# Patient Record
Sex: MALE | Race: WHITE | NOT HISPANIC OR LATINO | Employment: OTHER | URBAN - METROPOLITAN AREA
[De-identification: names, ages, dates, MRNs, and addresses within clinical notes are randomized per-mention and may not be internally consistent; named-entity substitution may affect disease eponyms.]

---

## 2018-05-10 ENCOUNTER — OFFICE VISIT CONVERTED (OUTPATIENT)
Dept: ORTHOPEDIC SURGERY | Facility: CLINIC | Age: 73
End: 2018-05-10
Attending: ORTHOPAEDIC SURGERY

## 2018-06-13 ENCOUNTER — OFFICE VISIT CONVERTED (OUTPATIENT)
Dept: SURGERY | Facility: CLINIC | Age: 73
End: 2018-06-13
Attending: SURGERY

## 2018-06-26 ENCOUNTER — OFFICE VISIT CONVERTED (OUTPATIENT)
Dept: CARDIOLOGY | Facility: CLINIC | Age: 73
End: 2018-06-26
Attending: SPECIALIST

## 2018-07-05 ENCOUNTER — CONVERSION ENCOUNTER (OUTPATIENT)
Dept: SURGERY | Facility: CLINIC | Age: 73
End: 2018-07-05

## 2018-07-05 ENCOUNTER — OFFICE VISIT CONVERTED (OUTPATIENT)
Dept: SURGERY | Facility: CLINIC | Age: 73
End: 2018-07-05
Attending: SURGERY

## 2019-02-26 ENCOUNTER — OFFICE VISIT CONVERTED (OUTPATIENT)
Dept: ORTHOPEDIC SURGERY | Facility: CLINIC | Age: 74
End: 2019-02-26
Attending: ORTHOPAEDIC SURGERY

## 2019-02-26 ENCOUNTER — CONVERSION ENCOUNTER (OUTPATIENT)
Dept: ORTHOPEDIC SURGERY | Facility: CLINIC | Age: 74
End: 2019-02-26

## 2019-05-02 ENCOUNTER — OFFICE VISIT CONVERTED (OUTPATIENT)
Dept: ORTHOPEDIC SURGERY | Facility: CLINIC | Age: 74
End: 2019-05-02
Attending: ORTHOPAEDIC SURGERY

## 2019-05-02 ENCOUNTER — CONVERSION ENCOUNTER (OUTPATIENT)
Dept: ORTHOPEDIC SURGERY | Facility: CLINIC | Age: 74
End: 2019-05-02

## 2019-06-07 ENCOUNTER — OFFICE VISIT CONVERTED (OUTPATIENT)
Dept: ORTHOPEDIC SURGERY | Facility: CLINIC | Age: 74
End: 2019-06-07
Attending: ORTHOPAEDIC SURGERY

## 2019-06-28 ENCOUNTER — HOSPITAL ENCOUNTER (OUTPATIENT)
Dept: MRI IMAGING | Facility: HOSPITAL | Age: 74
Discharge: HOME | End: 2019-06-28
Attending: ORTHOPAEDIC SURGERY

## 2019-07-09 ENCOUNTER — CONVERSION ENCOUNTER (OUTPATIENT)
Dept: ORTHOPEDIC SURGERY | Facility: CLINIC | Age: 74
End: 2019-07-09

## 2019-07-09 ENCOUNTER — OFFICE VISIT CONVERTED (OUTPATIENT)
Dept: ORTHOPEDIC SURGERY | Facility: CLINIC | Age: 74
End: 2019-07-09
Attending: PHYSICIAN ASSISTANT

## 2019-08-21 ENCOUNTER — HOSPITAL ENCOUNTER (OUTPATIENT)
Dept: PERIOP | Facility: HOSPITAL | Age: 74
Setting detail: HOSPITAL OUTPATIENT SURGERY
Discharge: HOME | End: 2019-08-21
Attending: ORTHOPAEDIC SURGERY

## 2019-08-21 LAB
ANION GAP SERPL CALC-SCNC: 15 MMOL/L (ref 8–19)
BUN SERPL-MCNC: 24 MG/DL (ref 5–25)
BUN/CREAT SERPL: 17 {RATIO} (ref 6–20)
CALCIUM SERPL-MCNC: 8.7 MG/DL (ref 8.7–10.4)
CHLORIDE SERPL-SCNC: 104 MMOL/L (ref 99–111)
CONV CO2: 25 MMOL/L (ref 22–32)
CREAT UR-MCNC: 1.4 MG/DL (ref 0.7–1.2)
GFR SERPLBLD BASED ON 1.73 SQ M-ARVRAT: 49 ML/MIN/{1.73_M2}
GLUCOSE SERPL-MCNC: 103 MG/DL (ref 70–99)
OSMOLALITY SERPL CALC.SUM OF ELEC: 294 MOSM/KG (ref 273–304)
POTASSIUM SERPL-SCNC: 4.2 MMOL/L (ref 3.5–5.3)
SODIUM SERPL-SCNC: 140 MMOL/L (ref 135–147)

## 2019-08-26 ENCOUNTER — HOSPITAL ENCOUNTER (OUTPATIENT)
Dept: ORTHOPEDIC SURGERY | Facility: CLINIC | Age: 74
Setting detail: RECURRING SERIES
Discharge: HOME | End: 2019-12-13
Attending: ORTHOPAEDIC SURGERY

## 2019-09-05 ENCOUNTER — OFFICE VISIT CONVERTED (OUTPATIENT)
Dept: ORTHOPEDIC SURGERY | Facility: CLINIC | Age: 74
End: 2019-09-05
Attending: PHYSICIAN ASSISTANT

## 2019-10-08 ENCOUNTER — OFFICE VISIT CONVERTED (OUTPATIENT)
Dept: ORTHOPEDIC SURGERY | Facility: CLINIC | Age: 74
End: 2019-10-08
Attending: PHYSICIAN ASSISTANT

## 2019-11-19 ENCOUNTER — CONVERSION ENCOUNTER (OUTPATIENT)
Dept: ORTHOPEDIC SURGERY | Facility: CLINIC | Age: 74
End: 2019-11-19

## 2019-11-19 ENCOUNTER — OFFICE VISIT CONVERTED (OUTPATIENT)
Dept: ORTHOPEDIC SURGERY | Facility: CLINIC | Age: 74
End: 2019-11-19
Attending: PHYSICIAN ASSISTANT

## 2020-10-22 ENCOUNTER — OFFICE VISIT CONVERTED (OUTPATIENT)
Dept: ORTHOPEDIC SURGERY | Facility: CLINIC | Age: 75
End: 2020-10-22
Attending: PHYSICIAN ASSISTANT

## 2020-11-03 ENCOUNTER — CONVERSION ENCOUNTER (OUTPATIENT)
Dept: ORTHOPEDIC SURGERY | Facility: CLINIC | Age: 75
End: 2020-11-03

## 2020-11-03 ENCOUNTER — OFFICE VISIT CONVERTED (OUTPATIENT)
Dept: ORTHOPEDIC SURGERY | Facility: CLINIC | Age: 75
End: 2020-11-03
Attending: PHYSICIAN ASSISTANT

## 2020-11-17 ENCOUNTER — CONVERSION ENCOUNTER (OUTPATIENT)
Dept: ORTHOPEDIC SURGERY | Facility: CLINIC | Age: 75
End: 2020-11-17

## 2020-11-17 ENCOUNTER — OFFICE VISIT CONVERTED (OUTPATIENT)
Dept: ORTHOPEDIC SURGERY | Facility: CLINIC | Age: 75
End: 2020-11-17
Attending: PHYSICIAN ASSISTANT

## 2021-02-23 ENCOUNTER — HOSPITAL ENCOUNTER (OUTPATIENT)
Dept: VACCINE CLINIC | Facility: HOSPITAL | Age: 76
Discharge: HOME | End: 2021-02-23
Attending: INTERNAL MEDICINE

## 2021-03-25 ENCOUNTER — HOSPITAL ENCOUNTER (OUTPATIENT)
Dept: VACCINE CLINIC | Facility: HOSPITAL | Age: 76
Discharge: HOME | End: 2021-03-25
Attending: INTERNAL MEDICINE

## 2021-05-10 NOTE — H&P
History and Physical      Patient Name: Blair Lira   Patient ID: [id number]   Sex: Male   YOB: 1945    Primary Care Provider: Joy BERMUDEZ   Referring Provider: Joy BERMUDEZ    Visit Date: October 22, 2020    Provider: Lillie Lockett PA-C   Location: [facility]   Location Address: [address] [place] [address]  [id number]   Location Phone: [phone]          Chief Complaint  · Right elbow pain      History Of Present Illness  Blair Lira is a 75 year old /White male who presents today to [facility].      Patient is here for right elbow pain that started 1 month ago. Patient states the last 3-4 days pain increased with redness.       Past Medical History  Aftercare following right hip joint replacement surgery; Avascular necrosis of bone of hip, Right > Left; High blood pressure; History of total replacement of right hip; Hypertension; Lumbago; Lumbar disc herniation, L5-S1; Primary osteoarthritis bilateral hip         Past Surgical History  Artificial Joints/Limbs; Colonoscopy; EGD; Joint Surgery         Medication List  atorvastatin 10 mg oral tablet; hydrochlorothiazide 12.5 mg oral tablet; irbesartan 300 mg oral tablet; Keflex 500 mg oral capsule; Omega 3-6-9 Fatty Acids 400-400-200 mg oral capsule; tamsulosin 0.4 mg oral capsule         Allergy List  NO KNOWN DRUG ALLERGIES         Family Medical History  Heart Disease; Cancer, Unspecified; - No Family History of Colorectal Cancer; *No Known Family History         Social History  Alcohol (Former); Alcohol Use (Never); lives with spouse; .; Recreational Drug Use (Never); Retired.; Tobacco (Never)         Review of Systems  · Constitutional  o Denies  o : fever, chills, weight loss  · Cardiovascular  o Denies  o : chest pain, shortness of breath  · Gastrointestinal  o Denies  o : liver disease, heartburn, nausea, blood in stools  · Genitourinary  o Denies  o : painful urination,  "blood in urine  · Integument  o Denies  o : rash, itching  · Neurologic  o Denies  o : headache, weakness, loss of consciousness  · Musculoskeletal  o Denies  o : painful, swollen joints  · Psychiatric  o Denies  o : drug/alcohol addiction, anxiety, depression      Vitals  Date Time BP Position Site L\R Cuff Size HR RR TEMP (F) WT  HT  BMI kg/m2 BSA m2 O2 Sat FR L/min FiO2        10/22/2020 09:40 AM      94 - R   217lbs 6oz 6'  1\" 28.68 2.25 96 %            Physical Examination  · Constitutional  o Appearance  o : well developed, well-nourished, no obvious deformities present  · Head and Face  o Head  o :   § Inspection  § : normocephalic  o Face  o :   § Inspection  § : no facial lesions  · Eyes  o Conjunctivae  o : conjunctivae normal  o Sclerae  o : sclerae white  · Ears, Nose, Mouth and Throat  o Ears  o :   § External Ears  § : appearance within normal limits  § Hearing  § : intact  o Nose  o :   § External Nose  § : appearance normal  · Neck  o Inspection/Palpation  o : normal appearance  o Range of Motion  o : full range of motion  · Respiratory  o Respiratory Effort  o : breathing unlabored  o Inspection of Chest  o : normal appearance  o Auscultation of Lungs  o : no audible wheezing or rales  · Cardiovascular  o Heart  o : regular rate  · Gastrointestinal  o Abdominal Examination  o : soft and non-tender  · Skin and Subcutaneous Tissue  o General Inspection  o : intact, no rashes  · Psychiatric  o General  o : Alert and oriented x3  o Judgement and Insight  o : judgment and insight intact  o Mood and Affect  o : mood normal, affect appropriate  · In Office Procedures  o View  o : AP/LATERAL  o Site  o : right, elbow  o Indication  o : Right arm pain  o Study  o : X-rays ordered, taken in the office, and reviewed today.  o Xray  o : olecranon spur  o Comparative Data  o : No comparative data found  · Right [address]  o Inspection  o : swelling, erythema, no ecchymosis, no atrophy  o Palpation  o : " biceps insertion tenderness absent, non-tender lateral epicondyle, non-tender medial epicondyle, non-tender at triceps insertion, non-tender radial head, non-tender cubital tunnel, tenderness of olecranon bursa , tenderness at olecranon, non-tender UCL  o Range of Motion  o : normal flexion, normal extension, normal pronation, normal supination  o Strength  o : Full extension, full flexion, full supination, full pronoation  o Special Tests  o : Negative Varus, Negative Valgus, Negative Pivot shift          Assessment  · Right elbow pain     719.42/M25.521  · Olecranon bursitis     726.33/M70.20      Plan  · Orders  o Elbow (Right) 2 views X-Ray [facility] ([id number]) - 719.42/M25.521 - 10/22/2020  · Medications  o Medications have been Reconciled  o Transition of Care or Provider Policy  · Instructions  o Dr. Samuels saw and examined the patient and agrees with plan.   o X-rays reviewed by Dr. Samuels.  o Reviewed the patient's Past Medical, Social, and Family history as well as the ROS at today's visit, no changes.  o Call or return if worsening symptoms.  o X-ray ordered, taken and reviewed at this visit.  o Follow up in 1 week.  o Electronically Identified Patient Education Materials Provided Electronically     Prescribed Keflex 500mg one po TID x 10 days             Electronically Signed by: CHAI Aquino-C -Author on October 22, 2020 10:16:05 AM  Electronically Co-signed by: Gustavo Samuels MD -Reviewer on October 22, 2020 11:39:56 AM

## 2021-05-13 NOTE — PROGRESS NOTES
Progress Note      Patient Name: Blair Lira   Patient ID: [id number]   Sex: Male   YOB: 1945    Primary Care Provider: Joy BERMUDEZ   Referring Provider: Joy BERMUDEZ    Visit Date: November 3, 2020    Provider: Lillie Lockett PA-C   Location: [facility]   Location Address: [address] [place] [address]  [id number]   Location Phone: [phone]          Chief Complaint  · Follow up right elbow pain      History Of Present Illness  Blair Lira is a 75 year old /White male who presents today to [facility].      Patient is following up for right olecranon bursitis. Patient states completing Keflex 500mg for 10 days. Patient states redness has subsided. Patient states mild pain.       Past Medical History  Aftercare following right hip joint replacement surgery; Avascular necrosis of bone of hip, Right > Left; High blood pressure; History of total replacement of right hip; Hypertension; Lumbago; Lumbar disc herniation, L5-S1; Primary osteoarthritis bilateral hip         Past Surgical History  Artificial Joints/Limbs; Colonoscopy; EGD; Joint Surgery         Medication List  atorvastatin 10 mg oral tablet; hydrochlorothiazide 12.5 mg oral tablet; irbesartan 300 mg oral tablet; Keflex 500 mg oral capsule; Omega 3-6-9 Fatty Acids 400-400-200 mg oral capsule; tamsulosin 0.4 mg oral capsule         Allergy List  NO KNOWN DRUG ALLERGIES         Family Medical History  Heart Disease; Cancer, Unspecified; - No Family History of Colorectal Cancer; *No Known Family History         Social History  Alcohol (Former); Alcohol Use (Never); lives with spouse; .; Recreational Drug Use (Never); Retired.; Tobacco (Never)         Review of Systems  · Constitutional  o Denies  o : fever, chills, weight loss  · Cardiovascular  o Denies  o : chest pain, shortness of breath  · Gastrointestinal  o Denies  o : liver disease, heartburn, nausea, blood in  "stools  · Genitourinary  o Denies  o : painful urination, blood in urine  · Integument  o Denies  o : rash, itching  · Neurologic  o Denies  o : headache, weakness, loss of consciousness  · Musculoskeletal  o Denies  o : painful, swollen joints  · Psychiatric  o Denies  o : drug/alcohol addiction, anxiety, depression      Vitals  Date Time BP Position Site L\R Cuff Size HR RR TEMP (F) WT  HT  BMI kg/m2 BSA m2 O2 Sat FR L/min FiO2        11/03/2020 03:07 PM      73 - R   217lbs 0oz 6'  1\" 28.63 2.25 96 %            Physical Examination  · Constitutional  o Appearance  o : well developed, well-nourished, no obvious deformities present  · Head and Face  o Head  o :   § Inspection  § : normocephalic  o Face  o :   § Inspection  § : no facial lesions  · Eyes  o Conjunctivae  o : conjunctivae normal  o Sclerae  o : sclerae white  · Ears, Nose, Mouth and Throat  o Ears  o :   § External Ears  § : appearance within normal limits  § Hearing  § : intact  o Nose  o :   § External Nose  § : appearance normal  · Neck  o Inspection/Palpation  o : normal appearance  o Range of Motion  o : full range of motion  · Respiratory  o Respiratory Effort  o : breathing unlabored  o Inspection of Chest  o : normal appearance  o Auscultation of Lungs  o : no audible wheezing or rales  · Cardiovascular  o Heart  o : regular rate  · Gastrointestinal  o Abdominal Examination  o : soft and non-tender  · Skin and Subcutaneous Tissue  o General Inspection  o : intact, no rashes  · Psychiatric  o General  o : Alert and oriented x3  o Judgement and Insight  o : judgment and insight intact  o Mood and Affect  o : mood normal, affect appropriate  · Right [address]  o Inspection  o : no swelling, no erythema, no ecchymosis, no atrophy  o Palpation  o : biceps insertion tenderness absent, non-tender lateral epicondyle, non-tender medial epicondyle, non-tender at triceps insertion, non-tender radial head, non-tender cubital tunnel, tenderness of " olecranon bursa , non-tender olecranon, non-tender UCL  o Range of Motion  o : normal flexion, normal extension, normal pronation, normal supination  o Strength  o : Full extension, full flexion, full supination, full pronoation  o Special Tests  o : Negative Varus, Negative Valgus, Negative Pivot shift          Assessment  · Pain: Elbow     719.42/M25.529  · Olecranon bursitis     726.33/M70.20      Plan  · Medications  o Medications have been Reconciled  o Transition of Care or Provider Policy  · Instructions  o Reviewed the patient's Past Medical, Social, and Family history as well as the ROS at today's visit, no changes.  o Call or return if worsening symptoms.  o Follow Up in 2 weeks.  o Electronically Identified Patient Education Materials Provided Electronically     Prescribed Keflex 500mg one po TID X 10 days             Electronically Signed by: Lillie Lockett PA-C -Author on November 3, 2020 03:46:31 PM

## 2021-05-13 NOTE — PROGRESS NOTES
Progress Note      Patient Name: Blair Lira   Patient ID: [id number]   Sex: Male   YOB: 1945    Primary Care Provider: Joy BERMUDEZ   Referring Provider: Joy BERMUDEZ    Visit Date: November 17, 2020    Provider: Lillie Lockett PA-C   Location: [facility]   Location Address: [address] [place] [address]  [id number]   Location Phone: [phone]          Chief Complaint  · right elbow pain      History Of Present Illness  Blair Lira is a 75 year old /White male who presents today to [facility].      Patient is following up for right olecranon bursitis. Patient states completing Keflex 500mg for 10 days. Patient states redness has subsided. Patient states mild pain.            Past Medical History  Aftercare following right hip joint replacement surgery; Avascular necrosis of bone of hip, Right > Left; High blood pressure; History of total replacement of right hip; Hypertension; Lumbago; Lumbar disc herniation, L5-S1; Primary osteoarthritis bilateral hip         Past Surgical History  Artificial Joints/Limbs; Colonoscopy; EGD; Joint Surgery         Medication List  atorvastatin 10 mg oral tablet; hydrochlorothiazide 12.5 mg oral tablet; irbesartan 300 mg oral tablet; Omega 3-6-9 Fatty Acids 400-400-200 mg oral capsule; tamsulosin 0.4 mg oral capsule         Allergy List  NO KNOWN DRUG ALLERGIES         Family Medical History  Heart Disease; Cancer, Unspecified; - No Family History of Colorectal Cancer; *No Known Family History         Social History  Alcohol (Former); Alcohol Use (Never); lives with spouse; .; Recreational Drug Use (Never); Retired.; Tobacco (Never)         Review of Systems  · Constitutional  o Denies  o : fever, chills, weight loss  · Cardiovascular  o Denies  o : chest pain, shortness of breath  · Gastrointestinal  o Denies  o : liver disease, heartburn, nausea, blood in stools  · Genitourinary  o Denies  o :  "painful urination, blood in urine  · Integument  o Denies  o : rash, itching  · Neurologic  o Denies  o : headache, weakness, loss of consciousness  · Musculoskeletal  o Denies  o : painful, swollen joints  · Psychiatric  o Denies  o : drug/alcohol addiction, anxiety, depression      Vitals  Date Time BP Position Site L\R Cuff Size HR RR TEMP (F) WT  HT  BMI kg/m2 BSA m2 O2 Sat FR L/min FiO2        11/17/2020 10:07 AM      58 - R   223lbs 8oz 6'  1\" 29.49 2.29 99 %            Physical Examination  · Constitutional  o Appearance  o : well developed, well-nourished, no obvious deformities present  · Head and Face  o Head  o :   § Inspection  § : normocephalic  o Face  o :   § Inspection  § : no facial lesions  · Eyes  o Conjunctivae  o : conjunctivae normal  o Sclerae  o : sclerae white  · Ears, Nose, Mouth and Throat  o Ears  o :   § External Ears  § : appearance within normal limits  § Hearing  § : intact  o Nose  o :   § External Nose  § : appearance normal  · Neck  o Inspection/Palpation  o : normal appearance  o Range of Motion  o : full range of motion  · Respiratory  o Respiratory Effort  o : breathing unlabored  o Inspection of Chest  o : normal appearance  o Auscultation of Lungs  o : no audible wheezing or rales  · Cardiovascular  o Heart  o : regular rate  · Gastrointestinal  o Abdominal Examination  o : soft and non-tender  · Skin and Subcutaneous Tissue  o General Inspection  o : intact, no rashes  · Psychiatric  o General  o : Alert and oriented x3  o Judgement and Insight  o : judgment and insight intact  o Mood and Affect  o : mood normal, affect appropriate  · Injection Note/Aspiration Note  o Site  o : right elbow aspiration  o Procedure  o : Procedure: After educating the patient, patient gave consent for procedure. After using Chloraprep, the joint space was injected. The patient tolerated the procedure well.   o Medication  o : Aspiration with 1cc of 1% Lidocaine  · Right Elbow " [address]  o Inspection  o : swelling, no erythema, no ecchymosis, no atrophy  o Palpation  o : biceps insertion tenderness absent, non-tender lateral epicondyle, non-tender medial epicondyle, non-tender at triceps insertion, non-tender radial head, non-tender cubital tunnel, tenderness of olecranon bursa , non-tender olecranon, non-tender UCL  o Range of Motion  o : normal flexion, normal extension, normal pronation, normal supination  o Strength  o : Full extension, full flexion, full supination, full pronoation  o Special Tests  o : Negative Varus, Negative Valgus, Negative Pivot shift          Assessment  · Right elbow pain     719.42/M25.521  · Olecranon bursitis     726.33/M70.20      Plan  · Orders  o Elbow-Lat Single Tendon (Injection) (78350) - - 11/17/2020   Lot 08 214 DK Exp 08 01 2021 Hospira Aspiration administered by johan PAULA C  · Medications  o Medications have been Reconciled  o Transition of Care or Provider Policy  · Instructions  o Reviewed the patient's Past Medical, Social, and Family history as well as the ROS at today's visit, no changes.  o Call or return if worsening symptoms.  o Follow Up PRN.  o Electronically Identified Patient Education Materials Provided Electronically     Aspirated 5 cc of purulent fluid from right elbow olecranon bursa.             Electronically Signed by: CHAI Aquino-C -Author on November 17, 2020 11:49:20 AM  Electronically Co-signed by: Gustavo Samuels MD -Reviewer on November 17, 2020 03:10:15 PM

## 2021-05-14 VITALS — HEIGHT: 73 IN | OXYGEN SATURATION: 96 % | WEIGHT: 217.37 LBS | BODY MASS INDEX: 28.81 KG/M2 | HEART RATE: 94 BPM

## 2021-05-14 VITALS — HEIGHT: 73 IN | HEART RATE: 73 BPM | BODY MASS INDEX: 28.76 KG/M2 | OXYGEN SATURATION: 96 % | WEIGHT: 217 LBS

## 2021-05-14 VITALS — HEART RATE: 58 BPM | HEIGHT: 73 IN | OXYGEN SATURATION: 99 % | BODY MASS INDEX: 29.62 KG/M2 | WEIGHT: 223.5 LBS

## 2021-05-15 VITALS — HEIGHT: 73 IN | HEART RATE: 70 BPM | BODY MASS INDEX: 30.35 KG/M2 | WEIGHT: 229 LBS | OXYGEN SATURATION: 96 %

## 2021-05-15 VITALS — HEIGHT: 73 IN | WEIGHT: 229 LBS | BODY MASS INDEX: 30.35 KG/M2 | OXYGEN SATURATION: 98 % | HEART RATE: 74 BPM

## 2021-05-15 VITALS — HEIGHT: 73 IN | OXYGEN SATURATION: 95 % | BODY MASS INDEX: 30.35 KG/M2 | HEART RATE: 70 BPM | WEIGHT: 229 LBS

## 2021-05-15 VITALS — WEIGHT: 227.25 LBS | HEART RATE: 87 BPM | HEIGHT: 73 IN | OXYGEN SATURATION: 93 % | BODY MASS INDEX: 30.12 KG/M2

## 2021-05-15 VITALS — WEIGHT: 237.12 LBS | HEIGHT: 73 IN | HEART RATE: 83 BPM | OXYGEN SATURATION: 93 % | BODY MASS INDEX: 31.43 KG/M2

## 2021-05-15 VITALS — HEIGHT: 73 IN | OXYGEN SATURATION: 94 % | HEART RATE: 86 BPM | BODY MASS INDEX: 30.1 KG/M2 | WEIGHT: 227.12 LBS

## 2021-05-16 VITALS — WEIGHT: 228.12 LBS | HEIGHT: 73 IN | HEART RATE: 82 BPM | OXYGEN SATURATION: 93 % | BODY MASS INDEX: 30.23 KG/M2

## 2021-05-16 VITALS — BODY MASS INDEX: 29.95 KG/M2 | WEIGHT: 226 LBS | HEIGHT: 73 IN | RESPIRATION RATE: 16 BRPM

## 2021-05-16 VITALS — WEIGHT: 229 LBS | BODY MASS INDEX: 30.35 KG/M2 | RESPIRATION RATE: 16 BRPM | HEIGHT: 73 IN

## 2021-05-16 VITALS
HEART RATE: 60 BPM | WEIGHT: 226 LBS | HEIGHT: 73 IN | DIASTOLIC BLOOD PRESSURE: 84 MMHG | SYSTOLIC BLOOD PRESSURE: 136 MMHG | BODY MASS INDEX: 29.95 KG/M2

## 2021-05-16 VITALS — OXYGEN SATURATION: 98 % | WEIGHT: 235 LBS | HEART RATE: 75 BPM | HEIGHT: 73 IN | BODY MASS INDEX: 31.14 KG/M2

## 2022-11-15 ENCOUNTER — OFFICE VISIT (OUTPATIENT)
Dept: ORTHOPEDIC SURGERY | Facility: CLINIC | Age: 77
End: 2022-11-15

## 2022-11-15 VITALS — HEIGHT: 73 IN | BODY MASS INDEX: 29.69 KG/M2 | OXYGEN SATURATION: 97 % | WEIGHT: 224 LBS | HEART RATE: 74 BPM

## 2022-11-15 DIAGNOSIS — M25.511 RIGHT SHOULDER PAIN, UNSPECIFIED CHRONICITY: Primary | ICD-10-CM

## 2022-11-15 DIAGNOSIS — M75.41 IMPINGEMENT SYNDROME OF RIGHT SHOULDER: ICD-10-CM

## 2022-11-15 PROCEDURE — 99203 OFFICE O/P NEW LOW 30 MIN: CPT | Performed by: ORTHOPAEDIC SURGERY

## 2022-11-15 PROCEDURE — 20610 DRAIN/INJ JOINT/BURSA W/O US: CPT | Performed by: ORTHOPAEDIC SURGERY

## 2022-11-15 RX ORDER — TAMSULOSIN HYDROCHLORIDE 0.4 MG/1
CAPSULE ORAL
COMMUNITY
Start: 2022-09-23

## 2022-11-15 RX ORDER — ATORVASTATIN CALCIUM 10 MG/1
TABLET, FILM COATED ORAL
COMMUNITY
Start: 2022-09-23

## 2022-11-15 RX ORDER — IRBESARTAN 300 MG/1
300 TABLET ORAL NIGHTLY
COMMUNITY
Start: 2022-09-23

## 2022-11-15 RX ORDER — TRIAMCINOLONE ACETONIDE 40 MG/ML
40 INJECTION, SUSPENSION INTRA-ARTICULAR; INTRAMUSCULAR
Status: COMPLETED | OUTPATIENT
Start: 2022-11-15 | End: 2022-11-15

## 2022-11-15 RX ORDER — HYDROCHLOROTHIAZIDE 25 MG/1
25 TABLET ORAL NIGHTLY
COMMUNITY
Start: 2022-09-23

## 2022-11-15 RX ORDER — LIDOCAINE HYDROCHLORIDE 10 MG/ML
5 INJECTION, SOLUTION INFILTRATION; PERINEURAL
Status: COMPLETED | OUTPATIENT
Start: 2022-11-15 | End: 2022-11-15

## 2022-11-15 RX ORDER — MELOXICAM 15 MG/1
15 TABLET ORAL DAILY
Qty: 30 TABLET | Refills: 0 | Status: SHIPPED | OUTPATIENT
Start: 2022-11-15 | End: 2022-12-12

## 2022-11-15 RX ADMIN — LIDOCAINE HYDROCHLORIDE 5 ML: 10 INJECTION, SOLUTION INFILTRATION; PERINEURAL at 09:17

## 2022-11-15 RX ADMIN — TRIAMCINOLONE ACETONIDE 40 MG: 40 INJECTION, SUSPENSION INTRA-ARTICULAR; INTRAMUSCULAR at 09:17

## 2022-11-15 NOTE — PROGRESS NOTES
"Chief Complaint  Initial Evaluation of the Right Shoulder     Subjective      Blair Lira presents to [facility] for initial evaluation of the right shoulder. No injury occurred but 2-3 weeks ago the shoulder started hurting with burning and aching.  There is just constant pain.  He reports difficulty with sleeping and activity. The patient had a left rotator cuff tear years ago and states this feels like that did when it started. He stated it started more in the bicep but then went back to the shoulder joint.     No Known Allergies     Social History     Socioeconomic History   • Marital status:    Tobacco Use   • Smoking status: Never   • Smokeless tobacco: Never        Review of Systems     Objective   Vital Signs:   Pulse 74   Ht 185.4 cm (73\")   Wt 102 kg (224 lb)   SpO2 97%   BMI 29.55 kg/m²       Physical Exam  Constitutional:       Appearance: Normal appearance. Patient is well-developed and normal weight.   HENT:      Head: Normocephalic.      Right Ear: Hearing and external ear normal.      Left Ear: Hearing and external ear normal.      Nose: Nose normal.   Eyes:      Conjunctiva/sclera: Conjunctivae normal.   Cardiovascular:      Rate and Rhythm: Normal rate.   Pulmonary:      Effort: Pulmonary effort is normal.      Breath sounds: No wheezing or rales.   Abdominal:      Palpations: Abdomen is soft.      Tenderness: There is no abdominal tenderness.   Musculoskeletal:      Cervical back: Normal range of motion.   Skin:     Findings: No rash.   Neurological:      Mental Status: Patient is alert and oriented to person, place, and time.   Psychiatric:         Mood and Affect: Mood and affect normal.         Judgment: Judgment normal.       Ortho Exam      RIGHT SHOULDER radial pulse 2+. Ulnar pulse 2+. Good tone of deltoid, bicep, triceps, wrist extensors and flexors. Positive impingement.  40 ER .  Near full forward elevation.  80 abduction.     Large Joint " Arthrocentesis: R subacromial bursa  Date/Time: 11/15/2022 9:17 AM  Consent given by: patient  Site marked: site marked  Timeout: Immediately prior to procedure a time out was called to verify the correct patient, procedure, equipment, support staff and site/side marked as required   Supporting Documentation  Indications: pain   Procedure Details  Location: shoulder - R subacromial bursa  Preparation: Patient was prepped and draped in the usual sterile fashion  Needle gauge: 21g.  Medications administered: 40 mg triamcinolone acetonide 40 MG/ML; 5 mL lidocaine 1 %  Patient tolerance: patient tolerated the procedure well with no immediate complications            Imaging Results (Most Recent)     Procedure Component Value Units Date/Time    XR Scapula Right [[id number]] Resulted: 11/15/22 0849     Updated: 11/15/22 0852           Result Review :     X-Ray Report:  Right scapula(s) X-Ray  Indication: Evaluation of the right shoulder  AP/Lateral view(s)  Findings: No acute osseous abnormality, no dislocation or fracture.   Prior studies available for comparison:No              Assessment and Plan     Diagnoses and all orders for this visit:    1. Right shoulder pain, unspecified chronicity (Primary)  -     XR Scapula Right    2. Impingement syndrome of right shoulder      Discussed the treatment plan with the patient. Discussed conservative measures as exercises, anti-inflammatory and injection. The patient is going to get a right steroid shoulder injection, anti-inflammatory and HEP.  MRI if these conservative measures do not work.     Educated on risk of smoking. Discussed options for smoking cessation.  Call or return if worsening symptoms.    Follow Up     3-4 weeks.       Patient was given instructions and counseling regarding his condition or for health maintenance advice. Please see specific information pulled into the AVS if appropriate.     Scribed for Gustavo Samuels MD by Abigail Umanzor MA.  11/15/22    08:57 EST    I have personally performed the services described in this document as scribed by the above individual and it is both accurate and complete. Gustavo Samuels MD 11/15/22

## 2022-11-29 ENCOUNTER — LAB (OUTPATIENT)
Dept: LAB | Facility: HOSPITAL | Age: 77
End: 2022-11-29

## 2022-11-29 ENCOUNTER — TRANSCRIBE ORDERS (OUTPATIENT)
Dept: LAB | Facility: HOSPITAL | Age: 77
End: 2022-11-29

## 2022-11-29 DIAGNOSIS — I10 HYPERTENSION, ESSENTIAL: ICD-10-CM

## 2022-11-29 DIAGNOSIS — N18.30 STAGE 3 CHRONIC KIDNEY DISEASE, UNSPECIFIED WHETHER STAGE 3A OR 3B CKD: Primary | ICD-10-CM

## 2022-11-29 DIAGNOSIS — N18.30 STAGE 3 CHRONIC KIDNEY DISEASE, UNSPECIFIED WHETHER STAGE 3A OR 3B CKD: ICD-10-CM

## 2022-11-29 LAB
ALBUMIN SERPL-MCNC: 3.8 G/DL (ref 3.5–5.2)
ALBUMIN UR-MCNC: 17.8 MG/DL
ALBUMIN/GLOB SERPL: 0.9 G/DL
ALP SERPL-CCNC: 56 U/L (ref 39–117)
ALT SERPL W P-5'-P-CCNC: 9 U/L (ref 1–41)
ANION GAP SERPL CALCULATED.3IONS-SCNC: 9.6 MMOL/L (ref 5–15)
AST SERPL-CCNC: 10 U/L (ref 1–40)
BACTERIA UR QL AUTO: NORMAL /HPF
BILIRUB SERPL-MCNC: 0.7 MG/DL (ref 0–1.2)
BILIRUB UR QL STRIP: NEGATIVE
BUN SERPL-MCNC: 26 MG/DL (ref 8–23)
BUN/CREAT SERPL: 16 (ref 7–25)
CALCIUM SPEC-SCNC: 9.1 MG/DL (ref 8.6–10.5)
CHLORIDE SERPL-SCNC: 103 MMOL/L (ref 98–107)
CLARITY UR: CLEAR
CO2 SERPL-SCNC: 24.4 MMOL/L (ref 22–29)
COLOR UR: YELLOW
CREAT SERPL-MCNC: 1.63 MG/DL (ref 0.76–1.27)
CREAT UR-MCNC: 155.6 MG/DL
CREAT UR-MCNC: 162.2 MG/DL
EGFRCR SERPLBLD CKD-EPI 2021: 43.4 ML/MIN/1.73
GLOBULIN UR ELPH-MCNC: 4.2 GM/DL
GLUCOSE SERPL-MCNC: 111 MG/DL (ref 65–99)
GLUCOSE UR STRIP-MCNC: NEGATIVE MG/DL
HGB UR QL STRIP.AUTO: ABNORMAL
HYALINE CASTS UR QL AUTO: NORMAL /LPF
KETONES UR QL STRIP: NEGATIVE
LEUKOCYTE ESTERASE UR QL STRIP.AUTO: NEGATIVE
MICROALBUMIN/CREAT UR: 114.4 MG/G
NITRITE UR QL STRIP: NEGATIVE
PH UR STRIP.AUTO: 6 [PH] (ref 5–8)
POTASSIUM SERPL-SCNC: 4.1 MMOL/L (ref 3.5–5.2)
PROT ?TM UR-MCNC: 101.2 MG/DL
PROT SERPL-MCNC: 8 G/DL (ref 6–8.5)
PROT UR QL STRIP: ABNORMAL
PROT/CREAT UR: 0.62 MG/G{CREAT}
RBC # UR STRIP: NORMAL /HPF
REF LAB TEST METHOD: NORMAL
SODIUM SERPL-SCNC: 137 MMOL/L (ref 136–145)
SP GR UR STRIP: 1.02 (ref 1–1.03)
SQUAMOUS #/AREA URNS HPF: NORMAL /HPF
UROBILINOGEN UR QL STRIP: ABNORMAL
WBC # UR STRIP: NORMAL /HPF

## 2022-11-29 PROCEDURE — 80053 COMPREHEN METABOLIC PANEL: CPT

## 2022-11-29 PROCEDURE — 81001 URINALYSIS AUTO W/SCOPE: CPT

## 2022-11-29 PROCEDURE — 82043 UR ALBUMIN QUANTITATIVE: CPT

## 2022-11-29 PROCEDURE — 84156 ASSAY OF PROTEIN URINE: CPT

## 2022-11-29 PROCEDURE — 82570 ASSAY OF URINE CREATININE: CPT

## 2022-11-29 PROCEDURE — 36415 COLL VENOUS BLD VENIPUNCTURE: CPT

## 2022-12-11 DIAGNOSIS — M75.41 IMPINGEMENT SYNDROME OF RIGHT SHOULDER: ICD-10-CM

## 2022-12-12 RX ORDER — MELOXICAM 15 MG/1
TABLET ORAL
Qty: 30 TABLET | Refills: 0 | Status: SHIPPED | OUTPATIENT
Start: 2022-12-12 | End: 2022-12-27 | Stop reason: SDUPTHER

## 2022-12-27 ENCOUNTER — OFFICE VISIT (OUTPATIENT)
Dept: ORTHOPEDIC SURGERY | Facility: CLINIC | Age: 77
End: 2022-12-27

## 2022-12-27 VITALS — HEIGHT: 73 IN | BODY MASS INDEX: 29.9 KG/M2 | OXYGEN SATURATION: 96 % | HEART RATE: 72 BPM | WEIGHT: 225.6 LBS

## 2022-12-27 DIAGNOSIS — M75.41 IMPINGEMENT SYNDROME OF RIGHT SHOULDER: Primary | ICD-10-CM

## 2022-12-27 PROCEDURE — 99213 OFFICE O/P EST LOW 20 MIN: CPT | Performed by: PHYSICIAN ASSISTANT

## 2022-12-27 RX ORDER — MELOXICAM 15 MG/1
15 TABLET ORAL DAILY
Qty: 30 TABLET | Refills: 0 | Status: SHIPPED | OUTPATIENT
Start: 2022-12-27

## 2022-12-27 RX ORDER — ATENOLOL 50 MG/1
50 TABLET ORAL NIGHTLY
COMMUNITY

## 2022-12-27 RX ORDER — ATORVASTATIN CALCIUM 10 MG/1
TABLET, FILM COATED ORAL EVERY 24 HOURS
COMMUNITY
End: 2023-03-27

## 2022-12-27 NOTE — PROGRESS NOTES
"Chief Complaint  Follow-up of the Right Shoulder    Subjective      Blair Lira presents to [facility] for follow-up of right shoulder impingement syndrome.  He was evaluated in office on 11/15/2022 and received a right shoulder steroid injection.  Reports that his shoulder pain was \"almost gone for about 2 weeks\".  States currently at approximately 50% improvement, however \"I know it is still there\".  Reports his symptoms are somewhat improved with use of meloxicam, which she has been taking on an as-needed basis.  He has near constant aching sensation radiating from his shoulder to his distal bicep.  Has occasional \"popping\".  He has regained full shoulder range of motion.    Objective   No Known Allergies    Vital Signs:   Pulse 72   Ht 185.4 cm (73\")   Wt 102 kg (225 lb 9.6 oz)   SpO2 96%   BMI 29.76 kg/m²       Physical Exam    Constitutional: Awake, alert. Well nourished appearance.    Integumentary: Warm, dry, intact. No obvious rashes.    HENT: Atraumatic, normocephalic.   Respiratory: Non labored respirations .   Cardiovascular: Intact peripheral pulses.    Psychiatric: Normal mood and affect. A&O X3    Ortho Exam  Right shoulder: Skin is warm, dry, and intact.  Full active shoulder forward flexion and abduction.  Full flexion extension of the wrist and elbow.  Tenderness to palpation of AC joint.  Full supination and pronation.  Patient able to form a full fist.    Imaging Results (Most Recent)     None                    Assessment and Plan   Problem List Items Addressed This Visit    None  Visit Diagnoses     Impingement syndrome of right shoulder    -  Primary    Relevant Medications    meloxicam (MOBIC) 15 MG tablet          Follow Up   Return in about 3 months (around 3/16/2023).  Educated on risk of smoking. Discussed options for smoking cessation.    Patient Instructions   Discussed HEP vs PT. Patient would like to continue HEP.     Patient advised to continue " NSAIDs as needed. Advised caution as patient has had abnormal kidney labs in the past. Reports he has discussed NSAID use with Nephrology.     Follow up after 2/15/23 for repeat steroid injection vs MRI, if needed. No imaging. Call with changes or concerns.     Patient was given instructions and counseling regarding his condition or for health maintenance advice. Please see specific information pulled into the AVS if appropriate.

## 2022-12-27 NOTE — PATIENT INSTRUCTIONS
Discussed HEP vs PT. Patient would like to continue HEP.     Patient advised to continue NSAIDs as needed. Advised caution as patient has had abnormal kidney labs in the past. Reports he has discussed NSAID use with Nephrology.     Follow up after 2/15/23 for repeat steroid injection vs MRI, if needed. No imaging. Call with changes or concerns.

## 2023-02-16 ENCOUNTER — OFFICE VISIT (OUTPATIENT)
Dept: ORTHOPEDIC SURGERY | Facility: CLINIC | Age: 78
End: 2023-02-16
Payer: MEDICARE

## 2023-02-16 VITALS — HEART RATE: 62 BPM | OXYGEN SATURATION: 95 % | BODY MASS INDEX: 30.09 KG/M2 | WEIGHT: 227 LBS | HEIGHT: 73 IN

## 2023-02-16 DIAGNOSIS — M75.41 IMPINGEMENT SYNDROME OF RIGHT SHOULDER: Primary | ICD-10-CM

## 2023-02-16 DIAGNOSIS — G89.29 CHRONIC ELBOW PAIN, RIGHT: ICD-10-CM

## 2023-02-16 DIAGNOSIS — M25.521 CHRONIC ELBOW PAIN, RIGHT: ICD-10-CM

## 2023-02-16 PROCEDURE — 99214 OFFICE O/P EST MOD 30 MIN: CPT | Performed by: PHYSICIAN ASSISTANT

## 2023-02-16 NOTE — PATIENT INSTRUCTIONS
Discussed options with patient regarding R shoulder. He has failed conservative measures with steroid injection, HEP, anti-inflammatories. He would like to proceed with MRI, ordered today.    Follow up with MRI results. X-ray R elbow at this visit. Call with changes/concerns.

## 2023-02-16 NOTE — PROGRESS NOTES
"Chief Complaint  Follow-up and Pain of the Right Shoulder    Subjective      Blair Lira presents to [facility] for follow-up of right shoulder and initial evaluation of the right elbow.  Reports that he has good days and bad days.  Reports that his shoulder \"always has aches, pains, and strains\".  He reports temporary relief of right shoulder pain with right shoulder steroid injection received on 11/15/2022.  He has participated in home exercises and been taking meloxicam with minimal improvement.  He is also complaining of chronic right elbow pain.  He is unsure if his elbow pain is related to his shoulder pain, or vice versa.  Reports remote history of right olecranon bursitis, which was aspirated with subsequent development of \"large knot on my right elbow\".  He has not had any further evaluation of this.    Objective   No Known Allergies    Vital Signs:   Pulse 62   Ht 185.4 cm (73\")   Wt 103 kg (227 lb)   SpO2 95%   BMI 29.95 kg/m²       Physical Exam    Constitutional: Awake, alert. Well nourished appearance.    Integumentary: Warm, dry, intact. No obvious rashes.    HENT: Atraumatic, normocephalic.   Respiratory: Non labored respirations .   Cardiovascular: Intact peripheral pulses.    Psychiatric: Normal mood and affect. A&O X3    Ortho Exam  Right shoulder: Skin is warm, dry, and intact.  Full active shoulder forward flexion and abduction, although reported with pain.  Internal rotation to L5.  Full flexion and extension of the wrist and elbow.  Full supination and pronation.  Tenderness to palpation of AC joint.  Impingement signs present.    Right elbow: Skin is warm, dry, and intact.  There is a large palpable prominence overlying the right olecranon.  Full elbow flexion and extension.    Imaging Results (Most Recent)     None                  Assessment and Plan   Problem List Items Addressed This Visit    None  Visit Diagnoses     Impingement syndrome of right " shoulder    -  Primary    Relevant Orders    MRI Shoulder Right Without Contrast    Chronic elbow pain, right            Follow Up   Return for Recheck.  Patient is a non-smoker.  Did not discuss options for smoking cessation.      Patient Instructions   Discussed options with patient regarding R shoulder. He has failed conservative measures with steroid injection, HEP, anti-inflammatories. He would like to proceed with MRI, ordered today.    Follow up with MRI results. X-ray R elbow at this visit. Call with changes/concerns.     Patient was given instructions and counseling regarding his condition or for health maintenance advice. Please see specific information pulled into the AVS if appropriate.

## 2023-03-21 ENCOUNTER — HOSPITAL ENCOUNTER (OUTPATIENT)
Dept: MRI IMAGING | Facility: HOSPITAL | Age: 78
Discharge: HOME | End: 2023-03-21
Admitting: PHYSICIAN ASSISTANT
Payer: MEDICARE

## 2023-03-21 DIAGNOSIS — M75.41 IMPINGEMENT SYNDROME OF RIGHT SHOULDER: ICD-10-CM

## 2023-03-21 PROCEDURE — 73221 MRI JOINT UPR EXTREM W/O DYE: CPT

## 2023-03-23 ENCOUNTER — OFFICE VISIT (OUTPATIENT)
Dept: ORTHOPEDIC SURGERY | Facility: CLINIC | Age: 78
End: 2023-03-23
Payer: MEDICARE

## 2023-03-23 ENCOUNTER — PREP FOR SURGERY (OUTPATIENT)
Dept: OTHER | Facility: HOSPITAL | Age: 78
End: 2023-03-23
Payer: MEDICARE

## 2023-03-23 VITALS — BODY MASS INDEX: 30.09 KG/M2 | OXYGEN SATURATION: 97 % | WEIGHT: 227 LBS | HEIGHT: 73 IN | HEART RATE: 51 BPM

## 2023-03-23 DIAGNOSIS — M75.41 IMPINGEMENT SYNDROME OF RIGHT SHOULDER: Primary | ICD-10-CM

## 2023-03-23 DIAGNOSIS — M75.102 TEAR OF LEFT ROTATOR CUFF, UNSPECIFIED TEAR EXTENT, UNSPECIFIED WHETHER TRAUMATIC: ICD-10-CM

## 2023-03-23 DIAGNOSIS — M75.41 IMPINGEMENT SYNDROME OF RIGHT SHOULDER: ICD-10-CM

## 2023-03-23 DIAGNOSIS — M25.511 RIGHT SHOULDER PAIN, UNSPECIFIED CHRONICITY: Primary | ICD-10-CM

## 2023-03-23 PROBLEM — M75.101 ROTATOR CUFF TEAR, RIGHT: Status: ACTIVE | Noted: 2023-03-23

## 2023-03-23 PROCEDURE — 99214 OFFICE O/P EST MOD 30 MIN: CPT | Performed by: ORTHOPAEDIC SURGERY

## 2023-03-23 RX ORDER — CEFAZOLIN SODIUM 2 G/100ML
2 INJECTION, SOLUTION INTRAVENOUS ONCE
Status: CANCELLED | OUTPATIENT
Start: 2023-03-23 | End: 2023-03-23

## 2023-03-23 RX ORDER — CEFAZOLIN SODIUM IN 0.9 % NACL 3 G/100 ML
3 INTRAVENOUS SOLUTION, PIGGYBACK (ML) INTRAVENOUS ONCE
Status: CANCELLED | OUTPATIENT
Start: 2023-03-23 | End: 2023-03-23

## 2023-03-23 NOTE — PROGRESS NOTES
"Chief Complaint  Follow-up and Pain of the Right Shoulder     Subjective      Blair Lira presents to [facility] for follow-up of right shoulder.  Reports that he has good days and bad days.  Reports that his shoulder \"always has aches, pains, and strains\".  He reports temporary relief of right shoulder pain with right shoulder steroid injection received on 11/15/2022.  He has participated in home exercises and been taking meloxicam with minimal improvement. He is here today for MRI results.     No Known Allergies     Social History     Socioeconomic History   • Marital status:    Tobacco Use   • Smoking status: Never   • Smokeless tobacco: Never   Substance and Sexual Activity   • Alcohol use: Not Currently   • Drug use: Never   • Sexual activity: Not Currently     Partners: Female        Review of Systems     Objective   Vital Signs:   Pulse 51   Ht 185.4 cm (73\")   Wt 103 kg (227 lb)   SpO2 97%   BMI 29.95 kg/m²       Physical Exam  Constitutional:       Appearance: Normal appearance. Patient is well-developed and normal weight.   HENT:      Head: Normocephalic.      Right Ear: Hearing and external ear normal.      Left Ear: Hearing and external ear normal.      Nose: Nose normal.   Eyes:      Conjunctiva/sclera: Conjunctivae normal.   Cardiovascular:      Rate and Rhythm: Normal rate.   Pulmonary:      Effort: Pulmonary effort is normal.      Breath sounds: No wheezing or rales.   Abdominal:      Palpations: Abdomen is soft.      Tenderness: There is no abdominal tenderness.   Musculoskeletal:      Cervical back: Normal range of motion.   Skin:     Findings: No rash.   Neurological:      Mental Status: Patient is alert and oriented to person, place, and time.   Psychiatric:         Mood and Affect: Mood and affect normal.         Judgment: Judgment normal.       Ortho Exam      RIGHT SHOULDER Forward flexion 160. Abduction 160. External rotation 50. Internal rotation " to L5. Positive Cross body adduction. Supraspinatus strength 4/5. Infraspinatus Strength 4/5. Infrared subscap 4/5. Positive Gomez. Positive Neer. Negative Apprehension. Negative Lift off. (Negative Obriens. Sensation intact to light touch, median, radial, ulnar nerve. Positive AIN, PIN, ulnar nerve motor. Positive pulses. Positive Impingement signs. Good strength in triceps, biceps, deltoid, wrist extensors and wrist flexors.         Procedures      Imaging Results (Most Recent)     Procedure Component Value Units Date/Time    XR Scapula Right [[id number]] Resulted: 03/23/23 0835     Updated: 03/23/23 0839           Result Review :     X-Ray Report:  Right scapula X-Ray  Indication: Evaluation of the right scapula  AP/Lateral view(s)  Findings: Moderate degenerative changes.   Prior studies available for comparison: Yes        MRI Shoulder Right Without Contrast    Result Date: 3/22/2023  Narrative: PROCEDURE: MRI SHOULDER RIGHT WO CONTRAST  COMPARISON: None  INDICATIONS: RIGHT SHOULDER PAIN X4-5 MONTHS. NO KNOWN INJURY.      TECHNIQUE: A variety of imaging planes and parameters were utilized for visualization of suspected pathology.  Images were performed without contrast.   FINDINGS:  Rotator cuff tendinosis is present with intermediate signal changes and thickening.  There is partial articular sided tearing of the supraspinatus tendon at the footplate as well as along the critical zone with delamination.  There is partial articular sided tearing of the infraspinatus tendon as well as the superior fibers of the subscapularis tendon.  No evidence of complete tear.  No significant bursitis identified.  The biceps tendon appears intact.  Surrounding fluid is present.  Moderate to severe degenerative changes are present within the acromioclavicular joint.  A moderate amount of surrounding edema and fluid is present.  Moderate degenerative changes are present within the glenohumeral joint.  There is a trace amount of  joint fluid present.  Pan labral degenerative tearing is present, most pronounced along the posterior labrum.  No evidence of a focal displaced tear.  No abnormal focal bone marrow signal is seen. The cortical margins are intact. The volume of the rotator cuff musculature is within normal limits. The surrounding soft tissues are unremarkable.      Impression:   1. Rotator cuff tendinosis with high-grade partial articular sided tearing of the supraspinatus tendon at the footplate and the critical zone with proximal delamination.  There is mild partial articular sided tearing of the infraspinatus and superior fibers of the subscapularis tendons.  No evidence of complete tear. 2. Significant degenerative changes of the acromioclavicular joint with a moderate amount of surrounding edema.  CPPD arthropathy should be considered. 3. Moderate glenohumeral osteoarthritis. 4. Pan labral degenerative tearing with no evidence of a focal displaced tear. 5. Biceps tenosynovitis..     CHAR MCARTHUR MD       Electronically Signed and Approved By: CHAR MCARTHUR MD on 3/22/2023 at 9:57                      Assessment and Plan     Diagnoses and all orders for this visit:    1. Right shoulder pain, unspecified chronicity (Primary)  -     XR Scapula Right    2. Impingement syndrome of right shoulder    3. Tear of right rotator cuff, unspecified tear extent, unspecified whether traumatic        Discussed the treatment plan with the patient. I reviewed the MRI results with the patient. Discussed the treatment options with the patient, operative vs non-operative. The patient expressed understanding and wished to proceed with a right total shoulder arthroscopy.     Discussed surgery., Risks/benefits discussed with patient including, but not limited to: infection, bleeding, neurovascular damage, malunion, nonunion, aesthetic deformity, need for further surgery, and death., Surgery pamphlet given. and Call or return if worsening  symptoms.    Follow Up     2 weeks postoperatively       Patient was given instructions and counseling regarding his condition or for health maintenance advice. Please see specific information pulled into the AVS if appropriate.     Scribed for Gustavo Samuels MD by Abigail Umanzor MA.  03/23/23   08:30 EDT    I have personally performed the services described in this document as scribed by the above individual and it is both accurate and complete. Gustavo Samuels MD 03/23/23

## 2023-03-23 NOTE — H&P (VIEW-ONLY)
"Chief Complaint  Follow-up and Pain of the Right Shoulder     Subjective      Blair Lira presents to [facility] for follow-up of right shoulder.  Reports that he has good days and bad days.  Reports that his shoulder \"always has aches, pains, and strains\".  He reports temporary relief of right shoulder pain with right shoulder steroid injection received on 11/15/2022.  He has participated in home exercises and been taking meloxicam with minimal improvement. He is here today for MRI results.     No Known Allergies     Social History     Socioeconomic History   • Marital status:    Tobacco Use   • Smoking status: Never   • Smokeless tobacco: Never   Substance and Sexual Activity   • Alcohol use: Not Currently   • Drug use: Never   • Sexual activity: Not Currently     Partners: Female        Review of Systems     Objective   Vital Signs:   Pulse 51   Ht 185.4 cm (73\")   Wt 103 kg (227 lb)   SpO2 97%   BMI 29.95 kg/m²       Physical Exam  Constitutional:       Appearance: Normal appearance. Patient is well-developed and normal weight.   HENT:      Head: Normocephalic.      Right Ear: Hearing and external ear normal.      Left Ear: Hearing and external ear normal.      Nose: Nose normal.   Eyes:      Conjunctiva/sclera: Conjunctivae normal.   Cardiovascular:      Rate and Rhythm: Normal rate.   Pulmonary:      Effort: Pulmonary effort is normal.      Breath sounds: No wheezing or rales.   Abdominal:      Palpations: Abdomen is soft.      Tenderness: There is no abdominal tenderness.   Musculoskeletal:      Cervical back: Normal range of motion.   Skin:     Findings: No rash.   Neurological:      Mental Status: Patient is alert and oriented to person, place, and time.   Psychiatric:         Mood and Affect: Mood and affect normal.         Judgment: Judgment normal.       Ortho Exam      RIGHT SHOULDER Forward flexion 160. Abduction 160. External rotation 50. Internal rotation " to L5. Positive Cross body adduction. Supraspinatus strength 4/5. Infraspinatus Strength 4/5. Infrared subscap 4/5. Positive Gomez. Positive Neer. Negative Apprehension. Negative Lift off. (Negative Obriens. Sensation intact to light touch, median, radial, ulnar nerve. Positive AIN, PIN, ulnar nerve motor. Positive pulses. Positive Impingement signs. Good strength in triceps, biceps, deltoid, wrist extensors and wrist flexors.         Procedures      Imaging Results (Most Recent)     Procedure Component Value Units Date/Time    XR Scapula Right [[id number]] Resulted: 03/23/23 0835     Updated: 03/23/23 0839           Result Review :     X-Ray Report:  Right scapula X-Ray  Indication: Evaluation of the right scapula  AP/Lateral view(s)  Findings: Moderate degenerative changes.   Prior studies available for comparison: Yes        MRI Shoulder Right Without Contrast    Result Date: 3/22/2023  Narrative: PROCEDURE: MRI SHOULDER RIGHT WO CONTRAST  COMPARISON: None  INDICATIONS: RIGHT SHOULDER PAIN X4-5 MONTHS. NO KNOWN INJURY.      TECHNIQUE: A variety of imaging planes and parameters were utilized for visualization of suspected pathology.  Images were performed without contrast.   FINDINGS:  Rotator cuff tendinosis is present with intermediate signal changes and thickening.  There is partial articular sided tearing of the supraspinatus tendon at the footplate as well as along the critical zone with delamination.  There is partial articular sided tearing of the infraspinatus tendon as well as the superior fibers of the subscapularis tendon.  No evidence of complete tear.  No significant bursitis identified.  The biceps tendon appears intact.  Surrounding fluid is present.  Moderate to severe degenerative changes are present within the acromioclavicular joint.  A moderate amount of surrounding edema and fluid is present.  Moderate degenerative changes are present within the glenohumeral joint.  There is a trace amount of  joint fluid present.  Pan labral degenerative tearing is present, most pronounced along the posterior labrum.  No evidence of a focal displaced tear.  No abnormal focal bone marrow signal is seen. The cortical margins are intact. The volume of the rotator cuff musculature is within normal limits. The surrounding soft tissues are unremarkable.      Impression:   1. Rotator cuff tendinosis with high-grade partial articular sided tearing of the supraspinatus tendon at the footplate and the critical zone with proximal delamination.  There is mild partial articular sided tearing of the infraspinatus and superior fibers of the subscapularis tendons.  No evidence of complete tear. 2. Significant degenerative changes of the acromioclavicular joint with a moderate amount of surrounding edema.  CPPD arthropathy should be considered. 3. Moderate glenohumeral osteoarthritis. 4. Pan labral degenerative tearing with no evidence of a focal displaced tear. 5. Biceps tenosynovitis..     CHAR MCARTHUR MD       Electronically Signed and Approved By: CHAR MCARTHUR MD on 3/22/2023 at 9:57                      Assessment and Plan     Diagnoses and all orders for this visit:    1. Right shoulder pain, unspecified chronicity (Primary)  -     XR Scapula Right    2. Impingement syndrome of right shoulder    3. Tear of right rotator cuff, unspecified tear extent, unspecified whether traumatic        Discussed the treatment plan with the patient. I reviewed the MRI results with the patient. Discussed the treatment options with the patient, operative vs non-operative. The patient expressed understanding and wished to proceed with a right total shoulder arthroscopy.     Discussed surgery., Risks/benefits discussed with patient including, but not limited to: infection, bleeding, neurovascular damage, malunion, nonunion, aesthetic deformity, need for further surgery, and death., Surgery pamphlet given. and Call or return if worsening  symptoms.    Follow Up     2 weeks postoperatively       Patient was given instructions and counseling regarding his condition or for health maintenance advice. Please see specific information pulled into the AVS if appropriate.     Scribed for Gustavo Samuels MD by Abigail Umanzor MA.  03/23/23   08:30 EDT    I have personally performed the services described in this document as scribed by the above individual and it is both accurate and complete. Gustavo Samuels MD 03/23/23

## 2023-03-29 ENCOUNTER — HOSPITAL ENCOUNTER (OUTPATIENT)
Facility: HOSPITAL | Age: 78
Discharge: HOME | End: 2023-03-29
Attending: ORTHOPAEDIC SURGERY | Admitting: ORTHOPAEDIC SURGERY
Payer: MEDICARE

## 2023-03-29 ENCOUNTER — ANESTHESIA EVENT (OUTPATIENT)
Dept: PERIOP | Facility: HOSPITAL | Age: 78
End: 2023-03-29
Payer: MEDICARE

## 2023-03-29 ENCOUNTER — ANESTHESIA (OUTPATIENT)
Dept: PERIOP | Facility: HOSPITAL | Age: 78
End: 2023-03-29
Payer: MEDICARE

## 2023-03-29 VITALS
SYSTOLIC BLOOD PRESSURE: 141 MMHG | BODY MASS INDEX: 29.19 KG/M2 | HEIGHT: 73 IN | WEIGHT: 220.24 LBS | OXYGEN SATURATION: 98 % | DIASTOLIC BLOOD PRESSURE: 85 MMHG | TEMPERATURE: 96.6 F | RESPIRATION RATE: 18 BRPM | HEART RATE: 65 BPM

## 2023-03-29 DIAGNOSIS — M75.41 IMPINGEMENT SYNDROME OF RIGHT SHOULDER: ICD-10-CM

## 2023-03-29 DIAGNOSIS — S46.011A TRAUMATIC COMPLETE TEAR OF RIGHT ROTATOR CUFF, INITIAL ENCOUNTER: Primary | ICD-10-CM

## 2023-03-29 PROCEDURE — C1713 ANCHOR/SCREW BN/BN,TIS/BN: HCPCS | Performed by: ORTHOPAEDIC SURGERY

## 2023-03-29 PROCEDURE — 23412 REPAIR ROTATOR CUFF CHRONIC: CPT | Performed by: ORTHOPAEDIC SURGERY

## 2023-03-29 PROCEDURE — 25010000002 MIDAZOLAM PER 1MG: Performed by: ANESTHESIOLOGY

## 2023-03-29 PROCEDURE — 25010000002 DEXAMETHASONE PER 1 MG: Performed by: NURSE ANESTHETIST, CERTIFIED REGISTERED

## 2023-03-29 PROCEDURE — 25010000002 CEFAZOLIN IN DEXTROSE 2-4 GM/100ML-% SOLUTION: Performed by: ORTHOPAEDIC SURGERY

## 2023-03-29 PROCEDURE — 29824 SHO ARTHRS SRG DSTL CLAVICLC: CPT | Performed by: ORTHOPAEDIC SURGERY

## 2023-03-29 PROCEDURE — 25010000002 FENTANYL CITRATE (PF) 50 MCG/ML SOLUTION: Performed by: NURSE ANESTHETIST, CERTIFIED REGISTERED

## 2023-03-29 PROCEDURE — 25010000002 PROPOFOL 10 MG/ML EMULSION: Performed by: NURSE ANESTHETIST, CERTIFIED REGISTERED

## 2023-03-29 PROCEDURE — 93010 ELECTROCARDIOGRAM REPORT: CPT | Performed by: INTERNAL MEDICINE

## 2023-03-29 PROCEDURE — 25010000002 PHENYLEPHRINE 10 MG/ML SOLUTION 1 ML VIAL: Performed by: NURSE ANESTHETIST, CERTIFIED REGISTERED

## 2023-03-29 PROCEDURE — 63710000001 ACETAMINOPHEN 500 MG TABLET: Performed by: ANESTHESIOLOGY

## 2023-03-29 PROCEDURE — 93005 ELECTROCARDIOGRAM TRACING: CPT | Performed by: ORTHOPAEDIC SURGERY

## 2023-03-29 PROCEDURE — 25010000002 ROPIVACAINE PER 1 MG: Performed by: ANESTHESIOLOGY

## 2023-03-29 PROCEDURE — A9270 NON-COVERED ITEM OR SERVICE: HCPCS | Performed by: ANESTHESIOLOGY

## 2023-03-29 PROCEDURE — L3670 SO ACRO/CLAV CAN WEB PRE OTS: HCPCS | Performed by: ORTHOPAEDIC SURGERY

## 2023-03-29 PROCEDURE — 25010000002 KETOROLAC TROMETHAMINE PER 15 MG: Performed by: NURSE ANESTHETIST, CERTIFIED REGISTERED

## 2023-03-29 PROCEDURE — 25010000002 ONDANSETRON PER 1 MG: Performed by: NURSE ANESTHETIST, CERTIFIED REGISTERED

## 2023-03-29 DEVICE — IMPLANTABLE DEVICE
Type: IMPLANTABLE DEVICE | Site: SHOULDER | Status: FUNCTIONAL
Brand: QUATTRO® LINK KNOTLESS ANCHOR

## 2023-03-29 DEVICE — IMPLANTABLE DEVICE
Type: IMPLANTABLE DEVICE | Site: SHOULDER | Status: FUNCTIONAL
Brand: QUATTRO® X3 TRIPLE LOADED SUTURE ANCHOR

## 2023-03-29 RX ORDER — OXYCODONE HYDROCHLORIDE 5 MG/1
5 TABLET ORAL
Status: DISCONTINUED | OUTPATIENT
Start: 2023-03-29 | End: 2023-03-29 | Stop reason: HOSPADM

## 2023-03-29 RX ORDER — ONDANSETRON 2 MG/ML
INJECTION INTRAMUSCULAR; INTRAVENOUS AS NEEDED
Status: DISCONTINUED | OUTPATIENT
Start: 2023-03-29 | End: 2023-03-29 | Stop reason: SURG

## 2023-03-29 RX ORDER — LIDOCAINE HYDROCHLORIDE 20 MG/ML
INJECTION, SOLUTION EPIDURAL; INFILTRATION; INTRACAUDAL; PERINEURAL AS NEEDED
Status: DISCONTINUED | OUTPATIENT
Start: 2023-03-29 | End: 2023-03-29 | Stop reason: SURG

## 2023-03-29 RX ORDER — CEFAZOLIN SODIUM 2 G/100ML
2 INJECTION, SOLUTION INTRAVENOUS ONCE
Status: COMPLETED | OUTPATIENT
Start: 2023-03-29 | End: 2023-03-29

## 2023-03-29 RX ORDER — PHENYLEPHRINE HCL IN 0.9% NACL 1 MG/10 ML
SYRINGE (ML) INTRAVENOUS AS NEEDED
Status: DISCONTINUED | OUTPATIENT
Start: 2023-03-29 | End: 2023-03-29 | Stop reason: SURG

## 2023-03-29 RX ORDER — HYDROCODONE BITARTRATE AND ACETAMINOPHEN 7.5; 325 MG/1; MG/1
1-2 TABLET ORAL EVERY 4 HOURS PRN
Qty: 40 TABLET | Refills: 0 | Status: SHIPPED | OUTPATIENT
Start: 2023-03-29 | End: 2023-04-11 | Stop reason: SDUPTHER

## 2023-03-29 RX ORDER — MEPERIDINE HYDROCHLORIDE 25 MG/ML
12.5 INJECTION INTRAMUSCULAR; INTRAVENOUS; SUBCUTANEOUS
Status: DISCONTINUED | OUTPATIENT
Start: 2023-03-29 | End: 2023-03-29 | Stop reason: HOSPADM

## 2023-03-29 RX ORDER — PROMETHAZINE HYDROCHLORIDE 12.5 MG/1
25 TABLET ORAL ONCE AS NEEDED
Status: DISCONTINUED | OUTPATIENT
Start: 2023-03-29 | End: 2023-03-29 | Stop reason: HOSPADM

## 2023-03-29 RX ORDER — KETOROLAC TROMETHAMINE 30 MG/ML
INJECTION, SOLUTION INTRAMUSCULAR; INTRAVENOUS AS NEEDED
Status: DISCONTINUED | OUTPATIENT
Start: 2023-03-29 | End: 2023-03-29 | Stop reason: SURG

## 2023-03-29 RX ORDER — ONDANSETRON 2 MG/ML
4 INJECTION INTRAMUSCULAR; INTRAVENOUS ONCE AS NEEDED
Status: DISCONTINUED | OUTPATIENT
Start: 2023-03-29 | End: 2023-03-29 | Stop reason: HOSPADM

## 2023-03-29 RX ORDER — DEXAMETHASONE SODIUM PHOSPHATE 4 MG/ML
INJECTION, SOLUTION INTRA-ARTICULAR; INTRALESIONAL; INTRAMUSCULAR; INTRAVENOUS; SOFT TISSUE AS NEEDED
Status: DISCONTINUED | OUTPATIENT
Start: 2023-03-29 | End: 2023-03-29 | Stop reason: SURG

## 2023-03-29 RX ORDER — MIDAZOLAM HYDROCHLORIDE 2 MG/2ML
4 INJECTION, SOLUTION INTRAMUSCULAR; INTRAVENOUS ONCE
Status: COMPLETED | OUTPATIENT
Start: 2023-03-29 | End: 2023-03-29

## 2023-03-29 RX ORDER — HYDROCODONE BITARTRATE AND ACETAMINOPHEN 7.5; 325 MG/1; MG/1
1 TABLET ORAL ONCE AS NEEDED
Status: DISCONTINUED | OUTPATIENT
Start: 2023-03-29 | End: 2023-03-29 | Stop reason: HOSPADM

## 2023-03-29 RX ORDER — ROCURONIUM BROMIDE 10 MG/ML
INJECTION, SOLUTION INTRAVENOUS AS NEEDED
Status: DISCONTINUED | OUTPATIENT
Start: 2023-03-29 | End: 2023-03-29 | Stop reason: SURG

## 2023-03-29 RX ORDER — ROPIVACAINE HYDROCHLORIDE 5 MG/ML
INJECTION, SOLUTION EPIDURAL; INFILTRATION; PERINEURAL
Status: COMPLETED | OUTPATIENT
Start: 2023-03-29 | End: 2023-03-29

## 2023-03-29 RX ORDER — ACETAMINOPHEN 500 MG
1000 TABLET ORAL ONCE
Status: COMPLETED | OUTPATIENT
Start: 2023-03-29 | End: 2023-03-29

## 2023-03-29 RX ORDER — PROPOFOL 10 MG/ML
VIAL (ML) INTRAVENOUS AS NEEDED
Status: DISCONTINUED | OUTPATIENT
Start: 2023-03-29 | End: 2023-03-29 | Stop reason: SURG

## 2023-03-29 RX ORDER — FENTANYL CITRATE 50 UG/ML
INJECTION, SOLUTION INTRAMUSCULAR; INTRAVENOUS AS NEEDED
Status: DISCONTINUED | OUTPATIENT
Start: 2023-03-29 | End: 2023-03-29 | Stop reason: SURG

## 2023-03-29 RX ORDER — PROMETHAZINE HYDROCHLORIDE 25 MG/1
25 SUPPOSITORY RECTAL ONCE AS NEEDED
Status: DISCONTINUED | OUTPATIENT
Start: 2023-03-29 | End: 2023-03-29 | Stop reason: HOSPADM

## 2023-03-29 RX ORDER — CEFAZOLIN SODIUM IN 0.9 % NACL 3 G/100 ML
3 INTRAVENOUS SOLUTION, PIGGYBACK (ML) INTRAVENOUS ONCE
Status: DISCONTINUED | OUTPATIENT
Start: 2023-03-29 | End: 2023-03-29 | Stop reason: DRUGHIGH

## 2023-03-29 RX ORDER — SODIUM CHLORIDE, SODIUM LACTATE, POTASSIUM CHLORIDE, CALCIUM CHLORIDE 600; 310; 30; 20 MG/100ML; MG/100ML; MG/100ML; MG/100ML
9 INJECTION, SOLUTION INTRAVENOUS CONTINUOUS PRN
Status: DISCONTINUED | OUTPATIENT
Start: 2023-03-29 | End: 2023-03-29 | Stop reason: HOSPADM

## 2023-03-29 RX ADMIN — ONDANSETRON 4 MG: 2 INJECTION INTRAMUSCULAR; INTRAVENOUS at 13:44

## 2023-03-29 RX ADMIN — FENTANYL CITRATE 50 MCG: 50 INJECTION, SOLUTION INTRAMUSCULAR; INTRAVENOUS at 13:26

## 2023-03-29 RX ADMIN — ROPIVACAINE HYDROCHLORIDE 30 ML: 5 INJECTION, SOLUTION EPIDURAL; INFILTRATION; PERINEURAL at 12:41

## 2023-03-29 RX ADMIN — KETOROLAC TROMETHAMINE 30 MG: 30 INJECTION, SOLUTION INTRAMUSCULAR; INTRAVENOUS at 13:44

## 2023-03-29 RX ADMIN — Medication 100 MCG: at 13:42

## 2023-03-29 RX ADMIN — SUGAMMADEX 200 MG: 100 INJECTION, SOLUTION INTRAVENOUS at 14:20

## 2023-03-29 RX ADMIN — Medication 100 MCG: at 13:45

## 2023-03-29 RX ADMIN — PHENYLEPHRINE HYDROCHLORIDE 0.5 MCG/KG/MIN: 10 INJECTION INTRAVENOUS at 13:36

## 2023-03-29 RX ADMIN — PROPOFOL 200 MG: 10 INJECTION, EMULSION INTRAVENOUS at 13:26

## 2023-03-29 RX ADMIN — CEFAZOLIN SODIUM 2 G: 2 INJECTION, SOLUTION INTRAVENOUS at 13:24

## 2023-03-29 RX ADMIN — LIDOCAINE HYDROCHLORIDE 60 MG: 20 INJECTION, SOLUTION EPIDURAL; INFILTRATION; INTRACAUDAL; PERINEURAL at 13:26

## 2023-03-29 RX ADMIN — MIDAZOLAM HYDROCHLORIDE 4 MG: 1 INJECTION, SOLUTION INTRAMUSCULAR; INTRAVENOUS at 12:31

## 2023-03-29 RX ADMIN — Medication 100 MCG: at 13:36

## 2023-03-29 RX ADMIN — ACETAMINOPHEN 1000 MG: 500 TABLET ORAL at 12:11

## 2023-03-29 RX ADMIN — SODIUM CHLORIDE, POTASSIUM CHLORIDE, SODIUM LACTATE AND CALCIUM CHLORIDE 9 ML/HR: 600; 310; 30; 20 INJECTION, SOLUTION INTRAVENOUS at 12:12

## 2023-03-29 RX ADMIN — FENTANYL CITRATE 50 MCG: 50 INJECTION, SOLUTION INTRAMUSCULAR; INTRAVENOUS at 14:19

## 2023-03-29 RX ADMIN — DEXAMETHASONE SODIUM PHOSPHATE 4 MG: 4 INJECTION, SOLUTION INTRA-ARTICULAR; INTRALESIONAL; INTRAMUSCULAR; INTRAVENOUS; SOFT TISSUE at 13:44

## 2023-03-29 RX ADMIN — ROCURONIUM BROMIDE 50 MG: 10 INJECTION, SOLUTION INTRAVENOUS at 13:26

## 2023-03-29 NOTE — ANESTHESIA PREPROCEDURE EVALUATION
Anesthesia Evaluation     Patient summary reviewed and Nursing notes reviewed   NPO Solid Status: > 6 hours  NPO Liquid Status: > 6 hours           Airway   Mallampati: II  TM distance: >3 FB  Dental      Pulmonary - negative pulmonary ROS and normal exam   Cardiovascular - normal exam  Exercise tolerance: good (4-7 METS)    (+) hypertension, hyperlipidemia,       Neuro/Psych  GI/Hepatic/Renal/Endo - negative ROS     Musculoskeletal     Abdominal    Substance History      OB/GYN          Other                        Anesthesia Plan    ASA 3     general with block     intravenous induction     Anesthetic plan, risks, benefits, and alternatives have been provided, discussed and informed consent has been obtained with: patient.        CODE STATUS:

## 2023-03-29 NOTE — OP NOTE
SHOULDER ARTHROSCOPY WITH ROTATOR CUFF REPAIR  Procedure Report    Patient Name:  Blair Lira  YOB: 1945    Date of Surgery:  3/29/2023     Indications:  shoulder pain/injury, failed conservative care    Pre-op Diagnosis:   Impingement syndrome of right shoulder [M75.41] with rotator cuff tear and primary AC joint arthritis       Post-Op Diagnosis Codes:     * Impingement syndrome of right shoulder [M75.41] with rotator cuff tear and primary AC joint of the right    Procedure/CPT® Codes:      Procedure(s):  SHOULDER ARTHROSCOPY WITH MINI OPEN ROTATOR CUFF REPAIR, arthroscopic extensive SUBCROMIAL DECOMPRESSION, arthroscopic DISTAL CLAVICLE RESECTION    Staff:  Surgeon(s):  Gustavo Samuels MD    Assistant: Jasiel Galvan    Anesthesia: General    Estimated Blood Loss: minimal    Implants:    Implant Name Type Inv. Item Serial No.  Lot No. LRB No. Used Action   SUT/ANCH QUATTRO LINK KNOTLSS 4.5 - [id number] Implant SUT/ANCH QUATTRO LINK KNOTLSS 4.5  [facility] [id number] Right 1 Implanted   SUT/ANCH QUATTRO X3 PRELD W/3/SZ2/FORCEFIBER/SUT 5.5MM - [id number] Implant SUT/ANCH QUATTRO X3 PRELD W/3/SZ2/FORCEFIBER/SUT 5.5MM  [facility] [id number] Right 1 Implanted       Specimen:          None        Findings: + rotator cuff tear    Complications: none    Description of Procedure: The patient was brought to the operating room and had a preoperative antibiotic and preoperative block. The patient was placed in a modified beach chair position and was prepped and draped in sterile fashion. A posterior portal was made. The scope was inserted to the glenohumeral joint. There was no labral tear, no biceps tendon tear, and there was a full-thickness rotator cuff tear. Attention was then turned to the subacromial space where an extensive subacromial decompression was then performed using a VAPR, shaver and a kinga. This included an acromioplasty and bursectomy, inspection of the rotator cuff.   Attention was then turned to the distal clavicle where a distal claviculectomy was then performed using a VAPR and a kinga. Following this, a mini open incision was made. The deltoid was split and retractors placed. The rotator cuff tear was identified and then this was repaired using a single medial row anchor and  lateral row anchor. A good repair was achieved. This was then thoroughly irrigated, closed using #1 Vicryl, 2-0 Vicryl and 3-0 Monocryl, and 3-0 nylon was used for the portals. Sterile dressing was applied followed by application of an ice pack and UltraSling. The patient was then extubated and taken to the recovery room in stable condition. No complications.    Assistant: Jasiel Galvan  was responsible for performing the following activities: Retraction, Suction, Irrigation, Closing and Placing Dressing and their skilled assistance was necessary for the success of this case.    Gustavo Samuels MD     Date: 3/29/2023  Time: 14:22 EDT

## 2023-03-29 NOTE — ANESTHESIA POSTPROCEDURE EVALUATION
Patient: Blair Lira    Procedure Summary     Date: 03/29/23 Room / Location: [facility] OR  / [facility]    Anesthesia Start: 1319 Anesthesia Stop: 1432    Procedure: SHOULDER ARTHROSCOPY WITH ROTATOR CUFF REPAIR, SUBCROMIAL DECOMPRESSION,DISTAL CLAVICLE RESECTION (Right: Shoulder) Diagnosis:       Impingement syndrome of right shoulder      (Impingement syndrome of right shoulder [M75.41])    Surgeons: Gustavo Samuels MD Provider: Dima Izquierdo MD    Anesthesia Type: general with block ASA Status: 3          Anesthesia Type: general with block    Vitals  Vitals Value Taken Time   /79 03/29/23 1447   Temp 35.9 °C (96.6 °F) 03/29/23 1432   Pulse 76 03/29/23 1450   Resp 18 03/29/23 1435   SpO2 94 % 03/29/23 1450   Vitals shown include unvalidated device data.        Post Anesthesia Care and Evaluation    Patient location during evaluation: bedside  Patient participation: complete - patient participated  Level of consciousness: awake  Pain management: adequate    Airway patency: patent  PONV Status: none  Cardiovascular status: acceptable and stable  Respiratory status: acceptable  Hydration status: acceptable    Comments: An Anesthesiologist personally participated in the most demanding procedures (including induction and emergence if applicable) in the anesthesia plan, monitored the course of anesthesia administration at frequent intervals and remained physically present and available for immediate diagnosis and treatment of emergencies.

## 2023-03-29 NOTE — DISCHARGE INSTRUCTIONS
DISCHARGE INSTRUCTIONS  Post-Operative  Arthroscopic Shoulder Surgery      For your surgery you had:  General anesthesia (you may have a sore throat for the first 24 hours)  IV sedation.  Local anesthesia  Monitored anesthesia care  You may experience dizziness, drowsiness, or light-headedness for several hours following surgery/procedure.  Do not stay alone today or tonight.  Limit your activity for 24 hours.  Resume your diet slowly.  Follow whatever special dietary instructions you may have been given by your doctor.  You should not drive or operate machinery or drink alcohol for 24 hours or while you are taking pain medication.  You should not sign legally binding documents.  Post-Operative Day #1 is counted as the 1st day after surgery.  [x] If you had a regional block, you will not have control of your arm for up to 12 hours.  Protect the arm with a sling or follow your physician's specific instructions.  Post-Operative Day #3- SATURDAY  Remove your dressing.  Under the dressing you will either have sutures or staples.  Change dressing once or twice daily.  Place a dry dressing or band-aids over the small incisions.  Prior to dressing change, wash your hands.  You may shower.  During the shower, you may let the water hit the incision and roll down but do not scrub or place any soap on the incision.  Do not soak it.  Pat it dry and do not put any ointments on the incision unless directed by surgeon. Then replace the dry dressing.    General Instructions  [x] Use ice pack to shoulder for 72 hours.  This can help with reducing swelling and pain relief.  Apply 20 minutes on and 20 minutes off.  Never place ice directly on skin.  Avoid getting dressing wet.  Keep arm elevated with sling to decrease swelling and minimize throbbing pain.  The sling will provide support for your shoulder.  It is important to remove the sling 3-5 times daily to work on range-of-motion of the elbow, wrist and hand.  You will receive a  prescription for physical therapy, unless otherwise instructed by physician.  It is okay to come out of the sling for showering or for certain activities of daily living but avoid any lifting or carrying with the affected arm until instructed by the physician especially if you have had a repair of the rotator cuff or some type of reconstructive procedure.  It is okay to come out of the sling and support the arm with a pillow if you remain sedentary.  In general, sling use is preferred following a repair or reconstruction for 4 weeks.  If you have had a SAD (sub acromial decompression), continue sling use more liberally because there was not a repair or reconstruction that could be harmed.          DISCHARGE INSTRUCTIONS  Post-Operative   Arthroscopic Shoulder Surgery      Exercise fingers for 10 minutes every hour while awake.  The physician will typically inform the family and the patient whether or not a repair or reconstruction could be harmed.  If you have had a rotator cuff repair or reconstructive procedure, do not let the arm drop down suddenly from an elevated position down to your side despite improvements made in pain and over the 3 months following repair and reconstruction.  It generally takes 8-12 weeks before the repair or reconstruction does not rely on sutures and anchors that are placed for the repair.  You are generally given a prescription for a narcotic medication.  Take as prescribed.  You may use over-the-counter anti-inflammatory medications such as Motrin or Aleve for additional pain or fever reduction if not allergic.  If you are taking the pain medication prescribed, do not take Tylenol too unless you consult with the pharmacist. The pain medications generally have Tylenol in them and too much Tylenol can be harmful.  Sometimes it is recommended to take an anti-inflammatory in between doses of prescription pain medication if additional pain relief is needed.  Consult with your physician or  your pharmacist before taking over-the-counter pain medications/anti-inflammatories.  It is not uncommon to have a fever post-operatively especially in the first 24-48 hours.  Anti-inflammatories can be used for fever reduction also.  It is normal to have some redness or irritation around skin sutures or staples, however, if you have any expanding areas of redness with a persistent fever and increasing pain notify the physician as soon as possible.  The incidence of blood clots is low following arthroscopic procedures, however, if you notice any increasing pain or swelling in your calves or legs, contact the physician as soon as possible.   It is difficult to sleep in the customary fashion following a shoulder surgery.  It is usually necessary to sleep with the shoulder above the level of the heart such as in a recliner, couch or with the head of the bed elevated.  This should slowly improve over the weeks following shoulder surgery.  If unable to urinate 6 to 8 hours after surgery or urinating frequently in small amounts, notify the physician or go to the nearest Emergency Room.  SPECIAL INSTRUCTIONS:        NOTIFY YOUR PHYSICIAN IF YOU EXPERIENCE:  Numbness of fingers.  Inability to move fingers.  Extreme coldness, paleness or blue dis-coloration of fingers.  Any pain other than from the incision, such as swelling of the arm that blocks circulation of fingers.  Follow verbal instructions from your doctor.  Medications per physician instructions as indicated on Discharge Medication Information Sheet.  You should see  ______________________for follow-up care  on     Phone number: __________________________________  Missing your appointment/follow-up could lead to serious complications  If you have an emergency and cannot reach your doctor, go to an Emergency Room nearest your home.

## 2023-03-29 NOTE — ANESTHESIA PROCEDURE NOTES
Peripheral Block      Patient reassessed immediately prior to procedure    Patient location during procedure: pre-op  Reason for block: at surgeon's request and post-op pain management  Preanesthetic Checklist  Completed: patient identified, IV checked, site marked, risks and benefits discussed, surgical consent, monitors and equipment checked, pre-op evaluation and timeout performed  Prep:  Pt Position: supine (HOB elevated)  Sterile barriers:cap, washed/disinfected hands, sterile barriers, gloves, mask, partial drape and alcohol skin prep  Prep: ChloraPrep  Patient monitoring: blood pressure monitoring, continuous pulse oximetry and EKG  Procedure    Sedation: yes  Performed under: local infiltration  Guidance:ultrasound guided and nerve stimulator    ULTRASOUND INTERPRETATION.  Using ultrasound guidance a 22 G gauge needle was placed in close proximity to the brachial plexus nerve, at which point, under ultrasound guidance anesthetic was injected in the area of the nerve and spread of the anesthesia was seen on ultrasound in close proximity thereto.  There were no abnormalities seen on ultrasound; a digital image was taken; and the patient tolerated the procedure with no complications. Images:still images obtained, printed/placed on chart    Laterality:right  Block Type:interscalene  Injection Technique:single-shot  Needle Type:echogenic  Needle Gauge:22 G (2in)  Resistance on Injection: none    Medications Used: ropivacaine (NAROPIN) 0.5 % injection - Injection   30 mL - 3/29/2023 12:41:00 PM      Post Assessment  Injection Assessment: negative aspiration for heme, no paresthesia on injection and incremental injection  Patient Tolerance:comfortable throughout block  Complications:no  Additional Notes  The block or continuous infusion is requested by the referring physician for management of postoperative pain, or pain related to a procedure. Ultrasound guidance (deemed medically necessary). Painless injection,  pt was awake and conversant during the procedure without complications. Needle and surrounding structures visualized throughout procedure. No adverse reactions or complications seen during this period. Post-procedure image showed no signs of complication, and anatomy was consistent with an uncomplicated nerve blockade.

## 2023-03-30 LAB — QT INTERVAL: 394 MS

## 2023-04-11 ENCOUNTER — OFFICE VISIT (OUTPATIENT)
Dept: ORTHOPEDIC SURGERY | Facility: CLINIC | Age: 78
End: 2023-04-11
Payer: MEDICARE

## 2023-04-11 ENCOUNTER — TREATMENT (OUTPATIENT)
Dept: PHYSICAL THERAPY | Facility: CLINIC | Age: 78
End: 2023-04-11
Payer: MEDICARE

## 2023-04-11 VITALS — BODY MASS INDEX: 29.16 KG/M2 | HEIGHT: 73 IN | WEIGHT: 220 LBS

## 2023-04-11 DIAGNOSIS — Z98.890 S/P RIGHT ROTATOR CUFF REPAIR: Primary | ICD-10-CM

## 2023-04-11 DIAGNOSIS — Z47.89 AFTERCARE FOLLOWING SURGERY OF THE MUSCULOSKELETAL SYSTEM: Primary | ICD-10-CM

## 2023-04-11 DIAGNOSIS — S46.011A TRAUMATIC COMPLETE TEAR OF RIGHT ROTATOR CUFF, INITIAL ENCOUNTER: ICD-10-CM

## 2023-04-11 DIAGNOSIS — M25.611 SHOULDER STIFFNESS, RIGHT: ICD-10-CM

## 2023-04-11 DIAGNOSIS — R29.898 SHOULDER WEAKNESS: ICD-10-CM

## 2023-04-11 PROCEDURE — 97110 THERAPEUTIC EXERCISES: CPT

## 2023-04-11 PROCEDURE — 97140 MANUAL THERAPY 1/> REGIONS: CPT

## 2023-04-11 PROCEDURE — 1160F RVW MEDS BY RX/DR IN RCRD: CPT | Performed by: PHYSICIAN ASSISTANT

## 2023-04-11 PROCEDURE — 97161 PT EVAL LOW COMPLEX 20 MIN: CPT

## 2023-04-11 PROCEDURE — 99024 POSTOP FOLLOW-UP VISIT: CPT | Performed by: PHYSICIAN ASSISTANT

## 2023-04-11 PROCEDURE — 1159F MED LIST DOCD IN RCRD: CPT | Performed by: PHYSICIAN ASSISTANT

## 2023-04-11 RX ORDER — HYDROCODONE BITARTRATE AND ACETAMINOPHEN 7.5; 325 MG/1; MG/1
1-2 TABLET ORAL EVERY 4 HOURS PRN
Qty: 40 TABLET | Refills: 0 | Status: SHIPPED | OUTPATIENT
Start: 2023-04-11

## 2023-04-11 NOTE — PATIENT INSTRUCTIONS
Sutures/staples removed in office today. Steri-strips applied. Patient educated on incision care. May shower, but do not submerge in water until fully healed. Do not apply creams or lotions. Allow steri-strips to fall off on their own in 7-10 days.     Continue sling for the next 4 weeks. May remove for hygiene and shoulder pendulums, gentle range of motion exercises to the elbow, wrist, and hand/fingers. No active range of motion of the shoulder. No lifting, pushing, or pulling.     Continue PT/OT for passive stretching/gentle range of motion only.     Follow-up in 4 weeks. No x-rays needed. Call with any changes or concerns.

## 2023-04-11 NOTE — TELEPHONE ENCOUNTER
Patient was seen in office today by luz and would like a refill on pain meds. [facility]

## 2023-04-11 NOTE — PROGRESS NOTES
Physical Therapy Initial Evaluation and Plan of Care      [facility]: [address] [place] [address]      Patient: Blair Lira   : 1945  Diagnosis/ICD-10 Code:  S/P right rotator cuff repair [Z98.890]  Referring practitioner: Gustavo Samuels MD  Date of Initial Visit: 2023  Today's Date: 2023  Patient seen for 1 sessions           Subjective Questionnaire: QuickDASH: 38      Subjective   Pt reports to physical therapy s/p R RTC repair w/ subacromial decompression and distal clavicle resection performed on 3/29/2023.   Pt reports about 6 months ago they started to have some pain. Pt reports they tried shots but did not help at the time. Pt reports they were having some soreness in their R shoulder the yesterday, and was unable to sleep at all last night. Pt states they did have some time out of their sling when they were sitting and may not have had it supported enough. Pt reports their current pain is about a 5/10 in their R shoulder. Pt reports it was a '12/10' last night. Pt has had some days with lower pain.       Pt occupation: retired; everyday stuff; want to decrease their overall pain.     Easing Factors: ice, pain medication,     Past Medical Hx: R shoulder pain deemed as 'frozen shoulder' in the past. Never lost movement.  HTN (controlled w/ medication), and arthritis. RTC repair on the L.     Pt is L handed.       Objective          Observations     Additional Shoulder Observation Details  Sling present for R UE.   Pt has steri strips in place. Pt has bandages covering their incisions on the R.     Active Range of Motion   Left Shoulder   Flexion: 135 degrees   Abduction: 115 degrees   External rotation 0°: 68 degrees     Additional Active Range of Motion Details  L functional:  IR: T9  ER: T3        Passive Range of Motion     Right Shoulder   Flexion: 90 degrees   Abduction: 90 degrees   External rotation 45°: 0 degrees     Strength/Myotome Testing     Left  Shoulder     Planes of Motion   Flexion: 4+   Abduction: 5   External rotation at 0°: 5   Internal rotation at 0°: 5     Isolated Muscles   Biceps: 5   Triceps: 5     Additional Strength Details  R UE not tested today.       See Exercise, Manual, and Modality Logs for complete treatment.       Assessment & Plan     Assessment  Impairments: abnormal coordination, abnormal muscle firing, abnormal or restricted ROM, activity intolerance, impaired physical strength, lacks appropriate home exercise program and pain with function  Functional Limitations: carrying objects, lifting, sleeping, pulling, pushing, uncomfortable because of pain, moving in bed, sitting, reaching behind back, reaching overhead and unable to perform repetitive tasks  Assessment details: Pt reports to physical therapy s/p R RTC repair w/ subacromial decompression and distal clavicle resection performed on 3/29/2023. Pt presents w/ decreased ROM, decreased strength, and decreased tolerance to performance of ADLs. Pt has increased perceived deficits described by Nicholas. Pt has been educated on their HEP. Pt will benefit from skilled physical therapy to formally rehabilitate post operatively as well as to address their current impairments and limitations. This will assist the pt in returning to perform ADLs and daily tasks without restrictions and with decreased pain.   Prognosis: good    Goals  Plan Goals: SHOULDER  PROBLEMS:     1. The patient has limited ROM of the R shoulder.    LTG 1: 12 weeks:  The patient will demonstrate 165 degrees of R shoulder flexion, 165 degrees of shoulder abduction, 80 degrees of shoulder external rotation, and 80 degrees of shoulder internal rotation to allow the patient to reach into upper kitchen cabinets and manipulate clothing behind the back with greater ease.    STATUS:  New   STG 1a: 6 weeks:  The patient will demonstrate 125 degrees of R shoulder flexion, 125 degrees of shoulder abduction, 70 degrees of  shoulder external rotation, and 70 degrees of shoulder internal rotation.    STATUS:  New   TREATMENT: Manual therapy, therapeutic exercise, home exercise instruction, and modalities as needed to include: electrical stimulation, ultrasound, moist heat, and ice.    2. The patient has limited strength of the R shoulder.   LTG 2: 12 weeks:  The patient will demonstrate 4+/5 strength for R shoulder flexion, abduction, external rotation, and internal rotation in order to demonstrate improved shoulder stability.    STATUS:  New   STG 2a: 8 weeks:  The patient will demonstrate 3+/5 strength for R shoulder flexion, abduction, external rotation, and internal rotation.    STATUS:  New   STG2b:  2 weeks:  The patient will be independent with home exercises.     STATUS:  New   TREATMENT: Manual therapy, therapeutic exercise, home exercise instruction, and modalities as needed to include: electrical stimulation, ultrasound, moist heat, and ice.     3. The patient complains of pain to the R shoulder.   LTG 3: 12 weeks:  The patient will report a pain rating of 1/10 or better in order to improve sleep quality and tolerance to performance of activities of daily living.    STATUS:  New   STG 3a: 8 weeks:  The patient will report a pain rating of 3/10 or better.     STATUS:  New   TREATMENT: Manual therapy, therapeutic exercise, home exercise instruction, and modalities as needed to include: electrical stimulation, ultrasound, moist heat, and ice.    4. Carrying, Moving, and Handling Objects Functional Limitation     LTG 4: 12 weeks:  The patient will demonstrate 9 % limitation by achieving a score of 15 on the QuickDASH.    STATUS:  New   STG 4a: 6 weeks:  The patient will demonstrate 27 % limitation by achieving a score of 23 on the QuickDASH.      STATUS:  New   TREATMENT:  Manual therapy, therapeutic exercise, home exercise instruction, and modalities as needed to include: moist heat, electrical stimulation, and  ultrasound.            PLAN:  Therapy options: will receive skilled therapy services  Planned modality interventions: Cryotherapy  Planned therapy interventions:balance/weight-bearing training, ADL retraining, soft tissue mobilization, strengthening, stretching, therapeutic activities, manual therapy, joint mobilization, home exercise program/patient education, gait training, functional ROM exercises, flexibility, body mechanics training, postural training and neuromuscular re-education  Frequency: 3x per week  Duration in weeks: 12  Treatment plan discussed with: patient      Visit Diagnoses:    ICD-10-CM ICD-9-CM   1. S/P right rotator cuff repair  Z98.890 V45.89   2. Shoulder stiffness, right  M25.611 719.51   3. Shoulder weakness  R29.898 719.61       History # of Personal Factors and/or Comorbidities: LOW (0)  Examination of Body System(s): # of elements: LOW (1-2)  Clinical Presentation: STABLE   Clinical Decision Making: LOW       Timed:         Manual Therapy:    15     mins  55425;     Therapeutic Exercise:    8     mins  07231;     Neuromuscular Randall:    0    mins  25538;    Therapeutic Activity:     0     mins  97094;     Gait Trainin     mins  06373;     Ultrasound:     0     mins  89379;    Ionto                               0    mins   48163  Self Care                       0     mins   78935  Canalith Repos    0     mins 98111      Un-Timed:  Electrical Stimulation:    0     mins  50236 ( );  Dry Needling     0     mins self-pay  Traction     0     mins 26313  Low Eval     15     Mins  83456  Mod Eval     0     Mins  02617  High Eval                       0     Mins  65365  Re-Eval                           0    mins  50496    Timed Treatment:   23   mins   Total Treatment:     38   mins    PT SIGNATURE: Cheo Moreno PT, DPT    Electronically signed 2023    [place] License: PT - [id number]    Initial Certification  Certification Period: 2023 thru 2023  I certify that the therapy  services are furnished while this patient is under my care.  The services outlined above are required by this patient, and will be reviewed every 90 days.     PHYSICIAN: Gustavo Samuels MD  NPI: [id number]      DATE:     Please sign and return via fax to [phone]. Thank you, [facility].

## 2023-04-11 NOTE — PROGRESS NOTES
"Chief Complaint  Pain and Follow-up of the Right Shoulder    Subjective      Blair Lira presents to [facility] for follow-up of right shoulder arthroscopy with mini open rotator cuff repair, extensive subacromial decompression, and distal clavicle resection performed on 3/29/2023 by Dr. Samuels.  Patient presents today with postoperative sling in place.  Reports that his shoulder is \"mostly pretty good\", however, that he \"had a rough night last night\".  Reports that he thinks he \"overdid it yesterday\".  Reports he was walking around his house without his sling in place.  He states he did not know that he was instructed to continue this at all times.  He did start outpatient physical therapy through [facility] this morning.    Objective   No Known Allergies    Vital Signs:   Ht 185.4 cm (73\")   Wt 99.8 kg (220 lb)   BMI 29.03 kg/m²       Physical Exam    Constitutional: Awake, alert. Well nourished appearance.    Integumentary: Warm, dry, intact. No obvious rashes.    HENT: Atraumatic, normocephalic.   Respiratory: Non labored respirations .   Cardiovascular: Intact peripheral pulses.    Psychiatric: Normal mood and affect. A&O X3    Ortho Exam  Right shoulder: Well-healing surgical incisions visualized, which are clean, dry, and intact.  No evidence of wound dehiscence, surrounding erythema, warmth, or drainage.  Full flexion and extension of the wrist and elbow.  Full pronation and supination.  Patient is able to form a full fist.  Sensation intact light touch.  Distal neurovascular intact.    Imaging Results (Most Recent)     None                    Assessment and Plan   Problem List Items Addressed This Visit    None  Visit Diagnoses     Aftercare following R shoulder arthroscopy with RCR, CÉSAR, DC    -  Primary        Follow Up   Return in about 4 weeks (around 5/9/2023).  Patient is a non-smoker.  Did not discuss options for smoking cessation.    Patient " Instructions   Sutures/staples removed in office today. Steri-strips applied. Patient educated on incision care. May shower, but do not submerge in water until fully healed. Do not apply creams or lotions. Allow steri-strips to fall off on their own in 7-10 days.     Continue sling for the next 4 weeks. May remove for hygiene and shoulder pendulums, gentle range of motion exercises to the elbow, wrist, and hand/fingers. No active range of motion of the shoulder. No lifting, pushing, or pulling.     Continue PT/OT for passive stretching/gentle range of motion only.     Follow-up in 4 weeks. No x-rays needed. Call with any changes or concerns.       Patient was given instructions and counseling regarding his condition or for health maintenance advice. Please see specific information pulled into the AVS if appropriate.         no difficulties

## 2023-04-13 ENCOUNTER — TREATMENT (OUTPATIENT)
Dept: PHYSICAL THERAPY | Facility: CLINIC | Age: 78
End: 2023-04-13
Payer: MEDICARE

## 2023-04-13 DIAGNOSIS — M25.611 SHOULDER STIFFNESS, RIGHT: ICD-10-CM

## 2023-04-13 DIAGNOSIS — R29.898 SHOULDER WEAKNESS: ICD-10-CM

## 2023-04-13 DIAGNOSIS — Z98.890 S/P RIGHT ROTATOR CUFF REPAIR: Primary | ICD-10-CM

## 2023-04-13 PROCEDURE — 97140 MANUAL THERAPY 1/> REGIONS: CPT | Performed by: PHYSICAL THERAPIST

## 2023-04-13 NOTE — PROGRESS NOTES
Outpatient Physical Therapy  [address] [facility], [place] [address]                 Physical Therapy Daily Treatment Note    Patient: Blair Lira   : 1945  Diagnosis/ICD-10 Code:  S/P right rotator cuff repair [Z98.890]  Referring practitioner: Gustavo Samuels MD  Date of Initial Visit: Type: THERAPY  Noted: 2023  Today's Date: 2023  Patient seen for 2 sessions             Subjective   Blair Lira reports: he hasn't been sleeping well due to right shoulder pain and being uncomfortable. Patient reports he was feeling good one day so he took off his sling and did some yard work. Patient states he had a lot of increased pain that night and required two pain pills to relive the pain.     Pain: 0/10 pain, in right shoulder at time of arrival     Objective     See Exercise, Manual, and Modality Logs for complete treatment.     Assessment/Plan  Pt tolerated manual well, with minimal discomfort at end range of motion. Patient reported he has been guarding his shoulder so patient was instructed to add upper trap stretch into his HEP. Pt would benefit from skilled PT to address Range of Motion  and Strength deficits with surgical protocol, pain management and any concerns with ADLs.     Progress per Plan of Care       Timed:  Manual Therapy:    23     mins  55955;  Therapeutic Exercise:    3     mins  76361;     Neuromuscular Randall:    0    mins  09406;    Therapeutic Activity:     0     mins  60402;     Gait Trainin     mins  63417;    Aquatic Therapy:     0     mins  74019;       Untimed:  Electrical Stimulation:    0     mins  92549 ( );  Mechanical Traction:    0     mins  60257;       Timed Treatment:   26   mins   Total Treatment:     26   mins      Electronically signed:   Shona Wills PTA  Physical Therapist Assistant  [facility] License #: [id number]

## 2023-04-17 ENCOUNTER — TREATMENT (OUTPATIENT)
Dept: PHYSICAL THERAPY | Facility: CLINIC | Age: 78
End: 2023-04-17
Payer: MEDICARE

## 2023-04-17 DIAGNOSIS — Z98.890 S/P RIGHT ROTATOR CUFF REPAIR: Primary | ICD-10-CM

## 2023-04-17 DIAGNOSIS — M25.611 SHOULDER STIFFNESS, RIGHT: ICD-10-CM

## 2023-04-17 DIAGNOSIS — R29.898 SHOULDER WEAKNESS: ICD-10-CM

## 2023-04-17 NOTE — PROGRESS NOTES
Outpatient Physical Therapy  [address] [place] [address]                            Physical Therapy Daily Treatment Note    Patient: Blair Lira   : 1945  Diagnosis/ICD-10 Code:  S/P right rotator cuff repair [Z98.890]  Referring practitioner: Gustavo Samuels MD  Date of Initial Visit: Type: THERAPY  Noted: 2023  Today's Date: 2023  Patient seen for 3 sessions             Subjective   Blair Lira reports: having trouble with finding a comfortable position to sleep at night. Reports no soreness after last PT session. States wearing the sling all the time, except yesterday for about 30 minutes while sitting in recliner. Reports only taking pain meds at night.     Pain: 0/10 pain, currently.    Objective   Pt had no increased pain/discomfort.    See Exercise, Manual, and Modality Logs for complete treatment.     Assessment/Plan  Blair progressing as evident by decreased overall shoulder pain. Pt tolerated exercises and manual well, no complaints of increased pain or discomfort. Pt would benefit from skilled PT to address Range of Motion  and Strength deficits with surgical protocol, pain management and any concerns with ADLs.     Progress per Plan of Care         Timed:  Manual Therapy:    22     mins  54723;  Therapeutic Exercise:    8    mins  12503;     Neuromuscular Randall:        mins  73436;    Therapeutic Activity:          mins  62272;     Gait Training:           mins  99653;    Aquatic Therapy:          mins  94592;       Untimed:  Electrical Stimulation:         mins  52815 ( );  Mechanical Traction:         mins  28136;       Timed Treatment:  30    mins   Total Treatment:    30    mins      Electronically signed:   Coty Rg PTA Student    Supervised by:  Ann Howe PTA  Physical Therapist Assistant  [facility] License #: [id number]

## 2023-04-20 ENCOUNTER — TREATMENT (OUTPATIENT)
Dept: PHYSICAL THERAPY | Facility: CLINIC | Age: 78
End: 2023-04-20
Payer: MEDICARE

## 2023-04-20 DIAGNOSIS — R29.898 SHOULDER WEAKNESS: ICD-10-CM

## 2023-04-20 DIAGNOSIS — Z98.890 S/P RIGHT ROTATOR CUFF REPAIR: Primary | ICD-10-CM

## 2023-04-20 DIAGNOSIS — M25.611 SHOULDER STIFFNESS, RIGHT: ICD-10-CM

## 2023-04-20 NOTE — PROGRESS NOTES
Outpatient Physical Therapy  [address] [facility], [place] [address]                 Physical Therapy Daily Treatment Note    Patient: Blair Lira   : 1945  Diagnosis/ICD-10 Code:  S/P right rotator cuff repair [Z98.890]  Referring practitioner: Gustavo Samuels MD  Date of Initial Visit: Type: THERAPY  Noted: 2023  Today's Date: 2023  Patient seen for 4 sessions             Subjective   Blair Lira reports: no pain at time of arrival and patient states he preforms his exercises 2-4x a day.    Objective   No complaints of increased pain or discomfort.     See Exercise, Manual, and Modality Logs for complete treatment.     Assessment/Plan  Blair's right shoulder ROM progressing well overall, with some limitation with external rotation. Pt would benefit from skilled PT to address Range of Motion  and Strength deficits, pain management and any concerns with ADLs.     Progress per Plan of Care       Timed:  Manual Therapy:    23     mins  43054;  Therapeutic Exercise:    0     mins  30413;     Neuromuscular Randall:    0    mins  33089;    Therapeutic Activity:     0     mins  67093;     Gait Trainin     mins  58119;    Aquatic Therapy:     0     mins  05834;       Untimed:  Electrical Stimulation:    0     mins  10500 ( );  Mechanical Traction:    0     mins  99177;       Timed Treatment:   23   mins   Total Treatment:     23   mins      Electronically signed:   Shona Wills PTA  Physical Therapist Assistant  [facility] [place] License #: [id number]

## 2023-04-24 ENCOUNTER — TREATMENT (OUTPATIENT)
Dept: PHYSICAL THERAPY | Facility: CLINIC | Age: 78
End: 2023-04-24
Payer: MEDICARE

## 2023-04-24 DIAGNOSIS — M25.611 SHOULDER STIFFNESS, RIGHT: ICD-10-CM

## 2023-04-24 DIAGNOSIS — R29.898 SHOULDER WEAKNESS: ICD-10-CM

## 2023-04-24 DIAGNOSIS — Z98.890 S/P RIGHT ROTATOR CUFF REPAIR: Primary | ICD-10-CM

## 2023-04-24 NOTE — PROGRESS NOTES
Outpatient Physical Therapy  [address] [facility], [place] [address]                            Physical Therapy Daily Treatment Note    Patient: Blair Lira   : 1945  Diagnosis/ICD-10 Code:  S/P right rotator cuff repair [Z98.890]  Referring practitioner: Gustavo Samuels MD  Date of Initial Visit: Type: THERAPY  Noted: 2023  Today's Date: 2023  Patient seen for 5 sessions             Subjective   Blair Lira reports: trying to sleep in bed but can't get comfortable because he is usually a right sleeper.     Objective   No complaints of increased pain or discomfort.    See Exercise, Manual, and Modality Logs for complete treatment.     Assessment/Plan  Blair progressing as evident by decreased overall shoulder pain. Pt tolerated exercises and manual well, no complaints of increased pain or discomfort. Pt would benefit from skilled PT to address Range of Motion  and Strength deficits with surgical protocol, pain management and any concerns with ADLs.       Progress per Plan of Care         Timed:  Manual Therapy:    20     mins  32691;  Therapeutic Exercise:    10     mins  24329;     Neuromuscular Randall:        mins  99985;    Therapeutic Activity:          mins  98138;     Gait Training:           mins  75436;    Aquatic Therapy:          mins  62419;       Untimed:  Electrical Stimulation:         mins  31673 ( );  Mechanical Traction:         mins  66930;       Timed Treatment:   30   mins   Total Treatment:     30   mins      Electronically signed:     Ann Howe PTA  Physical Therapist Assistant  [facility] License #: [id number]

## 2023-04-27 ENCOUNTER — TREATMENT (OUTPATIENT)
Dept: PHYSICAL THERAPY | Facility: CLINIC | Age: 78
End: 2023-04-27
Payer: MEDICARE

## 2023-04-27 DIAGNOSIS — M25.611 SHOULDER STIFFNESS, RIGHT: ICD-10-CM

## 2023-04-27 DIAGNOSIS — R29.898 SHOULDER WEAKNESS: ICD-10-CM

## 2023-04-27 DIAGNOSIS — Z98.890 S/P RIGHT ROTATOR CUFF REPAIR: Primary | ICD-10-CM

## 2023-04-27 NOTE — PROGRESS NOTES
Outpatient Physical Therapy  [address] [facility], [place] [address]                 Physical Therapy Daily Treatment Note    Patient: Blair Lira   : 1945  Diagnosis/ICD-10 Code:  S/P right rotator cuff repair [Z98.890]  Referring practitioner: Gustavo Samuels MD  Date of Initial Visit: Type: THERAPY  Noted: 2023  Today's Date: 2023  Patient seen for 6 sessions             Subjective   Blair Lira reports: sleeping in bed at night has gotten better.     Pain: 0/10 pain, right shoulder at time of arrival    Objective     See Exercise, Manual, and Modality Logs for complete treatment.     Assessment/Plan  PTA discussed protocol and sling procedure. Patient tolerated progression of exercises well with minimal increased pain. Pt would benefit from skilled PT to address Range of Motion  and Strength deficits with surgical protocol, pain management and any concerns with ADLs.     Progress per Plan of Care       Timed:  Manual Therapy:    15     mins  03788;  Therapeutic Exercise:    10     mins  20611;     Neuromuscular Randall:    0    mins  87133;    Therapeutic Activity:     0     mins  38223;     Gait Trainin     mins  28575;    Aquatic Therapy:     0     mins  74387;       Untimed:  Electrical Stimulation:    0     mins  81382 ( );  Mechanical Traction:    0     mins  62779;       Timed Treatment:   25   mins   Total Treatment:     25   mins      Electronically signed:   Shona Wills PTA  Physical Therapist Assistant  [facility] License #: [id number]

## 2023-05-01 ENCOUNTER — TREATMENT (OUTPATIENT)
Dept: PHYSICAL THERAPY | Facility: CLINIC | Age: 78
End: 2023-05-01
Payer: MEDICARE

## 2023-05-01 DIAGNOSIS — M25.611 SHOULDER STIFFNESS, RIGHT: ICD-10-CM

## 2023-05-01 DIAGNOSIS — Z98.890 S/P RIGHT ROTATOR CUFF REPAIR: Primary | ICD-10-CM

## 2023-05-01 DIAGNOSIS — R29.898 SHOULDER WEAKNESS: ICD-10-CM

## 2023-05-01 NOTE — PROGRESS NOTES
Outpatient Physical Therapy  [address] [facility], [place] [address]                            Physical Therapy Daily Treatment Note    Patient: Blair Lira   : 1945  Diagnosis/ICD-10 Code:  S/P right rotator cuff repair [Z98.890]  Referring practitioner: Gustavo Samuels MD  Date of Initial Visit: Type: THERAPY  Noted: 2023  Today's Date: 2023  Patient seen for 7 sessions             Subjective   Blair Lira reports: shoulder is pretty good. States no soreness after last PT session. Reports only really having to take pain medicine before bed PRN.     Pain: 0/10 pain, currently.     Objective   Pt had some mild discomfort/pain with pulleys.     See Exercise, Manual, and Modality Logs for complete treatment.     Assessment/Plan  Blair progressing as evident by decreased overall shoulder pain. Pt tolerated exercises and manual well, some mild discomfort with a few exercises. Pt would benefit from skilled PT to address Range of Motion  and Strength deficits with surgical protocol, pain management and any concerns with ADLs.       Progress per Plan of Care         Timed:  Manual Therapy:    15     mins  76750;  Therapeutic Exercise:    15     mins  98573;     Neuromuscular Randall:        mins  11424;    Therapeutic Activity:          mins  97056;     Gait Training:           mins  72533;    Aquatic Therapy:          mins  70828;       Untimed:  Electrical Stimulation:         mins  03088 ( );  Mechanical Traction:         mins  17023;       Timed Treatment:   30   mins   Total Treatment:     30   mins      Electronically signed:   Coty Rg PTA Student    Supervised by:  Ann Howe PTA  Physical Therapist Assistant  [facility] License #: [id number]

## 2023-05-04 ENCOUNTER — TREATMENT (OUTPATIENT)
Dept: PHYSICAL THERAPY | Facility: CLINIC | Age: 78
End: 2023-05-04
Payer: MEDICARE

## 2023-05-04 DIAGNOSIS — Z98.890 S/P RIGHT ROTATOR CUFF REPAIR: Primary | ICD-10-CM

## 2023-05-04 DIAGNOSIS — R29.898 SHOULDER WEAKNESS: ICD-10-CM

## 2023-05-04 DIAGNOSIS — M25.611 SHOULDER STIFFNESS, RIGHT: ICD-10-CM

## 2023-05-04 NOTE — PROGRESS NOTES
Outpatient Physical Therapy  [address] [facility] [place] [place] [address]                            Physical Therapy Daily Treatment Note    Patient: Blair Lira   : 1945  Diagnosis/ICD-10 Code:  S/P right rotator cuff repair [Z98.890]  Referring practitioner: Gustavo Samuels MD  Date of Initial Visit: Type: THERAPY  Noted: 2023  Today's Date: 2023  Patient seen for 8 sessions             Subjective   Blair Lira reports: shoulder feeling okay. Reports no soreness after last PT session. States he has been taking off the sling at home.   Reports only taking pain medication at night time.     Pain: 2/10 pain, currently.     Objective   Pt had some increased pain with PROM and AAROM- flexion, and has tighness with PROM ER.     Pt tolerated new exercises well, no increased pain.     See Exercise, Manual, and Modality Logs for complete treatment.     Assessment/Plan  Blair progressing as evident by decreased overall shoulder pain, although still has some soreness and tightness. Pt tolerated exercises and manual well, some mild c/o of pain with some exercises. . Pt would benefit from skilled PT to address Range of Motion  and Strength deficits with surgical protocol, pain management and any concerns with ADLs.     Progress per Plan of Care         Timed:  Manual Therapy:    12     mins  97977;  Therapeutic Exercise:    18     mins  73598;     Neuromuscular Randall:        mins  44551;    Therapeutic Activity:          mins  49917;     Gait Training:           mins  06765;    Aquatic Therapy:          mins  40623;       Untimed:  Electrical Stimulation:         mins  66401 ( );  Mechanical Traction:         mins  43915;       Timed Treatment:   23   mins   Total Treatment:     30   mins      Electronically signed:   Coty Rg PTA Student    Supervised by:  Ann Howe PTA  Physical Therapist Assistant  [facility] License #: [id number]

## 2023-05-08 ENCOUNTER — TREATMENT (OUTPATIENT)
Dept: PHYSICAL THERAPY | Facility: CLINIC | Age: 78
End: 2023-05-08
Payer: MEDICARE

## 2023-05-08 DIAGNOSIS — M25.611 SHOULDER STIFFNESS, RIGHT: ICD-10-CM

## 2023-05-08 DIAGNOSIS — R29.898 SHOULDER WEAKNESS: ICD-10-CM

## 2023-05-08 DIAGNOSIS — Z98.890 S/P RIGHT ROTATOR CUFF REPAIR: Primary | ICD-10-CM

## 2023-05-08 NOTE — PROGRESS NOTES
Outpatient Physical Therapy  [address] [facility] [place] [address]                 Physical Therapy Daily Treatment Note    Patient: Blair Lira   : 1945  Diagnosis/ICD-10 Code:  S/P right rotator cuff repair [Z98.890]  Referring practitioner: Gustavo Samuels MD  Date of Initial Visit: Type: THERAPY  Noted: 2023  Today's Date: 2023  Patient seen for 9 sessions             Subjective   Blair Lira reports: he took his sling off yesterday for a while because it felt better with it off. Patient states he was active and his shoulder probably had more movement than it is use to.     Pain: 2/10 pain, in right shoulder at time of arrival. Patient complains mostly of stiffness in his shoulder.     Objective     See Exercise, Manual, and Modality Logs for complete treatment.     Assessment/Plan  Blair progressing as evident by decreased overall shoulder pain. Pt tolerated exercises and manual well, no complaints of increased pain or discomfort. Patient had good improvements with abduction ROM with manual but still remained with tightness at end range flexion. Pt would benefit from skilled PT to address Range of Motion  and Strength deficits with surgical protocol, pain management and any concerns with ADLs.     Progress per Plan of Care       Timed:  Manual Therapy:    20     mins  65594;  Therapeutic Exercise:    10     mins  85005;     Neuromuscular Randall:    0    mins  48070;    Therapeutic Activity:     8     mins  73007;     Gait Trainin     mins  40381;    Aquatic Therapy:     0     mins  83466;       Untimed:  Electrical Stimulation:    0     mins  39346 ( );  Mechanical Traction:    0     mins  44599;       Timed Treatment:   38   mins   Total Treatment:     38   mins      Electronically signed:   Shona Wills PTA  Physical Therapist Assistant  [facility] License #: [id number]

## 2023-05-11 ENCOUNTER — TREATMENT (OUTPATIENT)
Dept: PHYSICAL THERAPY | Facility: CLINIC | Age: 78
End: 2023-05-11
Payer: MEDICARE

## 2023-05-11 DIAGNOSIS — Z98.890 S/P RIGHT ROTATOR CUFF REPAIR: Primary | ICD-10-CM

## 2023-05-11 DIAGNOSIS — M25.611 SHOULDER STIFFNESS, RIGHT: ICD-10-CM

## 2023-05-11 DIAGNOSIS — R29.898 SHOULDER WEAKNESS: ICD-10-CM

## 2023-05-11 NOTE — PROGRESS NOTES
Progress Note   [facility]: [address] [place] [address]      Patient: Blair Lira   : 1945  Diagnosis/ICD-10 Code:  S/P right rotator cuff repair [Z98.890]  Referring practitioner: Gustavo Samuels MD  Date of Initial Visit: Type: THERAPY  Noted: 2023  Today's Date: 2023  Patient seen for 10 sessions      Subjective:   Subjective Questionnaire: QuickDASH: 21  Clinical Progress: improved  Home Program Compliance: Yes  Treatment has included: ADL retraining, soft tissue mobilization, strengthening, stretching, therapeutic activities, manual therapy, joint mobilization, home exercise program/patient education, functional ROM exercises, flexibility, body mechanics training, postural training and neuromuscular re-education    Subjective   Pt reports their R shoulder feels stiff at times, but not a lot of pain. Pt reports the majority of their problems occur at night time when they are trying to sleep. Pt reports they have still used their sling at times due to increased pain and for guarding their arm in public.       Objective     Active Range of Motion   Right  Shoulder   Flexion: 90 degrees   Abduction: 70 degrees      Passive Range of Motion      Right Shoulder   Flexion: 125 degrees   Abduction: 120 degrees   External rotation 45°: 30 degrees      Strength/Myotome Testing     Right  Shoulder      Planes of Motion   Flexion: 3+   Abduction: 3+   External rotation at 0°: 4   Internal rotation at 0°: 4      Isolated Muscles   Biceps: 4   Triceps: 4    See Exercise, Manual, and Modality Logs for complete treatment.     Assessment/Plan   Impairments: abnormal coordination, abnormal muscle firing, abnormal or restricted ROM, activity intolerance, impaired physical strength, lacks appropriate home exercise program and pain with function  Functional Limitations: carrying objects, lifting, sleeping, pulling, pushing, uncomfortable because of pain, moving in bed, sitting, reaching  behind back, reaching overhead and unable to perform repetitive tasks    Pt reports to physical therapy s/p R RTC repair w/ subacromial decompression and distal clavicle resection performed on 3/29/2023.Discussed w/ pt about being outside of the sling more, only to use it when they are in public for protection or if they have large increases in pain when they are not in the sling. Pt has improvement in their ROM as well as their strength. Pt also has improvement in their perceived deficits described by Nicholas. Pt's goals will be addressed to reflect their progress in physical therapy. Pt will continue to benefit from skilled physical therapy to further improve upon their ROM and strength in order to perform ADLs.     Goals  Plan Goals: SHOULDER  PROBLEMS:     1. The patient has limited ROM of the R shoulder.                  LTG 1: 12 weeks:  The patient will demonstrate 165 degrees of R shoulder flexion, 165 degrees of shoulder abduction, 80 degrees of shoulder external rotation, and 80 degrees of shoulder internal rotation to allow the patient to reach into upper kitchen cabinets and manipulate clothing behind the back with greater ease.                          STATUS:  progressing              STG 1a: 6 weeks:  The patient will demonstrate 125 degrees of R shoulder flexion, 125 degrees of shoulder abduction, 70 degrees of shoulder external rotation, and 70 degrees of shoulder internal rotation.                          STATUS:   progressing              TREATMENT: Manual therapy, therapeutic exercise, home exercise instruction, and modalities as needed to include: electrical stimulation, ultrasound, moist heat, and ice.    2. The patient has limited strength of the R shoulder.              LTG 2: 12 weeks:  The patient will demonstrate 4+/5 strength for R shoulder flexion, abduction, external rotation, and internal rotation in order to demonstrate improved shoulder stability.                          STATUS:    progressing              STG 2a: 8 weeks:  The patient will demonstrate 3+/5 strength for R shoulder flexion, abduction, external rotation, and internal rotation.                          STATUS:  met              STG2b:  2 weeks:  The patient will be independent with home exercises.                           STATUS:   progressing              TREATMENT: Manual therapy, therapeutic exercise, home exercise instruction, and modalities as needed to include: electrical stimulation, ultrasound, moist heat, and ice.                3. The patient complains of pain to the R shoulder.              LTG 3: 12 weeks:  The patient will report a pain rating of 1/10 or better in order to improve sleep quality and tolerance to performance of activities of daily living.                          STATUS:  New              STG 3a: 8 weeks:  The patient will report a pain rating of 3/10 or better.                           STATUS:  New              TREATMENT: Manual therapy, therapeutic exercise, home exercise instruction, and modalities as needed to include: electrical stimulation, ultrasound, moist heat, and ice.    4. Carrying, Moving, and Handling Objects Functional Limitation                               LTG 4: 12 weeks:  The patient will demonstrate 9 % limitation by achieving a score of 15 on the QuickDASH.                          STATUS:  New              STG 4a: 6 weeks:  The patient will demonstrate 27 % limitation by achieving a score of 23 on the QuickDASH.                            STATUS:  New              TREATMENT:  Manual therapy, therapeutic exercise, home exercise instruction, and modalities as needed to include: moist heat, electrical stimulation, and ultrasound.     Progress toward previous goals: Partially Met      Recommendations: Continue as planned  Timeframe: 2 a week, for 2 months   Prognosis to achieve goals: good    PT Signature: Cheo Moreno PT, DPT    Electronically signed 5/11/2023    [place] License: PT -  [id number]       Timed:  Manual Therapy:    10     mins  88589;  Therapeutic Exercise:    15     mins  50167;     Neuromuscular Randall:    0    mins  37832;    Therapeutic Activity:     6     mins  28081;     Gait Trainin     mins  45260;     Aquatics                         0      mins  14564    Un-timed:  Mechanical Traction      0     mins  15950  Dry Needling     0     mins self-pay  Electrical Stimulation:    0     mins  37653 ( );    Timed Treatment:   31   mins   Total Treatment:     31   mins

## 2023-05-15 ENCOUNTER — TREATMENT (OUTPATIENT)
Dept: PHYSICAL THERAPY | Facility: CLINIC | Age: 78
End: 2023-05-15
Payer: MEDICARE

## 2023-05-15 DIAGNOSIS — R29.898 SHOULDER WEAKNESS: ICD-10-CM

## 2023-05-15 DIAGNOSIS — Z98.890 S/P RIGHT ROTATOR CUFF REPAIR: Primary | ICD-10-CM

## 2023-05-15 DIAGNOSIS — M25.611 SHOULDER STIFFNESS, RIGHT: ICD-10-CM

## 2023-05-15 PROCEDURE — 97110 THERAPEUTIC EXERCISES: CPT | Performed by: PHYSICAL THERAPIST

## 2023-05-15 PROCEDURE — 97140 MANUAL THERAPY 1/> REGIONS: CPT | Performed by: PHYSICAL THERAPIST

## 2023-05-15 NOTE — PROGRESS NOTES
Outpatient Physical Therapy  [address] [facility], [place] [address]                            Physical Therapy Daily Treatment Note    Patient: Blair Lira   : 1945  Diagnosis/ICD-10 Code:  S/P right rotator cuff repair [Z98.890]  Referring practitioner: Gustavo Samuels MD  Date of Initial Visit: Type: THERAPY  Noted: 2023  Today's Date: 5/15/2023  Patient seen for 11 sessions           Subjective   Balir Lira reports: that the shoulder kept him up last night, states that it was more sore and he just couldn't get comfortable.    Objective   Increased discomfort with end range shoulder flexion.    See Exercise, Manual, and Modality Logs for complete treatment.     Assessment/Plan  Blair progressing as evident by decreased overall shoulder pain, although sore over the last day. Pt tolerated exercises and manual well, increased discomfort with end range shoulder flexion. Pt would benefit from skilled PT to address Range of Motion  and Strength deficits with surgical protocol, pain management and any concerns with ADLs.       Progress per Plan of Care         Timed:  Manual Therapy:    10     mins  75461;  Therapeutic Exercise:    20     mins  77390;     Neuromuscular Randall:        mins  42253;    Therapeutic Activity:          mins  16305;     Gait Training:           mins  36584;    Aquatic Therapy:          mins  14953;       Untimed:  Electrical Stimulation:         mins  17230 ( );  Mechanical Traction:         mins  46150;       Timed Treatment:   30   mins   Total Treatment:     30   mins      Electronically signed:     Ann Hwoe PTA  Physical Therapist Assistant  [facility] [place] License #: [id number]

## 2023-05-16 ENCOUNTER — OFFICE VISIT (OUTPATIENT)
Dept: ORTHOPEDIC SURGERY | Facility: CLINIC | Age: 78
End: 2023-05-16
Payer: MEDICARE

## 2023-05-16 VITALS — BODY MASS INDEX: 29.16 KG/M2 | HEIGHT: 73 IN | WEIGHT: 220 LBS

## 2023-05-16 DIAGNOSIS — Z47.89 AFTERCARE FOLLOWING SURGERY OF THE MUSCULOSKELETAL SYSTEM: Primary | ICD-10-CM

## 2023-05-16 NOTE — PROGRESS NOTES
"Chief Complaint  Follow-up of the Right Shoulder    Subjective      Blair Lira presents to [facility] for follow-up of right shoulder arthroscopy with mini open rotator cuff repair, extensive subacromial decompression, distal clavicle resection performed on 3/29/2023 by Dr. Samuels.  He presents today for follow-up without sling in place, reporting he discontinued this last week under the direction of physical therapy.  He remains in PT through [facility], attending twice weekly.  He complains of aching in his right shoulder, primarily at nighttime.    Objective   No Known Allergies    Vital Signs:   Ht 185.4 cm (73\")   Wt 99.8 kg (220 lb)   BMI 29.03 kg/m²       Physical Exam    Constitutional: Awake, alert. Well nourished appearance.    Integumentary: Warm, dry, intact. No obvious rashes.    HENT: Atraumatic, normocephalic.   Respiratory: Non labored respirations .   Cardiovascular: Intact peripheral pulses.    Psychiatric: Normal mood and affect. A&O X3    Ortho Exam  Right shoulder: Skin is warm, dry, and intact.  Well-healed surgical scars noted.  Active assisted shoulder forward flexion 150 degrees.  Full flexion extension of the wrist.  Full pronation and supination.  Patient is able to form a full fist.  Good thumb opposition.  Sensation intact light touch.  Distal neurovascular intact.    Imaging Results (Most Recent)     None                    Assessment and Plan   Problem List Items Addressed This Visit    None  Visit Diagnoses     Aftercare following R shoulder arthroscopy with RCR, SAD, DC    -  Primary          Follow Up   Return in about 6 weeks (around 6/27/2023).    Tobacco Use: Low Risk    • Smoking Tobacco Use: Never   • Smokeless Tobacco Use: Never   • Passive Exposure: Not on file     Patient is a non-smoker.  Did not discuss tobacco cessation options.    Patient Instructions   Patient may discontinue sling use. Advised still no active ROM " of the shoulder, until directed by PT. continue physical therapy and patient may start active assist ROM/exercises. No lifting, pushing, or pulling. Nothing heavier than 1 lb in affected arm.    Continue icing shoulder up to 3-4 times daily for no longer than 15 to 20 minutes at a time as needed for pain or swelling.    Follow-up in 6 weeks.  Call with changes or concerns.  No imaging.        Patient was given instructions and counseling regarding his condition or for health maintenance advice. Please see specific information pulled into the AVS if appropriate.

## 2023-05-16 NOTE — PATIENT INSTRUCTIONS
Patient may discontinue sling use. Advised still no active ROM of the shoulder, until directed by PT. continue physical therapy and patient may start active assist ROM/exercises. No lifting, pushing, or pulling. Nothing heavier than 1 lb in affected arm.    Continue icing shoulder up to 3-4 times daily for no longer than 15 to 20 minutes at a time as needed for pain or swelling.    Follow-up in 6 weeks.  Call with changes or concerns.  No imaging.

## 2023-05-18 ENCOUNTER — TREATMENT (OUTPATIENT)
Dept: PHYSICAL THERAPY | Facility: CLINIC | Age: 78
End: 2023-05-18
Payer: MEDICARE

## 2023-05-18 DIAGNOSIS — M25.611 SHOULDER STIFFNESS, RIGHT: ICD-10-CM

## 2023-05-18 DIAGNOSIS — R29.898 SHOULDER WEAKNESS: ICD-10-CM

## 2023-05-18 DIAGNOSIS — Z98.890 S/P RIGHT ROTATOR CUFF REPAIR: Primary | ICD-10-CM

## 2023-05-18 NOTE — PROGRESS NOTES
Physical Therapy Daily Treatment Note  [place] [facility]: [address] [facility] [place] [address]      Patient: Blair Lira   : 1945  Diagnosis/ICD-10 Code:  S/P right rotator cuff repair [Z98.890]  Referring practitioner: Gustavo Samuels MD  Date of Initial Visit: Type: THERAPY  Noted: 2023  Today's Date: 2023  Patient seen for 12 sessions           Subjective   The patient reported they are having a little bit of increased soreness in their R shoulder today. Pt reports they may be using their shoulder a little more than they should. Pt reports they are currently in 3/10.     Objective   See Exercise, Manual, and Modality Logs for complete treatment.     Assessment/Plan  Instructed pt to continue to perform AAROM at home for further improvement in ROM. Pt otherwise tolerates treatment session well today as pt has a decrease in pain at the end of the therapy session. Will continue to progress pt to their tolerance. Patient will continue to benefit from skilled physical therapy to further address their deficits in strength and ROM in order to improve upon their functional mobility and to meet their personal goals.          Timed:  Manual Therapy:    10     mins  02152;  Therapeutic Exercise:    18     mins  36083;     Neuromuscular Randall:   0    mins  20990;    Therapeutic Activity:     0     mins  96360;     Gait Trainin     mins  23124;     Aquatics                         0      mins  24816    Un-timed:  Mechanical Traction      0     mins  35031  Electrical Stimulation:    0     mins  28563 ( );      Timed Treatment:   28   mins   Total Treatment:     28   mins    Cheo Moreno PT, DPT    Electronically signed 2023    [place] License: PT - [id number]

## 2023-05-22 ENCOUNTER — TREATMENT (OUTPATIENT)
Dept: PHYSICAL THERAPY | Facility: CLINIC | Age: 78
End: 2023-05-22
Payer: MEDICARE

## 2023-05-22 DIAGNOSIS — R29.898 SHOULDER WEAKNESS: ICD-10-CM

## 2023-05-22 DIAGNOSIS — M25.611 SHOULDER STIFFNESS, RIGHT: ICD-10-CM

## 2023-05-22 DIAGNOSIS — Z98.890 S/P RIGHT ROTATOR CUFF REPAIR: Primary | ICD-10-CM

## 2023-05-22 NOTE — PROGRESS NOTES
Outpatient Physical Therapy  [address] [facility], [place] [address]                            Physical Therapy Daily Treatment Note    Patient: Blair Lira   : 1945  Diagnosis/ICD-10 Code:  S/P right rotator cuff repair [Z98.890]  Referring practitioner: Gustavo Samuels MD  Date of Initial Visit: Type: THERAPY  Noted: 2023  Today's Date: 2023  Patient seen for 13 sessions           Subjective   Blair Lira reports: he was more sore all last week but feeling pretty good today. Does feel like he's using his shoulder more and more     Objective   No complaints of increased pain or discomfort.   Still limited PROM into shoulder flexion and abduction    See Exercise, Manual, and Modality Logs for complete treatment.     Assessment/Plan  Blair progressing as evident by decreased overall shoulder pain. Pt tolerated exercises and manual well, no complaints of increased pain or discomfort. Pt would benefit from skilled PT to address Range of Motion  and Strength deficits with surgical protocol, pain management and any concerns with ADLs.       Progress per Plan of Care         Timed:  Manual Therapy:    15     mins  96475;  Therapeutic Exercise:    15     mins  39255;     Neuromuscular Randall:        mins  15782;    Therapeutic Activity:          mins  39986;     Gait Training:           mins  05423;    Aquatic Therapy:          mins  03983;       Untimed:  Electrical Stimulation:         mins  80698 ( );  Mechanical Traction:         mins  33415;       Timed Treatment:   30   mins   Total Treatment:     30   mins      Electronically signed:     Ann Howe PTA  Physical Therapist Assistant  [facility] License #: [id number]

## 2023-05-25 ENCOUNTER — TREATMENT (OUTPATIENT)
Dept: PHYSICAL THERAPY | Facility: CLINIC | Age: 78
End: 2023-05-25
Payer: MEDICARE

## 2023-05-25 DIAGNOSIS — R29.898 SHOULDER WEAKNESS: ICD-10-CM

## 2023-05-25 DIAGNOSIS — M25.611 SHOULDER STIFFNESS, RIGHT: ICD-10-CM

## 2023-05-25 DIAGNOSIS — Z98.890 S/P RIGHT ROTATOR CUFF REPAIR: Primary | ICD-10-CM

## 2023-05-25 NOTE — PROGRESS NOTES
Outpatient Physical Therapy  [address] [facility], [place] [address]                            Physical Therapy Daily Treatment Note    Patient: Blair Lira   : 1945  Diagnosis/ICD-10 Code:  S/P right rotator cuff repair [Z98.890]  Referring practitioner: Gustavo Samuels MD  Date of Initial Visit: Type: THERAPY  Noted: 2023  Today's Date: 2023  Patient seen for 14 sessions           Subjective   Blair Lira reports: shoulder not being sore after last PT session. States that he is using the shoulder more, although not lifting yet.     Objective   No complaints of increased pain or discomfort.    See Exercise, Manual, and Modality Logs for complete treatment.     Assessment/Plan  Blair progressing as evident by decreased overall shoulder pain. Pt tolerated exercises and manual well, no complaints of increased pain or discomfort. Pt would benefit from skilled PT to address Range of Motion  and Strength deficits with surgical protocol, pain management and any concerns with ADLs.       Progress per Plan of Care         Timed:  Manual Therapy:    10     mins  51266;  Therapeutic Exercise:    20     mins  52517;     Neuromuscular Randall:        mins  06852;    Therapeutic Activity:          mins  08170;     Gait Training:           mins  10827;    Aquatic Therapy:          mins  70261;       Untimed:  Electrical Stimulation:         mins  44098 ( );  Mechanical Traction:         mins  50924;       Timed Treatment:   30   mins   Total Treatment:     30   mins      Electronically signed:     Ann Howe PTA  Physical Therapist Assistant  [facility] License #: [id number]

## 2023-05-30 ENCOUNTER — TREATMENT (OUTPATIENT)
Dept: PHYSICAL THERAPY | Facility: CLINIC | Age: 78
End: 2023-05-30

## 2023-05-30 DIAGNOSIS — Z98.890 S/P RIGHT ROTATOR CUFF REPAIR: Primary | ICD-10-CM

## 2023-05-30 DIAGNOSIS — R29.898 SHOULDER WEAKNESS: ICD-10-CM

## 2023-05-30 DIAGNOSIS — M25.611 SHOULDER STIFFNESS, RIGHT: ICD-10-CM

## 2023-05-30 NOTE — PROGRESS NOTES
Outpatient Physical Therapy  [address] [facility], [place] [address]                            Physical Therapy Daily Treatment Note    Patient: Blair Lira   : 1945  Diagnosis/ICD-10 Code:  S/P right rotator cuff repair [Z98.890]  Referring practitioner: Gustavo Samuels MD  Date of Initial Visit: Type: THERAPY  Noted: 2023  Today's Date: 2023  Patient seen for 15 sessions           Subjective   Blair Lira reports: no problems with the shoulder, although isn't lifting a lot at this time.     Objective   No complaints of increased pain or discomfort.    See Exercise, Manual, and Modality Logs for complete treatment.     Assessment/Plan  Blair progressing as evident by decreased overall shoulder pain. Pt tolerated exercises and manual well, no complaints of increased pain or discomfort. Pt would benefit from skilled PT to address Range of Motion  and Strength deficits with surgical protocol, pain management and any concerns with ADLs. .      Progress per Plan of Care         Timed:  Manual Therapy:    10     mins  61890;  Therapeutic Exercise:    20     mins  78387;     Neuromuscular Randall:        mins  99520;    Therapeutic Activity:          mins  92305;     Gait Training:           mins  27980;    Aquatic Therapy:          mins  71886;       Untimed:  Electrical Stimulation:         mins  43230 ( );  Mechanical Traction:         mins  08253;       Timed Treatment:   30   mins   Total Treatment:     30   mins      Electronically signed:     Ann Howe PTA  Physical Therapist Assistant  [facility] License #: [id number]

## 2023-06-01 ENCOUNTER — TREATMENT (OUTPATIENT)
Dept: PHYSICAL THERAPY | Facility: CLINIC | Age: 78
End: 2023-06-01

## 2023-06-01 DIAGNOSIS — Z98.890 S/P RIGHT ROTATOR CUFF REPAIR: Primary | ICD-10-CM

## 2023-06-01 DIAGNOSIS — M25.611 SHOULDER STIFFNESS, RIGHT: ICD-10-CM

## 2023-06-01 DIAGNOSIS — R29.898 SHOULDER WEAKNESS: ICD-10-CM

## 2023-06-01 NOTE — PROGRESS NOTES
Outpatient Physical Therapy  [address] [facility], [place] [address]                            Physical Therapy Daily Treatment Note    Patient: Blair Lira   : 1945  Diagnosis/ICD-10 Code:  S/P right rotator cuff repair [Z98.890]  Referring practitioner: Gustavo Samuels MD  Date of Initial Visit: Type: THERAPY  Noted: 2023  Today's Date: 2023  Patient seen for 16 sessions             Subjective   Blair Lira reports: having a normal amount of pain in the shoulder, states that he can't get comfortable at night.     Objective   No complaints of increased pain or discomfort.    See Exercise, Manual, and Modality Logs for complete treatment.     Assessment/Plan  Blair progressing as evident by decreased overall shoulder pain, although still uncomfortable at night. Pt tolerated exercises and manual well, no complaints of increased pain or discomfort. Pt would benefit from skilled PT to address Range of Motion  and Strength deficits, pain management and any concerns with ADLs.         Progress per Plan of Care         Timed:  Manual Therapy:    10     mins  63566;  Therapeutic Exercise:    20     mins  89162;     Neuromuscular Randall:        mins  83817;    Therapeutic Activity:          mins  84166;     Gait Training:           mins  86349;    Aquatic Therapy:          mins  82669;       Untimed:  Electrical Stimulation:         mins  63358 ( );  Mechanical Traction:         mins  28429;       Timed Treatment:   30   mins   Total Treatment:     30   mins      Electronically signed:     Ann Howe PTA  Physical Therapist Assistant  \[facility]\"" License #: [id number]

## 2023-06-05 ENCOUNTER — TREATMENT (OUTPATIENT)
Dept: PHYSICAL THERAPY | Facility: CLINIC | Age: 78
End: 2023-06-05
Payer: MEDICARE

## 2023-06-05 DIAGNOSIS — R29.898 SHOULDER WEAKNESS: ICD-10-CM

## 2023-06-05 DIAGNOSIS — Z98.890 S/P RIGHT ROTATOR CUFF REPAIR: Primary | ICD-10-CM

## 2023-06-05 DIAGNOSIS — M25.611 SHOULDER STIFFNESS, RIGHT: ICD-10-CM

## 2023-06-05 NOTE — PROGRESS NOTES
Outpatient Physical Therapy  [address] [facility], [place] [address]                            Physical Therapy Daily Treatment Note    Patient: Blair Lira   : 1945  Diagnosis/ICD-10 Code:  S/P right rotator cuff repair [Z98.890]  Referring practitioner: Gustavo Samuels MD  Date of Initial Visit: Type: THERAPY  Noted: 2023  Today's Date: 2023  Patient seen for 17 sessions           Subjective   Blair Lira reports: that his shoulder is extra sore today after using it more over the weekend.     Objective   No complaints of increased pain or discomfort.    See Exercise, Manual, and Modality Logs for complete treatment.     Assessment/Plan  Blair progressing as evident by decreased overall shoulder pain. Pt tolerated exercises and manual well, no complaints of increased pain or discomfort. Pt would benefit from skilled PT to address Range of Motion  and Strength deficits with surgical protocol, pain management and any concerns with ADLs.       Progress per Plan of Care         Timed:  Manual Therapy:    10     mins  59132;  Therapeutic Exercise:    20     mins  18364;     Neuromuscular Randall:        mins  31891;    Therapeutic Activity:          mins  38276;     Gait Training:           mins  70817;    Aquatic Therapy:          mins  12211;       Untimed:  Electrical Stimulation:         mins  77893 ( );  Mechanical Traction:         mins  66703;       Timed Treatment:   30   mins   Total Treatment:     30   mins      Electronically signed:     Ann Howe PTA  Physical Therapist Assistant  [place] [facility] License #: [id number]

## 2023-06-08 ENCOUNTER — TREATMENT (OUTPATIENT)
Dept: PHYSICAL THERAPY | Facility: CLINIC | Age: 78
End: 2023-06-08
Payer: MEDICARE

## 2023-06-08 DIAGNOSIS — R29.898 SHOULDER WEAKNESS: ICD-10-CM

## 2023-06-08 DIAGNOSIS — Z98.890 S/P RIGHT ROTATOR CUFF REPAIR: Primary | ICD-10-CM

## 2023-06-08 DIAGNOSIS — M25.611 SHOULDER STIFFNESS, RIGHT: ICD-10-CM

## 2023-06-08 NOTE — PROGRESS NOTES
Progress Note   [facility]: [address] [place] [address]      Patient: Blair Lira   : 1945  Diagnosis/ICD-10 Code:  S/P right rotator cuff repair [Z98.890]  Referring practitioner: Gustavo Samuels MD  Date of Initial Visit: Type: THERAPY  Noted: 2023  Today's Date: 2023  Patient seen for 18 sessions      Subjective:   Subjective Questionnaire: QuickDASH: 16  Clinical Progress: improved  Home Program Compliance: Yes  Treatment has included: ADL retraining, soft tissue mobilization, strengthening, stretching, therapeutic activities, manual therapy, joint mobilization, home exercise program/patient education, functional ROM exercises, flexibility, body mechanics training, postural training, and neuromuscular re-education    Subjective   Pt reported to physical therapy s/p R RTC repair w/ subacromial decompression and distal clavicle resection performed on 3/29/2023. Pt reports they were having some trouble w/ their R UE last week. Pt reports the pain was deep and didn't seem to want to quit. Pt reports that pain has since resolved. Pt reports their pain in their R shoulder is currently a 0/10. Pt reports they are still icing some when they are having pain.     Pt reports they are still struggling with sleep at night. Pt reports they did have some increased pain w/ increased use. Pt reports this particularly happened with fishing. Pt reports they still have some weakness up over head.    Pt reports they have seen that they have some trouble closing their R hand into a full fist. Pt reports it feels weaker performing the motion. Pt denies numbness and tingling. Pt denies neck pain.       Objective     Active Range of Motion   Left Shoulder   Flexion: 135 degrees   Abduction: 115 degrees   External rotation 0°: 68 degrees     Right  Shoulder   Flexion: 91 degrees   Abduction: 90 degrees      Passive Range of Motion      Right Shoulder   Flexion: 125 degrees   Abduction: 120 degrees    External rotation 45°: 25 degrees      Strength/Myotome Testing     Right  Shoulder      Planes of Motion   Flexion: 4-   Abduction: 4   External rotation at 0°: 4+   Internal rotation at 0°:5      Isolated Muscles   Biceps: 4   Triceps: 4      See Exercise, Manual, and Modality Logs for complete treatment.     Assessment/Plan  Impairments: abnormal coordination, abnormal muscle firing, abnormal or restricted ROM, activity intolerance, impaired physical strength, and pain with function  Functional Limitations: carrying objects, lifting, sleeping, pulling, pushing, uncomfortable because of pain, moving in bed, sitting, reaching behind back, reaching overhead and unable to perform repetitive tasks     Pt reported to physical therapy s/p R RTC repair w/ subacromial decompression and distal clavicle resection performed on 3/29/2023. Pt continues to have R shoulder stiffness, though they attribute this to not using their arm much at home. Pt was encuraged to continue to increase the frequency of use as well as to maintain their ROM exs at an increased frequency. Pt does have improvement in their overall perceived deficits as well as some strength gains. Pt's goals will be addressed to reflect their current progress in therapy. Pt will continue to benefit from skilled physical therapy in order to further progress their ROM and strength for safe return to performing all daily tasks and ADLs without restriction.       Goals  Plan Goals: SHOULDER  PROBLEMS:     1. The patient has limited ROM of the R shoulder.                  LTG 1: 12 weeks:  The patient will demonstrate 165 degrees of R shoulder flexion, 165 degrees of shoulder abduction, 80 degrees of shoulder external rotation, and 80 degrees of shoulder internal rotation to allow the patient to reach into upper kitchen cabinets and manipulate clothing behind the back with greater ease.                          STATUS:  progressing              STG 1a: 6 weeks:  The  patient will demonstrate 125 degrees of R shoulder flexion, 125 degrees of shoulder abduction, 70 degrees of shoulder external rotation, and 70 degrees of shoulder internal rotation.                          STATUS:   progressing              TREATMENT: Manual therapy, therapeutic exercise, home exercise instruction, and modalities as needed to include: electrical stimulation, ultrasound, moist heat, and ice.    2. The patient has limited strength of the R shoulder.              LTG 2: 12 weeks:  The patient will demonstrate 4+/5 strength for R shoulder flexion, abduction, external rotation, and internal rotation in order to demonstrate improved shoulder stability.                          STATUS:   progressing              STG 2a: 8 weeks:  The patient will demonstrate 3+/5 strength for R shoulder flexion, abduction, external rotation, and internal rotation.                          STATUS:  met              STG2b:  2 weeks:  The patient will be independent with home exercises.                           STATUS:   progressing              TREATMENT: Manual therapy, therapeutic exercise, home exercise instruction, and modalities as needed to include: electrical stimulation, ultrasound, moist heat, and ice.                3. The patient complains of pain to the R shoulder.              LTG 3: 12 weeks:  The patient will report a pain rating of 1/10 or better in order to improve sleep quality and tolerance to performance of activities of daily living.                          STATUS:  progressing              STG 3a: 8 weeks:  The patient will report a pain rating of 3/10 or better.                           STATUS:  met              TREATMENT: Manual therapy, therapeutic exercise, home exercise instruction, and modalities as needed to include: electrical stimulation, ultrasound, moist heat, and ice.    4. Carrying, Moving, and Handling Objects Functional Limitation                               LTG 4: 12 weeks:  The  patient will demonstrate 9 % limitation by achieving a score of 15 on the QuickDASH.                          STATUS:  progressing              STG 4a: 6 weeks:  The patient will demonstrate 27 % limitation by achieving a score of 23 on the QuickDASH.                            STATUS:  met              TREATMENT:  Manual therapy, therapeutic exercise, home exercise instruction, and modalities as needed to include: moist heat, electrical stimulation, and ultrasound.         Progress toward previous goals: Partially Met      Recommendations: Continue as planned  Timeframe: 2 a week, for 2 months   Prognosis to achieve goals: fair    PT Signature: Cheo Moreno PT, DPT    Electronically signed 2023    [place] License: PT - [id number]         Timed:  Manual Therapy:    10     mins  26433;  Therapeutic Exercise:    19     mins  84982;     Neuromuscular Randall:    0    mins  02490;    Therapeutic Activity:     0     mins  80994;     Gait Trainin     mins  76093;     Aquatics                         0      mins  42725    Un-timed:  Mechanical Traction      0     mins  25905  Dry Needling     0     mins self-pay  Electrical Stimulation:    0     mins  82703 ( );    Timed Treatment:   29   mins   Total Treatment:     29   mins

## 2023-06-12 ENCOUNTER — TREATMENT (OUTPATIENT)
Dept: PHYSICAL THERAPY | Facility: CLINIC | Age: 78
End: 2023-06-12
Payer: MEDICARE

## 2023-06-12 DIAGNOSIS — Z98.890 S/P RIGHT ROTATOR CUFF REPAIR: Primary | ICD-10-CM

## 2023-06-12 DIAGNOSIS — M25.611 SHOULDER STIFFNESS, RIGHT: ICD-10-CM

## 2023-06-12 DIAGNOSIS — R29.898 SHOULDER WEAKNESS: ICD-10-CM

## 2023-06-12 PROCEDURE — 97140 MANUAL THERAPY 1/> REGIONS: CPT | Performed by: PHYSICAL THERAPIST

## 2023-06-12 PROCEDURE — 97110 THERAPEUTIC EXERCISES: CPT | Performed by: PHYSICAL THERAPIST

## 2023-06-12 PROCEDURE — 97530 THERAPEUTIC ACTIVITIES: CPT | Performed by: PHYSICAL THERAPIST

## 2023-06-12 NOTE — PROGRESS NOTES
"Outpatient Physical Therapy                   Physical Therapy Daily Treatment Note    Patient: Blair Lira   : 1945  Diagnosis/ICD-10 Code:  S/P right rotator cuff repair [Z98.890]  Referring practitioner: Gustavo Samuels MD  Date of Initial Visit: Type: THERAPY  Noted: 2023  Today's Date: 2023  Patient seen for 19 sessions             Subjective   Blair Lira reports: his pain is \"the same as it has been, but last night it was bothering me.\" Patient comments \"it seems like night is still the worst time\" for shoulder pain.     Objective     See Exercise, Manual, and Modality Logs for complete treatment.     Assessment/Plan  Blair still experiencing increased right shoulder pain, especially at night. Pt tolerated exercises well, with no complaints of increased pain. Pt would benefit from skilled PT to address Range of Motion  and Strength deficits, pain management and any concerns with ADLs.     Progress per Plan of Care       Timed:  Manual Therapy:    8     mins  99943;  Therapeutic Exercise:    15     mins  21281;     Neuromuscular Randall:    0    mins  16851;    Therapeutic Activity:     15     mins  92527;     Gait Trainin     mins  47789;    Aquatic Therapy:     0     mins  13573;       Untimed:  Electrical Stimulation:    0     mins  73770 ( );  Mechanical Traction:    0     mins  79295;       Timed Treatment:   38   mins   Total Treatment:     38   mins      Electronically signed:   Shona Wills PTA  Physical Therapist Assistant  [facility] License #: [id number]  "

## 2023-06-15 ENCOUNTER — TREATMENT (OUTPATIENT)
Dept: PHYSICAL THERAPY | Facility: CLINIC | Age: 78
End: 2023-06-15
Payer: MEDICARE

## 2023-06-15 DIAGNOSIS — Z98.890 S/P RIGHT ROTATOR CUFF REPAIR: Primary | ICD-10-CM

## 2023-06-15 DIAGNOSIS — R29.898 SHOULDER WEAKNESS: ICD-10-CM

## 2023-06-15 DIAGNOSIS — M25.611 SHOULDER STIFFNESS, RIGHT: ICD-10-CM

## 2023-06-15 NOTE — PROGRESS NOTES
Outpatient Physical Therapy  [address] [facility], [place] [address]                            Physical Therapy Daily Treatment Note    Patient: Blair Lira   : 1945  Diagnosis/ICD-10 Code:  S/P right rotator cuff repair [Z98.890]  Referring practitioner: Gustavo Samuels MD  Date of Initial Visit: Type: THERAPY  Noted: 2023  Today's Date: 6/15/2023  Patient seen for 20 sessions           Subjective   Blair Lira reports: that his shoulder is feeling pretty good today. He is starting to do more and more with his shoulder, usually a pretty sore afterwards.     Objective   Still limited Range of Motion shoulder flexion and abduction.    See Exercise, Manual, and Modality Logs for complete treatment.     Assessment/Plan  Blair progressing as evident by decreased overall shoulder pain. Pt tolerated exercises and manual well,  although still limited ROM . Pt would benefit from skilled PT to address Range of Motion  and Strength deficits with surgical protocol, pain management and any concerns with ADLs.       Progress per Plan of Care         Timed:  Manual Therapy:    10     mins  03938;  Therapeutic Exercise:    20     mins  27248;     Neuromuscular Randall:        mins  50584;    Therapeutic Activity:          mins  43565;     Gait Training:           mins  47748;    Aquatic Therapy:          mins  66737;       Untimed:  Electrical Stimulation:         mins  84815 ( );  Mechanical Traction:         mins  93217;       Timed Treatment:   30   mins   Total Treatment:     30   mins      Electronically signed:     Ann Howe PTA  Physical Therapist Assistant  [facility] License #: [id number]

## 2023-06-19 ENCOUNTER — TREATMENT (OUTPATIENT)
Dept: PHYSICAL THERAPY | Facility: CLINIC | Age: 78
End: 2023-06-19
Payer: MEDICARE

## 2023-06-19 DIAGNOSIS — R29.898 SHOULDER WEAKNESS: ICD-10-CM

## 2023-06-19 DIAGNOSIS — Z98.890 S/P RIGHT ROTATOR CUFF REPAIR: Primary | ICD-10-CM

## 2023-06-19 DIAGNOSIS — M25.611 SHOULDER STIFFNESS, RIGHT: ICD-10-CM

## 2023-06-19 PROCEDURE — 97140 MANUAL THERAPY 1/> REGIONS: CPT | Performed by: PHYSICAL THERAPIST

## 2023-06-19 PROCEDURE — 97110 THERAPEUTIC EXERCISES: CPT | Performed by: PHYSICAL THERAPIST

## 2023-06-19 NOTE — PROGRESS NOTES
Outpatient Physical Therapy  [address] [facility], [place] [address]                            Physical Therapy Daily Treatment Note    Patient: Blair Lira   : 1945  Diagnosis/ICD-10 Code:  S/P right rotator cuff repair [Z98.890]  Referring practitioner: Gustavo Samuels MD  Date of Initial Visit: Type: THERAPY  Noted: 2023  Today's Date: 2023  Patient seen for 21 sessions           Subjective   Blair Lira reports: that he is doing everything with his shoulder that he wants too. Still has a bit of a constant ache in the shoulder, but that might just be the way it is.     Objective   Limited PROM shoulder flexion, abduction and External Rotation.    See Exercise, Manual, and Modality Logs for complete treatment.     Assessment/Plan  Blair progressing as evident by decreased overall shoulder pain. Pt tolerated exercises and manual well, no complaints of increased pain or discomfort. Pt would benefit from skilled PT to address Range of Motion  and Strength deficits, pain management and any concerns with ADLs.       Progress per Plan of Care         Timed:  Manual Therapy:    10     mins  65047;  Therapeutic Exercise:    20     mins  11655;     Neuromuscular Randall:        mins  93990;    Therapeutic Activity:          mins  88976;     Gait Training:           mins  37737;    Aquatic Therapy:          mins  92088;       Untimed:  Electrical Stimulation:         mins  69318 ( );  Mechanical Traction:         mins  39485;       Timed Treatment:   30   mins   Total Treatment:     30   mins      Electronically signed:     Ann Howe PTA  Physical Therapist Assistant  \[facility]\"" License #: [id number]

## 2023-10-24 ENCOUNTER — OFFICE VISIT (OUTPATIENT)
Dept: ORTHOPEDIC SURGERY | Facility: CLINIC | Age: 78
End: 2023-10-24
Payer: MEDICARE

## 2023-10-24 VITALS
WEIGHT: 220.02 LBS | HEIGHT: 73 IN | SYSTOLIC BLOOD PRESSURE: 154 MMHG | BODY MASS INDEX: 29.16 KG/M2 | OXYGEN SATURATION: 95 % | HEART RATE: 111 BPM | DIASTOLIC BLOOD PRESSURE: 68 MMHG

## 2023-10-24 DIAGNOSIS — M25.511 RIGHT SHOULDER PAIN, UNSPECIFIED CHRONICITY: Primary | ICD-10-CM

## 2023-10-24 DIAGNOSIS — Z47.89 AFTERCARE FOLLOWING SURGERY OF THE MUSCULOSKELETAL SYSTEM: ICD-10-CM

## 2023-10-24 RX ORDER — LIDOCAINE HYDROCHLORIDE 10 MG/ML
5 INJECTION, SOLUTION INFILTRATION; PERINEURAL
Status: COMPLETED | OUTPATIENT
Start: 2023-10-24 | End: 2023-10-24

## 2023-10-24 RX ORDER — TRIAMCINOLONE ACETONIDE 1 MG/G
CREAM TOPICAL
COMMUNITY
Start: 2023-08-01 | End: 2023-10-26

## 2023-10-24 RX ORDER — TRIAMCINOLONE ACETONIDE 40 MG/ML
40 INJECTION, SUSPENSION INTRA-ARTICULAR; INTRAMUSCULAR
Status: COMPLETED | OUTPATIENT
Start: 2023-10-24 | End: 2023-10-24

## 2023-10-24 RX ADMIN — TRIAMCINOLONE ACETONIDE 40 MG: 40 INJECTION, SUSPENSION INTRA-ARTICULAR; INTRAMUSCULAR at 08:38

## 2023-10-24 RX ADMIN — LIDOCAINE HYDROCHLORIDE 5 ML: 10 INJECTION, SOLUTION INFILTRATION; PERINEURAL at 08:38

## 2023-10-24 NOTE — PROGRESS NOTES
"Chief Complaint  Follow-up of the Right Shoulder    Subjective      Blair Lira presents to [facility] for follow up of his right shoulder.  He is status post right shoulder arthroscopy with mini open rotator cuff repair, extensive subacromial decompression, distal clavicle resection performed on 3/29/2023 by Dr. Samuels.  Today, he states the last couple months it has been bothering him. He describes the pain as a stiffness in the muscle and aching. He reports his ROM is still good - not back to complete normal but it is good.  When he is sitting he props his arm/shoulder up and gets relief. He denies any numbness/tingling in the shoulder/arm. He hasn't been taking any medications for the pain.     No Known Allergies    Objective     Vital Signs:   Vitals:    10/24/23 0803   BP: 154/68   Pulse: 111   SpO2: 95%   Weight: 99.8 kg (220 lb 0.3 oz)   Height: 185.4 cm (73\")     Body mass index is 29.03 kg/m².    I reviewed the patient's chief complaint, history of present illness, review of systems, past medical history, surgical history, family history, social history, medications, and allergy list.     REVIEW OF SYSTEMS    Constitutional: Denies fevers, chills, weight loss  Cardiovascular: Denies chest pain, shortness of breath  Skin: Denies rashes, acute skin changes  Neurologic: Denies headache, loss of consciousness  MSK: Shoulder pain.     Ortho Exam  Shoulder General: Alert. No acute distress.  Right upper Extremity: Fully healed incisions without warmth, redness or opening.  not tender to palpation. No skin discoloration, atrophy, or swelling. 160 degrees active elevation. External rotation to 30 degrees. Internal rotation to lower lumbar spine.  Sensation intact in the axillary, median, radial, ulnar nerve distributions. Full wrist ROM.  Full elbow ROM. Palpable radial pulse. Neurovascularly intact.      Large Joint Arthrocentesis: R subacromial bursa  Date/Time: 10/24/2023 8:38 " AM  Consent given by: patient  Site marked: site marked  Timeout: Immediately prior to procedure a time out was called to verify the correct patient, procedure, equipment, support staff and site/side marked as required   Supporting Documentation  Indications: pain   Procedure Details  Location: shoulder - R subacromial bursa  Preparation: Patient was prepped and draped in the usual sterile fashion  Needle gauge: 21 G.  Approach: posterior  Medications administered: 5 mL lidocaine 1 %; 40 mg triamcinolone acetonide 40 MG/ML  Patient tolerance: patient tolerated the procedure well with no immediate complications              Assessment and Plan   Diagnoses and all orders for this visit:    1. Right shoulder pain, unspecified chronicity (Primary)  -     diclofenac (VOLTAREN) 50 MG EC tablet; Take 1 tablet by mouth 2 (Two) Times a Day.  Dispense: 60 tablet; Refill: 0    2. Aftercare following surgery of the musculoskeletal system    Other orders  -     Large Joint Arthrocentesis: R subacromial bursa       Blair is here today following up on his right shoulder. We discussed NSAIDs, return to PT for further strengthening/stretching and/or steroid injection in office today. Patient elected to try home exercises, diclofenac prescription send to pharmacy and steroid joint injection administered today in office. Right shoulder steroid joint injection administered today in office. Advised on duration between injections. Patient is eligible for repeat injection 3  months from today.     Follow up as needed. Call with questions or concerns.     Follow Up   Return if symptoms worsen or fail to improve.  There are no Patient Instructions on file for this visit.  Patient was given instructions and counseling regarding his condition or for health maintenance advice. Please see specific information pulled into the AVS if appropriate.

## 2023-10-26 ENCOUNTER — HOSPITAL ENCOUNTER (INPATIENT)
Facility: HOSPITAL | Age: 78
LOS: 7 days | Discharge: HOME | DRG: 683 | End: 2023-11-02
Attending: EMERGENCY MEDICINE | Admitting: INTERNAL MEDICINE
Payer: MEDICARE

## 2023-10-26 ENCOUNTER — APPOINTMENT (OUTPATIENT)
Dept: CT IMAGING | Facility: HOSPITAL | Age: 78
DRG: 683 | End: 2023-10-26
Payer: MEDICARE

## 2023-10-26 DIAGNOSIS — D64.9 ANEMIA, UNSPECIFIED TYPE: ICD-10-CM

## 2023-10-26 DIAGNOSIS — Z78.9 DECREASED ACTIVITIES OF DAILY LIVING (ADL): ICD-10-CM

## 2023-10-26 DIAGNOSIS — N17.9 ACUTE RENAL FAILURE, UNSPECIFIED ACUTE RENAL FAILURE TYPE: Primary | ICD-10-CM

## 2023-10-26 DIAGNOSIS — R26.2 DIFFICULTY WALKING: ICD-10-CM

## 2023-10-26 PROBLEM — D61.818 PANCYTOPENIA: Status: ACTIVE | Noted: 2023-10-26

## 2023-10-26 PROBLEM — R63.4 ABNORMAL WEIGHT LOSS: Chronic | Status: ACTIVE | Noted: 2023-10-26

## 2023-10-26 PROBLEM — I10 HYPERTENSION: Status: ACTIVE | Noted: 2023-10-26

## 2023-10-26 PROBLEM — N18.30 CKD (CHRONIC KIDNEY DISEASE) STAGE 3, GFR 30-59 ML/MIN: Chronic | Status: ACTIVE | Noted: 2023-10-26

## 2023-10-26 LAB
ABO GROUP BLD: NORMAL
ABO GROUP BLD: NORMAL
ALBUMIN SERPL-MCNC: 2.9 G/DL (ref 3.5–5.2)
ALBUMIN/GLOB SERPL: 0.4 G/DL
ALP SERPL-CCNC: 39 U/L (ref 39–117)
ALT SERPL W P-5'-P-CCNC: 10 U/L (ref 1–41)
ANION GAP SERPL CALCULATED.3IONS-SCNC: 9.7 MMOL/L (ref 5–15)
AST SERPL-CCNC: 8 U/L (ref 1–40)
BASOPHILS # BLD AUTO: 0.02 10*3/MM3 (ref 0–0.2)
BASOPHILS NFR BLD AUTO: 0.3 % (ref 0–1.5)
BILIRUB SERPL-MCNC: 1.1 MG/DL (ref 0–1.2)
BLD GP AB SCN SERPL QL: NEGATIVE
BUN SERPL-MCNC: 51 MG/DL (ref 8–23)
BUN/CREAT SERPL: 10.3 (ref 7–25)
CALCIUM SPEC-SCNC: 9 MG/DL (ref 8.6–10.5)
CHLORIDE SERPL-SCNC: 104 MMOL/L (ref 98–107)
CO2 SERPL-SCNC: 22.3 MMOL/L (ref 22–29)
CREAT SERPL-MCNC: 4.95 MG/DL (ref 0.76–1.27)
DEPRECATED RDW RBC AUTO: 51.5 FL (ref 37–54)
EGFRCR SERPLBLD CKD-EPI 2021: 11.4 ML/MIN/1.73
EOSINOPHIL # BLD AUTO: 0.13 10*3/MM3 (ref 0–0.4)
EOSINOPHIL NFR BLD AUTO: 2.2 % (ref 0.3–6.2)
ERYTHROCYTE [DISTWIDTH] IN BLOOD BY AUTOMATED COUNT: 13.7 % (ref 12.3–15.4)
FERRITIN SERPL-MCNC: 426.2 NG/ML (ref 30–400)
FOLATE SERPL-MCNC: 4.63 NG/ML (ref 4.78–24.2)
GLOBULIN UR ELPH-MCNC: 7.1 GM/DL
GLUCOSE SERPL-MCNC: 98 MG/DL (ref 65–99)
HCT VFR BLD AUTO: 22 % (ref 37.5–51)
HEMOCCULT STL QL IA: NEGATIVE
HGB BLD-MCNC: 7.3 G/DL (ref 13–17.7)
HOLD SPECIMEN: NORMAL
HOLD SPECIMEN: NORMAL
IMM GRANULOCYTES # BLD AUTO: 0.03 10*3/MM3 (ref 0–0.05)
IMM GRANULOCYTES NFR BLD AUTO: 0.5 % (ref 0–0.5)
IRON 24H UR-MRATE: 120 MCG/DL (ref 59–158)
IRON SATN MFR SERPL: 59 % (ref 20–50)
LDH SERPL-CCNC: 180 U/L (ref 135–225)
LYMPHOCYTES # BLD AUTO: 2 10*3/MM3 (ref 0.7–3.1)
LYMPHOCYTES NFR BLD AUTO: 33.4 % (ref 19.6–45.3)
MCH RBC QN AUTO: 34.4 PG (ref 26.6–33)
MCHC RBC AUTO-ENTMCNC: 33.2 G/DL (ref 31.5–35.7)
MCV RBC AUTO: 103.8 FL (ref 79–97)
MONOCYTES # BLD AUTO: 0.52 10*3/MM3 (ref 0.1–0.9)
MONOCYTES NFR BLD AUTO: 8.7 % (ref 5–12)
NEUTROPHILS NFR BLD AUTO: 3.29 10*3/MM3 (ref 1.7–7)
NEUTROPHILS NFR BLD AUTO: 54.9 % (ref 42.7–76)
NRBC BLD AUTO-RTO: 0 /100 WBC (ref 0–0.2)
PLATELET # BLD AUTO: 122 10*3/MM3 (ref 140–450)
PMV BLD AUTO: 8.9 FL (ref 6–12)
POTASSIUM SERPL-SCNC: 4.6 MMOL/L (ref 3.5–5.2)
PROT SERPL-MCNC: 10 G/DL (ref 6–8.5)
RBC # BLD AUTO: 2.12 10*6/MM3 (ref 4.14–5.8)
RETICS # AUTO: 0.01 10*6/MM3 (ref 0.02–0.13)
RETICS/RBC NFR AUTO: 0.69 % (ref 0.7–1.9)
RH BLD: POSITIVE
RH BLD: POSITIVE
SODIUM SERPL-SCNC: 136 MMOL/L (ref 136–145)
T&S EXPIRATION DATE: NORMAL
TIBC SERPL-MCNC: 203 MCG/DL (ref 298–536)
TRANSFERRIN SERPL-MCNC: 136 MG/DL (ref 200–360)
VIT B12 BLD-MCNC: 158 PG/ML (ref 211–946)
WBC NRBC COR # BLD: 5.99 10*3/MM3 (ref 3.4–10.8)
WHOLE BLOOD HOLD COAG: NORMAL
WHOLE BLOOD HOLD SPECIMEN: NORMAL

## 2023-10-26 PROCEDURE — 88275 CYTOGENETICS 100-300: CPT | Performed by: INTERNAL MEDICINE

## 2023-10-26 PROCEDURE — 86850 RBC ANTIBODY SCREEN: CPT | Performed by: EMERGENCY MEDICINE

## 2023-10-26 PROCEDURE — 86901 BLOOD TYPING SEROLOGIC RH(D): CPT | Performed by: EMERGENCY MEDICINE

## 2023-10-26 PROCEDURE — 82728 ASSAY OF FERRITIN: CPT | Performed by: INTERNAL MEDICINE

## 2023-10-26 PROCEDURE — 88184 FLOWCYTOMETRY/ TC 1 MARKER: CPT

## 2023-10-26 PROCEDURE — 86901 BLOOD TYPING SEROLOGIC RH(D): CPT

## 2023-10-26 PROCEDURE — 36415 COLL VENOUS BLD VENIPUNCTURE: CPT

## 2023-10-26 PROCEDURE — 71250 CT THORAX DX C-: CPT

## 2023-10-26 PROCEDURE — 99285 EMERGENCY DEPT VISIT HI MDM: CPT

## 2023-10-26 PROCEDURE — 25010000002 ENOXAPARIN PER 10 MG: Performed by: INTERNAL MEDICINE

## 2023-10-26 PROCEDURE — 82746 ASSAY OF FOLIC ACID SERUM: CPT | Performed by: INTERNAL MEDICINE

## 2023-10-26 PROCEDURE — 86900 BLOOD TYPING SEROLOGIC ABO: CPT | Performed by: EMERGENCY MEDICINE

## 2023-10-26 PROCEDURE — 88185 FLOWCYTOMETRY/TC ADD-ON: CPT | Performed by: INTERNAL MEDICINE

## 2023-10-26 PROCEDURE — 82274 ASSAY TEST FOR BLOOD FECAL: CPT | Performed by: EMERGENCY MEDICINE

## 2023-10-26 PROCEDURE — 74176 CT ABD & PELVIS W/O CONTRAST: CPT

## 2023-10-26 PROCEDURE — 84466 ASSAY OF TRANSFERRIN: CPT | Performed by: INTERNAL MEDICINE

## 2023-10-26 PROCEDURE — 83540 ASSAY OF IRON: CPT | Performed by: INTERNAL MEDICINE

## 2023-10-26 PROCEDURE — 86900 BLOOD TYPING SEROLOGIC ABO: CPT

## 2023-10-26 PROCEDURE — 80053 COMPREHEN METABOLIC PANEL: CPT | Performed by: EMERGENCY MEDICINE

## 2023-10-26 PROCEDURE — 85025 COMPLETE CBC W/AUTO DIFF WBC: CPT | Performed by: EMERGENCY MEDICINE

## 2023-10-26 PROCEDURE — 83615 LACTATE (LD) (LDH) ENZYME: CPT | Performed by: INTERNAL MEDICINE

## 2023-10-26 PROCEDURE — 85045 AUTOMATED RETICULOCYTE COUNT: CPT | Performed by: INTERNAL MEDICINE

## 2023-10-26 PROCEDURE — 82607 VITAMIN B-12: CPT | Performed by: INTERNAL MEDICINE

## 2023-10-26 PROCEDURE — 88271 CYTOGENETICS DNA PROBE: CPT

## 2023-10-26 RX ORDER — ATENOLOL 50 MG/1
50 TABLET ORAL NIGHTLY
Status: DISCONTINUED | OUTPATIENT
Start: 2023-10-26 | End: 2023-10-28

## 2023-10-26 RX ORDER — NALOXONE HCL 0.4 MG/ML
0.4 VIAL (ML) INJECTION
Status: DISCONTINUED | OUTPATIENT
Start: 2023-10-26 | End: 2023-11-02 | Stop reason: HOSPADM

## 2023-10-26 RX ORDER — SODIUM CHLORIDE 9 MG/ML
40 INJECTION, SOLUTION INTRAVENOUS AS NEEDED
Status: DISCONTINUED | OUTPATIENT
Start: 2023-10-26 | End: 2023-11-02 | Stop reason: HOSPADM

## 2023-10-26 RX ORDER — ENOXAPARIN SODIUM 100 MG/ML
30 INJECTION SUBCUTANEOUS DAILY
Status: DISCONTINUED | OUTPATIENT
Start: 2023-10-26 | End: 2023-10-31

## 2023-10-26 RX ORDER — BISACODYL 10 MG
10 SUPPOSITORY, RECTAL RECTAL DAILY PRN
Status: DISCONTINUED | OUTPATIENT
Start: 2023-10-26 | End: 2023-11-02 | Stop reason: HOSPADM

## 2023-10-26 RX ORDER — ATORVASTATIN CALCIUM 10 MG/1
10 TABLET, FILM COATED ORAL NIGHTLY
Status: DISCONTINUED | OUTPATIENT
Start: 2023-10-26 | End: 2023-11-02 | Stop reason: HOSPADM

## 2023-10-26 RX ORDER — FAMOTIDINE 10 MG/ML
20 INJECTION, SOLUTION INTRAVENOUS EVERY 12 HOURS SCHEDULED
Status: DISCONTINUED | OUTPATIENT
Start: 2023-10-26 | End: 2023-11-02 | Stop reason: HOSPADM

## 2023-10-26 RX ORDER — BISACODYL 5 MG/1
5 TABLET, DELAYED RELEASE ORAL DAILY PRN
Status: DISCONTINUED | OUTPATIENT
Start: 2023-10-26 | End: 2023-11-02 | Stop reason: HOSPADM

## 2023-10-26 RX ORDER — SODIUM CHLORIDE 450 MG/100ML
100 INJECTION, SOLUTION INTRAVENOUS CONTINUOUS
Status: DISCONTINUED | OUTPATIENT
Start: 2023-10-26 | End: 2023-10-27

## 2023-10-26 RX ORDER — ACETAMINOPHEN 325 MG/1
650 TABLET ORAL EVERY 4 HOURS PRN
Status: DISCONTINUED | OUTPATIENT
Start: 2023-10-26 | End: 2023-11-02 | Stop reason: HOSPADM

## 2023-10-26 RX ORDER — POLYETHYLENE GLYCOL 3350 17 G/17G
17 POWDER, FOR SOLUTION ORAL DAILY PRN
Status: DISCONTINUED | OUTPATIENT
Start: 2023-10-26 | End: 2023-11-02 | Stop reason: HOSPADM

## 2023-10-26 RX ORDER — NITROGLYCERIN 0.4 MG/1
0.4 TABLET SUBLINGUAL
Status: DISCONTINUED | OUTPATIENT
Start: 2023-10-26 | End: 2023-11-02 | Stop reason: HOSPADM

## 2023-10-26 RX ORDER — ONDANSETRON 2 MG/ML
4 INJECTION INTRAMUSCULAR; INTRAVENOUS EVERY 4 HOURS PRN
Status: DISCONTINUED | OUTPATIENT
Start: 2023-10-26 | End: 2023-11-02 | Stop reason: HOSPADM

## 2023-10-26 RX ORDER — FAMOTIDINE 20 MG/1
40 TABLET, FILM COATED ORAL DAILY
Status: DISCONTINUED | OUTPATIENT
Start: 2023-10-26 | End: 2023-10-26

## 2023-10-26 RX ORDER — AMLODIPINE BESYLATE 5 MG/1
5 TABLET ORAL DAILY
Status: ON HOLD | COMMUNITY
Start: 2023-10-25

## 2023-10-26 RX ORDER — AMOXICILLIN 250 MG
1 CAPSULE ORAL 2 TIMES DAILY
Status: DISCONTINUED | OUTPATIENT
Start: 2023-10-26 | End: 2023-11-02 | Stop reason: HOSPADM

## 2023-10-26 RX ORDER — TAMSULOSIN HYDROCHLORIDE 0.4 MG/1
0.4 CAPSULE ORAL DAILY
Status: DISCONTINUED | OUTPATIENT
Start: 2023-10-26 | End: 2023-11-02 | Stop reason: HOSPADM

## 2023-10-26 RX ORDER — SODIUM CHLORIDE 0.9 % (FLUSH) 0.9 %
10 SYRINGE (ML) INJECTION AS NEEDED
Status: DISCONTINUED | OUTPATIENT
Start: 2023-10-26 | End: 2023-11-02 | Stop reason: HOSPADM

## 2023-10-26 RX ORDER — ALPRAZOLAM 0.25 MG/1
0.25 TABLET ORAL EVERY 6 HOURS PRN
Status: DISCONTINUED | OUTPATIENT
Start: 2023-10-26 | End: 2023-11-02 | Stop reason: HOSPADM

## 2023-10-26 RX ORDER — SODIUM CHLORIDE 0.9 % (FLUSH) 0.9 %
10 SYRINGE (ML) INJECTION EVERY 12 HOURS SCHEDULED
Status: DISCONTINUED | OUTPATIENT
Start: 2023-10-26 | End: 2023-11-02 | Stop reason: HOSPADM

## 2023-10-26 RX ORDER — HYDROCODONE BITARTRATE AND ACETAMINOPHEN 5; 325 MG/1; MG/1
1 TABLET ORAL EVERY 4 HOURS PRN
Status: DISCONTINUED | OUTPATIENT
Start: 2023-10-26 | End: 2023-11-02 | Stop reason: HOSPADM

## 2023-10-26 RX ORDER — ALUMINA, MAGNESIA, AND SIMETHICONE 2400; 2400; 240 MG/30ML; MG/30ML; MG/30ML
15 SUSPENSION ORAL EVERY 6 HOURS PRN
Status: DISCONTINUED | OUTPATIENT
Start: 2023-10-26 | End: 2023-11-02 | Stop reason: HOSPADM

## 2023-10-26 RX ORDER — TEMAZEPAM 15 MG/1
15 CAPSULE ORAL NIGHTLY PRN
Status: DISCONTINUED | OUTPATIENT
Start: 2023-10-26 | End: 2023-11-02 | Stop reason: HOSPADM

## 2023-10-26 RX ADMIN — TAMSULOSIN HYDROCHLORIDE 0.4 MG: 0.4 CAPSULE ORAL at 17:10

## 2023-10-26 RX ADMIN — ENOXAPARIN SODIUM 30 MG: 100 INJECTION SUBCUTANEOUS at 17:10

## 2023-10-26 RX ADMIN — ATORVASTATIN CALCIUM 10 MG: 10 TABLET, FILM COATED ORAL at 22:09

## 2023-10-26 RX ADMIN — SODIUM CHLORIDE 100 ML/HR: 4.5 INJECTION, SOLUTION INTRAVENOUS at 17:01

## 2023-10-26 RX ADMIN — FAMOTIDINE 20 MG: 10 INJECTION INTRAVENOUS at 22:13

## 2023-10-26 RX ADMIN — ATENOLOL 50 MG: 50 TABLET ORAL at 22:09

## 2023-10-26 NOTE — H&P
[facility]   HISTORY AND PHYSICAL    Patient Name: Blair Lira  : 1945  MRN: [id number]  Primary Care Physician:  Babs Summers APRN  Date of admission: 10/26/2023    Subjective   Subjective     Chief Complaint:   Feeling weak, low blood count      HPI:    Blair Lira is a 78 y.o. male with known history of hypertension hyperlipidemia, was sent to ER by PCP nurse practitioner for anemia and elevated kidney function test.  Patient work-up in the ER revealed creatinine to be 4 which is up from baseline of 1.8.  Also hemoglobin down to 7.3.  When examined patient is awake alert in room #34 in ER.  Patient denies any abdominal pain denies any blood in the stools or black-colored stools.  He has been feeling weak for last several months in fact he lost 20 to 25 pounds in last 2 months.  He denies any chest pain, shortness of breath but complains of dryness and decreased urine output.        Review of Systems:    Complains of fatigue weakness.  No fever chills  Getting tired and able to walk.  Gets out of breath easily.  Decreased urine output.  Denies blood in the stools or black-colored stools.  Denies chest pain or palpitations    Personal History     Past Medical History:   Diagnosis Date    BPH (benign prostatic hyperplasia)     Frozen shoulder     Hyperlipidemia     Hypertension 10/26/2023    Periarthritis of shoulder     Rotator cuff disorder, right     Tennis elbow     RIGHT       Past Surgical History:   Procedure Laterality Date    CATARACT EXTRACTION EXTRACAPSULAR W/ INTRAOCULAR LENS IMPLANTATION Bilateral     COLONOSCOPY      JOINT REPLACEMENT Right     THR    SHOULDER ARTHROSCOPY W/ ROTATOR CUFF REPAIR Right 3/29/2023    Procedure: SHOULDER ARTHROSCOPY WITH ROTATOR CUFF REPAIR, SUBCROMIAL DECOMPRESSION,DISTAL CLAVICLE RESECTION;  Surgeon: Gustavo Samuels MD;  Location: [facility] [place] [facility] [place];  Service: Orthopedics;  Laterality: Right;    SHOULDER SURGERY Left     SCOPE       Family History:  family history is not on file. Otherwise pertinent FHx was reviewed and not pertinent to current issue.    Social History:  reports that he has never smoked. He has never used smokeless tobacco. He reports that he does not currently use alcohol. He reports that he does not use drugs.    Home Medications:  amLODIPine, atenolol, atorvastatin, diclofenac, hydroCHLOROthiazide, irbesartan, and tamsulosin      Allergies:  No Known Allergies    Objective   Objective     Vitals:   Temp:  [98.3 °F (36.8 °C)] 98.3 °F (36.8 °C)  Heart Rate:  [68-73] 73  Resp:  [16] 16  BP: (143)/(63-69) 143/63    Physical Exam  Elderly male looks of his stated age, not in acute distress.  HEENT reveals pallor.  Oral mucosa dry.  Neck supple.  Heart regular.  Lungs clear.  Abdomen soft nontender.  Extremities free of any edema  Neurologically awake alert and oriented      I have personally reviewed the results from the time of this admission to 10/26/2023 17:09 EDT and agree with these findings:  [x]  Laboratory  []  Microbiology  [x]  Radiology  []  EKG/Telemetry   []  Cardiology/Vascular   []  Pathology  []  Old records  []  Other:    CBC:    WBC   Date Value Ref Range Status   10/26/2023 5.99 3.40 - 10.80 10*3/mm3 Final     RBC   Date Value Ref Range Status   10/26/2023 2.12 (L) 4.14 - 5.80 10*6/mm3 Final     Hemoglobin   Date Value Ref Range Status   10/26/2023 7.3 (L) 13.0 - 17.7 g/dL Final     Hematocrit   Date Value Ref Range Status   10/26/2023 22.0 (L) 37.5 - 51.0 % Final     MCV   Date Value Ref Range Status   10/26/2023 103.8 (H) 79.0 - 97.0 fL Final     MCH   Date Value Ref Range Status   10/26/2023 34.4 (H) 26.6 - 33.0 pg Final     MCHC   Date Value Ref Range Status   10/26/2023 33.2 31.5 - 35.7 g/dL Final     RDW   Date Value Ref Range Status   10/26/2023 13.7 12.3 - 15.4 % Final     RDW-SD   Date Value Ref Range Status   10/26/2023 51.5 37.0 - 54.0 fl Final     MPV   Date Value Ref Range Status   10/26/2023 8.9 6.0 - 12.0 fL  Final     Platelets   Date Value Ref Range Status   10/26/2023 122 (L) 140 - 450 10*3/mm3 Final     Neutrophil %   Date Value Ref Range Status   10/26/2023 54.9 42.7 - 76.0 % Final     Lymphocyte %   Date Value Ref Range Status   10/26/2023 33.4 19.6 - 45.3 % Final     Monocyte %   Date Value Ref Range Status   10/26/2023 8.7 5.0 - 12.0 % Final     Eosinophil %   Date Value Ref Range Status   10/26/2023 2.2 0.3 - 6.2 % Final     Basophil %   Date Value Ref Range Status   10/26/2023 0.3 0.0 - 1.5 % Final     Immature Grans %   Date Value Ref Range Status   10/26/2023 0.5 0.0 - 0.5 % Final     Neutrophils, Absolute   Date Value Ref Range Status   10/26/2023 3.29 1.70 - 7.00 10*3/mm3 Final     Lymphocytes, Absolute   Date Value Ref Range Status   10/26/2023 2.00 0.70 - 3.10 10*3/mm3 Final     Monocytes, Absolute   Date Value Ref Range Status   10/26/2023 0.52 0.10 - 0.90 10*3/mm3 Final     Eosinophils, Absolute   Date Value Ref Range Status   10/26/2023 0.13 0.00 - 0.40 10*3/mm3 Final     Basophils, Absolute   Date Value Ref Range Status   10/26/2023 0.02 0.00 - 0.20 10*3/mm3 Final     Immature Grans, Absolute   Date Value Ref Range Status   10/26/2023 0.03 0.00 - 0.05 10*3/mm3 Final     nRBC   Date Value Ref Range Status   10/26/2023 0.0 0.0 - 0.2 /100 WBC Final        BMP:    Lab Results   Component Value Date    GLUCOSE 98 10/26/2023    BUN 51 (H) 10/26/2023    CREATININE 4.95 (H) 10/26/2023    BCR 10.3 10/26/2023    K 4.6 10/26/2023    CO2 22.3 10/26/2023    CALCIUM 9.0 10/26/2023    ALBUMIN 2.9 (L) 10/26/2023    LABIL2 1.0 (L) 09/24/2020    AST 8 10/26/2023    ALT 10 10/26/2023        No radiology results for the last day           Assessment & Plan   Assessment / Plan       Current level 5 diagnosis:  Active Hospital Problems    Diagnosis     **Acute renal failure (ARF)     Pancytopenia     Abnormal weight loss     Hypertension     CKD (chronic kidney disease) stage 3, GFR 30-59 ml/min      Plan:     Patient  comes in with complex issues, severe anemia and pancytopenia along with acute renal failure.  Admit to the hospital.  Patient generally appears dehydrated.  At this point patient with acute renal failure on chronic kidney disease.  We will hydrate and monitor kidney functions.  Hold nephrotoxic agents.  Anemia severe.  No active bleed reported.  Patient has pancytopenia with low platelets as well as low WBCs and RBCs.  I will check iron studies and B12 and folate.  Consult hematologist for opinion on anemia especially with abnormal weight loss.  Dr. Mason notified.  Further monitor H&H every 8 hours and transfuse if needed.  IV Pepcid for GI prophylaxis.  Further management will based on clinical course, lab results and consultant input      DVT prophylaxis:  Medical DVT prophylaxis orders are present.    GI Prophylaxis:       IV Pepcid    CODE STATUS:    Code Status (Patient has no pulse and is not breathing): CPR (Attempt to Resuscitate)  Medical Interventions (Patient has pulse or is breathing): Full Support    Admission Status:  I believe this patient meets inpatient status.             I have dictated this note utilizing Dragon Dictation.             Please note that portions of this note were completed with a voice recognition program.             Part of this note may be an electronic transcription/translation of spoken language to printed text         using the Dragon Dictation System.       Electronically signed by Elieser Pederson MD, 10/26/23, 5:02 PM EDT.    Total time spent with in evaluation and management:

## 2023-10-26 NOTE — ED PROVIDER NOTES
Time: 11:10 AM EDT  Date of encounter:  10/26/2023  Independent Historian/Clinical History and Information was obtained by:   Patient    History is limited by: N/A    Chief Complaint: Abnormal labs      History of Present Illness:  Patient is a 78 y.o. year old male who presents to the emergency department for evaluation of abnormal labs.  Patient had blood work done by PCP and was referred to the emergency department for further evaluation.  Blood work reportedly revealed low hemoglobin and patient reports several months worth of fatigue, shortness of breath with exertion, generally not feeling well with any exertion.    HPI    Patient Care Team  Primary Care Provider: Babs Summers APRN    Past Medical History:     No Known Allergies  Past Medical History:   Diagnosis Date    BPH (benign prostatic hyperplasia)     Frozen shoulder     Hyperlipidemia     Hypertension     Periarthritis of shoulder     Rotator cuff disorder, right     Tennis elbow     RIGHT     Past Surgical History:   Procedure Laterality Date    CATARACT EXTRACTION EXTRACAPSULAR W/ INTRAOCULAR LENS IMPLANTATION Bilateral     COLONOSCOPY      JOINT REPLACEMENT Right     THR    SHOULDER ARTHROSCOPY W/ ROTATOR CUFF REPAIR Right 3/29/2023    Procedure: SHOULDER ARTHROSCOPY WITH ROTATOR CUFF REPAIR, SUBCROMIAL DECOMPRESSION,DISTAL CLAVICLE RESECTION;  Surgeon: Gustavo Samuels MD;  Location: [facility] OR [facility] [place];  Service: Orthopedics;  Laterality: Right;    SHOULDER SURGERY Left     SCOPE     Family History   Problem Relation Age of Onset    Malig Hyperthermia Neg Hx        Home Medications:  Prior to Admission medications    Medication Sig Start Date End Date Taking? Authorizing Provider   atenolol (TENORMIN) 50 MG tablet Take 1 tablet by mouth Every Night. INST PER ANESTHESIA PROTOCOL    Shavonne Yin MD   atorvastatin (LIPITOR) 10 MG tablet  9/23/22   Shavonne Yin MD   diclofenac (VOLTAREN) 50 MG EC tablet Take 1 tablet by mouth 2 (Two)  "Times a Day. 10/24/23   Jodi Dailey PA-C   hydroCHLOROthiazide (HYDRODIURIL) 25 MG tablet Take 1 tablet by mouth Every Night. 9/23/22   Shavonne Yin MD   irbesartan (AVAPRO) 300 MG tablet Take 1 tablet by mouth Every Night. INST PER ANESTHESIA PROTOCOL 9/23/22   Shavonne Yin MD   tamsulosin (FLOMAX) 0.4 MG capsule 24 hr capsule  9/23/22   Shavonne Yin MD   triamcinolone (KENALOG) 0.1 % cream  8/1/23   Shavonne Yin MD        Social History:   Social History     Tobacco Use    Smoking status: Never    Smokeless tobacco: Never   Vaping Use    Vaping Use: Never used   Substance Use Topics    Alcohol use: Not Currently    Drug use: Never         Review of Systems:  Review of Systems   Constitutional:  Positive for fatigue. Negative for chills and fever.   HENT:  Negative for congestion, rhinorrhea and sore throat.    Eyes:  Negative for pain and visual disturbance.   Respiratory:  Negative for apnea, cough, chest tightness and shortness of breath.    Cardiovascular:  Negative for chest pain and palpitations.   Gastrointestinal:  Negative for abdominal pain, diarrhea, nausea and vomiting.   Genitourinary:  Negative for difficulty urinating and dysuria.   Musculoskeletal:  Negative for joint swelling and myalgias.   Skin:  Negative for color change.   Neurological:  Negative for seizures and headaches.   Psychiatric/Behavioral: Negative.     All other systems reviewed and are negative.       Physical Exam:  /69   Pulse 68   Temp 98.3 °F (36.8 °C) (Oral)   Resp 16   Ht 185.4 cm (73\")   Wt 93.1 kg (205 lb 4 oz)   SpO2 99%   BMI 27.08 kg/m²     Physical Exam  Vitals and nursing note reviewed.   Constitutional:       General: He is not in acute distress.     Appearance: Normal appearance. He is not toxic-appearing.   HENT:      Head: Normocephalic and atraumatic.      Jaw: There is normal jaw occlusion.   Eyes:      General: Lids are normal.      Extraocular Movements: " Extraocular movements intact.      Conjunctiva/sclera: Conjunctivae normal.      Pupils: Pupils are equal, round, and reactive to light.   Cardiovascular:      Rate and Rhythm: Normal rate and regular rhythm.      Pulses: Normal pulses.      Heart sounds: Normal heart sounds.   Pulmonary:      Effort: Pulmonary effort is normal. No respiratory distress.      Breath sounds: Normal breath sounds. No wheezing or rhonchi.   Abdominal:      General: Abdomen is flat.      Palpations: Abdomen is soft.      Tenderness: There is no abdominal tenderness. There is no guarding or rebound.   Musculoskeletal:         General: Normal range of motion.      Cervical back: Normal range of motion and neck supple.      Right lower leg: No edema.      Left lower leg: No edema.   Skin:     General: Skin is warm and dry.      Coloration: Skin is pale.   Neurological:      Mental Status: He is alert and oriented to person, place, and time. Mental status is at baseline.   Psychiatric:         Mood and Affect: Mood normal.                  Procedures:  Procedures      Medical Decision Making:      Comorbidities that affect care:    Hypertension    External Notes reviewed:    Previous Clinic Note: Orthopedic surgery office visit for shoulder pain management      The following orders were placed and all results were independently analyzed by me:  Orders Placed This Encounter   Procedures    [place] Draw    CBC Auto Differential    Comprehensive Metabolic Panel    Occult Blood, Fecal By Immunoassay - Stool, Per Rectum    Code Status and Medical Interventions:    IP General Consult (Use specialty-specific consult if known)    Type & Screen    ABO RH Specimen Verification    Insert peripheral IV    Inpatient Admission    CBC & Differential    Green Top (Gel)    Lavender Top    Gold Top - SST    Light Blue Top       Medications Given in the Emergency Department:  Medications   sodium chloride 0.9 % flush 10 mL (has no administration in time range)         ED Course:         Labs:    Lab Results (last 24 hours)       Procedure Component Value Units Date/Time    CBC & Differential [[id number]]  (Abnormal) Collected: 10/26/23 1049    Specimen: Blood Updated: 10/26/23 1057    Narrative:      The following orders were created for panel order CBC & Differential.  Procedure                               Abnormality         Status                     ---------                               -----------         ------                     CBC Auto Differential[[id number]]        Abnormal            Final result                 Please view results for these tests on the individual orders.    CBC Auto Differential [[id number]]  (Abnormal) Collected: 10/26/23 1049    Specimen: Blood Updated: 10/26/23 1057     WBC 5.99 10*3/mm3      RBC 2.12 10*6/mm3      Hemoglobin 7.3 g/dL      Hematocrit 22.0 %      .8 fL      MCH 34.4 pg      MCHC 33.2 g/dL      RDW 13.7 %      RDW-SD 51.5 fl      MPV 8.9 fL      Platelets 122 10*3/mm3      Neutrophil % 54.9 %      Lymphocyte % 33.4 %      Monocyte % 8.7 %      Eosinophil % 2.2 %      Basophil % 0.3 %      Immature Grans % 0.5 %      Neutrophils, Absolute 3.29 10*3/mm3      Lymphocytes, Absolute 2.00 10*3/mm3      Monocytes, Absolute 0.52 10*3/mm3      Eosinophils, Absolute 0.13 10*3/mm3      Basophils, Absolute 0.02 10*3/mm3      Immature Grans, Absolute 0.03 10*3/mm3      nRBC 0.0 /100 WBC     Comprehensive Metabolic Panel [[id number]]  (Abnormal) Collected: 10/26/23 1049    Specimen: Blood Updated: 10/26/23 1129     Glucose 98 mg/dL      BUN 51 mg/dL      Creatinine 4.95 mg/dL      Sodium 136 mmol/L      Potassium 4.6 mmol/L      Chloride 104 mmol/L      CO2 22.3 mmol/L      Calcium 9.0 mg/dL      Total Protein 10.0 g/dL      Albumin 2.9 g/dL      ALT (SGPT) 10 U/L      AST (SGOT) 8 U/L      Alkaline Phosphatase 39 U/L      Total Bilirubin 1.1 mg/dL      Globulin 7.1 gm/dL      A/G Ratio 0.4 g/dL      BUN/Creatinine Ratio 10.3      Anion Gap 9.7 mmol/L      eGFR 11.4 mL/min/1.73      Comment: <15 Indicative of kidney failure       Narrative:      GFR Normal >60  Chronic Kidney Disease <60  Kidney Failure <15    The GFR formula is only valid for adults with stable renal function between ages 18 and 70.    Occult Blood, Fecal By Immunoassay - Stool, Per Rectum [[id number]]  (Normal) Collected: 10/26/23 1219    Specimen: Stool from Per Rectum Updated: 10/26/23 1233     Occult Blood, Fecal by Immunoassay Negative             Imaging:    No Radiology Exams Resulted Within Past 24 Hours      Differential Diagnosis and Discussion:    Metabolic: Differential diagnosis includes but is not limited to hypertension, hyperglycemia, hyperkalemia, hypocalcemia, metabolic acidosis, hypokalemia, hypoglycemia, malnutrition, hypothyroidism, hyperthyroidism, and adrenal insufficiency.     All labs were reviewed and interpreted by me.    MDM  Number of Diagnoses or Management Options  Acute renal failure, unspecified acute renal failure type  Anemia, unspecified type  Diagnosis management comments: In summary this is a 78-year-old male patient presents to the emergency department for evaluation of abnormal labs obtained by PCP.  CBC remarkable for anemia with a hemoglobin of 7.3.  CMP remarkable for new acute renal failure with creatinine 4.95, baseline 11 months ago was 1.6.  Case has been discussed with patient's primary physician office who accepts the patient for admission.             Patient Care Considerations:    CT ABDOMEN AND PELVIS: I considered ordering a CT scan of the abdomen and pelvis however benign abdominal examination, no urinary retention      Consultants/Shared Management Plan:    PCP: I have discussed the case with Dr. Pederson who agrees to accept the patient for admission.    Social Determinants of Health:    Patient is independent, reliable, and has access to care.       Disposition and Care Coordination:    Admit:   Through independent  evaluation of the patient's history, physical, and imperical data, the patient meets criteria for observation/admission to the hospital.        Final diagnoses:   Acute renal failure, unspecified acute renal failure type   Anemia, unspecified type        ED Disposition       ED Disposition   Decision to Admit    Condition   --    Comment   Level of Care: Telemetry [5]   Diagnosis: Acute renal failure (ARF) [934634]   Admitting Physician: LONNIE RUBY [[id number]]   Certification: I Certify That Inpatient Hospital Services Are Medically Necessary For Greater Than 2 Midnights                 This medical record created using voice recognition software.             Nestor Francis MD  10/26/23 7137

## 2023-10-26 NOTE — PLAN OF CARE
Goal Outcome Evaluation:   Patient alert and oriented, on room air, no complaints of pain, continuous fluids started, no other changes.

## 2023-10-26 NOTE — CONSULTS
[facility]   Consult Note    Patient Name: Blair Lira  : 1945  MRN: [id number]  Primary Care Physician:  Babs Summers APRN  Date of admission: 10/26/2023    Subjective   Subjective     Reason for Consult: Anemia    HPI: Patient complaining of extreme weakness feeling tired was found to be anemic was having some dizzy spells patient states that he has lost about 10 to 20 pounds he has had some arthritis type symptoms and had shoulder surgery since then he also has been having some itching and neuropathy his examination reveals that he has elevated protein levels with macrocytic anemia with mild degree of thrombocytopenia ferritin levels and iron saturation has increased denies history of smoking smoking or alcoholic abuse but has lost his appetite and lost about 20 pounds of weight    Blair Lira is a 78 y.o. male patient with loss of weight loss of appetite thrombocytopenia anemia and elevated protein level    Review of Systems   All systems were reviewed and negative except for: Reviewed    Personal History     Past Medical History:   Diagnosis Date    BPH (benign prostatic hyperplasia)     Frozen shoulder     Hyperlipidemia     Hypertension 10/26/2023    Periarthritis of shoulder     Rotator cuff disorder, right     Tennis elbow     RIGHT       Past Surgical History:   Procedure Laterality Date    CATARACT EXTRACTION EXTRACAPSULAR W/ INTRAOCULAR LENS IMPLANTATION Bilateral     COLONOSCOPY      JOINT REPLACEMENT Right     THR    SHOULDER ARTHROSCOPY W/ ROTATOR CUFF REPAIR Right 3/29/2023    Procedure: SHOULDER ARTHROSCOPY WITH ROTATOR CUFF REPAIR, SUBCROMIAL DECOMPRESSION,DISTAL CLAVICLE RESECTION;  Surgeon: Gustavo Samuels MD;  Location: [facility] OR [facility] [place];  Service: Orthopedics;  Laterality: Right;    SHOULDER SURGERY Left     SCOPE       Family History: family history is not on file. Otherwise pertinent FHx was reviewed and not pertinent to current issue.    Social History:  reports that he  has never smoked. He has never used smokeless tobacco. He reports that he does not currently use alcohol. He reports that he does not use drugs.    Home Medications:  amLODIPine, atenolol, atorvastatin, diclofenac, hydroCHLOROthiazide, irbesartan, and tamsulosin      Allergies:  No Known Allergies    Objective   Objective     Vitals:   Temp:  [98.1 °F (36.7 °C)-98.3 °F (36.8 °C)] 98.1 °F (36.7 °C)  Heart Rate:  [68-79] 79  Resp:  [16-18] 18  BP: (137-154)/(56-69) 137/65  Physical Exam    Constitutional: Awake, alert   Eyes: PERRLA, sclerae anicteric, no conjunctival injection   HENT: NCAT, mucous membranes moist   Neck: Supple, no thyromegaly, no lymphadenopathy, trachea midline   Respiratory: Clear to auscultation bilaterally, nonlabored respirations    Cardiovascular: RRR, no murmurs, rubs, or gallops, palpable pedal pulses bilaterally   Gastrointestinal: Positive bowel sounds, soft, nontender, nondistended   Musculoskeletal: No bilateral ankle edema, no clubbing or cyanosis to extremities   Psychiatric: Appropriate affect, cooperative   Neurologic: Oriented x 3, strength symmetric in all extremities, Cranial Nerves grossly intact to confrontation, speech clear   Skin: No rashes   No significant finding  Result Review    Result Review:  I have personally reviewed the results from the time of this admission to 10/26/2023 18:11 EDT and agree with these findings:  [x]  Laboratory  []  Microbiology  []  Radiology  []  EKG/Telemetry   []  Cardiology/Vascular   []  Pathology  []  Old records  []  Other:  Most notable findings include: Anemia elevated protein and renal failure    Assessment & Plan   Assessment / Plan     Brief Patient Summary:  Blair Lira is a 78 y.o. male who findings highly suggestive of possible multiple myeloma    Active Hospital Problems:  Active Hospital Problems    Diagnosis     **Acute renal failure (ARF)     Pancytopenia     Abnormal weight loss     Hypertension     CKD (chronic kidney  disease) stage 3, GFR 30-59 ml/min        Plan:   We will get a CT scan of the chest and abdomen to rule out possibility of malignancy bone marrow aspiration and biopsy will be performed        Electronically signed by Vamsi Mason MD, 10/26/23, 6:11 PM EDT.    Part of this note may be an electronic transcription/translation of spoken language to printed text using the Dragon Dictation System.

## 2023-10-27 ENCOUNTER — APPOINTMENT (OUTPATIENT)
Dept: NUCLEAR MEDICINE | Facility: HOSPITAL | Age: 78
DRG: 683 | End: 2023-10-27
Payer: MEDICARE

## 2023-10-27 ENCOUNTER — APPOINTMENT (OUTPATIENT)
Dept: GENERAL RADIOLOGY | Facility: HOSPITAL | Age: 78
DRG: 683 | End: 2023-10-27
Payer: MEDICARE

## 2023-10-27 LAB
ABO GROUP BLD: NORMAL
ALBUMIN SERPL-MCNC: 2.8 G/DL (ref 3.5–5.2)
ALBUMIN SERPL-MCNC: 3.1 G/DL (ref 3.5–5.2)
ALBUMIN/GLOB SERPL: 0.4 G/DL
ALBUMIN/GLOB SERPL: 0.4 G/DL
ALP SERPL-CCNC: 34 U/L (ref 39–117)
ALP SERPL-CCNC: 38 U/L (ref 39–117)
ALT SERPL W P-5'-P-CCNC: 12 U/L (ref 1–41)
ALT SERPL W P-5'-P-CCNC: 9 U/L (ref 1–41)
ANION GAP SERPL CALCULATED.3IONS-SCNC: 10 MMOL/L (ref 5–15)
ANION GAP SERPL CALCULATED.3IONS-SCNC: 10.9 MMOL/L (ref 5–15)
ANISOCYTOSIS BLD QL: ABNORMAL
AST SERPL-CCNC: 8 U/L (ref 1–40)
AST SERPL-CCNC: 9 U/L (ref 1–40)
BASOPHILS # BLD AUTO: 0.03 10*3/MM3 (ref 0–0.2)
BASOPHILS # BLD AUTO: 0.03 10*3/MM3 (ref 0–0.2)
BASOPHILS NFR BLD AUTO: 0.5 % (ref 0–1.5)
BASOPHILS NFR BLD AUTO: 0.5 % (ref 0–1.5)
BH BB BLOOD EXPIRATION DATE: NORMAL
BH BB BLOOD TYPE BARCODE: 6200
BH BB DISPENSE STATUS: NORMAL
BH BB PRODUCT CODE: NORMAL
BH BB UNIT NUMBER: NORMAL
BILIRUB SERPL-MCNC: 1 MG/DL (ref 0–1.2)
BILIRUB SERPL-MCNC: 1.1 MG/DL (ref 0–1.2)
BLD GP AB SCN SERPL QL: NEGATIVE
BUN SERPL-MCNC: 54 MG/DL (ref 8–23)
BUN SERPL-MCNC: 57 MG/DL (ref 8–23)
BUN/CREAT SERPL: 11.7 (ref 7–25)
BUN/CREAT SERPL: 12.1 (ref 7–25)
BURR CELLS BLD QL SMEAR: ABNORMAL
CALCIUM SPEC-SCNC: 8.9 MG/DL (ref 8.6–10.5)
CALCIUM SPEC-SCNC: 8.9 MG/DL (ref 8.6–10.5)
CHLORIDE SERPL-SCNC: 101 MMOL/L (ref 98–107)
CHLORIDE SERPL-SCNC: 103 MMOL/L (ref 98–107)
CO2 SERPL-SCNC: 20.1 MMOL/L (ref 22–29)
CO2 SERPL-SCNC: 21 MMOL/L (ref 22–29)
CREAT SERPL-MCNC: 4.45 MG/DL (ref 0.76–1.27)
CREAT SERPL-MCNC: 4.89 MG/DL (ref 0.76–1.27)
CROSSMATCH INTERPRETATION: NORMAL
DEPRECATED RDW RBC AUTO: 53.1 FL (ref 37–54)
DEPRECATED RDW RBC AUTO: 55 FL (ref 37–54)
EGFRCR SERPLBLD CKD-EPI 2021: 11.5 ML/MIN/1.73
EGFRCR SERPLBLD CKD-EPI 2021: 12.9 ML/MIN/1.73
EOSINOPHIL # BLD AUTO: 0.06 10*3/MM3 (ref 0–0.4)
EOSINOPHIL # BLD AUTO: 0.12 10*3/MM3 (ref 0–0.4)
EOSINOPHIL # BLD MANUAL: 0.13 10*3/MM3 (ref 0–0.4)
EOSINOPHIL NFR BLD AUTO: 1 % (ref 0.3–6.2)
EOSINOPHIL NFR BLD AUTO: 1.9 % (ref 0.3–6.2)
EOSINOPHIL NFR BLD MANUAL: 2 % (ref 0.3–6.2)
ERYTHROCYTE [DISTWIDTH] IN BLOOD BY AUTOMATED COUNT: 14.6 % (ref 12.3–15.4)
ERYTHROCYTE [DISTWIDTH] IN BLOOD BY AUTOMATED COUNT: 14.6 % (ref 12.3–15.4)
GLOBULIN UR ELPH-MCNC: 6.6 GM/DL
GLOBULIN UR ELPH-MCNC: 7 GM/DL
GLUCOSE SERPL-MCNC: 175 MG/DL (ref 65–99)
GLUCOSE SERPL-MCNC: 86 MG/DL (ref 65–99)
HAPTOGLOB SERPL-MCNC: 133 MG/DL (ref 30–200)
HCT VFR BLD AUTO: 19.9 % (ref 37.5–51)
HCT VFR BLD AUTO: 22.6 % (ref 37.5–51)
HCT VFR BLD AUTO: 22.6 % (ref 37.5–51)
HCT VFR BLD AUTO: 23.6 % (ref 37.5–51)
HCT VFR BLD AUTO: 24 % (ref 37.5–51)
HCYS SERPL-MCNC: 6 UMOL/L (ref 0–15)
HGB BLD-MCNC: 6.7 G/DL (ref 13–17.7)
HGB BLD-MCNC: 7.6 G/DL (ref 13–17.7)
HGB BLD-MCNC: 7.6 G/DL (ref 13–17.7)
HGB BLD-MCNC: 8 G/DL (ref 13–17.7)
HGB BLD-MCNC: 8 G/DL (ref 13–17.7)
IMM GRANULOCYTES # BLD AUTO: 0.04 10*3/MM3 (ref 0–0.05)
IMM GRANULOCYTES NFR BLD AUTO: 0.7 % (ref 0–0.5)
LYMPHOCYTES # BLD AUTO: 1.6 10*3/MM3 (ref 0.7–3.1)
LYMPHOCYTES # BLD AUTO: 2.18 10*3/MM3 (ref 0.7–3.1)
LYMPHOCYTES # BLD MANUAL: 2.32 10*3/MM3 (ref 0.7–3.1)
LYMPHOCYTES NFR BLD AUTO: 26.4 % (ref 19.6–45.3)
LYMPHOCYTES NFR BLD AUTO: 34.7 % (ref 19.6–45.3)
LYMPHOCYTES NFR BLD MANUAL: 4 % (ref 5–12)
MCH RBC QN AUTO: 33.9 PG (ref 26.6–33)
MCH RBC QN AUTO: 34.7 PG (ref 26.6–33)
MCHC RBC AUTO-ENTMCNC: 33.3 G/DL (ref 31.5–35.7)
MCHC RBC AUTO-ENTMCNC: 33.6 G/DL (ref 31.5–35.7)
MCV RBC AUTO: 101.7 FL (ref 79–97)
MCV RBC AUTO: 103.2 FL (ref 79–97)
MONOCYTES # BLD AUTO: 0.25 10*3/MM3 (ref 0.1–0.9)
MONOCYTES # BLD AUTO: 0.55 10*3/MM3 (ref 0.1–0.9)
MONOCYTES # BLD: 0.25 10*3/MM3 (ref 0.1–0.9)
MONOCYTES NFR BLD AUTO: 4.1 % (ref 5–12)
MONOCYTES NFR BLD AUTO: 8.8 % (ref 5–12)
NEUTROPHILS # BLD AUTO: 3.58 10*3/MM3 (ref 1.7–7)
NEUTROPHILS NFR BLD AUTO: 3.37 10*3/MM3 (ref 1.7–7)
NEUTROPHILS NFR BLD AUTO: 4.07 10*3/MM3 (ref 1.7–7)
NEUTROPHILS NFR BLD AUTO: 53.6 % (ref 42.7–76)
NEUTROPHILS NFR BLD AUTO: 67.3 % (ref 42.7–76)
NEUTROPHILS NFR BLD MANUAL: 57 % (ref 42.7–76)
NRBC BLD AUTO-RTO: 0 /100 WBC (ref 0–0.2)
OVALOCYTES BLD QL SMEAR: ABNORMAL
PLATELET # BLD AUTO: 113 10*3/MM3 (ref 140–450)
PLATELET # BLD AUTO: 117 10*3/MM3 (ref 140–450)
PMV BLD AUTO: 8.8 FL (ref 6–12)
PMV BLD AUTO: 8.9 FL (ref 6–12)
POIKILOCYTOSIS BLD QL SMEAR: ABNORMAL
POTASSIUM SERPL-SCNC: 4.7 MMOL/L (ref 3.5–5.2)
POTASSIUM SERPL-SCNC: 5 MMOL/L (ref 3.5–5.2)
PROT SERPL-MCNC: 10.1 G/DL (ref 6–8.5)
PROT SERPL-MCNC: 9.4 G/DL (ref 6–8.5)
RBC # BLD AUTO: 2.19 10*6/MM3 (ref 4.14–5.8)
RBC # BLD AUTO: 2.36 10*6/MM3 (ref 4.14–5.8)
RETICS # AUTO: 0.02 10*6/MM3 (ref 0.02–0.13)
RETICS/RBC NFR AUTO: 0.8 % (ref 0.7–1.9)
RH BLD: POSITIVE
SMALL PLATELETS BLD QL SMEAR: ABNORMAL
SODIUM SERPL-SCNC: 132 MMOL/L (ref 136–145)
SODIUM SERPL-SCNC: 134 MMOL/L (ref 136–145)
T&S EXPIRATION DATE: NORMAL
UNIT  ABO: NORMAL
UNIT  RH: NORMAL
VARIANT LYMPHS NFR BLD MANUAL: 37 % (ref 19.6–45.3)
WBC MORPH BLD: NORMAL
WBC NRBC COR # BLD: 6.05 10*3/MM3 (ref 3.4–10.8)
WBC NRBC COR # BLD: 6.28 10*3/MM3 (ref 3.4–10.8)

## 2023-10-27 PROCEDURE — 88341 IMHCHEM/IMCYTCHM EA ADD ANTB: CPT | Performed by: INTERNAL MEDICINE

## 2023-10-27 PROCEDURE — 88305 TISSUE EXAM BY PATHOLOGIST: CPT | Performed by: INTERNAL MEDICINE

## 2023-10-27 PROCEDURE — 85014 HEMATOCRIT: CPT | Performed by: INTERNAL MEDICINE

## 2023-10-27 PROCEDURE — 86900 BLOOD TYPING SEROLOGIC ABO: CPT | Performed by: INTERNAL MEDICINE

## 2023-10-27 PROCEDURE — 88360 TUMOR IMMUNOHISTOCHEM/MANUAL: CPT | Performed by: INTERNAL MEDICINE

## 2023-10-27 PROCEDURE — A9503 TC99M MEDRONATE: HCPCS | Performed by: INTERNAL MEDICINE

## 2023-10-27 PROCEDURE — 80053 COMPREHEN METABOLIC PANEL: CPT | Performed by: INTERNAL MEDICINE

## 2023-10-27 PROCEDURE — 86901 BLOOD TYPING SEROLOGIC RH(D): CPT | Performed by: INTERNAL MEDICINE

## 2023-10-27 PROCEDURE — 85018 HEMOGLOBIN: CPT | Performed by: INTERNAL MEDICINE

## 2023-10-27 PROCEDURE — 83010 ASSAY OF HAPTOGLOBIN QUANT: CPT | Performed by: INTERNAL MEDICINE

## 2023-10-27 PROCEDURE — 86334 IMMUNOFIX E-PHORESIS SERUM: CPT | Performed by: INTERNAL MEDICINE

## 2023-10-27 PROCEDURE — 88313 SPECIAL STAINS GROUP 2: CPT | Performed by: INTERNAL MEDICINE

## 2023-10-27 PROCEDURE — 25010000002 ENOXAPARIN PER 10 MG: Performed by: INTERNAL MEDICINE

## 2023-10-27 PROCEDURE — 83521 IG LIGHT CHAINS FREE EACH: CPT | Performed by: INTERNAL MEDICINE

## 2023-10-27 PROCEDURE — 84166 PROTEIN E-PHORESIS/URINE/CSF: CPT | Performed by: INTERNAL MEDICINE

## 2023-10-27 PROCEDURE — 84156 ASSAY OF PROTEIN URINE: CPT | Performed by: INTERNAL MEDICINE

## 2023-10-27 PROCEDURE — 83090 ASSAY OF HOMOCYSTEINE: CPT | Performed by: INTERNAL MEDICINE

## 2023-10-27 PROCEDURE — 85025 COMPLETE CBC W/AUTO DIFF WBC: CPT | Performed by: INTERNAL MEDICINE

## 2023-10-27 PROCEDURE — 78306 BONE IMAGING WHOLE BODY: CPT

## 2023-10-27 PROCEDURE — 25010000002 DEXAMETHASONE SODIUM PHOSPHATE 10 MG/ML SOLUTION: Performed by: INTERNAL MEDICINE

## 2023-10-27 PROCEDURE — 25010000002 CYANOCOBALAMIN PER 1000 MCG: Performed by: INTERNAL MEDICINE

## 2023-10-27 PROCEDURE — 77075 RADEX OSSEOUS SURVEY COMPL: CPT

## 2023-10-27 PROCEDURE — 85045 AUTOMATED RETICULOCYTE COUNT: CPT | Performed by: INTERNAL MEDICINE

## 2023-10-27 PROCEDURE — 85007 BL SMEAR W/DIFF WBC COUNT: CPT | Performed by: INTERNAL MEDICINE

## 2023-10-27 PROCEDURE — 86850 RBC ANTIBODY SCREEN: CPT | Performed by: INTERNAL MEDICINE

## 2023-10-27 PROCEDURE — 97165 OT EVAL LOW COMPLEX 30 MIN: CPT

## 2023-10-27 PROCEDURE — 88311 DECALCIFY TISSUE: CPT | Performed by: INTERNAL MEDICINE

## 2023-10-27 PROCEDURE — 0 TECHNETIUM MEDRONATE KIT: Performed by: INTERNAL MEDICINE

## 2023-10-27 PROCEDURE — 88342 IMHCHEM/IMCYTCHM 1ST ANTB: CPT | Performed by: INTERNAL MEDICINE

## 2023-10-27 PROCEDURE — 82784 ASSAY IGA/IGD/IGG/IGM EACH: CPT | Performed by: INTERNAL MEDICINE

## 2023-10-27 RX ORDER — CYANOCOBALAMIN 1000 UG/ML
1000 INJECTION, SOLUTION INTRAMUSCULAR; SUBCUTANEOUS DAILY
Status: COMPLETED | OUTPATIENT
Start: 2023-10-27 | End: 2023-11-02

## 2023-10-27 RX ORDER — FOLIC ACID 1 MG/1
1 TABLET ORAL DAILY
Status: DISCONTINUED | OUTPATIENT
Start: 2023-10-27 | End: 2023-11-02 | Stop reason: HOSPADM

## 2023-10-27 RX ORDER — DEXAMETHASONE SODIUM PHOSPHATE 10 MG/ML
8 INJECTION, SOLUTION INTRAMUSCULAR; INTRAVENOUS EVERY 8 HOURS
Qty: 15 ML | Refills: 0 | Status: COMPLETED | OUTPATIENT
Start: 2023-10-27 | End: 2023-11-01

## 2023-10-27 RX ORDER — TC 99M MEDRONATE 20 MG/10ML
22.7 INJECTION, POWDER, LYOPHILIZED, FOR SOLUTION INTRAVENOUS
Status: COMPLETED | OUTPATIENT
Start: 2023-10-27 | End: 2023-10-27

## 2023-10-27 RX ADMIN — CYANOCOBALAMIN 1000 MCG: 1000 INJECTION, SOLUTION INTRAMUSCULAR at 10:52

## 2023-10-27 RX ADMIN — DEXAMETHASONE SODIUM PHOSPHATE 8 MG: 10 INJECTION INTRAMUSCULAR; INTRAVENOUS at 12:41

## 2023-10-27 RX ADMIN — TC 99M MEDRONATE 22.7 MILLICURIE: 20 INJECTION, POWDER, LYOPHILIZED, FOR SOLUTION INTRAVENOUS at 13:01

## 2023-10-27 RX ADMIN — ATORVASTATIN CALCIUM 10 MG: 10 TABLET, FILM COATED ORAL at 21:21

## 2023-10-27 RX ADMIN — Medication 10 ML: at 08:27

## 2023-10-27 RX ADMIN — FOLIC ACID 1 MG: 1 TABLET ORAL at 10:53

## 2023-10-27 RX ADMIN — FAMOTIDINE 20 MG: 10 INJECTION INTRAVENOUS at 21:22

## 2023-10-27 RX ADMIN — DEXAMETHASONE SODIUM PHOSPHATE 8 MG: 10 INJECTION INTRAMUSCULAR; INTRAVENOUS at 21:22

## 2023-10-27 RX ADMIN — TAMSULOSIN HYDROCHLORIDE 0.4 MG: 0.4 CAPSULE ORAL at 08:27

## 2023-10-27 RX ADMIN — FAMOTIDINE 20 MG: 10 INJECTION INTRAVENOUS at 08:35

## 2023-10-27 RX ADMIN — ENOXAPARIN SODIUM 30 MG: 100 INJECTION SUBCUTANEOUS at 08:26

## 2023-10-27 NOTE — PLAN OF CARE
Goal Outcome Evaluation:  Plan of Care Reviewed With: patient        Progress: no change (First session for evaluation)  Outcome Evaluation: Patient presents with limitations of balance and endurance/activity tolerance which impede his ability to perform ADL and transfers as prior.  The skills of a therapist will be required to safely and effectively implement treatment plan to restore maximal level of function.      Anticipated Discharge Disposition (OT): home with assist, home with home health

## 2023-10-27 NOTE — PROGRESS NOTES
[facility]     Progress Note    Patient Name: Blair Lira  : 1945  MRN: [id number]  Primary Care Physician:  Babs Summers APRN  Date of admission: 10/26/2023    Subjective   Subjective     Chief Complaint: Patient with severe anemia further drop in hemoglobin 1 unit of packed RBC was transfused and patient is feeling somewhat stronger he was found to have B12 deficiency folic acid deficiency with elevated MCV and mild degree of thrombocytopenia proteins were increased possibility of multiple myeloma is being considered a bone marrow aspiration and biopsy was performed from the right posterior iliac crest patient tolerated procedure well there were no complications bone marrow was sent for flow and cytogenetics,    HPI: CT scan findings were discussed patient has multiple fractures but it looks like they are old from his previous injuries however a bone marrow aspiration biopsy has been performed    Review of Systems   All systems were reviewed and negative except for: Has been reviewed    Objective   Objective     Vitals:   Temp:  [97.9 °F (36.6 °C)-98.6 °F (37 °C)] 98.2 °F (36.8 °C)  Heart Rate:  [53-84] 63  Resp:  [16-20] 20  BP: (108-154)/(56-84) 125/84    Physical Exam    Constitutional: Awake, alert   Eyes: PERRLA, sclerae anicteric, no conjunctival injection   HENT: NCAT, mucous membranes moist   Neck: Supple, no thyromegaly, no lymphadenopathy, trachea midline   Respiratory: Clear to auscultation bilaterally, nonlabored respirations    Cardiovascular: RRR, no murmurs, rubs, or gallops, palpable pedal pulses bilaterally   Gastrointestinal: Positive bowel sounds, soft, nontender, nondistended   Musculoskeletal: No bilateral ankle edema, no clubbing or cyanosis to extremities   Psychiatric: Appropriate affect, cooperative   Neurologic: Oriented x 3, strength symmetric in all extremities, Cranial Nerves grossly intact to confrontation, speech clear   Skin: No rashes   No change  Result Review     Result Review:  I have personally reviewed the results from the time of this admission to 10/27/2023 10:28 EDT and agree with these findings:  [x]  Laboratory anemia thrombocytopenia B12 and folic acid deficiency  []  Microbiology  [x]  Radiology report fracture  []  EKG/Telemetry   []  Cardiology/Vascular   []  Pathology  []  Old records  []  Other:  Most notable findings include: Multiple fractures with anemia    Assessment & Plan   Assessment / Plan     Brief Patient Summary:  Blair Lira is a 78 y.o. male who bone marrow aspiration and biopsy was performed patient tolerated procedure well there were no complications    Active Hospital Problems:  Active Hospital Problems    Diagnosis     **Acute renal failure (ARF)     Pancytopenia     Abnormal weight loss     Hypertension     CKD (chronic kidney disease) stage 3, GFR 30-59 ml/min        Plan:   Has been transfused and a bone marrow aspiration has been performed    DVT prophylaxis:  Medical DVT prophylaxis orders are present.    CODE STATUS:   Code Status (Patient has no pulse and is not breathing): CPR (Attempt to Resuscitate)  Medical Interventions (Patient has pulse or is breathing): Full Support    Disposition:  I expect patient to be discharged after the patient has been stabilized.    Electronically signed by Vamsi Mason MD, 10/27/23, 10:28 AM EDT.      Part of this note may be an electronic transcription/translation of spoken language to printed text using the Dragon Dictation System.

## 2023-10-27 NOTE — THERAPY EVALUATION
Patient Name: Blair Lira  : 1945    MRN: [id number]                              Today's Date: 10/27/2023       Admit Date: 10/26/2023    Visit Dx:     ICD-10-CM ICD-9-CM   1. Acute renal failure, unspecified acute renal failure type  N17.9 584.9   2. Anemia, unspecified type  D64.9 285.9   3. Decreased activities of daily living (ADL)  Z78.9 V49.89     Patient Active Problem List   Diagnosis    Rotator cuff tear, right    Impingement syndrome of right shoulder    Acute renal failure (ARF)    Pancytopenia    Abnormal weight loss    Hypertension    CKD (chronic kidney disease) stage 3, GFR 30-59 ml/min     Past Medical History:   Diagnosis Date    BPH (benign prostatic hyperplasia)     Frozen shoulder     Hyperlipidemia     Hypertension 10/26/2023    Periarthritis of shoulder     Rotator cuff disorder, right     Tennis elbow     RIGHT     Past Surgical History:   Procedure Laterality Date    CATARACT EXTRACTION EXTRACAPSULAR W/ INTRAOCULAR LENS IMPLANTATION Bilateral     COLONOSCOPY      JOINT REPLACEMENT Right     THR    SHOULDER ARTHROSCOPY W/ ROTATOR CUFF REPAIR Right 3/29/2023    Procedure: SHOULDER ARTHROSCOPY WITH ROTATOR CUFF REPAIR, SUBCROMIAL DECOMPRESSION,DISTAL CLAVICLE RESECTION;  Surgeon: Gustavo Samuels MD;  Location: [facility] OR [place];  Service: Orthopedics;  Laterality: Right;    SHOULDER SURGERY Left     SCOPE      General Information       Row Name 10/27/23 1209          OT Time and Intention    Document Type evaluation  -AV     Mode of Treatment individual therapy;occupational therapy  -AV       Row Name 10/27/23 1209          General Information    Patient Profile Reviewed yes  -AV     Prior Level of Function independent:;ADL's;all household mobility;transfer  Stands to shower (walk-in).  Stands at sink to groom.  Ambulates without assistive device.  No home oxygen.  -AV     Existing Precautions/Restrictions no known precautions/restrictions  -AV     Barriers to Rehab none identified   -AV       Row Name 10/27/23 1209          Occupational Profile    Reason for Services/Referral (Occupational Profile) Patient is a 78 year old male admitted to [facility] on October 26, 2023 with weakness and low blood count.  He is currently on fourth floor/room air.   OT consulted due to recent decline in ADL/transfer independence.  No previous OT services for current condition.  -AV       Row Name 10/27/23 1209          Stairs Within Home, Primary    Number of Stairs, Within Home, Primary one  one to enter both from garage and front door  -AV     Stairs Comment, Within Home, Primary Non-essential basement stairs  -AV       Row Name 10/27/23 1209          Cognition    Orientation Status (Cognition) --  Patient is alert, pleasant and cooperative- able to retain information and follow commands.  -AV       Row Name 10/27/23 1209          Safety Issues, Functional Mobility    Impairments Affecting Function (Mobility) balance;endurance/activity tolerance  -AV               User Key  (r) = Recorded By, (t) = Taken By, (c) = Cosigned By      Initials Name Provider Type    Teddy Ambriz OT Occupational Therapist                     Mobility/ADL's       Row Name 10/27/23 1211          Transfers    Comment, (Transfers) Testing deferred: 6.7 hemoglobin.  CGA/SBA per report  -AV       Row Name 10/27/23 1211          Activities of Daily Living    BADL Assessment/Intervention --  Patient is able to feed self and groom independently with set up while seated.  CGA/SBA further ADLs.  -AV               User Key  (r) = Recorded By, (t) = Taken By, (c) = Cosigned By      Initials Name Provider Type    Teddy Ambriz OT Occupational Therapist                   Obj/Interventions       Row Name 10/27/23 1211          Sensory Assessment (Somatosensory)    Sensory Assessment (Somatosensory) UE sensation intact  -AV       Row Name 10/27/23 1211          Vision Assessment/Intervention    Visual Impairment/Limitations WFL   -AV       Row Name 10/27/23 1211          Range of Motion Comprehensive    General Range of Motion bilateral upper extremity ROM WFL  -AV     Comment, General Range of Motion AROM  -AV       Row Name 10/27/23 1211          Strength Comprehensive (MMT)    Comment, General Manual Muscle Testing (MMT) Assessment 5/5 bilateral upper extremities  -AV       Row Name 10/27/23 1211          Motor Skills    Motor Skills coordination;functional endurance  -AV     Coordination WFL  Left dominant  -AV     Functional Endurance Fair  -AV       Row Name 10/27/23 1211          Balance    Comment, Balance CGA/SBA  -AV               User Key  (r) = Recorded By, (t) = Taken By, (c) = Cosigned By      Initials Name Provider Type    Teddy Ambriz OT Occupational Therapist                   Goals/Plan       Row Name 10/27/23 1213          Transfer Goal 1 (OT)    Activity/Assistive Device (Transfer Goal 1, OT) transfers, all  -AV     [place] Level/Cues Needed (Transfer Goal 1, OT) modified independence  -AV     Time Frame (Transfer Goal 1, OT) long term goal (LTG);10 days  -AV       Row Name 10/27/23 1213          Bathing Goal 1 (OT)    Activity/Device (Bathing Goal 1, OT) bathing skills, all  -AV     [place] Level/Cues Needed (Bathing Goal 1, OT) modified independence  -AV     Time Frame (Bathing Goal 1, OT) long term goal (LTG);10 days  -AV       Row Name 10/27/23 1213          Dressing Goal 1 (OT)    Activity/Device (Dressing Goal 1, OT) dressing skills, all  -AV     [place]/Cues Needed (Dressing Goal 1, OT) modified independence  -AV     Time Frame (Dressing Goal 1, OT) long term goal (LTG);10 days  -AV       Row Name 10/27/23 1213          Toileting Goal 1 (OT)    Activity/Device (Toileting Goal 1, OT) toileting skills, all  -AV     [place] Level/Cues Needed (Toileting Goal 1, OT) modified independence  -AV     Time Frame (Toileting Goal 1, OT) long term goal (LTG);10 days  -AV       Row Name 10/27/23 1213           Grooming Goal 1 (OT)    Activity/Device (Grooming Goal 1, OT) grooming skills, all  -AV     [place] (Grooming Goal 1, OT) modified independence  -AV     Time Frame (Grooming Goal 1, OT) long term goal (LTG);10 days  -AV       Row Name 10/27/23 1213          Problem Specific Goal 1 (OT)    Problem Specific Goal 1 (OT) Patient will demonstrate fair plus/good endurance/activity tolerance needed to support ADLs.  -AV     Time Frame (Problem Specific Goal 1, OT) long term goal (LTG);10 days  -AV       Row Name 10/27/23 1213          Therapy Assessment/Plan (OT)    Planned Therapy Interventions (OT) activity tolerance training;BADL retraining;functional balance retraining;occupation/activity based interventions;patient/caregiver education/training;transfer/mobility retraining;ROM/therapeutic exercise  -AV               User Key  (r) = Recorded By, (t) = Taken By, (c) = Cosigned By      Initials Name Provider Type    AV Teddy Griffith, ROSA M Occupational Therapist                   Clinical Impression       Row Name 10/27/23 1212          Pain Assessment    Additional Documentation Pain Scale: FACES Pre/Post-Treatment (Group)  -AV       Row Name 10/27/23 1212          Pain Scale: FACES Pre/Post-Treatment    Pain: FACES Scale, Pretreatment 0-->no hurt  -AV     Posttreatment Pain Rating 0-->no hurt  -AV       Row Name 10/27/23 1212          Plan of Care Review    Plan of Care Reviewed With patient  -AV     Progress no change  First session for evaluation  -AV     Outcome Evaluation Patient presents with limitations of balance and endurance/activity tolerance which impede his ability to perform ADL and transfers as prior.  The skills of a therapist will be required to safely and effectively implement treatment plan to restore maximal level of function.  -AV       Row Name 10/27/23 1212          Therapy Assessment/Plan (OT)    Patient/Family Therapy Goal Statement (OT) Regain independence  -AV     Rehab Potential  (OT) good, to achieve stated therapy goals  -AV     Criteria for Skilled Therapeutic Interventions Met (OT) yes;meets criteria;skilled treatment is necessary  -AV     Therapy Frequency (OT) 5 times/wk  -AV       Row Name 10/27/23 1212          Therapy Plan Review/Discharge Plan (OT)    Equipment Needs Upon Discharge (OT) walker, rolling;commode chair;shower chair  -AV     Anticipated Discharge Disposition (OT) home with assist;home with home health  -AV       Row Name 10/27/23 1212          Vital Signs    O2 Delivery Pre Treatment room air  -AV     O2 Delivery Intra Treatment room air  -AV     O2 Delivery Post Treatment room air  -AV       Row Name 10/27/23 1212          Positioning and Restraints    Pre-Treatment Position in bed  -AV     Post Treatment Position bed  -AV     In Bed call light within reach;encouraged to call for assist  No alarm in place upon arrival.  PCA reports she is getting one.  -AV               User Key  (r) = Recorded By, (t) = Taken By, (c) = Cosigned By      Initials Name Provider Type    AV Teddy Griffith, OT Occupational Therapist                   Outcome Measures       Row Name 10/27/23 1214          How much help from another is currently needed...    Putting on and taking off regular lower body clothing? 3  -AV     Bathing (including washing, rinsing, and drying) 3  -AV     Toileting (which includes using toilet bed pan or urinal) 3  -AV     Putting on and taking off regular upper body clothing 4  -AV     Taking care of personal grooming (such as brushing teeth) 4  -AV     Eating meals 4  -AV     AM-PAC 6 Clicks Score (OT) 21  -AV       Row Name 10/27/23 1214          Functional Assessment    Outcome Measure Options AM-PAC 6 Clicks Daily Activity (OT);Optimal Instrument  -AV       Row Name 10/27/23 1214          Optimal Instrument    Optimal Instrument Optimal - 3  -AV     Bending/Stooping 1  -AV     Standing 2  -AV     Reaching 1  -AV     From the list, choose the 3 activities you  would most like to be able to do without any difficulty Bending/stooping;Standing;Reaching  -     Total Score Optimal - 3 4  -AV               User Key  (r) = Recorded By, (t) = Taken By, (c) = Cosigned By      Initials Name Provider Type    Teddy Ambriz OT Occupational Therapist                    Occupational Therapy Education       Title: PT OT SLP Therapies (Done)       Topic: Occupational Therapy (Done)       Point: ADL training (Done)       Description:   Instruct learner(s) on proper safety adaptation and remediation techniques during self care or transfers.   Instruct in proper use of assistive devices.                  Learning Progress Summary             Patient Acceptance, E, VU by JESSICA at 10/27/2023 1215                         Point: Home exercise program (Done)       Description:   Instruct learner(s) on appropriate technique for monitoring, assisting and/or progressing therapeutic exercises/activities.                  Learning Progress Summary             Patient Acceptance, E, VU by AV at 10/27/2023 1215                         Point: Precautions (Done)       Description:   Instruct learner(s) on prescribed precautions during self-care and functional transfers.                  Learning Progress Summary             Patient Acceptance, E, VU by AV at 10/27/2023 1215                         Point: Body mechanics (Done)       Description:   Instruct learner(s) on proper positioning and spine alignment during self-care, functional mobility activities and/or exercises.                  Learning Progress Summary             Patient Acceptance, E, VU by JESSICA at 10/27/2023 1215                                         User Key       Initials Effective Dates Name Provider Type Discipline     06/16/21 -  Teddy Griffith OT Occupational Therapist OT                  OT Recommendation and Plan  Planned Therapy Interventions (OT): activity tolerance training, BADL retraining, functional balance retraining,  occupation/activity based interventions, patient/caregiver education/training, transfer/mobility retraining, ROM/therapeutic exercise  Therapy Frequency (OT): 5 times/wk  Plan of Care Review  Plan of Care Reviewed With: patient  Progress: no change (First session for evaluation)  Outcome Evaluation: Patient presents with limitations of balance and endurance/activity tolerance which impede his ability to perform ADL and transfers as prior.  The skills of a therapist will be required to safely and effectively implement treatment plan to restore maximal level of function.     Time Calculation:   Evaluation Complexity (OT)  Review Occupational Profile/Medical/Therapy History Complexity: expanded/moderate complexity  Assessment, Occupational Performance/Identification of Deficit Complexity: 1-3 performance deficits  Clinical Decision Making Complexity (OT): problem focused assessment/low complexity  Overall Complexity of Evaluation (OT): low complexity     Time Calculation- OT       Row Name 10/27/23 1216             Time Calculation- OT    OT Received On 10/27/23  -AV      OT Goal Re-Cert Due Date 11/05/23  -AV         Untimed Charges    OT Eval/Re-eval Minutes 32  -AV         Total Minutes    Untimed Charges Total Minutes 32  -AV       Total Minutes 32  -AV                User Key  (r) = Recorded By, (t) = Taken By, (c) = Cosigned By      Initials Name Provider Type    AV Teddy Griffith OT Occupational Therapist                  Therapy Charges for Today       Code Description Service Date Service Provider Modifiers Qty    [id number]  OT EVAL LOW COMPLEXITY 3 10/27/2023 Teddy Griffith OT GO 1                 Teddy Griffith OT  10/27/2023   No

## 2023-10-27 NOTE — PROGRESS NOTES
[facility]     Progress Note    Patient Name: Blair Lira  : 1945  MRN: [id number]  Primary Care Physician:  Babs Summers APRN  Date of admission: 10/26/2023      Subjective   Brief summary.  Patient admitted with anemia, fatigue and  renal failure      HPI:  Feeling weak and tired but states better than yesterday.  Hemoglobin dropped requiring transfusion, status post bone marrow biopsy.    Review of Systems     Fatigue and weakness, urine output picking.  No chest pain, no abdominal pain      Objective     Vitals:   Temp:  [97.5 °F (36.4 °C)-98.6 °F (37 °C)] 97.5 °F (36.4 °C)  Heart Rate:  [53-84] 58  Resp:  [18-20] 20  BP: (108-151)/(59-84) 121/60    Physical Exam :     Elderly male not in acute distress.  Heart regular.  Lungs clear.  Abdomen soft.  Extremities no edema      Result Review:  I have personally reviewed the results from the time of this admission to 10/27/2023 17:17 EDT and agree with these findings:  [x]  Laboratory  []  Microbiology  []  Radiology  []  EKG/Telemetry   []  Cardiology/Vascular   []  Pathology  []  Old records  []  Other:       Reviewed today's lab    Assessment / Plan       Active Hospital Problems:  Active Hospital Problems    Diagnosis     **Acute renal failure (ARF)     Pancytopenia     Abnormal weight loss     Hypertension     CKD (chronic kidney disease) stage 3, GFR 30-59 ml/min        Plan:   Most likely multiple myeloma.  Appreciate hematologist/oncologist input, we will consult nephrologist for renal failure, continue fluids.  Increase activity as tolerates with PT and OT       DVT prophylaxis:  Medical DVT prophylaxis orders are present.    CODE STATUS:   Code Status (Patient has no pulse and is not breathing): CPR (Attempt to Resuscitate)  Medical Interventions (Patient has pulse or is breathing): Full Support            Electronically signed by Elieser Pederson MD, 10/27/23, 5:17 PM EDT.

## 2023-10-27 NOTE — CONSULTS
"Nutrition Services    Patient Name: Blair Lira  YOB: 1945  MRN: [id number]  Admission date: 10/26/2023      CLINICAL NUTRITION ASSESSMENT      Reason for Assessment  Identified at risk by screening criteria, MST score 2+   H&P:    Past Medical History:   Diagnosis Date    BPH (benign prostatic hyperplasia)     Frozen shoulder     Hyperlipidemia     Hypertension 10/26/2023    Periarthritis of shoulder     Rotator cuff disorder, right     Tennis elbow     RIGHT        Current Problems:   Active Hospital Problems    Diagnosis     **Acute renal failure (ARF)     Pancytopenia     Abnormal weight loss     Hypertension     CKD (chronic kidney disease) stage 3, GFR 30-59 ml/min         Nutrition/Diet History         Narrative     RD nutrition assessment secondary to nurse admission screening and MST score of 3 related to 14-23 pound weight loss and decreased appetite.  Patient presented to ED for evaluation of abnormal labs.  Admitted with ARF.    Patient weight stable x6 months.  Question accuracy of most recent weight on 10/26/2023.  This would indicate a 15 pound weight loss x2 days.  Per nursing documentation, patient with 100% breakfast intake this morning.      RD visited patient after lunch.  Family at bedside.  Patient reports a weight loss of 20 pounds over the past 3 months.  This is an 8.9% decrease in weight.  Patient reports her usual intake of 2 meals per day.    RD performed NFPE with moderate losses noted to upper body.  Unable to assess lower body due to patient wearing dannie jeans.  See full report below.  Patient amenable to receiving ONS during admission.     Anthropometrics        Current Height, Weight Height: 185.4 cm (73\")  Weight: 93.1 kg (205 lb 4 oz)   Current BMI Body mass index is 27.08 kg/m².       Weight Hx  Wt Readings from Last 30 Encounters:   10/26/23 1013 93.1 kg (205 lb 4 oz)   10/24/23 0803 99.8 kg (220 lb 0.3 oz)   06/27/23 1059 99.8 kg (220 lb)   05/16/23 1055 99.8 " "kg (220 lb)   04/11/23 0922 99.8 kg (220 lb)   03/29/23 1010 99.9 kg (220 lb 3.8 oz)   03/23/23 0824 103 kg (227 lb)   02/16/23 0759 103 kg (227 lb)   12/27/22 0838 102 kg (225 lb 9.6 oz)   11/15/22 0846 102 kg (224 lb)   11/17/20 0000 101 kg (223 lb 8 oz)   11/03/20 0000 98.4 kg (217 lb)   10/22/20 0000 98.6 kg (217 lb 6 oz)   11/19/19 0000 104 kg (229 lb)   10/08/19 0000 103 kg (227 lb 4 oz)   09/05/19 0000 103 kg (227 lb 2 oz)   07/09/19 0000 104 kg (229 lb)   06/07/19 0000 104 kg (229 lb)   05/02/19 0000 108 kg (237 lb 2 oz)   02/26/19 0000 107 kg (235 lb)   07/05/18 0000 103 kg (226 lb)   06/26/18 0000 103 kg (226 lb)   06/13/18 0000 104 kg (229 lb)   05/10/18 0000 103 kg (228 lb 2 oz)            Wt Change Observation -8.9% x 3 months per patient report, clinically significant     Estimated/Assessed Needs       Energy Requirements 25-30 kcal/kg    EST Needs (kcal/day) 3457-6518 kcal       Protein Requirements 0.8-1.0 g/kg   EST Daily Needs (g/day) 74-93 g       Fluid Requirements     Estimated Needs (mL/day) 1500 ml     Labs/Medications         Pertinent Labs Reviewed.   Results from last 7 days   Lab Units 10/27/23  0828 10/26/23  1049   SODIUM mmol/L 134* 136   POTASSIUM mmol/L 4.7 4.6   CHLORIDE mmol/L 103 104   CO2 mmol/L 21.0* 22.3   BUN mg/dL 54* 51*   CREATININE mg/dL 4.45* 4.95*   CALCIUM mg/dL 8.9 9.0   BILIRUBIN mg/dL 1.0 1.1   ALK PHOS U/L 34* 39   ALT (SGPT) U/L 9 10   AST (SGOT) U/L 8 8   GLUCOSE mg/dL 86 98     Results from last 7 days   Lab Units 10/27/23  0800   HEMOGLOBIN g/dL 7.6*  7.6*   HEMATOCRIT % 22.6*  22.6*     No results found for: \"COVID19\"  No results found for: \"HGBA1C\"      Pertinent Medications Reviewed.     Current Nutrition Orders & Evaluation of Intake       Oral Nutrition     Current PO Diet Diet: Renal Diets; Low Sodium (2-3g), Low Potassium, Low Phosphorus; Texture: Regular Texture (IDDSI 7); Fluid Consistency: Thin (IDDSI 0)   Supplement No active supplement orders   "     Malnutrition Severity Assessment         Malnutrition Severity Assessment      Patient meets criteria for : Moderate (non-severe) Malnutrition  Malnutrition Type (last 8 hours)       Malnutrition Severity Assessment       Row Name 10/27/23 1347       Malnutrition Severity Assessment    Malnutrition Type Chronic Disease - Related Malnutrition      Row Name 10/27/23 1347       Unintentional Weight Loss     Unintentional Weight Loss Findings Severe    Unintentional Weight Loss  Weight loss greater than 7.5% in three months      Row Name 10/27/23 1347       Muscle Loss    Loss of Muscle Mass Findings Moderate    [place] Moderate - slight depression    Clavicle Bone Region Moderate - some protrusion in females, visible in males    Acromion Bone Region Moderate - acromion may slightly protrude      Row Name 10/27/23 1347       Criteria Met (Must meet criteria for severity in at least 2 of these categories: M Wasting, Fat Loss, Fluid, Secondary Signs, Wt. Status, Intake)    Patient meets criteria for  Moderate (non-severe) Malnutrition                                Nutrition Diagnosis         Nutrition Dx Problem 1 Moderate malnutrition related to decreased ability to consume sufficient energy as evidenced by decreased appetite., unintended wt change., body composition changes., and patient report.     Nutrition Intervention         Boost BID, vanilla  =480 kcal, 20 g pro     Medical Nutrition Therapy/Nutrition Education          Learner     Readiness Patient and Family  Acceptance     Method     Response Explanation  Verbalizes understanding     Monitor/Evaluation        Monitor Per protocol, PO intake, Supplement intake, Weight, Symptoms, POC/GOC     Nutrition Discharge Plan         No nutrition discharge needs identified at this time     Electronically signed by:  Lillie Curry RD  10/27/23 13:32 EDT

## 2023-10-27 NOTE — PLAN OF CARE
Goal Outcome Evaluation:  Plan of Care Reviewed With: patient        Progress: improving  Outcome Evaluation: pt calm and cooperative-a/ox4-VSS-no distress during shift-blood transfusion education given-blood administered-will continue to follwo current POC

## 2023-10-27 NOTE — PLAN OF CARE
Goal Outcome Evaluation:                 VSS, Hgb improved after last night's 1unit blood given, denies pain but still feeling weak, poor intake with supplements added, will continue POC

## 2023-10-28 LAB
ALBUMIN SERPL-MCNC: 2.8 G/DL (ref 3.5–5.2)
ALBUMIN SERPL-MCNC: 3 G/DL (ref 3.5–5.2)
ALBUMIN SERPL-MCNC: 3 G/DL (ref 3.5–5.2)
ALBUMIN/GLOB SERPL: 0.5 G/DL
ALP SERPL-CCNC: 33 U/L (ref 39–117)
ALT SERPL W P-5'-P-CCNC: 10 U/L (ref 1–41)
ANION GAP SERPL CALCULATED.3IONS-SCNC: 11 MMOL/L (ref 5–15)
ANION GAP SERPL CALCULATED.3IONS-SCNC: 11 MMOL/L (ref 5–15)
ANION GAP SERPL CALCULATED.3IONS-SCNC: 12.5 MMOL/L (ref 5–15)
AST SERPL-CCNC: 8 U/L (ref 1–40)
BACTERIA UR QL AUTO: NORMAL /HPF
BASOPHILS # BLD AUTO: 0.01 10*3/MM3 (ref 0–0.2)
BASOPHILS NFR BLD AUTO: 0.1 % (ref 0–1.5)
BH BB BLOOD EXPIRATION DATE: NORMAL
BH BB BLOOD TYPE BARCODE: 6200
BH BB DISPENSE STATUS: NORMAL
BH BB PRODUCT CODE: NORMAL
BH BB UNIT NUMBER: NORMAL
BILIRUB SERPL-MCNC: 0.7 MG/DL (ref 0–1.2)
BILIRUB UR QL STRIP: NEGATIVE
BUN SERPL-MCNC: 68 MG/DL (ref 8–23)
BUN SERPL-MCNC: 68 MG/DL (ref 8–23)
BUN SERPL-MCNC: 82 MG/DL (ref 8–23)
BUN/CREAT SERPL: 14.8 (ref 7–25)
BUN/CREAT SERPL: 14.8 (ref 7–25)
BUN/CREAT SERPL: 16.5 (ref 7–25)
CALCIUM SPEC-SCNC: 8.8 MG/DL (ref 8.6–10.5)
CALCIUM SPEC-SCNC: 9 MG/DL (ref 8.6–10.5)
CALCIUM SPEC-SCNC: 9 MG/DL (ref 8.6–10.5)
CHLORIDE SERPL-SCNC: 101 MMOL/L (ref 98–107)
CHLORIDE SERPL-SCNC: 101 MMOL/L (ref 98–107)
CHLORIDE SERPL-SCNC: 99 MMOL/L (ref 98–107)
CK SERPL-CCNC: 77 U/L (ref 20–200)
CLARITY UR: CLEAR
CO2 SERPL-SCNC: 18.5 MMOL/L (ref 22–29)
CO2 SERPL-SCNC: 19 MMOL/L (ref 22–29)
CO2 SERPL-SCNC: 19 MMOL/L (ref 22–29)
COLOR UR: YELLOW
CREAT SERPL-MCNC: 4.6 MG/DL (ref 0.76–1.27)
CREAT SERPL-MCNC: 4.6 MG/DL (ref 0.76–1.27)
CREAT SERPL-MCNC: 4.96 MG/DL (ref 0.76–1.27)
CROSSMATCH INTERPRETATION: NORMAL
DEPRECATED RDW RBC AUTO: 50.4 FL (ref 37–54)
EGFRCR SERPLBLD CKD-EPI 2021: 11.3 ML/MIN/1.73
EGFRCR SERPLBLD CKD-EPI 2021: 12.4 ML/MIN/1.73
EGFRCR SERPLBLD CKD-EPI 2021: 12.4 ML/MIN/1.73
EOSINOPHIL # BLD AUTO: 0 10*3/MM3 (ref 0–0.4)
EOSINOPHIL NFR BLD AUTO: 0 % (ref 0.3–6.2)
ERYTHROCYTE [DISTWIDTH] IN BLOOD BY AUTOMATED COUNT: 14.1 % (ref 12.3–15.4)
GLOBULIN UR ELPH-MCNC: 6.5 GM/DL
GLUCOSE SERPL-MCNC: 138 MG/DL (ref 65–99)
GLUCOSE SERPL-MCNC: 157 MG/DL (ref 65–99)
GLUCOSE SERPL-MCNC: 157 MG/DL (ref 65–99)
GLUCOSE UR STRIP-MCNC: NEGATIVE MG/DL
HCT VFR BLD AUTO: 21.5 % (ref 37.5–51)
HCT VFR BLD AUTO: 22.3 % (ref 37.5–51)
HCT VFR BLD AUTO: 22.8 % (ref 37.5–51)
HCT VFR BLD AUTO: 23.6 % (ref 37.5–51)
HGB BLD-MCNC: 7.3 G/DL (ref 13–17.7)
HGB BLD-MCNC: 7.6 G/DL (ref 13–17.7)
HGB BLD-MCNC: 7.8 G/DL (ref 13–17.7)
HGB BLD-MCNC: 8 G/DL (ref 13–17.7)
HGB UR QL STRIP.AUTO: ABNORMAL
HYALINE CASTS UR QL AUTO: NORMAL /LPF
IMM GRANULOCYTES # BLD AUTO: 0.13 10*3/MM3 (ref 0–0.05)
IMM GRANULOCYTES NFR BLD AUTO: 1.6 % (ref 0–0.5)
KETONES UR QL STRIP: NEGATIVE
LEUKOCYTE ESTERASE UR QL STRIP.AUTO: NEGATIVE
LYMPHOCYTES # BLD AUTO: 1.44 10*3/MM3 (ref 0.7–3.1)
LYMPHOCYTES NFR BLD AUTO: 18.1 % (ref 19.6–45.3)
Lab: NORMAL
MCH RBC QN AUTO: 34 PG (ref 26.6–33)
MCHC RBC AUTO-ENTMCNC: 33.9 G/DL (ref 31.5–35.7)
MCV RBC AUTO: 100.4 FL (ref 79–97)
MONOCYTES # BLD AUTO: 0.27 10*3/MM3 (ref 0.1–0.9)
MONOCYTES NFR BLD AUTO: 3.4 % (ref 5–12)
NEUTROPHILS NFR BLD AUTO: 6.11 10*3/MM3 (ref 1.7–7)
NEUTROPHILS NFR BLD AUTO: 76.8 % (ref 42.7–76)
NITRITE UR QL STRIP: NEGATIVE
NRBC BLD AUTO-RTO: 0 /100 WBC (ref 0–0.2)
PH UR STRIP.AUTO: 5.5 [PH] (ref 5–8)
PHOSPHATE SERPL-MCNC: 7.8 MG/DL (ref 2.5–4.5)
PHOSPHATE SERPL-MCNC: 8.2 MG/DL (ref 2.5–4.5)
PLATELET # BLD AUTO: 118 10*3/MM3 (ref 140–450)
PMV BLD AUTO: 8.5 FL (ref 6–12)
POTASSIUM SERPL-SCNC: 4.7 MMOL/L (ref 3.5–5.2)
POTASSIUM SERPL-SCNC: 4.7 MMOL/L (ref 3.5–5.2)
POTASSIUM SERPL-SCNC: 5 MMOL/L (ref 3.5–5.2)
PROT SERPL-MCNC: 9.5 G/DL (ref 6–8.5)
PROT UR QL STRIP: ABNORMAL
RBC # BLD AUTO: 2.35 10*6/MM3 (ref 4.14–5.8)
RBC # UR STRIP: NORMAL /HPF
REF LAB TEST METHOD: NORMAL
RETICS # AUTO: 0.02 10*6/MM3 (ref 0.02–0.13)
RETICS/RBC NFR AUTO: 0.81 % (ref 0.7–1.9)
SODIUM SERPL-SCNC: 130 MMOL/L (ref 136–145)
SODIUM SERPL-SCNC: 131 MMOL/L (ref 136–145)
SODIUM SERPL-SCNC: 131 MMOL/L (ref 136–145)
SP GR UR STRIP: 1.01 (ref 1–1.03)
SQUAMOUS #/AREA URNS HPF: NORMAL /HPF
UNIT  ABO: NORMAL
UNIT  RH: NORMAL
UROBILINOGEN UR QL STRIP: ABNORMAL
WBC # UR STRIP: NORMAL /HPF
WBC NRBC COR # BLD: 7.96 10*3/MM3 (ref 3.4–10.8)

## 2023-10-28 PROCEDURE — 93010 ELECTROCARDIOGRAM REPORT: CPT | Performed by: INTERNAL MEDICINE

## 2023-10-28 PROCEDURE — 85014 HEMATOCRIT: CPT | Performed by: INTERNAL MEDICINE

## 2023-10-28 PROCEDURE — 25010000002 DEXAMETHASONE SODIUM PHOSPHATE 10 MG/ML SOLUTION: Performed by: INTERNAL MEDICINE

## 2023-10-28 PROCEDURE — 81001 URINALYSIS AUTO W/SCOPE: CPT | Performed by: STUDENT IN AN ORGANIZED HEALTH CARE EDUCATION/TRAINING PROGRAM

## 2023-10-28 PROCEDURE — 84156 ASSAY OF PROTEIN URINE: CPT | Performed by: STUDENT IN AN ORGANIZED HEALTH CARE EDUCATION/TRAINING PROGRAM

## 2023-10-28 PROCEDURE — 85018 HEMOGLOBIN: CPT | Performed by: INTERNAL MEDICINE

## 2023-10-28 PROCEDURE — 25010000002 CYANOCOBALAMIN PER 1000 MCG: Performed by: INTERNAL MEDICINE

## 2023-10-28 PROCEDURE — 25010000002 ENOXAPARIN PER 10 MG: Performed by: INTERNAL MEDICINE

## 2023-10-28 PROCEDURE — 84100 ASSAY OF PHOSPHORUS: CPT | Performed by: INTERNAL MEDICINE

## 2023-10-28 PROCEDURE — 82550 ASSAY OF CK (CPK): CPT | Performed by: STUDENT IN AN ORGANIZED HEALTH CARE EDUCATION/TRAINING PROGRAM

## 2023-10-28 PROCEDURE — 85045 AUTOMATED RETICULOCYTE COUNT: CPT | Performed by: INTERNAL MEDICINE

## 2023-10-28 PROCEDURE — 93005 ELECTROCARDIOGRAM TRACING: CPT | Performed by: INTERNAL MEDICINE

## 2023-10-28 PROCEDURE — 82570 ASSAY OF URINE CREATININE: CPT | Performed by: STUDENT IN AN ORGANIZED HEALTH CARE EDUCATION/TRAINING PROGRAM

## 2023-10-28 PROCEDURE — 85025 COMPLETE CBC W/AUTO DIFF WBC: CPT | Performed by: INTERNAL MEDICINE

## 2023-10-28 PROCEDURE — 80053 COMPREHEN METABOLIC PANEL: CPT | Performed by: INTERNAL MEDICINE

## 2023-10-28 RX ADMIN — FAMOTIDINE 20 MG: 10 INJECTION INTRAVENOUS at 08:19

## 2023-10-28 RX ADMIN — Medication 10 ML: at 08:21

## 2023-10-28 RX ADMIN — TAMSULOSIN HYDROCHLORIDE 0.4 MG: 0.4 CAPSULE ORAL at 08:20

## 2023-10-28 RX ADMIN — DEXAMETHASONE SODIUM PHOSPHATE 8 MG: 10 INJECTION INTRAMUSCULAR; INTRAVENOUS at 04:27

## 2023-10-28 RX ADMIN — ENOXAPARIN SODIUM 30 MG: 100 INJECTION SUBCUTANEOUS at 08:19

## 2023-10-28 RX ADMIN — DEXAMETHASONE SODIUM PHOSPHATE 8 MG: 10 INJECTION INTRAMUSCULAR; INTRAVENOUS at 22:47

## 2023-10-28 RX ADMIN — FOLIC ACID 1 MG: 1 TABLET ORAL at 08:20

## 2023-10-28 RX ADMIN — Medication 10 ML: at 22:47

## 2023-10-28 RX ADMIN — CYANOCOBALAMIN 1000 MCG: 1000 INJECTION, SOLUTION INTRAMUSCULAR at 08:19

## 2023-10-28 RX ADMIN — DOCUSATE SODIUM 50MG AND SENNOSIDES 8.6MG 1 TABLET: 8.6; 5 TABLET, FILM COATED ORAL at 08:20

## 2023-10-28 RX ADMIN — DEXAMETHASONE SODIUM PHOSPHATE 8 MG: 10 INJECTION INTRAMUSCULAR; INTRAVENOUS at 14:29

## 2023-10-28 RX ADMIN — ATORVASTATIN CALCIUM 10 MG: 10 TABLET, FILM COATED ORAL at 22:46

## 2023-10-28 RX ADMIN — FAMOTIDINE 20 MG: 10 INJECTION INTRAVENOUS at 22:47

## 2023-10-28 NOTE — PROGRESS NOTES
[facility]     Progress Note    Patient Name: Blair Lria  : 1945  MRN: [id number]  Primary Care Physician:  Babs Summers APRN  Date of admission: 10/26/2023    Subjective   Subjective     Chief Complaint: Patient probably has a partially treated myeloma he has been given Depo-Medrol injections by his dermatologist has had itching neuropathy he said as if pins and needle, stabbing him which could be part of his neuropathy from the plasma Citic malignancy we will get him there fore give him a steroids which will probably help his kidneys as well, have reviewed the bone marrow which is somewhat hypocellular but there were increased plasma cell including bilobed plasma cells highly suspicious for multiple myeloma however we are still waiting, for the flow as well as immunoelectrophoresis, also discussed with the pathologist regarding the slides and it certainly is highly suspicious for multiple myeloma we will therefore give him dexamethasone for symptomatic cure especially for his itching and his neuropathic pains, plasma cells are increased but we do not know how many we will be able to see and what kind of response he has gotten because of his previous steroids which were given to him    HPI: Patient with renal failure anemia elevated protein levels in possible multiple myeloma bone marrow aspiration and biopsy has been done with increased plasma cells    Further studies are still pending in the meantime patient is being started on steroids for symptomatic relief    Review of Systems   All systems were reviewed and negative except for: Has been reviewed    Objective   Objective     Vitals:   Temp:  [97.5 °F (36.4 °C)-98.7 °F (37.1 °C)] 97.9 °F (36.6 °C)  Heart Rate:  [52-65] 65  Resp:  [20] 20  BP: (121-140)/(59-67) 127/67    Physical Exam    Constitutional: Awake, alert   Eyes: PERRLA, sclerae anicteric, no conjunctival injection   HENT: NCAT, mucous membranes moist   Neck: Supple, no  thyromegaly, no lymphadenopathy, trachea midline   Respiratory: Clear to auscultation bilaterally, nonlabored respirations    Cardiovascular: RRR, no murmurs, rubs, or gallops, palpable pedal pulses bilaterally   Gastrointestinal: Positive bowel sounds, soft, nontender, nondistended   Musculoskeletal: No bilateral ankle edema, no clubbing or cyanosis to extremities   Psychiatric: Appropriate affect, cooperative   Neurologic: Oriented x 3, strength symmetric in all extremities, Cranial Nerves grossly intact to confrontation, speech clear   Skin: No rashes   Probable partial response to the treatment because of previous steroid  Result Review    Result Review:  I have personally reviewed the results from the time of this admission to 10/28/2023 10:22 EDT and agree with these findings:  [x]  Laboratory  []  Microbiology  []  Radiology  []  EKG/Telemetry   []  Cardiology/Vascular   [x]  Pathology  []  Old records  []  Other:  Most notable findings include: Slides have been reviewed    Assessment & Plan   Assessment / Plan     Brief Patient Summary:  Blair Lira is a 78 y.o. male who waiting for further work-up results    Active Hospital Problems:  Active Hospital Problems    Diagnosis     **Acute renal failure (ARF)     Pancytopenia     Abnormal weight loss     Hypertension     CKD (chronic kidney disease) stage 3, GFR 30-59 ml/min        Plan:   Continue current management    DVT prophylaxis:  Medical DVT prophylaxis orders are present.    CODE STATUS:   Code Status (Patient has no pulse and is not breathing): CPR (Attempt to Resuscitate)  Medical Interventions (Patient has pulse or is breathing): Full Support    Disposition:  I expect patient to be discharged after the patient has been stabilized.    Electronically signed by Vamsi Mason MD, 10/28/23, 10:22 AM EDT.      Part of this note may be an electronic transcription/translation of spoken language to printed text using the Dragon Dictation System.

## 2023-10-28 NOTE — CONSULTS
[facility]   Consult Note    Patient Name: Blair Lira  : 1945  MRN: [id number]  Primary Care Physician:  Babs Summers APRN  Referring Physician: No ref. provider found  Date of admission: 10/26/2023    Subjective   Subjective     Reason for Consult/ Chief Complaint: LAINA    HPI:  Blair Lira is a 78 y.o. male 78-year-old male with past medical history of hypertension, hyperlipidemia, osteoarthritis with CKD stage III and baseline creatinine of 1.4-1.6 per documentation in  comes in due to progressive weakness and weight loss that has been ongoing for the last few weeks.  Patient has been continuing to work but states that has been progressively getting weaker to the point that he was unable to do so for which he went to his primary care doctor and with his labs noted to be abnormal presented to the ER.    In the ER he was noted to have a creatinine that has increased from baseline to peak of 4.89 with bicarb of 20.  He was also noted to be anemic with hemoglobin of 8.  His protein level was significantly elevated with a relatively low albumin with a high gamma gap.  Patient was admitted for further management of his complaints.  Pan CT scan and nuclear scan have all been unremarkable.  Skeletal survey was also unremarkable.  Patient had bone marrow biopsy.  Nephrology been consulted for management of LAINA    Review of Systems  Constitutional:        Weakness tiredness fatigue  Eyes:                       No blurry vision, eye discharge, eye irritation, eye pain  HEENT:                   No acute hair loss, earache and discharge, nasal congestion or discharge, sore throat, postnasal drip  Respiratory:           No shortness of breath coughing sputum production wheezing hemoptysis pleuritic chest pain  Cardiovascular:     No chest pain, orthopnea, PND, dizziness, palpitation, lower extremity edema  Gastrointestinal:   No nausea vomiting diarrhea abdominal pain constipation  Genitourinary:        No urinary incontinence, hesitancy, frequency, urgency, dysuria  Neurological:        No confusion, headache, focal weakness, numbness, dysphasia  Hematologic:         No bruising, bleeding, pallor, lymphadenopathy  Endocrine:            No coldness, hot flashes, polyuria, abnormal hair growth  Musculoskeletal:  No body pains, aches, arthritic pains, muscle pain ,muscle wasting  Psychiatric:          No low or high mood, anxiety, hallucinations, delusions  Skin.                      No rash, ulcers, bruising, itching    Personal History     Past Medical History:   Diagnosis Date    BPH (benign prostatic hyperplasia)     Frozen shoulder     Hyperlipidemia     Hypertension 10/26/2023    Periarthritis of shoulder     Rotator cuff disorder, right     Tennis elbow     RIGHT       Past Surgical History:   Procedure Laterality Date    CATARACT EXTRACTION EXTRACAPSULAR W/ INTRAOCULAR LENS IMPLANTATION Bilateral     COLONOSCOPY      JOINT REPLACEMENT Right     THR    SHOULDER ARTHROSCOPY W/ ROTATOR CUFF REPAIR Right 3/29/2023    Procedure: SHOULDER ARTHROSCOPY WITH ROTATOR CUFF REPAIR, SUBCROMIAL DECOMPRESSION,DISTAL CLAVICLE RESECTION;  Surgeon: Gustavo Samuels MD;  Location: [facility] [place];  Service: Orthopedics;  Laterality: Right;    SHOULDER SURGERY Left     SCOPE       Family History: family history is not on file. Otherwise pertinent FHx was reviewed and not pertinent to current issue.    Social History:  reports that he has never smoked. He has never used smokeless tobacco. He reports that he does not currently use alcohol. He reports that he does not use drugs.    Home Medications:  amLODIPine, atenolol, atorvastatin, diclofenac, hydroCHLOROthiazide, irbesartan, and tamsulosin    Allergies:  No Known Allergies    Objective    Objective     Vitals:   Temp:  [97.5 °F (36.4 °C)-98.7 °F (37.1 °C)] 97.9 °F (36.6 °C)  Heart Rate:  [52-63] 59  Resp:  [20] 20  BP: (121-140)/(59-84) 140/59    Physical Exam:              Constitutional:         Awake, alert responsive, conversant, no obvious distress   Eyes:                       PERRLA, sclerae anicteric, no conjunctival injection   HEENT:                   Moist mucous membranes, no nasal or eye discharge, no throat congestion   Neck:                      Supple, no thyromegaly, no lymphadenopathy, trachea midline, no elevated JVD   Respiratory:           Clear to auscultation bilaterally, nonlabored respirations    Cardiovascular:     RRR, no murmurs, rubs, or gallops, palpable pedal pulses bilaterally, No bilateral ankle edema   Gastrointestinal:   Positive bowel sounds, soft, nontender, non-distended, no organomegaly   Musculoskeletal:  No clubbing or cyanosis to extremities, muscle wasting, joint swelling, muscle weakness   Psychiatric:              Appropriate affect, cooperative   Neurologic:            Awake alert, oriented x 3, strength symmetric in all extremities, Cranial Nerves grossly intact to confrontation, speech clear   Skin:                      No rashes, bruising, skin ulcers, petechiae or ecchymosis    Result Review    Result Review:  I have personally reviewed the results from the time of this admission to 10/28/2023 08:07 EDT and agree with these findings:  []  Laboratory  []  Microbiology  []  Radiology  []  EKG/Telemetry   []  Cardiology/Vascular   []  Pathology  []  Old records  []  Other:      Assessment & Plan   Assessment / Plan     Active Hospital Problems:  Active Hospital Problems    Diagnosis     **Acute renal failure (ARF)     Pancytopenia     Abnormal weight loss     Hypertension     CKD (chronic kidney disease) stage 3, GFR 30-59 ml/min      78-year-old male with past medical history of hypertension, hyperlipidemia, osteoarthritis with CKD stage III and baseline creatinine of 1.4-1.6 per documentation in 2022 comes in due to progressive weakness and weight loss 20-25 lbs that has been ongoing for the last few weeks.  Noted to have an LAINA with  creatinine rise to 4.9 while patient has been on hydrochlorothiazide, diclofenac and irbesartan.  Patient has not had any low blood pressures here or at home.  Also noted on labs to have had an elevated gamma gap.  Bone marrow biopsy done.  Pan imaging including skeletal survey was unremarkable.  Hemoglobin noted to be at 6.7 with low B12 and folate and status post transfusion    Plan:   Patient appears to be iron replete. Will start epo 20 units 3 times a week  We will get urine analysis  At this time is unclear if patient has LAINA related to his hemodynamics secondary to his medications versus any other autoimmune process.  In past patient has had no proteinuria  Noted SPEP, MINNIE and free light chains are pending  Patient status post bone marrow biopsy results of which are pending  B12 and folate are low and being repleted  Thank you for involving me in the care of the patient.  We will continue to follow along        Electronically signed by Sharmaine Bowling MD, 10/28/23, 8:07 AM EDT.

## 2023-10-28 NOTE — PLAN OF CARE
Goal Outcome Evaluation:   VSS.  Denies chest pain or shortness of breath. SR-SB on monitor. Lungs diminished, Room air. Continue with current POC.

## 2023-10-28 NOTE — PROGRESS NOTES
[facility]     Progress Note    Patient Name: Blair Lira  : 1945  MRN: [id number]  Primary Care Physician:  Babs Summers APRN  Date of admission: 10/26/2023      Subjective   Brief summary.  Patient admitted with anemia, fatigue and  renal failure      HPI:  Patient feels somewhat better today, getting up and walking around.  No chest pain or shortness of breath.  Status post bone marrow biopsy yesterday    Review of Systems     Fatigue and weakness, urine output picking.  No chest pain, no abdominal pain seen by nephrologist      Objective     Vitals:   Temp:  [97.9 °F (36.6 °C)-98.7 °F (37.1 °C)] 97.9 °F (36.6 °C)  Heart Rate:  [52-65] 65  Resp:  [20] 20  BP: (127-140)/(59-67) 127/67    Physical Exam :     Elderly male not in acute distress.  Pallor noted.  Heart regular.  Lungs clear.  Abdomen soft.  Extremities no edema      Result Review:  I have personally reviewed the results from the time of this admission to 10/28/2023 12:02 EDT and agree with these findings:  [x]  Laboratory  []  Microbiology  []  Radiology  []  EKG/Telemetry   []  Cardiology/Vascular   []  Pathology  []  Old records  []  Other:       Reviewed today's lab    Assessment / Plan       Active Hospital Problems:  Active Hospital Problems    Diagnosis     **Acute renal failure (ARF)     Pancytopenia     Abnormal weight loss     Hypertension     CKD (chronic kidney disease) stage 3, GFR 30-59 ml/min        Plan:   Kidney functions not improving.  Discussed with nephrologist.  If lab works inconclusive may need kidney biopsy.  Pancytopenia persist.  Work-up in progress.  Continue fluids, continue to monitor volume status, continue steroids       DVT prophylaxis:  Medical DVT prophylaxis orders are present.    CODE STATUS:   Code Status (Patient has no pulse and is not breathing): CPR (Attempt to Resuscitate)  Medical Interventions (Patient has pulse or is breathing): Full Support              Electronically signed by Elieser  MD Bg, 10/28/23, 12:03 PM EDT.

## 2023-10-29 LAB
CREAT UR-MCNC: 96.2 MG/DL
HCT VFR BLD AUTO: 23.4 % (ref 37.5–51)
HCT VFR BLD AUTO: 24.4 % (ref 37.5–51)
HGB BLD-MCNC: 7.9 G/DL (ref 13–17.7)
HGB BLD-MCNC: 8 G/DL (ref 13–17.7)
Lab: NORMAL
PROT ?TM UR-MCNC: 211.9 MG/DL
PROT/CREAT UR: 2.2 MG/G{CREAT}

## 2023-10-29 PROCEDURE — 25010000002 ENOXAPARIN PER 10 MG: Performed by: INTERNAL MEDICINE

## 2023-10-29 PROCEDURE — 85014 HEMATOCRIT: CPT | Performed by: INTERNAL MEDICINE

## 2023-10-29 PROCEDURE — 25010000002 CYANOCOBALAMIN PER 1000 MCG: Performed by: INTERNAL MEDICINE

## 2023-10-29 PROCEDURE — 25010000002 DEXAMETHASONE SODIUM PHOSPHATE 10 MG/ML SOLUTION: Performed by: INTERNAL MEDICINE

## 2023-10-29 PROCEDURE — 85018 HEMOGLOBIN: CPT | Performed by: INTERNAL MEDICINE

## 2023-10-29 RX ADMIN — TAMSULOSIN HYDROCHLORIDE 0.4 MG: 0.4 CAPSULE ORAL at 08:43

## 2023-10-29 RX ADMIN — Medication 10 ML: at 08:45

## 2023-10-29 RX ADMIN — DEXAMETHASONE SODIUM PHOSPHATE 8 MG: 10 INJECTION INTRAMUSCULAR; INTRAVENOUS at 20:52

## 2023-10-29 RX ADMIN — CYANOCOBALAMIN 1000 MCG: 1000 INJECTION, SOLUTION INTRAMUSCULAR at 08:43

## 2023-10-29 RX ADMIN — FOLIC ACID 1 MG: 1 TABLET ORAL at 08:43

## 2023-10-29 RX ADMIN — ENOXAPARIN SODIUM 30 MG: 100 INJECTION SUBCUTANEOUS at 08:44

## 2023-10-29 RX ADMIN — ATORVASTATIN CALCIUM 10 MG: 10 TABLET, FILM COATED ORAL at 20:52

## 2023-10-29 RX ADMIN — Medication 10 ML: at 11:46

## 2023-10-29 RX ADMIN — Medication 10 ML: at 04:36

## 2023-10-29 RX ADMIN — FAMOTIDINE 20 MG: 10 INJECTION INTRAVENOUS at 20:52

## 2023-10-29 RX ADMIN — DEXAMETHASONE SODIUM PHOSPHATE 8 MG: 10 INJECTION INTRAMUSCULAR; INTRAVENOUS at 11:45

## 2023-10-29 RX ADMIN — DOCUSATE SODIUM 50MG AND SENNOSIDES 8.6MG 1 TABLET: 8.6; 5 TABLET, FILM COATED ORAL at 08:43

## 2023-10-29 RX ADMIN — DEXAMETHASONE SODIUM PHOSPHATE 8 MG: 10 INJECTION INTRAMUSCULAR; INTRAVENOUS at 04:36

## 2023-10-29 RX ADMIN — FAMOTIDINE 20 MG: 10 INJECTION INTRAVENOUS at 08:43

## 2023-10-29 NOTE — PROGRESS NOTES
[facility]     Progress Note    Patient Name: Blair Lira  : 1945  MRN: [id number]  Primary Care Physician:  Babs Summers APRN  Date of admission: 10/26/2023    Subjective   Per oncology's notes it appears that patient's bone marrow is concerning for multiple myeloma  Urine analysis was clear but showed some protein  Creatinine continues to stay stable and likely patient has developed ATN  No major acute events overnight    Scheduled Meds:atorvastatin, 10 mg, Oral, Nightly  cyanocobalamin, 1,000 mcg, Intramuscular, Daily  dexAMETHasone, 8 mg, Intravenous, Q8H  enoxaparin, 30 mg, Subcutaneous, Daily  [START ON 10/30/2023] epoetin chris/chris-epbx, 20,000 Units, Subcutaneous, Once per day on   famotidine, 20 mg, Intravenous, Q12H  folic acid, 1 mg, Oral, Daily  senna-docusate sodium, 1 tablet, Oral, BID  sodium chloride, 10 mL, Intravenous, Q12H  tamsulosin, 0.4 mg, Oral, Daily      Continuous Infusions:Pharmacy to Dose enoxaparin (LOVENOX),       PRN Meds:.  acetaminophen    ALPRAZolam    aluminum-magnesium hydroxide-simethicone    senna-docusate sodium **AND** polyethylene glycol **AND** bisacodyl **AND** bisacodyl    HYDROcodone-acetaminophen    HYDROmorphone **AND** naloxone    nitroglycerin    ondansetron    Pharmacy to Dose enoxaparin (LOVENOX)    sodium chloride    sodium chloride    sodium chloride    temazepam       Review of Systems  Constitutional:        Weakness tiredness fatigue  Eyes:                       No blurry vision, eye discharge, eye irritation, eye pain  HEENT:                   No acute hair loss, earache and discharge, nasal congestion or discharge, sore throat, postnasal drip  Respiratory:           No shortness of breath coughing sputum production wheezing hemoptysis pleuritic chest pain  Cardiovascular:     No chest pain, orthopnea, PND, dizziness, palpitation, lower extremity edema  Gastrointestinal:   No nausea vomiting diarrhea abdominal pain  constipation  Genitourinary:       No urinary incontinence, hesitancy, frequency, urgency, dysuria  Hematologic:         No bruising, bleeding, pallor, lymphadenopathy  Endocrine:            No coldness, hot flashes, polyuria, abnormal hair growth  Musculoskeletal:    No body pains, aches, arthritic pains, muscle pain ,muscle wasting  Psychiatric:          No low or high mood, anxiety, hallucinations, delusions  Skin.                      No rash, ulcers, bruising, itching  Neurological:        No confusion, headache, focal weakness, numbness, dysphasia    Objective   Objective     Vitals:   Temp:  [97.7 °F (36.5 °C)-98.2 °F (36.8 °C)] 97.9 °F (36.6 °C)  Heart Rate:  [54-76] 76  Resp:  [18-20] 18  BP: (102-139)/(63-76) 131/63  Physical Exam    Constitutional: Awake, alert responsive, conversant, no obvious distress              Psychiatric:  Appropriate affect, cooperative   Neurologic:  Awake alert ,oriented x 3, strength symmetric in all extremities, Cranial Nerves grossly intact to confrontation, speech clear   Eyes:   PERRLA, sclerae anicteric, no conjunctival injection   HEENT:  Moist mucous membranes, no nasal or eye discharge, no throat congestion   Neck:   Supple, no thyromegaly, no lymphadenopathy, trachea midline, no elevated JVD   Respiratory:  Clear to auscultation bilaterally, nonlabored respirations    Cardiovascular: RRR, no murmurs, rubs, or gallops, palpable pedal pulses bilaterally, No bilateral ankle edema   Gastrointestinal: Positive bowel sounds, soft, nontender, nondistended, no organomegaly   Musculoskeletal:  No clubbing or cyanosis to extremities,muscle wasting, joint swelling, muscle weakness             Skin:                      No rashes, bruising, skin ulcers, petechiae or ecchymosis    Result Review    Result Review:  I have personally reviewed the results from the time of this admission to 10/29/2023 09:27 EDT and agree with these findings:  []  Laboratory  []  Microbiology  []   Radiology  []  EKG/Telemetry   []  Cardiology/Vascular   []  Pathology  []  Old records  []  Other:    Assessment & Plan   Assessment / Plan       Active Hospital Problems:  Active Hospital Problems    Diagnosis     **Acute renal failure (ARF)     Pancytopenia     Abnormal weight loss     Hypertension     CKD (chronic kidney disease) stage 3, GFR 30-59 ml/min      78-year-old male with past medical history of hypertension, hyperlipidemia, osteoarthritis with CKD stage III and baseline creatinine of 1.4-1.6 per documentation in 2022 comes in due to progressive weakness and weight loss 20-25 lbs that has been ongoing for the last few weeks.  Noted to have an LAINA with creatinine rise to 4.9 while patient has been on hydrochlorothiazide and irbesartan with concerns for possible multiple myeloma related LAINA.  Patient has not had any low blood pressures here or at home.  Also noted on labs to have had an elevated gamma gap.  Bone marrow biopsy done.  Pan imaging including skeletal survey was unremarkable.  Hemoglobin noted to be at 6.7 with low B12 and folate and status post transfusion.  UA was largely unremarkable with mild proteinuria.  Per oncology notes it appears that concern for multiple myeloma     Plan:   Patient appears to be iron replete. Will start epo 20 units 3 times a week  We will get 24-hour protein and creatinine clearance  At this time is unclear if patient has LAINA related to his hemodynamics secondary to his medications versus multiple myeloma in past patient has had no proteinuria  Noted SPEP, MINNIE and free light chains are pending  Patient status post bone marrow biopsy results of which are pending  B12 and folate are low and being repleted      Thank you for involving me in the care of the patient.  We will continue to follow along    Electronically signed by Sharmaine Bowling MD, 10/29/23, 9:27 AM EDT.

## 2023-10-29 NOTE — PROGRESS NOTES
[facility]     Progress Note    Patient Name: Blair Lira  : 1945  MRN: [id number]  Primary Care Physician:  Babs Summers APRN  Date of admission: 10/26/2023      Subjective   Brief summary.  Patient admitted with anemia, fatigue and  renal failure      HPI:  Patient feeling better, staying in bed most of the time.  Weakness is improved.  Patient's heart rate was low at atenolol discontinued, further RN reported patient was not taking atenolol at home.  It was by mistake as he was taking couple of years ago it carried on his medication list.    Review of Systems     No chest pain or shortness of breath or palpitations.  Feeling weak and tired but improving.  Urine output picking.  No nausea vomiting.      Objective     Vitals:   Temp:  [97.7 °F (36.5 °C)-98.2 °F (36.8 °C)] 97.9 °F (36.6 °C)  Heart Rate:  [54-76] 76  Resp:  [18-20] 18  BP: (102-139)/(63-76) 131/63    Physical Exam :     Elderly male not in acute distress.  Pallor noted.  Heart regular.  Lungs clear.  Abdomen soft nontender.  Extremities no edema      Result Review:  I have personally reviewed the results from the time of this admission to 10/29/2023 12:03 EDT and agree with these findings:  [x]  Laboratory  []  Microbiology  []  Radiology  []  EKG/Telemetry   []  Cardiology/Vascular   []  Pathology  []  Old records  []  Other:       Reviewed today's lab    Assessment / Plan       Active Hospital Problems:  Active Hospital Problems    Diagnosis     **Acute renal failure (ARF)     Pancytopenia     Abnormal weight loss     Hypertension     CKD (chronic kidney disease) stage 3, GFR 30-59 ml/min        Plan:   Niccoli improving, kidney function remains unchanged.  Most likely multiple myeloma affecting kidneys also.  Final labs and results still pending.  Discussed with nephrologist and oncologist.  Continue current treatment.  Increase activity.       DVT prophylaxis:  Medical DVT prophylaxis orders are present.    CODE STATUS:   Code  Status (Patient has no pulse and is not breathing): CPR (Attempt to Resuscitate)  Medical Interventions (Patient has pulse or is breathing): Full Support              Electronically signed by Elieser Pederson MD, 10/29/23, 12:04 PM EDT.

## 2023-10-29 NOTE — PLAN OF CARE
Goal Outcome Evaluation:      VSS. SB-NSR. Denies chest pain or shortness of breath. No c/o pain. Lungs clear, diminished. No acute distress noted.

## 2023-10-29 NOTE — PROGRESS NOTES
[facility]     Progress Note    Patient Name: Blair Lira  : 1945  MRN: [id number]  Primary Care Physician:  Babs Summers APRN  Date of admission: 10/26/2023    Subjective   Subjective     Chief Complaint: Peripheral blood flow shows 0.5% plasma cells findings most likely because of multiple myeloma, immunoelectrophoresis results are pending, patient most likely has multiple myeloma causing his kidney damage as well as anemia, patient is feeling much better since he has been started on steroids, he has a partially treated myeloma patient has been given about 3 to 4 months of steroid injections,    HPI: Patient with most likely multiple myeloma and immunoelectrophoresis and final pathology report is still pending    Review of Systems   All systems were reviewed and negative except for: Has been reviewed    Objective   Objective     Vitals:   Temp:  [97.7 °F (36.5 °C)-98.2 °F (36.8 °C)] 97.9 °F (36.6 °C)  Heart Rate:  [54-76] 76  Resp:  [18-20] 18  BP: (102-139)/(63-76) 131/63    Physical Exam    Constitutional: Awake, alert   Eyes: PERRLA, sclerae anicteric, no conjunctival injection   HENT: NCAT, mucous membranes moist   Neck: Supple, no thyromegaly, no lymphadenopathy, trachea midline   Respiratory: Clear to auscultation bilaterally, nonlabored respirations    Cardiovascular: RRR, no murmurs, rubs, or gallops, palpable pedal pulses bilaterally   Gastrointestinal: Positive bowel sounds, soft, nontender, nondistended   Musculoskeletal: No bilateral ankle edema, no clubbing or cyanosis to extremities   Psychiatric: Appropriate affect, cooperative   Neurologic: Oriented x 3, strength symmetric in all extremities, Cranial Nerves grossly intact to confrontation, speech clear   Skin: No rashes   No change  Result Review    Result Review:  I have personally reviewed the results from the time of this admission to 10/29/2023 12:06 EDT and agree with these findings:  [x]  Laboratory  []  Microbiology  []   Radiology  []  EKG/Telemetry   []  Cardiology/Vascular   []  Pathology  []  Old records  []  Other:  Most notable findings include: Most likely multiple myeloma    Assessment & Plan   Assessment / Plan     Brief Patient Summary:  Blair Lira is a 78 y.o. male who plasma cells in the peripheral blood    Active Hospital Problems:  Active Hospital Problems    Diagnosis     **Acute renal failure (ARF)     Pancytopenia     Abnormal weight loss     Hypertension     CKD (chronic kidney disease) stage 3, GFR 30-59 ml/min        Plan:   We will continue with the steroids final pathology immunoelectrophoresis pending    DVT prophylaxis:  Medical DVT prophylaxis orders are present.    CODE STATUS:   Code Status (Patient has no pulse and is not breathing): CPR (Attempt to Resuscitate)  Medical Interventions (Patient has pulse or is breathing): Full Support    Disposition:  I expect patient to be discharged after the patient has been stabilized.    Electronically signed by Vamsi Mason MD, 10/29/23, 12:06 PM EDT.      Part of this note may be an electronic transcription/translation of spoken language to printed text using the Dragon Dictation System.

## 2023-10-29 NOTE — PLAN OF CARE
Goal Outcome Evaluation:  Plan of Care Reviewed With: patient        Progress: improving  Outcome Evaluation: pt a/[id number]-[id number]-[id number] any needs-states feeling better-ambulating in room-waiting for more testing and test results-will continue to follow current POC

## 2023-10-30 PROBLEM — E44.0 MODERATE MALNUTRITION: Status: ACTIVE | Noted: 2023-10-30

## 2023-10-30 LAB
ALBUMIN SERPL-MCNC: 2.7 G/DL (ref 3.5–5.2)
ANION GAP SERPL CALCULATED.3IONS-SCNC: 10.8 MMOL/L (ref 5–15)
BUN SERPL-MCNC: 110 MG/DL (ref 8–23)
BUN/CREAT SERPL: 24.9 (ref 7–25)
CALCIUM SPEC-SCNC: 8.6 MG/DL (ref 8.6–10.5)
CHLORIDE SERPL-SCNC: 102 MMOL/L (ref 98–107)
CO2 SERPL-SCNC: 19.2 MMOL/L (ref 22–29)
CREAT SERPL-MCNC: 4.41 MG/DL (ref 0.76–1.27)
EGFRCR SERPLBLD CKD-EPI 2021: 13.1 ML/MIN/1.73
GLUCOSE SERPL-MCNC: 131 MG/DL (ref 65–99)
KAPPA LC FREE SER-MCNC: 18 MG/L (ref 3.3–19.4)
KAPPA LC FREE/LAMBDA FREE SER: 0 {RATIO} (ref 0.26–1.65)
LAMBDA LC FREE SERPL-MCNC: 6295.9 MG/L (ref 5.7–26.3)
PHOSPHATE SERPL-MCNC: 5.5 MG/DL (ref 2.5–4.5)
POTASSIUM SERPL-SCNC: 5 MMOL/L (ref 3.5–5.2)
SODIUM SERPL-SCNC: 132 MMOL/L (ref 136–145)

## 2023-10-30 PROCEDURE — 97161 PT EVAL LOW COMPLEX 20 MIN: CPT

## 2023-10-30 PROCEDURE — 25010000002 ENOXAPARIN PER 10 MG: Performed by: INTERNAL MEDICINE

## 2023-10-30 PROCEDURE — 25810000003 SODIUM CHLORIDE 0.9 % SOLUTION: Performed by: NURSE PRACTITIONER

## 2023-10-30 PROCEDURE — 80069 RENAL FUNCTION PANEL: CPT | Performed by: STUDENT IN AN ORGANIZED HEALTH CARE EDUCATION/TRAINING PROGRAM

## 2023-10-30 PROCEDURE — 25010000002 EPOETIN ALFA PER 1000 UNITS: Performed by: STUDENT IN AN ORGANIZED HEALTH CARE EDUCATION/TRAINING PROGRAM

## 2023-10-30 PROCEDURE — 25010000002 DEXAMETHASONE SODIUM PHOSPHATE 10 MG/ML SOLUTION: Performed by: INTERNAL MEDICINE

## 2023-10-30 PROCEDURE — 25010000002 CYANOCOBALAMIN PER 1000 MCG: Performed by: INTERNAL MEDICINE

## 2023-10-30 RX ORDER — SODIUM CHLORIDE 9 MG/ML
75 INJECTION, SOLUTION INTRAVENOUS CONTINUOUS
Status: DISCONTINUED | OUTPATIENT
Start: 2023-10-30 | End: 2023-11-01

## 2023-10-30 RX ORDER — SODIUM BICARBONATE 650 MG/1
1300 TABLET ORAL 2 TIMES DAILY
Status: DISCONTINUED | OUTPATIENT
Start: 2023-10-30 | End: 2023-11-02 | Stop reason: HOSPADM

## 2023-10-30 RX ADMIN — TAMSULOSIN HYDROCHLORIDE 0.4 MG: 0.4 CAPSULE ORAL at 10:40

## 2023-10-30 RX ADMIN — DEXAMETHASONE SODIUM PHOSPHATE 8 MG: 10 INJECTION INTRAMUSCULAR; INTRAVENOUS at 04:17

## 2023-10-30 RX ADMIN — FAMOTIDINE 20 MG: 10 INJECTION INTRAVENOUS at 10:31

## 2023-10-30 RX ADMIN — SODIUM BICARBONATE 650 MG TABLET 1300 MG: at 10:30

## 2023-10-30 RX ADMIN — ENOXAPARIN SODIUM 30 MG: 100 INJECTION SUBCUTANEOUS at 10:31

## 2023-10-30 RX ADMIN — DEXAMETHASONE SODIUM PHOSPHATE 8 MG: 10 INJECTION INTRAMUSCULAR; INTRAVENOUS at 15:01

## 2023-10-30 RX ADMIN — FOLIC ACID 1 MG: 1 TABLET ORAL at 10:40

## 2023-10-30 RX ADMIN — FAMOTIDINE 20 MG: 10 INJECTION INTRAVENOUS at 20:55

## 2023-10-30 RX ADMIN — SODIUM BICARBONATE 650 MG TABLET 1300 MG: at 20:54

## 2023-10-30 RX ADMIN — ATORVASTATIN CALCIUM 10 MG: 10 TABLET, FILM COATED ORAL at 20:54

## 2023-10-30 RX ADMIN — CYANOCOBALAMIN 1000 MCG: 1000 INJECTION, SOLUTION INTRAMUSCULAR at 10:32

## 2023-10-30 RX ADMIN — Medication 10 ML: at 20:57

## 2023-10-30 RX ADMIN — Medication 10 ML: at 10:40

## 2023-10-30 RX ADMIN — ERYTHROPOIETIN 20000 UNITS: 20000 INJECTION, SOLUTION INTRAVENOUS; SUBCUTANEOUS at 10:37

## 2023-10-30 RX ADMIN — SODIUM CHLORIDE 100 ML/HR: 9 INJECTION, SOLUTION INTRAVENOUS at 19:51

## 2023-10-30 RX ADMIN — SODIUM CHLORIDE 100 ML/HR: 9 INJECTION, SOLUTION INTRAVENOUS at 10:40

## 2023-10-30 RX ADMIN — DEXAMETHASONE SODIUM PHOSPHATE 8 MG: 10 INJECTION INTRAMUSCULAR; INTRAVENOUS at 23:16

## 2023-10-30 NOTE — PROGRESS NOTES
[facility]     Progress Note    Patient Name: Blair Lira  : 1945  MRN: [id number]  Primary Care Physician:  Babs Summers APRN  Date of admission: 10/26/2023 10:00 AM       Subjective     States he is feeling well this morning.  Reports good appetite.  No N/V/D.  Reports urine output has increased since yesterday.  24-hour urine in progress which is to be completed around 1230 today.  Creatinine down to 4.4.  BUN up to 110 from 82.    Review of Systems:    Review of Systems   Constitutional:  Positive for activity change and fatigue. Negative for appetite change and fever.   Neurological:  Positive for weakness.   All other systems reviewed and are negative.          Objective   Objective     Vitals:   Vitals:    10/29/23 1914 10/29/23 2301 10/30/23 0352 10/30/23 0730   BP: 134/65 126/65 130/65 133/57   BP Location: Left arm Left arm Left arm Left arm   Patient Position: Lying Lying Lying Lying   Pulse: 69 52 57 67   Resp: 20 20 18 18   Temp: 97.8 °F (36.6 °C) 98.5 °F (36.9 °C) 98.3 °F (36.8 °C) 97.2 °F (36.2 °C)   TempSrc: Oral Oral Oral Oral   SpO2: 99% 97% 96% 96%   Weight:       Height:              Physical Exam:  Vitals and nursing note reviewed.     Constitutional:      Alert, cooperative, responsive, and conversant.  No acute distress.     HENT:      Normocephalic and atraumatic, no nasal congestion, discharge, mucous membranes are moist, no OP erythema  Eyes:      PERRLA, no scleral icterus, no conjunctival injection, no eye discharge  Neck:     Supple, no thyromegaly, no lymphadenopathy, trachea midline, no elevated JVD  Cardiovascular:      RRR, no murmurs, rubs or gallops. Palpable pedal pulses bilaterally. No BLE edema  Pulmonary:      CTAB. Pulmonary effort is normal and unlabored. No respiratory distress.    Abdominal:      Bowel sounds active, soft, nontender, non distended, no organomegaly  Musculoskeletal:         No muscle wasting, tenderness or joint swelling.  Generalized  weakness.  Skin:     Warm and dry, cap refill less than 3 seconds. No clubbing, cyanosis, jaundice, erythema, rashes or ecchymosis.   Neurological:      AAOx3, speech clear, no focal deficit, strength equal bilaterally in all extremities, cranial nerves grossly intact  Psychiatric:         Mood and affect normal.  Behavior normal.         Result Review    Result Review:  I have personally reviewed the results from the time of this admission to 9/20/2021 18:13 EDT and agree with these findings:  []  Laboratory  []  Microbiology  []  Radiology  []  EKG/Telemetry   []  Cardiology/Vascular   []  Pathology  []  Old records  []  Other:     Assessment/Plan   Assessment / Plan       Active Hospital Problems:  Active Hospital Problems    Diagnosis     **Acute renal failure (ARF)     Moderate malnutrition     Pancytopenia     Abnormal weight loss     Hypertension     CKD (chronic kidney disease) stage 3, GFR 30-59 ml/min      Based on patient's age anemia renal function there is a high probability 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  IV fluids  Protein electrophoresis and urine electrophoresis results are pending hopefully patient will be completing 24-hour urine around noon time  I have personally seen the patient reviewed all the information and modified the plan    Electronically signed by LARA Obando, 10/30/23, 9:00 AM EDT.

## 2023-10-30 NOTE — PLAN OF CARE
Goal Outcome Evaluation:  Plan of Care Reviewed With: patient        Progress: improving  Outcome Evaluation: pt a/[id number]-[id number] any needs during shift-will continue to follow current POC

## 2023-10-30 NOTE — PROGRESS NOTES
[facility]     Progress Note    Patient Name: Blair Lira  : 1945  MRN: [id number]  Primary Care Physician:  Babs Summers APRN  Date of admission: 10/26/2023    Subjective   Subjective     Chief Complaint: Patient was seen for anemia and renal failure patient does have plasma cells in the peripheral blood possible plasmacytic leukemia bone marrow aspiration biopsy has been done and there were increased number of plasma cells however patient has been partially treated because of steroids which were given for some other reasons his itching has improved urine output is improving creatinine has also improved hopefully we will get the immunoelectrophoresis results today    HPI: Most likely has multiple myeloma however lab tests bone marrow biopsy results are still pending    Review of Systems   All systems were reviewed and negative except for: Has been reviewed    Objective   Objective     Vitals:   Temp:  [97.8 °F (36.6 °C)-98.5 °F (36.9 °C)] 98.3 °F (36.8 °C)  Heart Rate:  [52-69] 57  Resp:  [18-20] 18  BP: (124-137)/(60-68) 130/65    Physical Exam    Constitutional: Awake, alert   Eyes: PERRLA, sclerae anicteric, no conjunctival injection   HENT: NCAT, mucous membranes moist   Neck: Supple, no thyromegaly, no lymphadenopathy, trachea midline   Respiratory: Clear to auscultation bilaterally, nonlabored respirations    Cardiovascular: RRR, no murmurs, rubs, or gallops, palpable pedal pulses bilaterally   Gastrointestinal: Positive bowel sounds, soft, nontender, nondistended   Musculoskeletal: No bilateral ankle edema, no clubbing or cyanosis to extremities   Psychiatric: Appropriate affect, cooperative   Neurologic: Oriented x 3, strength symmetric in all extremities, Cranial Nerves grossly intact to confrontation, speech clear   Skin: No rashes   No change  Result Review    Result Review:  I have personally reviewed the results from the time of this admission to 10/30/2023 08:07 EDT and agree with  these findings:  [x]  Laboratory  []  Microbiology  []  Radiology  []  EKG/Telemetry   []  Cardiology/Vascular   []  Pathology  []  Old records  []  Other:  Most notable findings include: Has been reviewed    Assessment & Plan   Assessment / Plan     Brief Patient Summary:  Blair Lira is a 78 y.o. male who most likely has multiple myeloma results pending    Active Hospital Problems:  Active Hospital Problems    Diagnosis     **Acute renal failure (ARF)     Moderate malnutrition     Pancytopenia     Abnormal weight loss     Hypertension     CKD (chronic kidney disease) stage 3, GFR 30-59 ml/min        Plan:   Continue current management waiting for pathology report    DVT prophylaxis:  Medical DVT prophylaxis orders are present.    CODE STATUS:   Code Status (Patient has no pulse and is not breathing): CPR (Attempt to Resuscitate)  Medical Interventions (Patient has pulse or is breathing): Full Support    Disposition:  I expect patient to be discharged after the patient has been stabilized and the diagnosis has been established.    Electronically signed by Vamsi Mason MD, 10/30/23, 8:07 AM EDT.      Part of this note may be an electronic transcription/translation of spoken language to printed text using the Dragon Dictation System.

## 2023-10-30 NOTE — PLAN OF CARE
Goal Outcome Evaluation:  Plan of Care Reviewed With: patient           Outcome Evaluation: Pt presents with no impairments that warrant skilled PT intervention. He is appropriate for discharge from PT caseload.      Anticipated Discharge Disposition (PT): home

## 2023-10-30 NOTE — PROGRESS NOTES
[facility]     Progress Note    Patient Name: Blair Lira  : 1945  MRN: [id number]  Primary Care Physician:  Babs Summers APRN  Date of admission: 10/26/2023      Subjective   Brief summary.  Patient admitted with anemia, fatigue and  renal failure      HPI:  Patient feeling better, staying in bed most of the time.  Emotional and depressed, crying at times.  No chest pain, shortness of breath and weakness improving, urine output much improved      Review of Systems     No chest pain or shortness of breath or palpitations.  Feeling weak and tired but improving.  Feels depressed    Objective     Vitals:   Temp:  [97.2 °F (36.2 °C)-98.5 °F (36.9 °C)] 98.1 °F (36.7 °C)  Heart Rate:  [52-69] 67  Resp:  [18-20] 18  BP: (126-143)/(57-67) 143/67    Physical Exam :     Elderly male not in acute distress.  Pallor noted.  Heart regular.  Lungs clear.  Abdomen soft nontender.  Extremities no edema      Result Review:  I have personally reviewed the results from the time of this admission to 10/30/2023 16:39 EDT and agree with these findings:  [x]  Laboratory  []  Microbiology  []  Radiology  []  EKG/Telemetry   []  Cardiology/Vascular   []  Pathology  []  Old records  []  Other:       Reviewed today's lab    Assessment / Plan       Active Hospital Problems:  Active Hospital Problems    Diagnosis     **Acute renal failure (ARF)     Moderate malnutrition     Pancytopenia     Abnormal weight loss     Hypertension     CKD (chronic kidney disease) stage 3, GFR 30-59 ml/min        Plan:   Stable.  Labs essentially unchanged..  Continue current treatment.  Patient depressed.  Discussed with patient recommended meds but patient wants to hold off.  Increase activity as tolerates.      DVT prophylaxis:  Medical DVT prophylaxis orders are present.    CODE STATUS:   Code Status (Patient has no pulse and is not breathing): CPR (Attempt to Resuscitate)  Medical Interventions (Patient has pulse or is breathing): Full  Support                Electronically signed by Elieser Pederson MD, 10/30/23, 4:41 PM EDT.

## 2023-10-30 NOTE — THERAPY EVALUATION
Acute Care - Physical Therapy Initial Evaluation   [facility]     Patient Name: Blair Lira  : 1945  MRN: [id number]  Today's Date: 10/30/2023      Visit Dx:     ICD-10-CM ICD-9-CM   1. Acute renal failure, unspecified acute renal failure type  N17.9 584.9   2. Anemia, unspecified type  D64.9 285.9   3. Decreased activities of daily living (ADL)  Z78.9 V49.89   4. Difficulty walking  R26.2 719.7     Patient Active Problem List   Diagnosis    Rotator cuff tear, right    Impingement syndrome of right shoulder    Acute renal failure (ARF)    Pancytopenia    Abnormal weight loss    Hypertension    CKD (chronic kidney disease) stage 3, GFR 30-59 ml/min    Moderate malnutrition     Past Medical History:   Diagnosis Date    BPH (benign prostatic hyperplasia)     Frozen shoulder     Hyperlipidemia     Hypertension 10/26/2023    Periarthritis of shoulder     Rotator cuff disorder, right     Tennis elbow     RIGHT     Past Surgical History:   Procedure Laterality Date    CATARACT EXTRACTION EXTRACAPSULAR W/ INTRAOCULAR LENS IMPLANTATION Bilateral     COLONOSCOPY      JOINT REPLACEMENT Right     THR    SHOULDER ARTHROSCOPY W/ ROTATOR CUFF REPAIR Right 3/29/2023    Procedure: SHOULDER ARTHROSCOPY WITH ROTATOR CUFF REPAIR, SUBCROMIAL DECOMPRESSION,DISTAL CLAVICLE RESECTION;  Surgeon: Gustavo Samuels MD;  Location: [facility] [place];  Service: Orthopedics;  Laterality: Right;    SHOULDER SURGERY Left     SCOPE     PT Assessment (last 12 hours)       PT Evaluation and Treatment       Row Name 10/30/23 1400          Physical Therapy Time and Intention    Subjective Information no complaints  -DP     Document Type evaluation  -DP     Mode of Treatment individual therapy;physical therapy  -DP     Symptoms Noted During/After Treatment none  -DP       Row Name 10/30/23 1400          General Information    Patient Profile Reviewed yes  -DP     Patient Observations alert;cooperative;agree to therapy  -DP     Prior Level of  Function independent:;gait;transfer;bed mobility;ADL's;home management  -DP     Equipment Currently Used at Home none  -DP     Existing Precautions/Restrictions no known precautions/restrictions  -DP     Barriers to Rehab none identified  -DP       Row Name 10/30/23 1400          Living Environment    Current Living Arrangements home  -DP     Home Accessibility stairs to enter home;stairs within home  -DP     People in Home alone  -DP     Primary Care Provided by self  -DP       Row Name 10/30/23 1400          Home Main Entrance    Number of Stairs, Main Entrance one  -DP       Row Name 10/30/23 1400          Stairs Within Home, Primary    Stairs, Within Home, Primary 1 standard flight to basement, non-essential  -DP       Row Name 10/30/23 1400          Home Use of Assistive/Adaptive Equipment    Equipment Currently Used at Home none  Pt has a standard walker and straight cane from hip surgery but does not use them  -DP       Row Name 10/30/23 1400          Cognition    Orientation Status (Cognition) oriented x 4  -DP     Personal Safety Interventions gait belt;nonskid shoes/slippers when out of bed  -DP       Row Name 10/30/23 1400          Range of Motion Comprehensive    General Range of Motion bilateral lower extremity ROM WFL  -DP       Row Name 10/30/23 1400          Strength (Manual Muscle Testing)    Strength (Manual Muscle Testing) bilateral lower extremities;strength is WFL  Strength 4+/5  -DP       Row Name 10/30/23 1400          Bed Mobility    Comment, (Bed Mobility) Not tested - pt standing in room upon entry  -DP       Row Name 10/30/23 1400          Transfers    Transfers stand-sit transfer  -DP       Row Name 10/30/23 1400          Stand-Sit Transfer    Stand-Sit [place] (Transfers) independent  -DP     Assistive Device (Stand-Sit Transfers) --  None used  -DP       Row Name 10/30/23 1400          Gait/Stairs (Locomotion)    Gait/Stairs Locomotion gait/ambulation independence  -DP      [place] Level (Gait) supervision  -DP     Assistive Device (Gait) --  None used  -DP     Patient was able to Ambulate yes  -DP     Distance in Feet (Gait) 600  -DP     Pattern (Gait) step-through  -DP       Row Name 10/30/23 1400          Safety Issues, Functional Mobility    Comment, Safety Issues/Impairments (Mobility) Pt has no safety issues/impairments affecting mobility  -DP       Row Name 10/30/23 1400          Balance    Balance Assessment standing dynamic balance  -DP     Dynamic Standing Balance supervision;standby assist  -DP     Position/Device Used, Standing Balance unsupported  -DP     Comment, Balance Pt stumbled 2x on IV pole with small loss of balance, recovered without PT assistance.  -DP       Row Name 10/30/23 1400          Plan of Care Review    Plan of Care Reviewed With patient  -DP     Outcome Evaluation Pt presents with no impairments that warrant skilled PT intervention. He is appropriate for discharge from PT caseload.  -DP       Row Name 10/30/23 1400          Positioning and Restraints    Pre-Treatment Position standing in room  -DP     Post Treatment Position bed  Sitting EOB  -DP     In Bed call light within reach;encouraged to call for assist  No bed alarm in place upon entry  -DP       Row Name 10/30/23 1400          Therapy Assessment/Plan (PT)    Criteria for Skilled Interventions Met (PT) no problems identified which require skilled intervention  -DP     Therapy Frequency (PT) evaluation only  -DP       Row Name 10/30/23 1400          PT Evaluation Complexity    History, PT Evaluation Complexity no personal factors and/or comorbidities  -DP     Examination of Body Systems (PT Eval Complexity) total of 4 or more elements  -DP     Clinical Presentation (PT Evaluation Complexity) stable  -DP     Clinical Decision Making (PT Evaluation Complexity) low complexity  -DP     Overall Complexity (PT Evaluation Complexity) low complexity  -DP       Row Name 10/30/23 1400          Therapy  Plan Review/Discharge Plan (PT)    Therapy Plan Review (PT) evaluation/treatment results reviewed;patient  -DP       Row Name 10/30/23 1400          Physical Therapy Goals    Problem Specific Goal Selection (PT) problem specific goal 1, PT  -DP       Row Name 10/30/23 1400          Problem Specific Goal 1 (PT)    Problem Specific Goal 1 (PT) Complete PT evaluation.  -DP     Time Frame (Problem Specific Goal 1, PT) 1 day  -DP     Progress/Outcome (Problem Specific Goal 1, PT) goal met  -DP               User Key  (r) = Recorded By, (t) = Taken By, (c) = Cosigned By      Initials Name Provider Type    Lupe Celis, PT Physical Therapist                      PT Recommendation and Plan  Anticipated Discharge Disposition (PT): home  Therapy Frequency (PT): evaluation only  Plan of Care Reviewed With: patient  Outcome Evaluation: Pt presents with no impairments that warrant skilled PT intervention. He is appropriate for discharge from PT caseload.   Outcome Measures       Row Name 10/30/23 1400             How much help from another person do you currently need...    Turning from your back to your side while in flat bed without using bedrails? 4  -DP      Moving from lying on back to sitting on the side of a flat bed without bedrails? 4  -DP      Moving to and from a bed to a chair (including a wheelchair)? 4  -DP      Standing up from a chair using your arms (e.g., wheelchair, bedside chair)? 4  -DP      Climbing 3-5 steps with a railing? 4  -DP      To walk in hospital room? 4  -DP      AM-PAC 6 Clicks Score (PT) 24  -DP      Highest level of mobility 8 --> Walked 250 feet or more  -DP                User Key  (r) = Recorded By, (t) = Taken By, (c) = Cosigned By      Initials Name Provider Type    Lupe Celis, PT Physical Therapist                     Time Calculation:    PT Charges       Row Name 10/30/23 1455             Untimed Charges    PT Eval/Re-eval Minutes 50  -DP         Total Minutes    Untimed  Charges Total Minutes 50  -DP       Total Minutes 50  -DP                User Key  (r) = Recorded By, (t) = Taken By, (c) = Cosigned By      Initials Name Provider Type    Lupe Celis, PT Physical Therapist                      PT G-Codes  Outcome Measure Options: AM-PAC 6 Clicks Daily Activity (OT), Optimal Instrument  AM-PAC 6 Clicks Score (PT): 24  AM-PAC 6 Clicks Score (OT): 21    Lupe De Oliveira PT  10/30/2023

## 2023-10-31 ENCOUNTER — APPOINTMENT (OUTPATIENT)
Dept: MRI IMAGING | Facility: HOSPITAL | Age: 78
DRG: 683 | End: 2023-10-31
Payer: MEDICARE

## 2023-10-31 PROBLEM — N18.9 ACUTE RENAL FAILURE SUPERIMPOSED ON CHRONIC KIDNEY DISEASE: Status: ACTIVE | Noted: 2023-10-26

## 2023-10-31 LAB
ABO GROUP BLD: NORMAL
ALBUMIN SERPL-MCNC: 2.6 G/DL (ref 3.5–5.2)
ALBUMIN/GLOB SERPL: 0.5 G/DL
ALP SERPL-CCNC: 25 U/L (ref 39–117)
ALT SERPL W P-5'-P-CCNC: 17 U/L (ref 1–41)
ANION GAP SERPL CALCULATED.3IONS-SCNC: 10.3 MMOL/L (ref 5–15)
AST SERPL-CCNC: 7 U/L (ref 1–40)
BASOPHILS # BLD AUTO: 0 10*3/MM3 (ref 0–0.2)
BASOPHILS NFR BLD AUTO: 0 % (ref 0–1.5)
BILIRUB SERPL-MCNC: 0.6 MG/DL (ref 0–1.2)
BLD GP AB SCN SERPL QL: NEGATIVE
BUN SERPL-MCNC: 107 MG/DL (ref 8–23)
BUN/CREAT SERPL: 25.8 (ref 7–25)
CALCIUM SPEC-SCNC: 8 MG/DL (ref 8.6–10.5)
CHLORIDE SERPL-SCNC: 102 MMOL/L (ref 98–107)
CO2 SERPL-SCNC: 18.7 MMOL/L (ref 22–29)
COLLECT DURATION TIME UR: 24 HRS
COLLECT DURATION TIME UR: 24 HRS
CREAT CL 24H UR+SERPL-VRATE: 20.9 ML/MIN (ref 97–137)
CREAT CL 24H UR+SERPL-VRATE: 30.1 L/24 HR (ref 139.7–197.3)
CREAT SERPL-MCNC: 4.15 MG/DL (ref 0.76–1.27)
CREAT UR-MCNC: 58.5 MG/DL
CREATINE 24H UR-MRATE: 1.58 G/24 HR (ref 1–2.4)
DEPRECATED RDW RBC AUTO: 51.5 FL (ref 37–54)
EGFRCR SERPLBLD CKD-EPI 2021: 14.1 ML/MIN/1.73
EOSINOPHIL # BLD AUTO: 0 10*3/MM3 (ref 0–0.4)
EOSINOPHIL NFR BLD AUTO: 0 % (ref 0.3–6.2)
ERYTHROCYTE [DISTWIDTH] IN BLOOD BY AUTOMATED COUNT: 14.1 % (ref 12.3–15.4)
GLOBULIN UR ELPH-MCNC: 5.3 GM/DL
GLUCOSE SERPL-MCNC: 141 MG/DL (ref 65–99)
HCT VFR BLD AUTO: 21.4 % (ref 37.5–51)
HGB BLD-MCNC: 7.2 G/DL (ref 13–17.7)
IGA SERPL-MCNC: 46 MG/DL (ref 61–437)
IGG SERPL-MCNC: 5840 MG/DL (ref 603–1613)
IGM SERPL-MCNC: 11 MG/DL (ref 15–143)
IMM GRANULOCYTES # BLD AUTO: 0.07 10*3/MM3 (ref 0–0.05)
IMM GRANULOCYTES NFR BLD AUTO: 1.2 % (ref 0–0.5)
LYMPHOCYTES # BLD AUTO: 0.41 10*3/MM3 (ref 0.7–3.1)
LYMPHOCYTES NFR BLD AUTO: 7.2 % (ref 19.6–45.3)
MCH RBC QN AUTO: 34.1 PG (ref 26.6–33)
MCHC RBC AUTO-ENTMCNC: 33.6 G/DL (ref 31.5–35.7)
MCV RBC AUTO: 101.4 FL (ref 79–97)
MONOCYTES # BLD AUTO: 0.32 10*3/MM3 (ref 0.1–0.9)
MONOCYTES NFR BLD AUTO: 5.6 % (ref 5–12)
NEUTROPHILS NFR BLD AUTO: 4.92 10*3/MM3 (ref 1.7–7)
NEUTROPHILS NFR BLD AUTO: 86 % (ref 42.7–76)
NRBC BLD AUTO-RTO: 0 /100 WBC (ref 0–0.2)
PHOSPHATE SERPL-MCNC: 7.7 MG/DL (ref 2.5–4.5)
PLATELET # BLD AUTO: 123 10*3/MM3 (ref 140–450)
PMV BLD AUTO: 9.4 FL (ref 6–12)
POTASSIUM SERPL-SCNC: 4.9 MMOL/L (ref 3.5–5.2)
PROT 24H UR-MRATE: 569.7 MG/24HOURS (ref 0–150)
PROT PATTERN SERPL IFE-IMP: ABNORMAL
PROT SERPL-MCNC: 7.9 G/DL (ref 6–8.5)
RBC # BLD AUTO: 2.11 10*6/MM3 (ref 4.14–5.8)
RH BLD: POSITIVE
SODIUM SERPL-SCNC: 131 MMOL/L (ref 136–145)
SPECIMEN VOL 24H UR: 2700 ML
SPECIMEN VOL 24H UR: 2700 ML
T&S EXPIRATION DATE: NORMAL
WBC NRBC COR # BLD: 5.72 10*3/MM3 (ref 3.4–10.8)

## 2023-10-31 PROCEDURE — 80053 COMPREHEN METABOLIC PANEL: CPT | Performed by: INTERNAL MEDICINE

## 2023-10-31 PROCEDURE — 86901 BLOOD TYPING SEROLOGIC RH(D): CPT | Performed by: INTERNAL MEDICINE

## 2023-10-31 PROCEDURE — 82575 CREATININE CLEARANCE TEST: CPT | Performed by: STUDENT IN AN ORGANIZED HEALTH CARE EDUCATION/TRAINING PROGRAM

## 2023-10-31 PROCEDURE — 84100 ASSAY OF PHOSPHORUS: CPT | Performed by: STUDENT IN AN ORGANIZED HEALTH CARE EDUCATION/TRAINING PROGRAM

## 2023-10-31 PROCEDURE — 25010000002 DEXAMETHASONE SODIUM PHOSPHATE 10 MG/ML SOLUTION: Performed by: INTERNAL MEDICINE

## 2023-10-31 PROCEDURE — 82570 ASSAY OF URINE CREATININE: CPT | Performed by: INTERNAL MEDICINE

## 2023-10-31 PROCEDURE — 84156 ASSAY OF PROTEIN URINE: CPT | Performed by: INTERNAL MEDICINE

## 2023-10-31 PROCEDURE — 25810000003 SODIUM CHLORIDE 0.9 % SOLUTION: Performed by: INTERNAL MEDICINE

## 2023-10-31 PROCEDURE — 86335 IMMUNFIX E-PHORSIS/URINE/CSF: CPT | Performed by: INTERNAL MEDICINE

## 2023-10-31 PROCEDURE — 25010000002 CYANOCOBALAMIN PER 1000 MCG: Performed by: INTERNAL MEDICINE

## 2023-10-31 PROCEDURE — 85025 COMPLETE CBC W/AUTO DIFF WBC: CPT | Performed by: INTERNAL MEDICINE

## 2023-10-31 PROCEDURE — 86900 BLOOD TYPING SEROLOGIC ABO: CPT | Performed by: INTERNAL MEDICINE

## 2023-10-31 PROCEDURE — 84166 PROTEIN E-PHORESIS/URINE/CSF: CPT | Performed by: INTERNAL MEDICINE

## 2023-10-31 PROCEDURE — 25810000003 SODIUM CHLORIDE 0.9 % SOLUTION: Performed by: NURSE PRACTITIONER

## 2023-10-31 PROCEDURE — 86850 RBC ANTIBODY SCREEN: CPT | Performed by: INTERNAL MEDICINE

## 2023-10-31 PROCEDURE — 72148 MRI LUMBAR SPINE W/O DYE: CPT

## 2023-10-31 PROCEDURE — 72141 MRI NECK SPINE W/O DYE: CPT

## 2023-10-31 RX ADMIN — SODIUM BICARBONATE 650 MG TABLET 1300 MG: at 10:59

## 2023-10-31 RX ADMIN — SODIUM CHLORIDE 100 ML/HR: 9 INJECTION, SOLUTION INTRAVENOUS at 05:55

## 2023-10-31 RX ADMIN — Medication 10 ML: at 11:10

## 2023-10-31 RX ADMIN — FAMOTIDINE 20 MG: 10 INJECTION INTRAVENOUS at 21:42

## 2023-10-31 RX ADMIN — FAMOTIDINE 20 MG: 10 INJECTION INTRAVENOUS at 11:01

## 2023-10-31 RX ADMIN — SODIUM BICARBONATE 650 MG TABLET 1300 MG: at 21:42

## 2023-10-31 RX ADMIN — TAMSULOSIN HYDROCHLORIDE 0.4 MG: 0.4 CAPSULE ORAL at 11:01

## 2023-10-31 RX ADMIN — SODIUM CHLORIDE 75 ML/HR: 9 INJECTION, SOLUTION INTRAVENOUS at 21:51

## 2023-10-31 RX ADMIN — FOLIC ACID 1 MG: 1 TABLET ORAL at 11:00

## 2023-10-31 RX ADMIN — DEXAMETHASONE SODIUM PHOSPHATE 8 MG: 10 INJECTION INTRAMUSCULAR; INTRAVENOUS at 11:00

## 2023-10-31 RX ADMIN — ACETAMINOPHEN 650 MG: 325 TABLET ORAL at 11:08

## 2023-10-31 RX ADMIN — ATORVASTATIN CALCIUM 10 MG: 10 TABLET, FILM COATED ORAL at 21:42

## 2023-10-31 RX ADMIN — Medication 10 ML: at 21:43

## 2023-10-31 RX ADMIN — DEXAMETHASONE SODIUM PHOSPHATE 8 MG: 10 INJECTION INTRAMUSCULAR; INTRAVENOUS at 16:41

## 2023-10-31 RX ADMIN — CYANOCOBALAMIN 1000 MCG: 1000 INJECTION, SOLUTION INTRAMUSCULAR at 11:02

## 2023-10-31 NOTE — PROGRESS NOTES
[facility]     Progress Note    Patient Name: Blair Lira  : 1945  MRN: [id number]  Primary Care Physician:  Babs Summers APRN  Date of admission: 10/26/2023    Subjective patient is feeling okay except being tired and hemoglobin is 7.3  Creatinine is hanging around 4    Review of Systems  Constitutional:        Weakness tiredness fatigue  Eyes:                       No blurry vision, eye discharge, eye irritation, eye pain  HEENT:                   No acute hair loss, earache and discharge, nasal congestion or discharge, sore throat, postnasal drip  Respiratory:           No shortness of breath coughing sputum production wheezing hemoptysis pleuritic chest pain  Cardiovascular:     No chest pain, orthopnea, PND, dizziness, palpitation, lower extremity edema  Gastrointestinal:   No nausea vomiting diarrhea abdominal pain constipation  Genitourinary:       No urinary incontinence, hesitancy, frequency, urgency, dysuria  Hematologic:         No bruising, bleeding, pallor, lymphadenopathy  Endocrine:            No coldness, hot flashes, polyuria, abnormal hair growth  Musculoskeletal:    No body pains, aches, arthritic pains, muscle pain ,muscle wasting  Psychiatric:          No low or high mood, anxiety, hallucinations, delusions  Skin.                      No rash, ulcers, bruising, itching  Neurological:        No confusion, headache, focal weakness, numbness, dysphasia    Objective   Objective     Vitals:   Temp:  [97.7 °F (36.5 °C)-98.1 °F (36.7 °C)] 98.1 °F (36.7 °C)  Heart Rate:  [65-70] 65  Resp:  [18] 18  BP: (113-143)/(67-81) 113/81  Physical Exam    Constitutional: Awake, alert responsive, conversant, no obvious distress              Psychiatric:  Appropriate affect, cooperative   Neurologic:  Awake alert ,oriented x 3, strength symmetric in all extremities, Cranial Nerves grossly intact to confrontation, speech clear   Eyes:   PERRLA, sclerae anicteric, no conjunctival  injection   HEENT:  Moist mucous membranes, no nasal or eye discharge, no throat congestion   Neck:   Supple, no thyromegaly, no lymphadenopathy, trachea midline, no elevated JVD   Respiratory:  Clear to auscultation bilaterally, nonlabored respirations    Cardiovascular: RRR, no murmurs, rubs, or gallops, palpable pedal pulses bilaterally, No bilateral ankle edema   Gastrointestinal: Positive bowel sounds, soft, nontender, nondistended, no organomegaly   Musculoskeletal:  No clubbing or cyanosis to extremities,muscle wasting, joint swelling, muscle weakness             Skin:                      No rashes, bruising, skin ulcers, petechiae or ecchymosis    Result Review    Result Review:  I have personally reviewed the results from the time of this admission to 10/31/2023 09:35 EDT and agree with these findings:  []  Laboratory  []  Microbiology  []  Radiology  []  EKG/Telemetry   []  Cardiology/Vascular   []  Pathology  []  Old records  []  Other:    Assessment & Plan   Assessment / Plan       Active Hospital Problems:    Active Hospital Problems    Diagnosis  POA    **Acute renal failure superimposed on chronic kidney disease [N17.9, N18.9]  Yes     Serum creatinine 1.65 November 2022  Most likely patient has multiple myeloma.  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

## 2023-10-31 NOTE — PROGRESS NOTES
[facility]     Progress Note    Patient Name: Blair Lira  : 1945  MRN: [id number]  Primary Care Physician:  Babs Summers APRN  Date of admission: 10/26/2023    Subjective   Subjective     Chief Complaint: Final pathology results are still pending immunoelectrophoresis results are pending but patient has a very high light chain levels most likely light chain myeloma we will get the MRI for completion of the staging work-up, anticipate drop in the hemoglobin because chemotherapy will be started hopefully next week, if it remains stable can be discharged tomorrow renal function seem to be improving having a good urine output, MRI will be done to look for possible myelomatous involvement, PET scan will be scheduled as an outpatient    HPI: Patient with most likely multiple myeloma final pathology pending    Review of Systems   All systems were reviewed and negative except for: Has been reviewed    Objective   Objective     Vitals:   Temp:  [97.7 °F (36.5 °C)-98.1 °F (36.7 °C)] 97.7 °F (36.5 °C)  Heart Rate:  [70] 70  Resp:  [18] 18  BP: (131-143)/(67-69) 131/69    Physical Exam    Constitutional: Awake, alert   Eyes: PERRLA, sclerae anicteric, no conjunctival injection   HENT: NCAT, mucous membranes moist   Neck: Supple, no thyromegaly, no lymphadenopathy, trachea midline   Respiratory: Clear to auscultation bilaterally, nonlabored respirations    Cardiovascular: RRR, no murmurs, rubs, or gallops, palpable pedal pulses bilaterally   Gastrointestinal: Positive bowel sounds, soft, nontender, nondistended   Musculoskeletal: No bilateral ankle edema, no clubbing or cyanosis to extremities   Psychiatric: Appropriate affect, cooperative   Neurologic: Oriented x 3, strength symmetric in all extremities, Cranial Nerves grossly intact to confrontation, speech clear   Skin: No rashes   No change Result Review    Result Review:  I have personally reviewed the results from the time of this admission to  10/31/2023 08:18 EDT and agree with these findings:  [x]  Laboratory  []  Microbiology  []  Radiology  []  EKG/Telemetry   []  Cardiology/Vascular   [x]  Pathology  []  Old records  []  Other:  Most notable findings include: Most likely multiple myeloma final pathology hopefully results will be available today    Assessment & Plan   Assessment / Plan     Brief Patient Summary:  Blair Lira is a 78 y.o. male who will get a complete staging work-up with MRIs and will transfuse 1 unit of blood as I anticipate further drop in the hemoglobin    Active Hospital Problems:  Active Hospital Problems    Diagnosis     **Acute renal failure (ARF)     Moderate malnutrition     Pancytopenia     Abnormal weight loss     Hypertension     CKD (chronic kidney disease) stage 3, GFR 30-59 ml/min        Plan:   Continue the current management    DVT prophylaxis:  Medical DVT prophylaxis orders are present.    CODE STATUS:   Code Status (Patient has no pulse and is not breathing): CPR (Attempt to Resuscitate)  Medical Interventions (Patient has pulse or is breathing): Full Support    Disposition:  I expect patient to be discharged after the patient has been stabilized.    Electronically signed by Vamsi Mason MD, 10/31/23, 8:18 AM EDT.      Part of this note may be an electronic transcription/translation of spoken language to printed text using the Dragon Dictation System.

## 2023-10-31 NOTE — PLAN OF CARE
Goal Outcome Evaluation:  Plan of Care Reviewed With: patient        Progress: improving  Outcome Evaluation: pt a/[id number] around the unit unassisted with upright steady gait-denied any needs t/t the night-will continue to follow current POC

## 2023-10-31 NOTE — PROGRESS NOTES
[facility]     Progress Note    Patient Name: Blair Lira  : 1945  MRN: [id number]  Primary Care Physician:  Babs Summers APRN  Date of admission: 10/26/2023      Subjective   Brief summary.  Patient admitted with anemia, fatigue and  renal failure      HPI:  No new complaints except for some infiltration of fluids in the right arm, swollen.  No chest pain, no shortness of breath.  Still making urine.  Tired and fatigued.      Review of Systems   Denies any shortness of breath, appetite improving.  Fatigue improving    Objective     Vitals:   Temp:  [97.5 °F (36.4 °C)-98.1 °F (36.7 °C)] 97.6 °F (36.4 °C)  Heart Rate:  [62-70] 64  Resp:  [18] 18  BP: (113-148)/(58-93) 148/58    Physical Exam :     Elderly male not in acute distress.  Pallor noted.  Heart regular.  Lungs clear.  Abdomen soft nontender.  Right upper extremity with edema peripheral pulses intact      Result Review:  I have personally reviewed the results from the time of this admission to 10/31/2023 16:17 EDT and agree with these findings:  [x]  Laboratory  []  Microbiology  []  Radiology  []  EKG/Telemetry   []  Cardiology/Vascular   []  Pathology  []  Old records  []  Other:       Reviewed today's lab    Assessment / Plan       Active Hospital Problems:  Active Hospital Problems    Diagnosis     **Acute renal failure superimposed on chronic kidney disease     Moderate malnutrition     Pancytopenia     Abnormal weight loss     Hypertension     CKD (chronic kidney disease) stage 3, GFR 30-59 ml/min        Plan:   Keep right upper extremity elevated.  Swelling due to infiltrated fluids.  Increase activity..  Transfuse 1 unit packed blood cells.  Discussed with consultants.  Discharge in 1 to 2 days    DVT prophylaxis:  Medical DVT prophylaxis orders are present.    CODE STATUS:   Code Status (Patient has no pulse and is not breathing): CPR (Attempt to Resuscitate)  Medical Interventions (Patient has pulse or is breathing): Full  Support                Electronically signed by Elieser Pederson MD, 10/31/23, 4:19 PM EDT.

## 2023-11-01 ENCOUNTER — APPOINTMENT (OUTPATIENT)
Dept: CARDIOLOGY | Facility: HOSPITAL | Age: 78
DRG: 683 | End: 2023-11-01
Payer: MEDICARE

## 2023-11-01 LAB
ALBUMIN 24H MFR UR ELPH: 5.3 %
ALBUMIN SERPL-MCNC: 2.5 G/DL (ref 3.5–5.2)
ALBUMIN/GLOB SERPL: 0.5 G/DL
ALP SERPL-CCNC: 25 U/L (ref 39–117)
ALPHA1 GLOB 24H MFR UR ELPH: 2 %
ALPHA2 GLOB 24H MFR UR ELPH: 2.8 %
ALT SERPL W P-5'-P-CCNC: 18 U/L (ref 1–41)
ANION GAP SERPL CALCULATED.3IONS-SCNC: 10.5 MMOL/L (ref 5–15)
AST SERPL-CCNC: 7 U/L (ref 1–40)
B-GLOBULIN 24H MFR UR ELPH: 7.2 %
BASOPHILS # BLD AUTO: 0.01 10*3/MM3 (ref 0–0.2)
BASOPHILS NFR BLD AUTO: 0.2 % (ref 0–1.5)
BH BB BLOOD EXPIRATION DATE: NORMAL
BH BB BLOOD TYPE BARCODE: 6200
BH BB DISPENSE STATUS: NORMAL
BH BB PRODUCT CODE: NORMAL
BH BB UNIT NUMBER: NORMAL
BILIRUB SERPL-MCNC: 0.7 MG/DL (ref 0–1.2)
BUN SERPL-MCNC: 108 MG/DL (ref 8–23)
BUN/CREAT SERPL: 27.8 (ref 7–25)
CALCIUM SPEC-SCNC: 7.9 MG/DL (ref 8.6–10.5)
CHLORIDE SERPL-SCNC: 104 MMOL/L (ref 98–107)
CO2 SERPL-SCNC: 17.5 MMOL/L (ref 22–29)
CREAT SERPL-MCNC: 3.89 MG/DL (ref 0.76–1.27)
CROSSMATCH INTERPRETATION: NORMAL
DEPRECATED RDW RBC AUTO: 65.5 FL (ref 37–54)
EGFRCR SERPLBLD CKD-EPI 2021: 15.2 ML/MIN/1.73
EOSINOPHIL # BLD AUTO: 0 10*3/MM3 (ref 0–0.4)
EOSINOPHIL NFR BLD AUTO: 0 % (ref 0.3–6.2)
ERYTHROCYTE [DISTWIDTH] IN BLOOD BY AUTOMATED COUNT: 18.6 % (ref 12.3–15.4)
GAMMA GLOB 24H MFR UR ELPH: 82.7 %
GLOBULIN UR ELPH-MCNC: 5.1 GM/DL
GLUCOSE SERPL-MCNC: 154 MG/DL (ref 65–99)
HCT VFR BLD AUTO: 23.8 % (ref 37.5–51)
HGB BLD-MCNC: 8 G/DL (ref 13–17.7)
IMM GRANULOCYTES # BLD AUTO: 0.04 10*3/MM3 (ref 0–0.05)
IMM GRANULOCYTES NFR BLD AUTO: 1 % (ref 0–0.5)
LABORATORY COMMENT REPORT: ABNORMAL
LYMPHOCYTES # BLD AUTO: 0.38 10*3/MM3 (ref 0.7–3.1)
LYMPHOCYTES NFR BLD AUTO: 9.1 % (ref 19.6–45.3)
M PROTEIN 24H MFR UR ELPH: 73.1 %
M PROTEIN 24H UR ELPH-MRATE: 2370 MG/24 HR
MCH RBC QN AUTO: 32.3 PG (ref 26.6–33)
MCHC RBC AUTO-ENTMCNC: 33.6 G/DL (ref 31.5–35.7)
MCV RBC AUTO: 96 FL (ref 79–97)
MONOCYTES # BLD AUTO: 0.25 10*3/MM3 (ref 0.1–0.9)
MONOCYTES NFR BLD AUTO: 6 % (ref 5–12)
NEUTROPHILS NFR BLD AUTO: 3.48 10*3/MM3 (ref 1.7–7)
NEUTROPHILS NFR BLD AUTO: 83.7 % (ref 42.7–76)
NRBC BLD AUTO-RTO: 0 /100 WBC (ref 0–0.2)
PHOSPHATE SERPL-MCNC: 8 MG/DL (ref 2.5–4.5)
PLATELET # BLD AUTO: 118 10*3/MM3 (ref 140–450)
PMV BLD AUTO: 9.4 FL (ref 6–12)
POTASSIUM SERPL-SCNC: 4.6 MMOL/L (ref 3.5–5.2)
PROT 24H UR-MRATE: 3242 MG/24 HR (ref 30–150)
PROT SERPL-MCNC: 7.6 G/DL (ref 6–8.5)
PROT UR-MCNC: 223.6 MG/DL
RBC # BLD AUTO: 2.48 10*6/MM3 (ref 4.14–5.8)
SODIUM SERPL-SCNC: 132 MMOL/L (ref 136–145)
T4 FREE SERPL-MCNC: 1.1 NG/DL (ref 0.93–1.7)
TSH SERPL DL<=0.05 MIU/L-ACNC: 0.55 UIU/ML (ref 0.27–4.2)
UNIT  ABO: NORMAL
UNIT  RH: NORMAL
WBC NRBC COR # BLD: 4.16 10*3/MM3 (ref 3.4–10.8)

## 2023-11-01 PROCEDURE — 93306 TTE W/DOPPLER COMPLETE: CPT | Performed by: INTERNAL MEDICINE

## 2023-11-01 PROCEDURE — 84443 ASSAY THYROID STIM HORMONE: CPT | Performed by: INTERNAL MEDICINE

## 2023-11-01 PROCEDURE — 93005 ELECTROCARDIOGRAM TRACING: CPT | Performed by: INTERNAL MEDICINE

## 2023-11-01 PROCEDURE — 25010000002 DEXAMETHASONE SODIUM PHOSPHATE 10 MG/ML SOLUTION: Performed by: INTERNAL MEDICINE

## 2023-11-01 PROCEDURE — 93306 TTE W/DOPPLER COMPLETE: CPT

## 2023-11-01 PROCEDURE — 84100 ASSAY OF PHOSPHORUS: CPT | Performed by: STUDENT IN AN ORGANIZED HEALTH CARE EDUCATION/TRAINING PROGRAM

## 2023-11-01 PROCEDURE — 80053 COMPREHEN METABOLIC PANEL: CPT | Performed by: INTERNAL MEDICINE

## 2023-11-01 PROCEDURE — 63710000001 DEXAMETHASONE PER 0.25 MG: Performed by: INTERNAL MEDICINE

## 2023-11-01 PROCEDURE — 25010000002 EPOETIN ALFA PER 1000 UNITS: Performed by: STUDENT IN AN ORGANIZED HEALTH CARE EDUCATION/TRAINING PROGRAM

## 2023-11-01 PROCEDURE — 25010000002 CYANOCOBALAMIN PER 1000 MCG: Performed by: INTERNAL MEDICINE

## 2023-11-01 PROCEDURE — 85025 COMPLETE CBC W/AUTO DIFF WBC: CPT | Performed by: INTERNAL MEDICINE

## 2023-11-01 PROCEDURE — 84439 ASSAY OF FREE THYROXINE: CPT | Performed by: INTERNAL MEDICINE

## 2023-11-01 RX ORDER — PANTOPRAZOLE SODIUM 40 MG/1
40 TABLET, DELAYED RELEASE ORAL
Status: DISCONTINUED | OUTPATIENT
Start: 2023-11-01 | End: 2023-11-02 | Stop reason: HOSPADM

## 2023-11-01 RX ORDER — DEXAMETHASONE 4 MG/1
4 TABLET ORAL EVERY 12 HOURS SCHEDULED
Status: DISCONTINUED | OUTPATIENT
Start: 2023-11-01 | End: 2023-11-02 | Stop reason: HOSPADM

## 2023-11-01 RX ADMIN — CYANOCOBALAMIN 1000 MCG: 1000 INJECTION, SOLUTION INTRAMUSCULAR at 08:57

## 2023-11-01 RX ADMIN — Medication 10 ML: at 08:58

## 2023-11-01 RX ADMIN — DEXAMETHASONE 4 MG: 4 TABLET ORAL at 21:38

## 2023-11-01 RX ADMIN — ERYTHROPOIETIN 20000 UNITS: 20000 INJECTION, SOLUTION INTRAVENOUS; SUBCUTANEOUS at 08:57

## 2023-11-01 RX ADMIN — SODIUM BICARBONATE 650 MG TABLET 1300 MG: at 21:38

## 2023-11-01 RX ADMIN — SODIUM BICARBONATE 650 MG TABLET 1300 MG: at 08:50

## 2023-11-01 RX ADMIN — FOLIC ACID 1 MG: 1 TABLET ORAL at 08:50

## 2023-11-01 RX ADMIN — ATORVASTATIN CALCIUM 10 MG: 10 TABLET, FILM COATED ORAL at 21:38

## 2023-11-01 RX ADMIN — DEXAMETHASONE SODIUM PHOSPHATE 8 MG: 10 INJECTION INTRAMUSCULAR; INTRAVENOUS at 00:46

## 2023-11-01 RX ADMIN — Medication 10 ML: at 21:38

## 2023-11-01 RX ADMIN — TAMSULOSIN HYDROCHLORIDE 0.4 MG: 0.4 CAPSULE ORAL at 08:50

## 2023-11-01 RX ADMIN — FAMOTIDINE 20 MG: 10 INJECTION INTRAVENOUS at 21:37

## 2023-11-01 RX ADMIN — DEXAMETHASONE SODIUM PHOSPHATE 8 MG: 10 INJECTION INTRAMUSCULAR; INTRAVENOUS at 08:55

## 2023-11-01 RX ADMIN — FAMOTIDINE 20 MG: 10 INJECTION INTRAVENOUS at 08:57

## 2023-11-01 RX ADMIN — PANTOPRAZOLE SODIUM 40 MG: 40 TABLET, DELAYED RELEASE ORAL at 08:50

## 2023-11-01 NOTE — PROGRESS NOTES
[facility]     Progress Note    Patient Name: Blair Lira  : 1945  MRN: [id number]  Primary Care Physician:  Babs Summers APRN  Date of admission: 10/26/2023    Subjective    Patient is overall feeling better his appetite is also improving creatinine is 3.89   MRI findings are consistent with lytic lesions consistent with multiple myeloma.   According to Dr. Mason bone marrow is strongly consistent with multiple myeloma  Review of Systems  Constitutional:        Weakness tiredness fatigue  Eyes:                       No blurry vision, eye discharge, eye irritation, eye pain  HEENT:                   No acute hair loss, earache and discharge, nasal congestion or discharge, sore throat, postnasal drip  Respiratory:           No shortness of breath coughing sputum production wheezing hemoptysis pleuritic chest pain  Cardiovascular:     No chest pain, orthopnea, PND, dizziness, palpitation, lower extremity edema  Gastrointestinal:   No nausea vomiting diarrhea abdominal pain constipation  Genitourinary:       No urinary incontinence, hesitancy, frequency, urgency, dysuria  Hematologic:         No bruising, bleeding, pallor, lymphadenopathy  Endocrine:            No coldness, hot flashes, polyuria, abnormal hair growth  Musculoskeletal:    No body pains, aches, arthritic pains, muscle pain ,muscle wasting  Psychiatric:          No low or high mood, anxiety, hallucinations, delusions  Skin.                      No rash, ulcers, bruising, itching  Neurological:        No confusion, headache, focal weakness, numbness, dysphasia    Objective   Objective     Vitals:   Temp:  [97.5 °F (36.4 °C)-98.9 °F (37.2 °C)] 98.2 °F (36.8 °C)  Heart Rate:  [55-66] 60  Resp:  [17-18] 18  BP: (135-152)/(58-93) 148/68  Physical Exam    Constitutional: Awake, alert responsive, conversant, no obvious distress              Psychiatric:  Appropriate affect, cooperative   Neurologic:  Awake alert ,oriented x 3, strength  symmetric in all extremities, Cranial Nerves grossly intact to confrontation, speech clear   Eyes:   PERRLA, sclerae anicteric, no conjunctival injection   HEENT:  Moist mucous membranes, no nasal or eye discharge, no throat congestion   Neck:   Supple, no thyromegaly, no lymphadenopathy, trachea midline, no elevated JVD   Respiratory:  Clear to auscultation bilaterally, nonlabored respirations    Cardiovascular: RRR, no murmurs, rubs, or gallops, palpable pedal pulses bilaterally, No bilateral ankle edema   Gastrointestinal: Positive bowel sounds, soft, nontender, nondistended, no organomegaly   Musculoskeletal:  No clubbing or cyanosis to extremities,muscle wasting, joint swelling, muscle weakness             Skin:                      No rashes, bruising, skin ulcers, petechiae or ecchymosis    Result Review    Result Review:  I have personally reviewed the results from the time of this admission to 11/1/2023 11:41 EDT and agree with these findings:  []  Laboratory  []  Microbiology  []  Radiology  []  EKG/Telemetry   []  Cardiology/Vascular   []  Pathology  []  Old records  []  Other:    Assessment & Plan   Assessment / Plan       Active Hospital Problems:    Active Hospital Problems    Diagnosis  POA    **Acute renal failure superimposed on chronic kidney disease [N17.9, N18.9]  Yes     Serum creatinine 1.65 November 2022  Most likely patient has multiple myeloma.  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  I have discussed this with patient and we will not be considering kidney biopsy       Electronically signed by Siri Fox MD, 11/01/23, 11:41 AM EDT.

## 2023-11-01 NOTE — NURSING NOTE
Unable to give dexamethasone to pt-the medication is not in the pt profile-called pharmacy and the computer has it as being an  order, but it is not supposed to  until  today-they are going to fix it and neelima gonzalez is going to administer it.

## 2023-11-01 NOTE — CONSULTS
"Nutrition Services    Patient Name: Blair Lira  YOB: 1945  MRN: [id number]  Admission date: 10/26/2023      CLINICAL NUTRITION ASSESSMENT      Reason for Assessment  Follow-up protocol   H&P:    Past Medical History:   Diagnosis Date    BPH (benign prostatic hyperplasia)     Frozen shoulder     Hyperlipidemia     Hypertension 10/26/2023    Periarthritis of shoulder     Rotator cuff disorder, right     Tennis elbow     RIGHT        Current Problems:   Active Hospital Problems    Diagnosis     **Acute renal failure superimposed on chronic kidney disease     Moderate malnutrition     Pancytopenia     Abnormal weight loss     Hypertension     CKD (chronic kidney disease) stage 3, GFR 30-59 ml/min         Nutrition/Diet History         Narrative     Nutrition follow up. Patient presented to ED for evaluation of abnormal labs.  Admitted with ARF. Pt with moderate malnutrition. Per nursing documentation, consuming % meals. Last BM noted 10/31/23. Receiving ONS. Continue with current nutrition intervention.      Anthropometrics        Current Height, Weight Height: 185.4 cm (73\")  Weight: 94.9 kg (209 lb 3.5 oz)   Current BMI Body mass index is 27.6 kg/m².       Weight Hx  Wt Readings from Last 30 Encounters:   10/31/23 1000 94.9 kg (209 lb 3.5 oz)   10/26/23 1013 93.1 kg (205 lb 4 oz)   10/24/23 0803 99.8 kg (220 lb 0.3 oz)   06/27/23 1059 99.8 kg (220 lb)   05/16/23 1055 99.8 kg (220 lb)   04/11/23 0922 99.8 kg (220 lb)   03/29/23 1010 99.9 kg (220 lb 3.8 oz)   03/23/23 0824 103 kg (227 lb)   02/16/23 0759 103 kg (227 lb)   12/27/22 0838 102 kg (225 lb 9.6 oz)   11/15/22 0846 102 kg (224 lb)   11/17/20 0000 101 kg (223 lb 8 oz)   11/03/20 0000 98.4 kg (217 lb)   10/22/20 0000 98.6 kg (217 lb 6 oz)   11/19/19 0000 104 kg (229 lb)   10/08/19 0000 103 kg (227 lb 4 oz)   09/05/19 0000 103 kg (227 lb 2 oz)   07/09/19 0000 104 kg (229 lb)   06/07/19 0000 104 kg (229 lb)   05/02/19 0000 108 kg (237 lb 2 " "oz)   02/26/19 0000 107 kg (235 lb)   07/05/18 0000 103 kg (226 lb)   06/26/18 0000 103 kg (226 lb)   06/13/18 0000 104 kg (229 lb)   05/10/18 0000 103 kg (228 lb 2 oz)            Wt Change Observation -8.9% x 3 months per patient report, clinically significant     Estimated/Assessed Needs       Energy Requirements 25-30 kcal/kg    EST Needs (kcal/day) 3418-0170 kcal       Protein Requirements 0.8-1.0 g/kg   EST Daily Needs (g/day) 74-93 g       Fluid Requirements     Estimated Needs (mL/day) 1500 ml     Labs/Medications         Pertinent Labs Reviewed.   Results from last 7 days   Lab Units 11/01/23  0430 10/31/23  0457 10/30/23  0535 10/28/23  2327 10/28/23  0841   SODIUM mmol/L 132* 131* 132*   < > 131*  131*   POTASSIUM mmol/L 4.6 4.9 5.0   < > 4.7  4.7   CHLORIDE mmol/L 104 102 102   < > 101  101   CO2 mmol/L 17.5* 18.7* 19.2*   < > 19.0*  19.0*   BUN mg/dL 108* 107* 110*   < > 68*  68*   CREATININE mg/dL 3.89* 4.15* 4.41*   < > 4.60*  4.60*   CALCIUM mg/dL 7.9* 8.0* 8.6   < > 9.0  9.0   BILIRUBIN mg/dL 0.7 0.6  --   --  0.7   ALK PHOS U/L 25* 25*  --   --  33*   ALT (SGPT) U/L 18 17  --   --  10   AST (SGOT) U/L 7 7  --   --  8   GLUCOSE mg/dL 154* 141* 131*   < > 157*  157*    < > = values in this interval not displayed.     Results from last 7 days   Lab Units 11/01/23  0430   PHOSPHORUS mg/dL 8.0*   HEMOGLOBIN g/dL 8.0*   HEMATOCRIT % 23.8*     No results found for: \"COVID19\"  No results found for: \"HGBA1C\"      Pertinent Medications Reviewed.     Current Nutrition Orders & Evaluation of Intake       Oral Nutrition     Current PO Diet Diet: Renal Diets; Low Sodium (2-3g), Low Potassium, Low Phosphorus; Texture: Regular Texture (IDDSI 7); Fluid Consistency: Thin (IDDSI 0)   Supplement Orders Placed This Encounter      Dietary Nutrition Supplements Boost Plus (Ensure Plus); vanilla       Malnutrition Severity Assessment      Patient meets criteria for : Moderate (non-severe) Malnutrition  Malnutrition " Severity Assessment      Patient meets criteria for : Moderate (non-severe) Malnutrition                    Nutrition Diagnosis         Nutrition Dx Problem 1 Moderate malnutrition related to decreased ability to consume sufficient energy as evidenced by decreased appetite., unintended wt change., body composition changes., and patient report.     Nutrition Intervention         Boost Plus BID, vanilla     Medical Nutrition Therapy/Nutrition Education          Learner     Readiness Patient and Family  Acceptance     Method     Response Explanation  Verbalizes understanding     Monitor/Evaluation        Monitor Per protocol, PO intake, Supplement intake, Weight, Symptoms, POC/GOC     Nutrition Discharge Plan         No nutrition discharge needs identified at this time     Electronically signed by:  Lillie Curry RD  11/01/23 09:04 EDT

## 2023-11-01 NOTE — PLAN OF CARE
Goal Outcome Evaluation:  Plan of Care Reviewed With: patient        Progress: no change  Outcome Evaluation: No acute changes, patient alert and oriented, vss, possibile d/c in AM, will continue to monitor,

## 2023-11-01 NOTE — PLAN OF CARE
Goal Outcome Evaluation:  Plan of Care Reviewed With: patient        Progress: improving  Outcome Evaluation: pt a/ox4 t/o shift-still bradycardic t/o night-pt hopeful with the diagnosis he recieved-kidney biopsy to be done today-he is lookin forward to going home-will continue to follow current poc

## 2023-11-01 NOTE — NURSING NOTE
Pt's hr going very low tonight-lowest was 36-called dr knight and tosin for EKG and he would see  him this morning

## 2023-11-01 NOTE — PROGRESS NOTES
[facility]     Progress Note    Patient Name: Blair Lira  : 1945  MRN: [id number]  Primary Care Physician:  Babs Summers APRN  Date of admission: 10/26/2023    Subjective   Subjective     Chief Complaint: Patient has multiple myeloma have discussed the case with nephrology probably does not need a kidney biopsy can be discharged she will be seen in the office and will start him on chemotherapy treatments    HPI: Patient with multiple myeloma we will start him on oral Decadron    Review of Systems   All systems were reviewed and negative except for: Reviewed    Objective   Objective     Vitals:   Temp:  [97.5 °F (36.4 °C)-98.9 °F (37.2 °C)] 98 °F (36.7 °C)  Heart Rate:  [55-66] 58  Resp:  [17-18] 18  BP: (135-152)/(58-93) 144/65    Physical Exam    Constitutional: Awake, alert   Eyes: PERRLA, sclerae anicteric, no conjunctival injection   HENT: NCAT, mucous membranes moist   Neck: Supple, no thyromegaly, no lymphadenopathy, trachea midline   Respiratory: Clear to auscultation bilaterally, nonlabored respirations    Cardiovascular: RRR, no murmurs, rubs, or gallops, palpable pedal pulses bilaterally   Gastrointestinal: Positive bowel sounds, soft, nontender, nondistended   Musculoskeletal: No bilateral ankle edema, no clubbing or cyanosis to extremities   Psychiatric: Appropriate affect, cooperative   Neurologic: Oriented x 3, strength symmetric in all extremities, Cranial Nerves grossly intact to confrontation, speech clear   Skin: No rashes   No change  Result Review    Result Review:  I have personally reviewed the results from the time of this admission to 2023 08:22 EDT and agree with these findings:  [x]  Laboratory  []  Microbiology  []  Radiology  []  EKG/Telemetry   []  Cardiology/Vascular   [x]  Pathology  [x]  Old records  []  Other:  Most notable findings include: Multiple myeloma    Assessment & Plan   Assessment / Plan     Brief Patient Summary:  Blair Lira is a 78 y.o.  male who patient with multiple myeloma and nephropathy    Active Hospital Problems:  Active Hospital Problems    Diagnosis     **Acute renal failure superimposed on chronic kidney disease     Moderate malnutrition     Pancytopenia     Abnormal weight loss     Hypertension     CKD (chronic kidney disease) stage 3, GFR 30-59 ml/min        Plan:   We will switch over to oral Decadron and then patient can be discharged and will see him in the office    DVT prophylaxis:  Medical DVT prophylaxis orders are present.    CODE STATUS:   Code Status (Patient has no pulse and is not breathing): CPR (Attempt to Resuscitate)  Medical Interventions (Patient has pulse or is breathing): Full Support    Disposition:  I expect patient to be discharged we will switch over to oral Decadron.    Electronically signed by Vamsi Mason MD, 11/01/23, 8:22 AM EDT.      Part of this note may be an electronic transcription/translation of spoken language to printed text using the Dragon Dictation System.

## 2023-11-01 NOTE — PROGRESS NOTES
[facility]     Progress Note    Patient Name: Blair Lira  : 1945  MRN: [id number]  Primary Care Physician:  Babs Summers APRN  Date of admission: 10/26/2023      Subjective   Brief summary.  Patient admitted with anemia, fatigue and  renal failure      HPI:  No new complaints swelling of the right upper extremity improving, energy level improving.  Also had episodes of bradycardia yesterday night      Review of Systems     Denies any palpitations  Denies any shortness of breath, appetite improving.  Fatigue improving    Objective     Vitals:   Temp:  [97.9 °F (36.6 °C)-98.9 °F (37.2 °C)] 98.2 °F (36.8 °C)  Heart Rate:  [55-66] 60  Resp:  [17-18] 18  BP: (140-152)/(58-76) 148/68    Physical Exam :     Elderly male not in acute distress.  Pallor noted.  Heart regular slightly bradycardic.  Lungs clear.  Abdomen soft nontender.  Right upper extremity with edema peripheral pulses intact      Result Review:  I have personally reviewed the results from the time of this admission to 2023 14:36 EDT and agree with these findings:  [x]  Laboratory  []  Microbiology  []  Radiology  []  EKG/Telemetry   []  Cardiology/Vascular   []  Pathology  []  Old records  []  Other:       Reviewed today's lab    Assessment / Plan       Active Hospital Problems:  Active Hospital Problems    Diagnosis     **Acute renal failure superimposed on chronic kidney disease     Moderate malnutrition     Pancytopenia     Abnormal weight loss     Hypertension     CKD (chronic kidney disease) stage 3, GFR 30-59 ml/min        Plan:   Results positive for multiple myeloma per hematologist oncologist.  No need for renal biopsy.  Patient significantly improved clinically with steroids and fluids.  Discontinue IV fluids now.  For bradycardia we will check a thyroid profile and echo.  Increase activity    DVT prophylaxis:  Medical DVT prophylaxis orders are present.    CODE STATUS:   Code Status (Patient has no pulse and is not  breathing): CPR (Attempt to Resuscitate)  Medical Interventions (Patient has pulse or is breathing): Full Support              Electronically signed by Elieser Pederson MD, 11/01/23, 2:37 PM EDT.

## 2023-11-02 VITALS
WEIGHT: 209.22 LBS | SYSTOLIC BLOOD PRESSURE: 149 MMHG | DIASTOLIC BLOOD PRESSURE: 77 MMHG | HEIGHT: 73 IN | HEART RATE: 59 BPM | OXYGEN SATURATION: 100 % | BODY MASS INDEX: 27.73 KG/M2 | TEMPERATURE: 98.3 F | RESPIRATION RATE: 18 BRPM

## 2023-11-02 LAB
ALBUMIN 24H MFR UR ELPH: 20.3 %
ALBUMIN SERPL-MCNC: 2.5 G/DL (ref 3.5–5.2)
ALPHA1 GLOB 24H MFR UR ELPH: 3.9 %
ALPHA2 GLOB 24H MFR UR ELPH: 7.2 %
ANION GAP SERPL CALCULATED.3IONS-SCNC: 10.3 MMOL/L (ref 5–15)
ASCENDING AORTA: 4.2 CM
B-GLOBULIN MFR UR ELPH: 10.9 %
BH CV ECHO MEAS - AO MAX PG: 6 MMHG
BH CV ECHO MEAS - AO MEAN PG: 2.9 MMHG
BH CV ECHO MEAS - AO ROOT DIAM: 3.9 CM
BH CV ECHO MEAS - AO V2 MAX: 123 CM/SEC
BH CV ECHO MEAS - AO V2 VTI: 29 CM
BH CV ECHO MEAS - AVA(I,D): 3.8 CM2
BH CV ECHO MEAS - EDV(CUBED): 96.6 ML
BH CV ECHO MEAS - EDV(MOD-SP2): 97.3 ML
BH CV ECHO MEAS - EDV(MOD-SP4): 124 ML
BH CV ECHO MEAS - EF(MOD-SP2): 40.9 %
BH CV ECHO MEAS - EF(MOD-SP4): 49 %
BH CV ECHO MEAS - ESV(CUBED): 36.7 ML
BH CV ECHO MEAS - ESV(MOD-SP2): 57.5 ML
BH CV ECHO MEAS - ESV(MOD-SP4): 63.3 ML
BH CV ECHO MEAS - FS: 27.6 %
BH CV ECHO MEAS - IVS/LVPW: 1.08 CM
BH CV ECHO MEAS - IVSD: 1.28 CM
BH CV ECHO MEAS - LA DIMENSION: 4.7 CM
BH CV ECHO MEAS - LAT PEAK E' VEL: 13.5 CM/SEC
BH CV ECHO MEAS - LV MASS(C)D: 210.8 GRAMS
BH CV ECHO MEAS - LV MAX PG: 4.4 MMHG
BH CV ECHO MEAS - LV MEAN PG: 2.36 MMHG
BH CV ECHO MEAS - LV V1 MAX: 104.5 CM/SEC
BH CV ECHO MEAS - LV V1 VTI: 24.6 CM
BH CV ECHO MEAS - LVIDD: 4.6 CM
BH CV ECHO MEAS - LVIDS: 3.3 CM
BH CV ECHO MEAS - LVOT AREA: 4.5 CM2
BH CV ECHO MEAS - LVOT DIAM: 2.4 CM
BH CV ECHO MEAS - LVPWD: 1.18 CM
BH CV ECHO MEAS - MED PEAK E' VEL: 5.2 CM/SEC
BH CV ECHO MEAS - MV A MAX VEL: 114 CM/SEC
BH CV ECHO MEAS - MV DEC SLOPE: 279.3 CM/SEC2
BH CV ECHO MEAS - MV DEC TIME: 0.31 SEC
BH CV ECHO MEAS - MV E MAX VEL: 81.9 CM/SEC
BH CV ECHO MEAS - MV E/A: 0.72
BH CV ECHO MEAS - PA V2 MAX: 99.1 CM/SEC
BH CV ECHO MEAS - RAP SYSTOLE: 5 MMHG
BH CV ECHO MEAS - RVSP: 28.2 MMHG
BH CV ECHO MEAS - SV(LVOT): 111.1 ML
BH CV ECHO MEAS - SV(MOD-SP2): 39.8 ML
BH CV ECHO MEAS - SV(MOD-SP4): 60.7 ML
BH CV ECHO MEAS - TAPSE (>1.6): 1.85 CM
BH CV ECHO MEAS - TR MAX PG: 23.2 MMHG
BH CV ECHO MEAS - TR MAX VEL: 241.1 CM/SEC
BH CV ECHO MEASUREMENTS AVERAGE E/E' RATIO: 8.76
BH CV XLRA - TDI S': 9.6 CM/SEC
BUN SERPL-MCNC: 104 MG/DL (ref 8–23)
BUN/CREAT SERPL: 29.4 (ref 7–25)
CALCIUM SPEC-SCNC: 8.1 MG/DL (ref 8.6–10.5)
CHLORIDE SERPL-SCNC: 104 MMOL/L (ref 98–107)
CO2 SERPL-SCNC: 20.7 MMOL/L (ref 22–29)
CREAT 24H UR-MRATE: 1534 MG/24 HR (ref 1000–2000)
CREAT SERPL-MCNC: 3.54 MG/DL (ref 0.76–1.27)
CREAT UR-MCNC: 56.8 MG/DL
EGFRCR SERPLBLD CKD-EPI 2021: 17 ML/MIN/1.73
GAMMA GLOB 24H MFR UR ELPH: 57.7 %
GLUCOSE SERPL-MCNC: 133 MG/DL (ref 65–99)
HIV 1 & 2 AB SER-IMP: ABNORMAL
INTERPRETATION UR IFE-IMP: ABNORMAL
IVRT: 77 MS
LEFT ATRIUM VOLUME INDEX: 25.5 ML/M2
LV EF 2D ECHO EST: 50 %
M PROTEIN 24H MFR UR ELPH: ABNORMAL %
PHOSPHATE SERPL-MCNC: 6.9 MG/DL (ref 2.5–4.5)
POTASSIUM SERPL-SCNC: 4.4 MMOL/L (ref 3.5–5.2)
PROT UR-MCNC: 20.9 MG/DL
QT INTERVAL: 428 MS
QTC INTERVAL: 386 MS
SODIUM SERPL-SCNC: 135 MMOL/L (ref 136–145)
WHOLE BLOOD HOLD SPECIMEN: NORMAL

## 2023-11-02 PROCEDURE — 25010000002 CYANOCOBALAMIN PER 1000 MCG: Performed by: INTERNAL MEDICINE

## 2023-11-02 PROCEDURE — 63710000001 DEXAMETHASONE PER 0.25 MG: Performed by: INTERNAL MEDICINE

## 2023-11-02 PROCEDURE — 80069 RENAL FUNCTION PANEL: CPT | Performed by: STUDENT IN AN ORGANIZED HEALTH CARE EDUCATION/TRAINING PROGRAM

## 2023-11-02 RX ORDER — TAMSULOSIN HYDROCHLORIDE 0.4 MG/1
0.8 CAPSULE ORAL DAILY
Qty: 60 CAPSULE | Refills: 0 | Status: ON HOLD | OUTPATIENT
Start: 2023-11-02 | End: 2023-12-02

## 2023-11-02 RX ORDER — PANTOPRAZOLE SODIUM 40 MG/1
40 TABLET, DELAYED RELEASE ORAL
Qty: 30 TABLET | Refills: 0 | Status: ON HOLD | OUTPATIENT
Start: 2023-11-03 | End: 2023-12-03

## 2023-11-02 RX ORDER — FOLIC ACID 1 MG/1
1 TABLET ORAL DAILY
Qty: 30 TABLET | Refills: 0 | Status: ON HOLD | OUTPATIENT
Start: 2023-11-02 | End: 2023-12-02

## 2023-11-02 RX ORDER — LANOLIN ALCOHOL/MO/W.PET/CERES
1000 CREAM (GRAM) TOPICAL DAILY
Qty: 30 TABLET | Refills: 0 | Status: ON HOLD | OUTPATIENT
Start: 2023-11-02 | End: 2023-12-02

## 2023-11-02 RX ORDER — DEXAMETHASONE 4 MG/1
4 TABLET ORAL EVERY 12 HOURS SCHEDULED
Qty: 13 TABLET | Refills: 0 | Status: ON HOLD | OUTPATIENT
Start: 2023-11-02 | End: 2023-11-09

## 2023-11-02 RX ADMIN — TAMSULOSIN HYDROCHLORIDE 0.4 MG: 0.4 CAPSULE ORAL at 08:26

## 2023-11-02 RX ADMIN — DEXAMETHASONE 4 MG: 4 TABLET ORAL at 08:26

## 2023-11-02 RX ADMIN — Medication 10 ML: at 08:26

## 2023-11-02 RX ADMIN — PANTOPRAZOLE SODIUM 40 MG: 40 TABLET, DELAYED RELEASE ORAL at 05:24

## 2023-11-02 RX ADMIN — FOLIC ACID 1 MG: 1 TABLET ORAL at 08:26

## 2023-11-02 RX ADMIN — FAMOTIDINE 20 MG: 10 INJECTION INTRAVENOUS at 08:26

## 2023-11-02 RX ADMIN — SODIUM BICARBONATE 650 MG TABLET 1300 MG: at 08:26

## 2023-11-02 RX ADMIN — CYANOCOBALAMIN 1000 MCG: 1000 INJECTION, SOLUTION INTRAMUSCULAR at 08:26

## 2023-11-02 NOTE — PLAN OF CARE
Problem: Adult Inpatient Plan of Care  Goal: Plan of Care Review  11/2/2023 1007 by Maddy Flanagan RN  Outcome: Met  11/2/2023 1007 by Maddy Flanagan RN  Outcome: Ongoing, Progressing  Goal: Patient-Specific Goal (Individualized)  11/2/2023 1007 by Maddy Flanagan RN  Outcome: Met  11/2/2023 1007 by Maddy Flanagan RN  Outcome: Ongoing, Progressing  Goal: Absence of Hospital-Acquired Illness or Injury  11/2/2023 1007 by Maddy Flanagan RN  Outcome: Met  11/2/2023 1007 by Maddy Flanagan RN  Outcome: Ongoing, Progressing  Intervention: Identify and Manage Fall Risk  Recent Flowsheet Documentation  Taken 11/2/2023 1000 by Maddy Flanagan RN  Safety Promotion/Fall Prevention: safety round/check completed  Intervention: Prevent Skin Injury  Recent Flowsheet Documentation  Taken 11/2/2023 1000 by Maddy Flanagan RN  Body Position: position changed independently  Intervention: Prevent and Manage VTE (Venous Thromboembolism) Risk  Recent Flowsheet Documentation  Taken 11/2/2023 1000 by Maddy Flanagan RN  Range of Motion: active ROM (range of motion) encouraged  Intervention: Prevent Infection  Recent Flowsheet Documentation  Taken 11/2/2023 1000 by Maddy Flanagan RN  Infection Prevention: visitors restricted/screened  Goal: Optimal Comfort and Wellbeing  11/2/2023 1007 by Maddy Flanagan RN  Outcome: Met  11/2/2023 1007 by Maddy Flanagan RN  Outcome: Ongoing, Progressing  Intervention: Monitor Pain and Promote Comfort  Recent Flowsheet Documentation  Taken 11/2/2023 1000 by Maddy Flanagan RN  Pain Management Interventions:   position adjusted   pillow support provided  Intervention: Provide Person-Centered Care  Recent Flowsheet Documentation  Taken 11/2/2023 1000 by Maddy Flanagan RN  Trust Relationship/Rapport:   care explained   choices provided  Goal: Readiness for Transition of Care  11/2/2023 1007 by Maddy Flanagan RN  Outcome: Met  11/2/2023 1007 by Maddy Flanagan  RN  Outcome: Ongoing, Progressing     Problem: Fall Injury Risk  Goal: Absence of Fall and Fall-Related Injury  11/2/2023 1007 by Maddy Flanagan RN  Outcome: Met  11/2/2023 1007 by Maddy Flanagan RN  Outcome: Ongoing, Progressing  Intervention: Promote Injury-Free Environment  Recent Flowsheet Documentation  Taken 11/2/2023 1000 by Maddy Flanagan RN  Safety Promotion/Fall Prevention: safety round/check completed     Problem: Anemia  Goal: Anemia Symptom Improvement  11/2/2023 1007 by Maddy Flanagan RN  Outcome: Met  11/2/2023 1007 by Maddy Flanagan RN  Outcome: Ongoing, Progressing  Intervention: Monitor and Manage Anemia  Recent Flowsheet Documentation  Taken 11/2/2023 1000 by Maddy Flanagan RN  Safety Promotion/Fall Prevention: safety round/check completed     Problem: Fluid and Electrolyte Imbalance (Acute Kidney Injury/Impairment)  Goal: Fluid and Electrolyte Balance  11/2/2023 1007 by Maddy Flanagan RN  Outcome: Met  11/2/2023 1007 by Maddy Flanagan RN  Outcome: Ongoing, Progressing     Problem: Oral Intake Inadequate (Acute Kidney Injury/Impairment)  Goal: Optimal Nutrition Intake  11/2/2023 1007 by Maddy Flanagan RN  Outcome: Met  11/2/2023 1007 by Maddy Flanagan RN  Outcome: Ongoing, Progressing     Problem: Renal Function Impairment (Acute Kidney Injury/Impairment)  Goal: Effective Renal Function  11/2/2023 1007 by Maddy Flanagan RN  Outcome: Met  11/2/2023 1007 by Maddy Flanagan RN  Outcome: Ongoing, Progressing   Goal Outcome Evaluation:  Plan of Care Reviewed With: patient        Progress: no change  Outcome Evaluation: No acute changes, patient alert and oriented, vss, possibile d/c in AM, will continue to monitor,

## 2023-11-02 NOTE — PLAN OF CARE
Problem: Adult Inpatient Plan of Care  Goal: Plan of Care Review  Outcome: Ongoing, Progressing  Flowsheets (Taken 11/2/2023 0606)  Progress: improving  Plan of Care Reviewed With: patient  Outcome Evaluation: Patient alert and oriented throughout shift. VSS. No complaints of pain or discomfort thorughout shift. Possible d/c today. Continue with plan of care.   Goal Outcome Evaluation:  Plan of Care Reviewed With: patient        Progress: improving  Outcome Evaluation: Patient alert and oriented throughout shift. VSS. No complaints of pain or discomfort thorughout shift. Possible d/c today. Continue with plan of care.

## 2023-11-02 NOTE — PROGRESS NOTES
[facility]     Progress Note    Patient Name: Blair Lira  : 1945  MRN: [id number]  Primary Care Physician:  Babs Summers APRN  Date of admission: 10/26/2023    Subjective patient is feeling better has no new issues creatinine is down 3.54    Review of Systems  Constitutional:        Weakness tiredness fatigue  Eyes:                       No blurry vision, eye discharge, eye irritation, eye pain  HEENT:                   No acute hair loss, earache and discharge, nasal congestion or discharge, sore throat, postnasal drip  Respiratory:           No shortness of breath coughing sputum production wheezing hemoptysis pleuritic chest pain  Cardiovascular:     No chest pain, orthopnea, PND, dizziness, palpitation, lower extremity edema  Gastrointestinal:   No nausea vomiting diarrhea abdominal pain constipation  Genitourinary:       No urinary incontinence, hesitancy, frequency, urgency, dysuria  Hematologic:         No bruising, bleeding, pallor, lymphadenopathy  Endocrine:            No coldness, hot flashes, polyuria, abnormal hair growth  Musculoskeletal:    No body pains, aches, arthritic pains, muscle pain ,muscle wasting  Psychiatric:          No low or high mood, anxiety, hallucinations, delusions  Skin.                      No rash, ulcers, bruising, itching  Neurological:        No confusion, headache, focal weakness, numbness, dysphasia    Objective   Objective     Vitals:   Temp:  [97.9 °F (36.6 °C)-98.6 °F (37 °C)] 98.3 °F (36.8 °C)  Heart Rate:  [54-64] 59  Resp:  [18] 18  BP: (148-179)/(68-80) 149/77  Physical Exam    Constitutional: Awake, alert responsive, conversant, no obvious distress              Psychiatric:  Appropriate affect, cooperative   Neurologic:  Awake alert ,oriented x 3, strength symmetric in all extremities, Cranial Nerves grossly intact to confrontation, speech clear   Eyes:   PERRLA, sclerae anicteric, no conjunctival injection   HEENT:  Moist mucous membranes, no  nasal or eye discharge, no throat congestion   Neck:   Supple, no thyromegaly, no lymphadenopathy, trachea midline, no elevated JVD   Respiratory:  Clear to auscultation bilaterally, nonlabored respirations    Cardiovascular: RRR, no murmurs, rubs, or gallops, palpable pedal pulses bilaterally, No bilateral ankle edema   Gastrointestinal: Positive bowel sounds, soft, nontender, nondistended, no organomegaly   Musculoskeletal:  No clubbing or cyanosis to extremities,muscle wasting, joint swelling, muscle weakness             Skin:                      No rashes, bruising, skin ulcers, petechiae or ecchymosis    Result Review    Result Review:  I have personally reviewed the results from the time of this admission to 11/2/2023 09:07 EDT and agree with these findings:  []  Laboratory  []  Microbiology  []  Radiology  []  EKG/Telemetry   []  Cardiology/Vascular   []  Pathology  []  Old records  []  Other:    Assessment & Plan   Assessment / Plan       Active Hospital Problems:    Active Hospital Problems    Diagnosis  POA    **Acute renal failure superimposed on chronic kidney disease [N17.9, N18.9]  Yes     Serum creatinine 1.65 November 2022  Most likely patient has multiple myeloma.  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

## 2023-11-02 NOTE — DISCHARGE SUMMARY
[facility]         DISCHARGE SUMMARY    Patient Name: Blair Lira  : 1945  MRN: [id number]    Date of Admission: 10/26/2023  Date of Discharge: 2023  Primary Care Physician: Babs Summers APRN    Consults       Date and Time Order Name Status Description    10/27/2023  5:16 PM Inpatient Nephrology Consult Completed     10/26/2023  5:08 PM Hematology & Oncology Inpatient Consult      10/26/2023  1:08 PM IP General Consult (Use specialty-specific consult if known)              Presenting Problem:   Acute renal failure (ARF) [N17.9]  Acute renal failure, unspecified acute renal failure type [N17.9]  Anemia, unspecified type [D64.9]    Active and Resolved Hospital Problems:  Active Hospital Problems    Diagnosis POA    **Acute renal failure superimposed on chronic kidney disease [N17.9, N18.9] Yes    Moderate malnutrition [E44.0] Yes    Pancytopenia [D61.818] Yes    Abnormal weight loss [R63.4] Yes    Hypertension [I10] Yes    CKD (chronic kidney disease) stage 3, GFR 30-59 ml/min [N18.30] Yes      Resolved Hospital Problems   No resolved problems to display.         Hospital Course     Hospital Course:  Blair Lira is a 78 y.o. male Admitted to hospital with complaints of weakness and fatigue and low blood count, patient was sent from PCPs office for anemia and renal failure  Please see H&P for further details.    Patient admitted to a monitored bed his hemoglobin was 7.3.  His creatinine was 4 above from baseline 1.8.    Patient was admitted to hospital with acute renal failure and severe anemia.  Hematologist was consulted as there was no active signs of bleed.  Nephrologist was also consulted for renal failure.    Patient was started on fluids work-up revealed multiple myeloma.  Patient's creatinine has slightly improved with hydration and steroids.    Oncologist has talked to patient and they will start treatment next week.  Patient will be discharged to home as he  is feeling better and stable and he is not on any IV meds at this point he is independent he is getting up and walking around his weakness has significantly improved since admission.      DISCHARGE Follow Up Recommendations for labs and diagnostics:   Discharge to home with outpatient follow-up.  To follow-up with PCP next week and to follow-up with consultants as recommended      Day of Discharge     Vital Signs:  Temp:  [97.9 °F (36.6 °C)-98.6 °F (37 °C)] 98.2 °F (36.8 °C)  Heart Rate:  [54-64] 60  Resp:  [18] 18  BP: (144-179)/(65-80) 158/70    Physical Exam:    Elderly male looks of his stated age, not in acute distress.  Heart regular.  Lungs clear.  Abdomen soft nontender.  Extremities trace of edema.  Pallor noted.      Pertinent  and/or Most Recent Results     LAB RESULTS:      Lab 11/01/23  0430 10/31/23  0457 10/29/23  1624 10/29/23  0757 10/28/23  2327 10/28/23  1539 10/28/23  0841 10/28/23  0030 10/27/23  1708 10/27/23  0800 10/27/23  0026 10/26/23  1049   WBC 4.16 5.72  --   --   --   --  7.96  --  6.05 6.28  --  5.99   HEMOGLOBIN 8.0* 7.2* 7.9* 8.0* 7.6*   < > 8.0*   < > 8.0*  8.0* 7.6*  7.6*   < > 7.3*   HEMATOCRIT 23.8* 21.4* 23.4* 24.4* 22.3*   < > 23.6*   < > 24.0*  23.6* 22.6*  22.6*   < > 22.0*   PLATELETS 118* 123*  --   --   --   --  118*  --  117* 113*  --  122*   NEUTROS ABS 3.48 4.92  --   --   --   --  6.11  --  4.07 3.37  3.58  --  3.29   IMMATURE GRANS (ABS) 0.04 0.07*  --   --   --   --  0.13*  --  0.04  --   --  0.03   LYMPHS ABS 0.38* 0.41*  --   --   --   --  1.44  --  1.60 2.18  --  2.00   MONOS ABS 0.25 0.32  --   --   --   --  0.27  --  0.25 0.55  --  0.52   EOS ABS 0.00 0.00  --   --   --   --  0.00  --  0.06 0.12  0.13  --  0.13   MCV 96.0 101.4*  --   --   --   --  100.4*  --  101.7* 103.2*  --  103.8*   LDH  --   --   --   --   --   --   --   --   --   --   --  180    < > = values in this interval not displayed.         Lab 11/02/23  0512 11/01/23  0430 10/31/23  0457  10/30/23  0535 10/28/23  2327   SODIUM 135* 132* 131* 132* 130*   POTASSIUM 4.4 4.6 4.9 5.0 5.0   CHLORIDE 104 104 102 102 99   CO2 20.7* 17.5* 18.7* 19.2* 18.5*   ANION GAP 10.3 10.5 10.3 10.8 12.5   * 108* 107* 110* 82*   CREATININE 3.54* 3.89* 4.15* 4.41* 4.96*   EGFR 17.0* 15.2* 14.1* 13.1* 11.3*   GLUCOSE 133* 154* 141* 131* 138*   CALCIUM 8.1* 7.9* 8.0* 8.6 8.8   PHOSPHORUS 6.9* 8.0* 7.7* 5.5* 7.8*   TSH  --  0.546  --   --   --          Lab 11/02/23  0512 11/01/23  0430 10/31/23  0457 10/30/23  0535 10/28/23  2327 10/28/23  0841 10/27/23  1803 10/27/23  0828   TOTAL PROTEIN  --  7.6 7.9  --   --  9.5* 10.1* 9.4*   ALBUMIN 2.5* 2.5* 2.6* 2.7* 2.8* 3.0*  3.0* 3.1* 2.8*   GLOBULIN  --  5.1 5.3  --   --  6.5 7.0 6.6   ALT (SGPT)  --  18 17  --   --  10 12 9   AST (SGOT)  --  7 7  --   --  8 9 8   BILIRUBIN  --  0.7 0.6  --   --  0.7 1.1 1.0   ALK PHOS  --  25* 25*  --   --  33* 38* 34*                 Lab 10/31/23  0856 10/27/23  0118 10/26/23  1216 10/26/23  1049   IRON  --   --   --  120   IRON SATURATION (TSAT)  --   --   --  59*   TIBC  --   --   --  203*   TRANSFERRIN  --   --   --  136*   FERRITIN  --   --   --  426.20*   FOLATE  --   --   --  4.63*   VITAMIN B 12  --   --   --  158*   ABO TYPING A A   < > A   RH TYPING Positive Positive   < > Positive   ANTIBODY SCREEN Negative Negative  --  Negative    < > = values in this interval not displayed.         Brief Urine Lab Results  (Last result in the past 365 days)        Color   Clarity   Blood   Leuk Est   Nitrite   Protein   CREAT   Urine HCG        10/31/23 1514             58.5               Microbiology Results (last 10 days)       ** No results found for the last 240 hours. **            PROCEDURES:    MRI Lumbar Spine Without Contrast    Result Date: 10/31/2023  Impression:   1. Heterogeneous marrow signal with a 1 cm rounded area of decreased T1 and intermediate T2 signal within the L3 vertebral body.  Findings are suspicious for myeloma or  metastatic disease given the patient's clinical history. 2. Multilevel degenerative disc disease and degenerative facet change resulting in multilevel spinal canal stenosis and neural foraminal narrowing as detailed above.  These degenerative changes have worsened throughout the lumbar spine when compared to 05/23/2017.      GRETA MÁRQUEZ MD       Electronically Signed and Approved By: GRETA MÁRQUEZ MD on 10/31/2023 at 23:16             MRI Cervical Spine Without Contrast    Result Date: 10/31/2023  Impression:   1. Multilevel degenerative disc disease and degenerative facet change throughout the cervical spine as detailed above. 2. Within the right occipital condyle there is a 8 mm area of decreased T1 and T2 signal suspicious for myeloma or metastatic disease.     GRETA MÁRQUEZ MD       Electronically Signed and Approved By: GRETA MÁRQUEZ MD on 10/31/2023 at 22:35             NM Bone Scan Whole Body    Result Date: 10/27/2023  Impression:    No suspicious MDP uptake.     MATILDA GUALLPA MD       Electronically Signed and Approved By: MATILDA GUALLPA MD on 10/27/2023 at 17:37             XR Bone Survey Complete    Result Date: 10/27/2023  Impression:   No acute osseous abnormality.      MATILDA GUALLPA MD       Electronically Signed and Approved By: MATILDA GUALLPA MD on 10/27/2023 at 17:04             CT Abdomen Pelvis Without Contrast    Result Date: 10/27/2023  Impression:   1. No convincing nonenhanced CT evidence for primary malignancy or metastatic disease involving the abdomen and pelvis.  2. There may be prostatomegaly.  3. There has been interval right hip total arthroplasty with associated streak artifact.  4. No splenomegaly is seen.  5. No enlarged lymph nodes are identified.  6. No mechanical bowel obstruction.  7. There are colonic diverticula without acute diverticulitis.  8. Atherosclerotic change is seen without aneurysmal dilatation of the aortoiliac arterial system.   9. Please see above  comments for further detail.   1. Please note that portions of this note were completed with a voice recognition program.  THOMAS CHILDS JR, MD       Electronically Signed and Approved By: THOMAS CHILDS JR, MD on 10/27/2023 at 3:55              CT Chest Without Contrast Diagnostic    Result Date: 10/27/2023  Impression:    1. The study is motion-limited.   2. No acute infiltrate is suspected.   3. There is chronic asymmetric elevation of the right diaphragm, seen on prior studies dating back to 2006.  4. Atherosclerotic changes are present including involvement of the coronary arteries.  5. The maximum diameter of the ascending aorta is 4.1 cm, which is mildly dilated.  Previously, it measured about 3.6 cm in maximum diameter on the 2/26/2015 enhanced chest CT study.  Consider close interval clinical and imaging follow-up if clinically warranted.  6. There may be borderline cardiac enlargement.  7. No pleural or pericardial effusion.  8. No suspicious pulmonary nodule.  9. No enlarged lymph nodes are suggested.  10. There is no convincing nonenhanced chest CT evidence for primary malignancy or metastatic disease involving the chest.  11. If occult malignancy remains a clinical concern, consider Nuclear Medicine (NM) whole-body FDG-PET-CT scan for further imaging evaluation (and if not contraindicated).   12. Please see above comments for further detail.     Please note that portions of this note were completed with a voice recognition program.  THOMAS CHILDS JR, MD       Electronically Signed and Approved By: THOMAS CHILDS JR, MD on 10/27/2023 at 3:29                           Labs Pending at Discharge:  Pending Labs       Order Current Status    Bone Marrow Exam In process    Bone Marrow Exam In process    Chromosome Analysis, Bone Marrow In process    Creatinine, Total Protein , Electrophoresis & Immunofixation 24 Hr Urine - Urine, Clean Catch In process              Discharge Details        Discharge  Medications        New Medications        Instructions Start Date   amLODIPine 5 MG tablet  Commonly known as: NORVASC       dexAMETHasone 4 MG tablet  Commonly known as: DECADRON   4 mg, Oral, Every 12 Hours Scheduled      folic acid 1 MG tablet  Commonly known as: FOLVITE   1 mg, Oral, Daily      pantoprazole 40 MG EC tablet  Commonly known as: PROTONIX   40 mg, Oral, Every Early Morning   Start Date: November 3, 2023     vitamin B-12 1000 MCG tablet  Commonly known as: CYANOCOBALAMIN   1,000 mcg, Oral, Daily             Changes to Medications        Instructions Start Date   tamsulosin 0.4 MG capsule 24 hr capsule  Commonly known as: FLOMAX  What changed: Another medication with the same name was added. Make sure you understand how and when to take each.   1 capsule, Oral, Daily      tamsulosin 0.4 MG capsule 24 hr capsule  Commonly known as: FLOMAX  What changed: You were already taking a medication with the same name, and this prescription was added. Make sure you understand how and when to take each.   0.8 mg, Oral, Daily             Continue These Medications        Instructions Start Date   atorvastatin 10 MG tablet  Commonly known as: LIPITOR   10 mg, Oral, Nightly             Stop These Medications      diclofenac 50 MG EC tablet  Commonly known as: VOLTAREN     hydroCHLOROthiazide 25 MG tablet  Commonly known as: HYDRODIURIL     irbesartan 300 MG tablet  Commonly known as: AVAPRO              No Known Allergies      Discharge Disposition:    Home or Self Care    Diet:    Renal diet    Discharge Activity:     Activity Instructions       Activity as Tolerated              No future appointments.    Additional Instructions for the Follow-ups that You Need to Schedule       Discharge Follow-up with PCP   As directed       Currently Documented PCP:    Babs Summers APRN    PCP Phone Number:    [phone]     Follow Up Details: Next week        Discharge Follow-up with Specified Provider: Follow-up with   Marlon next week and Dr. Fox in 2 weeks   As directed      To: Follow-up with Dr. Mason next week and Dr. Fox in 2 weeks                Time spent on Discharge including face to face service: 37 minutes.            I have dictated this note utilizing Dragon Dictation.             Please note that portions of this note were completed with a voice recognition program.             Part of this note may be an electronic transcription/translation of spoken language to printed text         using the Dragon Dictation System.       Electronically signed by Elieser Pederson MD, 11/02/23, 7:13 AM EDT.

## 2023-11-02 NOTE — PROGRESS NOTES
[facility]     Progress Note    Patient Name: Blair Lira  : 1945  MRN: [id number]  Primary Care Physician:  Babs Summers APRN  Date of admission: 10/26/2023    Subjective   Subjective     Chief Complaint: Patient recently admitted with renal failure and anemia bone marrow aspiration biopsy and the blood work and other finding consistent with multiple myeloma he has been started on high-dose steroids has responded pretty good to the treatment BUN/creatinine has improved has got good urine output she will be started on intensive chemotherapy treatment with RVD will be started next week going to be discharged today    HPI: With anemia renal failure and multiple myeloma    Review of Systems   All systems were reviewed and negative except for: Has been reviewed and discussed    Objective   Objective     Vitals:   Temp:  [97.9 °F (36.6 °C)-98.6 °F (37 °C)] 98.3 °F (36.8 °C)  Heart Rate:  [54-64] 59  Resp:  [18] 18  BP: (148-179)/(68-80) 149/77    Physical Exam    Constitutional: Awake, alert   Eyes: PERRLA, sclerae anicteric, no conjunctival injection   HENT: NCAT, mucous membranes moist   Neck: Supple, no thyromegaly, no lymphadenopathy, trachea midline   Respiratory: Clear to auscultation bilaterally, nonlabored respirations    Cardiovascular: RRR, no murmurs, rubs, or gallops, palpable pedal pulses bilaterally   Gastrointestinal: Positive bowel sounds, soft, nontender, nondistended   Musculoskeletal: No bilateral ankle edema, no clubbing or cyanosis to extremities   Psychiatric: Appropriate affect, cooperative   Neurologic: Oriented x 3, strength symmetric in all extremities, Cranial Nerves grossly intact to confrontation, speech clear   Skin: No rashes   No change  Result Review    Result Review:  I have personally reviewed the results from the time of this admission to 2023 08:25 EDT and agree with these findings:  [x]  Laboratory  []  Microbiology  [x]  Radiology  []  EKG/Telemetry   []   Cardiology/Vascular   []  Pathology  []  Old records  []  Other:  Most notable findings include: Multiple myeloma recently diagnosed with renal failure    Assessment & Plan   Assessment / Plan     Brief Patient Summary:  Blair Lira is a 78 y.o. male who patient admitted with renal failure anemia and has now been diagnosed to have multiple myeloma    Active Hospital Problems:  Active Hospital Problems    Diagnosis     **Acute renal failure superimposed on chronic kidney disease     Moderate malnutrition     Pancytopenia     Abnormal weight loss     Hypertension     CKD (chronic kidney disease) stage 3, GFR 30-59 ml/min        Plan:   Patient will be discharged on oral steroids will be seen next week to initiate his treatment    DVT prophylaxis:  Medical DVT prophylaxis orders are present.    CODE STATUS:   Code Status (Patient has no pulse and is not breathing): CPR (Attempt to Resuscitate)  Medical Interventions (Patient has pulse or is breathing): Full Support    Disposition:  I expect patient to be discharged being discharged today.    Electronically signed by Vamsi Mason MD, 11/02/23, 8:25 AM EDT.      Part of this note may be an electronic transcription/translation of spoken language to printed text using the Dragon Dictation System.

## 2023-11-03 ENCOUNTER — READMISSION MANAGEMENT (OUTPATIENT)
Dept: CALL CENTER | Facility: HOSPITAL | Age: 78
End: 2023-11-03
Payer: MEDICARE

## 2023-11-03 NOTE — OUTREACH NOTE
Prep Survey      Flowsheet Row Responses   [facility] facility patient discharged from? [facility]   Is LACE score < 7 ? No   Eligibility Readm Mgmt   Discharge diagnosis Acute renal failure   Does the patient have one of the following disease processes/diagnoses(primary or secondary)? Other   Does the patient have Home health ordered? No   Is there a DME ordered? No   Prep survey completed? Yes            Olive CAMPBELL - Registered Nurse

## 2023-11-05 LAB
QT INTERVAL: 393 MS
QTC INTERVAL: 394 MS

## 2023-11-06 ENCOUNTER — HOSPITAL ENCOUNTER (INPATIENT)
Facility: HOSPITAL | Age: 78
LOS: 9 days | Discharge: HOME | DRG: 871 | End: 2023-11-15
Attending: EMERGENCY MEDICINE | Admitting: INTERNAL MEDICINE
Payer: MEDICARE

## 2023-11-06 ENCOUNTER — APPOINTMENT (OUTPATIENT)
Facility: HOSPITAL | Age: 78
DRG: 871 | End: 2023-11-06
Payer: MEDICARE

## 2023-11-06 ENCOUNTER — APPOINTMENT (OUTPATIENT)
Dept: GENERAL RADIOLOGY | Facility: HOSPITAL | Age: 78
DRG: 871 | End: 2023-11-06
Payer: MEDICARE

## 2023-11-06 ENCOUNTER — READMISSION MANAGEMENT (OUTPATIENT)
Dept: CALL CENTER | Facility: HOSPITAL | Age: 78
End: 2023-11-06
Payer: MEDICARE

## 2023-11-06 DIAGNOSIS — N39.0 URINARY TRACT INFECTION WITHOUT HEMATURIA, SITE UNSPECIFIED: ICD-10-CM

## 2023-11-06 DIAGNOSIS — M79.10 MYALGIA: Primary | ICD-10-CM

## 2023-11-06 DIAGNOSIS — Z78.9 DECREASED ACTIVITIES OF DAILY LIVING (ADL): ICD-10-CM

## 2023-11-06 DIAGNOSIS — R26.2 DIFFICULTY IN WALKING: ICD-10-CM

## 2023-11-06 DIAGNOSIS — C90.00 MULTIPLE MYELOMA, REMISSION STATUS UNSPECIFIED: ICD-10-CM

## 2023-11-06 DIAGNOSIS — R53.1 WEAKNESS GENERALIZED: ICD-10-CM

## 2023-11-06 PROBLEM — R52 INTRACTABLE PAIN: Status: ACTIVE | Noted: 2023-11-06

## 2023-11-06 LAB
ALBUMIN SERPL-MCNC: 2.3 G/DL (ref 3.5–5.2)
ALBUMIN/GLOB SERPL: 0.4 G/DL
ALP SERPL-CCNC: 50 U/L (ref 39–117)
ALT SERPL W P-5'-P-CCNC: 16 U/L (ref 1–41)
ANION GAP SERPL CALCULATED.3IONS-SCNC: 10.7 MMOL/L (ref 5–15)
ANISOCYTOSIS BLD QL: ABNORMAL
AST SERPL-CCNC: 10 U/L (ref 1–40)
BACTERIA BLD CULT: ABNORMAL
BACTERIA UR QL AUTO: ABNORMAL /HPF
BH CV LOWER VASCULAR LEFT COMMON FEMORAL AUGMENT: NORMAL
BH CV LOWER VASCULAR LEFT COMMON FEMORAL COMPETENT: NORMAL
BH CV LOWER VASCULAR LEFT COMMON FEMORAL COMPRESS: NORMAL
BH CV LOWER VASCULAR LEFT COMMON FEMORAL PHASIC: NORMAL
BH CV LOWER VASCULAR LEFT COMMON FEMORAL SPONT: NORMAL
BH CV LOWER VASCULAR RIGHT COMMON FEMORAL AUGMENT: NORMAL
BH CV LOWER VASCULAR RIGHT COMMON FEMORAL COMPETENT: NORMAL
BH CV LOWER VASCULAR RIGHT COMMON FEMORAL COMPRESS: NORMAL
BH CV LOWER VASCULAR RIGHT COMMON FEMORAL PHASIC: NORMAL
BH CV LOWER VASCULAR RIGHT COMMON FEMORAL SPONT: NORMAL
BH CV LOWER VASCULAR RIGHT DISTAL FEMORAL COMPRESS: NORMAL
BH CV LOWER VASCULAR RIGHT GASTRONEMIUS COMPRESS: NORMAL
BH CV LOWER VASCULAR RIGHT GREATER SAPH AK COMPRESS: NORMAL
BH CV LOWER VASCULAR RIGHT GREATER SAPH BK COMPRESS: NORMAL
BH CV LOWER VASCULAR RIGHT LESSER SAPH COMPRESS: NORMAL
BH CV LOWER VASCULAR RIGHT MID FEMORAL AUGMENT: NORMAL
BH CV LOWER VASCULAR RIGHT MID FEMORAL COMPETENT: NORMAL
BH CV LOWER VASCULAR RIGHT MID FEMORAL COMPRESS: NORMAL
BH CV LOWER VASCULAR RIGHT MID FEMORAL PHASIC: NORMAL
BH CV LOWER VASCULAR RIGHT MID FEMORAL SPONT: NORMAL
BH CV LOWER VASCULAR RIGHT PERONEAL COMPRESS: NORMAL
BH CV LOWER VASCULAR RIGHT POPLITEAL AUGMENT: NORMAL
BH CV LOWER VASCULAR RIGHT POPLITEAL COMPETENT: NORMAL
BH CV LOWER VASCULAR RIGHT POPLITEAL COMPRESS: NORMAL
BH CV LOWER VASCULAR RIGHT POPLITEAL PHASIC: NORMAL
BH CV LOWER VASCULAR RIGHT POPLITEAL SPONT: NORMAL
BH CV LOWER VASCULAR RIGHT POSTERIOR TIBIAL COMPRESS: NORMAL
BH CV LOWER VASCULAR RIGHT PROXIMAL FEMORAL COMPRESS: NORMAL
BH CV LOWER VASCULAR RIGHT SAPHENOFEMORAL JUNCTION COMPRESS: NORMAL
BH CV VAS PRELIMINARY FINDINGS SCRIPTING: 1
BILIRUB SERPL-MCNC: 0.4 MG/DL (ref 0–1.2)
BILIRUB UR QL STRIP: NEGATIVE
BOTTLE TYPE: ABNORMAL
BUN SERPL-MCNC: 75 MG/DL (ref 8–23)
BUN/CREAT SERPL: 26.5 (ref 7–25)
BURR CELLS BLD QL SMEAR: ABNORMAL
CALCIUM SPEC-SCNC: 8.1 MG/DL (ref 8.6–10.5)
CHLORIDE SERPL-SCNC: 102 MMOL/L (ref 98–107)
CLARITY UR: CLEAR
CO2 SERPL-SCNC: 19.3 MMOL/L (ref 22–29)
COLOR UR: YELLOW
CREAT SERPL-MCNC: 2.83 MG/DL (ref 0.76–1.27)
D-LACTATE SERPL-SCNC: 1.6 MMOL/L (ref 0.5–2)
DACRYOCYTES BLD QL SMEAR: ABNORMAL
DEPRECATED RDW RBC AUTO: 67.1 FL (ref 37–54)
EGFRCR SERPLBLD CKD-EPI 2021: 22.2 ML/MIN/1.73
ERYTHROCYTE [DISTWIDTH] IN BLOOD BY AUTOMATED COUNT: 18.8 % (ref 12.3–15.4)
GEN 5 2HR TROPONIN T REFLEX: 50 NG/L
GLOBULIN UR ELPH-MCNC: 5.5 GM/DL
GLUCOSE BLDC GLUCOMTR-MCNC: 125 MG/DL (ref 70–99)
GLUCOSE SERPL-MCNC: 172 MG/DL (ref 65–99)
GLUCOSE UR STRIP-MCNC: ABNORMAL MG/DL
HBA1C MFR BLD: 6.7 % (ref 4.8–5.6)
HCT VFR BLD AUTO: 28.8 % (ref 37.5–51)
HGB BLD-MCNC: 9.7 G/DL (ref 13–17.7)
HGB UR QL STRIP.AUTO: ABNORMAL
HYALINE CASTS UR QL AUTO: ABNORMAL /LPF
KETONES UR QL STRIP: NEGATIVE
LARGE PLATELETS: ABNORMAL
LEUKOCYTE ESTERASE UR QL STRIP.AUTO: ABNORMAL
LYMPHOCYTES # BLD MANUAL: 0.72 10*3/MM3 (ref 0.7–3.1)
LYMPHOCYTES NFR BLD MANUAL: 2 % (ref 5–12)
MACROCYTES BLD QL SMEAR: ABNORMAL
MCH RBC QN AUTO: 32.6 PG (ref 26.6–33)
MCHC RBC AUTO-ENTMCNC: 33.7 G/DL (ref 31.5–35.7)
MCV RBC AUTO: 96.6 FL (ref 79–97)
MICROCYTES BLD QL: ABNORMAL
MONOCYTES # BLD: 0.36 10*3/MM3 (ref 0.1–0.9)
NEUTROPHILS # BLD AUTO: 16.84 10*3/MM3 (ref 1.7–7)
NEUTROPHILS NFR BLD MANUAL: 93 % (ref 42.7–76)
NEUTS BAND NFR BLD MANUAL: 1 % (ref 0–5)
NITRITE UR QL STRIP: NEGATIVE
NT-PROBNP SERPL-MCNC: 1971 PG/ML (ref 0–1800)
OVALOCYTES BLD QL SMEAR: ABNORMAL
PH UR STRIP.AUTO: 5.5 [PH] (ref 5–8)
PLATELET # BLD AUTO: 168 10*3/MM3 (ref 140–450)
PMV BLD AUTO: 9.3 FL (ref 6–12)
POTASSIUM SERPL-SCNC: 4 MMOL/L (ref 3.5–5.2)
PROT SERPL-MCNC: 7.8 G/DL (ref 6–8.5)
PROT UR QL STRIP: ABNORMAL
RBC # BLD AUTO: 2.98 10*6/MM3 (ref 4.14–5.8)
RBC # UR STRIP: ABNORMAL /HPF
REF LAB TEST METHOD: ABNORMAL
SMALL PLATELETS BLD QL SMEAR: ADEQUATE
SODIUM SERPL-SCNC: 132 MMOL/L (ref 136–145)
SP GR UR STRIP: 1.01 (ref 1–1.03)
SQUAMOUS #/AREA URNS HPF: ABNORMAL /HPF
TARGETS BLD QL SMEAR: ABNORMAL
TROPONIN T DELTA: 3 NG/L
TROPONIN T SERPL HS-MCNC: 47 NG/L
UROBILINOGEN UR QL STRIP: ABNORMAL
VARIANT LYMPHS NFR BLD MANUAL: 4 % (ref 19.6–45.3)
WBC # UR STRIP: ABNORMAL /HPF
WBC MORPH BLD: NORMAL
WBC NRBC COR # BLD: 17.91 10*3/MM3 (ref 3.4–10.8)

## 2023-11-06 PROCEDURE — 93971 EXTREMITY STUDY: CPT

## 2023-11-06 PROCEDURE — 87150 DNA/RNA AMPLIFIED PROBE: CPT | Performed by: EMERGENCY MEDICINE

## 2023-11-06 PROCEDURE — 25810000003 LACTATED RINGERS PER 1000 ML: Performed by: INTERNAL MEDICINE

## 2023-11-06 PROCEDURE — 87040 BLOOD CULTURE FOR BACTERIA: CPT | Performed by: EMERGENCY MEDICINE

## 2023-11-06 PROCEDURE — 82948 REAGENT STRIP/BLOOD GLUCOSE: CPT

## 2023-11-06 PROCEDURE — 25810000003 SODIUM CHLORIDE 0.9 % SOLUTION: Performed by: EMERGENCY MEDICINE

## 2023-11-06 PROCEDURE — 87147 CULTURE TYPE IMMUNOLOGIC: CPT | Performed by: EMERGENCY MEDICINE

## 2023-11-06 PROCEDURE — 99285 EMERGENCY DEPT VISIT HI MDM: CPT

## 2023-11-06 PROCEDURE — 80053 COMPREHEN METABOLIC PANEL: CPT | Performed by: EMERGENCY MEDICINE

## 2023-11-06 PROCEDURE — 63710000001 DEXAMETHASONE PER 0.25 MG: Performed by: INTERNAL MEDICINE

## 2023-11-06 PROCEDURE — 07DR3ZX EXTRACTION OF ILIAC BONE MARROW, PERCUTANEOUS APPROACH, DIAGNOSTIC: ICD-10-PCS | Performed by: INTERNAL MEDICINE

## 2023-11-06 PROCEDURE — 83605 ASSAY OF LACTIC ACID: CPT | Performed by: EMERGENCY MEDICINE

## 2023-11-06 PROCEDURE — 85025 COMPLETE CBC W/AUTO DIFF WBC: CPT | Performed by: EMERGENCY MEDICINE

## 2023-11-06 PROCEDURE — 85007 BL SMEAR W/DIFF WBC COUNT: CPT | Performed by: EMERGENCY MEDICINE

## 2023-11-06 PROCEDURE — 93005 ELECTROCARDIOGRAM TRACING: CPT | Performed by: EMERGENCY MEDICINE

## 2023-11-06 PROCEDURE — 25010000002 CEFAZOLIN IN DEXTROSE 2-4 GM/100ML-% SOLUTION: Performed by: INTERNAL MEDICINE

## 2023-11-06 PROCEDURE — 87186 SC STD MICRODIL/AGAR DIL: CPT | Performed by: EMERGENCY MEDICINE

## 2023-11-06 PROCEDURE — 81001 URINALYSIS AUTO W/SCOPE: CPT | Performed by: EMERGENCY MEDICINE

## 2023-11-06 PROCEDURE — 93971 EXTREMITY STUDY: CPT | Performed by: SURGERY

## 2023-11-06 PROCEDURE — 87077 CULTURE AEROBIC IDENTIFY: CPT | Performed by: EMERGENCY MEDICINE

## 2023-11-06 PROCEDURE — 93010 ELECTROCARDIOGRAM REPORT: CPT | Performed by: INTERNAL MEDICINE

## 2023-11-06 PROCEDURE — 25010000002 HYDROMORPHONE 1 MG/ML SOLUTION: Performed by: EMERGENCY MEDICINE

## 2023-11-06 PROCEDURE — 83036 HEMOGLOBIN GLYCOSYLATED A1C: CPT | Performed by: INTERNAL MEDICINE

## 2023-11-06 PROCEDURE — 88312 SPECIAL STAINS GROUP 1: CPT | Performed by: EMERGENCY MEDICINE

## 2023-11-06 PROCEDURE — 36415 COLL VENOUS BLD VENIPUNCTURE: CPT

## 2023-11-06 PROCEDURE — 87086 URINE CULTURE/COLONY COUNT: CPT | Performed by: EMERGENCY MEDICINE

## 2023-11-06 PROCEDURE — 25010000002 CEFTRIAXONE PER 250 MG: Performed by: EMERGENCY MEDICINE

## 2023-11-06 PROCEDURE — 25010000002 HEPARIN (PORCINE) PER 1000 UNITS: Performed by: INTERNAL MEDICINE

## 2023-11-06 PROCEDURE — 84484 ASSAY OF TROPONIN QUANT: CPT | Performed by: EMERGENCY MEDICINE

## 2023-11-06 PROCEDURE — 71045 X-RAY EXAM CHEST 1 VIEW: CPT

## 2023-11-06 PROCEDURE — 83880 ASSAY OF NATRIURETIC PEPTIDE: CPT | Performed by: EMERGENCY MEDICINE

## 2023-11-06 PROCEDURE — 97161 PT EVAL LOW COMPLEX 20 MIN: CPT

## 2023-11-06 RX ORDER — AMLODIPINE BESYLATE 5 MG/1
10 TABLET ORAL
Status: DISCONTINUED | OUTPATIENT
Start: 2023-11-06 | End: 2023-11-15 | Stop reason: HOSPADM

## 2023-11-06 RX ORDER — CALCIUM CARBONATE 500 MG/1
2 TABLET, CHEWABLE ORAL 2 TIMES DAILY PRN
Status: DISCONTINUED | OUTPATIENT
Start: 2023-11-06 | End: 2023-11-15 | Stop reason: HOSPADM

## 2023-11-06 RX ORDER — ONDANSETRON 2 MG/ML
4 INJECTION INTRAMUSCULAR; INTRAVENOUS EVERY 6 HOURS PRN
Status: DISCONTINUED | OUTPATIENT
Start: 2023-11-06 | End: 2023-11-15 | Stop reason: HOSPADM

## 2023-11-06 RX ORDER — HYDROCODONE BITARTRATE AND ACETAMINOPHEN 7.5; 325 MG/1; MG/1
1 TABLET ORAL EVERY 4 HOURS PRN
Status: DISCONTINUED | OUTPATIENT
Start: 2023-11-06 | End: 2023-11-15 | Stop reason: HOSPADM

## 2023-11-06 RX ORDER — HYDROCODONE BITARTRATE AND ACETAMINOPHEN 5; 325 MG/1; MG/1
1 TABLET ORAL EVERY 6 HOURS PRN
Status: DISCONTINUED | OUTPATIENT
Start: 2023-11-06 | End: 2023-11-06

## 2023-11-06 RX ORDER — INSULIN LISPRO 100 [IU]/ML
2-7 INJECTION, SOLUTION INTRAVENOUS; SUBCUTANEOUS
Status: DISCONTINUED | OUTPATIENT
Start: 2023-11-06 | End: 2023-11-06

## 2023-11-06 RX ORDER — ACETAMINOPHEN 160 MG/5ML
650 SOLUTION ORAL EVERY 4 HOURS PRN
Status: DISCONTINUED | OUTPATIENT
Start: 2023-11-06 | End: 2023-11-15 | Stop reason: HOSPADM

## 2023-11-06 RX ORDER — ALUMINA, MAGNESIA, AND SIMETHICONE 2400; 2400; 240 MG/30ML; MG/30ML; MG/30ML
15 SUSPENSION ORAL EVERY 6 HOURS PRN
Status: DISCONTINUED | OUTPATIENT
Start: 2023-11-06 | End: 2023-11-15 | Stop reason: HOSPADM

## 2023-11-06 RX ORDER — SODIUM CHLORIDE 0.9 % (FLUSH) 0.9 %
10 SYRINGE (ML) INJECTION EVERY 12 HOURS SCHEDULED
Status: DISCONTINUED | OUTPATIENT
Start: 2023-11-06 | End: 2023-11-15 | Stop reason: HOSPADM

## 2023-11-06 RX ORDER — POLYETHYLENE GLYCOL 3350 17 G/17G
17 POWDER, FOR SOLUTION ORAL DAILY PRN
Status: DISCONTINUED | OUTPATIENT
Start: 2023-11-06 | End: 2023-11-15 | Stop reason: HOSPADM

## 2023-11-06 RX ORDER — IBUPROFEN 600 MG/1
1 TABLET ORAL
Status: DISCONTINUED | OUTPATIENT
Start: 2023-11-06 | End: 2023-11-06

## 2023-11-06 RX ORDER — TAMSULOSIN HYDROCHLORIDE 0.4 MG/1
0.8 CAPSULE ORAL DAILY
Status: DISCONTINUED | OUTPATIENT
Start: 2023-11-06 | End: 2023-11-15 | Stop reason: HOSPADM

## 2023-11-06 RX ORDER — ACETAMINOPHEN 650 MG/1
650 SUPPOSITORY RECTAL EVERY 4 HOURS PRN
Status: DISCONTINUED | OUTPATIENT
Start: 2023-11-06 | End: 2023-11-15 | Stop reason: HOSPADM

## 2023-11-06 RX ORDER — SODIUM CHLORIDE 0.9 % (FLUSH) 0.9 %
10 SYRINGE (ML) INJECTION AS NEEDED
Status: DISCONTINUED | OUTPATIENT
Start: 2023-11-06 | End: 2023-11-15 | Stop reason: HOSPADM

## 2023-11-06 RX ORDER — HEPARIN SODIUM 5000 [USP'U]/ML
5000 INJECTION, SOLUTION INTRAVENOUS; SUBCUTANEOUS EVERY 12 HOURS SCHEDULED
Status: DISCONTINUED | OUTPATIENT
Start: 2023-11-06 | End: 2023-11-15 | Stop reason: HOSPADM

## 2023-11-06 RX ORDER — BISACODYL 5 MG/1
5 TABLET, DELAYED RELEASE ORAL DAILY PRN
Status: DISCONTINUED | OUTPATIENT
Start: 2023-11-06 | End: 2023-11-15 | Stop reason: HOSPADM

## 2023-11-06 RX ORDER — ACETAMINOPHEN 325 MG/1
650 TABLET ORAL EVERY 4 HOURS PRN
Status: DISCONTINUED | OUTPATIENT
Start: 2023-11-06 | End: 2023-11-15 | Stop reason: HOSPADM

## 2023-11-06 RX ORDER — PANTOPRAZOLE SODIUM 40 MG/1
40 TABLET, DELAYED RELEASE ORAL
Status: DISCONTINUED | OUTPATIENT
Start: 2023-11-06 | End: 2023-11-15 | Stop reason: HOSPADM

## 2023-11-06 RX ORDER — CEFAZOLIN SODIUM 2 G/100ML
2000 INJECTION, SOLUTION INTRAVENOUS EVERY 8 HOURS
Status: DISCONTINUED | OUTPATIENT
Start: 2023-11-06 | End: 2023-11-15 | Stop reason: HOSPADM

## 2023-11-06 RX ORDER — DEXAMETHASONE 4 MG/1
4 TABLET ORAL EVERY 12 HOURS SCHEDULED
Status: DISCONTINUED | OUTPATIENT
Start: 2023-11-06 | End: 2023-11-06

## 2023-11-06 RX ORDER — BISACODYL 10 MG
10 SUPPOSITORY, RECTAL RECTAL DAILY PRN
Status: DISCONTINUED | OUTPATIENT
Start: 2023-11-06 | End: 2023-11-15 | Stop reason: HOSPADM

## 2023-11-06 RX ORDER — SODIUM CHLORIDE, SODIUM LACTATE, POTASSIUM CHLORIDE, CALCIUM CHLORIDE 600; 310; 30; 20 MG/100ML; MG/100ML; MG/100ML; MG/100ML
75 INJECTION, SOLUTION INTRAVENOUS CONTINUOUS
Status: DISCONTINUED | OUTPATIENT
Start: 2023-11-06 | End: 2023-11-06

## 2023-11-06 RX ORDER — HYDROCODONE BITARTRATE AND ACETAMINOPHEN 5; 325 MG/1; MG/1
1 TABLET ORAL EVERY 4 HOURS PRN
Status: DISCONTINUED | OUTPATIENT
Start: 2023-11-06 | End: 2023-11-15 | Stop reason: HOSPADM

## 2023-11-06 RX ORDER — NALOXONE HCL 0.4 MG/ML
0.4 VIAL (ML) INJECTION
Status: DISCONTINUED | OUTPATIENT
Start: 2023-11-06 | End: 2023-11-15 | Stop reason: HOSPADM

## 2023-11-06 RX ORDER — FOLIC ACID 1 MG/1
1 TABLET ORAL DAILY
Status: DISCONTINUED | OUTPATIENT
Start: 2023-11-06 | End: 2023-11-15 | Stop reason: HOSPADM

## 2023-11-06 RX ORDER — NICOTINE POLACRILEX 4 MG
15 LOZENGE BUCCAL
Status: DISCONTINUED | OUTPATIENT
Start: 2023-11-06 | End: 2023-11-06

## 2023-11-06 RX ORDER — CHOLECALCIFEROL (VITAMIN D3) 125 MCG
5 CAPSULE ORAL NIGHTLY PRN
Status: DISCONTINUED | OUTPATIENT
Start: 2023-11-06 | End: 2023-11-15 | Stop reason: HOSPADM

## 2023-11-06 RX ORDER — DEXTROSE MONOHYDRATE 25 G/50ML
25 INJECTION, SOLUTION INTRAVENOUS
Status: DISCONTINUED | OUTPATIENT
Start: 2023-11-06 | End: 2023-11-06

## 2023-11-06 RX ORDER — SODIUM CHLORIDE 9 MG/ML
40 INJECTION, SOLUTION INTRAVENOUS AS NEEDED
Status: DISCONTINUED | OUTPATIENT
Start: 2023-11-06 | End: 2023-11-15 | Stop reason: HOSPADM

## 2023-11-06 RX ORDER — CHOLECALCIFEROL (VITAMIN D3) 125 MCG
1000 CAPSULE ORAL DAILY
Status: DISCONTINUED | OUTPATIENT
Start: 2023-11-06 | End: 2023-11-15 | Stop reason: HOSPADM

## 2023-11-06 RX ORDER — HYDROCODONE BITARTRATE AND ACETAMINOPHEN 7.5; 325 MG/1; MG/1
1 TABLET ORAL EVERY 6 HOURS PRN
Status: DISCONTINUED | OUTPATIENT
Start: 2023-11-06 | End: 2023-11-06

## 2023-11-06 RX ORDER — CEFTRIAXONE SODIUM 1 G/50ML
1000 INJECTION, SOLUTION INTRAVENOUS EVERY 24 HOURS
Qty: 250 ML | Refills: 0 | Status: DISCONTINUED | OUTPATIENT
Start: 2023-11-07 | End: 2023-11-06 | Stop reason: ALTCHOICE

## 2023-11-06 RX ORDER — CEFTRIAXONE SODIUM 1 G/50ML
1000 INJECTION, SOLUTION INTRAVENOUS ONCE
Status: COMPLETED | OUTPATIENT
Start: 2023-11-06 | End: 2023-11-06

## 2023-11-06 RX ORDER — DEXAMETHASONE 0.5 MG/1
2 TABLET ORAL EVERY 12 HOURS SCHEDULED
Status: COMPLETED | OUTPATIENT
Start: 2023-11-06 | End: 2023-11-08

## 2023-11-06 RX ORDER — AMOXICILLIN 250 MG
2 CAPSULE ORAL 2 TIMES DAILY
Status: DISCONTINUED | OUTPATIENT
Start: 2023-11-06 | End: 2023-11-15 | Stop reason: HOSPADM

## 2023-11-06 RX ADMIN — DEXAMETHASONE 4 MG: 4 TABLET ORAL at 09:33

## 2023-11-06 RX ADMIN — SODIUM CHLORIDE 500 ML: 9 INJECTION, SOLUTION INTRAVENOUS at 05:03

## 2023-11-06 RX ADMIN — Medication 10 ML: at 20:08

## 2023-11-06 RX ADMIN — DEXAMETHASONE 2 MG: 0.5 TABLET ORAL at 20:07

## 2023-11-06 RX ADMIN — HYDROCODONE BITARTRATE AND ACETAMINOPHEN 1 TABLET: 7.5; 325 TABLET ORAL at 09:33

## 2023-11-06 RX ADMIN — AMLODIPINE BESYLATE 10 MG: 5 TABLET ORAL at 09:33

## 2023-11-06 RX ADMIN — HEPARIN SODIUM 5000 UNITS: 5000 INJECTION INTRAVENOUS; SUBCUTANEOUS at 09:32

## 2023-11-06 RX ADMIN — CEFAZOLIN SODIUM 2000 MG: 2 INJECTION, SOLUTION INTRAVENOUS at 22:23

## 2023-11-06 RX ADMIN — PANTOPRAZOLE SODIUM 40 MG: 40 TABLET, DELAYED RELEASE ORAL at 09:33

## 2023-11-06 RX ADMIN — TAMSULOSIN HYDROCHLORIDE 0.8 MG: 0.4 CAPSULE ORAL at 09:33

## 2023-11-06 RX ADMIN — CYANOCOBALAMIN TAB 500 MCG 1000 MCG: 500 TAB at 09:33

## 2023-11-06 RX ADMIN — Medication 10 ML: at 09:33

## 2023-11-06 RX ADMIN — CEFTRIAXONE SODIUM 1000 MG: 1 INJECTION, SOLUTION INTRAVENOUS at 05:02

## 2023-11-06 RX ADMIN — HYDROCODONE BITARTRATE AND ACETAMINOPHEN 1 TABLET: 7.5; 325 TABLET ORAL at 15:54

## 2023-11-06 RX ADMIN — HYDROMORPHONE HYDROCHLORIDE 0.5 MG: 1 INJECTION, SOLUTION INTRAMUSCULAR; INTRAVENOUS; SUBCUTANEOUS at 05:01

## 2023-11-06 RX ADMIN — HYDROCODONE BITARTRATE AND ACETAMINOPHEN 1 TABLET: 7.5; 325 TABLET ORAL at 20:08

## 2023-11-06 RX ADMIN — Medication 1 MG: at 09:33

## 2023-11-06 RX ADMIN — SODIUM CHLORIDE, POTASSIUM CHLORIDE, SODIUM LACTATE AND CALCIUM CHLORIDE 75 ML/HR: 600; 310; 30; 20 INJECTION, SOLUTION INTRAVENOUS at 09:54

## 2023-11-06 RX ADMIN — HEPARIN SODIUM 5000 UNITS: 5000 INJECTION INTRAVENOUS; SUBCUTANEOUS at 20:08

## 2023-11-06 NOTE — CASE MANAGEMENT/SOCIAL WORK
Discharge Planning Assessment  [facility]     Patient Name: Blair Lira  MRN: [id number]  Today's Date: 11/6/2023    Admit Date: 11/6/2023    Plan: Pt lives home alone. Pt is a re-admission, PCP: Babs Summers, Pharm: Sumi Martell, Pt states he has not been feeling well for about 3 months, Pt does have a cane and a walker if needed. Pt denies any fin. stressors at this time,  will continue to follow for needs.   Discharge Needs Assessment       Row Name 11/06/23 0955       Living Environment    People in Home alone    Current Living Arrangements home    Potentially Unsafe Housing Conditions none    In the past 12 months has the electric, gas, oil, or water company threatened to shut off services in your home? No    Primary Care Provided by self    Provides Primary Care For no one    Family Caregiver if Needed child(justin), adult    Quality of Family Relationships helpful;involved;supportive    Able to Return to Prior Arrangements yes       Resource/Environmental Concerns    Resource/Environmental Concerns none    Transportation Concerns none       Food Insecurity    Within the past 12 months, you worried that your food would run out before you got the money to buy more. Never true    Within the past 12 months, the food you bought just didn't last and you didn't have money to get more. Never true       Transition Planning    Patient/Family Anticipates Transition to home    Patient/Family Anticipated Services at Transition none    Transportation Anticipated family or friend will provide       Discharge Needs Assessment    Equipment Currently Used at Home bath bench;cane, straight    Concerns to be Addressed discharge planning    Equipment Needed After Discharge none    Discharge Coordination/Progress Pt lives home alone. Pt is a re-admission, PCP: Babs Summers, Pharm: Sumi Martell, Pt states he has not been feeling well for about 3 months, Pt does have a cane and a walker if needed. Pt denies any fin. stressors at  this time, SW will continue to follow for needs.                   Discharge Plan       Row Name 11/06/23 0958       Plan    Plan Pt lives home alone. Pt is a re-admission, PCP: Babs Summers, Pharm: Sumi Martell, Pt states he has not been feeling well for about 3 months, Pt does have a cane and a walker if needed. Pt denies any fin. stressors at this time, SW will continue to follow for needs.                  Continued Care and Services - Admitted Since 11/6/2023    Coordination has not been started for this encounter.          Demographic Summary       Row Name 11/06/23 0954       General Information    Admission Type inpatient    Arrived From emergency department    Referral Source admission list    Reason for Consult discharge planning    Preferred Language English       Contact Information    Permission Granted to Share Info With permission denied                   Functional Status       Row Name 11/06/23 0955       Functional Status    Usual Activity Tolerance good    Current Activity Tolerance good       Physical Activity    On average, how many days per week do you engage in moderate to strenuous exercise (like a brisk walk)? 3 days    On average, how many minutes do you engage in exercise at this level? 20 min    Number of minutes of exercise per week 60       Assessment of Health Literacy    How often do you have someone help you read hospital materials? Never    How often do you have problems learning about your medical condition because of difficulty understanding written information? Never    How often do you have a problem understanding what is told to you about your medical condition? Never    How confident are you filling out medical forms by yourself? Quite a bit    Health Literacy Good       Functional Status, IADL    Medications independent    Meal Preparation independent    Housekeeping independent    Laundry independent    Shopping independent       Mental Status    General Appearance WDL WDL        Mental Status Summary    Recent Changes in Mental Status/Cognitive Functioning no changes       Employment/    Employment Status retired                   Psychosocial    No documentation.                  Abuse/Neglect    No documentation.                  Legal       Row Name 11/06/23 0955       Financial Resource Strain    How hard is it for you to pay for the very basics like food, housing, medical care, and heating? Not hard       Financial/Legal    Source of Income social security;pension/[employer]    Application for Public Assistance not applied       Legal    Criminal Activity/Legal Involvement none                   Substance Abuse    No documentation.                  Patient Forms    No documentation.                     Mirtha Diego

## 2023-11-06 NOTE — ED PROVIDER NOTES
Time: 4:42 AM EST  Date of encounter:  11/6/2023  Independent Historian/Clinical History and Information was obtained by:   Patient    History is limited by: N/A    Chief Complaint: weakness, diffuse pain      History of Present Illness:  Patient is a 78 y.o. year old male who presents to the emergency department for evaluation of Diffuse pain.  Patient states he was recently diagnosed with multiple myeloma.  He also had acute kidney injury.  He states since discharge he has been having increased pain to the point where he has difficulty getting around.  He states he can barely get up.  He is also noticed right lower extremity edema that has been getting progressively worse since discharge.  He said he did have some swelling in his right leg but this has gotten progressively worse.  No nausea vomiting.  He does report chest pain.  He states he is scheduled to see his oncologist in the morning.    HPI    Patient Care Team  Primary Care Provider: Babs Summers APRN    Past Medical History:     No Known Allergies  Past Medical History:   Diagnosis Date    BPH (benign prostatic hyperplasia)     Frozen shoulder     Hyperlipidemia     Hypertension 10/26/2023    Periarthritis of shoulder     Rotator cuff disorder, right     Tennis elbow     RIGHT     Past Surgical History:   Procedure Laterality Date    CATARACT EXTRACTION EXTRACAPSULAR W/ INTRAOCULAR LENS IMPLANTATION Bilateral     COLONOSCOPY      JOINT REPLACEMENT Right     THR    SHOULDER ARTHROSCOPY W/ ROTATOR CUFF REPAIR Right 3/29/2023    Procedure: SHOULDER ARTHROSCOPY WITH ROTATOR CUFF REPAIR, SUBCROMIAL DECOMPRESSION,DISTAL CLAVICLE RESECTION;  Surgeon: Gustavo Samuels MD;  Location: [facility];  Service: Orthopedics;  Laterality: Right;    SHOULDER SURGERY Left     SCOPE     Family History   Problem Relation Age of Onset    Malig Hyperthermia Neg Hx        Home Medications:  Prior to Admission medications    Medication Sig Start Date End Date Taking?  Authorizing Provider   amLODIPine (NORVASC) 5 MG tablet  10/25/23   Shavonne Yin MD   atorvastatin (LIPITOR) 10 MG tablet Take 1 tablet by mouth Every Night. 9/23/22   Shavonne Yin MD   dexAMETHasone (DECADRON) 4 MG tablet Take 1 tablet by mouth Every 12 (Twelve) Hours for 13 doses. 11/2/23 11/9/23  Elieser Pederson MD   folic acid (FOLVITE) 1 MG tablet Take 1 tablet by mouth Daily for 30 doses. 11/2/23 12/2/23  Elieser Pederson MD   pantoprazole (PROTONIX) 40 MG EC tablet Take 1 tablet by mouth Every Morning for 30 days. 11/3/23 12/3/23  Elieser Pederson MD   tamsulosin (FLOMAX) 0.4 MG capsule 24 hr capsule Take 1 capsule by mouth Daily. 9/23/22   Shavonne Yin MD   tamsulosin (FLOMAX) 0.4 MG capsule 24 hr capsule Take 2 capsules by mouth Daily for 30 days. 11/2/23 12/2/23  Elieser Pederson MD   vitamin B-12 (CYANOCOBALAMIN) 1000 MCG tablet Take 1 tablet by mouth Daily for 30 days. 11/2/23 12/2/23  Elieser Pederson MD        Social History:   Social History     Tobacco Use    Smoking status: Never    Smokeless tobacco: Never   Vaping Use    Vaping Use: Never used   Substance Use Topics    Alcohol use: Not Currently    Drug use: Never         Review of Systems:  Review of Systems   Constitutional:  Negative for chills and fever.   HENT:  Negative for congestion, ear pain and sore throat.    Eyes:  Negative for pain.   Respiratory:  Negative for cough, chest tightness and shortness of breath.    Cardiovascular:  Positive for chest pain and leg swelling.   Gastrointestinal:  Negative for abdominal pain, diarrhea, nausea and vomiting.   Genitourinary:  Negative for flank pain and hematuria.   Musculoskeletal:  Positive for arthralgias and myalgias. Negative for joint swelling.   Skin:  Negative for pallor.   Neurological:  Negative for seizures and headaches.   All other systems reviewed and are negative.       Physical Exam:  BP (!) 143/117   Pulse 83   Temp 98.3 °F (36.8 °C) (Oral)   Resp 16   Ht 185.4 cm  "(73\")   Wt 95.6 kg (210 lb 12.2 oz)   SpO2 95%   BMI 27.81 kg/m²     Physical Exam  Constitutional:       Appearance: Normal appearance.   HENT:      Head: Normocephalic and atraumatic.      Nose: Nose normal.      Mouth/Throat:      Mouth: Mucous membranes are moist.   Eyes:      Extraocular Movements: Extraocular movements intact.      Conjunctiva/sclera: Conjunctivae normal.      Pupils: Pupils are equal, round, and reactive to light.   Cardiovascular:      Rate and Rhythm: Normal rate and regular rhythm.      Pulses: Normal pulses.      Heart sounds: Normal heart sounds.   Pulmonary:      Effort: Pulmonary effort is normal.      Breath sounds: Normal breath sounds.   Abdominal:      General: There is no distension.      Palpations: Abdomen is soft.      Tenderness: There is no abdominal tenderness.   Musculoskeletal:         General: Normal range of motion.      Cervical back: Normal range of motion.      Right lower leg: Edema (R>L) present.      Left lower leg: Edema present.   Skin:     General: Skin is warm and dry.      Capillary Refill: Capillary refill takes less than 2 seconds.   Neurological:      General: No focal deficit present.      Mental Status: He is alert and oriented to person, place, and time. Mental status is at baseline.   Psychiatric:         Mood and Affect: Mood normal.         Behavior: Behavior normal.                  Procedures:  Procedures      Medical Decision Making:      Comorbidities that affect care:    HLD, Hypertension    External Notes reviewed:    Hospital Discharge Summary: Patient was admitted to the hospital on 10/26/2023 and discharged on 11/2/2023 for acute renal failure, multiple myeloma.      The following orders were placed and all results were independently analyzed by me:  Orders Placed This Encounter   Procedures    Urine Culture - Urine,    Blood Culture - Blood,    Blood Culture - Blood,    XR Chest 1 View    Comprehensive Metabolic Panel    High Sensitivity " Troponin T    BNP    Urinalysis With Culture If Indicated - Urine, Clean Catch    CBC Auto Differential    Urinalysis, Microscopic Only - Urine, Clean Catch    High Sensitivity Troponin T 2Hr    Manual Differential    Lactic Acid, Plasma    Diet: Regular/House Diet; Texture: Regular Texture (IDDSI 7); Fluid Consistency: Thin (IDDSI 0)    IP General Consult (Use specialty-specific consult if known)    ECG 12 Lead Chest Pain    Inpatient Admission    CBC & Differential       Medications Given in the Emergency Department:  Medications   HYDROmorphone (DILAUDID) injection 0.5 mg (0.5 mg Intravenous Given 11/6/23 0501)   sodium chloride 0.9 % bolus 500 mL (500 mL Intravenous New Bag 11/6/23 0503)   cefTRIAXone (ROCEPHIN) IVPB 1,000 mg (0 mg Intravenous Stopped 11/6/23 0538)        ED Course:         Labs:    Lab Results (last 24 hours)       Procedure Component Value Units Date/Time    CBC & Differential [[id number]]  (Abnormal) Collected: 11/06/23 0314    Specimen: Blood Updated: 11/06/23 0405    Narrative:      The following orders were created for panel order CBC & Differential.  Procedure                               Abnormality         Status                     ---------                               -----------         ------                     CBC Auto Differential[[id number]]        Abnormal            Final result               Scan Slide[[id number]]                                                                    Please view results for these tests on the individual orders.    Comprehensive Metabolic Panel [[id number]]  (Abnormal) Collected: 11/06/23 0314    Specimen: Blood Updated: 11/06/23 0348     Glucose 172 mg/dL      BUN 75 mg/dL      Creatinine 2.83 mg/dL      Sodium 132 mmol/L      Potassium 4.0 mmol/L      Chloride 102 mmol/L      CO2 19.3 mmol/L      Calcium 8.1 mg/dL      Total Protein 7.8 g/dL      Albumin 2.3 g/dL      ALT (SGPT) 16 U/L      AST (SGOT) 10 U/L      Alkaline Phosphatase 50 U/L       Total Bilirubin 0.4 mg/dL      Globulin 5.5 gm/dL      A/G Ratio 0.4 g/dL      BUN/Creatinine Ratio 26.5     Anion Gap 10.7 mmol/L      eGFR 22.2 mL/min/1.73     Narrative:      GFR Normal >60  Chronic Kidney Disease <60  Kidney Failure <15    The GFR formula is only valid for adults with stable renal function between ages 18 and 70.    High Sensitivity Troponin T [[id number]]  (Abnormal) Collected: 11/06/23 0314    Specimen: Blood Updated: 11/06/23 0354     HS Troponin T 47 ng/L     Narrative:      High Sensitive Troponin T Reference Range:  <10.0 ng/L- Negative Female for AMI  <15.0 ng/L- Negative Male for AMI  >=10 - Abnormal Female indicating possible myocardial injury.  >=15 - Abnormal Male indicating possible myocardial injury.   Clinicians would have to utilize clinical acumen, EKG, Troponin, and serial changes to determine if it is an Acute Myocardial Infarction or myocardial injury due to an underlying chronic condition.         BNP [[id number]]  (Abnormal) Collected: 11/06/23 0314    Specimen: Blood Updated: 11/06/23 0354     proBNP 1,971.0 pg/mL     Narrative:      This assay is used as an aid in the diagnosis of individuals suspected of having heart failure. It can be used as an aid in the diagnosis of acute decompensated heart failure (ADHF) in patients presenting with signs and symptoms of ADHF to the emergency department (ED). In addition, NT-proBNP of <300 pg/mL indicates ADHF is not likely.    Age Range Result Interpretation  NT-proBNP Concentration (pg/mL:      <50             Positive            >450                   Gray                 300-450                    Negative             <300    50-75           Positive            >900                  Gray                300-900                  Negative            <300      >75             Positive            >1800                  Gray                300-1800                  Negative            <300    CBC Auto Differential [[id number]]  (Abnormal)  Collected: 11/06/23 0314    Specimen: Blood Updated: 11/06/23 0404     WBC 17.91 10*3/mm3      RBC 2.98 10*6/mm3      Hemoglobin 9.7 g/dL      Hematocrit 28.8 %      MCV 96.6 fL      MCH 32.6 pg      MCHC 33.7 g/dL      RDW 18.8 %      RDW-SD 67.1 fl      MPV 9.3 fL      Platelets 168 10*3/mm3     Manual Differential [[id number]]  (Abnormal) Collected: 11/06/23 0314    Specimen: Blood Updated: 11/06/23 0404     Neutrophil % 93.0 %      Lymphocyte % 4.0 %      Monocyte % 2.0 %      Bands %  1.0 %      Neutrophils Absolute 16.84 10*3/mm3      Lymphocytes Absolute 0.72 10*3/mm3      Monocytes Absolute 0.36 10*3/mm3      Anisocytosis Slight/1+     Crenated RBC's Slight/1+     Dacrocytes Slight/1+     Macrocytes Slight/1+     Microcytes Slight/1+     Ovalocytes Slight/1+     Target Cells Slight/1+     WBC Morphology Normal     Platelet Estimate Adequate     Large Platelets Slight/1+    Urinalysis With Culture If Indicated - Urine, Clean Catch [[id number]]  (Abnormal) Collected: 11/06/23 0324    Specimen: Urine, Clean Catch Updated: 11/06/23 0337     Color, UA Yellow     Appearance, UA Clear     pH, UA 5.5     Specific Gravity, UA 1.015     Glucose,  mg/dL (1+)     Ketones, UA Negative     Bilirubin, UA Negative     Blood, UA Moderate (2+)     Protein, UA 30 mg/dL (1+)     Leuk Esterase, UA Small (1+)     Nitrite, UA Negative     Urobilinogen, UA 0.2 E.U./dL    Narrative:      In absence of clinical symptoms, the presence of pyuria, bacteria, and/or nitrites on the urinalysis result does not correlate with infection.    Urinalysis, Microscopic Only - Urine, Clean Catch [[id number]]  (Abnormal) Collected: 11/06/23 0324    Specimen: Urine, Clean Catch Updated: 11/06/23 0337     RBC, UA 11-20 /HPF      WBC, UA 11-20 /HPF      Bacteria, UA 3+ /HPF      Squamous Epithelial Cells, UA 0-2 /HPF      Hyaline Casts, UA 0-2 /LPF      Methodology Automated Microscopy    Urine Culture - Urine, Urine, Clean Catch [[id number]]  Collected: 11/06/23 0324    Specimen: Urine, Clean Catch Updated: 11/06/23 0337    High Sensitivity Troponin T 2Hr [[id number]]  (Abnormal) Collected: 11/06/23 0459    Specimen: Blood Updated: 11/06/23 0533     HS Troponin T 50 ng/L      Troponin T Delta 3 ng/L     Narrative:      High Sensitive Troponin T Reference Range:  <10.0 ng/L- Negative Female for AMI  <15.0 ng/L- Negative Male for AMI  >=10 - Abnormal Female indicating possible myocardial injury.  >=15 - Abnormal Male indicating possible myocardial injury.   Clinicians would have to utilize clinical acumen, EKG, Troponin, and serial changes to determine if it is an Acute Myocardial Infarction or myocardial injury due to an underlying chronic condition.         Lactic Acid, Plasma [[id number]]  (Normal) Collected: 11/06/23 0459    Specimen: Blood Updated: 11/06/23 0532     Lactate 1.6 mmol/L     Blood Culture - Blood, Arm, Right [[id number]] Collected: 11/06/23 0459    Specimen: Blood from Arm, Right Updated: 11/06/23 0513    Blood Culture - Blood, Arm, Left [[id number]] Collected: 11/06/23 0459    Specimen: Blood from Arm, Left Updated: 11/06/23 0513             Imaging:    XR Chest 1 View    Result Date: 11/6/2023  PROCEDURE: XR CHEST 1 VW  COMPARISONS: 10/27/2023; 10/26/2023.  INDICATIONS: cough  FINDINGS:  A single AP (or PA) upright portable chest radiograph was performed.  Borderline cardiac enlargement is seen.  No acute infiltrate is appreciated.  No pleural effusion or pneumothorax is identified.  There is asymmetric elevation of the right diaphragm, seen previously, and likely chronic in nature.  There may be mild subsegmental atelectasis and/or fibrosis bilaterally.  Mild biapical pleural-parenchymal scarring is suspected.  There are degenerative changes of the imaged spine and the bilateral shoulders.  The thoracic aorta is atherosclerotic.  External artifacts obscure detail.  Chronic calcified granulomatous disease involves the chest.  There are old  healed bilateral rib fractures.  No significant interval change is seen since the prior study (or studies).        No acute infiltrate is appreciated.     Please note that portions of this note were completed with a voice recognition program.  THOMAS CHILDS JR, MD       Electronically Signed and Approved By: THOMAS CHILDS JR, MD on 11/06/2023 at 4:56                 Differential Diagnosis and Discussion:    Edema: Based on the patient's history, signs, and symptoms, the differential diagnosis includes but is not limited to heart failure, kidney disease, liver disease, venous insufficiency, lymphedema, pregnancy, medications, allergic reactions.  Weakness: Based on the patient's history, signs, and symptoms, the diffential diagnosis includes but is not limited to meningitis, stroke, sepsis, subarachnoid hemorrhage, intracranial bleeding, encephalitis, acute uti, dehydration, MS, myasthenia gravis, Guillan [place], migraine variant, neuromuscular disorders vertigo, electrolyte imbalance, and metabolic disorders.    All labs were reviewed and interpreted by me.  All X-rays impressions were independently interpreted by me.  EKG was interpreted by me.    MDM  Number of Diagnoses or Management Options  Multiple myeloma, remission status unspecified  Myalgia  Urinary tract infection without hematuria, site unspecified  Weakness generalized  Diagnosis management comments: On arrival patient is afebrile nontoxic-appearing.  Vital signs stable.  Patient reports diffuse pain.  He also reports worsening right lower extremity edema.  Order was placed for vascular study unfortunately they are not available till later this morning.  Labs also show an elevated white count of 17.  He was given pain medication.  UA consistent with urinary tract infection.  Patient is scheduled to see his oncologist this morning.  Patient states that he is too weak and has been in so much pain he is not going to be able to make that appointment.   Discussed patient with hospitalist and will admit for further care.       Amount and/or Complexity of Data Reviewed  Clinical lab tests: reviewed  Tests in the radiology section of CPT®: reviewed  Review and summarize past medical records: yes  Independent visualization of images, tracings, or specimens: yes             Patient Care Considerations:    CT CHEST: I considered ordering a CT scan of the chest, however no respiratory symptoms      Consultants/Shared Management Plan:    Hospitalist: I have discussed the case with Dr Drake who agrees to accept the patient for admission.    Social Determinants of Health:    Patient is independent, reliable, and has access to care.       Disposition and Care Coordination:    Admit:   Through independent evaluation of the patient's history, physical, and imperical data, the patient meets criteria for observation/admission to the hospital.        Final diagnoses:   Myalgia   Multiple myeloma, remission status unspecified   Urinary tract infection without hematuria, site unspecified   Weakness generalized        ED Disposition       ED Disposition   Decision to Admit    Condition   --    Comment   Level of Care: Telemetry [5]   Diagnosis: UTI (urinary tract infection) [881763]   Admitting Physician: TOM DRAKE [[id number]]   Attending Physician: TOM DRAKE [[id number]]   Certification: I Certify That Inpatient Hospital Services Are Medically Necessary For Greater Than 2 Midnights                 This medical record created using voice recognition software.             Dariusz Noriega MD  11/06/23 0603

## 2023-11-06 NOTE — H&P
[facility]   HISTORY AND PHYSICAL    Patient Name: Blair Lira  : 1945  MRN: [id number]  Primary Care Physician:  Babs Summers APRN  Date of admission: 2023    Subjective   Subjective     Chief Complaint:   Body aches and hurting all over      HPI:    Blair Lira is a 78 y.o. male who was recently diagnosed with multiple myeloma and discharged to home last week return to ER with increasing pain.  ER evaluation revealed right leg swelling and admitted to hospital for intractable pain, possible UTI and rule out DVT.  Patient lives by himself and has inability to get up and move.  When I saw this morning he is feeling slightly better after getting the pain meds.  He is venous Doppler was negative for DVT.  He has ongoing pain all over the body.        Review of Systems:      Fatigue increased pain.  No fever chills.  No shortness of breath.  Swelling of leg    Personal History     Past Medical History:   Diagnosis Date    BPH (benign prostatic hyperplasia)     Frozen shoulder     Hyperlipidemia     Hypertension 10/26/2023    Periarthritis of shoulder     Rotator cuff disorder, right     Tennis elbow     RIGHT       Past Surgical History:   Procedure Laterality Date    CATARACT EXTRACTION EXTRACAPSULAR W/ INTRAOCULAR LENS IMPLANTATION Bilateral     COLONOSCOPY      JOINT REPLACEMENT Right     THR    SHOULDER ARTHROSCOPY W/ ROTATOR CUFF REPAIR Right 3/29/2023    Procedure: SHOULDER ARTHROSCOPY WITH ROTATOR CUFF REPAIR, SUBCROMIAL DECOMPRESSION,DISTAL CLAVICLE RESECTION;  Surgeon: Gustavo Samuels MD;  Location: [facility] [place];  Service: Orthopedics;  Laterality: Right;    SHOULDER SURGERY Left     SCOPE       Family History: family history is not on file. Otherwise pertinent FHx was reviewed and not pertinent to current issue.    Social History:  reports that he has never smoked. He has never used smokeless tobacco. He reports that he does not currently use alcohol. He reports that he does not  use drugs.    Home Medications:  amLODIPine, atorvastatin, dexAMETHasone, folic acid, pantoprazole, tamsulosin, and vitamin B-12      Allergies:  No Known Allergies    Objective   Objective     Vitals:   Temp:  [97.3 °F (36.3 °C)-98.6 °F (37 °C)] 98.6 °F (37 °C)  Heart Rate:  [] 95  Resp:  [16-20] 20  BP: (129-170)/() 129/63    Physical Exam    Elderly male tired looking fatigued looking and in pain.  Heart regular and lungs diminished breath sounds.  Abdomen obese and soft nontender.  Extremities with edema right lower extremity with 2+ edema compared to left.  No calf tenderness.  Tenderness over multiple joints and back and hip and range of movement painful, neurologically awake alert and oriented      I have personally reviewed the results from the time of this admission to 11/6/2023 16:49 EST and agree with these findings:  [x]  Laboratory  [x]  Microbiology  [x]  Radiology  []  EKG/Telemetry   []  Cardiology/Vascular   []  Pathology  []  Old records  []  Other:    CBC:    WBC   Date Value Ref Range Status   11/06/2023 17.91 (H) 3.40 - 10.80 10*3/mm3 Final     RBC   Date Value Ref Range Status   11/06/2023 2.98 (L) 4.14 - 5.80 10*6/mm3 Final     Hemoglobin   Date Value Ref Range Status   11/06/2023 9.7 (L) 13.0 - 17.7 g/dL Final     Hematocrit   Date Value Ref Range Status   11/06/2023 28.8 (L) 37.5 - 51.0 % Final     MCV   Date Value Ref Range Status   11/06/2023 96.6 79.0 - 97.0 fL Final     MCH   Date Value Ref Range Status   11/06/2023 32.6 26.6 - 33.0 pg Final     MCHC   Date Value Ref Range Status   11/06/2023 33.7 31.5 - 35.7 g/dL Final     RDW   Date Value Ref Range Status   11/06/2023 18.8 (H) 12.3 - 15.4 % Final     RDW-SD   Date Value Ref Range Status   11/06/2023 67.1 (H) 37.0 - 54.0 fl Final     MPV   Date Value Ref Range Status   11/06/2023 9.3 6.0 - 12.0 fL Final     Platelets   Date Value Ref Range Status   11/06/2023 168 140 - 450 10*3/mm3 Final     Neutrophils Absolute   Date Value  Ref Range Status   11/06/2023 16.84 (H) 1.70 - 7.00 10*3/mm3 Final        BMP:    Lab Results   Component Value Date    GLUCOSE 172 (H) 11/06/2023    BUN 75 (H) 11/06/2023    CREATININE 2.83 (H) 11/06/2023    BCR 26.5 (H) 11/06/2023    K 4.0 11/06/2023    CO2 19.3 (L) 11/06/2023    CALCIUM 8.1 (L) 11/06/2023    ALBUMIN 2.3 (L) 11/06/2023    LABIL2 1.0 (L) 09/24/2020    AST 10 11/06/2023    ALT 16 11/06/2023        XR Chest 1 View    Result Date: 11/6/2023    No acute infiltrate is appreciated.     Please note that portions of this note were completed with a voice recognition program.  THOMAS CHILDS JR, MD       Electronically Signed and Approved By: THOMAS CHILDS JR, MD on 11/06/2023 at 4:56                      Assessment & Plan   Assessment / Plan       Current Diagnosis:  Active Hospital Problems    Diagnosis     **UTI (urinary tract infection)     Weakness generalized     Intractable pain     Acute renal failure superimposed on chronic kidney disease      Plan:   Venous Doppler negative urinalysis consistent with UTI, cultures pending continue Rocephin, control pain with narcotics.  PT OT consult.  Discussed with oncologist.  He prefers patient to be discharged to start chemo soon.  Discussed with patient in detail possible discharge tomorrow      DVT prophylaxis:  Medical DVT prophylaxis orders are present.    GI Prophylaxis:       Pepcid    CODE STATUS:    Code Status (Patient has no pulse and is not breathing): CPR (Attempt to Resuscitate)  Medical Interventions (Patient has pulse or is breathing): Full Support    Admission Status:  I believe this patient meets inpatient status.             I have dictated this note utilizing Dragon Dictation.             Please note that portions of this note were completed with a voice recognition program.             Part of this note may be an electronic transcription/translation of spoken language to printed text         using the Dragon Dictation System.        Electronically signed by Elieser Pederson MD, 11/06/23, 8:30 AM EST.    Total time spent with in evaluation and management:

## 2023-11-06 NOTE — PLAN OF CARE
Goal Outcome Evaluation:             VSS.  Norco was added for pain mgmt. No acute changes this shift.  No signs of distress noted at this time.

## 2023-11-06 NOTE — THERAPY EVALUATION
Acute Care - Physical Therapy Initial Evaluation   [facility]     Patient Name: Blair Lira  : 1945  MRN: [id number]  Today's Date: 2023      Visit Dx:     ICD-10-CM ICD-9-CM   1. Myalgia  M79.10 729.1   2. Multiple myeloma, remission status unspecified  C90.00 203.00   3. Urinary tract infection without hematuria, site unspecified  N39.0 599.0   4. Weakness generalized  R53.1 780.79   5. Difficulty in walking  R26.2 719.7     Patient Active Problem List   Diagnosis    Rotator cuff tear, right    Impingement syndrome of right shoulder    Acute renal failure superimposed on chronic kidney disease    Pancytopenia    Abnormal weight loss    Hypertension    CKD (chronic kidney disease) stage 3, GFR 30-59 ml/min    Moderate malnutrition    UTI (urinary tract infection)    Weakness generalized     Past Medical History:   Diagnosis Date    BPH (benign prostatic hyperplasia)     Frozen shoulder     Hyperlipidemia     Hypertension 10/26/2023    Periarthritis of shoulder     Rotator cuff disorder, right     Tennis elbow     RIGHT     Past Surgical History:   Procedure Laterality Date    CATARACT EXTRACTION EXTRACAPSULAR W/ INTRAOCULAR LENS IMPLANTATION Bilateral     COLONOSCOPY      JOINT REPLACEMENT Right     THR    SHOULDER ARTHROSCOPY W/ ROTATOR CUFF REPAIR Right 3/29/2023    Procedure: SHOULDER ARTHROSCOPY WITH ROTATOR CUFF REPAIR, SUBCROMIAL DECOMPRESSION,DISTAL CLAVICLE RESECTION;  Surgeon: Gustaov Samuels MD;  Location: [facility] OR [facility] [place];  Service: Orthopedics;  Laterality: Right;    SHOULDER SURGERY Left     SCOPE     PT Assessment (last 12 hours)       PT Evaluation and Treatment       Row Name 23 1100          Physical Therapy Time and Intention    Subjective Information complains of;weakness;fatigue;pain (P)   -AM     Document Type evaluation (P)   -AM     Mode of Treatment individual therapy;physical therapy (P)   -AM     Patient Effort good (P)   -AM     Symptoms Noted During/After Treatment  none (P)   -AM       Row Name 11/06/23 1100          General Information    Patient Profile Reviewed yes (P)   -AM     Patient Observations alert;cooperative;agree to therapy (P)   -AM     Prior Level of Function independent:;gait;transfer;bed mobility (P)   -AM     Equipment Currently Used at Home cane, straight (P)   -AM     Existing Precautions/Restrictions no known precautions/restrictions (P)   -AM     Barriers to Rehab none identified (P)   -AM       [facility] Name 11/06/23 1100          Living Environment    Current Living Arrangements home (P)   -AM     People in Home alone (P)   -AM     Primary Care Provided by self (P)   -AM       [facility] Name 11/06/23 1100          Home Use of Assistive/Adaptive Equipment    Equipment Currently Used at Home cane, straight (P)   -AM       [facility] Name 11/06/23 1100          Cognition    Orientation Status (Cognition) oriented x 4 (P)   -AM       [facility] Name 11/06/23 1100          Range of Motion (ROM)    Range of Motion bilateral lower extremities;ROM is WFL (P)   -AM       Row Name 11/06/23 1100          Strength (Manual Muscle Testing)    Strength (Manual Muscle Testing) bilateral lower extremities (P)   4-/5, painful  -AM       [facility] Name 11/06/23 1100          Bed Mobility    Bed Mobility supine-sit;sit-supine (P)   -AM     Supine-Sit [place] (Bed Mobility) contact guard (P)   -AM     Sit-Supine [place] (Bed Mobility) contact guard (P)   -AM     Assistive Device (Bed Mobility) bed rails;head of bed elevated (P)   -AM       Row Name 11/06/23 1100          Transfers    Transfers other (see comments) (P)   pt declined transfers due to weakness and pain in B LE due to swelling specifically of R LE  -AM       Row Name 11/06/23 1100          Balance    Balance Assessment sitting dynamic balance (P)   -AM     Dynamic Sitting Balance standby assist (P)   -AM     Position, Sitting Balance unsupported;sitting edge of bed (P)   -AM       Row Name 11/06/23 1100          Plan of Care Review     Plan of Care Reviewed With patient (P)   -AM     Progress no change (P)   -AM     Outcome Evaluation Pt presents with decreased strength, decreased activity tolerance and decreased balance limiting pt's performance with ambulation and functional transfers. Pt will benefit from PT services to increased tolerance to ambulation for return to maximum level of function with mobility. (P)   -AM       Row Name 11/06/23 1100          Positioning and Restraints    Pre-Treatment Position in bed (P)   -AM     Post Treatment Position bed (P)   -AM     In Bed supine;call light within reach;encouraged to call for assist (P)   -AM       Row Name 11/06/23 1100          Therapy Assessment/Plan (PT)    Rehab Potential (PT) good, to achieve stated therapy goals (P)   -AM     Criteria for Skilled Interventions Met (PT) yes;meets criteria (P)   -AM     Therapy Frequency (PT) daily (P)   -AM     Problem List (PT) problems related to;balance;mobility;strength (P)   -AM     Activity Limitations Related to Problem List (PT) unable to ambulate safely;unable to transfer safely (P)   -AM       Row Name 11/06/23 1100          PT Evaluation Complexity    History, PT Evaluation Complexity no personal factors and/or comorbidities (P)   -AM     Examination of Body Systems (PT Eval Complexity) total of 4 or more elements (P)   -AM     Clinical Presentation (PT Evaluation Complexity) stable (P)   -AM     Clinical Decision Making (PT Evaluation Complexity) low complexity (P)   -AM     Overall Complexity (PT Evaluation Complexity) low complexity (P)   -AM       Row Name 11/06/23 1100          Physical Therapy Goals    Bed Mobility Goal Selection (PT) bed mobility, PT goal 1 (P)   -AM     Transfer Goal Selection (PT) transfer, PT goal 1 (P)   -AM     Gait Training Goal Selection (PT) gait training, PT goal 1 (P)   -AM       Row Name 11/06/23 1100          Bed Mobility Goal 1 (PT)    Activity/Assistive Device (Bed Mobility Goal 1, PT) sit to  supine;supine to sit (P)   -AM     [place] Level/Cues Needed (Bed Mobility Goal 1, PT) standby assist (P)   -AM     Time Frame (Bed Mobility Goal 1, PT) 10 days (P)   -AM       Row Name 11/06/23 1100          Transfer Goal 1 (PT)    Activity/Assistive Device (Transfer Goal 1, PT) sit-to-stand/stand-to-sit (P)   -AM     [place] Level/Cues Needed (Transfer Goal 1, PT) standby assist (P)   -AM     Time Frame (Transfer Goal 1, PT) 10 days (P)   -AM       Row Name 11/06/23 1100          Gait Training Goal 1 (PT)    Activity/Assistive Device (Gait Training Goal 1, PT) gait (walking locomotion);improve balance and speed;increase endurance/gait distance (P)   -AM     [place] Level (Gait Training Goal 1, PT) standby assist (P)   -AM     Distance (Gait Training Goal 1, PT) 200 (P)   -AM     Time Frame (Gait Training Goal 1, PT) 10 days (P)   -AM               User Key  (r) = Recorded By, (t) = Taken By, (c) = Cosigned By      Initials Name Provider Type    AM Melba Irwin PT Student PT Student                      PT Recommendation and Plan  Anticipated Discharge Disposition (PT): (P) inpatient rehabilitation facility, home with home health, home with assist (pt reports he has daughter and grandson near by who can help him if needed)  Planned Therapy Interventions (PT): (P) balance training, bed mobility training, gait training, neuromuscular re-education, strengthening, transfer training  Therapy Frequency (PT): (P) daily  Plan of Care Reviewed With: (P) patient  Progress: (P) no change  Outcome Evaluation: (P) Pt presents with decreased strength, decreased activity tolerance and decreased balance limiting pt's performance with ambulation and functional transfers. Pt will benefit from PT services to increased tolerance to ambulation for return to maximum level of function with mobility.   Outcome Measures       Row Name 11/06/23 1100             How much help from another person do you currently need...     Turning from your back to your side while in flat bed without using bedrails? 3 (P)   -AM      Moving from lying on back to sitting on the side of a flat bed without bedrails? 3 (P)   -AM      Moving to and from a bed to a chair (including a wheelchair)? 3 (P)   -AM      Standing up from a chair using your arms (e.g., wheelchair, bedside chair)? 2 (P)   -AM      Climbing 3-5 steps with a railing? 2 (P)   -AM      To walk in hospital room? 2 (P)   -AM      AM-PAC 6 Clicks Score (PT) 15 (P)   -AM      Highest level of mobility 4 --> Transferred to chair/commode (P)   -AM         Functional Assessment    Outcome Measure Options AM-PAC 6 Clicks Basic Mobility (PT) (P)   -AM                User Key  (r) = Recorded By, (t) = Taken By, (c) = Cosigned By      Initials Name Provider Type    AM Melba Irwin PT Student PT Student                     Time Calculation:    PT Charges       Row Name 11/06/23 1153             Time Calculation    PT Received On 11/06/23 (P)   -AM      PT Goal Re-Cert Due Date 11/15/23 (P)   -AM         Untimed Charges    PT Eval/Re-eval Minutes 30 (P)   -AM         Total Minutes    Untimed Charges Total Minutes 30 (P)   -AM       Total Minutes 30 (P)   -AM                User Key  (r) = Recorded By, (t) = Taken By, (c) = Cosigned By      Initials Name Provider Type    AM Melba Irwin PT Student PT Student                  Therapy Charges for Today       Code Description Service Date Service Provider Modifiers Qty    82793596013 HC PT EVAL LOW COMPLEXITY 2 11/6/2023 Melba Irwin PT Student GP 1            PT G-Codes  Outcome Measure Options: (P) AM-PAC 6 Clicks Basic Mobility (PT)  AM-PAC 6 Clicks Score (PT): (P) 15    MARQUIS Galaviz  11/6/2023

## 2023-11-06 NOTE — PLAN OF CARE
Goal Outcome Evaluation:  Plan of Care Reviewed With: (P) patient        Progress: (P) no change  Outcome Evaluation: (P) Pt presents with decreased strength, decreased activity tolerance and decreased balance limiting pt's performance with ambulation and functional transfers. Pt will benefit from PT services to increased tolerance to ambulation for return to maximum level of function with mobility.      Anticipated Discharge Disposition (PT): (P) inpatient rehabilitation facility, home with home health, home with assist (pt reports he has daughter and grandson near by who can help him if needed)

## 2023-11-06 NOTE — OUTREACH NOTE
Medical Week 1 Survey      Flowsheet Row Responses   [facility] patient discharged from? [facility]   Does the patient have one of the following disease processes/diagnoses(primary or secondary)? Other   Week 1 attempt successful? No   Unsuccessful attempts Attempt 1   Revoke Readmitted            Tracy H - Registered Nurse

## 2023-11-06 NOTE — CONSULTS
[facility]   Consult Note    Patient Name: Blair Lira  : 1945  MRN: [id number]  Primary Care Physician:  Babs Summers APRN  Date of admission: 2023    Subjective   Subjective     Reason for Consult: Multiple myeloma    HPI: Patient recently diagnosed to have multiple myeloma myeloma was supposed to have been started on treatment but patient came with increasing pain we have now started him on pain maintenance medicines some of the problems may be because of the pain on the right side specially with radiculopathy as well as swelling of the right leg possibility of deep venous thrombosis is being considered ultrasound will be done we will also get him scheduled for the PET scan and after he has been discharged we will initiate the process of getting chemotherapy    Blair Lira is a 78 y.o. male patient to be started on chemotherapy has been diagnosed to have multiple myeloma    Review of Systems   All systems were reviewed and negative except for: Has been reviewed    Personal History     Past Medical History:   Diagnosis Date    BPH (benign prostatic hyperplasia)     Frozen shoulder     Hyperlipidemia     Hypertension 10/26/2023    Periarthritis of shoulder     Rotator cuff disorder, right     Tennis elbow     RIGHT       Past Surgical History:   Procedure Laterality Date    CATARACT EXTRACTION EXTRACAPSULAR W/ INTRAOCULAR LENS IMPLANTATION Bilateral     COLONOSCOPY      JOINT REPLACEMENT Right     THR    SHOULDER ARTHROSCOPY W/ ROTATOR CUFF REPAIR Right 3/29/2023    Procedure: SHOULDER ARTHROSCOPY WITH ROTATOR CUFF REPAIR, SUBCROMIAL DECOMPRESSION,DISTAL CLAVICLE RESECTION;  Surgeon: Gustavo Samuels MD;  Location: [facility] OR [facility] [place];  Service: Orthopedics;  Laterality: Right;    SHOULDER SURGERY Left     SCOPE       Family History: family history is not on file. Otherwise pertinent FHx was reviewed and not pertinent to current issue.    Social History:  reports that he has never smoked. He  has never used smokeless tobacco. He reports that he does not currently use alcohol. He reports that he does not use drugs.    Home Medications:  amLODIPine, atorvastatin, dexAMETHasone, folic acid, pantoprazole, tamsulosin, and vitamin B-12      Allergies:  No Known Allergies    Objective   Objective     Vitals:   Temp:  [97.3 °F (36.3 °C)-98.3 °F (36.8 °C)] 97.3 °F (36.3 °C)  Heart Rate:  [63-91] 88  Resp:  [16-20] 20  BP: (143-170)/() 170/90  Physical Exam    Constitutional: Awake, alert   Eyes: PERRLA, sclerae anicteric, no conjunctival injection   HENT: NCAT, mucous membranes moist   Neck: Supple, no thyromegaly, no lymphadenopathy, trachea midline   Respiratory: Clear to auscultation bilaterally, nonlabored respirations    Cardiovascular: RRR, no murmurs, rubs, or gallops, palpable pedal pulses bilaterally   Gastrointestinal: Positive bowel sounds, soft, nontender, nondistended   Musculoskeletal: No bilateral ankle edema, no clubbing or cyanosis to extremities   Psychiatric: Appropriate affect, cooperative   Neurologic: Oriented x 3, strength symmetric in all extremities, Cranial Nerves grossly intact to confrontation, speech clear   Skin: No rashes   Has some swelling on the right side  Result Review    Result Review:  I have personally reviewed the results from the time of this admission to 11/6/2023 08:16 EST and agree with these findings:  [x]  Laboratory  []  Microbiology  [x]  Radiology  []  EKG/Telemetry   []  Cardiology/Vascular   []  Pathology  []  Old records  []  Other:  Most notable findings include: Multiple myeloma    Assessment & Plan   Assessment / Plan     Brief Patient Summary:  Blair Lira is a 78 y.o. male who with multiple myeloma right leg edema possible deep venous thrombosis    Active Hospital Problems:  Active Hospital Problems    Diagnosis     **UTI (urinary tract infection)     Weakness generalized     Acute renal failure superimposed on chronic kidney disease        Plan:    Possible deep venous thrombosis will get pain management and will start treatment when we are able to discharge him        Electronically signed by Vamsi Mason MD, 11/06/23, 8:16 AM EST.    Part of this note may be an electronic transcription/translation of spoken language to printed text using the Dragon Dictation System.

## 2023-11-06 NOTE — PLAN OF CARE
Goal Outcome Evaluation:  Plan of Care Reviewed With: patient      Admit from ER. No complaints from patient.

## 2023-11-06 NOTE — PROCEDURES
[facility]   Bone Marrow Aspiration and Biopsy Procedure Note    Patient Name: Blair Lira  : 1945  MRN: [id number]  Primary Care Physician:  Babs Summers APRN  Referring Physician: No ref. provider found  Date of admission: 2023    Subjective date of procedure was 10/27/2023  Procedure     Bone marrow aspiration and biopsy from the left posterior iliac crest.    Indication     Renal failure anemia possible multiple myeloma    Procedure Note     Vitals:   Temp:  [97.3 °F (36.3 °C)-98.3 °F (36.8 °C)] 97.3 °F (36.3 °C)  Heart Rate:  [63-91] 88  Resp:  [16-20] 20  BP: (143-170)/() 170/90    Under local anesthesia with Xylocaine and Betadine prep, the left iliac crest was approached with an aspiration needle, good aspirate was obtained. Slides were made and sent to the pathology for staining. After the aspiration was completed again the left posterior iliac crest was approached with a Jamshidi needle under local anesthesia with Xylocaine and Betadine prep; a good core biopsy was obtained. Touch preps were made. Specimen was placed in B5 solution and was sent to pathology for further preparations. Prior to the procedure, possible risks and benefits, complications, and alternatives were explained to the patient and consent was obtained. Bone marrow was also sent for flow and cytogenetics.    Peripheral Smear Interpretation:    Normocytic normochromic red probably chromatin failure there were few plasmacytoid cytoid cells seen in the peripheral smear    Bone Marrow Aspirate Interpretation:    Cellularity was somewhat decreased megakaryocytes were present on extra myeloid cells were seen erythropoiesis myelopoiesis was normal there were plasma cells increased about 20 to 25% he also had some bilobed and multilobe.  Plasma cells consistent with multiple myeloma patient also had evidence of iron present    Bone Marrow Biopsy Interpretation:    Bone marrow was packed with plasma cells consistent  with multiple myeloma    Impression and Findings     Finding consistent with multiple myeloma    Electronically signed by Vamsi Mason MD, 11/06/23, 7:31 AM EST.    Part of this note may be an electronic transcription/translation of spoken language to printed text using the Dragon Dictation System.  Left

## 2023-11-06 NOTE — ED NOTES
Patient was recently discharged from the hospital with a diagnosis of bone and blood cancer. Since discharge, patient has had generalized body pain. Patient due to see cancer  tomorrow to start treatment.

## 2023-11-07 LAB
ALBUMIN SERPL-MCNC: 2 G/DL (ref 3.5–5.2)
ALBUMIN SERPL-MCNC: 2.2 G/DL (ref 3.5–5.2)
ALBUMIN/GLOB SERPL: 0.4 G/DL
ALBUMIN/GLOB SERPL: 0.4 G/DL
ALP SERPL-CCNC: 51 U/L (ref 39–117)
ALP SERPL-CCNC: 62 U/L (ref 39–117)
ALT SERPL W P-5'-P-CCNC: 11 U/L (ref 1–41)
ALT SERPL W P-5'-P-CCNC: 14 U/L (ref 1–41)
ANION GAP SERPL CALCULATED.3IONS-SCNC: 11.6 MMOL/L (ref 5–15)
ANION GAP SERPL CALCULATED.3IONS-SCNC: 9.8 MMOL/L (ref 5–15)
ANISOCYTOSIS BLD QL: ABNORMAL
AST SERPL-CCNC: 10 U/L (ref 1–40)
AST SERPL-CCNC: 11 U/L (ref 1–40)
BASOPHILS # BLD AUTO: 0.05 10*3/MM3 (ref 0–0.2)
BASOPHILS NFR BLD AUTO: 0.3 % (ref 0–1.5)
BILIRUB SERPL-MCNC: 0.3 MG/DL (ref 0–1.2)
BILIRUB SERPL-MCNC: 0.3 MG/DL (ref 0–1.2)
BUN SERPL-MCNC: 89 MG/DL (ref 8–23)
BUN SERPL-MCNC: 96 MG/DL (ref 8–23)
BUN/CREAT SERPL: 26.5 (ref 7–25)
BUN/CREAT SERPL: 28.6 (ref 7–25)
CALCIUM SPEC-SCNC: 7.6 MG/DL (ref 8.6–10.5)
CALCIUM SPEC-SCNC: 7.7 MG/DL (ref 8.6–10.5)
CHLORIDE SERPL-SCNC: 100 MMOL/L (ref 98–107)
CHLORIDE SERPL-SCNC: 101 MMOL/L (ref 98–107)
CO2 SERPL-SCNC: 17.4 MMOL/L (ref 22–29)
CO2 SERPL-SCNC: 19.2 MMOL/L (ref 22–29)
CREAT SERPL-MCNC: 3.36 MG/DL (ref 0.76–1.27)
CREAT SERPL-MCNC: 3.36 MG/DL (ref 0.76–1.27)
D-LACTATE SERPL-SCNC: 1.4 MMOL/L (ref 0.5–2)
DACRYOCYTES BLD QL SMEAR: ABNORMAL
DEPRECATED RDW RBC AUTO: 67.2 FL (ref 37–54)
DEPRECATED RDW RBC AUTO: 69 FL (ref 37–54)
EGFRCR SERPLBLD CKD-EPI 2021: 18.1 ML/MIN/1.73
EGFRCR SERPLBLD CKD-EPI 2021: 18.1 ML/MIN/1.73
EOSINOPHIL # BLD AUTO: 0.02 10*3/MM3 (ref 0–0.4)
EOSINOPHIL NFR BLD AUTO: 0.1 % (ref 0.3–6.2)
ERYTHROCYTE [DISTWIDTH] IN BLOOD BY AUTOMATED COUNT: 18.5 % (ref 12.3–15.4)
ERYTHROCYTE [DISTWIDTH] IN BLOOD BY AUTOMATED COUNT: 18.8 % (ref 12.3–15.4)
GLOBULIN UR ELPH-MCNC: 4.9 GM/DL
GLOBULIN UR ELPH-MCNC: 5 GM/DL
GLUCOSE SERPL-MCNC: 134 MG/DL (ref 65–99)
GLUCOSE SERPL-MCNC: 157 MG/DL (ref 65–99)
HCT VFR BLD AUTO: 27.3 % (ref 37.5–51)
HCT VFR BLD AUTO: 27.6 % (ref 37.5–51)
HGB BLD-MCNC: 8.8 G/DL (ref 13–17.7)
HGB BLD-MCNC: 9.2 G/DL (ref 13–17.7)
IMM GRANULOCYTES # BLD AUTO: 0.36 10*3/MM3 (ref 0–0.05)
IMM GRANULOCYTES NFR BLD AUTO: 2.1 % (ref 0–0.5)
LARGE PLATELETS: ABNORMAL
LYMPHOCYTES # BLD AUTO: 1.29 10*3/MM3 (ref 0.7–3.1)
LYMPHOCYTES # BLD MANUAL: 0.88 10*3/MM3 (ref 0.7–3.1)
LYMPHOCYTES NFR BLD AUTO: 7.4 % (ref 19.6–45.3)
LYMPHOCYTES NFR BLD MANUAL: 5 % (ref 5–12)
MACROCYTES BLD QL SMEAR: ABNORMAL
MCH RBC QN AUTO: 32.1 PG (ref 26.6–33)
MCH RBC QN AUTO: 32.9 PG (ref 26.6–33)
MCHC RBC AUTO-ENTMCNC: 32.2 G/DL (ref 31.5–35.7)
MCHC RBC AUTO-ENTMCNC: 33.3 G/DL (ref 31.5–35.7)
MCV RBC AUTO: 98.6 FL (ref 79–97)
MCV RBC AUTO: 99.6 FL (ref 79–97)
METAMYELOCYTES NFR BLD MANUAL: 2 % (ref 0–0)
MONOCYTES # BLD AUTO: 0.52 10*3/MM3 (ref 0.1–0.9)
MONOCYTES # BLD: 0.88 10*3/MM3 (ref 0.1–0.9)
MONOCYTES NFR BLD AUTO: 3 % (ref 5–12)
NEUTROPHILS # BLD AUTO: 15.45 10*3/MM3 (ref 1.7–7)
NEUTROPHILS NFR BLD AUTO: 15.23 10*3/MM3 (ref 1.7–7)
NEUTROPHILS NFR BLD AUTO: 87.1 % (ref 42.7–76)
NEUTROPHILS NFR BLD MANUAL: 80 % (ref 42.7–76)
NEUTS BAND NFR BLD MANUAL: 8 % (ref 0–5)
NRBC BLD AUTO-RTO: 0 /100 WBC (ref 0–0.2)
OVALOCYTES BLD QL SMEAR: ABNORMAL
PLASMA CELL MYELOMA TARGETGENE PANEL RESULT: NORMAL
PLATELET # BLD AUTO: 160 10*3/MM3 (ref 140–450)
PLATELET # BLD AUTO: 165 10*3/MM3 (ref 140–450)
PMV BLD AUTO: 9.5 FL (ref 6–12)
PMV BLD AUTO: 9.7 FL (ref 6–12)
POIKILOCYTOSIS BLD QL SMEAR: ABNORMAL
POLYCHROMASIA BLD QL SMEAR: ABNORMAL
POTASSIUM SERPL-SCNC: 4.6 MMOL/L (ref 3.5–5.2)
POTASSIUM SERPL-SCNC: 4.7 MMOL/L (ref 3.5–5.2)
PROCALCITONIN SERPL-MCNC: 0.94 NG/ML (ref 0–0.25)
PROT SERPL-MCNC: 6.9 G/DL (ref 6–8.5)
PROT SERPL-MCNC: 7.2 G/DL (ref 6–8.5)
RBC # BLD AUTO: 2.74 10*6/MM3 (ref 4.14–5.8)
RBC # BLD AUTO: 2.8 10*6/MM3 (ref 4.14–5.8)
SCAN SLIDE: NORMAL
SMALL PLATELETS BLD QL SMEAR: ADEQUATE
SODIUM SERPL-SCNC: 129 MMOL/L (ref 136–145)
SODIUM SERPL-SCNC: 130 MMOL/L (ref 136–145)
VARIANT LYMPHS NFR BLD MANUAL: 5 % (ref 19.6–45.3)
WBC MORPH BLD: NORMAL
WBC NRBC COR # BLD: 17.47 10*3/MM3 (ref 3.4–10.8)
WBC NRBC COR # BLD: 17.56 10*3/MM3 (ref 3.4–10.8)

## 2023-11-07 PROCEDURE — 80053 COMPREHEN METABOLIC PANEL: CPT | Performed by: INTERNAL MEDICINE

## 2023-11-07 PROCEDURE — 97165 OT EVAL LOW COMPLEX 30 MIN: CPT

## 2023-11-07 PROCEDURE — 63710000001 DEXAMETHASONE PER 0.25 MG: Performed by: INTERNAL MEDICINE

## 2023-11-07 PROCEDURE — 87040 BLOOD CULTURE FOR BACTERIA: CPT | Performed by: INTERNAL MEDICINE

## 2023-11-07 PROCEDURE — 25010000002 CEFAZOLIN IN DEXTROSE 2-4 GM/100ML-% SOLUTION: Performed by: INTERNAL MEDICINE

## 2023-11-07 PROCEDURE — 25010000002 HEPARIN (PORCINE) PER 1000 UNITS: Performed by: INTERNAL MEDICINE

## 2023-11-07 PROCEDURE — 85025 COMPLETE CBC W/AUTO DIFF WBC: CPT | Performed by: INTERNAL MEDICINE

## 2023-11-07 PROCEDURE — 97116 GAIT TRAINING THERAPY: CPT

## 2023-11-07 PROCEDURE — 84145 PROCALCITONIN (PCT): CPT | Performed by: INTERNAL MEDICINE

## 2023-11-07 PROCEDURE — 83605 ASSAY OF LACTIC ACID: CPT | Performed by: INTERNAL MEDICINE

## 2023-11-07 PROCEDURE — 88312 SPECIAL STAINS GROUP 1: CPT | Performed by: INTERNAL MEDICINE

## 2023-11-07 PROCEDURE — 87147 CULTURE TYPE IMMUNOLOGIC: CPT | Performed by: INTERNAL MEDICINE

## 2023-11-07 RX ORDER — HYDROCODONE BITARTRATE AND ACETAMINOPHEN 10; 325 MG/1; MG/1
1 TABLET ORAL 3 TIMES DAILY
Status: DISPENSED | OUTPATIENT
Start: 2023-11-07 | End: 2023-11-14

## 2023-11-07 RX ADMIN — HEPARIN SODIUM 5000 UNITS: 5000 INJECTION INTRAVENOUS; SUBCUTANEOUS at 08:42

## 2023-11-07 RX ADMIN — HEPARIN SODIUM 5000 UNITS: 5000 INJECTION INTRAVENOUS; SUBCUTANEOUS at 21:30

## 2023-11-07 RX ADMIN — HYDROCODONE BITARTRATE AND ACETAMINOPHEN 1 TABLET: 10; 325 TABLET ORAL at 21:30

## 2023-11-07 RX ADMIN — PANTOPRAZOLE SODIUM 40 MG: 40 TABLET, DELAYED RELEASE ORAL at 05:55

## 2023-11-07 RX ADMIN — DEXAMETHASONE 2 MG: 0.5 TABLET ORAL at 08:42

## 2023-11-07 RX ADMIN — Medication 1 MG: at 08:42

## 2023-11-07 RX ADMIN — Medication 10 ML: at 08:42

## 2023-11-07 RX ADMIN — HYDROCODONE BITARTRATE AND ACETAMINOPHEN 1 TABLET: 10; 325 TABLET ORAL at 11:25

## 2023-11-07 RX ADMIN — CEFAZOLIN SODIUM 2000 MG: 2 INJECTION, SOLUTION INTRAVENOUS at 05:55

## 2023-11-07 RX ADMIN — CYANOCOBALAMIN TAB 500 MCG 1000 MCG: 500 TAB at 08:42

## 2023-11-07 RX ADMIN — CEFAZOLIN SODIUM 2000 MG: 2 INJECTION, SOLUTION INTRAVENOUS at 21:29

## 2023-11-07 RX ADMIN — CEFAZOLIN SODIUM 2000 MG: 2 INJECTION, SOLUTION INTRAVENOUS at 13:17

## 2023-11-07 RX ADMIN — HYDROCODONE BITARTRATE AND ACETAMINOPHEN 1 TABLET: 10; 325 TABLET ORAL at 16:15

## 2023-11-07 RX ADMIN — DEXAMETHASONE 2 MG: 0.5 TABLET ORAL at 21:29

## 2023-11-07 RX ADMIN — TAMSULOSIN HYDROCHLORIDE 0.8 MG: 0.4 CAPSULE ORAL at 08:42

## 2023-11-07 RX ADMIN — AMLODIPINE BESYLATE 10 MG: 5 TABLET ORAL at 08:42

## 2023-11-07 RX ADMIN — HYDROCODONE BITARTRATE AND ACETAMINOPHEN 1 TABLET: 7.5; 325 TABLET ORAL at 05:57

## 2023-11-07 RX ADMIN — Medication 10 ML: at 21:30

## 2023-11-07 NOTE — THERAPY EVALUATION
Patient Name: Blair Lira  : 1945    MRN: [id number]                              Today's Date: 2023       Admit Date: 2023    Visit Dx:     ICD-10-CM ICD-9-CM   1. Myalgia  M79.10 729.1   2. Multiple myeloma, remission status unspecified  C90.00 203.00   3. Urinary tract infection without hematuria, site unspecified  N39.0 599.0   4. Weakness generalized  R53.1 780.79   5. Difficulty in walking  R26.2 719.7   6. Decreased activities of daily living (ADL)  Z78.9 V49.89     Patient Active Problem List   Diagnosis    Rotator cuff tear, right    Impingement syndrome of right shoulder    Acute renal failure superimposed on chronic kidney disease    Pancytopenia    Abnormal weight loss    Hypertension    CKD (chronic kidney disease) stage 3, GFR 30-59 ml/min    Moderate malnutrition    UTI (urinary tract infection)    Weakness generalized    Intractable pain     Past Medical History:   Diagnosis Date    BPH (benign prostatic hyperplasia)     Frozen shoulder     Hyperlipidemia     Hypertension 10/26/2023    Periarthritis of shoulder     Rotator cuff disorder, right     Tennis elbow     RIGHT     Past Surgical History:   Procedure Laterality Date    CATARACT EXTRACTION EXTRACAPSULAR W/ INTRAOCULAR LENS IMPLANTATION Bilateral     COLONOSCOPY      JOINT REPLACEMENT Right     THR    SHOULDER ARTHROSCOPY W/ ROTATOR CUFF REPAIR Right 3/29/2023    Procedure: SHOULDER ARTHROSCOPY WITH ROTATOR CUFF REPAIR, SUBCROMIAL DECOMPRESSION,DISTAL CLAVICLE RESECTION;  Surgeon: Gustavo Samuels MD;  Location: [facility];  Service: Orthopedics;  Laterality: Right;    SHOULDER SURGERY Left     SCOPE      General Information       Row Name 23 1351          OT Time and Intention    Document Type evaluation  -ES     Mode of Treatment individual therapy;occupational therapy  -ES       Row Name 23 1351          General Information    Patient Profile Reviewed yes  -ES     Prior Level of Function  independent:;ADL's;all household mobility;community mobility  Patient independent with ADLs at baseline. Cane for mobility x1 week, no device prior. Walk in shower, standing shower completion. [place] in stance. No home O2. Denies recent falls.  -ES     Existing Precautions/Restrictions no known precautions/restrictions  -ES     Barriers to Rehab none identified  -ES       Row Name 11/07/23 1351          Occupational Profile    Reason for Services/Referral (Occupational Profile) Patient is 78 yr old male admitted to [facility] on 11/6/2023 with reports of RLE pain and swelling. OT evaluation and treatment ordered d/t recent decline in ADLs/transfer ability and discharge planning recommendations. No previous OT services for current condition.  -ES       Row Name 11/07/23 1351          Living Environment    People in Home alone  -ES       Row Name 11/07/23 1351          Cognition    Orientation Status (Cognition) oriented x 4  -ES       Row Name 11/07/23 1351          Safety Issues, Functional Mobility    Impairments Affecting Function (Mobility) balance;endurance/activity tolerance  -ES               User Key  (r) = Recorded By, (t) = Taken By, (c) = Cosigned By      Initials Name Provider Type    ES Babs Ramirez, OTR/L, CSRS Occupational Therapist                     Mobility/ADL's       Row Name 11/07/23 1358          Bed Mobility    Bed Mobility supine-sit;sit-supine  -ES     Supine-Sit [place] (Bed Mobility) standby assist  -ES     Sit-Supine [place] (Bed Mobility) standby assist  -ES       Row Name 11/07/23 1358          Transfers    Transfers sit-stand transfer;stand-sit transfer  -ES       Row Name 11/07/23 1358          Sit-Stand Transfer    Sit-Stand [place] (Transfers) contact guard  -ES     Assistive Device (Sit-Stand Transfers) other (see comments)  HHA x1  -ES       Row Name 11/07/23 1358          Stand-Sit Transfer    Stand-Sit [place] (Transfers) contact guard  -ES      Assistive Device (Stand-Sit Transfers) other (see comments)  HHA x1  -ES       Row Name 11/07/23 1358          Functional Mobility    Functional Mobility- Ind. Level contact guard assist;1 person  -ES     Functional Mobility- Device other (see comments)  HHA x1  -ES     Functional Mobility- Comment Patient performs functional mobility to/from bathroom, CGA with HHA x1  -ES       Row Name 11/07/23 1358          Activities of Daily Living    BADL Assessment/Intervention bathing;upper body dressing;lower body dressing;grooming;feeding;toileting  -ES       Row Name 11/07/23 1358          Bathing Assessment/Intervention    [place] Level (Bathing) bathing skills;minimum assist (75% patient effort)  -ES       Row Name 11/07/23 1358          Upper Body Dressing Assessment/Training    [place] Level (Upper Body Dressing) upper body dressing skills;set up  -ES       Row Name 11/07/23 1358          Lower Body Dressing Assessment/Training    [place] Level (Lower Body Dressing) lower body dressing skills;minimum assist (75% patient effort)  -ES       Row Name 11/07/23 1358          Grooming Assessment/Training    [place] Level (Grooming) grooming skills;set up  -ES       Row Name 11/07/23 1358          Self-Feeding Assessment/Training    [place] Level (Feeding) feeding skills;set up  -ES       Row Name 11/07/23 1358          Toileting Assessment/Training    [place] Level (Toileting) toileting skills;contact guard assist  -ES               User Key  (r) = Recorded By, (t) = Taken By, (c) = Cosigned By      Initials Name Provider Type    MARQUITA Ramirez, Babs, OTR/L, CSRS Occupational Therapist                   Obj/Interventions       Row Name 11/07/23 1402          Sensory Assessment (Somatosensory)    Sensory Assessment (Somatosensory) sensation intact  -ES       Row Name 11/07/23 1402          Vision Assessment/Intervention    Visual Impairment/Limitations WFL  -ES       Row Name 11/07/23 1402           Range of Motion Comprehensive    General Range of Motion bilateral upper extremity ROM WNL  -ES       Row Name 11/07/23 1402          Strength Comprehensive (MMT)    General Manual Muscle Testing (MMT) Assessment lower extremity strength deficits identified  -ES     Comment, General Manual Muscle Testing (MMT) Assessment BUEs 4/5  -ES       Row Name 11/07/23 1402          Motor Skills    Motor Skills functional endurance  -ES     Functional Endurance fair  -ES       Row Name 11/07/23 1402          Balance    Balance Assessment sitting dynamic balance;standing dynamic balance  -ES     Dynamic Sitting Balance supervision  -ES     Position, Sitting Balance unsupported;sitting edge of bed  -ES     Dynamic Standing Balance contact guard;1-person assist  -ES     Position/Device Used, Standing Balance other (see comments)  HHA x1  -ES               User Key  (r) = Recorded By, (t) = Taken By, (c) = Cosigned By      Initials Name Provider Type    Babs Wilkins, OTR/L, CSRS Occupational Therapist                   Goals/Plan       Row Name 11/07/23 1406          Transfer Goal 1 (OT)    Activity/Assistive Device (Transfer Goal 1, OT) transfers, all  -ES     [place] Level/Cues Needed (Transfer Goal 1, OT) modified independence  -ES     Time Frame (Transfer Goal 1, OT) long term goal (LTG);10 days  -ES       Row Name 11/07/23 1406          Bathing Goal 1 (OT)    Activity/Device (Bathing Goal 1, OT) bathing skills, all  -ES     [place] Level/Cues Needed (Bathing Goal 1, OT) modified independence  -ES     Time Frame (Bathing Goal 1, OT) long term goal (LTG);10 days  -ES       Row Name 11/07/23 1406          Dressing Goal 1 (OT)    Activity/Device (Dressing Goal 1, OT) dressing skills, all  -ES     [place]/Cues Needed (Dressing Goal 1, OT) modified independence  -ES     Time Frame (Dressing Goal 1, OT) long term goal (LTG);10 days  -ES       Row Name 11/07/23 1406          Toileting Goal 1 (OT)    Activity/Device  (Toileting Goal 1, OT) toileting skills, all  -ES     [place] Level/Cues Needed (Toileting Goal 1, OT) modified independence  -ES     Time Frame (Toileting Goal 1, OT) long term goal (LTG);10 days  -ES       Row Name 11/07/23 1406          Grooming Goal 1 (OT)    Activity/Device (Grooming Goal 1, OT) grooming skills, all  -ES     [place] (Grooming Goal 1, OT) modified independence  -ES     Time Frame (Grooming Goal 1, OT) long term goal (LTG);10 days  -ES       Row Name 11/07/23 1406          Problem Specific Goal 1 (OT)    Problem Specific Goal 1 (OT) Patient will demonstrate fair plus task tolerance in preperation for independent ADL routine completion at time of discharge  -ES     Time Frame (Problem Specific Goal 1, OT) long term goal (LTG);10 days  -ES       Row Name 11/07/23 1406          Therapy Assessment/Plan (OT)    Planned Therapy Interventions (OT) activity tolerance training;BADL retraining;functional balance retraining;occupation/activity based interventions;transfer/mobility retraining  -ES               User Key  (r) = Recorded By, (t) = Taken By, (c) = Cosigned By      Initials Name Provider Type    ES Babs Ramirez, OTR/L, CSRS Occupational Therapist                   Clinical Impression       Row Name 11/07/23 1404          Plan of Care Review    Plan of Care Reviewed With patient  -ES     Progress no change  -ES     Outcome Evaluation Patient has experienced decline in function from baseline status, presenting w/ deficits related to balance, tolerance, transfers and mobility that impede patient independence with activities of daily living.  Patient would benefit from skilled Occupational Therapy intervention to maxamize patient safety, and promote return to baseline independence.  -ES       Row Name 11/07/23 1404          Therapy Assessment/Plan (OT)    Rehab Potential (OT) good, to achieve stated therapy goals  -ES     Criteria for Skilled Therapeutic Interventions Met (OT) yes;meets  criteria;skilled treatment is necessary  -ES     Therapy Frequency (OT) 5 times/wk  -ES       Row Name 11/07/23 1404          Therapy Plan Review/Discharge Plan (OT)    Anticipated Discharge Disposition (OT) home with home health  -ES       Row Name 11/07/23 1404          Positioning and Restraints    Pre-Treatment Position in bed  -ES     Post Treatment Position bed  -ES               User Key  (r) = Recorded By, (t) = Taken By, (c) = Cosigned By      Initials Name Provider Type    Babs Wilkins, OTR/L, CSRS Occupational Therapist                   Outcome Measures       Row Name 11/07/23 1410          How much help from another is currently needed...    Putting on and taking off regular lower body clothing? 3  -ES     Bathing (including washing, rinsing, and drying) 3  -ES     Toileting (which includes using toilet bed pan or urinal) 3  -ES     Putting on and taking off regular upper body clothing 4  -ES     Taking care of personal grooming (such as brushing teeth) 4  -ES     Eating meals 4  -ES     AM-PAC 6 Clicks Score (OT) 21  -ES       Row Name 11/07/23 0720          How much help from another person do you currently need...    Turning from your back to your side while in flat bed without using bedrails? 3  -RS     Moving from lying on back to sitting on the side of a flat bed without bedrails? 3  -RS     Moving to and from a bed to a chair (including a wheelchair)? 3  -RS     Standing up from a chair using your arms (e.g., wheelchair, bedside chair)? 3  -RS     Climbing 3-5 steps with a railing? 2  -RS     To walk in hospital room? 2  -RS     AM-PAC 6 Clicks Score (PT) 16  -RS     Highest level of mobility 5 --> Static standing  -RS       Row Name 11/07/23 1410          Functional Assessment    Outcome Measure Options AM-PAC 6 Clicks Daily Activity (OT);Optimal Instrument  -ES       Row Name 11/07/23 1410          Optimal Instrument    Optimal Instrument Optimal - 3  -ES     Bending/Stooping 2  -ES      Balancing 2  -ES     Standing 2  -ES     From the list, choose the 3 activities you would most like to be able to do without any difficulty Bending/stooping;Standing;Balancing  -ES     Total Score Optimal - 3 6  -ES               User Key  (r) = Recorded By, (t) = Taken By, (c) = Cosigned By      Initials Name Provider Type    ES Babs Ramirez, OTR/L, CSRS Occupational Therapist    Octaviano Salcedo RN Registered Nurse                      OT Recommendation and Plan  Planned Therapy Interventions (OT): activity tolerance training, BADL retraining, functional balance retraining, occupation/activity based interventions, transfer/mobility retraining  Therapy Frequency (OT): 5 times/wk  Plan of Care Review  Plan of Care Reviewed With: patient  Progress: no change  Outcome Evaluation: Patient has experienced decline in function from baseline status, presenting w/ deficits related to balance, tolerance, transfers and mobility that impede patient independence with activities of daily living.  Patient would benefit from skilled Occupational Therapy intervention to maxamize patient safety, and promote return to baseline independence.     Time Calculation:   Evaluation Complexity (OT)  Review Occupational Profile/Medical/Therapy History Complexity: brief/low complexity  Assessment, Occupational Performance/Identification of Deficit Complexity: 3-5 performance deficits  Clinical Decision Making Complexity (OT): problem focused assessment/low complexity  Overall Complexity of Evaluation (OT): low complexity     Time Calculation- OT       Row Name 11/07/23 1413             Time Calculation- OT    OT Received On 11/07/23  -ES      OT Goal Re-Cert Due Date 11/16/23  -ES         Untimed Charges    OT Eval/Re-eval Minutes 32  -ES         Total Minutes    Untimed Charges Total Minutes 32  -ES       Total Minutes 32  -ES                User Key  (r) = Recorded By, (t) = Taken By, (c) = Cosigned By      Initials Name Provider Type     ES Babs Ramirez OTR/L, CSRS Occupational Therapist                  Therapy Charges for Today       Code Description Service Date Service Provider Modifiers Qty    68586670612 HC OT EVAL LOW COMPLEXITY 3 11/7/2023 Babs Ramirez OTR/L, CSRS GO 1                 MELINDA Truong/L, CSRS  11/7/2023

## 2023-11-07 NOTE — PLAN OF CARE
Goal Outcome Evaluation:  Plan of Care Reviewed With: patient        Progress: no change  Outcome Evaluation: Patient has experienced decline in function from baseline status, presenting w/ deficits related to balance, tolerance, transfers and mobility that impede patient independence with activities of daily living.  Patient would benefit from skilled Occupational Therapy intervention to maxamize patient safety, and promote return to baseline independence.      Anticipated Discharge Disposition (OT): home with home health

## 2023-11-07 NOTE — THERAPY TREATMENT NOTE
Acute Care - Physical Therapy Treatment Note   [place]     Patient Name: Blair Lira  : 1945  MRN: [id number]  Today's Date: 2023      Visit Dx:     ICD-10-CM ICD-9-CM   1. Myalgia  M79.10 729.1   2. Multiple myeloma, remission status unspecified  C90.00 203.00   3. Urinary tract infection without hematuria, site unspecified  N39.0 599.0   4. Weakness generalized  R53.1 780.79   5. Difficulty in walking  R26.2 719.7   6. Decreased activities of daily living (ADL)  Z78.9 V49.89     Patient Active Problem List   Diagnosis    Rotator cuff tear, right    Impingement syndrome of right shoulder    Acute renal failure superimposed on chronic kidney disease    Pancytopenia    Abnormal weight loss    Hypertension    CKD (chronic kidney disease) stage 3, GFR 30-59 ml/min    Moderate malnutrition    UTI (urinary tract infection)    Weakness generalized    Intractable pain     Past Medical History:   Diagnosis Date    BPH (benign prostatic hyperplasia)     Frozen shoulder     Hyperlipidemia     Hypertension 10/26/2023    Periarthritis of shoulder     Rotator cuff disorder, right     Tennis elbow     RIGHT     Past Surgical History:   Procedure Laterality Date    CATARACT EXTRACTION EXTRACAPSULAR W/ INTRAOCULAR LENS IMPLANTATION Bilateral     COLONOSCOPY      JOINT REPLACEMENT Right     THR    SHOULDER ARTHROSCOPY W/ ROTATOR CUFF REPAIR Right 3/29/2023    Procedure: SHOULDER ARTHROSCOPY WITH ROTATOR CUFF REPAIR, SUBCROMIAL DECOMPRESSION,DISTAL CLAVICLE RESECTION;  Surgeon: Gustavo Samuels MD;  Location: [facility] [place];  Service: Orthopedics;  Laterality: Right;    SHOULDER SURGERY Left     SCOPE     PT Assessment (last 12 hours)       PT Evaluation and Treatment       Row Name 23 1500          Physical Therapy Time and Intention    Subjective Information no complaints (P)   -ZT     Document Type therapy note (daily note) (P)   -ZT     Mode of Treatment individual therapy;physical therapy (P)   -ZT      Patient Effort good (P)   -ZT       Row Name 11/07/23 1500          General Information    Patient Profile Reviewed yes (P)   -ZT     Patient Observations alert;cooperative;agree to therapy (P)   -ZT     Existing Precautions/Restrictions fall (P)   -ZT       Row Name 11/07/23 1500          Bed Mobility    Bed Mobility bed mobility (all) activities (P)   -ZT     All Activities, [place] (Bed Mobility) independent (P)   -ZT       Row Name 11/07/23 1500          Transfers    Transfers sit-stand transfer;stand-sit transfer (P)   -ZT       Row Name 11/07/23 1500          Sit-Stand Transfer    Sit-Stand [place] (Transfers) standby assist (P)   -ZT     Assistive Device (Sit-Stand Transfers) walker, front-wheeled (P)   -ZT       Row Name 11/07/23 1500          Stand-Sit Transfer    Stand-Sit [place] (Transfers) standby assist (P)   -ZT     Assistive Device (Stand-Sit Transfers) walker, front-wheeled (P)   -ZT       Row Name 11/07/23 1500          Gait/Stairs (Locomotion)    Gait/Stairs Locomotion gait/ambulation assistive device (P)   -ZT     [place] Level (Gait) standby assist (P)   -ZT     Assistive Device (Gait) walker, front-wheeled (P)   -ZT     Distance in Feet (Gait) 150 (P)   -ZT     Pattern (Gait) step-through (P)   -ZT     Deviations/Abnormal Patterns (Gait) gait speed decreased;stride length decreased (P)   -ZT       Row Name 11/07/23 1500          Safety Issues, Functional Mobility    Impairments Affecting Function (Mobility) balance;endurance/activity tolerance (P)   -       Row Name 11/07/23 1500          Balance    Balance Assessment standing dynamic balance (P)   -ZT     Dynamic Standing Balance standby assist (P)   -ZT     Position/Device Used, Standing Balance walker, front-wheeled (P)   -ZT       Row Name 11/07/23 1500          Plan of Care Review    Plan of Care Reviewed With patient (P)   -       Row Name 11/07/23 1500          Positioning and Restraints    Pre-Treatment Position  in bed (P)   -ZT     Post Treatment Position bed (P)   -ZT     In Bed call light within reach;encouraged to call for assist (P)   No bed alarm pre treatment, no bed alarm post treatment  -ZT       Row Name 11/07/23 1500          Therapy Assessment/Plan (PT)    Rehab Potential (PT) good, to achieve stated therapy goals (P)   -ZT     Criteria for Skilled Interventions Met (PT) yes;skilled treatment is necessary (P)   -ZT     Therapy Frequency (PT) daily (P)   -ZT     Predicted Duration of Therapy Intervention (PT) 10 days (P)   -ZT     Problem List (PT) problems related to;balance;mobility;strength (P)   -ZT     Activity Limitations Related to Problem List (PT) unable to ambulate safely;unable to transfer safely (P)   -ZT       Row Name 11/07/23 1500          Progress Summary (PT)    Progress Toward Functional Goals (PT) progress toward functional goals is good (P)   -ZT     Daily Progress Summary (PT) Pt presents with decreased strength and endurance. He continues to require skilled PT services to address these deficits so that he can safely return to his PLOF. (P)   -ZT       Row Name 11/07/23 1500          Therapy Plan Review/Discharge Plan (PT)    Therapy Plan Review (PT) evaluation/treatment results reviewed;care plan/treatment goals reviewed;participants included;patient (P)   -ZT               User Key  (r) = Recorded By, (t) = Taken By, (c) = Cosigned By      Initials Name Provider Type    ZT Rui Lara, PT Student PT Student                      PT Recommendation and Plan  Anticipated Discharge Disposition (PT): (P) inpatient rehabilitation facility, home with home health, home with assist  Planned Therapy Interventions (PT): (P) balance training, bed mobility training, gait training, stair training, strengthening, transfer training  Therapy Frequency (PT): (P) daily  Progress Summary (PT)  Progress Toward Functional Goals (PT): (P) progress toward functional goals is good  Daily Progress Summary (PT):  (P) Pt presents with decreased strength and endurance. He continues to require skilled PT services to address these deficits so that he can safely return to his PLOF.  Plan of Care Reviewed With: (P) patient   Outcome Measures       Row Name 11/07/23 1500 11/06/23 1100          How much help from another person do you currently need...    Turning from your back to your side while in flat bed without using bedrails? 4 (P)   -ZT 3  -CHLOE (r) AM (t) CHLOE (c)     Moving from lying on back to sitting on the side of a flat bed without bedrails? 4 (P)   -ZT 3  -CHLOE (r) AM (t) CHLOE (c)     Moving to and from a bed to a chair (including a wheelchair)? 3 (P)   -ZT 3  -CHLOE (r) AM (t) CHLOE (c)     Standing up from a chair using your arms (e.g., wheelchair, bedside chair)? 3 (P)   -ZT 2  -CHLOE (r) AM (t) CHLOE (c)     Climbing 3-5 steps with a railing? 2 (P)   -ZT 2  -CHLOE (r) AM (t) CHLOE (c)     To walk in hospital room? 3 (P)   -ZT 2  -CHLOE (r) AM (t) CHLOE (c)     AM-PAC 6 Clicks Score (PT) 19 (P)   -ZT 15  -CHLOE (r) AM (t)     Highest level of mobility 6 --> Walked 10 steps or more (P)   -ZT 4 --> Transferred to chair/commode  -CHLOE (r) AM (t)        Functional Assessment    Outcome Measure Options -- AM-PAC 6 Clicks Basic Mobility (PT)  -CHLOE (r) AM (t) CHLOE (c)               User Key  (r) = Recorded By, (t) = Taken By, (c) = Cosigned By      Initials Name Provider Type    Woodrow Vivar, PT Physical Therapist    ZT Rui Lara, PT Student PT Student    Melba Kahn, PT Student PT Student                     Time Calculation:    PT Charges       Row Name 11/07/23 1504             Time Calculation    PT Received On 11/07/23 (P)   -ZT         Timed Charges    92900 - Gait Training Minutes  15 (P)   -ZT         Total Minutes    Timed Charges Total Minutes 15 (P)   -ZT       Total Minutes 15 (P)   -ZT                User Key  (r) = Recorded By, (t) = Taken By, (c) = Cosigned By      Initials Name Provider Type    ZT Rui Lara, PT Student  PT Student                      PT G-Codes  Outcome Measure Options: AM-PAC 6 Clicks Daily Activity (OT), Optimal Instrument  AM-PAC 6 Clicks Score (PT): (P) 19  AM-PAC 6 Clicks Score (OT): 21    Rui Lara, PT Student  11/7/2023

## 2023-11-07 NOTE — CONSULTS
"Nutrition Services    Patient Name: Blair Lira  YOB: 1945  MRN: [id number]  Admission date: 11/6/2023      CLINICAL NUTRITION ASSESSMENT      Reason for Assessment  MST score 2+     H&P:    Past Medical History:   Diagnosis Date    BPH (benign prostatic hyperplasia)     Frozen shoulder     Hyperlipidemia     Hypertension 10/26/2023    Periarthritis of shoulder     Rotator cuff disorder, right     Tennis elbow     RIGHT        Current Problems:   Active Hospital Problems    Diagnosis     **UTI (urinary tract infection)     Weakness generalized     Intractable pain     Acute renal failure superimposed on chronic kidney disease         Nutrition/Diet History         Narrative     Nutrition assessment related to MST score 3.  Patient has had no significant weight changes x 1 year.  Consuming % of meals since admission.  BMI is overweight per standards.      Pt is at low risk per nutrition risk screening. No acute nutrition concerns or interventions at this time. RD will continue to follow and monitor per protocol.       Anthropometrics        Current Height, Weight Height: 185.4 cm (72.99\")  Weight: 93.7 kg (206 lb 9.1 oz)   Current BMI Body mass index is 27.26 kg/m².       Weight Hx  Wt Readings from Last 30 Encounters:   11/07/23 0500 93.7 kg (206 lb 9.1 oz)   11/06/23 0626 93 kg (205 lb 0.4 oz)   11/06/23 0209 95.6 kg (210 lb 12.2 oz)   10/31/23 1000 94.9 kg (209 lb 3.5 oz)   10/26/23 1013 93.1 kg (205 lb 4 oz)   10/24/23 0803 99.8 kg (220 lb 0.3 oz)   06/27/23 1059 99.8 kg (220 lb)   05/16/23 1055 99.8 kg (220 lb)   04/11/23 0922 99.8 kg (220 lb)   03/29/23 1010 99.9 kg (220 lb 3.8 oz)   03/23/23 0824 103 kg (227 lb)   02/16/23 0759 103 kg (227 lb)   12/27/22 0838 102 kg (225 lb 9.6 oz)   11/15/22 0846 102 kg (224 lb)   11/17/20 0000 101 kg (223 lb 8 oz)   11/03/20 0000 98.4 kg (217 lb)   10/22/20 0000 98.6 kg (217 lb 6 oz)   11/19/19 0000 104 kg (229 lb)   10/08/19 0000 103 kg (227 lb 4 " "oz)   09/05/19 0000 103 kg (227 lb 2 oz)   07/09/19 0000 104 kg (229 lb)   06/07/19 0000 104 kg (229 lb)   05/02/19 0000 108 kg (237 lb 2 oz)   02/26/19 0000 107 kg (235 lb)   07/05/18 0000 103 kg (226 lb)   06/26/18 0000 103 kg (226 lb)   06/13/18 0000 104 kg (229 lb)   05/10/18 0000 103 kg (228 lb 2 oz)            Wt Change Observation -6.1% x 6 months   -8.1% x 1 year     Estimated/Assessed Needs       Energy Requirements 25 kcal/kg    EST Needs (kcal/day) 2343 kcal        Protein Requirements 0.8 g/kg    EST Daily Needs (g/day) 75 g       Fluid Requirements 20-25 ml/kg     Estimated Needs (mL/day) 7182-2447 ml      Labs/Medications         Pertinent Labs Reviewed.   Results from last 7 days   Lab Units 11/07/23 0447 11/06/23 0314 11/02/23 0512 11/01/23  0430   SODIUM mmol/L 130* 132* 135* 132*   POTASSIUM mmol/L 4.7 4.0 4.4 4.6   CHLORIDE mmol/L 101 102 104 104   CO2 mmol/L 19.2* 19.3* 20.7* 17.5*   BUN mg/dL 89* 75* 104* 108*   CREATININE mg/dL 3.36* 2.83* 3.54* 3.89*   CALCIUM mg/dL 7.7* 8.1* 8.1* 7.9*   BILIRUBIN mg/dL 0.3 0.4  --  0.7   ALK PHOS U/L 62 50  --  25*   ALT (SGPT) U/L 14 16  --  18   AST (SGOT) U/L 10 10  --  7   GLUCOSE mg/dL 134* 172* 133* 154*     Results from last 7 days   Lab Units 11/07/23 0447 11/06/23 0314 11/02/23  0512   PHOSPHORUS mg/dL  --   --  6.9*   HEMOGLOBIN g/dL 8.8*   < >  --    HEMATOCRIT % 27.3*   < >  --     < > = values in this interval not displayed.     No results found for: \"COVID19\"  Lab Results   Component Value Date    HGBA1C 6.70 (H) 11/06/2023         Pertinent Medications Reviewed.     Current Nutrition Orders & Evaluation of Intake       Oral Nutrition     Current PO Diet Diet: Regular/House Diet, Diabetic Diets; Consistent Carbohydrate; Texture: Regular Texture (IDDSI 7); Fluid Consistency: Thin (IDDSI 0)   Supplement No active supplement orders       Malnutrition Severity Assessment                Nutrition Diagnosis         Nutrition Dx Problem 1 No " nutrition diagnosis at this time.       Nutrition Intervention         No intervention indicated.      Medical Nutrition Therapy/Nutrition Education          Learner     Readiness N/A  N/A     Method     Response N/A  N/A     Monitor/Evaluation        Monitor Per protocol.       Nutrition Discharge Plan         No nutrition needs identified at this time.       Electronically signed by:  Latanya Mathew RD  11/07/23 11:18 EST

## 2023-11-07 NOTE — PROGRESS NOTES
[facility]     Progress Note    Patient Name: Blair Lira  : 1945  MRN: [id number]  Primary Care Physician:  Babs Summers APRN  Date of admission: 2023    Subjective   Subjective     Chief Complaint: Patient is more comfortable today he says his pain has been controlled blood cultures positive for staph, urine cultures pending, agree with the plans of antibiotics, have explained to the patient that we will need to get him to the office so that we can arrange for the chemotherapy treatments hopefully will be able to discharge him in a day or so and we can start the process of acquiring his medications    HPI: Patient with recently diagnosed multiple myeloma    Review of Systems   All systems were reviewed and negative except for: Has been reviewed    Objective   Objective     Vitals:   Temp:  [98.1 °F (36.7 °C)-98.6 °F (37 °C)] 98.2 °F (36.8 °C)  Heart Rate:  [] 69  Resp:  [18-20] 20  BP: (118-131)/(63-73) 127/66    Physical Exam    Constitutional: Awake, alert   Eyes: PERRLA, sclerae anicteric, no conjunctival injection   HENT: NCAT, mucous membranes moist   Neck: Supple, no thyromegaly, no lymphadenopathy, trachea midline   Respiratory: Clear to auscultation bilaterally, nonlabored respirations    Cardiovascular: RRR, no murmurs, rubs, or gallops, palpable pedal pulses bilaterally   Gastrointestinal: Positive bowel sounds, soft, nontender, nondistended   Musculoskeletal: No bilateral ankle edema, no clubbing or cyanosis to extremities   Psychiatric: Appropriate affect, cooperative   Neurologic: Oriented x 3, strength symmetric in all extremities, Cranial Nerves grossly intact to confrontation, speech clear   Skin: No rashes     Result Review    Result Review:  I have personally reviewed the results from the time of this admission to 2023 08:01 EST and agree with these findings:  [x]  Laboratory  []  Microbiology  []  Radiology  []  EKG/Telemetry   []  Cardiology/Vascular   [x]   Pathology  []  Old records  []  Other:  Most notable findings include: Multiple myeloma probably admitted because of infection    Assessment & Plan   Assessment / Plan     Brief Patient Summary:  Blair Lira is a 78 y.o. male who patient with possible UTI blood cultures are positive    Active Hospital Problems:  Active Hospital Problems    Diagnosis     **UTI (urinary tract infection)     Weakness generalized     Intractable pain     Acute renal failure superimposed on chronic kidney disease        Plan:   As per primary continue with antibiotics    DVT prophylaxis:  Medical DVT prophylaxis orders are present.    CODE STATUS:   Code Status (Patient has no pulse and is not breathing): CPR (Attempt to Resuscitate)  Medical Interventions (Patient has pulse or is breathing): Full Support    Disposition:  I expect patient to be discharged after the patient has been stabilized.    Electronically signed by Vamsi Mason MD, 11/07/23, 8:01 AM EST.      Part of this note may be an electronic transcription/translation of spoken language to printed text using the Dragon Dictation System.

## 2023-11-07 NOTE — PROGRESS NOTES
[facility]     Progress Note    Patient Name: Blair Liar  : 1945  MRN: [id number]  Primary Care Physician:  Babs Summers APRN  Date of admission: 2023      Subjective   Brief summary.  Patient admitted with increased pain and diagnosed with UTI further blood cultures positive      HPI:  Feeling much better, pain improving, swelling improving.  No fever chills    Review of Systems     Anxiety, no chest pain, no fever chills.  No urinary burning but frequency reported      Objective     Vitals:   Temp:  [98.1 °F (36.7 °C)-98.2 °F (36.8 °C)] 98.1 °F (36.7 °C)  Heart Rate:  [] 78  Resp:  [18-20] 18  BP: (118-137)/(60-70) 128/60    Physical Exam :     Elderly male not in acute distress.  More alert and oriented and comfortable today.  Heart regular.  Lungs clear.  Abdomen soft and obese.  Extremities with trace of edema      Result Review:  I have personally reviewed the results from the time of this admission to 2023 17:22 EST and agree with these findings:  []  Laboratory  []  Microbiology  []  Radiology  []  EKG/Telemetry   []  Cardiology/Vascular   []  Pathology  []  Old records  []  Other:           Assessment / Plan       Active Hospital Problems:  Active Hospital Problems    Diagnosis     **UTI (urinary tract infection)     Weakness generalized     Intractable pain     Acute renal failure superimposed on chronic kidney disease        Plan:   Positive blood cultures, continue IV antibiotics.  Clinically improving.  Will wait and review sensitivities we will also check a procalcitonin level, increase activity with assistance and PT OT.  Change pain medicine to schedule III times a day and as needed.       DVT prophylaxis:  Medical DVT prophylaxis orders are present.    CODE STATUS:   Code Status (Patient has no pulse and is not breathing): CPR (Attempt to Resuscitate)  Medical Interventions (Patient has pulse or is breathing): Full Support            Electronically signed by Elieser  MD Bg, 11/07/23, 5:22 PM EST.

## 2023-11-08 LAB
ALBUMIN SERPL-MCNC: 2 G/DL (ref 3.5–5.2)
ALBUMIN/GLOB SERPL: 0.4 G/DL
ALP SERPL-CCNC: 53 U/L (ref 39–117)
ALT SERPL W P-5'-P-CCNC: 5 U/L (ref 1–41)
ANION GAP SERPL CALCULATED.3IONS-SCNC: 10.8 MMOL/L (ref 5–15)
AST SERPL-CCNC: 8 U/L (ref 1–40)
BACTERIA SPEC AEROBE CULT: ABNORMAL
BACTERIA SPEC AEROBE CULT: ABNORMAL
BILIRUB SERPL-MCNC: <0.2 MG/DL (ref 0–1.2)
BUN SERPL-MCNC: 101 MG/DL (ref 8–23)
BUN/CREAT SERPL: 27.8 (ref 7–25)
CALCIUM SPEC-SCNC: 7.5 MG/DL (ref 8.6–10.5)
CHLORIDE SERPL-SCNC: 103 MMOL/L (ref 98–107)
CO2 SERPL-SCNC: 17.2 MMOL/L (ref 22–29)
CREAT SERPL-MCNC: 3.63 MG/DL (ref 0.76–1.27)
EGFRCR SERPLBLD CKD-EPI 2021: 16.5 ML/MIN/1.73
GLOBULIN UR ELPH-MCNC: 4.7 GM/DL
GLUCOSE SERPL-MCNC: 175 MG/DL (ref 65–99)
POTASSIUM SERPL-SCNC: 4.8 MMOL/L (ref 3.5–5.2)
PROT SERPL-MCNC: 6.7 G/DL (ref 6–8.5)
SODIUM SERPL-SCNC: 131 MMOL/L (ref 136–145)

## 2023-11-08 PROCEDURE — 87040 BLOOD CULTURE FOR BACTERIA: CPT | Performed by: INTERNAL MEDICINE

## 2023-11-08 PROCEDURE — 87147 CULTURE TYPE IMMUNOLOGIC: CPT | Performed by: INTERNAL MEDICINE

## 2023-11-08 PROCEDURE — 80053 COMPREHEN METABOLIC PANEL: CPT | Performed by: INTERNAL MEDICINE

## 2023-11-08 PROCEDURE — 25010000002 CEFAZOLIN IN DEXTROSE 2-4 GM/100ML-% SOLUTION: Performed by: INTERNAL MEDICINE

## 2023-11-08 PROCEDURE — 63710000001 DEXAMETHASONE PER 0.25 MG: Performed by: INTERNAL MEDICINE

## 2023-11-08 PROCEDURE — 97116 GAIT TRAINING THERAPY: CPT

## 2023-11-08 PROCEDURE — 88312 SPECIAL STAINS GROUP 1: CPT | Performed by: INTERNAL MEDICINE

## 2023-11-08 PROCEDURE — 25010000002 HEPARIN (PORCINE) PER 1000 UNITS: Performed by: INTERNAL MEDICINE

## 2023-11-08 RX ORDER — DEXTROSE AND SODIUM CHLORIDE 5; .45 G/100ML; G/100ML
100 INJECTION, SOLUTION INTRAVENOUS CONTINUOUS
Status: DISCONTINUED | OUTPATIENT
Start: 2023-11-08 | End: 2023-11-10

## 2023-11-08 RX ADMIN — TAMSULOSIN HYDROCHLORIDE 0.8 MG: 0.4 CAPSULE ORAL at 09:14

## 2023-11-08 RX ADMIN — CEFAZOLIN SODIUM 2000 MG: 2 INJECTION, SOLUTION INTRAVENOUS at 05:10

## 2023-11-08 RX ADMIN — CYANOCOBALAMIN TAB 500 MCG 1000 MCG: 500 TAB at 09:14

## 2023-11-08 RX ADMIN — DEXAMETHASONE 2 MG: 0.5 TABLET ORAL at 09:14

## 2023-11-08 RX ADMIN — AMLODIPINE BESYLATE 10 MG: 5 TABLET ORAL at 09:14

## 2023-11-08 RX ADMIN — Medication 1 MG: at 09:14

## 2023-11-08 RX ADMIN — HYDROCODONE BITARTRATE AND ACETAMINOPHEN 1 TABLET: 10; 325 TABLET ORAL at 09:14

## 2023-11-08 RX ADMIN — DEXTROSE AND SODIUM CHLORIDE 100 ML/HR: 5; 450 INJECTION, SOLUTION INTRAVENOUS at 09:14

## 2023-11-08 RX ADMIN — DEXAMETHASONE 2 MG: 0.5 TABLET ORAL at 21:53

## 2023-11-08 RX ADMIN — PANTOPRAZOLE SODIUM 40 MG: 40 TABLET, DELAYED RELEASE ORAL at 05:10

## 2023-11-08 RX ADMIN — Medication 10 ML: at 09:15

## 2023-11-08 RX ADMIN — HEPARIN SODIUM 5000 UNITS: 5000 INJECTION INTRAVENOUS; SUBCUTANEOUS at 09:13

## 2023-11-08 RX ADMIN — CEFAZOLIN SODIUM 2000 MG: 2 INJECTION, SOLUTION INTRAVENOUS at 21:55

## 2023-11-08 RX ADMIN — CEFAZOLIN SODIUM 2000 MG: 2 INJECTION, SOLUTION INTRAVENOUS at 16:08

## 2023-11-08 RX ADMIN — Medication 10 ML: at 21:54

## 2023-11-08 RX ADMIN — Medication 5 MG: at 21:52

## 2023-11-08 RX ADMIN — HEPARIN SODIUM 5000 UNITS: 5000 INJECTION INTRAVENOUS; SUBCUTANEOUS at 21:53

## 2023-11-08 RX ADMIN — DEXTROSE AND SODIUM CHLORIDE 100 ML/HR: 5; 450 INJECTION, SOLUTION INTRAVENOUS at 19:28

## 2023-11-08 RX ADMIN — HYDROCODONE BITARTRATE AND ACETAMINOPHEN 1 TABLET: 10; 325 TABLET ORAL at 16:08

## 2023-11-08 NOTE — PROGRESS NOTES
[facility]     Progress Note    Patient Name: Blair Lira  : 1945  MRN: [id number]  Primary Care Physician:  Babs Summers APRN  Date of admission: 2023      Subjective   Brief summary.  Patient admitted with increased pain and diagnosed with UTI further blood cultures positive      HPI:  Patient improving, no fever, urinary frequency persist.  Bilateral leg pain continues, swelling improving.  No chest pain or shortness of breath    Review of Systems     No fever chills, continues to have aches and pain but significantly better.  No shortness of breath.  Urinary burning no blood in the urine.      Objective     Vitals:   Temp:  [97.7 °F (36.5 °C)-98.1 °F (36.7 °C)] 97.7 °F (36.5 °C)  Heart Rate:  [] 73  Resp:  [18-20] 20  BP: (123-139)/(60-70) 139/66    Physical Exam :     Elderly male not in acute distress.  Pallor noted..  Heart regular.  Lungs clear.  Abdomen soft and obese.  Extremities with trace of edema, right leg worse than left      Result Review:  I have personally reviewed the results from the time of this admission to 2023 09:49 EST and agree with these findings:  []  Laboratory  []  Microbiology  []  Radiology  []  EKG/Telemetry   []  Cardiology/Vascular   []  Pathology  []  Old records  []  Other:           Assessment / Plan       Active Hospital Problems:  Active Hospital Problems    Diagnosis     **UTI (urinary tract infection)     Weakness generalized     Intractable pain     Acute renal failure superimposed on chronic kidney disease        Plan:   Urine with staph infection, non-MRSA, also blood cultures positive.  Continue IV antibiotic.  Repeat cultures today.  Increase activity.  Transfer out of PCU.       DVT prophylaxis:  Medical DVT prophylaxis orders are present.    CODE STATUS:   Code Status (Patient has no pulse and is not breathing): CPR (Attempt to Resuscitate)  Medical Interventions (Patient has pulse or is breathing): Full  Support              Electronically signed by Elieser Pederson MD, 11/08/23, 9:50 AM EST.

## 2023-11-08 NOTE — PROGRESS NOTES
[facility]     Progress Note    Patient Name: Blair Lira  : 1945  MRN: [id number]  Primary Care Physician:  Babs Summers APRN  Date of admission: 2023    Subjective   Subjective     Chief Complaint: Patient with multiple myeloma newly diagnosed with renal failure patient had blood cultures positive as well as urine cultures positive.  Being treated with IV antibiotics, will try to see him as soon as possible in the office to arrange for his chemotherapy medications which will have to be arranged by patient giving an interview for the pharmacologicl firm as well as to get preauthorization from insurance companies patient is more comfortable right now with pain management continue as per primary for the management of his urinary tract infection as well as blood cultures being positive    HPI: Patient with newly diagnosed multiple myeloma renal failure    Review of Systems   All systems were reviewed and negative except for: Has been reviewed    Objective   Objective     Vitals:   Temp:  [97.9 °F (36.6 °C)-98.1 °F (36.7 °C)] 97.9 °F (36.6 °C)  Heart Rate:  [] 73  Resp:  [18-20] 19  BP: (123-132)/(60-70) 130/61    Physical Exam    Constitutional: Awake, alert   Eyes: PERRLA, sclerae anicteric, no conjunctival injection   HENT: NCAT, mucous membranes moist   Neck: Supple, no thyromegaly, no lymphadenopathy, trachea midline   Respiratory: Clear to auscultation bilaterally, nonlabored respirations    Cardiovascular: RRR, no murmurs, rubs, or gallops, palpable pedal pulses bilaterally   Gastrointestinal: Positive bowel sounds, soft, nontender, nondistended   Musculoskeletal: No bilateral ankle edema, no clubbing or cyanosis to extremities   Psychiatric: Appropriate affect, cooperative   Neurologic: Oriented x 3, strength symmetric in all extremities, Cranial Nerves grossly intact to confrontation, speech clear   Skin: No rashes   No change  Result Review    Result Review:  I have personally  reviewed the results from the time of this admission to 11/8/2023 08:06 EST and agree with these findings:  []  Laboratory  []  Microbiology  []  Radiology  []  EKG/Telemetry   []  Cardiology/Vascular   []  Pathology  []  Old records  []  Other:  Most notable findings include: Triple myeloma with UTI    Assessment & Plan   Assessment / Plan     Brief Patient Summary:  Blair Lira is a 78 y.o. male who multiple myeloma with UTI back pain which is chronic    Active Hospital Problems:  Active Hospital Problems    Diagnosis     **UTI (urinary tract infection)     Weakness generalized     Intractable pain     Acute renal failure superimposed on chronic kidney disease        Plan:   Continue as per primary    DVT prophylaxis:  Medical DVT prophylaxis orders are present.    CODE STATUS:   Code Status (Patient has no pulse and is not breathing): CPR (Attempt to Resuscitate)  Medical Interventions (Patient has pulse or is breathing): Full Support    Disposition:  I expect patient to be discharged  As per primary we will see follow-up next week in the office.    Electronically signed by Vamsi Mason MD, 11/08/23, 8:06 AM EST.      Part of this note may be an electronic transcription/translation of spoken language to printed text using the Dragon Dictation System.

## 2023-11-08 NOTE — THERAPY TREATMENT NOTE
Acute Care - Physical Therapy Treatment Note   [place]     Patient Name: Blair Lira  : 1945  MRN: [id number]  Today's Date: 2023      Visit Dx:     ICD-10-CM ICD-9-CM   1. Myalgia  M79.10 729.1   2. Multiple myeloma, remission status unspecified  C90.00 203.00   3. Urinary tract infection without hematuria, site unspecified  N39.0 599.0   4. Weakness generalized  R53.1 780.79   5. Difficulty in walking  R26.2 719.7   6. Decreased activities of daily living (ADL)  Z78.9 V49.89     Patient Active Problem List   Diagnosis    Rotator cuff tear, right    Impingement syndrome of right shoulder    Acute renal failure superimposed on chronic kidney disease    Pancytopenia    Abnormal weight loss    Hypertension    CKD (chronic kidney disease) stage 3, GFR 30-59 ml/min    Moderate malnutrition    UTI (urinary tract infection)    Weakness generalized    Intractable pain     Past Medical History:   Diagnosis Date    BPH (benign prostatic hyperplasia)     Frozen shoulder     Hyperlipidemia     Hypertension 10/26/2023    Periarthritis of shoulder     Rotator cuff disorder, right     Tennis elbow     RIGHT     Past Surgical History:   Procedure Laterality Date    CATARACT EXTRACTION EXTRACAPSULAR W/ INTRAOCULAR LENS IMPLANTATION Bilateral     COLONOSCOPY      JOINT REPLACEMENT Right     THR    SHOULDER ARTHROSCOPY W/ ROTATOR CUFF REPAIR Right 3/29/2023    Procedure: SHOULDER ARTHROSCOPY WITH ROTATOR CUFF REPAIR, SUBCROMIAL DECOMPRESSION,DISTAL CLAVICLE RESECTION;  Surgeon: Gustavo Samuels MD;  Location: [facility] OR [facility];  Service: Orthopedics;  Laterality: Right;    SHOULDER SURGERY Left     SCOPE     PT Assessment (last 12 hours)       PT Evaluation and Treatment       Row Name 23 1100          Physical Therapy Time and Intention    Subjective Information no complaints (P)   -ZT     Document Type therapy note (daily note) (P)   -ZT     Mode of Treatment individual therapy;physical therapy (P)   -ZT      Patient Effort good (P)   -ZT       Row Name 11/08/23 1100          General Information    Patient Profile Reviewed yes (P)   -ZT     Patient Observations alert;cooperative;agree to therapy (P)   -ZT     Existing Precautions/Restrictions fall (P)   -ZT       Row Name 11/08/23 1100          Bed Mobility    Bed Mobility bed mobility (all) activities (P)   -ZT     All Activities, [place] (Bed Mobility) independent (P)   -ZT       Row Name 11/08/23 1100          Transfers    Transfers sit-stand transfer;stand-sit transfer (P)   -ZT       Row Name 11/08/23 1100          Sit-Stand Transfer    Sit-Stand [place] (Transfers) independent (P)   -ZT     Assistive Device (Sit-Stand Transfers) walker, front-wheeled (P)   -ZT       Row Name 11/08/23 1100          Stand-Sit Transfer    Stand-Sit [place] (Transfers) independent (P)   -ZT     Assistive Device (Stand-Sit Transfers) walker, front-wheeled (P)   -ZT       Row Name 11/08/23 1100          Gait/Stairs (Locomotion)    Gait/Stairs Locomotion gait/ambulation assistive device (P)   -ZT     [place] Level (Gait) standby assist (P)   -ZT     Assistive Device (Gait) walker, front-wheeled (P)   -ZT     Distance in Feet (Gait) 300 (P)   -ZT     Pattern (Gait) step-through (P)   -ZT     Deviations/Abnormal Patterns (Gait) gait speed decreased;stride length decreased (P)   -ZT       Row Name 11/08/23 1100          Safety Issues, Functional Mobility    Impairments Affecting Function (Mobility) balance;endurance/activity tolerance (P)   -ZT       Row Name 11/08/23 1100          Balance    Balance Assessment standing dynamic balance (P)   -ZT     Dynamic Standing Balance standby assist (P)   -ZT     Position/Device Used, Standing Balance walker, front-wheeled (P)   -ZT       Row Name 11/08/23 1100          Plan of Care Review    Plan of Care Reviewed With patient (P)   -ZT       Row Name 11/08/23 1100          Positioning and Restraints    Pre-Treatment Position in bed  (P)   -ZT     Post Treatment Position bed (P)   -ZT     In Bed call light within reach;encouraged to call for assist (P)   No bed alarm pre treatment, no bed alarm post treatment  -ZT       Row Name 11/08/23 1100          Therapy Assessment/Plan (PT)    Rehab Potential (PT) good, to achieve stated therapy goals (P)   -ZT     Criteria for Skilled Interventions Met (PT) yes;skilled treatment is necessary (P)   -ZT     Therapy Frequency (PT) daily (P)   -ZT     Predicted Duration of Therapy Intervention (PT) 10 days (P)   -ZT       Row Name 11/08/23 1100          Progress Summary (PT)    Progress Toward Functional Goals (PT) progress toward functional goals is good (P)   -ZT     Daily Progress Summary (PT) Pt presents today with the ability to tolerate functional activity. He still fatigues relatively quickly and required one rest break during the walk today. He continues to require skilled PT services to address these deficits so that he can safely return to his PLOF. (P)   -ZT       Row Name 11/08/23 1100          Therapy Plan Review/Discharge Plan (PT)    Therapy Plan Review (PT) evaluation/treatment results reviewed;care plan/treatment goals reviewed;participants included;patient (P)   -ZT               User Key  (r) = Recorded By, (t) = Taken By, (c) = Cosigned By      Initials Name Provider Type    Rui Ramirez, PT Student PT Student                      PT Recommendation and Plan  Anticipated Discharge Disposition (PT): (P) inpatient rehabilitation facility, home with home health, home with assist  Planned Therapy Interventions (PT): (P) bed mobility training, balance training, gait training, stair training, strengthening, transfer training  Therapy Frequency (PT): (P) daily  Progress Summary (PT)  Progress Toward Functional Goals (PT): (P) progress toward functional goals is good  Daily Progress Summary (PT): (P) Pt presents today with the ability to tolerate functional activity. He still fatigues  relatively quickly and required one rest break during the walk today. He continues to require skilled PT services to address these deficits so that he can safely return to his PLOF.  Plan of Care Reviewed With: (P) patient   Outcome Measures       Row Name 11/08/23 1100 11/07/23 1500 11/06/23 1100       How much help from another person do you currently need...    Turning from your back to your side while in flat bed without using bedrails? 4 (P)   -ZT 4  -DP (r) ZT (t) DP (c) 3  -CHLOE (r) AM (t) CHLOE (c)    Moving from lying on back to sitting on the side of a flat bed without bedrails? 4 (P)   -ZT 4  -DP (r) ZT (t) DP (c) 3  -CHLOE (r) AM (t) CHLOE (c)    Moving to and from a bed to a chair (including a wheelchair)? 3 (P)   -ZT 3  -DP (r) ZT (t) DP (c) 3  -CHLOE (r) AM (t) CHLOE (c)    Standing up from a chair using your arms (e.g., wheelchair, bedside chair)? 3 (P)   -ZT 3  -DP (r) ZT (t) DP (c) 2  -CHLOE (r) AM (t) CHLOE (c)    Climbing 3-5 steps with a railing? 2 (P)   -ZT 2  -DP (r) ZT (t) DP (c) 2  -CHLOE (r) AM (t) CHLOE (c)    To walk in hospital room? 3 (P)   -ZT 3  -DP (r) ZT (t) DP (c) 2  -CHLOE (r) AM (t) CHLOE (c)    AM-PAC 6 Clicks Score (PT) 19 (P)   -ZT 19  -DP (r) ZT (t) 15  -CHLOE (r) AM (t)    Highest level of mobility 6 --> Walked 10 steps or more (P)   -ZT 6 --> Walked 10 steps or more  -DP (r) ZT (t) 4 --> Transferred to chair/commode  -CHLOE (r) AM (t)       Functional Assessment    Outcome Measure Options -- -- AM-PAC 6 Clicks Basic Mobility (PT)  -CHLOE (r) AM (t) CHLOE (c)              User Key  (r) = Recorded By, (t) = Taken By, (c) = Cosigned By      Initials Name Provider Type    DP Lupe De Oliveira, PT Physical Therapist    CHLOEWoodrow Raya, PT Physical Therapist    ZT Rui Lara, PT Student PT Student    AM Melba Irwin, PT Student PT Student                     Time Calculation:    PT Charges       Row Name 11/08/23 1150             Time Calculation    PT Received On 11/08/23 (P)   -ZT         Timed Charges    87709 -  Gait Training Minutes  15 (P)   -ZT         Total Minutes    Timed Charges Total Minutes 15 (P)   -ZT       Total Minutes 15 (P)   -ZT                User Key  (r) = Recorded By, (t) = Taken By, (c) = Cosigned By      Initials Name Provider Type    Rui Ramirez, PT Student PT Student                  Therapy Charges for Today       Code Description Service Date Service Provider Modifiers Qty    [id number] HC GAIT TRAINING EA 15 MIN 11/7/2023 Rui Lara, PT Student GP 1            PT G-Codes  Outcome Measure Options: AM-PAC 6 Clicks Daily Activity (OT), Optimal Instrument  AM-PAC 6 Clicks Score (PT): (P) 19  AM-PAC 6 Clicks Score (OT): 21    Rui Lara PT Student  11/8/2023

## 2023-11-09 LAB
ALBUMIN SERPL-MCNC: 2 G/DL (ref 3.5–5.2)
ALBUMIN/GLOB SERPL: 0.4 G/DL
ALP SERPL-CCNC: 46 U/L (ref 39–117)
ALT SERPL W P-5'-P-CCNC: <5 U/L (ref 1–41)
ANION GAP SERPL CALCULATED.3IONS-SCNC: 8.9 MMOL/L (ref 5–15)
AST SERPL-CCNC: 8 U/L (ref 1–40)
BACTERIA SPEC AEROBE CULT: ABNORMAL
BACTERIA SPEC AEROBE CULT: ABNORMAL
BILIRUB SERPL-MCNC: 0.3 MG/DL (ref 0–1.2)
BUN SERPL-MCNC: 95 MG/DL (ref 8–23)
BUN/CREAT SERPL: 28.4 (ref 7–25)
CALCIUM SPEC-SCNC: 7.4 MG/DL (ref 8.6–10.5)
CHLORIDE SERPL-SCNC: 104 MMOL/L (ref 98–107)
CO2 SERPL-SCNC: 16.1 MMOL/L (ref 22–29)
CREAT SERPL-MCNC: 3.34 MG/DL (ref 0.76–1.27)
EGFRCR SERPLBLD CKD-EPI 2021: 18.2 ML/MIN/1.73
GLOBULIN UR ELPH-MCNC: 4.8 GM/DL
GLUCOSE SERPL-MCNC: 156 MG/DL (ref 65–99)
GRAM STN SPEC: ABNORMAL
ISOLATED FROM: ABNORMAL
ISOLATED FROM: ABNORMAL
POTASSIUM SERPL-SCNC: 4.9 MMOL/L (ref 3.5–5.2)
PROT SERPL-MCNC: 6.8 G/DL (ref 6–8.5)
SODIUM SERPL-SCNC: 129 MMOL/L (ref 136–145)

## 2023-11-09 PROCEDURE — 97116 GAIT TRAINING THERAPY: CPT

## 2023-11-09 PROCEDURE — 80053 COMPREHEN METABOLIC PANEL: CPT | Performed by: INTERNAL MEDICINE

## 2023-11-09 PROCEDURE — 97110 THERAPEUTIC EXERCISES: CPT

## 2023-11-09 PROCEDURE — 25010000002 CEFAZOLIN IN DEXTROSE 2-4 GM/100ML-% SOLUTION: Performed by: INTERNAL MEDICINE

## 2023-11-09 PROCEDURE — 25010000002 HEPARIN (PORCINE) PER 1000 UNITS: Performed by: INTERNAL MEDICINE

## 2023-11-09 RX ADMIN — CEFAZOLIN SODIUM 2000 MG: 2 INJECTION, SOLUTION INTRAVENOUS at 06:18

## 2023-11-09 RX ADMIN — Medication 10 ML: at 22:29

## 2023-11-09 RX ADMIN — DEXTROSE AND SODIUM CHLORIDE 100 ML/HR: 5; 450 INJECTION, SOLUTION INTRAVENOUS at 16:53

## 2023-11-09 RX ADMIN — PANTOPRAZOLE SODIUM 40 MG: 40 TABLET, DELAYED RELEASE ORAL at 06:18

## 2023-11-09 RX ADMIN — CEFAZOLIN SODIUM 2000 MG: 2 INJECTION, SOLUTION INTRAVENOUS at 22:29

## 2023-11-09 RX ADMIN — HEPARIN SODIUM 5000 UNITS: 5000 INJECTION INTRAVENOUS; SUBCUTANEOUS at 08:02

## 2023-11-09 RX ADMIN — DEXTROSE AND SODIUM CHLORIDE 100 ML/HR: 5; 450 INJECTION, SOLUTION INTRAVENOUS at 06:18

## 2023-11-09 RX ADMIN — Medication 10 ML: at 08:04

## 2023-11-09 RX ADMIN — CEFAZOLIN SODIUM 2000 MG: 2 INJECTION, SOLUTION INTRAVENOUS at 13:55

## 2023-11-09 RX ADMIN — AMLODIPINE BESYLATE 10 MG: 5 TABLET ORAL at 08:02

## 2023-11-09 RX ADMIN — HYDROCODONE BITARTRATE AND ACETAMINOPHEN 1 TABLET: 10; 325 TABLET ORAL at 22:29

## 2023-11-09 RX ADMIN — Medication 1 MG: at 08:03

## 2023-11-09 RX ADMIN — HYDROCODONE BITARTRATE AND ACETAMINOPHEN 1 TABLET: 10; 325 TABLET ORAL at 16:53

## 2023-11-09 RX ADMIN — TAMSULOSIN HYDROCHLORIDE 0.8 MG: 0.4 CAPSULE ORAL at 08:02

## 2023-11-09 RX ADMIN — HEPARIN SODIUM 5000 UNITS: 5000 INJECTION INTRAVENOUS; SUBCUTANEOUS at 22:29

## 2023-11-09 RX ADMIN — CYANOCOBALAMIN TAB 500 MCG 1000 MCG: 500 TAB at 08:02

## 2023-11-09 RX ADMIN — HYDROCODONE BITARTRATE AND ACETAMINOPHEN 1 TABLET: 10; 325 TABLET ORAL at 08:03

## 2023-11-09 NOTE — THERAPY TREATMENT NOTE
Acute Care - Physical Therapy Treatment Note   [facility]     Patient Name: Blair Lira  : 1945  MRN: [id number]  Today's Date: 2023      Visit Dx:     ICD-10-CM ICD-9-CM   1. Myalgia  M79.10 729.1   2. Multiple myeloma, remission status unspecified  C90.00 203.00   3. Urinary tract infection without hematuria, site unspecified  N39.0 599.0   4. Weakness generalized  R53.1 780.79   5. Difficulty in walking  R26.2 719.7   6. Decreased activities of daily living (ADL)  Z78.9 V49.89     Patient Active Problem List   Diagnosis    Rotator cuff tear, right    Impingement syndrome of right shoulder    Acute renal failure superimposed on chronic kidney disease    Pancytopenia    Abnormal weight loss    Hypertension    CKD (chronic kidney disease) stage 3, GFR 30-59 ml/min    Moderate malnutrition    UTI (urinary tract infection)    Weakness generalized    Intractable pain     Past Medical History:   Diagnosis Date    BPH (benign prostatic hyperplasia)     Frozen shoulder     Hyperlipidemia     Hypertension 10/26/2023    Periarthritis of shoulder     Rotator cuff disorder, right     Tennis elbow     RIGHT     Past Surgical History:   Procedure Laterality Date    CATARACT EXTRACTION EXTRACAPSULAR W/ INTRAOCULAR LENS IMPLANTATION Bilateral     COLONOSCOPY      JOINT REPLACEMENT Right     THR    SHOULDER ARTHROSCOPY W/ ROTATOR CUFF REPAIR Right 3/29/2023    Procedure: SHOULDER ARTHROSCOPY WITH ROTATOR CUFF REPAIR, SUBCROMIAL DECOMPRESSION,DISTAL CLAVICLE RESECTION;  Surgeon: Gustavo Samuels MD;  Location: [facility] OR [facility] [place];  Service: Orthopedics;  Laterality: Right;    SHOULDER SURGERY Left     SCOPE     PT Assessment (last 12 hours)       PT Evaluation and Treatment       Row Name 23 0918          Physical Therapy Time and Intention    Subjective Information complains of;fatigue  -DK     Document Type therapy note (daily note)  -DK     Mode of Treatment individual therapy;physical therapy  -DK     Patient  Effort good  -DK     Symptoms Noted During/After Treatment fatigue  -DK     Comment Pt reports walking with a cane prior to hospital admission, due to right leg weakness.  -DK       Row Name 11/09/23 0918          Pain    Pretreatment Pain Rating 0/10 - no pain  -DK     Posttreatment Pain Rating 0/10 - no pain  -DK       Row Name 11/09/23 0918          Cognition    Affect/Mental Status (Cognition) WNL  -DK     Orientation Status (Cognition) oriented x 4  -DK     Follows Commands (Cognition) WNL  -DK     Cognitive Function WNL  -DK     Personal Safety Interventions supervised activity;nonskid shoes/slippers when out of bed;gait belt  -DK       Row Name 11/09/23 0918          Bed Mobility    Bed Mobility supine-sit  -DK     All Activities, [place] (Bed Mobility) supervision  -DK     Supine-Sit [place] (Bed Mobility) supervision  -DK     Assistive Device (Bed Mobility) bed rails  -DK       Row Name 11/09/23 0918          Transfers    Transfers sit-stand transfer;stand-sit transfer  -DK       Row Name 11/09/23 0918          Sit-Stand Transfer    Sit-Stand [place] (Transfers) standby assist  -DK     Assistive Device (Sit-Stand Transfers) walker, front-wheeled  -DK       Row Name 11/09/23 0918          Stand-Sit Transfer    Stand-Sit [place] (Transfers) standby assist  -DK     Assistive Device (Stand-Sit Transfers) walker, front-wheeled  -DK       Row Name 11/09/23 0918          Gait/Stairs (Locomotion)    Gait/Stairs Locomotion gait/ambulation independence;gait/ambulation assistive device;distance ambulated;gait pattern  -DK     [place] Level (Gait) standby assist  -DK     Assistive Device (Gait) walker, front-wheeled  -     Distance in Feet (Gait) 400  -DK     Pattern (Gait) step-through  -DK     Comment, (Gait/Stairs) Pt ambulated on room air with a rolling walker. Pt did walk the final 20' with no assistive device, with the IV pole, into the bathroom and back to the bed. He reports  independence in the room with/without the IV pole, to the bathoom.  Pt was left sitting independently EOB post treatment.  -       Row Name 11/09/23 0918          Safety Issues, Functional Mobility    Impairments Affecting Function (Mobility) endurance/activity tolerance;pain;strength  -       Row Name 11/09/23 0918          Balance    Balance Assessment sitting static balance;sitting dynamic balance;standing static balance;standing dynamic balance  -     Static Sitting Balance supervision  -     Dynamic Sitting Balance supervision  -     Position, Sitting Balance unsupported;sitting edge of bed  -     Static Standing Balance standby assist  -     Dynamic Standing Balance standby assist;contact guard;1-person assist  -     Position/Device Used, Standing Balance walker, front-wheeled  -     Balance Interventions standing;dynamic;tandem gait  -       Row Name 11/09/23 0918          Motor Skills    Motor Skills --  therapeutic exercises  -     Therapeutic Exercise hip;knee;ankle  -       Row Name 11/09/23 0918          Hip (Therapeutic Exercise)    Hip (Therapeutic Exercise) AROM (active range of motion)  -     Hip AROM (Therapeutic Exercise) bilateral;flexion;extension;aBduction;aDduction;sitting;15 repititions  -       Row Name 11/09/23 0918          Knee (Therapeutic Exercise)    Knee (Therapeutic Exercise) AROM (active range of motion)  -     Knee AROM (Therapeutic Exercise) bilateral;flexion;extension;LAQ (long arc quad);sitting;15 repititions  -       Row Name 11/09/23 0918          Ankle (Therapeutic Exercise)    Ankle (Therapeutic Exercise) AROM (active range of motion)  -     Ankle AROM (Therapeutic Exercise) bilateral;dorsiflexion;plantarflexion;sitting;20 repititions  -       Row Name 11/09/23 0918          Plan of Care Review    Plan of Care Reviewed With patient  -MAVEE     Progress improving  -       Row Name 11/09/23 0918          Positioning and Restraints     Pre-Treatment Position in bed  -DK     Post Treatment Position bed  -DK     In Bed supine;sitting EOB;side rails up x2;call light within reach;encouraged to call for assist  -DK       Row Name 11/09/23 0918          Therapy Assessment/Plan (PT)    Rehab Potential (PT) good, to achieve stated therapy goals  -DK     Criteria for Skilled Interventions Met (PT) skilled treatment is necessary  -DK     Therapy Frequency (PT) daily  -DK     Problem List (PT) problems related to;balance;strength;pain  -DK     Activity Limitations Related to Problem List (PT) unable to ambulate safely  -DK       Row Name 11/09/23 0918          Progress Summary (PT)    Progress Toward Functional Goals (PT) progress toward functional goals is good  -DK               User Key  (r) = Recorded By, (t) = Taken By, (c) = Cosigned By      Initials Name Provider Type    Stacia Casey PTA Physical Therapist Assistant                      PT Recommendation and Plan  Planned Therapy Interventions (PT): balance training, bed mobility training, gait training, home exercise program, strengthening, transfer training  Therapy Frequency (PT): daily  Progress Summary (PT)  Progress Toward Functional Goals (PT): progress toward functional goals is good  Plan of Care Reviewed With: patient  Progress: improving   Outcome Measures       Row Name 11/09/23 0917 11/08/23 1100 11/07/23 1500       How much help from another person do you currently need...    Turning from your back to your side while in flat bed without using bedrails? 4  -DK 4  -CHLOE (r) ZT (t) CHLOE (c) 4  -DP (r) ZT (t) DP (c)    Moving from lying on back to sitting on the side of a flat bed without bedrails? 4  -DK 4  -CHLOE (r) ZT (t) CHLOE (c) 4  -DP (r) ZT (t) DP (c)    Moving to and from a bed to a chair (including a wheelchair)? 3  -DK 3  -CHLOE (r) ZT (t) CHLOE (c) 3  -DP (r) ZT (t) DP (c)    Standing up from a chair using your arms (e.g., wheelchair, bedside chair)? 4  -DK 3  -CHLOE (r) ZT (t) CHLOE (c) 3  -DP  (r) ZT (t) DP (c)    Climbing 3-5 steps with a railing? 3  -DK 2  -CHLOE (r) ZT (t) CHLOE (c) 2  -DP (r) ZT (t) DP (c)    To walk in hospital room? 3  -DK 3  -CHLOE (r) ZT (t) CHLOE (c) 3  -DP (r) ZT (t) DP (c)    AM-PAC 6 Clicks Score (PT) 21  -DK 19  -CHLOE (r) ZT (t) 19  -DP (r) ZT (t)    Highest level of mobility 6 --> Walked 10 steps or more  -DK 6 --> Walked 10 steps or more  -CHLOE (r) ZT (t) 6 --> Walked 10 steps or more  -DP (r) ZT (t)       Functional Assessment    Outcome Measure Options AM-PAC 6 Clicks Basic Mobility (PT)  -DK -- --      Row Name 11/06/23 1100             How much help from another person do you currently need...    Turning from your back to your side while in flat bed without using bedrails? 3  -CHLOE (r) AM (t) CHLOE (c)      Moving from lying on back to sitting on the side of a flat bed without bedrails? 3  -CHLOE (r) AM (t) CHLOE (c)      Moving to and from a bed to a chair (including a wheelchair)? 3  -CHLOE (r) AM (t) CHLOE (c)      Standing up from a chair using your arms (e.g., wheelchair, bedside chair)? 2  -CHLOE (r) AM (t) CHLOE (c)      Climbing 3-5 steps with a railing? 2  -CHLOE (r) AM (t) CHLOE (c)      To walk in hospital room? 2  -CHLOE (r) AM (t) CHLOE (c)      AM-PAC 6 Clicks Score (PT) 15  -CHLOE (r) AM (t)      Highest level of mobility 4 --> Transferred to chair/commode  -CHLOE (r) AM (t)         Functional Assessment    Outcome Measure Options AM-PAC 6 Clicks Basic Mobility (PT)  -CHLOE (r) AM (t) CHLOE (c)                User Key  (r) = Recorded By, (t) = Taken By, (c) = Cosigned By      Initials Name Provider Type    Stacia Casey, PTA Physical Therapist Assistant    Lupe Celis, PT Physical Therapist    Woodrow Vivar, PT Physical Therapist    Rui Ramirez, PT Student PT Student    AM Melba Irwin, PT Student PT Student                     Time Calculation:    PT Charges       Row Name 11/09/23 0924             Time Calculation    PT Received On 11/09/23  -MAEVE      PT Goal Re-Cert Due Date 11/15/23   -DK         Timed Charges    17909 - PT Therapeutic Exercise Minutes 12  -DK      93539 - Gait Training Minutes  8  -DK      04972 - PT Therapeutic Activity Minutes 6  -DK         Total Minutes    Timed Charges Total Minutes 26  -DK       Total Minutes 26  -DK                User Key  (r) = Recorded By, (t) = Taken By, (c) = Cosigned By      Initials Name Provider Type    Stacia Casey PTA Physical Therapist Assistant                  Therapy Charges for Today       Code Description Service Date Service Provider Modifiers Qty    37758991026 HC PT THER PROC EA 15 MIN 11/9/2023 Stacia Ordonez PTA GP 1    55156563684 HC GAIT TRAINING EA 15 MIN 11/9/2023 Stacia Ordonez PTA GP 1            PT G-Codes  Outcome Measure Options: AM-PAC 6 Clicks Basic Mobility (PT)  AM-PAC 6 Clicks Score (PT): 21  AM-PAC 6 Clicks Score (OT): 21    Stacia Ordonez PTA  11/9/2023

## 2023-11-09 NOTE — PLAN OF CARE
Goal Outcome Evaluation:  Plan of Care Reviewed With: patient        Progress: no change  Outcome Evaluation: No changes in patient condition this shift. Standby to bathroom. Denies pain this shift. VS WDL. Verbalizes no needs at this time. None observed.

## 2023-11-09 NOTE — PLAN OF CARE
Goal Outcome Evaluation:  Plan of Care Reviewed With: patient        Progress: no change  Outcome Evaluation: Patient A/Ox4. On room air. Standby to bathroom. Scheduled Norco administered for pain. No other issues/needs at this time.

## 2023-11-09 NOTE — PROGRESS NOTES
[facility]     Progress Note    Patient Name: Blair Lira  : 1945  MRN: [id number]  Primary Care Physician:  Babs Summers APRN  Date of admission: 2023    Subjective   Subjective     Chief Complaint: Stable and doing well UTI with blood cultures positive on IV antibiotic,    HPI: Patient stable doing well pain is well controlled    Review of Systems   All systems were reviewed and negative except for: Has been reviewed    Objective   Objective     Vitals:   Temp:  [97.7 °F (36.5 °C)-98.2 °F (36.8 °C)] 97.9 °F (36.6 °C)  Heart Rate:  [] 73  Resp:  [16-20] 20  BP: (117-149)/(59-76) 126/60    Physical Exam    Constitutional: Awake, alert   Eyes: PERRLA, sclerae anicteric, no conjunctival injection   HENT: NCAT, mucous membranes moist   Neck: Supple, no thyromegaly, no lymphadenopathy, trachea midline   Respiratory: Clear to auscultation bilaterally, nonlabored respirations    Cardiovascular: RRR, no murmurs, rubs, or gallops, palpable pedal pulses bilaterally   Gastrointestinal: Positive bowel sounds, soft, nontender, nondistended   Musculoskeletal: No bilateral ankle edema, no clubbing or cyanosis to extremities   Psychiatric: Appropriate affect, cooperative   Neurologic: Oriented x 3, strength symmetric in all extremities, Cranial Nerves grossly intact to confrontation, speech clear   Skin: No rashes   No change  Result Review    Result Review:  I have personally reviewed the results from the time of this admission to 2023 07:29 EST and agree with these findings:  [x]  Laboratory  [x]  Microbiology  []  Radiology  []  EKG/Telemetry   []  Cardiology/Vascular   []  Pathology  []  Old records  []  Other:  Most notable findings include: GI with positive blood cultures    Assessment & Plan   Assessment / Plan     Brief Patient Summary:  Blair Lira is a 78 y.o. male who patient recently diagnosed multiple myeloma    Active Hospital Problems:  Active Hospital Problems    Diagnosis      **UTI (urinary tract infection)     Weakness generalized     Intractable pain     Acute renal failure superimposed on chronic kidney disease        Plan:   Stable having good urine output    DVT prophylaxis:  Medical DVT prophylaxis orders are present.    CODE STATUS:   Code Status (Patient has no pulse and is not breathing): CPR (Attempt to Resuscitate)  Medical Interventions (Patient has pulse or is breathing): Full Support    Disposition:  I expect patient to be discharged after the patient has been stabilized.    Electronically signed by Vamsi Mason MD, 11/09/23, 7:29 AM EST.      Part of this note may be an electronic transcription/translation of spoken language to printed text using the Dragon Dictation System.

## 2023-11-10 PROBLEM — A41.9 SEPSIS: Status: ACTIVE | Noted: 2023-11-10

## 2023-11-10 LAB
ALBUMIN SERPL-MCNC: 1.9 G/DL (ref 3.5–5.2)
ALBUMIN/GLOB SERPL: 0.4 G/DL
ALP SERPL-CCNC: 40 U/L (ref 39–117)
ALT SERPL W P-5'-P-CCNC: <5 U/L (ref 1–41)
ANION GAP SERPL CALCULATED.3IONS-SCNC: 10 MMOL/L (ref 5–15)
ANISOCYTOSIS BLD QL: ABNORMAL
AST SERPL-CCNC: 9 U/L (ref 1–40)
BACTERIA SPEC AEROBE CULT: ABNORMAL
BILIRUB SERPL-MCNC: 0.2 MG/DL (ref 0–1.2)
BUN SERPL-MCNC: 89 MG/DL (ref 8–23)
BUN/CREAT SERPL: 29.1 (ref 7–25)
BURR CELLS BLD QL SMEAR: ABNORMAL
CALCIUM SPEC-SCNC: 7.3 MG/DL (ref 8.6–10.5)
CHLORIDE SERPL-SCNC: 104 MMOL/L (ref 98–107)
CO2 SERPL-SCNC: 17 MMOL/L (ref 22–29)
CREAT SERPL-MCNC: 3.06 MG/DL (ref 0.76–1.27)
CYTO UR: NORMAL
DEPRECATED RDW RBC AUTO: 66.2 FL (ref 37–54)
DIFF PNL MAR: NORMAL
EGFRCR SERPLBLD CKD-EPI 2021: 20.3 ML/MIN/1.73
ERYTHROCYTE [DISTWIDTH] IN BLOOD BY AUTOMATED COUNT: 18.2 % (ref 12.3–15.4)
GLOBULIN UR ELPH-MCNC: 4.5 GM/DL
GLUCOSE SERPL-MCNC: 134 MG/DL (ref 65–99)
GRAM STN SPEC: ABNORMAL
GRAM STN SPEC: ABNORMAL
HCT VFR BLD AUTO: 26.5 % (ref 37.5–51)
HGB BLD-MCNC: 8.6 G/DL (ref 13–17.7)
ISOLATED FROM: ABNORMAL
LAB AP ASPIRATE SMEAR: NORMAL
LAB AP CASE REPORT: NORMAL
LAB AP CBC AND DIFFERENTIAL: NORMAL
LAB AP CLINICAL INFORMATION: NORMAL
LAB AP CLOT SECTION: NORMAL
LAB AP CORE BIOPSY: NORMAL
LAB AP DIAGNOSIS COMMENT: NORMAL
LAB AP SPECIAL STAINS: NORMAL
LARGE PLATELETS: ABNORMAL
LYMPHOCYTES # BLD MANUAL: 0.74 10*3/MM3 (ref 0.7–3.1)
LYMPHOCYTES NFR BLD MANUAL: 2 % (ref 5–12)
MACROCYTES BLD QL SMEAR: ABNORMAL
MCH RBC QN AUTO: 32 PG (ref 26.6–33)
MCHC RBC AUTO-ENTMCNC: 32.5 G/DL (ref 31.5–35.7)
MCV RBC AUTO: 98.5 FL (ref 79–97)
METAMYELOCYTES NFR BLD MANUAL: 2 % (ref 0–0)
MONOCYTES # BLD: 0.19 10*3/MM3 (ref 0.1–0.9)
MYELOCYTES NFR BLD MANUAL: 0 % (ref 0–0)
NEUTROPHILS # BLD AUTO: 8.14 10*3/MM3 (ref 1.7–7)
NEUTROPHILS NFR BLD MANUAL: 87 % (ref 42.7–76)
NEUTS BAND NFR BLD MANUAL: 1 % (ref 0–5)
OVALOCYTES BLD QL SMEAR: ABNORMAL
PATH REPORT.FINAL DX SPEC: NORMAL
PATH REPORT.GROSS SPEC: NORMAL
PLATELET # BLD AUTO: 168 10*3/MM3 (ref 140–450)
PMV BLD AUTO: 9.2 FL (ref 6–12)
POIKILOCYTOSIS BLD QL SMEAR: ABNORMAL
POLYCHROMASIA BLD QL SMEAR: ABNORMAL
POTASSIUM SERPL-SCNC: 4.6 MMOL/L (ref 3.5–5.2)
PROCALCITONIN SERPL-MCNC: 0.45 NG/ML (ref 0–0.25)
PROT SERPL-MCNC: 6.4 G/DL (ref 6–8.5)
RBC # BLD AUTO: 2.69 10*6/MM3 (ref 4.14–5.8)
SMALL PLATELETS BLD QL SMEAR: ADEQUATE
SODIUM SERPL-SCNC: 131 MMOL/L (ref 136–145)
VARIANT LYMPHS NFR BLD MANUAL: 8 % (ref 19.6–45.3)
WBC MORPH BLD: NORMAL
WBC NRBC COR # BLD: 9.25 10*3/MM3 (ref 3.4–10.8)

## 2023-11-10 PROCEDURE — 84145 PROCALCITONIN (PCT): CPT | Performed by: INTERNAL MEDICINE

## 2023-11-10 PROCEDURE — 63710000001 DEXAMETHASONE PER 0.25 MG: Performed by: INTERNAL MEDICINE

## 2023-11-10 PROCEDURE — 25010000002 CEFAZOLIN IN DEXTROSE 2-4 GM/100ML-% SOLUTION: Performed by: INTERNAL MEDICINE

## 2023-11-10 PROCEDURE — 84154 ASSAY OF PSA FREE: CPT | Performed by: INTERNAL MEDICINE

## 2023-11-10 PROCEDURE — 80053 COMPREHEN METABOLIC PANEL: CPT | Performed by: INTERNAL MEDICINE

## 2023-11-10 PROCEDURE — 85007 BL SMEAR W/DIFF WBC COUNT: CPT | Performed by: INTERNAL MEDICINE

## 2023-11-10 PROCEDURE — 85025 COMPLETE CBC W/AUTO DIFF WBC: CPT | Performed by: INTERNAL MEDICINE

## 2023-11-10 PROCEDURE — 84153 ASSAY OF PSA TOTAL: CPT | Performed by: INTERNAL MEDICINE

## 2023-11-10 PROCEDURE — 25010000002 HEPARIN (PORCINE) PER 1000 UNITS: Performed by: INTERNAL MEDICINE

## 2023-11-10 RX ORDER — DEXTROSE AND SODIUM CHLORIDE 5; .45 G/100ML; G/100ML
100 INJECTION, SOLUTION INTRAVENOUS CONTINUOUS
Status: DISCONTINUED | OUTPATIENT
Start: 2023-11-10 | End: 2023-11-11

## 2023-11-10 RX ORDER — DEXAMETHASONE 4 MG/1
4 TABLET ORAL EVERY 12 HOURS SCHEDULED
Status: DISCONTINUED | OUTPATIENT
Start: 2023-11-10 | End: 2023-11-12

## 2023-11-10 RX ADMIN — Medication 1 MG: at 08:32

## 2023-11-10 RX ADMIN — Medication 10 ML: at 21:16

## 2023-11-10 RX ADMIN — DEXAMETHASONE 4 MG: 4 TABLET ORAL at 21:16

## 2023-11-10 RX ADMIN — CYANOCOBALAMIN TAB 500 MCG 1000 MCG: 500 TAB at 08:31

## 2023-11-10 RX ADMIN — HYDROCODONE BITARTRATE AND ACETAMINOPHEN 1 TABLET: 7.5; 325 TABLET ORAL at 03:53

## 2023-11-10 RX ADMIN — HYDROCODONE BITARTRATE AND ACETAMINOPHEN 1 TABLET: 10; 325 TABLET ORAL at 21:16

## 2023-11-10 RX ADMIN — DEXTROSE AND SODIUM CHLORIDE 100 ML/HR: 5; 450 INJECTION, SOLUTION INTRAVENOUS at 14:13

## 2023-11-10 RX ADMIN — DEXAMETHASONE 4 MG: 4 TABLET ORAL at 10:10

## 2023-11-10 RX ADMIN — DEXTROSE AND SODIUM CHLORIDE 100 ML/HR: 5; 450 INJECTION, SOLUTION INTRAVENOUS at 03:53

## 2023-11-10 RX ADMIN — PANTOPRAZOLE SODIUM 40 MG: 40 TABLET, DELAYED RELEASE ORAL at 05:41

## 2023-11-10 RX ADMIN — HYDROCODONE BITARTRATE AND ACETAMINOPHEN 1 TABLET: 10; 325 TABLET ORAL at 08:32

## 2023-11-10 RX ADMIN — HYDROCODONE BITARTRATE AND ACETAMINOPHEN 1 TABLET: 10; 325 TABLET ORAL at 15:04

## 2023-11-10 RX ADMIN — HEPARIN SODIUM 5000 UNITS: 5000 INJECTION INTRAVENOUS; SUBCUTANEOUS at 08:32

## 2023-11-10 RX ADMIN — CEFAZOLIN SODIUM 2000 MG: 2 INJECTION, SOLUTION INTRAVENOUS at 05:41

## 2023-11-10 RX ADMIN — AMLODIPINE BESYLATE 10 MG: 5 TABLET ORAL at 08:32

## 2023-11-10 RX ADMIN — CEFAZOLIN SODIUM 2000 MG: 2 INJECTION, SOLUTION INTRAVENOUS at 14:13

## 2023-11-10 RX ADMIN — CEFAZOLIN SODIUM 2000 MG: 2 INJECTION, SOLUTION INTRAVENOUS at 21:16

## 2023-11-10 RX ADMIN — TAMSULOSIN HYDROCHLORIDE 0.8 MG: 0.4 CAPSULE ORAL at 08:31

## 2023-11-10 RX ADMIN — HEPARIN SODIUM 5000 UNITS: 5000 INJECTION INTRAVENOUS; SUBCUTANEOUS at 21:16

## 2023-11-10 RX ADMIN — Medication 10 ML: at 08:32

## 2023-11-10 NOTE — PLAN OF CARE
Goal Outcome Evaluation:  Plan of Care Reviewed With: patient        Progress: no change  Outcome Evaluation: Patient A/Ox4. On room air. Up ad praneeth. Scheduled Norco administered for pain. Fluids continued. No other issues/needs at this time.

## 2023-11-10 NOTE — PROGRESS NOTES
[facility]     Progress Note    Patient Name: Blair Lira  : 1945  MRN: [id number]  Primary Care Physician:  Babs Summers APRN  Date of admission: 2023    Subjective   Subjective     Chief Complaint: Patient with newly diagnosed multiple myeloma is to be started on treatment however before that he developed urinary tract infection with blood cultures positive recently receiving IV antibiotic also had developed a very severe pain which has gotten better will resume his Decadron and will give him some IV fluids as BUN/creatinine tending to be up    HPI: Patient hopefully will be discharged by this weekend and then we will start his treatment protocol as an outpatient    Review of Systems   All systems were reviewed and negative except for: As been reviewed    Objective   Objective     Vitals:   Temp:  [97.3 °F (36.3 °C)-98.8 °F (37.1 °C)] 97.3 °F (36.3 °C)  Heart Rate:  [68-88] 79  Resp:  [18-20] 20  BP: (125-150)/(56-71) 125/71    Physical Exam    Constitutional: Awake, alert   Eyes: PERRLA, sclerae anicteric, no conjunctival injection   HENT: NCAT, mucous membranes moist   Neck: Supple, no thyromegaly, no lymphadenopathy, trachea midline   Respiratory: Clear to auscultation bilaterally, nonlabored respirations    Cardiovascular: RRR, no murmurs, rubs, or gallops, palpable pedal pulses bilaterally   Gastrointestinal: Positive bowel sounds, soft, nontender, nondistended   Musculoskeletal: No bilateral ankle edema, no clubbing or cyanosis to extremities   Psychiatric: Appropriate affect, cooperative   Neurologic: Oriented x 3, strength symmetric in all extremities, Cranial Nerves grossly intact to confrontation, speech clear   Skin: No rashes   No change  Result Review    Result Review:  I have personally reviewed the results from the time of this admission to 11/10/2023 09:01 EST and agree with these findings:  [x]  Laboratory  []  Microbiology  []  Radiology  []  EKG/Telemetry   []   Cardiology/Vascular   []  Pathology  []  Old records  []  Other:  Most notable findings include: Has been reviewed and discussed    Assessment & Plan   Assessment / Plan     Brief Patient Summary:  Blair Lira is a 78 y.o. male who with newly diagnosed multiple myeloma    Active Hospital Problems:  Active Hospital Problems    Diagnosis     **UTI (urinary tract infection)     Weakness generalized     Intractable pain     Acute renal failure superimposed on chronic kidney disease        Plan:   Newly diagnosed multiple myeloma with renal failure and anemia with severe pain and urinary tract infection continue antibiotics will add IV fluids    DVT prophylaxis:  Medical DVT prophylaxis orders are present.    CODE STATUS:   Code Status (Patient has no pulse and is not breathing): CPR (Attempt to Resuscitate)  Medical Interventions (Patient has pulse or is breathing): Full Support    Disposition:  I expect patient to be discharged after the patient has been stabilized.    Electronically signed by Vamsi Mason MD, 11/10/23, 9:01 AM EST.      Part of this note may be an electronic transcription/translation of spoken language to printed text using the Dragon Dictation System.

## 2023-11-10 NOTE — THERAPY TREATMENT NOTE
Acute Care - Physical Therapy Treatment Note   [facility]     Patient Name: Blair Lira  : 1945  MRN: [id number]  Today's Date: 11/10/2023      Visit Dx:     ICD-10-CM ICD-9-CM   1. Myalgia  M79.10 729.1   2. Multiple myeloma, remission status unspecified  C90.00 203.00   3. Urinary tract infection without hematuria, site unspecified  N39.0 599.0   4. Weakness generalized  R53.1 780.79   5. Difficulty in walking  R26.2 719.7   6. Decreased activities of daily living (ADL)  Z78.9 V49.89     Patient Active Problem List   Diagnosis    Rotator cuff tear, right    Impingement syndrome of right shoulder    Acute renal failure superimposed on chronic kidney disease    Pancytopenia    Abnormal weight loss    Hypertension    CKD (chronic kidney disease) stage 3, GFR 30-59 ml/min    Moderate malnutrition    UTI (urinary tract infection)    Weakness generalized    Intractable pain     Past Medical History:   Diagnosis Date    BPH (benign prostatic hyperplasia)     Frozen shoulder     Hyperlipidemia     Hypertension 10/26/2023    Periarthritis of shoulder     Rotator cuff disorder, right     Tennis elbow     RIGHT     Past Surgical History:   Procedure Laterality Date    CATARACT EXTRACTION EXTRACAPSULAR W/ INTRAOCULAR LENS IMPLANTATION Bilateral     COLONOSCOPY      JOINT REPLACEMENT Right     THR    SHOULDER ARTHROSCOPY W/ ROTATOR CUFF REPAIR Right 3/29/2023    Procedure: SHOULDER ARTHROSCOPY WITH ROTATOR CUFF REPAIR, SUBCROMIAL DECOMPRESSION,DISTAL CLAVICLE RESECTION;  Surgeon: Gustavo Samuels MD;  [place] [facility] [place]  Service: Orthopedics;  Laterality: Right;    SHOULDER SURGERY Left     SCOPE     PT Assessment (last 12 hours)       PT Evaluation and Treatment       Row Name 11/10/23 1400          Physical Therapy Time and Intention    Session Not Performed patient/family declined treatment (P)   -AM               User Key  (r) = Recorded By, (t) = Taken By, (c) = Cosigned By      Initials Name Provider Type     Melba Kahn, PT Student PT Student                      PT Recommendation and Plan  Anticipated Discharge Disposition (PT): inpatient rehabilitation facility, home with home health, home with assist (pt reports he has daughter and grandson near by who can help him if needed)  Planned Therapy Interventions (PT): balance training, bed mobility training, gait training, neuromuscular re-education, strengthening, transfer training  Therapy Frequency (PT): daily  Plan of Care Reviewed With: patient  Progress: no change  Outcome Evaluation: Pt presents with decreased strength, decreased activity tolerance and decreased balance limiting pt's performance with ambulation and functional transfers. Pt will benefit from PT services to increased tolerance to ambulation for return to maximum level of function with mobility.   Outcome Measures       Row Name 11/09/23 0917 11/08/23 1100 11/07/23 1500       How much help from another person do you currently need...    Turning from your back to your side while in flat bed without using bedrails? 4  -DK 4  -CHLOE (r) ZT (t) CHLOE (c) 4  -DP (r) ZT (t) DP (c)    Moving from lying on back to sitting on the side of a flat bed without bedrails? 4  -DK 4  -CHLOE (r) ZT (t) CHLOE (c) 4  -DP (r) ZT (t) DP (c)    Moving to and from a bed to a chair (including a wheelchair)? 3  -DK 3  -CHLOE (r) ZT (t) CHLOE (c) 3  -DP (r) ZT (t) DP (c)    Standing up from a chair using your arms (e.g., wheelchair, bedside chair)? 4  -DK 3  -CHLOE (r) ZT (t) CHLOE (c) 3  -DP (r) ZT (t) DP (c)    Climbing 3-5 steps with a railing? 3  -DK 2  -CHLOE (r) ZT (t) CHLOE (c) 2  -DP (r) ZT (t) DP (c)    To walk in hospital room? 3  -DK 3  -CHLOE (r) ZT (t) CHLOE (c) 3  -DP (r) ZT (t) DP (c)    AM-PAC 6 Clicks Score (PT) 21  -DK 19  -CHLOE (r) ZT (t) 19  -DP (r) ZT (t)    Highest level of mobility 6 --> Walked 10 steps or more  -DK 6 --> Walked 10 steps or more  -CHLOE (r) ZT (t) 6 --> Walked 10 steps or more  -DP (r) ZT (t)       Functional  Assessment    Outcome Measure Options AM-PAC 6 Clicks Basic Mobility (PT)  -DK -- --              User Key  (r) = Recorded By, (t) = Taken By, (c) = Cosigned By      Initials Name Provider Type    Stacia Casey, PTA Physical Therapist Assistant    Lupe Celis, PT Physical Therapist    Woodrow Vivar, PT Physical Therapist    Rui Ramirez, PT Student PT Student                     Time Calculation:    PT Charges       Row Name 11/10/23 7337             Time Calculation    PT Received On 11/10/23 (P)   -AM                User Key  (r) = Recorded By, (t) = Taken By, (c) = Cosigned By      Initials Name Provider Type    Melba Kahn, PT Student PT Student                      PT G-Codes  Outcome Measure Options: AM-PAC 6 Clicks Basic Mobility (PT)  AM-PAC 6 Clicks Score (PT): 23  AM-PAC 6 Clicks Score (OT): 21    Melba Irwin, PT Student  11/10/2023

## 2023-11-10 NOTE — PROGRESS NOTES
[facility]     Progress Note    Patient Name: Blair Lira  : 1945  MRN: [id number]  Primary Care Physician:  Babs Summers APRN  Date of admission: 2023      Subjective   Brief summary.  Patient admitted with increased pain and diagnosed with UTI further blood cultures positive.      HPI:  Patient sitting at bedside and eating, family at bedside.  No chest pain, no shortness of breath but complains of increasing swelling in legs again and complains of pain.  Also patient felt that he had some bleeding when he urinated but figured out that it was bleeding from his scrotum scratch.      Review of Systems     Bilateral leg pains, no fever chills, no shortness of breath.  Continues to have extreme weakness but overall better than couple of days ago.  Urine output picking      Objective     Vitals:   Temp:  [97.9 °F (36.6 °C)-98.6 °F (37 °C)] 98.2 °F (36.8 °C)  Heart Rate:  [73-90] 88  Resp:  [18-20] 18  BP: (117-150)/(57-69) 130/57    Physical Exam :     Elderly male not in acute distress.  Pallor noted..  Heart regular.  Lungs clear.  Abdomen soft and obese.  Lower extremities with edema.  Scrotum with a small pinhole area of open wound, most likely small papule which bled.      Result Review:  I have personally reviewed the results from the time of this admission to 2023 19:20 EST and agree with these findings:  []  Laboratory  []  Microbiology  []  Radiology  []  EKG/Telemetry   []  Cardiology/Vascular   []  Pathology  []  Old records  []  Other:           Assessment / Plan       Active Hospital Problems:  Active Hospital Problems    Diagnosis     **UTI (urinary tract infection)     Weakness generalized     Intractable pain     Acute renal failure superimposed on chronic kidney disease        Plan:   Continue IV antibiotics, repeat blood cultures ordered.  Patient improving.  I will reconsult nephrologist for follow-up.  Renal functions improving.  Possible discharge in 1 to 2 days    DVT  prophylaxis:  Medical DVT prophylaxis orders are present.    CODE STATUS:   Code Status (Patient has no pulse and is not breathing): CPR (Attempt to Resuscitate)  Medical Interventions (Patient has pulse or is breathing): Full Support                Electronically signed by Elieser Pederson MD, 11/09/23, 7:23 PM EST.

## 2023-11-10 NOTE — PROGRESS NOTES
[facility]     Progress Note    Patient Name: Blair Lira  : 1945  MRN: [id number]  Primary Care Physician:  Babs Summers APRN  Date of admission: 2023      Subjective   Brief summary.  Patient admitted with intractable pain and further diagnosed with UTI and sepsis      HPI:  Anxious, no fever chills, feeling better.  Slightly confused at times.  Pain improving.  Urinary frequency persists      Review of Systems     Leg pains improving.  Swelling persist.  No chest pain, no shortness of breath, anxiety, urinary frequency and burning      Objective     Vitals:   Temp:  [97.3 °F (36.3 °C)-98.8 °F (37.1 °C)] 97.3 °F (36.3 °C)  Heart Rate:  [] 82  Resp:  [20] 20  BP: (125-150)/(56-71) 127/62    Physical Exam :     Elderly male not in acute distress.  Pallor noted..  Heart regular.  Lungs clear.  Abdomen soft and obese.  Lower extremities with edema.  Patient appears nervous and anxious today    Result Review:  I have personally reviewed the results from the time of this admission to 11/10/2023 18:28 EST and agree with these findings:  []  Laboratory  []  Microbiology  []  Radiology  []  EKG/Telemetry   []  Cardiology/Vascular   []  Pathology  []  Old records  []  Other:       Repeat blood cultures positive    Assessment / Plan       Active Hospital Problems:  Active Hospital Problems    Diagnosis     **UTI (urinary tract infection)     Sepsis     Weakness generalized     Intractable pain     Acute renal failure superimposed on chronic kidney disease        Plan:   Patient with sepsis and UTI.  Cultures remains positive despite antibiotics.  At this point we will check a PSA.  Continue antibiotics.  Patient had recent CT done 10 days ago.  May need NIKHIL if repeat blood cultures continues to remain positive.  Continue pain meds, PT OT.  Nephrology consulted for chronic renal failure.    DVT prophylaxis:  Medical DVT prophylaxis orders are present.    CODE STATUS:   Code Status (Patient has no  pulse and is not breathing): CPR (Attempt to Resuscitate)  Medical Interventions (Patient has pulse or is breathing): Full Support                  Electronically signed by Elieser Pederson MD, 11/10/23, 6:29 PM EST.

## 2023-11-11 PROBLEM — C90.00 MULTIPLE MYELOMA: Status: ACTIVE | Noted: 2023-11-11

## 2023-11-11 LAB
ALBUMIN SERPL-MCNC: 2.2 G/DL (ref 3.5–5.2)
ALBUMIN/GLOB SERPL: 0.4 G/DL
ALP SERPL-CCNC: 51 U/L (ref 39–117)
ALT SERPL W P-5'-P-CCNC: <5 U/L (ref 1–41)
ANION GAP SERPL CALCULATED.3IONS-SCNC: 9.8 MMOL/L (ref 5–15)
AST SERPL-CCNC: 10 U/L (ref 1–40)
BACTERIA SPEC AEROBE CULT: ABNORMAL
BASOPHILS # BLD AUTO: 0.02 10*3/MM3 (ref 0–0.2)
BASOPHILS NFR BLD AUTO: 0.2 % (ref 0–1.5)
BILIRUB SERPL-MCNC: 0.3 MG/DL (ref 0–1.2)
BUN SERPL-MCNC: 84 MG/DL (ref 8–23)
BUN/CREAT SERPL: 28 (ref 7–25)
CALCIUM SPEC-SCNC: 7.5 MG/DL (ref 8.6–10.5)
CHLORIDE SERPL-SCNC: 103 MMOL/L (ref 98–107)
CO2 SERPL-SCNC: 16.2 MMOL/L (ref 22–29)
CREAT SERPL-MCNC: 3 MG/DL (ref 0.76–1.27)
DEPRECATED RDW RBC AUTO: 68 FL (ref 37–54)
EGFRCR SERPLBLD CKD-EPI 2021: 20.7 ML/MIN/1.73
EOSINOPHIL # BLD AUTO: 0 10*3/MM3 (ref 0–0.4)
EOSINOPHIL NFR BLD AUTO: 0 % (ref 0.3–6.2)
ERYTHROCYTE [DISTWIDTH] IN BLOOD BY AUTOMATED COUNT: 18.2 % (ref 12.3–15.4)
GLOBULIN UR ELPH-MCNC: 5.1 GM/DL
GLUCOSE SERPL-MCNC: 174 MG/DL (ref 65–99)
GRAM STN SPEC: ABNORMAL
HCT VFR BLD AUTO: 28.4 % (ref 37.5–51)
HGB BLD-MCNC: 9.1 G/DL (ref 13–17.7)
IMM GRANULOCYTES # BLD AUTO: 0.19 10*3/MM3 (ref 0–0.05)
IMM GRANULOCYTES NFR BLD AUTO: 1.7 % (ref 0–0.5)
ISOLATED FROM: ABNORMAL
LYMPHOCYTES # BLD AUTO: 0.92 10*3/MM3 (ref 0.7–3.1)
LYMPHOCYTES NFR BLD AUTO: 8.3 % (ref 19.6–45.3)
MCH RBC QN AUTO: 32.2 PG (ref 26.6–33)
MCHC RBC AUTO-ENTMCNC: 32 G/DL (ref 31.5–35.7)
MCV RBC AUTO: 100.4 FL (ref 79–97)
MONOCYTES # BLD AUTO: 0.19 10*3/MM3 (ref 0.1–0.9)
MONOCYTES NFR BLD AUTO: 1.7 % (ref 5–12)
NEUTROPHILS NFR BLD AUTO: 88.1 % (ref 42.7–76)
NEUTROPHILS NFR BLD AUTO: 9.82 10*3/MM3 (ref 1.7–7)
NRBC BLD AUTO-RTO: 0 /100 WBC (ref 0–0.2)
PLATELET # BLD AUTO: 180 10*3/MM3 (ref 140–450)
PMV BLD AUTO: 9.3 FL (ref 6–12)
POTASSIUM SERPL-SCNC: 5.2 MMOL/L (ref 3.5–5.2)
PROT SERPL-MCNC: 7.3 G/DL (ref 6–8.5)
RBC # BLD AUTO: 2.83 10*6/MM3 (ref 4.14–5.8)
SODIUM SERPL-SCNC: 129 MMOL/L (ref 136–145)
WBC NRBC COR # BLD: 11.14 10*3/MM3 (ref 3.4–10.8)

## 2023-11-11 PROCEDURE — 25010000002 CEFAZOLIN IN DEXTROSE 2-4 GM/100ML-% SOLUTION: Performed by: INTERNAL MEDICINE

## 2023-11-11 PROCEDURE — 80053 COMPREHEN METABOLIC PANEL: CPT | Performed by: INTERNAL MEDICINE

## 2023-11-11 PROCEDURE — 63710000001 DEXAMETHASONE PER 0.25 MG: Performed by: INTERNAL MEDICINE

## 2023-11-11 PROCEDURE — 25010000002 HEPARIN (PORCINE) PER 1000 UNITS: Performed by: INTERNAL MEDICINE

## 2023-11-11 PROCEDURE — 85025 COMPLETE CBC W/AUTO DIFF WBC: CPT | Performed by: INTERNAL MEDICINE

## 2023-11-11 RX ADMIN — Medication 1 MG: at 08:55

## 2023-11-11 RX ADMIN — Medication 10 ML: at 21:53

## 2023-11-11 RX ADMIN — AMLODIPINE BESYLATE 10 MG: 5 TABLET ORAL at 08:55

## 2023-11-11 RX ADMIN — HYDROCODONE BITARTRATE AND ACETAMINOPHEN 1 TABLET: 10; 325 TABLET ORAL at 08:55

## 2023-11-11 RX ADMIN — CEFAZOLIN SODIUM 2000 MG: 2 INJECTION, SOLUTION INTRAVENOUS at 21:53

## 2023-11-11 RX ADMIN — Medication 10 ML: at 08:56

## 2023-11-11 RX ADMIN — TAMSULOSIN HYDROCHLORIDE 0.8 MG: 0.4 CAPSULE ORAL at 08:55

## 2023-11-11 RX ADMIN — CYANOCOBALAMIN TAB 500 MCG 1000 MCG: 500 TAB at 08:55

## 2023-11-11 RX ADMIN — PANTOPRAZOLE SODIUM 40 MG: 40 TABLET, DELAYED RELEASE ORAL at 05:59

## 2023-11-11 RX ADMIN — DEXAMETHASONE 4 MG: 4 TABLET ORAL at 08:55

## 2023-11-11 RX ADMIN — CEFAZOLIN SODIUM 2000 MG: 2 INJECTION, SOLUTION INTRAVENOUS at 14:20

## 2023-11-11 RX ADMIN — CEFAZOLIN SODIUM 2000 MG: 2 INJECTION, SOLUTION INTRAVENOUS at 05:59

## 2023-11-11 RX ADMIN — HYDROCODONE BITARTRATE AND ACETAMINOPHEN 1 TABLET: 10; 325 TABLET ORAL at 16:03

## 2023-11-11 RX ADMIN — HEPARIN SODIUM 5000 UNITS: 5000 INJECTION INTRAVENOUS; SUBCUTANEOUS at 08:55

## 2023-11-11 RX ADMIN — HEPARIN SODIUM 5000 UNITS: 5000 INJECTION INTRAVENOUS; SUBCUTANEOUS at 21:53

## 2023-11-11 RX ADMIN — HYDROCODONE BITARTRATE AND ACETAMINOPHEN 1 TABLET: 10; 325 TABLET ORAL at 21:53

## 2023-11-11 RX ADMIN — DEXAMETHASONE 4 MG: 4 TABLET ORAL at 21:53

## 2023-11-11 NOTE — PROGRESS NOTES
[facility]     Progress Note    Patient Name: Blair Lira  : 1945  MRN: [id number]  Primary Care Physician:  Babs Summers APRN  Date of admission: 2023    Subjective   Subjective     Chief Complaint: Patient stable and doing well but continues to have positive blood cultures may need to get NIKHIL he does have multiple myeloma and renal failure continue the current management    HPI: With multiple myeloma and bacteremia    Review of Systems   All systems were reviewed and negative except for: Has been reviewed    Objective   Objective     Vitals:   Temp:  [97.3 °F (36.3 °C)-98.4 °F (36.9 °C)] 98.4 °F (36.9 °C)  Heart Rate:  [] 74  Resp:  [20] 20  BP: (125-139)/(57-71) 128/66    Physical Exam    Constitutional: Awake, alert   Eyes: PERRLA, sclerae anicteric, no conjunctival injection   HENT: NCAT, mucous membranes moist   Neck: Supple, no thyromegaly, no lymphadenopathy, trachea midline   Respiratory: Clear to auscultation bilaterally, nonlabored respirations    Cardiovascular: RRR, no murmurs, rubs, or gallops, palpable pedal pulses bilaterally   Gastrointestinal: Positive bowel sounds, soft, nontender, nondistended   Musculoskeletal: No bilateral ankle edema, no clubbing or cyanosis to extremities   Psychiatric: Appropriate affect, cooperative   Neurologic: Oriented x 3, strength symmetric in all extremities, Cranial Nerves grossly intact to confrontation, speech clear   Skin: No rashes   No change  Result Review    Result Review:  I have personally reviewed the results from the time of this admission to 2023 05:44 EST and agree with these findings:  [x]  Laboratory  []  Microbiology  []  Radiology  []  EKG/Telemetry   []  Cardiology/Vascular   []  Pathology  []  Old records  []  Other:  Most notable findings include: Bacteremia    Assessment & Plan   Assessment / Plan     Brief Patient Summary:  Blair Lira is a 78 y.o. male who patient with multiple myeloma bacteremia being  treated with antibiotics and steroids    Active Hospital Problems:  Active Hospital Problems    Diagnosis     **UTI (urinary tract infection)     Sepsis     Weakness generalized     Intractable pain     Acute renal failure superimposed on chronic kidney disease        Plan:   Continue current management    DVT prophylaxis:  Medical DVT prophylaxis orders are present.    CODE STATUS:   Code Status (Patient has no pulse and is not breathing): CPR (Attempt to Resuscitate)  Medical Interventions (Patient has pulse or is breathing): Full Support    Disposition:  I expect patient to be discharged after has been stabilized.    Electronically signed by Vamsi Mason MD, 11/11/23, 5:44 AM EST.      Part of this note may be an electronic transcription/translation of spoken language to printed text using the Dragon Dictation System.

## 2023-11-11 NOTE — CONSULTS
[facility]   Consult Note    Patient Name: Blair Lira  : 1945  MRN: [id number]  Primary Care Physician:  Babs Summers APRN  Referring Physician: No ref. provider found  Date of admission: 2023    Subjective   Subjective     Reason for Consult/ Chief Complaint: CKD    HPI:  Blair Lira is a 78 y.o. male with past medical history of BPH, hypertension hyperlipidemia and recent diagnosis of multiple myeloma who was admitted about 4 days ago for right leg swelling and diffuse body ache.  Imaging was negative for DVT.  He was subsequently found to have UTI and bacteremia.  Both urine and blood cultures were positive for Staph aureus.  He has been managed with antibiotics.  There was concern for sepsis as well.  Creatinine on presentation was 2.8 but it up trended to 3.6 however it has come down to 3 again.  Nephrology is consulted given history of CKD.      Review of Systems    Review of Systems   Constitutional:  Negative for appetite change, fatigue and fever.   HENT: Negative.     Eyes: Negative.    Respiratory: Negative.     Cardiovascular:  Positive for leg swelling.   Gastrointestinal: Negative.    Endocrine: Negative.    Genitourinary: Negative.    Musculoskeletal: Negative.    Skin: Negative.    Neurological: Negative.    Hematological:         Multiple myeloma   Psychiatric/Behavioral: Negative.          Personal History     Past Medical History:   Diagnosis Date    BPH (benign prostatic hyperplasia)     Frozen shoulder     Hyperlipidemia     Hypertension 10/26/2023    Periarthritis of shoulder     Rotator cuff disorder, right     Tennis elbow     RIGHT       Past Surgical History:   Procedure Laterality Date    CATARACT EXTRACTION EXTRACAPSULAR W/ INTRAOCULAR LENS IMPLANTATION Bilateral     COLONOSCOPY      JOINT REPLACEMENT Right     THR    SHOULDER ARTHROSCOPY W/ ROTATOR CUFF REPAIR Right 3/29/2023    Procedure: SHOULDER ARTHROSCOPY WITH ROTATOR CUFF REPAIR, SUBCROMIAL  DECOMPRESSION,DISTAL CLAVICLE RESECTION;  Surgeon: Gustavo Samuels MD;  Location: [facility] OR [facility];  Service: Orthopedics;  Laterality: Right;    SHOULDER SURGERY Left     SCOPE       Family History: family history is not on file. Otherwise pertinent FHx was reviewed and not pertinent to current issue.    Social History:  reports that he has never smoked. He has never used smokeless tobacco. He reports that he does not currently use alcohol. He reports that he does not use drugs.    Home Medications:  amLODIPine, atorvastatin, folic acid, pantoprazole, tamsulosin, and vitamin B-12    Allergies:  No Known Allergies    Objective    Objective     Vitals:   Temp:  [97.3 °F (36.3 °C)-98.4 °F (36.9 °C)] 97.9 °F (36.6 °C)  Heart Rate:  [] 78  Resp:  [16-20] 16  BP: (126-139)/(57-71) 135/63    Physical Exam:    Physical Exam  Vitals reviewed.   Constitutional:       Appearance: Normal appearance.   HENT:      Head: Normocephalic and atraumatic.   Eyes:      General: No scleral icterus.     Pupils: Pupils are equal, round, and reactive to light.   Cardiovascular:      Rate and Rhythm: Normal rate and regular rhythm.      Heart sounds: Normal heart sounds. No murmur heard.  Pulmonary:      Effort: Pulmonary effort is normal.      Breath sounds: Normal breath sounds.   Abdominal:      General: There is no distension.      Palpations: Abdomen is soft.      Tenderness: There is no abdominal tenderness. There is no guarding.   Musculoskeletal:      Cervical back: Neck supple.      Right lower leg: Edema present.      Left lower leg: Edema present.   Skin:     General: Skin is warm and dry.      Findings: No rash.   Neurological:      General: No focal deficit present.      Mental Status: He is alert and oriented to person, place, and time.   Psychiatric:         Mood and Affect: Mood normal.         Behavior: Behavior normal.          Result Review    Result Review:  I have personally reviewed the results from the time of  this admission to 11/11/2023 08:35 EST and agree with these findings:  [x]  Laboratory  [x]  Microbiology  [x]  Radiology  []  EKG/Telemetry   []  Cardiology/Vascular   []  Pathology  []  Old records  []  Other:      Assessment & Plan   Assessment / Plan     Active Hospital Problems:  Active Hospital Problems    Diagnosis     **UTI (urinary tract infection)     Sepsis     Weakness generalized     Intractable pain     Acute renal failure superimposed on chronic kidney disease        Plan:   -UTI and bacteremia have been managed with antibiotics however cultures remain positive.  Patient might end up needing NIKHIL  -Patient had some LAINA on presentation as creatinine up trended to 3.6 from 2.8 however it has down trended to 3 again.  The uprising of creatinine could have been secondary to hemodynamics fluctuation due to potential sepsis.  -Patient has CKD 4 at baseline due to multiple myeloma.  He is supposed to start chemotherapy in outpatient  -Patient has hyponatremia today as he was getting D5W half-normal saline.  I have stopped it and started fluid restriction of 1.5L  -Blood pressures well controlled  -We will continue to follow the patient      Electronically signed by Dash Don MD, 11/11/23, 8:35 AM EST.

## 2023-11-11 NOTE — PROGRESS NOTES
[facility]   Progress Note    Patient Name: Blair Lira  : 1945  MRN: [id number]  Primary Care Physician: Babs Summers APRN  Date of admission: 2023    Subjective   Subjective     Chief Complaint:   Follow-up on sepsis    History of Present Illness    Patient is awake and alert.  No new complaints    Review of Systems   Constitutional: Negative.        Objective   Objective     Vitals:  Temp:  [97.9 °F (36.6 °C)-98.4 °F (36.9 °C)] 98.1 °F (36.7 °C)  Heart Rate:  [74-93] 78  Resp:  [16-20] 18  BP: (128-156)/(57-71) 134/58    Physical Exam  Constitutional:       Appearance: Normal appearance.   Cardiovascular:      Rate and Rhythm: Normal rate.   Pulmonary:      Effort: Pulmonary effort is normal.   Neurological:      General: No focal deficit present.      Mental Status: He is alert.         Result Review    Result Review:  I have personally reviewed the results from the time of this admission to 23 5:07 PM EST and agree with these findings:  []  Laboratory  []  Microbiology  []  Radiology  []  EKG/Telemetry   []  Cardiology/Vascular   []  Pathology  []  Old records  []  Other:    Assessment & Plan   Assessment / Plan       Active Hospital Problems:  Active Hospital Problems    Diagnosis     **UTI (urinary tract infection)     Multiple myeloma     Sepsis     Weakness generalized     Intractable pain     Acute renal failure superimposed on chronic kidney disease        Plan:   Continue current management.  Increase activity                    Electronically signed by Riley Marr MD, 23, 5:07 PM EST.

## 2023-11-11 NOTE — PLAN OF CARE
Goal Outcome Evaluation:  Plan of Care Reviewed With: patient      Pt alert and oriented. Has sat up in chair most of this shift. Pt is now on 1500ml fluid restriction. Lungs clear. Pain medication has been effective. Call light in reach

## 2023-11-12 ENCOUNTER — APPOINTMENT (OUTPATIENT)
Dept: GENERAL RADIOLOGY | Facility: HOSPITAL | Age: 78
DRG: 871 | End: 2023-11-12
Payer: MEDICARE

## 2023-11-12 PROBLEM — R78.81 PERSISTENT BACTEREMIA: Status: ACTIVE | Noted: 2023-11-12

## 2023-11-12 PROBLEM — I50.32 CHRONIC DIASTOLIC (CONGESTIVE) HEART FAILURE: Status: ACTIVE | Noted: 2023-11-12

## 2023-11-12 PROBLEM — R60.9 EDEMA: Status: ACTIVE | Noted: 2023-11-12

## 2023-11-12 LAB
BACTERIA SPEC AEROBE CULT: NORMAL
BACTERIA UR QL AUTO: NORMAL /HPF
BILIRUB UR QL STRIP: NEGATIVE
CLARITY UR: CLEAR
COLOR UR: YELLOW
GLUCOSE UR STRIP-MCNC: ABNORMAL MG/DL
HGB UR QL STRIP.AUTO: ABNORMAL
HYALINE CASTS UR QL AUTO: NORMAL /LPF
KETONES UR QL STRIP: NEGATIVE
LEUKOCYTE ESTERASE UR QL STRIP.AUTO: NEGATIVE
NITRITE UR QL STRIP: NEGATIVE
PH UR STRIP.AUTO: <=5 [PH] (ref 5–8)
PROT UR QL STRIP: NEGATIVE
PSA FREE MFR SERPL: 24.8 %
PSA FREE SERPL-MCNC: 1.04 NG/ML
PSA SERPL-MCNC: 4.2 NG/ML (ref 0–4)
RBC # UR STRIP: NORMAL /HPF
REF LAB TEST METHOD: NORMAL
REFLEX: ABNORMAL
SP GR UR STRIP: 1.01 (ref 1–1.03)
SQUAMOUS #/AREA URNS HPF: NORMAL /HPF
UROBILINOGEN UR QL STRIP: ABNORMAL
WBC # UR STRIP: NORMAL /HPF

## 2023-11-12 PROCEDURE — 25010000002 CEFAZOLIN IN DEXTROSE 2-4 GM/100ML-% SOLUTION: Performed by: INTERNAL MEDICINE

## 2023-11-12 PROCEDURE — 97110 THERAPEUTIC EXERCISES: CPT

## 2023-11-12 PROCEDURE — 25010000002 DEXAMETHASONE SODIUM PHOSPHATE 10 MG/ML SOLUTION: Performed by: INTERNAL MEDICINE

## 2023-11-12 PROCEDURE — 71045 X-RAY EXAM CHEST 1 VIEW: CPT

## 2023-11-12 PROCEDURE — 25010000002 HEPARIN (PORCINE) PER 1000 UNITS: Performed by: INTERNAL MEDICINE

## 2023-11-12 PROCEDURE — 87086 URINE CULTURE/COLONY COUNT: CPT | Performed by: INTERNAL MEDICINE

## 2023-11-12 PROCEDURE — 87040 BLOOD CULTURE FOR BACTERIA: CPT | Performed by: INTERNAL MEDICINE

## 2023-11-12 PROCEDURE — 25010000002 FUROSEMIDE PER 20 MG: Performed by: INTERNAL MEDICINE

## 2023-11-12 PROCEDURE — 63710000001 DEXAMETHASONE PER 0.25 MG: Performed by: INTERNAL MEDICINE

## 2023-11-12 PROCEDURE — 97116 GAIT TRAINING THERAPY: CPT

## 2023-11-12 PROCEDURE — 81001 URINALYSIS AUTO W/SCOPE: CPT | Performed by: INTERNAL MEDICINE

## 2023-11-12 RX ORDER — DEXAMETHASONE SODIUM PHOSPHATE 10 MG/ML
8 INJECTION, SOLUTION INTRAMUSCULAR; INTRAVENOUS EVERY 8 HOURS
Status: DISCONTINUED | OUTPATIENT
Start: 2023-11-12 | End: 2023-11-13

## 2023-11-12 RX ORDER — FUROSEMIDE 10 MG/ML
80 INJECTION INTRAMUSCULAR; INTRAVENOUS
Status: DISCONTINUED | OUTPATIENT
Start: 2023-11-12 | End: 2023-11-14

## 2023-11-12 RX ADMIN — DEXAMETHASONE 4 MG: 4 TABLET ORAL at 08:57

## 2023-11-12 RX ADMIN — PANTOPRAZOLE SODIUM 40 MG: 40 TABLET, DELAYED RELEASE ORAL at 06:36

## 2023-11-12 RX ADMIN — AMLODIPINE BESYLATE 10 MG: 5 TABLET ORAL at 08:57

## 2023-11-12 RX ADMIN — HYDROCODONE BITARTRATE AND ACETAMINOPHEN 1 TABLET: 10; 325 TABLET ORAL at 16:28

## 2023-11-12 RX ADMIN — DEXAMETHASONE SODIUM PHOSPHATE 8 MG: 10 INJECTION INTRAMUSCULAR; INTRAVENOUS at 19:40

## 2023-11-12 RX ADMIN — Medication 1 MG: at 08:57

## 2023-11-12 RX ADMIN — TAMSULOSIN HYDROCHLORIDE 0.8 MG: 0.4 CAPSULE ORAL at 08:57

## 2023-11-12 RX ADMIN — HEPARIN SODIUM 5000 UNITS: 5000 INJECTION INTRAVENOUS; SUBCUTANEOUS at 22:15

## 2023-11-12 RX ADMIN — CYANOCOBALAMIN TAB 500 MCG 1000 MCG: 500 TAB at 08:57

## 2023-11-12 RX ADMIN — HEPARIN SODIUM 5000 UNITS: 5000 INJECTION INTRAVENOUS; SUBCUTANEOUS at 08:58

## 2023-11-12 RX ADMIN — HYDROCODONE BITARTRATE AND ACETAMINOPHEN 1 TABLET: 10; 325 TABLET ORAL at 22:15

## 2023-11-12 RX ADMIN — FUROSEMIDE 80 MG: 10 INJECTION, SOLUTION INTRAMUSCULAR; INTRAVENOUS at 18:14

## 2023-11-12 RX ADMIN — Medication 10 ML: at 08:58

## 2023-11-12 RX ADMIN — Medication 10 ML: at 22:16

## 2023-11-12 RX ADMIN — FUROSEMIDE 80 MG: 10 INJECTION, SOLUTION INTRAMUSCULAR; INTRAVENOUS at 09:01

## 2023-11-12 RX ADMIN — CEFAZOLIN SODIUM 2000 MG: 2 INJECTION, SOLUTION INTRAVENOUS at 22:15

## 2023-11-12 RX ADMIN — HYDROCODONE BITARTRATE AND ACETAMINOPHEN 1 TABLET: 10; 325 TABLET ORAL at 08:57

## 2023-11-12 RX ADMIN — CEFAZOLIN SODIUM 2000 MG: 2 INJECTION, SOLUTION INTRAVENOUS at 06:36

## 2023-11-12 RX ADMIN — DEXAMETHASONE SODIUM PHOSPHATE 8 MG: 10 INJECTION INTRAMUSCULAR; INTRAVENOUS at 11:42

## 2023-11-12 RX ADMIN — CEFAZOLIN SODIUM 2000 MG: 2 INJECTION, SOLUTION INTRAVENOUS at 14:28

## 2023-11-12 NOTE — PLAN OF CARE
Goal Outcome Evaluation:  Plan of Care Reviewed With: patient      Pt alert and oriented. Up to chair most of shift. Pt started on iv lasix with good results. Pt has had a dry hacking cough this shift. Continues with lower extremity edema and trace upper extremity edema. Started iv steroid today as well.

## 2023-11-12 NOTE — PROGRESS NOTES
[facility]   Progress Note    Patient Name: Blair Lira  : 1945  MRN: [id number]  Primary Care Physician: Babs Summers APRN  Date of admission: 2023    Subjective   Subjective     Chief Complaint:   Follow-up on sepsis    History of Present Illness    Patient is awake and alert.  No new complaints    Review of Systems   Constitutional: Negative.        Objective   Objective     Vitals:  Temp:  [97.7 °F (36.5 °C)-98.6 °F (37 °C)] 97.9 °F (36.6 °C)  Heart Rate:  [77-99] 99  Resp:  [18] 18  BP: (129-156)/(55-78) 138/68    Physical Exam  Constitutional:       Appearance: Normal appearance.   Cardiovascular:      Rate and Rhythm: Normal rate.   Pulmonary:      Effort: Pulmonary effort is normal.   Neurological:      General: No focal deficit present.      Mental Status: He is alert.         Result Review    Result Review:  I have personally reviewed the results from the time of this admission to 23 5:07 PM EST and agree with these findings:  []  Laboratory  []  Microbiology  []  Radiology  []  EKG/Telemetry   []  Cardiology/Vascular   [x]  Pathology  []  Old records  []  Other:    Assessment & Plan   Assessment / Plan       Active Hospital Problems:  Active Hospital Problems    Diagnosis     **UTI (urinary tract infection)     Persistent bacteremia     Edema     Chronic diastolic (congestive) heart failure     Multiple myeloma     Sepsis     Weakness generalized     Intractable pain     Acute renal failure superimposed on chronic kidney disease        Plan:   Continue current management.                      Electronically signed by Riley Marr MD, 23, 5:07 PM EST.

## 2023-11-12 NOTE — PROGRESS NOTES
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    HPI: Patient will be continued on increased dosages of Decadron 2D echo has been done because the etiology of positive blood culture is not very clear    Review of Systems   All systems were reviewed and negative except for: Been reviewed    Objective   Objective     Vitals:   Temp:  [97.7 °F (36.5 °C)-98.6 °F (37 °C)] 98.6 °F (37 °C)  Heart Rate:  [77-94] 78  Resp:  [16-18] 18  BP: (129-156)/(55-78) 135/78    Physical Exam    Constitutional: Awake, alert   Eyes: PERRLA, sclerae anicteric, no conjunctival injection   HENT: NCAT, mucous membranes moist   Neck: Supple, no thyromegaly, no lymphadenopathy, trachea midline   Respiratory: Clear to auscultation bilaterally, nonlabored respirations    Cardiovascular: RRR, no murmurs, rubs, or gallops, palpable pedal pulses bilaterally   Gastrointestinal: Positive bowel sounds, soft, nontender, nondistended   Musculoskeletal: No bilateral ankle edema, no clubbing or cyanosis to extremities   Psychiatric: Appropriate affect, cooperative   Neurologic: Oriented x 3, strength symmetric in all extremities, Cranial Nerves grossly intact to confrontation, speech clear   Skin: No rashes   Edema feet 2+  Result Review    Result Review:  I have personally reviewed the results from the time of this admission to 2023 10:17 EST and agree with these findings:  [x]  Laboratory  []  Microbiology  []  Radiology  []  EKG/Telemetry   []  Cardiology/Vascular   []  Pathology  []  Old  records  []  Other:  Most notable findings include: Edema feet 2+ with increasing pain on the right side    Assessment & Plan   Assessment / Plan     Brief Patient Summary:  Blair Lira is a 78 y.o. male who multiple myeloma with extensive involvement    Active Hospital Problems:  Active Hospital Problems    Diagnosis     **UTI (urinary tract infection)     Persistent bacteremia     Edema     Chronic diastolic (congestive) heart failure     Multiple myeloma     Sepsis     Weakness generalized     Intractable pain     Acute renal failure superimposed on chronic kidney disease        Plan:   Continue the IV Decadron    DVT prophylaxis:  Medical DVT prophylaxis orders are present.    CODE STATUS:   Code Status (Patient has no pulse and is not breathing): CPR (Attempt to Resuscitate)  Medical Interventions (Patient has pulse or is breathing): Full Support    Disposition:  I expect patient to be discharged IV Decadron.    Electronically signed by Vamsi Mason MD, 11/12/23, 10:17 AM EST.      Part of this note may be an electronic transcription/translation of spoken language to printed text using the Dragon Dictation System.

## 2023-11-12 NOTE — PROGRESS NOTES
[facility]     Progress Note    Patient Name: Blair Lira  : 1945  MRN: [id number]  Primary Care Physician:  Babs Summers APRN  Date of admission: 2023    Subjective patient is feeling fine and he did not express any new concerns however patient looks volume overloaded  He is denying shortness of breath however he looks grossly volume overloaded  Review of Systems  Constitutional:        Weakness tiredness fatigue  Eyes:                       No blurry vision, eye discharge, eye irritation, eye pain  HEENT:                   No acute hair loss, earache and discharge, nasal congestion or discharge, sore throat, postnasal drip  Respiratory:           No shortness of breath coughing sputum production wheezing hemoptysis pleuritic chest pain  Cardiovascular:     No chest pain, orthopnea, PND, dizziness, palpitation, lower extremity edema  Gastrointestinal:   No nausea vomiting diarrhea abdominal pain constipation  Genitourinary:       No urinary incontinence, hesitancy, frequency, urgency, dysuria  Hematologic:         No bruising, bleeding, pallor, lymphadenopathy  Endocrine:            No coldness, hot flashes, polyuria, abnormal hair growth  Musculoskeletal:    No body pains, aches, arthritic pains, muscle pain ,muscle wasting  Psychiatric:          No low or high mood, anxiety, hallucinations, delusions  Skin.                      No rash, ulcers, bruising, itching  Neurological:        No confusion, headache, focal weakness, numbness, dysphasia    Objective   Objective     Vitals:   Temp:  [97.7 °F (36.5 °C)-98.6 °F (37 °C)] 98.6 °F (37 °C)  Heart Rate:  [77-94] 78  Resp:  [16-18] 18  BP: (129-156)/(55-78) 135/78  Physical Exam    Constitutional: Awake, alert responsive, conversant, no obvious distress              Psychiatric:  Appropriate affect, cooperative   Neurologic:  Awake alert ,oriented x 3, strength symmetric in all extremities, Cranial Nerves grossly intact to confrontation,  speech clear   Eyes:   PERRLA, sclerae anicteric, no conjunctival injection   HEENT:  Moist mucous membranes, no nasal or eye discharge, no throat congestion   Neck:   Supple, no thyromegaly, no lymphadenopathy, trachea midline, no elevated JVD   Respiratory:  Clear to auscultation bilaterally, nonlabored respirations    Cardiovascular: RRR, no murmurs, rubs, or gallops, palpable pedal pulses bilaterally, No bilateral ankle edema   Gastrointestinal: Positive bowel sounds, soft, nontender, nondistended, no organomegaly   Musculoskeletal:  No clubbing or cyanosis to extremities,muscle wasting, joint swelling, muscle weakness             Skin:                      No rashes, bruising, skin ulcers, petechiae or ecchymosis    Result Review    Result Review:  I have personally reviewed the results from the time of this admission to 11/12/2023 08:08 EST and agree with these findings:  []  Laboratory  []  Microbiology  []  Radiology  []  EKG/Telemetry   []  Cardiology/Vascular   []  Pathology  []  Old records  []  Other:    Assessment & Plan   Assessment / Plan       Active Hospital Problems:    Active Hospital Problems    Diagnosis  POA    **UTI (urinary tract infection) [N39.0]  Yes    Persistent bacteremia [R78.81]  Unknown     Patient need to have NIKHIL      Edema [R60.9]  Unknown    Chronic diastolic (congestive) heart failure [I50.32]  Unknown    Multiple myeloma [C90.00]  Unknown    Sepsis [A41.9]  Yes    Weakness generalized [R53.1]  Yes    Intractable pain [R52]  Yes    Acute renal failure superimposed on chronic kidney disease [N17.9, N18.9]  Yes     Serum creatinine 1.65 November 2022  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  Primary team to consult cardiology  Continue IV antibiotics    Electronically signed by Siri Fox MD, 11/12/23, 8:04 AM EST.

## 2023-11-12 NOTE — THERAPY TREATMENT NOTE
Acute Care - Physical Therapy Treatment Note   [facility]     Patient Name: Blair Lira  : 1945  MRN: [id number]  Today's Date: 2023      Visit Dx:     ICD-10-CM ICD-9-CM   1. Myalgia  M79.10 729.1   2. Multiple myeloma, remission status unspecified  C90.00 203.00   3. Urinary tract infection without hematuria, site unspecified  N39.0 599.0   4. Weakness generalized  R53.1 780.79   5. Difficulty in walking  R26.2 719.7   6. Decreased activities of daily living (ADL)  Z78.9 V49.89     Patient Active Problem List   Diagnosis    Rotator cuff tear, right    Impingement syndrome of right shoulder    Acute renal failure superimposed on chronic kidney disease    Pancytopenia    Abnormal weight loss    Hypertension    CKD (chronic kidney disease) stage 3, GFR 30-59 ml/min    Moderate malnutrition    UTI (urinary tract infection)    Weakness generalized    Intractable pain    Sepsis    Multiple myeloma    Persistent bacteremia    Edema    Chronic diastolic (congestive) heart failure     Past Medical History:   Diagnosis Date    BPH (benign prostatic hyperplasia)     Frozen shoulder     Hyperlipidemia     Hypertension 10/26/2023    Periarthritis of shoulder     Rotator cuff disorder, right     Tennis elbow     RIGHT     Past Surgical History:   Procedure Laterality Date    CATARACT EXTRACTION EXTRACAPSULAR W/ INTRAOCULAR LENS IMPLANTATION Bilateral     COLONOSCOPY      JOINT REPLACEMENT Right     THR    SHOULDER ARTHROSCOPY W/ ROTATOR CUFF REPAIR Right 3/29/2023    Procedure: SHOULDER ARTHROSCOPY WITH ROTATOR CUFF REPAIR, SUBCROMIAL DECOMPRESSION,DISTAL CLAVICLE RESECTION;  Surgeon: Gustavo Samuels MD;  [place] [facility] [place] [facility] [place]  Service: Orthopedics;  Laterality: Right;    SHOULDER SURGERY Left     SCOPE     PT Assessment (last 12 hours)       PT Evaluation and Treatment       Row Name 23 0913          Physical Therapy Time and Intention    Subjective Information no complaints  -DK     Document Type  therapy note (daily note)  -DK     Mode of Treatment individual therapy;physical therapy  -DK     Patient Effort good  -DK     Symptoms Noted During/After Treatment none  -DK     Comment Pt reports independence in the room to the bathroom.  He reports his balance feels back to normal, and he is having no pain.  Pt has met all goals and will be discharged from PT services at this time.  -DK       Row Name 11/12/23 0913          Pain    Pretreatment Pain Rating 0/10 - no pain  -DK     Posttreatment Pain Rating 0/10 - no pain  -DK       Row Name 11/12/23 0913          Cognition    Affect/Mental Status (Cognition) WNL  -DK     Orientation Status (Cognition) oriented x 4  -DK     Follows Commands (Cognition) WNL  -DK     Cognitive Function WNL  -DK     Personal Safety Interventions gait belt;nonskid shoes/slippers when out of bed;supervised activity  -DK       Row Name 11/12/23 0913          Bed Mobility    Bed Mobility supine-sit  -DK     All Activities, [place] (Bed Mobility) independent  -DK     Supine-Sit [place] (Bed Mobility) independent  -DK     Sit-Supine [place] (Bed Mobility) independent  -DK     Assistive Device (Bed Mobility) bed rails  -       Row Name 11/12/23 0913          Transfers    Transfers sit-stand transfer;stand-sit transfer  -       Row Name 11/12/23 0913          Sit-Stand Transfer    Sit-Stand [place] (Transfers) independent  -DK     Assistive Device (Sit-Stand Transfers) --  no assistive device  -       Row Name 11/12/23 0913          Stand-Sit Transfer    Stand-Sit [place] (Transfers) independent  -DK     Assistive Device (Stand-Sit Transfers) --  no assistive device  -       Row Name 11/12/23 0913          Gait/Stairs (Locomotion)    Gait/Stairs Locomotion gait/ambulation independence;gait/ambulation assistive device;distance ambulated;gait pattern  -DK     [place] Level (Gait) independent;supervision  -DK     Assistive Device (Gait) --  no assistive  device  -     Distance in Feet (Gait) 400  -     Pattern (Gait) step-through  -DK     Comment, (Gait/Stairs) Pt ambulated on room air with no assistive device.  He required the bathroom prior to gait, and was left independently EOB.  He will be discharged from PT services at this time with all goals met.  -       Row Name 11/12/23 0913          Safety Issues, Functional Mobility    Impairments Affecting Function (Mobility) endurance/activity tolerance;strength  -       Row Name 11/12/23 0913          Balance    Balance Assessment sitting static balance;sitting dynamic balance;standing static balance;standing dynamic balance  -     Static Sitting Balance independent  -DK     Dynamic Sitting Balance independent  -DK     Position, Sitting Balance unsupported;sitting edge of bed  -     Static Standing Balance supervision  -     Dynamic Standing Balance supervision  -DK     Position/Device Used, Standing Balance --  no assistive device  -DK     Balance Interventions standing;dynamic;tandem gait  -       Row Name 11/12/23 0913          Motor Skills    Motor Skills --  therapeutic exercises  -     Therapeutic Exercise hip;knee;ankle  -       Row Name 11/12/23 0913          Hip (Therapeutic Exercise)    Hip (Therapeutic Exercise) AROM (active range of motion)  -     Hip AROM (Therapeutic Exercise) bilateral;flexion;extension;aBduction;aDduction;sitting;20 repititions  -       Row Name 11/12/23 0913          Knee (Therapeutic Exercise)    Knee (Therapeutic Exercise) AROM (active range of motion)  -     Knee AROM (Therapeutic Exercise) bilateral;flexion;extension;LAQ (long arc quad);sitting;20 repititions  -       Row Name 11/12/23 0913          Ankle (Therapeutic Exercise)    Ankle (Therapeutic Exercise) AROM (active range of motion)  -     Ankle AROM (Therapeutic Exercise) bilateral;dorsiflexion;plantarflexion;sitting;20 repititions  -       Row Name 11/12/23 0913          Plan of Care Review     Plan of Care Reviewed With patient  -DK     Progress improving  -DK       Row Name 11/12/23 0913          Positioning and Restraints    Pre-Treatment Position in bed  -DK     Post Treatment Position bed  -DK     In Bed supine;sitting EOB;call light within reach;encouraged to call for assist;side rails up x2  -DK       Row Name 11/12/23 0913          Therapy Assessment/Plan (PT)    Rehab Potential (PT) other (see comments)  All goals met.  -DK     Criteria for Skilled Interventions Met (PT) other (see comments)  All goals met.  -DK     Therapy Frequency (PT) other (see comments)  Discharged from PT services.  -DK       Row Name 11/12/23 0913          Progress Summary (PT)    Progress Toward Functional Goals (PT) prepare for discharge  Pt is discharged from PT services with all goals met.  -DK               User Key  (r) = Recorded By, (t) = Taken By, (c) = Cosigned By      Initials Name Provider Type    Stacia Casey, KRISTIE Physical Therapist Assistant                      PT Recommendation and Plan  Planned Therapy Interventions (PT): balance training, bed mobility training, gait training, home exercise program, strengthening, transfer training  Therapy Frequency (PT): other (see comments) (Discharged from PT services.)  Progress Summary (PT)  Progress Toward Functional Goals (PT): prepare for discharge (Pt is discharged from PT services with all goals met.)  Plan of Care Reviewed With: patient  Progress: improving   Outcome Measures       Row Name 11/12/23 0913             How much help from another person do you currently need...    Turning from your back to your side while in flat bed without using bedrails? 4  -DK      Moving from lying on back to sitting on the side of a flat bed without bedrails? 4  -DK      Moving to and from a bed to a chair (including a wheelchair)? 4  -DK      Standing up from a chair using your arms (e.g., wheelchair, bedside chair)? 4  -DK      Climbing 3-5 steps with a railing? 3   -DK      To walk in hospital room? 4  -DK      AM-PAC 6 Clicks Score (PT) 23  -DK      Highest level of mobility 7 --> Walked 25 feet or more  -DK         Functional Assessment    Outcome Measure Options AM-PAC 6 Clicks Basic Mobility (PT)  -DK                User Key  (r) = Recorded By, (t) = Taken By, (c) = Cosigned By      Initials Name Provider Type    Stacia Casey PTA Physical Therapist Assistant                     Time Calculation:    PT Charges       Row Name 11/12/23 0919             Time Calculation    PT Received On 11/12/23  -DK      PT Goal Re-Cert Due Date 11/15/23  -DK         Timed Charges    75580 - PT Therapeutic Exercise Minutes 12  -DK      28248 - Gait Training Minutes  6  -DK      71369 - PT Therapeutic Activity Minutes 6  -DK         Total Minutes    Timed Charges Total Minutes 24  -DK       Total Minutes 24  -DK                User Key  (r) = Recorded By, (t) = Taken By, (c) = Cosigned By      Initials Name Provider Type    Stacia Casey PTA Physical Therapist Assistant                  Therapy Charges for Today       Code Description Service Date Service Provider Modifiers Qty    80825338345 HC PT THER PROC EA 15 MIN 11/12/2023 Stacia Ordonez PTA GP 1    04154379272 HC GAIT TRAINING EA 15 MIN 11/12/2023 Stacia Ordonez PTA GP 1            PT G-Codes  Outcome Measure Options: AM-PAC 6 Clicks Basic Mobility (PT)  AM-PAC 6 Clicks Score (PT): 23  AM-PAC 6 Clicks Score (OT): 21    Stacia Ordonez PTA  11/12/2023

## 2023-11-13 LAB
ALBUMIN SERPL-MCNC: 2.4 G/DL (ref 3.5–5.2)
ALBUMIN/GLOB SERPL: 0.4 G/DL
ALP SERPL-CCNC: 49 U/L (ref 39–117)
ALT SERPL W P-5'-P-CCNC: <5 U/L (ref 1–41)
ANION GAP SERPL CALCULATED.3IONS-SCNC: 13.1 MMOL/L (ref 5–15)
ANISOCYTOSIS BLD QL: NORMAL
AST SERPL-CCNC: 15 U/L (ref 1–40)
BACTERIA SPEC AEROBE CULT: NO GROWTH
BACTERIA SPEC AEROBE CULT: NORMAL
BASOPHILS # BLD AUTO: 0.01 10*3/MM3 (ref 0–0.2)
BASOPHILS NFR BLD AUTO: 0.1 % (ref 0–1.5)
BILIRUB SERPL-MCNC: 0.3 MG/DL (ref 0–1.2)
BUN SERPL-MCNC: 99 MG/DL (ref 8–23)
BUN/CREAT SERPL: 28 (ref 7–25)
CALCIUM SPEC-SCNC: 7.7 MG/DL (ref 8.6–10.5)
CCV RESULT: NORMAL
CHLORIDE SERPL-SCNC: 101 MMOL/L (ref 98–107)
CO2 SERPL-SCNC: 17.9 MMOL/L (ref 22–29)
CREAT SERPL-MCNC: 3.53 MG/DL (ref 0.76–1.27)
DEPRECATED RDW RBC AUTO: 67.2 FL (ref 37–54)
EGFRCR SERPLBLD CKD-EPI 2021: 17.1 ML/MIN/1.73
EOSINOPHIL # BLD AUTO: 0 10*3/MM3 (ref 0–0.4)
EOSINOPHIL NFR BLD AUTO: 0 % (ref 0.3–6.2)
ERYTHROCYTE [DISTWIDTH] IN BLOOD BY AUTOMATED COUNT: 18.3 % (ref 12.3–15.4)
GLOBULIN UR ELPH-MCNC: 5.4 GM/DL
GLUCOSE SERPL-MCNC: 157 MG/DL (ref 65–99)
HCT VFR BLD AUTO: 29.1 % (ref 37.5–51)
HGB BLD-MCNC: 9.6 G/DL (ref 13–17.7)
IMM GRANULOCYTES # BLD AUTO: 0.15 10*3/MM3 (ref 0–0.05)
IMM GRANULOCYTES NFR BLD AUTO: 1.5 % (ref 0–0.5)
LYMPHOCYTES # BLD AUTO: 1.03 10*3/MM3 (ref 0.7–3.1)
LYMPHOCYTES NFR BLD AUTO: 10.6 % (ref 19.6–45.3)
MCH RBC QN AUTO: 32.9 PG (ref 26.6–33)
MCHC RBC AUTO-ENTMCNC: 33 G/DL (ref 31.5–35.7)
MCV RBC AUTO: 99.7 FL (ref 79–97)
MONOCYTES # BLD AUTO: 0.23 10*3/MM3 (ref 0.1–0.9)
MONOCYTES NFR BLD AUTO: 2.4 % (ref 5–12)
NEUTROPHILS NFR BLD AUTO: 8.28 10*3/MM3 (ref 1.7–7)
NEUTROPHILS NFR BLD AUTO: 85.4 % (ref 42.7–76)
NRBC BLD AUTO-RTO: 0 /100 WBC (ref 0–0.2)
PLAT MORPH BLD: NORMAL
PLATELET # BLD AUTO: 160 10*3/MM3 (ref 140–450)
PMV BLD AUTO: 9.1 FL (ref 6–12)
POTASSIUM SERPL-SCNC: 5.1 MMOL/L (ref 3.5–5.2)
PROT SERPL-MCNC: 7.8 G/DL (ref 6–8.5)
RBC # BLD AUTO: 2.92 10*6/MM3 (ref 4.14–5.8)
RETICS # AUTO: 0.05 10*6/MM3 (ref 0.02–0.13)
RETICS/RBC NFR AUTO: 1.74 % (ref 0.7–1.9)
SODIUM SERPL-SCNC: 132 MMOL/L (ref 136–145)
WBC MORPH BLD: NORMAL
WBC NRBC COR # BLD: 9.7 10*3/MM3 (ref 3.4–10.8)

## 2023-11-13 PROCEDURE — 85045 AUTOMATED RETICULOCYTE COUNT: CPT | Performed by: INTERNAL MEDICINE

## 2023-11-13 PROCEDURE — 85007 BL SMEAR W/DIFF WBC COUNT: CPT | Performed by: INTERNAL MEDICINE

## 2023-11-13 PROCEDURE — 80053 COMPREHEN METABOLIC PANEL: CPT | Performed by: INTERNAL MEDICINE

## 2023-11-13 PROCEDURE — 25010000002 CEFAZOLIN IN DEXTROSE 2-4 GM/100ML-% SOLUTION: Performed by: INTERNAL MEDICINE

## 2023-11-13 PROCEDURE — 25010000002 DEXAMETHASONE SODIUM PHOSPHATE 10 MG/ML SOLUTION: Performed by: INTERNAL MEDICINE

## 2023-11-13 PROCEDURE — 25810000003 SODIUM CHLORIDE 0.9 % SOLUTION 500 ML FLEX CONT: Performed by: INTERNAL MEDICINE

## 2023-11-13 PROCEDURE — 25010000002 HEPARIN (PORCINE) PER 1000 UNITS: Performed by: INTERNAL MEDICINE

## 2023-11-13 PROCEDURE — 25010000002 FUROSEMIDE PER 20 MG: Performed by: INTERNAL MEDICINE

## 2023-11-13 PROCEDURE — 85025 COMPLETE CBC W/AUTO DIFF WBC: CPT | Performed by: INTERNAL MEDICINE

## 2023-11-13 RX ORDER — SODIUM BICARBONATE 650 MG/1
1300 TABLET ORAL 3 TIMES DAILY
Status: DISCONTINUED | OUTPATIENT
Start: 2023-11-13 | End: 2023-11-15 | Stop reason: HOSPADM

## 2023-11-13 RX ORDER — DEXAMETHASONE SODIUM PHOSPHATE 10 MG/ML
8 INJECTION, SOLUTION INTRAMUSCULAR; INTRAVENOUS EVERY 8 HOURS
Status: COMPLETED | OUTPATIENT
Start: 2023-11-13 | End: 2023-11-13

## 2023-11-13 RX ADMIN — CEFAZOLIN SODIUM 2000 MG: 2 INJECTION, SOLUTION INTRAVENOUS at 05:30

## 2023-11-13 RX ADMIN — DEXAMETHASONE SODIUM PHOSPHATE 8 MG: 10 INJECTION INTRAMUSCULAR; INTRAVENOUS at 10:15

## 2023-11-13 RX ADMIN — SODIUM BICARBONATE 650 MG TABLET 1300 MG: at 09:50

## 2023-11-13 RX ADMIN — HYDROCODONE BITARTRATE AND ACETAMINOPHEN 1 TABLET: 10; 325 TABLET ORAL at 09:51

## 2023-11-13 RX ADMIN — SODIUM BICARBONATE 650 MG TABLET 1300 MG: at 15:21

## 2023-11-13 RX ADMIN — HEPARIN SODIUM 5000 UNITS: 5000 INJECTION INTRAVENOUS; SUBCUTANEOUS at 09:50

## 2023-11-13 RX ADMIN — FUROSEMIDE 80 MG: 10 INJECTION, SOLUTION INTRAMUSCULAR; INTRAVENOUS at 09:50

## 2023-11-13 RX ADMIN — CEFAZOLIN SODIUM 2000 MG: 2 INJECTION, SOLUTION INTRAVENOUS at 15:21

## 2023-11-13 RX ADMIN — Medication 1 MG: at 09:51

## 2023-11-13 RX ADMIN — SODIUM CHLORIDE 40 ML: 9 INJECTION, SOLUTION INTRAVENOUS at 05:30

## 2023-11-13 RX ADMIN — SODIUM BICARBONATE 650 MG TABLET 1300 MG: at 20:30

## 2023-11-13 RX ADMIN — HEPARIN SODIUM 5000 UNITS: 5000 INJECTION INTRAVENOUS; SUBCUTANEOUS at 20:30

## 2023-11-13 RX ADMIN — CYANOCOBALAMIN TAB 500 MCG 1000 MCG: 500 TAB at 09:50

## 2023-11-13 RX ADMIN — TAMSULOSIN HYDROCHLORIDE 0.8 MG: 0.4 CAPSULE ORAL at 09:51

## 2023-11-13 RX ADMIN — PANTOPRAZOLE SODIUM 40 MG: 40 TABLET, DELAYED RELEASE ORAL at 05:30

## 2023-11-13 RX ADMIN — HYDROCODONE BITARTRATE AND ACETAMINOPHEN 1 TABLET: 10; 325 TABLET ORAL at 20:30

## 2023-11-13 RX ADMIN — Medication 10 ML: at 09:51

## 2023-11-13 RX ADMIN — Medication 10 ML: at 20:30

## 2023-11-13 RX ADMIN — DEXAMETHASONE SODIUM PHOSPHATE 8 MG: 10 INJECTION INTRAMUSCULAR; INTRAVENOUS at 02:42

## 2023-11-13 RX ADMIN — AMLODIPINE BESYLATE 10 MG: 5 TABLET ORAL at 09:51

## 2023-11-13 RX ADMIN — FUROSEMIDE 80 MG: 10 INJECTION, SOLUTION INTRAMUSCULAR; INTRAVENOUS at 17:47

## 2023-11-13 RX ADMIN — CEFAZOLIN SODIUM 2000 MG: 2 INJECTION, SOLUTION INTRAVENOUS at 21:40

## 2023-11-13 RX ADMIN — SODIUM BICARBONATE 50 MEQ: 84 INJECTION INTRAVENOUS at 09:52

## 2023-11-13 RX ADMIN — Medication 10 ML: at 05:31

## 2023-11-13 RX ADMIN — HYDROCODONE BITARTRATE AND ACETAMINOPHEN 1 TABLET: 10; 325 TABLET ORAL at 15:21

## 2023-11-13 NOTE — PLAN OF CARE
Goal Outcome Evaluation:  Plan of Care Reviewed With: patient        Progress: improving  Outcome Evaluation: Very pleasant patient rested up in chair throughout the day. No complaints of pain or discomfort. Medicated per MAR. Cardiology consulted. Plans to have EEG tomorrow and will be NPO after midnight. Edema in patient's legs and redness decreased throughout the day. Patient took a shower on his own today and has been walking around the unit ad-praneeth. no concerns at this time.

## 2023-11-13 NOTE — PROGRESS NOTES
[facility]     Progress Note    Patient Name: Blair Lira  : 1945  MRN: [id number]  Primary Care Physician:  Babs Summers APRN  Date of admission: 2023      Subjective   Brief summary.  Patient admitted with intractable pain and further diagnosed with UTI and sepsis      HPI:  No new issues, no fever still very anxious.      Review of Systems     Urinary frequency improving no fever chills, no abdominal pain      Objective     Vitals:   Temp:  [97.2 °F (36.2 °C)-98.1 °F (36.7 °C)] 97.9 °F (36.6 °C)  Heart Rate:  [] 91  Resp:  [18] 18  BP: (114-150)/(58-70) 150/70    Physical Exam :     Elderly male not in acute distress.  Pallor noted..  Heart regular.  Lungs clear.  Abdomen soft and obese.  Lower extremities with edema.  Patient appears nervous and anxious today    Result Review:  I have personally reviewed the results from the time of this admission to 2023 18:51 EST and agree with these findings:  []  Laboratory  []  Microbiology  []  Radiology  []  EKG/Telemetry   []  Cardiology/Vascular   []  Pathology  []  Old records  []  Other:       Repeat blood cultures positive    Assessment / Plan       Active Hospital Problems:  Active Hospital Problems    Diagnosis     **UTI (urinary tract infection)     Persistent bacteremia     Edema     Chronic diastolic (congestive) heart failure     Multiple myeloma     Sepsis     Weakness generalized     Intractable pain     Acute renal failure superimposed on chronic kidney disease        Plan:   Patient with sepsis and UTI.  Cultures remains positive despite antibiotics.    Cardiac consult for NIKHIL.  Discussed with Dr. Grant.  Continue antibiotics.      DVT prophylaxis:  Medical DVT prophylaxis orders are present.    CODE STATUS:   Code Status (Patient has no pulse and is not breathing): CPR (Attempt to Resuscitate)  Medical Interventions (Patient has pulse or is breathing): Full Support                  Electronically signed by Elieser Pederson  MD, 11/13/23, 6:52 PM EST.

## 2023-11-13 NOTE — PROGRESS NOTES
[facility]     Progress Note    Patient Name: Blair Lira  : 1945  MRN: [id number]  Primary Care Physician:  Babs Summers APRN  Date of admission: 2023    Subjective   Subjective     Chief Complaint: Patient is good urine output the repeat culture results are still pending so far has been afebrile vital signs stable and has good good urine output    HPI: Patient with good urine output at the time of admission is EGFR was 11 now it is 7 to 8-18 BUN/creatinine elevated BUN may be elevated somewhat because of steroids    Review of Systems   All systems were reviewed and negative except for: As been reviewed    Objective   Objective     Vitals:   Temp:  [97.2 °F (36.2 °C)-97.9 °F (36.6 °C)] 97.3 °F (36.3 °C)  Heart Rate:  [] 103  Resp:  [18] 18  BP: (114-145)/(58-69) 114/61    Physical Exam    Constitutional: Awake, alert   Eyes: PERRLA, sclerae anicteric, no conjunctival injection   HENT: NCAT, mucous membranes moist   Neck: Supple, no thyromegaly, no lymphadenopathy, trachea midline   Respiratory: Clear to auscultation bilaterally, nonlabored respirations    Cardiovascular: RRR, no murmurs, rubs, or gallops, palpable pedal pulses bilaterally   Gastrointestinal: Positive bowel sounds, soft, nontender, nondistended   Musculoskeletal: No bilateral ankle edema, no clubbing or cyanosis to extremities   Psychiatric: Appropriate affect, cooperative   Neurologic: Oriented x 3, strength symmetric in all extremities, Cranial Nerves grossly intact to confrontation, speech clear   Skin: No rashes   No change  Result Review    Result Review:  I have personally reviewed the results from the time of this admission to 2023 07:46 EST and agree with these findings:  [x]  Laboratory  []  Microbiology  []  Radiology  []  EKG/Telemetry   []  Cardiology/Vascular   []  Pathology  []  Old records  []  Other:  Most notable findings include: Renal failure with multiple myeloma has to be started on the  treatment    Assessment & Plan   Assessment / Plan     Brief Patient Summary:  Blair Lira is a 78 y.o. male who patient comfortable not in acute distress no pain today    Active Hospital Problems:  Active Hospital Problems    Diagnosis     **UTI (urinary tract infection)     Persistent bacteremia     Edema     Chronic diastolic (congestive) heart failure     Multiple myeloma     Sepsis     Weakness generalized     Intractable pain     Acute renal failure superimposed on chronic kidney disease        Plan:   Continue current management    DVT prophylaxis:  Medical DVT prophylaxis orders are present.    CODE STATUS:   Code Status (Patient has no pulse and is not breathing): CPR (Attempt to Resuscitate)  Medical Interventions (Patient has pulse or is breathing): Full Support    Disposition:  I expect patient to be discharged cultures will be checked and he will be discharged when they are negative.    Electronically signed by Vamsi Mason MD, 11/13/23, 7:46 AM EST.      Part of this note may be an electronic transcription/translation of spoken language to printed text using the Dragon Dictation System.

## 2023-11-13 NOTE — PROGRESS NOTES
[facility]     Progress Note    Patient Name: Blair Lira  : 1945  MRN: [id number]  Primary Care Physician:  Babs Summers APRN  Date of admission: 2023    Subjective patient has no complaint and he had a good diuresis with diuretics as patient is grossly volume overloaded  Repeat blood cultures are negative as of today    Review of Systems  Constitutional:        Weakness tiredness fatigue  Eyes:                       No blurry vision, eye discharge, eye irritation, eye pain  HEENT:                   No acute hair loss, earache and discharge, nasal congestion or discharge, sore throat, postnasal drip  Respiratory:           No shortness of breath coughing sputum production wheezing hemoptysis pleuritic chest pain  Cardiovascular:     No chest pain, orthopnea, PND, dizziness, palpitation, lower extremity edema  Gastrointestinal:   No nausea vomiting diarrhea abdominal pain constipation  Genitourinary:       No urinary incontinence, hesitancy, frequency, urgency, dysuria  Hematologic:         No bruising, bleeding, pallor, lymphadenopathy  Endocrine:            No coldness, hot flashes, polyuria, abnormal hair growth  Musculoskeletal:    No body pains, aches, arthritic pains, muscle pain ,muscle wasting  Psychiatric:          No low or high mood, anxiety, hallucinations, delusions  Skin.                      No rash, ulcers, bruising, itching  Neurological:        No confusion, headache, focal weakness, numbness, dysphasia    Objective   Objective     Vitals:   Temp:  [97.2 °F (36.2 °C)-98.1 °F (36.7 °C)] 98.1 °F (36.7 °C)  Heart Rate:  [] 78  Resp:  [18] 18  BP: (114-145)/(58-69) 129/58  Physical Exam    Constitutional: Awake, alert responsive, conversant, no obvious distress              Psychiatric:  Appropriate affect, cooperative   Neurologic:  Awake alert ,oriented x 3, strength symmetric in all extremities, Cranial Nerves grossly intact to confrontation, speech  clear   Eyes:   PERRLA, sclerae anicteric, no conjunctival injection   HEENT:  Moist mucous membranes, no nasal or eye discharge, no throat congestion   Neck:   Supple, no thyromegaly, no lymphadenopathy, trachea midline, no elevated JVD   Respiratory:  Clear to auscultation bilaterally, nonlabored respirations    Cardiovascular: RRR, no murmurs, rubs, or gallops, palpable pedal pulses bilaterally, No bilateral ankle edema   Gastrointestinal: Positive bowel sounds, soft, nontender, nondistended, no organomegaly   Musculoskeletal:  No clubbing or cyanosis to extremities,muscle wasting, joint swelling, muscle weakness             Skin:                      No rashes, bruising, skin ulcers, petechiae or ecchymosis    Result Review    Result Review:  I have personally reviewed the results from the time of this admission to 11/13/2023 09:32 EST and agree with these findings:  []  Laboratory  []  Microbiology  []  Radiology  []  EKG/Telemetry   []  Cardiology/Vascular   []  Pathology  []  Old records  []  Other:    Assessment & Plan   Assessment / Plan       Active Hospital Problems:    Active Hospital Problems    Diagnosis  POA    **UTI (urinary tract infection) [N39.0]  Yes    Persistent bacteremia [R78.81]  Unknown     Patient need to have NIKHIL      Edema [R60.9]  Unknown    Chronic diastolic (congestive) heart failure [I50.32]  Unknown    Multiple myeloma [C90.00]  Yes    Sepsis [A41.9]  Yes    Weakness generalized [R53.1]  Yes    Intractable pain [R52]  Yes    Acute renal failure superimposed on chronic kidney disease [N17.9, N18.9]  Yes     Serum creatinine 1.65 November 2022  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

## 2023-11-14 ENCOUNTER — APPOINTMENT (OUTPATIENT)
Dept: CARDIOLOGY | Facility: HOSPITAL | Age: 78
DRG: 871 | End: 2023-11-14
Payer: MEDICARE

## 2023-11-14 LAB
CYTO UR: NORMAL
DIFF PNL MAR: NORMAL
KARYOTYP MAR: NORMAL
LAB AP ASPIRATE SMEAR: NORMAL
LAB AP CASE REPORT: NORMAL
LAB AP CBC AND DIFFERENTIAL: NORMAL
LAB AP CLINICAL INFORMATION: NORMAL
LAB AP CLOT SECTION: NORMAL
LAB AP CORE BIOPSY: NORMAL
LAB AP CYTOGENETICS REPORT,ADDENDUM: NORMAL
LAB AP DIAGNOSIS COMMENT: NORMAL
LAB AP SPECIAL STAINS: NORMAL
PATH REPORT.FINAL DX SPEC: NORMAL
PATH REPORT.GROSS SPEC: NORMAL

## 2023-11-14 PROCEDURE — 93312 ECHO TRANSESOPHAGEAL: CPT

## 2023-11-14 PROCEDURE — 25010000002 HEPARIN (PORCINE) PER 1000 UNITS: Performed by: INTERNAL MEDICINE

## 2023-11-14 PROCEDURE — 25010000002 CEFAZOLIN IN DEXTROSE 2-4 GM/100ML-% SOLUTION: Performed by: INTERNAL MEDICINE

## 2023-11-14 PROCEDURE — 93312 ECHO TRANSESOPHAGEAL: CPT | Performed by: INTERNAL MEDICINE

## 2023-11-14 PROCEDURE — 99231 SBSQ HOSP IP/OBS SF/LOW 25: CPT | Performed by: INTERNAL MEDICINE

## 2023-11-14 PROCEDURE — 93320 DOPPLER ECHO COMPLETE: CPT

## 2023-11-14 PROCEDURE — 93325 DOPPLER ECHO COLOR FLOW MAPG: CPT | Performed by: INTERNAL MEDICINE

## 2023-11-14 PROCEDURE — 93325 DOPPLER ECHO COLOR FLOW MAPG: CPT

## 2023-11-14 PROCEDURE — 93320 DOPPLER ECHO COMPLETE: CPT | Performed by: INTERNAL MEDICINE

## 2023-11-14 PROCEDURE — 25010000002 MIDAZOLAM PER 1MG: Performed by: INTERNAL MEDICINE

## 2023-11-14 PROCEDURE — 25010000002 MEPERIDINE PER 100 MG: Performed by: INTERNAL MEDICINE

## 2023-11-14 PROCEDURE — 25010000002 FUROSEMIDE PER 20 MG: Performed by: STUDENT IN AN ORGANIZED HEALTH CARE EDUCATION/TRAINING PROGRAM

## 2023-11-14 RX ORDER — MIDAZOLAM HYDROCHLORIDE 2 MG/2ML
INJECTION, SOLUTION INTRAMUSCULAR; INTRAVENOUS
Status: COMPLETED | OUTPATIENT
Start: 2023-11-14 | End: 2023-11-14

## 2023-11-14 RX ORDER — FUROSEMIDE 10 MG/ML
80 INJECTION INTRAMUSCULAR; INTRAVENOUS DAILY
Status: DISCONTINUED | OUTPATIENT
Start: 2023-11-14 | End: 2023-11-15 | Stop reason: HOSPADM

## 2023-11-14 RX ORDER — MEPERIDINE HYDROCHLORIDE 25 MG/ML
INJECTION INTRAMUSCULAR; INTRAVENOUS; SUBCUTANEOUS
Status: COMPLETED | OUTPATIENT
Start: 2023-11-14 | End: 2023-11-14

## 2023-11-14 RX ADMIN — HEPARIN SODIUM 5000 UNITS: 5000 INJECTION INTRAVENOUS; SUBCUTANEOUS at 21:20

## 2023-11-14 RX ADMIN — Medication 1 MG: at 15:54

## 2023-11-14 RX ADMIN — CEFAZOLIN SODIUM 2000 MG: 2 INJECTION, SOLUTION INTRAVENOUS at 21:20

## 2023-11-14 RX ADMIN — CEFAZOLIN SODIUM 2000 MG: 2 INJECTION, SOLUTION INTRAVENOUS at 17:21

## 2023-11-14 RX ADMIN — MEPERIDINE HYDROCHLORIDE 25 MG: 25 INJECTION INTRAMUSCULAR; INTRAVENOUS; SUBCUTANEOUS at 13:45

## 2023-11-14 RX ADMIN — AMLODIPINE BESYLATE 10 MG: 5 TABLET ORAL at 15:53

## 2023-11-14 RX ADMIN — TAMSULOSIN HYDROCHLORIDE 0.8 MG: 0.4 CAPSULE ORAL at 15:55

## 2023-11-14 RX ADMIN — MIDAZOLAM HYDROCHLORIDE 4 MG: 1 INJECTION, SOLUTION INTRAMUSCULAR; INTRAVENOUS at 13:43

## 2023-11-14 RX ADMIN — SODIUM BICARBONATE 650 MG TABLET 1300 MG: at 21:20

## 2023-11-14 RX ADMIN — MIDAZOLAM HYDROCHLORIDE 2 MG: 1 INJECTION, SOLUTION INTRAMUSCULAR; INTRAVENOUS at 13:49

## 2023-11-14 RX ADMIN — TOPICAL ANESTHETIC 1 SPRAY: 200 SPRAY DENTAL; PERIODONTAL at 13:41

## 2023-11-14 RX ADMIN — SODIUM BICARBONATE 650 MG TABLET 1300 MG: at 16:07

## 2023-11-14 RX ADMIN — CYANOCOBALAMIN TAB 500 MCG 1000 MCG: 500 TAB at 15:55

## 2023-11-14 RX ADMIN — Medication 10 ML: at 08:48

## 2023-11-14 RX ADMIN — CEFAZOLIN SODIUM 2000 MG: 2 INJECTION, SOLUTION INTRAVENOUS at 06:04

## 2023-11-14 RX ADMIN — FUROSEMIDE 80 MG: 10 INJECTION, SOLUTION INTRAMUSCULAR; INTRAVENOUS at 17:21

## 2023-11-14 NOTE — PROGRESS NOTES
[facility]     Progress Note    Patient Name: Blair Lira  : 1945  MRN: [id number]  Primary Care Physician:  Babs Summers APRN  Date of admission: 2023    Subjective   Subjective     Chief Complaint: Repeat cultures are negative cardiology to decide if he needs any NIKHIL previous regular 2D echo was normal if discharged would like to see him the next day in the office to start getting his medications for the time being we will continue on oral Decadron,    HPI: Patient with multiple myeloma renal failure caused by multiple myeloma patient also anxious partly may be because of steroids as well    Review of Systems   All systems were reviewed and negative except for: Reviewed    Objective   Objective     Vitals:   Temp:  [97.3 °F (36.3 °C)-98.2 °F (36.8 °C)] 98 °F (36.7 °C)  Heart Rate:  [] 78  Resp:  [16-18] 16  BP: (126-150)/(52-72) 133/69    Physical Exam    Constitutional: Awake, alert   Eyes: PERRLA, sclerae anicteric, no conjunctival injection   HENT: NCAT, mucous membranes moist   Neck: Supple, no thyromegaly, no lymphadenopathy, trachea midline   Respiratory: Clear to auscultation bilaterally, nonlabored respirations    Cardiovascular: RRR, no murmurs, rubs, or gallops, palpable pedal pulses bilaterally   Gastrointestinal: Positive bowel sounds, soft, nontender, nondistended   Musculoskeletal: No bilateral ankle edema, no clubbing or cyanosis to extremities   Psychiatric: Appropriate affect, cooperative   Neurologic: Oriented x 3, strength symmetric in all extremities, Cranial Nerves grossly intact to confrontation, speech clear   Skin: No rashes   No change  Result Review    Result Review:  I have personally reviewed the results from the time of this admission to 2023 07:45 EST and agree with these findings:  [x]  Laboratory  []  Microbiology  []  Radiology  []  EKG/Telemetry   []  Cardiology/Vascular   []  Pathology  []  Old records  []  Other:  Most notable findings  include: Labs were reviewed today discussed    Assessment & Plan   Assessment / Plan     Brief Patient Summary:  Blair Lira is a 78 y.o. male who with multiple myeloma with renal failure and had bacteremia    Active Hospital Problems:  Active Hospital Problems    Diagnosis     **UTI (urinary tract infection)     Persistent bacteremia     Edema     Chronic diastolic (congestive) heart failure     Multiple myeloma     Sepsis     Weakness generalized     Intractable pain     Acute renal failure superimposed on chronic kidney disease        Plan:   Continue current management repeat cultures have been negative    DVT prophylaxis:  Medical DVT prophylaxis orders are present.    CODE STATUS:   Code Status (Patient has no pulse and is not breathing): CPR (Attempt to Resuscitate)  Medical Interventions (Patient has pulse or is breathing): Full Support    Disposition:  I expect patient to be discharged after the patient has been stabilized.    Electronically signed by Vamsi Msaon MD, 11/14/23, 7:45 AM EST.      Part of this note may be an electronic transcription/translation of spoken language to printed text using the Dragon Dictation System.

## 2023-11-14 NOTE — CONSULTS
[facility]   Cardiology Consult Note    Patient Name: Blair Lira  : 1945  MRN: [id number]  Primary Care Physician:  Babs Summers APRN  Referring Physician: Elieser Pederson MD    Date of admission: 2023    Subjective   Subjective     Reason for Consultation : Staphylococcus bacteremia, evaluate for transesophageal echocardiogram    Chief Complaint : Intractable pain, UTI and sepsis    HPI:  Blair Lira is a 78 y.o. male with BPH, hypertension, hyperlipidemia, multiple myeloma.  He was admitted for leg pain and diffuse body aches.  Subsequently was noted to have UTI and bacteremia.  Blood cultures have been positive for Staphylococcus aureus.  His creatinine is above baseline.  He is currently being treated with antibiotics.  Cardiology was consulted for possible transesophageal echocardiogram due to concerns about infective endocarditis.    He has no history of any cardiac illness.  Denies any chest pain or palpitations.    Review of Systems   All systems were reviewed and negative except for: Malaise body aches, fatigue.  Negative for chest pain or palpitations.    Personal History     Past Medical History:   Diagnosis Date    BPH (benign prostatic hyperplasia)     Frozen shoulder     Hyperlipidemia     Hypertension 10/26/2023    Periarthritis of shoulder     Rotator cuff disorder, right     Tennis elbow     RIGHT        Family History: Reviewed, no family history of premature coronary artery disease    Social History:  reports that he has never smoked. He has never used smokeless tobacco. He reports that he does not currently use alcohol. He reports that he does not use drugs.    Home Medications:  amLODIPine, atorvastatin, folic acid, pantoprazole, tamsulosin, and vitamin B-12    Allergies:  No Known Allergies    Objective    Objective     Vitals:   Temp:  [97.3 °F (36.3 °C)-98.2 °F (36.8 °C)] 98 °F (36.7 °C)  Heart Rate:  [] 78  Resp:  [16-18] 16  BP: (126-150)/(52-72)  133/69      Physical Exam:   Constitutional: Awake, alert, No acute distress    Eyes: PERRLA, sclerae anicteric, no conjunctival injection   HENT: NCAT, mucous membranes moist   Neck: Supple, no thyromegaly, no lymphadenopathy, trachea midline   Respiratory: Clear to auscultation bilaterally, nonlabored respirations    Cardiovascular: RRR, no murmurs, rubs, or gallops, palpable pedal pulses bilaterally   Gastrointestinal: Positive bowel sounds, soft, nontender, nondistended   Musculoskeletal: No bilateral ankle edema, no clubbing or cyanosis to extremities   Psychiatric: Appropriate affect, cooperative   Neurologic: Oriented x 3, strength symmetric in all extremities, speech clear   Skin: No rashes     Result Review    Result Review:  I have personally reviewed the results from the time of this admission to 11/14/2023 09:01 EST and agree with these findings:  [x]  Laboratory  []  Microbiology  [x]  Radiology  [x]  EKG/Telemetry   [x]  Cardiology/Vascular   []  Pathology  [x]  Old records  []  Other:  Most notable findings include:     CMP          11/10/2023    06:09 11/11/2023    06:01 11/13/2023    05:42   CMP   Glucose 134  174  157    BUN 89  84  99    Creatinine 3.06  3.00  3.53    EGFR 20.3  20.7  17.1    Sodium 131  129  132    Potassium 4.6  5.2  5.1    Chloride 104  103  101    Calcium 7.3  7.5  7.7    Total Protein 6.4  7.3  7.8    Albumin 1.9  2.2  2.4    Globulin 4.5  5.1  5.4    Total Bilirubin 0.2  0.3  0.3    Alkaline Phosphatase 40  51  49    AST (SGOT) 9  10  15    ALT (SGPT) <5  <5  <5    Albumin/Globulin Ratio 0.4  0.4  0.4    BUN/Creatinine Ratio 29.1  28.0  28.0    Anion Gap 10.0  9.8  13.1       CBC          11/10/2023    06:09 11/11/2023    06:01 11/13/2023    05:42   CBC   WBC 9.25  11.14  9.70    RBC 2.69  2.83  2.92    Hemoglobin 8.6  9.1  9.6    Hematocrit 26.5  28.4  29.1    MCV 98.5  100.4  99.7    MCH 32.0  32.2  32.9    MCHC 32.5  32.0  33.0    RDW 18.2  18.2  18.3    Platelets 168   180  160       Lab Results   Component Value Date    TROPONINT 50 (H) 11/06/2023     Results for orders placed during the hospital encounter of 10/26/23    Adult Transthoracic Echo Complete W/ Cont if Necessary Per Protocol    Interpretation Summary    Left ventricular systolic function is low normal. Estimated left ventricular EF = 50%    Left ventricular wall thickness is consistent with mild concentric hypertrophy.    Left ventricular diastolic function is consistent with (grade I) impaired relaxation.    There is mild mitral valve regurgitation.    Mild dilation of the aortic root is present. Mild dilation of the ascending aorta is present, measuring 4.2 cm in diameter.    Estimated right ventricular systolic pressure from tricuspid regurgitation is normal (<35 mmHg).       Assessment & Plan   Assessment / Plan     Brief Patient Summary:  Bliar Lira is a 78 y.o. male is currently admitted with UTI and persistent bacteremia.  He is also having acute on chronic CKD and getting diuretics.    Active Hospital Problems:  Active Hospital Problems    Diagnosis     **UTI (urinary tract infection)     Persistent bacteremia     Edema     Chronic diastolic (congestive) heart failure     Multiple myeloma     Sepsis     Weakness generalized     Intractable pain     Acute renal failure superimposed on chronic kidney disease      Staphylococcus bacteremia : Currently on antibiotics.  Possible sources UTI.    Plan:     Reasonable to proceed with transesophageal echocardiogram to rule out infective endocarditis since transthoracic study was inconclusive.  The risks, benefits and alternatives were explained detail with the patient and he agreed to proceed.    N.p.o. including medications now.  Further management plans per primary team and nephrology    Electronically signed by Saul Grant MD, 11/14/23, 9:01 AM EST.

## 2023-11-14 NOTE — PROGRESS NOTES
[facility]     Progress Note    Patient Name: Blair Lira  : 1945  MRN: [id number]  Primary Care Physician:  Babs Summers APRN  Date of admission: 2023      Subjective   Brief summary.  Patient admitted with intractable pain and further diagnosed with UTI and sepsis      HPI:  No fever chills.  Feeling better anxious for scope, seen earlier this morning.      Review of Systems     Urinary frequency improving no fever chills, no abdominal pain      Objective     Vitals:   Temp:  [97.3 °F (36.3 °C)-98.2 °F (36.8 °C)] 97.4 °F (36.3 °C)  Heart Rate:  [] 78  Resp:  [15-20] 16  BP: (102-148)/(52-74) 148/69    Physical Exam :     Elderly male not in acute distress.  Pallor noted..  Heart regular.  Lungs clear.  Abdomen soft and obese.  Lower extremities with edema.  Again appears nervous and anxious today    Result Review:  I have personally reviewed the results from the time of this admission to 2023 17:19 EST and agree with these findings:  []  Laboratory  []  Microbiology  []  Radiology  []  EKG/Telemetry   []  Cardiology/Vascular   []  Pathology  []  Old records  []  Other:           Assessment / Plan       Active Hospital Problems:  Active Hospital Problems    Diagnosis     **UTI (urinary tract infection)     Persistent bacteremia     Edema     Chronic diastolic (congestive) heart failure     Multiple myeloma     Sepsis     Weakness generalized     Intractable pain     Acute renal failure superimposed on chronic kidney disease        Plan:   For NIKHIL today to rule out endocarditis  Meds on hold to restart post scope and NIKHIL.  Increase activity.  If negative for endocarditis we will discharge him home tomorrow with oral antibiotics      DVT prophylaxis:  Medical DVT prophylaxis orders are present.    CODE STATUS:   Code Status (Patient has no pulse and is not breathing): CPR (Attempt to Resuscitate)  Medical Interventions (Patient has pulse or is breathing): Full  Support                    Electronically signed by Elieser Pederson MD, 11/14/23, 5:21 PM EST.

## 2023-11-14 NOTE — PROGRESS NOTES
[facility]     Progress Note    Patient Name: Blair Lira  : 1945  MRN: [id number]  Primary Care Physician:  Babs Summers APRN  Date of admission: 2023    Subjective   Patient clinically stable this morning  Cultures noted to be negative  Renal dysfunction appears to be stable  Currently being actively diuresed  Vitals are otherwise stable    Scheduled Meds:!Patient Home Medications Stored in Pharmacy, , Does not apply, BID  amLODIPine, 10 mg, Oral, Q24H  ceFAZolin, 2,000 mg, Intravenous, Q8H  folic acid, 1 mg, Oral, Daily  furosemide, 80 mg, Intravenous, BID  heparin (porcine), 5,000 Units, Subcutaneous, Q12H  HYDROcodone-acetaminophen, 1 tablet, Oral, TID  pantoprazole, 40 mg, Oral, Q AM  senna-docusate sodium, 2 tablet, Oral, BID  sodium bicarbonate, 1,300 mg, Oral, TID  sodium chloride, 10 mL, Intravenous, Q12H  tamsulosin, 0.8 mg, Oral, Daily  vitamin B-12, 1,000 mcg, Oral, Daily      Continuous Infusions:   PRN Meds:.  acetaminophen **OR** acetaminophen **OR** acetaminophen    aluminum-magnesium hydroxide-simethicone    senna-docusate sodium **AND** polyethylene glycol **AND** bisacodyl **AND** bisacodyl    calcium carbonate    Calcium Replacement - Follow Nurse / BPA Driven Protocol    HYDROcodone-acetaminophen    HYDROcodone-acetaminophen    Magnesium Standard Dose Replacement - Follow Nurse / BPA Driven Protocol    melatonin    [] HYDROmorphone **AND** naloxone    ondansetron    Phosphorus Replacement - Follow Nurse / BPA Driven Protocol    Potassium Replacement - Follow Nurse / BPA Driven Protocol    sodium chloride    sodium chloride       Review of Systems  Constitutional:        Weakness tiredness fatigue  Eyes:                       No blurry vision, eye discharge, eye irritation, eye pain  HEENT:                   No acute hair loss, earache and discharge, nasal congestion or discharge, sore throat, postnasal drip  Respiratory:           No shortness of breath coughing  sputum production wheezing hemoptysis pleuritic chest pain  Cardiovascular:     No chest pain, orthopnea, PND, dizziness, palpitation, lower extremity edema  Gastrointestinal:   No nausea vomiting diarrhea abdominal pain constipation  Genitourinary:       No urinary incontinence, hesitancy, frequency, urgency, dysuria  Hematologic:         No bruising, bleeding, pallor, lymphadenopathy  Endocrine:            No coldness, hot flashes, polyuria, abnormal hair growth  Musculoskeletal:    No body pains, aches, arthritic pains, muscle pain ,muscle wasting  Psychiatric:          No low or high mood, anxiety, hallucinations, delusions  Skin.                      No rash, ulcers, bruising, itching  Neurological:        No confusion, headache, focal weakness, numbness, dysphasia    Objective   Objective     Vitals:   Temp:  [97.3 °F (36.3 °C)-98.2 °F (36.8 °C)] 98 °F (36.7 °C)  Heart Rate:  [] 78  Resp:  [16-18] 16  BP: (126-150)/(52-72) 133/69  Physical Exam    Constitutional: Awake, alert responsive, conversant, no obvious distress              Psychiatric:  Appropriate affect, cooperative   Neurologic:  Awake alert ,oriented x 3, strength symmetric in all extremities, Cranial Nerves grossly intact to confrontation, speech clear   Eyes:   PERRLA, sclerae anicteric, no conjunctival injection   HEENT:  Moist mucous membranes, no nasal or eye discharge, no throat congestion   Neck:   Supple, no thyromegaly, no lymphadenopathy, trachea midline, no elevated JVD   Respiratory:  Clear to auscultation bilaterally, nonlabored respirations    Cardiovascular: RRR, no murmurs, rubs, or gallops, palpable pedal pulses bilaterally, No bilateral ankle edema   Gastrointestinal: Positive bowel sounds, soft, nontender, nondistended, no organomegaly   Musculoskeletal:  No clubbing or cyanosis to extremities,muscle wasting, joint swelling, muscle weakness             Skin:                      No rashes, bruising, skin ulcers, petechiae  or ecchymosis    Result Review    Result Review:  I have personally reviewed the results from the time of this admission to 11/14/2023 07:59 EST and agree with these findings:  []  Laboratory  []  Microbiology  []  Radiology  []  EKG/Telemetry   []  Cardiology/Vascular   []  Pathology  []  Old records  []  Other:    Assessment & Plan   Assessment / Plan       Active Hospital Problems:  Active Hospital Problems    Diagnosis     **UTI (urinary tract infection)     Persistent bacteremia     Edema     Chronic diastolic (congestive) heart failure     Multiple myeloma     Sepsis     Weakness generalized     Intractable pain     Acute renal failure superimposed on chronic kidney disease        78-year-old male with past medical history of hypertension, hyperlipidemia, osteoarthritis with CKD stage III and baseline creatinine of 1.4-1.6 per documentation in 2022 comes in due to progressive weakness and weight loss 20-25 lbs that has been ongoing for the last few weeks.  Patient had bone marrow biopsy done concerning for multiple biloma.  Suspect he also has multiple myeloma associated kidney injury with creatinine rise to 3-3.5 and was initiated on steroids.  MRI noted to have sclerotic changes secondary to multiple myeloma in the thoracic spine.  After discharge was readmitted within a week due to concerns for UTI with bacteremia noted to be MSSA     plan:   Patient's hemoglobin noted to be stable and will hold off on EPO  We will decrease diuresis to 80 mg IV daily  Currently on cefazolin for MSSA  Noted potentially may be getting transesophageal echo though patient's cultures have turned negative  Continue bicarb tabs 1300 3 times daily  Patient getting vitamin B12 for prior noted deficiency as well as folic acid  Continue amlodipine 10 mg for hypertension        Thank you for involving me in the care of the patient.  We will continue to follow along    Electronically signed by Sharmaine Bowling MD, 11/14/23, 7:59 AM  EST.

## 2023-11-15 ENCOUNTER — READMISSION MANAGEMENT (OUTPATIENT)
Dept: CALL CENTER | Facility: HOSPITAL | Age: 78
End: 2023-11-15
Payer: MEDICARE

## 2023-11-15 VITALS
BODY MASS INDEX: 26.12 KG/M2 | HEIGHT: 73 IN | WEIGHT: 197.09 LBS | TEMPERATURE: 98.6 F | SYSTOLIC BLOOD PRESSURE: 129 MMHG | OXYGEN SATURATION: 96 % | DIASTOLIC BLOOD PRESSURE: 60 MMHG | HEART RATE: 105 BPM | RESPIRATION RATE: 18 BRPM

## 2023-11-15 PROBLEM — R78.81 PERSISTENT BACTEREMIA: Status: RESOLVED | Noted: 2023-11-12 | Resolved: 2023-11-15

## 2023-11-15 PROBLEM — I50.23 ACUTE ON CHRONIC HFREF (HEART FAILURE WITH REDUCED EJECTION FRACTION): Status: ACTIVE | Noted: 2023-11-15

## 2023-11-15 PROBLEM — A41.9 SEPSIS: Status: RESOLVED | Noted: 2023-11-10 | Resolved: 2023-11-15

## 2023-11-15 PROBLEM — N39.0 UTI (URINARY TRACT INFECTION): Status: RESOLVED | Noted: 2023-11-06 | Resolved: 2023-11-15

## 2023-11-15 LAB
BH CV ECHO SHUNT ASSESSMENT PERFORMED (HIDDEN SCRIPTING): 1
LV EF 2D ECHO EST: 55 %

## 2023-11-15 PROCEDURE — 25010000002 FUROSEMIDE PER 20 MG: Performed by: STUDENT IN AN ORGANIZED HEALTH CARE EDUCATION/TRAINING PROGRAM

## 2023-11-15 PROCEDURE — 25010000002 CEFAZOLIN IN DEXTROSE 2-4 GM/100ML-% SOLUTION: Performed by: INTERNAL MEDICINE

## 2023-11-15 RX ORDER — SODIUM BICARBONATE 650 MG/1
1300 TABLET ORAL 2 TIMES DAILY
Qty: 120 TABLET | Refills: 0 | Status: SHIPPED | OUTPATIENT
Start: 2023-11-15 | End: 2023-12-15

## 2023-11-15 RX ORDER — CEPHALEXIN 500 MG/1
500 CAPSULE ORAL 3 TIMES DAILY
Qty: 30 CAPSULE | Refills: 0 | Status: SHIPPED | OUTPATIENT
Start: 2023-11-15 | End: 2023-11-25

## 2023-11-15 RX ORDER — DEXAMETHASONE 4 MG/1
4 TABLET ORAL EVERY 12 HOURS SCHEDULED
Qty: 13 TABLET | Refills: 0 | Status: SHIPPED | OUTPATIENT
Start: 2023-11-15 | End: 2023-11-22

## 2023-11-15 RX ORDER — FUROSEMIDE 40 MG/1
40 TABLET ORAL 2 TIMES DAILY
Qty: 60 TABLET | Refills: 0 | Status: SHIPPED | OUTPATIENT
Start: 2023-11-15 | End: 2023-12-15

## 2023-11-15 RX ORDER — HYDROCODONE BITARTRATE AND ACETAMINOPHEN 7.5; 325 MG/1; MG/1
1 TABLET ORAL EVERY 8 HOURS PRN
Qty: 30 TABLET | Refills: 0 | Status: SHIPPED | OUTPATIENT
Start: 2023-11-15 | End: 2023-11-25

## 2023-11-15 RX ADMIN — SODIUM BICARBONATE 650 MG TABLET 1300 MG: at 09:55

## 2023-11-15 RX ADMIN — Medication 10 ML: at 09:56

## 2023-11-15 RX ADMIN — CEFAZOLIN SODIUM 2000 MG: 2 INJECTION, SOLUTION INTRAVENOUS at 05:39

## 2023-11-15 RX ADMIN — Medication 1 MG: at 09:55

## 2023-11-15 RX ADMIN — TAMSULOSIN HYDROCHLORIDE 0.8 MG: 0.4 CAPSULE ORAL at 09:55

## 2023-11-15 RX ADMIN — AMLODIPINE BESYLATE 10 MG: 5 TABLET ORAL at 09:56

## 2023-11-15 RX ADMIN — PANTOPRAZOLE SODIUM 40 MG: 40 TABLET, DELAYED RELEASE ORAL at 05:39

## 2023-11-15 RX ADMIN — CYANOCOBALAMIN TAB 500 MCG 1000 MCG: 500 TAB at 09:55

## 2023-11-15 RX ADMIN — FUROSEMIDE 80 MG: 10 INJECTION, SOLUTION INTRAMUSCULAR; INTRAVENOUS at 09:55

## 2023-11-15 NOTE — PROGRESS NOTES
[facility]     Progress Note    Patient Name: Blair Lira  : 1945  MRN: [id number]  Primary Care Physician:  Babs Summers APRN  Date of admission: 2023    Subjective patient is feeling fine and has no new complaints he is having a good diuresis and lower extremity swelling is improving every day    Review of Systems  Constitutional:        Weakness tiredness fatigue  Eyes:                       No blurry vision, eye discharge, eye irritation, eye pain  HEENT:                   No acute hair loss, earache and discharge, nasal congestion or discharge, sore throat, postnasal drip  Respiratory:           No shortness of breath coughing sputum production wheezing hemoptysis pleuritic chest pain  Cardiovascular:     No chest pain, orthopnea, PND, dizziness, palpitation, lower extremity edema  Gastrointestinal:   No nausea vomiting diarrhea abdominal pain constipation  Genitourinary:       No urinary incontinence, hesitancy, frequency, urgency, dysuria  Hematologic:         No bruising, bleeding, pallor, lymphadenopathy  Endocrine:            No coldness, hot flashes, polyuria, abnormal hair growth  Musculoskeletal:    No body pains, aches, arthritic pains, muscle pain ,muscle wasting  Psychiatric:          No low or high mood, anxiety, hallucinations, delusions  Skin.                      No rash, ulcers, bruising, itching  Neurological:        No confusion, headache, focal weakness, numbness, dysphasia    Objective   Objective     Vitals:   Temp:  [97.3 °F (36.3 °C)-98.7 °F (37.1 °C)] 98.6 °F (37 °C)  Heart Rate:  [] 74  Resp:  [15-20] 18  BP: (102-148)/(49-74) 114/51  Physical Exam    Constitutional: Awake, alert responsive, conversant, no obvious distress              Psychiatric:  Appropriate affect, cooperative   Neurologic:  Awake alert ,oriented x 3, strength symmetric in all extremities, Cranial Nerves grossly intact to confrontation, speech clear   Eyes:   PERRLA, sclerae anicteric,  no conjunctival injection   HEENT:  Moist mucous membranes, no nasal or eye discharge, no throat congestion   Neck:   Supple, no thyromegaly, no lymphadenopathy, trachea midline, no elevated JVD   Respiratory:  Clear to auscultation bilaterally, nonlabored respirations    Cardiovascular: RRR, no murmurs, rubs, or gallops, palpable pedal pulses bilaterally, No bilateral ankle edema   Gastrointestinal: Positive bowel sounds, soft, nontender, nondistended, no organomegaly   Musculoskeletal:  No clubbing or cyanosis to extremities,muscle wasting, joint swelling, muscle weakness             Skin:                      No rashes, bruising, skin ulcers, petechiae or ecchymosis    Result Review    Result Review:  I have personally reviewed the results from the time of this admission to 11/15/2023 09:31 EST and agree with these findings:  []  Laboratory  []  Microbiology  []  Radiology  []  EKG/Telemetry   []  Cardiology/Vascular   []  Pathology  []  Old records  []  Other:    Assessment & Plan   Assessment / Plan       Active Hospital Problems:    Active Hospital Problems    Diagnosis  POA    Acute on chronic HFrEF (heart failure with reduced ejection fraction) [I50.23]  Yes    Edema [R60.9]  Unknown    Chronic diastolic (congestive) heart failure [I50.32]  Unknown    Multiple myeloma [C90.00]  Yes    Weakness generalized [R53.1]  Yes    Intractable pain [R52]  Yes    Acute renal failure superimposed on chronic kidney disease [N17.9, N18.9]  Yes     Serum creatinine 1.65 November 2022  Patient'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

## 2023-11-15 NOTE — OUTREACH NOTE
Prep Survey      Flowsheet Row Responses   [facility] facility patient discharged from? [facility]   Is LACE score < 7 ? No   Eligibility Readm Mgmt   Discharge diagnosis [facility] on CKD, UTI, chronic CHF   Does the patient have one of the following disease processes/diagnoses(primary or secondary)? Other   Does the patient have Home health ordered? No   Is there a DME ordered? No   Prep survey completed? Yes            Yany CAMPBELL - Registered Nurse

## 2023-11-15 NOTE — PROGRESS NOTES
[facility]     Progress Note    Patient Name: Blair Lira  : 1945  MRN: [id number]  Primary Care Physician:  Babs Summers APRN  Date of admission: 2023    Subjective   Subjective     Chief Complaint: Patient with multiple myeloma renal failure afebrile most likely will be discharged we will see him in the office start getting the process of getting his medication    HPI: Multiple myeloma will be started as an outpatient on the chemotherapy    Review of Systems   All systems were reviewed and negative except for: Been reviewed    Objective   Objective     Vitals:   Temp:  [97.3 °F (36.3 °C)-98.7 °F (37.1 °C)] 98.1 °F (36.7 °C)  Heart Rate:  [] 78  Resp:  [15-20] 20  BP: (102-148)/(49-74) 109/49    Physical Exam    Constitutional: Awake, alert   Eyes: PERRLA, sclerae anicteric, no conjunctival injection   HENT: NCAT, mucous membranes moist   Neck: Supple, no thyromegaly, no lymphadenopathy, trachea midline   Respiratory: Clear to auscultation bilaterally, nonlabored respirations    Cardiovascular: RRR, no murmurs, rubs, or gallops, palpable pedal pulses bilaterally   Gastrointestinal: Positive bowel sounds, soft, nontender, nondistended   Musculoskeletal: No bilateral ankle edema, no clubbing or cyanosis to extremities   Psychiatric: Appropriate affect, cooperative   Neurologic: Oriented x 3, strength symmetric in all extremities, Cranial Nerves grossly intact to confrontation, speech clear   Skin: No rashes   No change  Result Review    Result Review:  I have personally reviewed the results from the time of this admission to 11/15/2023 08:24 EST and agree with these findings:  [x]  Laboratory  []  Microbiology  []  Radiology  []  EKG/Telemetry   []  Cardiology/Vascular   []  Pathology  []  Old records  []  Other:  Most notable findings include: Patient with multiple myeloma renal failure    Assessment & Plan   Assessment / Plan     Brief Patient Summary:  Blair Lira is a 78 y.o.  male who multiple myeloma renal failure to be started on treatment as an outpatient    Active Hospital Problems:  Active Hospital Problems    Diagnosis     **UTI (urinary tract infection)     Persistent bacteremia     Edema     Chronic diastolic (congestive) heart failure     Multiple myeloma     Sepsis     Weakness generalized     Intractable pain     Acute renal failure superimposed on chronic kidney disease        Plan:   Continue as per primary    DVT prophylaxis:  Medical DVT prophylaxis orders are present.    CODE STATUS:   Code Status (Patient has no pulse and is not breathing): CPR (Attempt to Resuscitate)  Medical Interventions (Patient has pulse or is breathing): Full Support    Disposition:  I expect patient to be discharged after the patient has been stable.    Electronically signed by Vamsi Mason MD, 11/15/23, 8:24 AM EST.      Part of this note may be an electronic transcription/translation of spoken language to printed text using the Dragon Dictation System.

## 2023-11-15 NOTE — DISCHARGE SUMMARY
[facility]         DISCHARGE SUMMARY    Patient Name: Blair Lira  : 1945  MRN: [id number]    Date of Admission: 2023  Date of Discharge: November 15  Primary Care Physician: Babs Summers APRN    Consults       Date and Time Order Name Status Description    2023  4:53 PM Inpatient Cardiology Consult Completed     11/10/2023  6:27 PM Inpatient Nephrology Consult Completed     2023  7:00 AM Inpatient Hematology & Oncology Consult      10/27/2023  5:16 PM Inpatient Nephrology Consult Completed             Presenting Problem:   Myalgia [M79.10]  UTI (urinary tract infection) [N39.0]  Weakness generalized [R53.1]  Urinary tract infection without hematuria, site unspecified [N39.0]  Multiple myeloma, remission status unspecified [C90.00]  Acute on chronic HFrEF (heart failure with reduced ejection fraction) [I50.23]    Active and Resolved Hospital Problems:  Active Hospital Problems    Diagnosis POA    Acute on chronic HFrEF (heart failure with reduced ejection fraction) [I50.23] Yes    Edema [R60.9] Unknown    Chronic diastolic (congestive) heart failure [I50.32] Unknown    Multiple myeloma [C90.00] Yes    Weakness generalized [R53.1] Yes    Intractable pain [R52] Yes    Acute renal failure superimposed on chronic kidney disease [N17.9, N18.9] Yes      Resolved Hospital Problems    Diagnosis POA    **UTI (urinary tract infection) [N39.0] Yes    Persistent bacteremia [R78.81] Unknown    Sepsis [A41.9] Yes         Hospital Course     Hospital Course:  Blair Lira is a 78 y.o. male who was recently diagnosed with multiple myeloma and acute renal failure, discharged home few days prior to this admission.  Patient came back with increasing pain and weakness and difficulty with walking and urinary frequency.  Work-up revealed UTI and intractable pain in lower extremities.  Patient was started on antibiotics, urine cultures were obtained.  Blood cultures were obtained.   Patient had positive blood cultures for staph.  Staff was negative for MRSA.  Patient was treated aggressively his pain was controlled with narcotics.  Patient also had some volume overload and he had acute on chronic CHF.  Diuretics were used nephrologist was consulted.  Patient had acute on chronic renal failure.    Patient was managed by oncologist as well as nephrologist with my assistance.    He continued to improve.  Initially he had positive cultures for several days.  NIKHIL was performed to rule out endocarditis.    Patient already had CT of the abdomen done.  No stones were found.    As he was feeling better after discussion with the consultants patient was discharged home.      Post discharge pharmacy called and recommended him to continue Zyvox for 2 weeks rather than cephalosporins as they have better coverage.  Patient was discharged on Keflex.  I called and oncologist Dr. Mason was notified he was supposed to see patient post discharge and he will change the antibiotic when he comes to office.            DISCHARGE Follow Up Recommendations for labs and diagnostics:   Discharged home  Follow-up with consultants      Day of Discharge     Vital Signs:  Temp:  [97.3 °F (36.3 °C)-98.7 °F (37.1 °C)] 98.6 °F (37 °C)  Heart Rate:  [] 74  Resp:  [15-20] 18  BP: (102-148)/(49-74) 114/51    Physical Exam:    Elderly male not in acute distress, looks younger than stated, heart regular, lungs clear.  Abdomen soft.  Extremities 1+ edema in both lower extremities neurologically awake alert and oriented      Pertinent  and/or Most Recent Results     LAB RESULTS:      Lab 11/13/23  0542 11/11/23  0601 11/10/23  1205 11/10/23  0609   WBC 9.70 11.14*  --  9.25   HEMOGLOBIN 9.6* 9.1*  --  8.6*   HEMATOCRIT 29.1* 28.4*  --  26.5*   PLATELETS 160 180  --  168   NEUTROS ABS 8.28* 9.82*  --  8.14*   IMMATURE GRANS (ABS) 0.15* 0.19*  --   --    LYMPHS ABS 1.03 0.92  --   --    MONOS ABS 0.23 0.19  --   --    EOS ABS  0.00 0.00  --   --    MCV 99.7* 100.4*  --  98.5*   PROCALCITONIN  --   --  0.45*  --          Lab 11/13/23  0542 11/11/23  0601 11/10/23  0609 11/09/23  0441   SODIUM 132* 129* 131* 129*   POTASSIUM 5.1 5.2 4.6 4.9   CHLORIDE 101 103 104 104   CO2 17.9* 16.2* 17.0* 16.1*   ANION GAP 13.1 9.8 10.0 8.9   BUN 99* 84* 89* 95*   CREATININE 3.53* 3.00* 3.06* 3.34*   EGFR 17.1* 20.7* 20.3* 18.2*   GLUCOSE 157* 174* 134* 156*   CALCIUM 7.7* 7.5* 7.3* 7.4*         Lab 11/13/23  0542 11/11/23  0601 11/10/23  0609 11/09/23  0441   TOTAL PROTEIN 7.8 7.3 6.4 6.8   ALBUMIN 2.4* 2.2* 1.9* 2.0*   GLOBULIN 5.4 5.1 4.5 4.8   ALT (SGPT) <5 <5 <5 <5   AST (SGOT) 15 10 9 8   BILIRUBIN 0.3 0.3 0.2 0.3   ALK PHOS 49 51 40 46                     Brief Urine Lab Results  (Last result in the past 365 days)        Color   Clarity   Blood   Leuk Est   Nitrite   Protein   CREAT   Urine HCG        11/12/23 1155 Yellow   Clear   Small (1+)   Negative   Negative   Negative                 Microbiology Results (last 10 days)       Procedure Component Value - Date/Time    Urine Culture - Urine, Urine, Clean Catch [[id number]]  (Normal) Collected: 11/12/23 1155    Lab Status: Final result Specimen: Urine, Clean Catch Updated: 11/13/23 2040     Urine Culture No growth    Blood Culture - Blood, Hand, Right [[id number]]  (Normal) Collected: 11/12/23 0916    Lab Status: Preliminary result Specimen: Blood from Hand, Right Updated: 11/15/23 0930     Blood Culture No growth at 3 days    Blood Culture - Blood, Arm, Left [[id number]]  (Normal) Collected: 11/12/23 0914    Lab Status: Preliminary result Specimen: Blood from Arm, Left Updated: 11/15/23 0930     Blood Culture No growth at 3 days    Blood Culture - Blood, Arm, Left [[id number]]  (Abnormal) Collected: 11/08/23 1943    Lab Status: Final result Specimen: Blood from Arm, Left Updated: 11/11/23 0650     Blood Culture Staphylococcus aureus     Comment:   Infectious disease consultation is highly  recommended to rule out distant foci of infection.        Isolated from Aerobic Bottle     Gram Stain Aerobic Bottle Gram positive cocci in clusters    Narrative:      Refer to previous blood culture collected on 11/06/23 at 0459 for MICs.    Blood Culture - Blood, Hand, Right [[id number]]  (Normal) Collected: 11/08/23 1943    Lab Status: Final result Specimen: Blood from Hand, Right Updated: 11/13/23 2000     Blood Culture No growth at 5 days    Blood Culture - Blood, Arm, Left [[id number]]  (Abnormal) Collected: 11/07/23 1720    Lab Status: Edited Result - FINAL Specimen: Blood from Arm, Left Updated: 11/10/23 0653     Blood Culture Staphylococcus aureus     Comment:   Infectious disease consultation is highly recommended to rule out distant foci of infection.        Isolated from Aerobic and Anaerobic Bottles     Gram Stain Aerobic Bottle Gram positive cocci in clusters      Anaerobic Bottle Gram positive cocci in clusters     Comment: Appended report. These results have been appended to a previously final verified report.       Narrative:      Refer to previous blood culture collected on 11/06/23 at 0459 for MICs.      Blood Culture - Blood, Hand, Right [[id number]]  (Normal) Collected: 11/07/23 1720    Lab Status: Final result Specimen: Blood from Hand, Right Updated: 11/12/23 1745     Blood Culture No growth at 5 days    Blood Culture - Blood, Arm, Right [[id number]]  (Abnormal)  (Susceptibility) Collected: 11/06/23 0459    Lab Status: Final result Specimen: Blood from Arm, Right Updated: 11/09/23 0659     Blood Culture Staphylococcus aureus     Comment:   Infectious disease consultation is highly recommended to rule out distant foci of infection.        Isolated from Aerobic and Anaerobic Bottles     Gram Stain Aerobic Bottle Gram positive cocci in clusters      Anaerobic Bottle Gram positive cocci in clusters    Susceptibility        Staphylococcus aureus      JANESSA      Gentamicin Susceptible      Oxacillin  Susceptible      Rifampin Susceptible      Vancomycin Susceptible                       Susceptibility Comments       Staphylococcus aureus    This isolate does not demonstrate inducible clindamycin resistance in vitro.                 Blood Culture - Blood, Arm, Left [[id number]]  (Abnormal) Collected: 11/06/23 0459    Lab Status: Final result Specimen: Blood from Arm, Left Updated: 11/09/23 0659     Blood Culture Staphylococcus aureus     Comment:   Infectious disease consultation is highly recommended to rule out distant foci of infection.        Isolated from Aerobic and Anaerobic Bottles     Gram Stain Aerobic Bottle Gram positive cocci in clusters      Anaerobic Bottle Gram positive cocci in clusters    Narrative:      Refer to previous blood culture collected on 11/06/2023 0459 for MICs      Blood Culture ID, PCR - Blood, Arm, Right [[id number]]  (Abnormal) Collected: 11/06/23 0459    Lab Status: Final result Specimen: Blood from Arm, Right Updated: 11/06/23 1948     BCID, PCR Staph aureus. mecA/C and MREJ (methicillin resistance gene) NOT detected. Identification by BCID2 PCR     BOTTLE TYPE Aerobic Bottle    Narrative:      Infectious disease consultation is highly recommended to rule out distant foci of infection.    Urine Culture - Urine, Urine, Clean Catch [[id number]]  (Abnormal)  (Susceptibility) Collected: 11/06/23 0324    Lab Status: Final result Specimen: Urine, Clean Catch Updated: 11/08/23 0308     Urine Culture >100,000 CFU/mL Staphylococcus aureus     Comment:   Except in cases with genitourinary instrumentation, Staphylococcus aureus bacteriuria is associated with S. aureus bacteremia. Blood cultures are recommended.         25,000 CFU/mL Escherichia coli    Narrative:      Colonization of the urinary tract without infection is common. Treatment is discouraged unless the patient is symptomatic, pregnant, or undergoing an invasive urologic procedure.    Susceptibility        Staphylococcus aureus       JANESSA      Nitrofurantoin Susceptible      Oxacillin Susceptible      Tetracycline Resistant      Trimethoprim + Sulfamethoxazole Susceptible      Vancomycin Susceptible                       Susceptibility        Escherichia coli      JANESSA      Ampicillin Susceptible      Ampicillin + Sulbactam Susceptible      Cefazolin Susceptible      Cefepime Susceptible      Ceftazidime Susceptible      Ceftriaxone Susceptible      Gentamicin Susceptible      Levofloxacin Susceptible      Nitrofurantoin Susceptible      Piperacillin + Tazobactam Susceptible      Trimethoprim + Sulfamethoxazole Susceptible                       Susceptibility Comments       Staphylococcus aureus    This isolate does not demonstrate inducible clindamycin resistance in vitro.                         PROCEDURES:    XR Chest 1 View    Result Date: 11/12/2023  Impression:  Chronic elevation of the right hemidiaphragm.  No acute cardiopulmonary process demonstrated       ASHLY PARKER MD       Electronically Signed and Approved By: ASHLY PARKER MD on 11/12/2023 at 9:45             XR Chest 1 View    Result Date: 11/6/2023  Impression:   No acute infiltrate is appreciated.     Please note that portions of this note were completed with a voice recognition program.  THOMAS CHILDS JR, MD       Electronically Signed and Approved By: THOMAS CHILDS JR, MD on 11/06/2023 at 4:56              MRI Lumbar Spine Without Contrast    Result Date: 10/31/2023  Impression:   1. Heterogeneous marrow signal with a 1 cm rounded area of decreased T1 and intermediate T2 signal within the L3 vertebral body.  Findings are suspicious for myeloma or metastatic disease given the patient's clinical history. 2. Multilevel degenerative disc disease and degenerative facet change resulting in multilevel spinal canal stenosis and neural foraminal narrowing as detailed above.  These degenerative changes have worsened throughout the lumbar spine when compared to 05/23/2017.       GRETA MÁRQUEZ MD       Electronically Signed and Approved By: GRETA MÁRQUEZ MD on 10/31/2023 at 23:16             MRI Cervical Spine Without Contrast    Result Date: 10/31/2023  Impression:   1. Multilevel degenerative disc disease and degenerative facet change throughout the cervical spine as detailed above. 2. Within the right occipital condyle there is a 8 mm area of decreased T1 and T2 signal suspicious for myeloma or metastatic disease.     GRETA MÁRQUEZ MD       Electronically Signed and Approved By: GRETA MÁRQUEZ MD on 10/31/2023 at 22:35             NM Bone Scan Whole Body    Result Date: 10/27/2023  Impression:    No suspicious MDP uptake.     MATILDA GUALLPA MD       Electronically Signed and Approved By: MATILDA GUALLPA MD on 10/27/2023 at 17:37             XR Bone Survey Complete    Result Date: 10/27/2023  Impression:   No acute osseous abnormality.      MATILDA GUALLPA MD       Electronically Signed and Approved By: MATILDA UGALLPA MD on 10/27/2023 at 17:04             CT Abdomen Pelvis Without Contrast    Result Date: 10/27/2023  Impression:   1. No convincing nonenhanced CT evidence for primary malignancy or metastatic disease involving the abdomen and pelvis.  2. There may be prostatomegaly.  3. There has been interval right hip total arthroplasty with associated streak artifact.  4. No splenomegaly is seen.  5. No enlarged lymph nodes are identified.  6. No mechanical bowel obstruction.  7. There are colonic diverticula without acute diverticulitis.  8. Atherosclerotic change is seen without aneurysmal dilatation of the aortoiliac arterial system.   9. Please see above comments for further detail.   1. Please note that portions of this note were completed with a voice recognition program.  THOMAS CHILDS JR, MD       Electronically Signed and Approved By: THOMAS CHILDS JR, MD on 10/27/2023 at 3:55              CT Chest Without Contrast Diagnostic    Result Date: 10/27/2023  Impression:    1.  The study is motion-limited.   2. No acute infiltrate is suspected.   3. There is chronic asymmetric elevation of the right diaphragm, seen on prior studies dating back to 2006.  4. Atherosclerotic changes are present including involvement of the coronary arteries.  5. The maximum diameter of the ascending aorta is 4.1 cm, which is mildly dilated.  Previously, it measured about 3.6 cm in maximum diameter on the 2/26/2015 enhanced chest CT study.  Consider close interval clinical and imaging follow-up if clinically warranted.  6. There may be borderline cardiac enlargement.  7. No pleural or pericardial effusion.  8. No suspicious pulmonary nodule.  9. No enlarged lymph nodes are suggested.  10. There is no convincing nonenhanced chest CT evidence for primary malignancy or metastatic disease involving the chest.  11. If occult malignancy remains a clinical concern, consider Nuclear Medicine (NM) whole-body FDG-PET-CT scan for further imaging evaluation (and if not contraindicated).   12. Please see above comments for further detail.     Please note that portions of this note were completed with a voice recognition program.  THOMAS CHILDS JR, MD       Electronically Signed and Approved By: THOMAS CHILDS JR, MD on 10/27/2023 at 3:29               Results for orders placed during the hospital encounter of 11/06/23    Duplex Venous Lower Extremity - Right CAR    Interpretation Summary    Normal right lower extremity venous duplex scan.      Results for orders placed during the hospital encounter of 11/06/23    Duplex Venous Lower Extremity - Right CAR    Interpretation Summary    Normal right lower extremity venous duplex scan.      Results for orders placed during the hospital encounter of 10/26/23    Adult Transthoracic Echo Complete W/ Cont if Necessary Per Protocol    Interpretation Summary    Left ventricular systolic function is low normal. Estimated left ventricular EF = 50%    Left ventricular wall thickness is  consistent with mild concentric hypertrophy.    Left ventricular diastolic function is consistent with (grade I) impaired relaxation.    There is mild mitral valve regurgitation.    Mild dilation of the aortic root is present. Mild dilation of the ascending aorta is present, measuring 4.2 cm in diameter.    Estimated right ventricular systolic pressure from tricuspid regurgitation is normal (<35 mmHg).      Labs Pending at Discharge:  Pending Labs       Order Current Status    Blood Culture - Blood, Arm, Left Preliminary result    Blood Culture - Blood, Hand, Right Preliminary result              Discharge Details        Discharge Medications        New Medications        Instructions Start Date   cephalexin 500 MG capsule  Commonly known as: Keflex   500 mg, Oral, 3 Times Daily      furosemide 40 MG tablet  Commonly known as: Lasix   40 mg, Oral, 2 Times Daily      HYDROcodone-acetaminophen 7.5-325 MG per tablet  Commonly known as: NORCO   1 tablet, Oral, Every 8 Hours PRN      sodium bicarbonate 650 MG tablet   1,300 mg, Oral, 2 Times Daily             Continue These Medications        Instructions Start Date   amLODIPine 5 MG tablet  Commonly known as: NORVASC   Take 1 tablet by mouth Daily.      atorvastatin 10 MG tablet  Commonly known as: LIPITOR   10 mg, Oral, Nightly      dexAMETHasone 4 MG tablet  Commonly known as: DECADRON   4 mg, Oral, Every 12 Hours Scheduled      folic acid 1 MG tablet  Commonly known as: FOLVITE   1 mg, Oral, Daily      pantoprazole 40 MG EC tablet  Commonly known as: PROTONIX   40 mg, Oral, Every Early Morning      tamsulosin 0.4 MG capsule 24 hr capsule  Commonly known as: FLOMAX   0.8 mg, Oral, Daily      vitamin B-12 1000 MCG tablet  Commonly known as: CYANOCOBALAMIN   1,000 mcg, Oral, Daily               No Known Allergies      Discharge Disposition:    Home or Self Care    Diet:    Renal diet    Discharge Activity:     Activity Instructions       Activity as Tolerated               No future appointments.    Additional Instructions for the Follow-ups that You Need to Schedule       Discharge Follow-up with PCP   As directed       Currently Documented PCP:    Babs Summers APRN    PCP Phone Number:    [phone]     Follow Up Details: Next week        Discharge Follow-up with Specified Provider: Dr. Mason today   As directed      To: Dr. Marlon melara                Time spent on Discharge including face to face service: 41 minutes.            I have dictated this note utilizing Dragon Dictation.             Please note that portions of this note were completed with a voice recognition program.             Part of this note may be an electronic transcription/translation of spoken language to printed text         using the Dragon Dictation System.       Electronically signed by Elieser Pederson MD, 11/15/23, 9:45 AM EST.

## 2023-11-15 NOTE — PLAN OF CARE
Goal Outcome Evaluation:           Progress: no change  Outcome Evaluation: Patient had NIKHIL this shift. Procedure when well. Up in chair most of the day, VSS. No complaints of pain.

## 2023-11-15 NOTE — CONSULTS
Discharge Planning Assessment   [facility]     Patient Name: Blair Lira  MRN: [id number]  Today's Date: 11/15/2023    Admit Date: 11/6/2023   Discharge Plan       Row Name 11/15/23 1035       Plan    Final Discharge Disposition Code 01 - home or self-care    Final Note Pt to discharge home today. No needs identified.             Expected Discharge Date and Time       Expected Discharge Date Expected Discharge Time    Nov 15, 2023      WILMA Miguel

## 2023-11-15 NOTE — PLAN OF CARE
Goal Outcome Evaluation:  Plan of Care Reviewed With: patient        Progress: improving  Outcome Evaluation: Alert and oriented x4. Up to chair this shift. Meds administered per MAR. No new issues at this time. no complaints of pain. Discharging this shift, will provide discharge teaching. Vital signs WNL for pt.

## 2023-11-16 LAB
QT INTERVAL: 374 MS
QTC INTERVAL: 402 MS

## 2023-11-17 LAB
BACTERIA SPEC AEROBE CULT: NORMAL
BACTERIA SPEC AEROBE CULT: NORMAL

## 2023-11-20 ENCOUNTER — READMISSION MANAGEMENT (OUTPATIENT)
Dept: CALL CENTER | Facility: HOSPITAL | Age: 78
End: 2023-11-20
Payer: MEDICARE

## 2023-11-20 NOTE — OUTREACH NOTE
Medical Week 1 Survey      Flowsheet Row Responses   [facility] patient discharged from? [facility]   Does the patient have one of the following disease processes/diagnoses(primary or secondary)? Other   Week 1 attempt successful? Yes   Call start time 1251   Call end time 1305   Discharge diagnosis LAINA on CKD, UTI, chronic CHF   Meds reviewed with patient/caregiver? Yes   Is the patient having any side effects they believe may be caused by any medication additions or changes? No   Does the patient have all medications ordered at discharge? Yes   Is the patient taking all medications as directed (includes completed medication regime)? Yes   Does the patient have a primary care provider?  Yes   Does the patient have an appointment with their PCP within 7 days of discharge? Yes   Comments regarding PCP PCP f/u 11-21-23   Has the patient kept scheduled appointments due by today? N/A   Psychosocial issues? No   Comments Pt c/o weakness remains, walking independently, urinating frequently, light in color, odorless, no pain, hematuria or other sx of UTI. LE edema is improved. Pt does not do daily wts at home, educated on importance of staying ahead of fluid retention and how daily wts are importanct for that, pt verbalized understanding   Did the patient receive a copy of their discharge instructions? Yes   Nursing interventions Reviewed instructions with patient   What is the patient's perception of their health status since discharge? Improving   Is the patient/caregiver able to teach back signs and symptoms related to disease process for when to call PCP? Yes   Is the patient/caregiver able to teach back signs and symptoms related to disease process for when to call 911? Yes   If the patient is a current smoker, are they able to teach back resources for cessation? Not a smoker   Week 1 call completed? Yes   Revoked No further contact(revokes)-requires comment   Would this patient benefit from a Referral to [facility]  Work? No   Is the patient interested in additional calls from an ambulatory ? No   Call end time [id number]            Lakeshia MCMULLEN - Registered Nurse

## 2023-11-29 ENCOUNTER — APPOINTMENT (OUTPATIENT)
Dept: GENERAL RADIOLOGY | Facility: HOSPITAL | Age: 78
DRG: 178 | End: 2023-11-29
Payer: MEDICARE

## 2023-11-29 ENCOUNTER — HOSPITAL ENCOUNTER (INPATIENT)
Facility: HOSPITAL | Age: 78
LOS: 4 days | Discharge: HOME | DRG: 178 | End: 2023-12-03
Attending: EMERGENCY MEDICINE | Admitting: INTERNAL MEDICINE
Payer: MEDICARE

## 2023-11-29 ENCOUNTER — APPOINTMENT (OUTPATIENT)
Dept: CT IMAGING | Facility: HOSPITAL | Age: 78
DRG: 178 | End: 2023-11-29
Payer: MEDICARE

## 2023-11-29 DIAGNOSIS — R53.1 WEAKNESS: ICD-10-CM

## 2023-11-29 DIAGNOSIS — Z78.9 DECREASED ACTIVITIES OF DAILY LIVING (ADL): ICD-10-CM

## 2023-11-29 DIAGNOSIS — R79.89 TROPONIN LEVEL ELEVATED: Primary | ICD-10-CM

## 2023-11-29 DIAGNOSIS — R55 SYNCOPE AND COLLAPSE: ICD-10-CM

## 2023-11-29 PROBLEM — R07.9 CHEST PAIN: Status: ACTIVE | Noted: 2023-11-29

## 2023-11-29 PROBLEM — U07.1 COVID-19 VIRUS INFECTION: Status: ACTIVE | Noted: 2023-11-29

## 2023-11-29 LAB
ALBUMIN SERPL-MCNC: 2.8 G/DL (ref 3.5–5.2)
ALBUMIN/GLOB SERPL: 0.6 G/DL
ALP SERPL-CCNC: 66 U/L (ref 39–117)
ALT SERPL W P-5'-P-CCNC: 29 U/L (ref 1–41)
ANION GAP SERPL CALCULATED.3IONS-SCNC: 16.3 MMOL/L (ref 5–15)
AST SERPL-CCNC: 16 U/L (ref 1–40)
BASOPHILS # BLD AUTO: 0.01 10*3/MM3 (ref 0–0.2)
BASOPHILS NFR BLD AUTO: 0.1 % (ref 0–1.5)
BILIRUB SERPL-MCNC: 0.5 MG/DL (ref 0–1.2)
BUN SERPL-MCNC: 58 MG/DL (ref 8–23)
BUN/CREAT SERPL: 20.7 (ref 7–25)
CALCIUM SPEC-SCNC: 8.1 MG/DL (ref 8.6–10.5)
CHLORIDE SERPL-SCNC: 90 MMOL/L (ref 98–107)
CO2 SERPL-SCNC: 25.7 MMOL/L (ref 22–29)
CREAT SERPL-MCNC: 2.8 MG/DL (ref 0.76–1.27)
DEPRECATED RDW RBC AUTO: 55.3 FL (ref 37–54)
EGFRCR SERPLBLD CKD-EPI 2021: 22.5 ML/MIN/1.73
EOSINOPHIL # BLD AUTO: 0 10*3/MM3 (ref 0–0.4)
EOSINOPHIL NFR BLD AUTO: 0 % (ref 0.3–6.2)
ERYTHROCYTE [DISTWIDTH] IN BLOOD BY AUTOMATED COUNT: 15.9 % (ref 12.3–15.4)
FLUAV SUBTYP SPEC NAA+PROBE: NOT DETECTED
FLUBV RNA ISLT QL NAA+PROBE: NOT DETECTED
GEN 5 2HR TROPONIN T REFLEX: 76 NG/L
GLOBULIN UR ELPH-MCNC: 4.7 GM/DL
GLUCOSE SERPL-MCNC: 170 MG/DL (ref 65–99)
HCT VFR BLD AUTO: 29.2 % (ref 37.5–51)
HGB BLD-MCNC: 9.8 G/DL (ref 13–17.7)
HOLD SPECIMEN: NORMAL
HOLD SPECIMEN: NORMAL
IMM GRANULOCYTES # BLD AUTO: 0.07 10*3/MM3 (ref 0–0.05)
IMM GRANULOCYTES NFR BLD AUTO: 0.9 % (ref 0–0.5)
IRON 24H UR-MRATE: 148 MCG/DL (ref 59–158)
IRON SATN MFR SERPL: 76 % (ref 20–50)
LYMPHOCYTES # BLD AUTO: 1.23 10*3/MM3 (ref 0.7–3.1)
LYMPHOCYTES NFR BLD AUTO: 15.1 % (ref 19.6–45.3)
MAGNESIUM SERPL-MCNC: 1.5 MG/DL (ref 1.6–2.4)
MCH RBC QN AUTO: 32 PG (ref 26.6–33)
MCHC RBC AUTO-ENTMCNC: 33.6 G/DL (ref 31.5–35.7)
MCV RBC AUTO: 95.4 FL (ref 79–97)
MONOCYTES # BLD AUTO: 0.41 10*3/MM3 (ref 0.1–0.9)
MONOCYTES NFR BLD AUTO: 5 % (ref 5–12)
NEUTROPHILS NFR BLD AUTO: 6.45 10*3/MM3 (ref 1.7–7)
NEUTROPHILS NFR BLD AUTO: 78.9 % (ref 42.7–76)
NRBC BLD AUTO-RTO: 0 /100 WBC (ref 0–0.2)
PLATELET # BLD AUTO: 214 10*3/MM3 (ref 140–450)
PMV BLD AUTO: 8.8 FL (ref 6–12)
POTASSIUM SERPL-SCNC: 3.9 MMOL/L (ref 3.5–5.2)
PROT SERPL-MCNC: 7.5 G/DL (ref 6–8.5)
RBC # BLD AUTO: 3.06 10*6/MM3 (ref 4.14–5.8)
RSV RNA NPH QL NAA+NON-PROBE: NOT DETECTED
SARS-COV-2 RNA RESP QL NAA+PROBE: DETECTED
SODIUM SERPL-SCNC: 132 MMOL/L (ref 136–145)
TIBC SERPL-MCNC: 194 MCG/DL (ref 298–536)
TRANSFERRIN SERPL-MCNC: 130 MG/DL (ref 200–360)
TROPONIN T DELTA: -2 NG/L
TROPONIN T SERPL HS-MCNC: 78 NG/L
WBC NRBC COR # BLD AUTO: 8.17 10*3/MM3 (ref 3.4–10.8)
WHOLE BLOOD HOLD COAG: NORMAL
WHOLE BLOOD HOLD SPECIMEN: NORMAL

## 2023-11-29 PROCEDURE — 73080 X-RAY EXAM OF ELBOW: CPT

## 2023-11-29 PROCEDURE — 84466 ASSAY OF TRANSFERRIN: CPT | Performed by: INTERNAL MEDICINE

## 2023-11-29 PROCEDURE — 25810000003 SODIUM CHLORIDE 0.9 % SOLUTION: Performed by: INTERNAL MEDICINE

## 2023-11-29 PROCEDURE — 36415 COLL VENOUS BLD VENIPUNCTURE: CPT

## 2023-11-29 PROCEDURE — 87637 SARSCOV2&INF A&B&RSV AMP PRB: CPT

## 2023-11-29 PROCEDURE — 71045 X-RAY EXAM CHEST 1 VIEW: CPT

## 2023-11-29 PROCEDURE — 93010 ELECTROCARDIOGRAM REPORT: CPT | Performed by: SPECIALIST

## 2023-11-29 PROCEDURE — 93010 ELECTROCARDIOGRAM REPORT: CPT | Performed by: INTERNAL MEDICINE

## 2023-11-29 PROCEDURE — 70450 CT HEAD/BRAIN W/O DYE: CPT

## 2023-11-29 PROCEDURE — 83735 ASSAY OF MAGNESIUM: CPT

## 2023-11-29 PROCEDURE — 83540 ASSAY OF IRON: CPT | Performed by: INTERNAL MEDICINE

## 2023-11-29 PROCEDURE — 99285 EMERGENCY DEPT VISIT HI MDM: CPT

## 2023-11-29 PROCEDURE — 93005 ELECTROCARDIOGRAM TRACING: CPT

## 2023-11-29 PROCEDURE — 84484 ASSAY OF TROPONIN QUANT: CPT

## 2023-11-29 PROCEDURE — 85025 COMPLETE CBC W/AUTO DIFF WBC: CPT

## 2023-11-29 PROCEDURE — 80053 COMPREHEN METABOLIC PANEL: CPT

## 2023-11-29 PROCEDURE — 93005 ELECTROCARDIOGRAM TRACING: CPT | Performed by: EMERGENCY MEDICINE

## 2023-11-29 RX ORDER — ERGOCALCIFEROL 1.25 MG/1
50000 CAPSULE ORAL WEEKLY
COMMUNITY

## 2023-11-29 RX ORDER — ACETAMINOPHEN 325 MG/1
650 TABLET ORAL EVERY 6 HOURS PRN
Status: DISCONTINUED | OUTPATIENT
Start: 2023-11-29 | End: 2023-12-03 | Stop reason: HOSPADM

## 2023-11-29 RX ORDER — MIDODRINE HYDROCHLORIDE 10 MG/1
10 TABLET ORAL
COMMUNITY
Start: 2023-11-29 | End: 2023-12-09

## 2023-11-29 RX ORDER — DEXAMETHASONE SODIUM PHOSPHATE 10 MG/ML
10 INJECTION, SOLUTION INTRAMUSCULAR; INTRAVENOUS DAILY
Status: DISCONTINUED | OUTPATIENT
Start: 2023-11-30 | End: 2023-12-03 | Stop reason: HOSPADM

## 2023-11-29 RX ORDER — TAMSULOSIN HYDROCHLORIDE 0.4 MG/1
0.8 CAPSULE ORAL DAILY
Status: COMPLETED | OUTPATIENT
Start: 2023-11-30 | End: 2023-12-02

## 2023-11-29 RX ORDER — ASPIRIN 81 MG/1
324 TABLET, CHEWABLE ORAL ONCE
Status: DISCONTINUED | OUTPATIENT
Start: 2023-11-29 | End: 2023-11-30

## 2023-11-29 RX ORDER — HYDROCODONE BITARTRATE AND ACETAMINOPHEN 7.5; 325 MG/1; MG/1
1 TABLET ORAL EVERY 6 HOURS PRN
COMMUNITY

## 2023-11-29 RX ORDER — ALBUTEROL SULFATE 90 UG/1
2 AEROSOL, METERED RESPIRATORY (INHALATION) EVERY 4 HOURS PRN
COMMUNITY

## 2023-11-29 RX ORDER — LINEZOLID 600 MG/1
600 TABLET, FILM COATED ORAL 2 TIMES DAILY
COMMUNITY

## 2023-11-29 RX ORDER — MORPHINE SULFATE 2 MG/ML
2 INJECTION, SOLUTION INTRAMUSCULAR; INTRAVENOUS EVERY 4 HOURS PRN
Status: DISCONTINUED | OUTPATIENT
Start: 2023-11-29 | End: 2023-12-03 | Stop reason: HOSPADM

## 2023-11-29 RX ORDER — SODIUM CHLORIDE 0.9 % (FLUSH) 0.9 %
10 SYRINGE (ML) INJECTION AS NEEDED
Status: DISCONTINUED | OUTPATIENT
Start: 2023-11-29 | End: 2023-12-03 | Stop reason: HOSPADM

## 2023-11-29 RX ORDER — MIDODRINE HYDROCHLORIDE 5 MG/1
10 TABLET ORAL 2 TIMES DAILY
COMMUNITY
End: 2023-11-29

## 2023-11-29 RX ORDER — POTASSIUM CHLORIDE 750 MG/1
40 CAPSULE, EXTENDED RELEASE ORAL ONCE
Status: COMPLETED | OUTPATIENT
Start: 2023-11-29 | End: 2023-11-29

## 2023-11-29 RX ORDER — NITROGLYCERIN 0.4 MG/1
0.4 TABLET SUBLINGUAL
Status: DISCONTINUED | OUTPATIENT
Start: 2023-11-29 | End: 2023-12-03 | Stop reason: HOSPADM

## 2023-11-29 RX ORDER — ONDANSETRON 2 MG/ML
4 INJECTION INTRAMUSCULAR; INTRAVENOUS EVERY 6 HOURS PRN
Status: DISCONTINUED | OUTPATIENT
Start: 2023-11-29 | End: 2023-12-03 | Stop reason: HOSPADM

## 2023-11-29 RX ORDER — SODIUM CHLORIDE 9 MG/ML
100 INJECTION, SOLUTION INTRAVENOUS CONTINUOUS
Status: ACTIVE | OUTPATIENT
Start: 2023-11-29 | End: 2023-11-30

## 2023-11-29 RX ADMIN — SODIUM CHLORIDE 100 ML/HR: 9 INJECTION, SOLUTION INTRAVENOUS at 21:12

## 2023-11-29 RX ADMIN — POTASSIUM CHLORIDE 40 MEQ: 10 CAPSULE, COATED, EXTENDED RELEASE ORAL at 21:12

## 2023-11-29 NOTE — Clinical Note
Level of Care: Telemetry [5]   Diagnosis: Chest pain [[id number]]   Admitting Physician: LONNIE RUBY [[id number]]   Attending Physician: LONNIE RUBY [[id number]]

## 2023-11-29 NOTE — ED PROVIDER NOTES
Time: 6:40 PM EST  Date of encounter:  11/29/2023  Independent Historian/Clinical History and Information was obtained by:   Patient    History is limited by: N/A    Chief Complaint: Syncope      History of Present Illness:  Patient is a 78 y.o. year old male who presents to the emergency department for evaluation of syncope that occurred today while at home.  Patient states he was down approximately 5 minutes before he got up and called his grandson.  Patient denies chest pain or shortness of air at this time.  Patient states he had body aches and chills for the past several days.  Patient states he was in the hospital and had a cardiac workup in the last 2 weeks that yielded no acute findings per the patient.  Patient states he saw Dr. Fox today his nephrologist he states his kidney function is improving and has taken him off his Lasix.  Patient states he is just feeling weaker than normal.    HPI    Patient Care Team  Primary Care Provider: Babs Summers APRN    Past Medical History:     No Known Allergies  Past Medical History:   Diagnosis Date    BPH (benign prostatic hyperplasia)     Frozen shoulder     Hyperlipidemia     Hypertension 10/26/2023    Periarthritis of shoulder     Rotator cuff disorder, right     Tennis elbow     RIGHT     Past Surgical History:   Procedure Laterality Date    CATARACT EXTRACTION EXTRACAPSULAR W/ INTRAOCULAR LENS IMPLANTATION Bilateral     COLONOSCOPY      JOINT REPLACEMENT Right     THR    SHOULDER ARTHROSCOPY W/ ROTATOR CUFF REPAIR Right 3/29/2023    Procedure: SHOULDER ARTHROSCOPY WITH ROTATOR CUFF REPAIR, SUBCROMIAL DECOMPRESSION,DISTAL CLAVICLE RESECTION;  Surgeon: Gustavo Samuels MD;  Location: [facility];  Service: Orthopedics;  Laterality: Right;    SHOULDER SURGERY Left     SCOPE     Family History   Problem Relation Age of Onset    Malig Hyperthermia Neg Hx        Home Medications:  Prior to Admission medications    Medication Sig Start Date End Date Taking?  Authorizing Provider   amLODIPine (NORVASC) 5 MG tablet Take 1 tablet by mouth Daily. 10/25/23   Shavonne Yin MD   atorvastatin (LIPITOR) 10 MG tablet Take 1 tablet by mouth Every Night. 9/23/22   Shavonne Yin MD   folic acid (FOLVITE) 1 MG tablet Take 1 tablet by mouth Daily for 30 doses. 11/2/23 12/2/23  Elieser Pederson MD   furosemide (Lasix) 40 MG tablet Take 1 tablet by mouth 2 (Two) Times a Day for 30 days. 11/15/23 12/15/23  Elieser Pederson MD   pantoprazole (PROTONIX) 40 MG EC tablet Take 1 tablet by mouth Every Morning for 30 days. 11/3/23 12/3/23  Elieser Pederson MD   sodium bicarbonate 650 MG tablet Take 2 tablets by mouth 2 (Two) Times a Day for 30 days. 11/15/23 12/15/23  Elieser Pederson MD   tamsulosin (FLOMAX) 0.4 MG capsule 24 hr capsule Take 2 capsules by mouth Daily for 30 days. 11/2/23 12/2/23  Elieser Pederson MD   vitamin B-12 (CYANOCOBALAMIN) 1000 MCG tablet Take 1 tablet by mouth Daily for 30 days. 11/2/23 12/2/23  Elieser Pederson MD        Social History:   Social History     Tobacco Use    Smoking status: Never    Smokeless tobacco: Never   Vaping Use    Vaping Use: Never used   Substance Use Topics    Alcohol use: Not Currently    Drug use: Never         Review of Systems:  Review of Systems   Constitutional:  Positive for chills. Negative for fever.   HENT:  Negative for congestion, rhinorrhea and sore throat.    Eyes:  Negative for pain and visual disturbance.   Respiratory:  Negative for apnea, cough, chest tightness and shortness of breath.    Cardiovascular:  Negative for chest pain and palpitations.   Gastrointestinal:  Negative for abdominal pain, diarrhea, nausea and vomiting.   Genitourinary:  Negative for difficulty urinating and dysuria.   Musculoskeletal:  Positive for myalgias. Negative for joint swelling.   Skin:  Negative for color change.   Neurological:  Positive for syncope and weakness. Negative for seizures and headaches.   Psychiatric/Behavioral: Negative.     All other  "systems reviewed and are negative.       Physical Exam:  /88 (BP Location: Left arm, Patient Position: Lying)   Pulse 72   Temp 98.5 °F (36.9 °C) (Oral)   Resp 20   Ht 185.4 cm (73\")   Wt 87.1 kg (192 lb)   SpO2 99%   BMI 25.33 kg/m²     Physical Exam  Vitals and nursing note reviewed.   Constitutional:       General: He is not in acute distress.     Appearance: Normal appearance. He is not toxic-appearing.   HENT:      Head: Normocephalic and atraumatic.      Jaw: There is normal jaw occlusion.   Eyes:      General: Lids are normal.      Extraocular Movements: Extraocular movements intact.      Conjunctiva/sclera: Conjunctivae normal.      Pupils: Pupils are equal, round, and reactive to light.   Cardiovascular:      Rate and Rhythm: Normal rate and regular rhythm.      Pulses: Normal pulses.      Heart sounds: Normal heart sounds.   Pulmonary:      Effort: Pulmonary effort is normal. No respiratory distress.      Breath sounds: Normal breath sounds. No wheezing or rhonchi.   Abdominal:      General: Abdomen is flat.      Palpations: Abdomen is soft.      Tenderness: There is no abdominal tenderness. There is no guarding or rebound.   Musculoskeletal:         General: Normal range of motion.      Cervical back: Normal range of motion and neck supple.      Right lower leg: No edema.      Left lower leg: No edema.   Skin:     General: Skin is warm and dry.   Neurological:      Mental Status: He is alert and oriented to person, place, and time. Mental status is at baseline.   Psychiatric:         Mood and Affect: Mood normal.                  Procedures:  Procedures      Medical Decision Making:      Comorbidities that affect care:    Hypertension    External Notes reviewed:          The following orders were placed and all results were independently analyzed by me:  Orders Placed This Encounter   Procedures    COVID PRE-OP / PRE-PROCEDURE SCREENING ORDER (NO ISOLATION) - Swab, Nasopharynx    COVID-19, " FLU A/B, RSV PCR 1 HR TAT - Swab, Nasopharynx    XR Chest 1 View    CT Head Without Contrast    XR Elbow 3+ View Left    [place] Draw    Comprehensive Metabolic Panel    High Sensitivity Troponin T    Magnesium    CBC Auto Differential    High Sensitivity Troponin T 2Hr    Continuous Pulse Oximetry    Vital Signs    Inpatient Cardiology Consult    Inpatient Hospitalist Consult    Oxygen Therapy- Nasal Cannula; Titrate 1-6 LPM Per SpO2; 90 - 95%    ECG 12 Lead Chest Pain    ECG 12 Lead Chest Pain    Insert Peripheral IV    Initiate Observation Status    CBC & Differential    Green Top (Gel)    Lavender Top    Gold Top - SST    Light Blue Top       Medications Given in the Emergency Department:  Medications   sodium chloride 0.9 % flush 10 mL (has no administration in time range)   aspirin chewable tablet 324 mg (has no administration in time range)        ED Course:         Labs:    Lab Results (last 24 hours)       Procedure Component Value Units Date/Time    CBC & Differential [[id number]]  (Abnormal) Collected: 11/29/23 1651    Specimen: Blood Updated: 11/29/23 1658    Narrative:      The following orders were created for panel order CBC & Differential.  Procedure                               Abnormality         Status                     ---------                               -----------         ------                     CBC Auto Differential[[id number]]        Abnormal            Final result                 Please view results for these tests on the individual orders.    Comprehensive Metabolic Panel [[id number]]  (Abnormal) Collected: 11/29/23 1651    Specimen: Blood Updated: 11/29/23 1716     Glucose 170 mg/dL      BUN 58 mg/dL      Creatinine 2.80 mg/dL      Sodium 132 mmol/L      Potassium 3.9 mmol/L      Chloride 90 mmol/L      CO2 25.7 mmol/L      Calcium 8.1 mg/dL      Total Protein 7.5 g/dL      Albumin 2.8 g/dL      ALT (SGPT) 29 U/L      AST (SGOT) 16 U/L      Alkaline Phosphatase 66 U/L      Total  Bilirubin 0.5 mg/dL      Globulin 4.7 gm/dL      A/G Ratio 0.6 g/dL      BUN/Creatinine Ratio 20.7     Anion Gap 16.3 mmol/L      eGFR 22.5 mL/min/1.73     Narrative:      GFR Normal >60  Chronic Kidney Disease <60  Kidney Failure <15    The GFR formula is only valid for adults with stable renal function between ages 18 and 70.    High Sensitivity Troponin T [[id number]]  (Abnormal) Collected: 11/29/23 1651    Specimen: Blood Updated: 11/29/23 1730     HS Troponin T 78 ng/L     Narrative:      High Sensitive Troponin T Reference Range:  <14.0 ng/L- Negative Female for AMI  <22.0 ng/L- Negative Male for AMI  >=14 - Abnormal Female indicating possible myocardial injury.  >=22 - Abnormal Male indicating possible myocardial injury.   Clinicians would have to utilize clinical acumen, EKG, Troponin, and serial changes to determine if it is an Acute Myocardial Infarction or myocardial injury due to an underlying chronic condition.         Magnesium [[id number]]  (Abnormal) Collected: 11/29/23 1651    Specimen: Blood Updated: 11/29/23 1716     Magnesium 1.5 mg/dL     CBC Auto Differential [[id number]]  (Abnormal) Collected: 11/29/23 1651    Specimen: Blood Updated: 11/29/23 1658     WBC 8.17 10*3/mm3      RBC 3.06 10*6/mm3      Hemoglobin 9.8 g/dL      Hematocrit 29.2 %      MCV 95.4 fL      MCH 32.0 pg      MCHC 33.6 g/dL      RDW 15.9 %      RDW-SD 55.3 fl      MPV 8.8 fL      Platelets 214 10*3/mm3      Neutrophil % 78.9 %      Lymphocyte % 15.1 %      Monocyte % 5.0 %      Eosinophil % 0.0 %      Basophil % 0.1 %      Immature Grans % 0.9 %      Neutrophils, Absolute 6.45 10*3/mm3      Lymphocytes, Absolute 1.23 10*3/mm3      Monocytes, Absolute 0.41 10*3/mm3      Eosinophils, Absolute 0.00 10*3/mm3      Basophils, Absolute 0.01 10*3/mm3      Immature Grans, Absolute 0.07 10*3/mm3      nRBC 0.0 /100 WBC     COVID PRE-OP / PRE-PROCEDURE SCREENING ORDER (NO ISOLATION) - Swab, Nasopharynx [[id number]]  (Abnormal) Collected:  11/29/23 1733    Specimen: Swab from Nasopharynx Updated: 11/29/23 1820    Narrative:      The following orders were created for panel order COVID PRE-OP / PRE-PROCEDURE SCREENING ORDER (NO ISOLATION) - Swab, Nasopharynx.  Procedure                               Abnormality         Status                     ---------                               -----------         ------                     COVID-19, FLU A/B, RSV P...[[id number]]  Abnormal            Final result                 Please view results for these tests on the individual orders.    COVID-19, FLU A/B, RSV PCR 1 HR TAT - Swab, Nasopharynx [[id number]]  (Abnormal) Collected: 11/29/23 1733    Specimen: Swab from Nasopharynx Updated: 11/29/23 1820     COVID19 Detected     Influenza A PCR Not Detected     Influenza B PCR Not Detected     RSV, PCR Not Detected    Narrative:      Fact sheet for providers: https://www.fda.gov/media/140760/download    Fact sheet for patients: https://www.fda.gov/media/425898/download    Test performed by PCR.             Imaging:    XR Chest 1 View    Result Date: 11/29/2023  PROCEDURE: XR CHEST 1 VW  COMPARISON: [facility], , XR CHEST 1 VW, 11/12/2023, 8:58.  INDICATIONS: CHEST PAIN; GENERALIZED WEAKNESS; RECENT FALL  FINDINGS:   The lungs are well-expanded. The heart and pulmonary vasculature are within normal limits. No pleural effusions are identified.  Stable elevation of the right hemidiaphragm.  New patchy airspace opacity in the left midlung field.  IMPRESSION:  New patchy airspace opacity in the left midlung field suspicious for pneumonia.  JOHN BENTON MD       Electronically Signed and Approved By: JOHN BENTON MD on 11/29/2023 at 17:49             XR Elbow 3+ View Left    Result Date: 11/29/2023  PROCEDURE: XR ELBOW 3+ VW LEFT  COMPARISON: None  INDICATIONS: LEFT ELBOW PAIN; FALL INJURY  FINDINGS:  BONES: Mild degenerative changes are present in the left elbow.  No fractures or dislocations  identified.  Olecranon osteophyte. SOFT TISSUES: Negative.  No visible soft tissue swelling.  EFFUSION: None visible.  OTHER: There is artifact from the blood pressure cuff.       Mild degenerative change.  No acute osseous abnormalities are identified.      JOHN BENTON MD       Electronically Signed and Approved By: JOHN BENTON MD on 11/29/2023 at 17:48             CT Head Without Contrast    Result Date: 11/29/2023  PROCEDURE: CT HEAD WO CONTRAST  COMPARISON:  [facility], CT, HEAD W/O CONTRAST, 12/08/2016, 12:41. INDICATIONS: fell, hit head  PROTOCOL:   Standard imaging protocol performed    RADIATION:   DLP: 1080 mGy*cm   MA and/or KV was adjusted to minimize radiation dose.     TECHNIQUE: Axial images of the head without intravenous contrast.  FINDINGS:  The ventricles have a normal size and configuration.  No skull fractures are identified.  There is mucosal thickening in the visualized maxillary sinuses.  There is a small air-fluid level in the left maxillary sinus.  No evidence of acute intracranial hemorrhage, mass or midline shift.  No extra-axial fluid collections are identified.  IMPRESSION:  1. No acute intracranial abnormalities are identified.  2. Findings suggest left maxillary sinusitis.   JOHN BENTON MD       Electronically Signed and Approved By: JOHN BENTON MD on 11/29/2023 at 16:45                Differential Diagnosis and Discussion:    Syncope: Differential diagnosis includes but is not limited to TIA, hyperventilation, aortic stenosis, pulmonary emboli, myocardial disease, bradycardia arrhythmia, heart block, tachyarrhythmia, vasovagal, orthostatic hypotension, ruptured AAA, aortic dissection, subarachnoid hemorrhage, seizure, hypoglycemia.    All labs were reviewed and interpreted by me.  All X-rays impressions were independently interpreted by me.  EKG was interpreted by supervising attending.  CT scan radiology impression was interpreted by  me.    MDM     Amount and/or Complexity of Data Reviewed  Decide to obtain previous medical records or to obtain history from someone other than the patient: yes       Patient tested positive for COVID.  Patient's oxygen saturation is 99% on room air.  Patient states he is feeling weaker today than he has in the past several days.  Patient's troponin was bumped to 78 today.  It was 52 weeks ago.  I spoke with Dr. Oneal other cardiologist who states to bring the patient down and they will consult.  I spoke with Dr. Marr who agrees to admit the patient.          Patient Care Considerations:          Consultants/Shared Management Plan:    I spoke with Dr. Oneal other cardiologist who states to bring the patient down and they will consult.  I spoke with Dr. Marr who agrees to admit the patient.    Social Determinants of Health:          Disposition and Care Coordination:    Admit:   Through independent evaluation of the patient's history, physical, and imperical data, the patient meets criteria for observation/admission to the hospital.        Final diagnoses:   Troponin level elevated   Weakness   Syncope and collapse        ED Disposition       ED Disposition   Decision to Admit    Condition   --    Comment   Level of Care: Telemetry [5]   Diagnosis: Chest pain [[id number]]   Admitting Physician: LONNIE RUBY [[id number]]   Attending Physician: LONNIE RUBY [[id number]]                 This medical record created using voice recognition software.             Nathan Bajwa PA-C  11/29/23 5601

## 2023-11-30 PROBLEM — I95.9 HYPOTENSION: Status: ACTIVE | Noted: 2023-11-30

## 2023-11-30 PROBLEM — N18.4 CHRONIC KIDNEY DISEASE, STAGE IV (SEVERE): Status: ACTIVE | Noted: 2023-11-30

## 2023-11-30 LAB
CK MB SERPL-CCNC: 3.47 NG/ML
CK SERPL-CCNC: 57 U/L (ref 20–200)
QT INTERVAL: 385 MS
QT INTERVAL: 394 MS
QT INTERVAL: 400 MS
QTC INTERVAL: 417 MS
QTC INTERVAL: 436 MS
QTC INTERVAL: 461 MS
TROPONIN T SERPL HS-MCNC: 73 NG/L
WHOLE BLOOD HOLD SPECIMEN: NORMAL

## 2023-11-30 PROCEDURE — 82550 ASSAY OF CK (CPK): CPT | Performed by: INTERNAL MEDICINE

## 2023-11-30 PROCEDURE — 25010000002 DEXAMETHASONE SODIUM PHOSPHATE 10 MG/ML SOLUTION: Performed by: INTERNAL MEDICINE

## 2023-11-30 PROCEDURE — 3E0333Z INTRODUCTION OF ANTI-INFLAMMATORY INTO PERIPHERAL VEIN, PERCUTANEOUS APPROACH: ICD-10-PCS | Performed by: INTERNAL MEDICINE

## 2023-11-30 PROCEDURE — 25810000003 SODIUM CHLORIDE 0.9 % SOLUTION: Performed by: INTERNAL MEDICINE

## 2023-11-30 PROCEDURE — 82553 CREATINE MB FRACTION: CPT | Performed by: INTERNAL MEDICINE

## 2023-11-30 PROCEDURE — 84484 ASSAY OF TROPONIN QUANT: CPT | Performed by: INTERNAL MEDICINE

## 2023-11-30 PROCEDURE — 93005 ELECTROCARDIOGRAM TRACING: CPT | Performed by: INTERNAL MEDICINE

## 2023-11-30 PROCEDURE — 93010 ELECTROCARDIOGRAM REPORT: CPT | Performed by: INTERNAL MEDICINE

## 2023-11-30 PROCEDURE — 99221 1ST HOSP IP/OBS SF/LOW 40: CPT | Performed by: INTERNAL MEDICINE

## 2023-11-30 RX ORDER — ATORVASTATIN CALCIUM 10 MG/1
10 TABLET, FILM COATED ORAL NIGHTLY
Status: DISCONTINUED | OUTPATIENT
Start: 2023-11-30 | End: 2023-12-03 | Stop reason: HOSPADM

## 2023-11-30 RX ORDER — ASPIRIN 81 MG/1
81 TABLET ORAL DAILY
Status: DISCONTINUED | OUTPATIENT
Start: 2023-11-30 | End: 2023-12-03 | Stop reason: HOSPADM

## 2023-11-30 RX ADMIN — SODIUM CHLORIDE 100 ML/HR: 9 INJECTION, SOLUTION INTRAVENOUS at 16:50

## 2023-11-30 RX ADMIN — ATORVASTATIN CALCIUM 10 MG: 10 TABLET, FILM COATED ORAL at 20:56

## 2023-11-30 RX ADMIN — NYSTATIN 500000 UNITS: 100000 SUSPENSION ORAL at 17:04

## 2023-11-30 RX ADMIN — DEXAMETHASONE SODIUM PHOSPHATE 10 MG: 10 INJECTION INTRAMUSCULAR; INTRAVENOUS at 08:30

## 2023-11-30 RX ADMIN — ASPIRIN 81 MG: 81 TABLET, COATED ORAL at 16:50

## 2023-11-30 RX ADMIN — Medication 10 ML: at 08:30

## 2023-11-30 RX ADMIN — TAMSULOSIN HYDROCHLORIDE 0.8 MG: 0.4 CAPSULE ORAL at 08:30

## 2023-11-30 RX ADMIN — NYSTATIN 500000 UNITS: 100000 SUSPENSION ORAL at 12:23

## 2023-11-30 RX ADMIN — NYSTATIN 500000 UNITS: 100000 SUSPENSION ORAL at 20:56

## 2023-11-30 RX ADMIN — NYSTATIN 500000 UNITS: 100000 SUSPENSION ORAL at 08:30

## 2023-11-30 NOTE — CONSULTS
[facility]   Cardiology Consult Note    Patient Name: Blair Lira  : 1945  MRN: [id number]  Primary Care Physician:  Babs Summers APRN  Referring Physician: Elieser Pederson MD    Date of admission: 2023    Subjective   Subjective     Reason for Consultation : Elevated troponin    Chief Complaint : Weakness, passing out    HPI:  Blair Lira is a 78 y.o. male with BPH, chronic kidney disease, recently diagnosed multiple myeloma hypertension, hyperlipidemia.  He was admitted to the hospital on 2023 after having syncopal/near syncopal episode at home.  He is feeling very weak and tired for the past few days.  Antihypertensive medications were adjusted at nephrology office, however in the evening he had a near syncopal episode.    In the ER he was noted to be hypotensive.  He was tested positive for COVID-19.  Other complaints include congestion and cough with no significant shortness of breath.  Denies any chest pain.  Labs revealed LAINA on CKD.  Troponin was noted to be mildly elevated.  Cardiology is consulted for the same.    Review of Systems   All systems were reviewed and negative except for: Fatigue, weakness, chest congestion, cough and dizziness.  Negative for chest pain or palpitations    Personal History     Past Medical History:   Diagnosis Date    BPH (benign prostatic hyperplasia)     Cancer     Chronic kidney disease     Frozen shoulder     Hyperlipidemia     Hypertension 10/26/2023    Periarthritis of shoulder     Rotator cuff disorder, right     Tennis elbow     RIGHT          Family History: Reviewed, no family history of premature coronary artery disease    Social History:  reports that he has never smoked. He has never used smokeless tobacco. He reports that he does not currently use alcohol. He reports that he does not use drugs.    Home Medications:  HYDROcodone-acetaminophen, albuterol sulfate HFA, amLODIPine, atorvastatin, folic acid, furosemide, linezolid,  midodrine, pantoprazole, sodium bicarbonate, tamsulosin, vitamin B-12, and vitamin D    Allergies:  No Known Allergies    Objective    Objective     Vitals:   Temp:  [97.7 °F (36.5 °C)-98.8 °F (37.1 °C)] 98.8 °F (37.1 °C)  Heart Rate:  [] 136  Resp:  [19-20] 20  BP: (100-155)/(53-99) 100/60      Physical Exam:   Constitutional: Awake, alert, No acute distress    Eyes: PERRLA, sclerae anicteric, no conjunctival injection   HENT: NCAT, mucous membranes moist   Neck: Supple, no thyromegaly, no lymphadenopathy, trachea midline   Respiratory: Clear to auscultation bilaterally, nonlabored respirations    Cardiovascular: RRR, no murmurs, rubs, or gallops, palpable pedal pulses bilaterally   Gastrointestinal: Positive bowel sounds, soft, nontender, nondistended   Musculoskeletal: No bilateral ankle edema, no clubbing or cyanosis to extremities   Psychiatric: Appropriate affect, cooperative   Neurologic: Oriented x 3, strength symmetric in all extremities, speech clear   Skin: No rashes       Result Review    Result Review:  I have personally reviewed the results from the time of this admission to 11/30/2023 15:38 EST and agree with these findings:  [x]  Laboratory  []  Microbiology  [x]  Radiology  [x]  EKG/Telemetry   [x]  Cardiology/Vascular   []  Pathology  [x]  Old records  []  Other:  Most notable findings include:     CMP          11/11/2023    06:01 11/13/2023    05:42 11/29/2023    16:51   CMP   Glucose 174  157  170    BUN 84  99  58    Creatinine 3.00  3.53  2.80    EGFR 20.7  17.1  22.5    Sodium 129  132  132    Potassium 5.2  5.1  3.9    Chloride 103  101  90    Calcium 7.5  7.7  8.1    Total Protein 7.3  7.8  7.5    Albumin 2.2  2.4  2.8    Globulin 5.1  5.4  4.7    Total Bilirubin 0.3  0.3  0.5    Alkaline Phosphatase 51  49  66    AST (SGOT) 10  15  16    ALT (SGPT) <5  <5  29    Albumin/Globulin Ratio 0.4  0.4  0.6    BUN/Creatinine Ratio 28.0  28.0  20.7    Anion Gap 9.8  13.1  16.3       CBC           11/11/2023    06:01 11/13/2023    05:42 11/29/2023    16:51   CBC   WBC 11.14  9.70  8.17    RBC 2.83  2.92  3.06    Hemoglobin 9.1  9.6  9.8    Hematocrit 28.4  29.1  29.2    .4  99.7  95.4    MCH 32.2  32.9  32.0    MCHC 32.0  33.0  33.6    RDW 18.2  18.3  15.9    Platelets 180  160  214        Latest Reference Range & Units 11/29/23 16:51 11/29/23 18:49 11/30/23 00:07   Creatine Kinase 20 - 200 U/L   57   CKMB <=10.40 ng/mL   3.47   HS Troponin T <22 ng/L 78 (C) 76 (C) 73 (C)   Troponin T Delta >=-4 - <+4 ng/L  -2        Results for orders placed during the hospital encounter of 11/06/23    Adult Transesophageal Echo (NIKHIL) W/ Cont if Necessary Per Protocol    Interpretation Summary    Left ventricular systolic function is normal. Estimated left ventricular EF = 55%    Patent foramen ovale present.    Saline test results are positive for right to left atrial level shunt.       Assessment & Plan   Assessment / Plan     Brief Patient Summary:  Blair Lira is a 78 y.o. male who with BPH, chronic kidney disease, recently diagnosed multiple myeloma hypertension, hyperlipidemia.  He is currently admitted with COVID-19, fatigue, hypertension, LAINA on CKD.    Active Hospital Problems:  Active Hospital Problems    Diagnosis     **Chest pain     Chronic kidney disease, stage IV (severe)     Hypotension     Syncope     COVID-19 virus infection     Chronic diastolic (congestive) heart failure     Multiple myeloma     CKD (chronic kidney disease) stage 3, GFR 30-59 ml/min      Elevated troponin : Mildly elevated high sensitive troponin, which is flat.  Other cardiac markers are within normal limits.  He denies having any chest pain.  EKG with no new ischemic changes.  This is likely type II MI related to worsening renal failure, hypotension and acute systemic illness.    Plan:     Initiate aspirin 81 mg p.o. daily  Restart home statins  Holding antihypertensive medications.    Antibiotics, antivirals, supportive  care per primary team  No further inpatient cardiac workup required, unless there is change in clinical status    We will follow as needed while inpatient.        Electronically signed by Saul Grant MD, 11/30/23, 3:38 PM EST.

## 2023-11-30 NOTE — CONSULTS
[facility]   Consult Note    Patient Name: Blair Lira  : 1945  MRN: [id number]  Primary Care Physician:  Babs Summers APRN  Referring Physician: No ref. provider found  Date of admission: 2023    Subjective   Subjective     Reason for Consult/ Chief Complaint: Weakness    HPI:  Blair Lira is a 78 y.o. male past medical history significant for hyperlipidemia BPH hypertension longstanding hypertension was referred to us because patient's creatinine was getting worse. It was 1.6 last year and now is up to 3.3. Patient is diagnosed with multiple myeloma and light chain nephropathy and he is supposed to start chemotherapy with Dr. Mason within 1 to 2 weeks.  Patient was admitted to the hospital about 10 days ago and his creatinine was up to 4.4 at that time and we do not have any labs since then .I saw the patient in the office earlier and he was complaining of being very weak dizzy and whenever he is trying to walk he feels like he may fall because his legs are giving it and in the office his systolic pressure was around 100.  I stop patient's Lasix and Flomax and encouraged him to drink plenty of liquid and hopefully within the next 2 to 3 days his blood pressure will improve.  Patient was clinically looking dry.  Apparently he went home and he passed out and because of that reason patient is admitted  Review of Systems  Constitutional:        Weakness tiredness fatigue  Eyes:                       No blurry vision, eye discharge, eye irritation, eye pain  HEENT:                   No acute hair loss, earache and discharge, nasal congestion or discharge, sore throat, postnasal drip  Respiratory:           No shortness of breath coughing sputum production wheezing hemoptysis pleuritic chest pain  Cardiovascular:     No chest pain, orthopnea, PND, dizziness, palpitation, lower extremity edema  Gastrointestinal:   No nausea vomiting diarrhea abdominal pain constipation  Genitourinary:        No urinary incontinence, hesitancy, frequency, urgency, dysuria  Neurological:        No confusion, headache, focal weakness, numbness, dysphasia  Hematologic:         No bruising, bleeding, pallor, lymphadenopathy  Endocrine:            No coldness, hot flashes, polyuria, abnormal hair growth  Musculoskeletal:  No body pains, aches, arthritic pains, muscle pain ,muscle wasting  Psychiatric:          No low or high mood, anxiety, hallucinations, delusions  Skin.                      No rash, ulcers, bruising, itching    Personal History     Past Medical History:   Diagnosis Date    BPH (benign prostatic hyperplasia)     Cancer     Chronic kidney disease     Frozen shoulder     Hyperlipidemia     Hypertension 10/26/2023    Periarthritis of shoulder     Rotator cuff disorder, right     Tennis elbow     RIGHT       Past Surgical History:   Procedure Laterality Date    CATARACT EXTRACTION EXTRACAPSULAR W/ INTRAOCULAR LENS IMPLANTATION Bilateral     COLONOSCOPY      JOINT REPLACEMENT Right     THR    SHOULDER ARTHROSCOPY W/ ROTATOR CUFF REPAIR Right 3/29/2023    Procedure: SHOULDER ARTHROSCOPY WITH ROTATOR CUFF REPAIR, SUBCROMIAL DECOMPRESSION,DISTAL CLAVICLE RESECTION;  Surgeon: Gustavo Samuels MD;  [place] [facility] [place] [facility] [place]  Service: Orthopedics;  Laterality: Right;    SHOULDER SURGERY Left     SCOPE       Family History: family history is not on file. Otherwise pertinent FHx was reviewed and not pertinent to current issue.    Social History:  reports that he has never smoked. He has never used smokeless tobacco. He reports that he does not currently use alcohol. He reports that he does not use drugs.    Home Medications:  HYDROcodone-acetaminophen, albuterol sulfate HFA, amLODIPine, atorvastatin, folic acid, furosemide, linezolid, midodrine, pantoprazole, sodium bicarbonate, tamsulosin, vitamin B-12, and vitamin D    Allergies:  No Known Allergies    Objective    Objective     Vitals:   Temp:  [97.7 °F (36.5  °C)-98.5 °F (36.9 °C)] 98.2 °F (36.8 °C)  Heart Rate:  [] 84  Resp:  [19-20] 19  BP: (111-155)/(53-99) 115/65    Physical Exam:             Constitutional:         Awake, alert responsive, conversant, no obvious distress   Eyes:                       PERRLA, sclerae anicteric, no conjunctival injection   HEENT:                   Moist mucous membranes, no nasal or eye discharge, no throat congestion   Neck:                      Supple, no thyromegaly, no lymphadenopathy, trachea midline, no elevated JVD   Respiratory:           Clear to auscultation bilaterally, nonlabored respirations    Cardiovascular:     RRR, no murmurs, rubs, or gallops, palpable pedal pulses bilaterally, No bilateral ankle edema   Gastrointestinal:   Positive bowel sounds, soft, nontender, non-distended, no organomegaly   Musculoskeletal:  No clubbing or cyanosis to extremities, muscle wasting, joint swelling, muscle weakness   Psychiatric:              Appropriate affect, cooperative   Neurologic:            Awake alert, oriented x 3, strength symmetric in all extremities, Cranial Nerves grossly intact to confrontation, speech clear   Skin:                      No rashes, bruising, skin ulcers, petechiae or ecchymosis    Result Review    Result Review:  I have personally reviewed the results from the time of this admission to 11/30/2023 08:47 EST and agree with these findings:  []  Laboratory  []  Microbiology  []  Radiology  []  EKG/Telemetry   []  Cardiology/Vascular   []  Pathology  []  Old records  []  Other:      Assessment & Plan   Assessment / Plan     Active Hospital Problems:  Active Hospital Problems    Diagnosis     **Chest pain     Chronic kidney disease, stage IV (severe)     Hypotension     Syncope     COVID-19 virus infection     Chronic diastolic (congestive) heart failure     Multiple myeloma     CKD (chronic kidney disease) stage 3, GFR 30-59 ml/min    Patient was COVID-positive however he has no respiratory  symptoms    Plan:   Patient clinically dry stop Lasix and Flomax  Give IV fluids  I expect patient's blood pressure to improve    Electronically signed by Siri Fox MD, 11/29/23, 7:24 PM EST.

## 2023-11-30 NOTE — PROGRESS NOTES
[facility]     Progress Note    Patient Name: Blair Lira  : 1945  MRN: [id number]  Primary Care Physician:  Babs Summers APRN  Date of admission: 2023    Subjective patient is feeling somewhat better today  He is COVID-positive  No respiratory symptoms  Pressure little bit better    Review of Systems  Constitutional:        Weakness tiredness fatigue  Eyes:                       No blurry vision, eye discharge, eye irritation, eye pain  HEENT:                   No acute hair loss, earache and discharge, nasal congestion or discharge, sore throat, postnasal drip  Respiratory:           No shortness of breath coughing sputum production wheezing hemoptysis pleuritic chest pain  Cardiovascular:     No chest pain, orthopnea, PND, dizziness, palpitation, lower extremity edema  Gastrointestinal:   No nausea vomiting diarrhea abdominal pain constipation  Genitourinary:       No urinary incontinence, hesitancy, frequency, urgency, dysuria  Hematologic:         No bruising, bleeding, pallor, lymphadenopathy  Endocrine:            No coldness, hot flashes, polyuria, abnormal hair growth  Musculoskeletal:    No body pains, aches, arthritic pains, muscle pain ,muscle wasting  Psychiatric:          No low or high mood, anxiety, hallucinations, delusions  Skin.                      No rash, ulcers, bruising, itching  Neurological:        No confusion, headache, focal weakness, numbness, dysphasia    Objective   Objective     Vitals:   Temp:  [97.7 °F (36.5 °C)-98.5 °F (36.9 °C)] 98.2 °F (36.8 °C)  Heart Rate:  [] 84  Resp:  [19-20] 19  BP: (111-155)/(53-99) 115/65  Physical Exam    Constitutional: Awake, alert responsive, conversant, no obvious distress              Psychiatric:  Appropriate affect, cooperative   Neurologic:  Awake alert ,oriented x 3, strength symmetric in all extremities, Cranial Nerves grossly intact to confrontation, speech clear   Eyes:   PERRLA, sclerae anicteric, no  conjunctival injection   HEENT:  Moist mucous membranes, no nasal or eye discharge, no throat congestion   Neck:   Supple, no thyromegaly, no lymphadenopathy, trachea midline, no elevated JVD   Respiratory:  Clear to auscultation bilaterally, nonlabored respirations    Cardiovascular: RRR, no murmurs, rubs, or gallops, palpable pedal pulses bilaterally, No bilateral ankle edema   Gastrointestinal: Positive bowel sounds, soft, nontender, nondistended, no organomegaly   Musculoskeletal:  No clubbing or cyanosis to extremities,muscle wasting, joint swelling, muscle weakness             Skin:                      No rashes, bruising, skin ulcers, petechiae or ecchymosis    Result Review    Result Review:  I have personally reviewed the results from the time of this admission to 11/30/2023 08:48 EST and agree with these findings:  []  Laboratory  []  Microbiology  []  Radiology  []  EKG/Telemetry   []  Cardiology/Vascular   []  Pathology  []  Old records  []  Other:    Assessment & Plan   Assessment / Plan       Active Hospital Problems:    Active Hospital Problems    Diagnosis  POA    **Chest pain [R07.9]  Yes    Chronic kidney disease, stage IV (severe) [N18.4]  Unknown     Secondary to light chain nephropathy      Hypotension [I95.9]  Unknown    Syncope [R55]  Yes    COVID-19 virus infection [U07.1]  Yes    Chronic diastolic (congestive) heart failure [I50.32]  Yes    Multiple myeloma [C90.00]  Yes    CKD (chronic kidney disease) stage 3, GFR 30-59 ml/min [N18.30]  Yes       Plan:   Continue IV fluid for another 12 hours  Check only standing blood pressure as patient is very orthostatic  Continue IV steroids for light chain nephropathy and also will help COVID-related symptoms  No need for antibiotics       Electronically signed by Siri Fox MD, 11/30/23, 8:48 AM EST.

## 2023-11-30 NOTE — PLAN OF CARE
Goal Outcome Evaluation: Patient had tachycardia intermittently throughout shift. MD notified. Standing only blood pressures performed this shift per order. Short of air when ambulating. Reports Improvement of dizziness throughout shift.

## 2023-11-30 NOTE — CONSULTS
"Nutrition Services    Patient Name: Blair Lira  YOB: 1945  MRN: [id number]  Admission date: 11/29/2023      CLINICAL NUTRITION ASSESSMENT      Reason for Assessment  Identified at risk by screening criteria, MST score 2+, Physician consult     H&P:    Past Medical History:   Diagnosis Date    BPH (benign prostatic hyperplasia)     Cancer     Chronic kidney disease     Frozen shoulder     Hyperlipidemia     Hypertension 10/26/2023    Periarthritis of shoulder     Rotator cuff disorder, right     Tennis elbow     RIGHT        Current Problems:   Active Hospital Problems    Diagnosis     **Chest pain     Chronic kidney disease, stage IV (severe)     Hypotension     Syncope     COVID-19 virus infection     Chronic diastolic (congestive) heart failure     Multiple myeloma     CKD (chronic kidney disease) stage 3, GFR 30-59 ml/min         Nutrition/Diet History         Narrative     Pt is admitted d/t syncope. Found to be COVID+. PMH significant for multiple myeloma on chemotherapy w/ renal failure.     RD visited pt. Pt reports appetite is improving and is almost back to baseline. Pt reports eating all of brkfst and lunch. RD recommends ONS r/t increased nutrient needs r/t catabolic disease. Pt states he likes Boost.     Pt endorses wt loss x 1-2 months. Pt has had significant wt loss x 1 month. NFPE performed. Mild to moderate muscle loss noted in clavicle and temples.    RD will continue to monitor per protocol.      Anthropometrics        Current Height, Weight Height: 185.4 cm (73\")  Weight: 85.8 kg (189 lb 2.5 oz)   Current BMI Body mass index is 24.96 kg/m².       Weight Hx  Wt Readings from Last 30 Encounters:   11/29/23 2309 85.8 kg (189 lb 2.5 oz)   11/29/23 1622 87.1 kg (192 lb)   11/15/23 0450 89.4 kg (197 lb 1.5 oz)   11/14/23 0500 92.2 kg (203 lb 3.2 oz)   11/13/23 0429 96.9 kg (213 lb 10 oz)   11/12/23 0539 97.5 kg (214 lb 15.2 oz)   11/11/23 0403 98 kg (216 lb 0.8 oz)   11/09/23 2346 " 98.2 kg (216 lb 7.9 oz)   11/09/23 0600 98.5 kg (217 lb 2.5 oz)   11/08/23 0509 93.6 kg (206 lb 5.6 oz)   11/07/23 0500 93.7 kg (206 lb 9.1 oz)   11/06/23 0626 93 kg (205 lb 0.4 oz)   11/06/23 0209 95.6 kg (210 lb 12.2 oz)   10/31/23 1000 94.9 kg (209 lb 3.5 oz)   10/26/23 1013 93.1 kg (205 lb 4 oz)   10/24/23 0803 99.8 kg (220 lb 0.3 oz)   06/27/23 1059 99.8 kg (220 lb)   05/16/23 1055 99.8 kg (220 lb)   04/11/23 0922 99.8 kg (220 lb)   03/29/23 1010 99.9 kg (220 lb 3.8 oz)   03/23/23 0824 103 kg (227 lb)   02/16/23 0759 103 kg (227 lb)   12/27/22 0838 102 kg (225 lb 9.6 oz)   11/15/22 0846 102 kg (224 lb)   11/17/20 0000 101 kg (223 lb 8 oz)   11/03/20 0000 98.4 kg (217 lb)   10/22/20 0000 98.6 kg (217 lb 6 oz)   11/19/19 0000 104 kg (229 lb)   10/08/19 0000 103 kg (227 lb 4 oz)   09/05/19 0000 103 kg (227 lb 2 oz)   07/09/19 0000 104 kg (229 lb)   06/07/19 0000 104 kg (229 lb)   05/02/19 0000 108 kg (237 lb 2 oz)   02/26/19 0000 107 kg (235 lb)   07/05/18 0000 103 kg (226 lb)   06/26/18 0000 103 kg (226 lb)   06/13/18 0000 104 kg (229 lb)   05/10/18 0000 103 kg (228 lb 2 oz)            Wt Change Observation -9.6% x 1 month--clinically significant      Estimated/Assessed Needs       Energy Requirements 30-35 kcal/kg CBW   EST Needs (kcal/day) 8514-9295       Protein Requirements 1.0-1.2 g/kg   EST Daily Needs (g/day)        Fluid Requirements 25 ml/kg    Estimated Needs (mL/day) 2150     Labs/Medications         Pertinent Labs Reviewed.   Results from last 7 days   Lab Units 11/29/23  1651   SODIUM mmol/L 132*   POTASSIUM mmol/L 3.9   CHLORIDE mmol/L 90*   CO2 mmol/L 25.7   BUN mg/dL 58*   CREATININE mg/dL 2.80*   CALCIUM mg/dL 8.1*   BILIRUBIN mg/dL 0.5   ALK PHOS U/L 66   ALT (SGPT) U/L 29   AST (SGOT) U/L 16   GLUCOSE mg/dL 170*     Results from last 7 days   Lab Units 11/29/23  1651   MAGNESIUM mg/dL 1.5*   HEMOGLOBIN g/dL 9.8*   HEMATOCRIT % 29.2*     COVID19   Date Value Ref Range Status   11/29/2023  Detected (C) Not Detected - Ref. Range Final     Lab Results   Component Value Date    HGBA1C 6.70 (H) 11/06/2023         Pertinent Medications Reviewed.     Current Nutrition Orders & Evaluation of Intake       Oral Nutrition     Current PO Diet Diet: Regular/House Diet; Texture: Regular Texture (IDDSI 7); Fluid Consistency: Thin (IDDSI 0)   Supplement Orders Placed This Encounter      Dietary Nutrition Supplements Boost Plus (Ensure Plus)       Malnutrition Severity Assessment                Nutrition Diagnosis         Nutrition Dx Problem 1 Unintentional weight loss related to increased nutrient needs due to catabolic disease as evidenced by inadequate energy intake., body composition changes., patient report., and 9.6% weight loss x 1 month.        Nutrition Intervention         Boost once daily      Medical Nutrition Therapy/Nutrition Education          Learner     Readiness Patient  Acceptance     Method     Response Explanation  Verbalizes understanding     Monitor/Evaluation        Monitor Per protocol, PO intake, Supplement intake, Pertinent labs, Weight, POC/GOC       Nutrition Discharge Plan         Continue high protein, high calorie nutrition supplement daily        Electronically signed by:  Dayana Mane RD  11/30/23 14:05 EST

## 2023-11-30 NOTE — PROGRESS NOTES
[facility]     Progress Note    Patient Name: Blair Lira  : 1945  MRN: [id number]  Primary Care Physician:  Babs Summers APRN  Date of admission: 2023      Subjective   Brief summary.  Patient admitted with syncope and diagnosed with COVID      HPI:  Feeling better this morning had a good night rest.  No fever  Denies any chest pain    Review of Systems     Patient admitted with reports of chest pain by ER but no chest pain per patient.  Dizzy spell and passing out spell.  No nausea vomiting      Objective     Vitals:   Temp:  [97.7 °F (36.5 °C)-98.5 °F (36.9 °C)] 98.2 °F (36.8 °C)  Heart Rate:  [] 86  Resp:  [20] 20  BP: (111-155)/(53-99) 111/60    Physical Exam :     Elderly male looks of his stated age, not in acute distress.  Heart regular.  Lungs coarse breath sounds but clear.  Abdomen soft.  Nontender.  Extremities no edema      Result Review:  I have personally reviewed the results from the time of this admission to 2023 06:53 EST and agree with these findings:  [x]  Laboratory  []  Microbiology  []  Radiology  []  EKG/Telemetry   []  Cardiology/Vascular   []  Pathology  []  Old records  []  Other:           Assessment / Plan       Active Hospital Problems:  Active Hospital Problems    Diagnosis     **Chest pain     Syncope     COVID-19 virus infection     CKD (chronic kidney disease) stage 3, GFR 30-59 ml/min        Plan:   Improving  Continue steroids.  Discussed with oncologist plan to start chemo soon possibly next week.  Will see recommendations from nephrologist regarding hypotension and volume issues leading to syncope.  If continues to improve discharged home in couple of days.         DVT prophylaxis:  No DVT prophylaxis order currently exists.    CODE STATUS:   Level Of Support Discussed With: Patient  Code Status (Patient has no pulse and is not breathing): CPR (Attempt to Resuscitate)  Medical Interventions (Patient has pulse or is breathing): Full  Support            Electronically signed by Elieser Pederson MD, 11/30/23, 6:53 AM EST.

## 2023-11-30 NOTE — CONSULTS
[facility]   Consult Note    Patient Name: Blair Lira  : 1945  MRN: [id number]  Primary Care Physician:  Babs Summers APRN  Date of admission: 2023    Subjective   Subjective     Reason for Consult: Multiple myeloma admitted with syncope and COVID    HPI: Patient recently close to have multiple myeloma with renal failure and bony lesions he is to be started on chemotherapy however now he has developed COVID we will hold off the treatment for another week he is recently has' received Revlimid the medication to be started as outpatient    Blair Lira is a 78 y.o. male alert oriented not in acute distress    Review of Systems   All systems were reviewed and negative except for: Patient not in acute distress has developed COVID but comfortable    Personal History     Past Medical History:   Diagnosis Date    BPH (benign prostatic hyperplasia)     Cancer     Chronic kidney disease     Frozen shoulder     Hyperlipidemia     Hypertension 10/26/2023    Periarthritis of shoulder     Rotator cuff disorder, right     Tennis elbow     RIGHT       Past Surgical History:   Procedure Laterality Date    CATARACT EXTRACTION EXTRACAPSULAR W/ INTRAOCULAR LENS IMPLANTATION Bilateral     COLONOSCOPY      JOINT REPLACEMENT Right     THR    SHOULDER ARTHROSCOPY W/ ROTATOR CUFF REPAIR Right 3/29/2023    Procedure: SHOULDER ARTHROSCOPY WITH ROTATOR CUFF REPAIR, SUBCROMIAL DECOMPRESSION,DISTAL CLAVICLE RESECTION;  Surgeon: Gustavo Samuels MD;  [place] [facility] [place] [facility] [place]  Service: [place]opedics;  Laterality: Right;    SHOULDER SURGERY Left     SCOPE       Family History: family history is not on file. Otherwise pertinent FHx was reviewed and not pertinent to current issue.    Social History:  reports that he has never smoked. He has never used smokeless tobacco. He reports that he does not currently use alcohol. He reports that he does not use drugs.    Home Medications:  HYDROcodone-acetaminophen, albuterol  sulfate HFA, amLODIPine, atorvastatin, folic acid, furosemide, linezolid, midodrine, pantoprazole, sodium bicarbonate, tamsulosin, vitamin B-12, and vitamin D      Allergies:  No Known Allergies    Objective   Objective     Vitals:   Temp:  [97.7 °F (36.5 °C)-98.5 °F (36.9 °C)] 98.2 °F (36.8 °C)  Heart Rate:  [] 86  Resp:  [20] 20  BP: (111-155)/(53-99) 111/60  Physical Exam    Constitutional: Awake, alert   Eyes: PERRLA, sclerae anicteric, no conjunctival injection   HENT: NCAT, mucous membranes moist   Neck: Supple, no thyromegaly, no lymphadenopathy, trachea midline   Respiratory: Clear to auscultation bilaterally, nonlabored respirations    Cardiovascular: RRR, no murmurs, rubs, or gallops, palpable pedal pulses bilaterally   Gastrointestinal: Positive bowel sounds, soft, nontender, nondistended   Musculoskeletal: No bilateral ankle edema, no clubbing or cyanosis to extremities   Psychiatric: Appropriate affect, cooperative   Neurologic: Oriented x 3, strength symmetric in all extremities, Cranial Nerves grossly intact to confrontation, speech clear   Skin: No rashes   No new findings  Result Review    Result Review:  I have personally reviewed the results from the time of this admission to 11/30/2023 07:36 EST and agree with these findings:  [x]  Laboratory  []  Microbiology  []  Radiology  []  EKG/Telemetry   []  Cardiology/Vascular   []  Pathology  []  Old records  []  Other:  Most notable findings include: BUN/creatinine stable    Assessment & Plan   Assessment / Plan     Brief Patient Summary:  Blair Lira is a 78 y.o. male who given creatinine and hematological status is stable with history of COVID    Active Hospital Problems:  Active Hospital Problems    Diagnosis     **Chest pain     Syncope     COVID-19 virus infection     CKD (chronic kidney disease) stage 3, GFR 30-59 ml/min        Plan:   Continue supportive care will be seen next week in the office to initiate his chemotherapy the  following week        Electronically signed by Vamsi Mason MD, 11/30/23, 7:36 AM EST.    Part of this note may be an electronic transcription/translation of spoken language to printed text using the Dragon Dictation System.

## 2023-11-30 NOTE — H&P
[facility]   HISTORY AND PHYSICAL    Patient Name: Blair Lira  : 1945  MRN: [id number]  Primary Care Physician:  Babs Summers APRN  Date of admission: 2023    Subjective   Subjective     Chief Complaint:   Passing out spell, weakness      HPI:    Blair Lira is a 78 y.o. male with recently diagnosed multiple myeloma and recurrent admission to hospital, recent UTI and sepsis, came to ER post visit to nephrologist this morning.  Patient was seen by nephrologist Dr. Fox, blood pressure was low and he had adjusted medicine.  Patient went home and took meds and passed out.  Patient was brought into ER workup showed mild hypotension and also showed positive for COVID patient reports congestion and cough and chest congestion.  There is no fever or chills.        Review of Systems:    Fatigue and weakness.  Head and chest congestion.  Weakness and dizziness.  Passing out spell    Personal History     Past Medical History:   Diagnosis Date    BPH (benign prostatic hyperplasia)     Frozen shoulder     Hyperlipidemia     Hypertension 10/26/2023    Periarthritis of shoulder     Rotator cuff disorder, right     Tennis elbow     RIGHT       Past Surgical History:   Procedure Laterality Date    CATARACT EXTRACTION EXTRACAPSULAR W/ INTRAOCULAR LENS IMPLANTATION Bilateral     COLONOSCOPY      JOINT REPLACEMENT Right     THR    SHOULDER ARTHROSCOPY W/ ROTATOR CUFF REPAIR Right 3/29/2023    Procedure: SHOULDER ARTHROSCOPY WITH ROTATOR CUFF REPAIR, SUBCROMIAL DECOMPRESSION,DISTAL CLAVICLE RESECTION;  Surgeon: Gustavo Samuels MD;  Location: [facility] OR [facility];  Service: Orthopedics;  Laterality: Right;    SHOULDER SURGERY Left     SCOPE       Family History: family history is not on file. Otherwise pertinent FHx was reviewed and not pertinent to current issue.    Social History:  reports that he has never smoked. He has never used smokeless tobacco. He reports that he does not currently use alcohol. He  reports that he does not use drugs.    Home Medications:  HYDROcodone-acetaminophen, albuterol sulfate HFA, amLODIPine, atorvastatin, folic acid, furosemide, linezolid, midodrine, pantoprazole, sodium bicarbonate, tamsulosin, vitamin B-12, and vitamin D      Allergies:  No Known Allergies    Objective   Objective     Vitals:   Temp:  [98.5 °F (36.9 °C)] 98.5 °F (36.9 °C)  Heart Rate:  [72] 72  Resp:  [20] 20  BP: (155)/(88) 155/88    Physical Exam    Elderly male looks of his stated age, not in acute distress  HEENT with some head congestion  Pupils reactive external ocular muscle intact no nystagmus  Heart regular  Lungs coarse breath sounds  Abdomen soft nontender  Extremities trace of edema      I have personally reviewed the results from the time of this admission to 11/29/2023 19:25 EST and agree with these findings:  [x]  Laboratory  [x]  Microbiology  [x]  Radiology  []  EKG/Telemetry   []  Cardiology/Vascular   []  Pathology  []  Old records  []  Other:    CBC:    WBC   Date Value Ref Range Status   11/29/2023 8.17 3.40 - 10.80 10*3/mm3 Final     RBC   Date Value Ref Range Status   11/29/2023 3.06 (L) 4.14 - 5.80 10*6/mm3 Final     Hemoglobin   Date Value Ref Range Status   11/29/2023 9.8 (L) 13.0 - 17.7 g/dL Final     Hematocrit   Date Value Ref Range Status   11/29/2023 29.2 (L) 37.5 - 51.0 % Final     MCV   Date Value Ref Range Status   11/29/2023 95.4 79.0 - 97.0 fL Final     MCH   Date Value Ref Range Status   11/29/2023 32.0 26.6 - 33.0 pg Final     MCHC   Date Value Ref Range Status   11/29/2023 33.6 31.5 - 35.7 g/dL Final     RDW   Date Value Ref Range Status   11/29/2023 15.9 (H) 12.3 - 15.4 % Final     RDW-SD   Date Value Ref Range Status   11/29/2023 55.3 (H) 37.0 - 54.0 fl Final     MPV   Date Value Ref Range Status   11/29/2023 8.8 6.0 - 12.0 fL Final     Platelets   Date Value Ref Range Status   11/29/2023 214 140 - 450 10*3/mm3 Final     Neutrophil %   Date Value Ref Range Status   11/29/2023  78.9 (H) 42.7 - 76.0 % Final     Lymphocyte %   Date Value Ref Range Status   11/29/2023 15.1 (L) 19.6 - 45.3 % Final     Monocyte %   Date Value Ref Range Status   11/29/2023 5.0 5.0 - 12.0 % Final     Eosinophil %   Date Value Ref Range Status   11/29/2023 0.0 (L) 0.3 - 6.2 % Final     Basophil %   Date Value Ref Range Status   11/29/2023 0.1 0.0 - 1.5 % Final     Immature Grans %   Date Value Ref Range Status   11/29/2023 0.9 (H) 0.0 - 0.5 % Final     Neutrophils, Absolute   Date Value Ref Range Status   11/29/2023 6.45 1.70 - 7.00 10*3/mm3 Final     Lymphocytes, Absolute   Date Value Ref Range Status   11/29/2023 1.23 0.70 - 3.10 10*3/mm3 Final     Monocytes, Absolute   Date Value Ref Range Status   11/29/2023 0.41 0.10 - 0.90 10*3/mm3 Final     Eosinophils, Absolute   Date Value Ref Range Status   11/29/2023 0.00 0.00 - 0.40 10*3/mm3 Final     Basophils, Absolute   Date Value Ref Range Status   11/29/2023 0.01 0.00 - 0.20 10*3/mm3 Final     Immature Grans, Absolute   Date Value Ref Range Status   11/29/2023 0.07 (H) 0.00 - 0.05 10*3/mm3 Final     nRBC   Date Value Ref Range Status   11/29/2023 0.0 0.0 - 0.2 /100 WBC Final        BMP:    Lab Results   Component Value Date    GLUCOSE 170 (H) 11/29/2023    BUN 58 (H) 11/29/2023    CREATININE 2.80 (H) 11/29/2023    BCR 20.7 11/29/2023    K 3.9 11/29/2023    CO2 25.7 11/29/2023    CALCIUM 8.1 (L) 11/29/2023    ALBUMIN 2.8 (L) 11/29/2023    LABIL2 1.0 (L) 09/24/2020    AST 16 11/29/2023    ALT 29 11/29/2023        XR Elbow 3+ View Left    Result Date: 11/29/2023   Mild degenerative change.  No acute osseous abnormalities are identified.      JOHN BENTON MD       Electronically Signed and Approved By: JOHN BENTON MD on 11/29/2023 at 17:48                     Assessment & Plan   Assessment / Plan       Current Diagnosis:  Active Hospital Problems    Diagnosis     **Chest pain     Syncope     COVID-19 virus infection     CKD (chronic kidney disease) stage 3,  GFR 30-59 ml/min      Plan:   Admit to hospital, monitor bed  IV fluids  IV dexamethasone.  Consult nephrologist Dr. Fox notified.  Consult oncologist Dr. Mason, patient was supposed to start chemo tomorrow.  Hold antihypertensives.  Further management based on clinical course      DVT prophylaxis:  No DVT prophylaxis order currently exists.    GI Prophylaxis:       Pepcid    CODE STATUS:    Level Of Support Discussed With: Patient  Code Status (Patient has no pulse and is not breathing): CPR (Attempt to Resuscitate)  Medical Interventions (Patient has pulse or is breathing): Full Support    Admission Status:  I believe this patient meets inpatient status.             I have dictated this note utilizing Dragon Dictation.             Please note that portions of this note were completed with a voice recognition program.             Part of this note may be an electronic transcription/translation of spoken language to printed text         using the Dragon Dictation System.       Electronically signed by Elieser Pederson MD, 11/29/23, 7:23 PM EST.    Total time spent with in evaluation and management:

## 2023-12-01 LAB
ALBUMIN SERPL-MCNC: 2.4 G/DL (ref 3.5–5.2)
ALBUMIN/GLOB SERPL: 0.6 G/DL
ALP SERPL-CCNC: 62 U/L (ref 39–117)
ALT SERPL W P-5'-P-CCNC: 24 U/L (ref 1–41)
ANION GAP SERPL CALCULATED.3IONS-SCNC: 10.3 MMOL/L (ref 5–15)
AST SERPL-CCNC: 13 U/L (ref 1–40)
BILIRUB SERPL-MCNC: 0.3 MG/DL (ref 0–1.2)
BUN SERPL-MCNC: 54 MG/DL (ref 8–23)
BUN/CREAT SERPL: 23 (ref 7–25)
CALCIUM SPEC-SCNC: 7.5 MG/DL (ref 8.6–10.5)
CHLORIDE SERPL-SCNC: 102 MMOL/L (ref 98–107)
CO2 SERPL-SCNC: 22.7 MMOL/L (ref 22–29)
CREAT SERPL-MCNC: 2.35 MG/DL (ref 0.76–1.27)
EGFRCR SERPLBLD CKD-EPI 2021: 27.8 ML/MIN/1.73
GLOBULIN UR ELPH-MCNC: 4 GM/DL
GLUCOSE SERPL-MCNC: 163 MG/DL (ref 65–99)
POTASSIUM SERPL-SCNC: 4.2 MMOL/L (ref 3.5–5.2)
PROT SERPL-MCNC: 6.4 G/DL (ref 6–8.5)
SODIUM SERPL-SCNC: 135 MMOL/L (ref 136–145)

## 2023-12-01 PROCEDURE — 80053 COMPREHEN METABOLIC PANEL: CPT | Performed by: INTERNAL MEDICINE

## 2023-12-01 PROCEDURE — 25010000002 DEXAMETHASONE SODIUM PHOSPHATE 10 MG/ML SOLUTION: Performed by: INTERNAL MEDICINE

## 2023-12-01 PROCEDURE — 97165 OT EVAL LOW COMPLEX 30 MIN: CPT

## 2023-12-01 RX ADMIN — NYSTATIN 500000 UNITS: 100000 SUSPENSION ORAL at 12:55

## 2023-12-01 RX ADMIN — TAMSULOSIN HYDROCHLORIDE 0.8 MG: 0.4 CAPSULE ORAL at 08:54

## 2023-12-01 RX ADMIN — NYSTATIN 500000 UNITS: 100000 SUSPENSION ORAL at 20:24

## 2023-12-01 RX ADMIN — ATORVASTATIN CALCIUM 10 MG: 10 TABLET, FILM COATED ORAL at 20:24

## 2023-12-01 RX ADMIN — ASPIRIN 81 MG: 81 TABLET, COATED ORAL at 08:54

## 2023-12-01 RX ADMIN — NYSTATIN 500000 UNITS: 100000 SUSPENSION ORAL at 17:58

## 2023-12-01 RX ADMIN — NYSTATIN 500000 UNITS: 100000 SUSPENSION ORAL at 08:54

## 2023-12-01 RX ADMIN — DEXAMETHASONE SODIUM PHOSPHATE 10 MG: 10 INJECTION INTRAMUSCULAR; INTRAVENOUS at 08:55

## 2023-12-01 NOTE — PLAN OF CARE
Goal Outcome Evaluation:  Plan of Care Reviewed With: patient           Outcome Evaluation: Pt is able to complete transfers and bed mobility with supervision and presents with no deficits with ambulation/. He has AMPAC of 23. No PT services indicated at this time.

## 2023-12-01 NOTE — PLAN OF CARE
Goal Outcome Evaluation:           Progress: no change  Outcome Evaluation: VSS. No complaints of dizziness or chest pain. No acute changes.

## 2023-12-01 NOTE — PROGRESS NOTES
[facility]     Progress Note    Patient Name: Blair Lira  : 1945  MRN: [id number]  Primary Care Physician:  Babs Summers APRN  Date of admission: 2023    Subjective   Patient is feeling much better and his dizziness is completely resolved  Volume status has improved  Review of Systems  Constitutional:        Weakness tiredness fatigue  Eyes:                       No blurry vision, eye discharge, eye irritation, eye pain  HEENT:                   No acute hair loss, earache and discharge, nasal congestion or discharge, sore throat, postnasal drip  Respiratory:           No shortness of breath coughing sputum production wheezing hemoptysis pleuritic chest pain  Cardiovascular:     No chest pain, orthopnea, PND, dizziness, palpitation, lower extremity edema  Gastrointestinal:   No nausea vomiting diarrhea abdominal pain constipation  Genitourinary:       No urinary incontinence, hesitancy, frequency, urgency, dysuria  Hematologic:         No bruising, bleeding, pallor, lymphadenopathy  Endocrine:            No coldness, hot flashes, polyuria, abnormal hair growth  Musculoskeletal:    No body pains, aches, arthritic pains, muscle pain ,muscle wasting  Psychiatric:          No low or high mood, anxiety, hallucinations, delusions  Skin.                      No rash, ulcers, bruising, itching  Neurological:        No confusion, headache, focal weakness, numbness, dysphasia    Objective   Objective     Vitals:   Temp:  [97.9 °F (36.6 °C)-98.8 °F (37.1 °C)] 98 °F (36.7 °C)  Heart Rate:  [] 73  Resp:  [18-20] 18  BP: (100-154)/(60-82) 138/78  Physical Exam    Constitutional: Awake, alert responsive, conversant, no obvious distress              Psychiatric:  Appropriate affect, cooperative   Neurologic:  Awake alert ,oriented x 3, strength symmetric in all extremities, Cranial Nerves grossly intact to confrontation, speech clear   Eyes:   PERRLA, sclerae anicteric, no conjunctival  injection   HEENT:  Moist mucous membranes, no nasal or eye discharge, no throat congestion   Neck:   Supple, no thyromegaly, no lymphadenopathy, trachea midline, no elevated JVD   Respiratory:  Clear to auscultation bilaterally, nonlabored respirations    Cardiovascular: RRR, no murmurs, rubs, or gallops, palpable pedal pulses bilaterally, No bilateral ankle edema   Gastrointestinal: Positive bowel sounds, soft, nontender, nondistended, no organomegaly   Musculoskeletal:  No clubbing or cyanosis to extremities,muscle wasting, joint swelling, muscle weakness             Skin:                      No rashes, bruising, skin ulcers, petechiae or ecchymosis    Result Review    Result Review:  I have personally reviewed the results from the time of this admission to 12/1/2023 08:34 EST and agree with these findings:  []  Laboratory  []  Microbiology  []  Radiology  []  EKG/Telemetry   []  Cardiology/Vascular   []  Pathology  []  Old records  []  Other:    Assessment & Plan   Assessment / Plan       Active Hospital Problems:    Active Hospital Problems    Diagnosis  POA    **Chest pain [R07.9]  Yes    Chronic kidney disease, stage IV (severe) [N18.4]  Yes     Secondary to light chain nephropathy      Hypotension [I95.9]  Unknown    Syncope [R55]  Yes    COVID-19 virus infection [U07.1]  Yes    Chronic diastolic (congestive) heart failure [I50.32]  Yes    Multiple myeloma [C90.00]  Yes    CKD (chronic kidney disease) stage 3, GFR 30-59 ml/min [N18.30]  Yes       Plan:   Hold diuretics  Hold antihypertensive medications  Check only standing blood pressure  Syncope was secondary to hypotension  Patient is clinically stable and discharged plan per primary team       Electronically signed by Siri Fox MD, 12/01/23, 8:34 AM EST.

## 2023-12-01 NOTE — PROGRESS NOTES
[facility]     Progress Note    Patient Name: Blair Lira  : 1945  MRN: [id number]  Primary Care Physician:  Basb Summers APRN  Date of admission: 2023      Subjective   Brief summary.  Patient admitted with syncope and diagnosed with COVID      HPI:  Patient feeling better, heart rate up when he gets up and walk around.  No dizziness  Denies any cough or congestion    Review of Systems   No fever chills  No chest pain, no shortness of breath, feeling better    Objective     Vitals:   Temp:  [97.9 °F (36.6 °C)-98.4 °F (36.9 °C)] 98.4 °F (36.9 °C)  Heart Rate:  [] 97  Resp:  [18-20] 18  BP: (111-154)/(64-82) 118/64    Physical Exam :     Elderly male looks of his stated age, not in acute distress.  Heart regular.  Lungs coarse breath sounds but clear.  Abdomen soft, nontender.  Extremities no edema      Result Review:  I have personally reviewed the results from the time of this admission to 2023 16:44 EST and agree with these findings:  [x]  Laboratory  []  Microbiology  []  Radiology  []  EKG/Telemetry   []  Cardiology/Vascular   []  Pathology  []  Old records  []  Other:           Assessment / Plan       Active Hospital Problems:  Active Hospital Problems    Diagnosis     **Chest pain     Chronic kidney disease, stage IV (severe)     Hypotension     Syncope     COVID-19 virus infection     Chronic diastolic (congestive) heart failure     Multiple myeloma     CKD (chronic kidney disease) stage 3, GFR 30-59 ml/min        Plan:   Improving  Heart rate up when he gets up and walk around but improving.  No symptoms of hypotension at this time, COVID symptoms with minimal cough, improved.  No further chest pain.  Will check orthostats tomorrow.  If remains stable discharge home in 1 to 2 days.       DVT prophylaxis:  No DVT prophylaxis order currently exists.    CODE STATUS:   Level Of Support Discussed With: Patient  Code Status (Patient has no pulse and is not breathing): CPR  (Attempt to Resuscitate)  Medical Interventions (Patient has pulse or is breathing): Full Support              Electronically signed by Elieser Pederson MD, 12/01/23, 4:45 PM EST.

## 2023-12-01 NOTE — PROGRESS NOTES
[facility]     Progress Note    Patient Name: Blair Lira  : 1945  MRN: [id number]  Primary Care Physician:  Babs Summers APRN  Date of admission: 2023    Subjective   Subjective     Chief Complaint: Patient with renal failure because of multiple myeloma be started on treatment his medications have arrived from specialty pharmacy patient is stable and doing well we will continue observation because of weakness tachycardia    HPI: Long with multiple myeloma renal failure episodes of dizzy spells tachycardia has COVID infection    Review of Systems   All systems were reviewed and negative except for: As been reviewed    Objective   Objective     Vitals:   Temp:  [97.9 °F (36.6 °C)-98.8 °F (37.1 °C)] 98 °F (36.7 °C)  Heart Rate:  [] 73  Resp:  [18-20] 18  BP: (100-154)/(60-82) 138/78    Physical Exam    Constitutional: Awake, alert   Eyes: PERRLA, sclerae anicteric, no conjunctival injection   HENT: NCAT, mucous membranes moist   Neck: Supple, no thyromegaly, no lymphadenopathy, trachea midline   Respiratory: Clear to auscultation bilaterally, nonlabored respirations    Cardiovascular: RRR, no murmurs, rubs, or gallops, palpable pedal pulses bilaterally   Gastrointestinal: Positive bowel sounds, soft, nontender, nondistended   Musculoskeletal: No bilateral ankle edema, no clubbing or cyanosis to extremities   Psychiatric: Appropriate affect, cooperative   Neurologic: Oriented x 3, strength symmetric in all extremities, Cranial Nerves grossly intact to confrontation, speech clear   Skin: No rashes   No change  Result Review    Result Review:  I have personally reviewed the results from the time of this admission to 2023 09:22 EST and agree with these findings:  [x]  Laboratory  []  Microbiology  []  Radiology  []  EKG/Telemetry   []  Cardiology/Vascular   [x]  Pathology  [x]  Old records  []  Other:  Most notable findings include: Been reviewed and discussed    Assessment & Plan    Assessment / Plan     Brief Patient Summary:  Blair Lira is a 78 y.o. male who table doing well alert oriented    Active Hospital Problems:  Active Hospital Problems    Diagnosis     **Chest pain     Chronic kidney disease, stage IV (severe)     Hypotension     Syncope     COVID-19 virus infection     Chronic diastolic (congestive) heart failure     Multiple myeloma     CKD (chronic kidney disease) stage 3, GFR 30-59 ml/min        Plan:   Continue supportive care    DVT prophylaxis:  No DVT prophylaxis order currently exists.    CODE STATUS:   Level Of Support Discussed With: Patient  Code Status (Patient has no pulse and is not breathing): CPR (Attempt to Resuscitate)  Medical Interventions (Patient has pulse or is breathing): Full Support    Disposition:  I expect patient to be discharged after the patient has been stabilized.    Electronically signed by Vamsi Mason MD, 12/01/23, 9:22 AM EST.      Part of this note may be an electronic transcription/translation of spoken language to printed text using the Dragon Dictation System.

## 2023-12-01 NOTE — THERAPY EVALUATION
Patient Name: Blair Lira  : 1945    MRN: [id number]                              Today's Date: 2023       Admit Date: 2023    Visit Dx:     ICD-10-CM ICD-9-CM   1. Troponin level elevated  R79.89 790.6   2. Weakness  R53.1 780.79   3. Syncope and collapse  R55 780.2   4. Decreased activities of daily living (ADL)  Z78.9 V49.89     Patient Active Problem List   Diagnosis    Rotator cuff tear, right    Impingement syndrome of right shoulder    Acute renal failure superimposed on chronic kidney disease    Pancytopenia    Abnormal weight loss    Hypertension    CKD (chronic kidney disease) stage 3, GFR 30-59 ml/min    Moderate malnutrition    Weakness generalized    Intractable pain    Multiple myeloma    Edema    Chronic diastolic (congestive) heart failure    Acute on chronic HFrEF (heart failure with reduced ejection fraction)    Chest pain    Syncope    COVID-19 virus infection    Chronic kidney disease, stage IV (severe)    Hypotension     Past Medical History:   Diagnosis Date    BPH (benign prostatic hyperplasia)     Cancer     Chronic kidney disease     Frozen shoulder     Hyperlipidemia     Hypertension 10/26/2023    Periarthritis of shoulder     Rotator cuff disorder, right     Tennis elbow     RIGHT     Past Surgical History:   Procedure Laterality Date    CATARACT EXTRACTION EXTRACAPSULAR W/ INTRAOCULAR LENS IMPLANTATION Bilateral     COLONOSCOPY      JOINT REPLACEMENT Right     THR    SHOULDER ARTHROSCOPY W/ ROTATOR CUFF REPAIR Right 3/29/2023    Procedure: SHOULDER ARTHROSCOPY WITH ROTATOR CUFF REPAIR, SUBCROMIAL DECOMPRESSION,DISTAL CLAVICLE RESECTION;  Surgeon: Gustavo Samuels MD;  [facility];  Service: Orthopedics;  Laterality: Right;    SHOULDER SURGERY Left     SCOPE      General Information       Row Name 23 1248          OT Time and Intention    Document Type evaluation  -ES     Mode of Treatment individual therapy;occupational therapy  -ES       Row Name  12/01/23 1248          General Information    Patient Profile Reviewed yes  -ES     Prior Level of Function independent:;ADL's;all household mobility;community mobility  Patient independent with ADLs and IADLs. No device for functional mobility. Walk in shower, standing shower completion. [place] in stance. No home O2 use. Denies recent falls.  -ES     Existing Precautions/Restrictions no known precautions/restrictions  -ES     Barriers to Rehab none identified  -ES       Row Name 12/01/23 1248          Occupational Profile    Reason for Services/Referral (Occupational Profile) Patient is 78 yr old male admitted to [facility] on 11/29/2023 with reports of dizziness, shortness of breath. Patient is COVID positive. OT evaluation and treatment ordered d/t recent decline in ADLs/transfer ability and discharge planning recommendations. No previous OT services for current condition.  -ES       Row Name 12/01/23 1248          Living Environment    People in Home alone  -ES       Row Name 12/01/23 1248          Cognition    Orientation Status (Cognition) oriented x 3  patient pleasant and cooperative, agreeable to therapy evaluation  -ES               User Key  (r) = Recorded By, (t) = Taken By, (c) = Cosigned By      Initials Name Provider Type    ES Babs Ramirez, OTR/L, CSRS Occupational Therapist                     Mobility/ADL's       Row Name 12/01/23 1249          Bed Mobility    Bed Mobility supine-sit;sit-supine  -ES     Supine-Sit [place] (Bed Mobility) independent  -ES     Sit-Supine [place] (Bed Mobility) independent  -ES       Row Name 12/01/23 1249          Transfers    Transfers sit-stand transfer;stand-sit transfer  -ES       Row Name 12/01/23 1249          Sit-Stand Transfer    Sit-Stand [place] (Transfers) supervision  -ES     Assistive Device (Sit-Stand Transfers) other (see comments)  no assistive device  -ES       Row Name 12/01/23 1249          Stand-Sit Transfer    Stand-Sit  [place] (Transfers) supervision  -ES     Assistive Device (Stand-Sit Transfers) other (see comments)  no assistive device  -ES       Row Name 12/01/23 1249          Functional Mobility    Functional Mobility- Ind. Level supervision required  -ES     Functional Mobility- Device other (see comments)  no assistive device  -ES     Functional Mobility- Comment Patient performs functional mobility to/from bathroom, SPV without use of assistive device. SPV provided secondary to initial evaluation.  -ES       Row Name 12/01/23 1249          Activities of Daily Living    BADL Assessment/Intervention bathing;upper body dressing;lower body dressing;grooming;feeding;toileting  -ES       Row Name 12/01/23 1249          Bathing Assessment/Intervention    [place] Level (Bathing) bathing skills;independent  -ES       Row Name 12/01/23 1249          Upper Body Dressing Assessment/Training    [place] Level (Upper Body Dressing) upper body dressing skills;independent  -ES       Row Name 12/01/23 1249          Lower Body Dressing Assessment/Training    [place] Level (Lower Body Dressing) lower body dressing skills;independent  -ES       Row Name 12/01/23 1249          Grooming Assessment/Training    [place] Level (Grooming) grooming skills;wash face, hands;independent  -ES     Position (Grooming) sink side;unsupported standing  -ES       Row Name 12/01/23 1249          Self-Feeding Assessment/Training    [place] Level (Feeding) feeding skills;independent  -ES       Row Name 12/01/23 1249          Toileting Assessment/Training    [place] Level (Toileting) toileting skills;adjust/manage clothing;perform perineal hygiene;independent  -ES     Position (Toileting) unsupported standing  -ES               User Key  (r) = Recorded By, (t) = Taken By, (c) = Cosigned By      Initials Name Provider Type    Babs Wilkins, OTR/L, CSRS Occupational Therapist                   Obj/Interventions       Row Name  12/01/23 1251          Vision Assessment/Intervention    Visual Impairment/Limitations WFL  -ES       Row Name 12/01/23 1251          Range of Motion Comprehensive    General Range of Motion bilateral upper extremity ROM WNL  -ES       Row Name 12/01/23 1251          Strength Comprehensive (MMT)    General Manual Muscle Testing (MMT) Assessment no strength deficits identified  -ES     Comment, General Manual Muscle Testing (MMT) Assessment BUEs WFL  -ES       Row Name 12/01/23 1251          Motor Skills    Motor Skills functional endurance  -ES     Functional Endurance good. no shortness of breath noted with functional mobility or standing ADL engagement  -ES       Row Name 12/01/23 1251          Balance    Balance Assessment sitting dynamic balance;standing dynamic balance  -ES     Dynamic Sitting Balance independent  -ES     Position, Sitting Balance unsupported;sitting edge of bed  -ES     Dynamic Standing Balance supervision  -ES     Position/Device Used, Standing Balance unsupported  -ES               User Key  (r) = Recorded By, (t) = Taken By, (c) = Cosigned By      Initials Name Provider Type    ES Babs Ramirez, OTR/L, CSRS Occupational Therapist                   Goals/Plan    No documentation.                  Clinical Impression       Row Name 12/01/23 1251          Plan of Care Review    Plan of Care Reviewed With patient  -ES     Outcome Evaluation Patient presents at or near baseline functional status at time of evaluation with no identified functional deficits that impede patient independence with activities of daily living.  No indicated need for skilled occupational therapy intervention in the acute care setting.  Occupational therapy will sign off at this time.  -ES       Row Name 12/01/23 1251          Therapy Assessment/Plan (OT)    Criteria for Skilled Therapeutic Interventions Met (OT) no problems identified which require skilled intervention  -ES     Therapy Frequency (OT) evaluation only   -ES       Row Name 12/01/23 1251          Therapy Plan Review/Discharge Plan (OT)    Anticipated Discharge Disposition (OT) home  -ES               User Key  (r) = Recorded By, (t) = Taken By, (c) = Cosigned By      Initials Name Provider Type    Babs Wilkins, ROSA MR/L, CHRISTIAN Occupational Therapist                   Outcome Measures       Row Name 12/01/23 1252          How much help from another is currently needed...    Putting on and taking off regular lower body clothing? 4  -ES     Bathing (including washing, rinsing, and drying) 4  -ES     Toileting (which includes using toilet bed pan or urinal) 4  -ES     Putting on and taking off regular upper body clothing 4  -ES     Taking care of personal grooming (such as brushing teeth) 4  -ES     Eating meals 4  -ES     AM-PAC 6 Clicks Score (OT) 24  -ES       Row Name 12/01/23 0738          How much help from another person do you currently need...    Turning from your back to your side while in flat bed without using bedrails? 4  -AK     Moving from lying on back to sitting on the side of a flat bed without bedrails? 4  -AK     Moving to and from a bed to a chair (including a wheelchair)? 4  -AK     Standing up from a chair using your arms (e.g., wheelchair, bedside chair)? 4  -AK     Climbing 3-5 steps with a railing? 3  -AK     To walk in hospital room? 4  -AK     AM-PAC 6 Clicks Score (PT) 23  -AK     Highest Level of Mobility Goal 7 --> Walk 25 feet or more  -AK       Row Name 12/01/23 1252          Functional Assessment    Outcome Measure Options AM-PAC 6 Clicks Daily Activity (OT)  -ES               User Key  (r) = Recorded By, (t) = Taken By, (c) = Cosigned By      Initials Name Provider Type    Mili Cr, RN Registered Nurse    Babs Wilkins, ROSA MR/L, CHRISTAIN Occupational Therapist                      OT Recommendation and Plan  Therapy Frequency (OT): evaluation only  Plan of Care Review  Plan of Care Reviewed With: patient  Outcome Evaluation:  Patient presents at or near baseline functional status at time of evaluation with no identified functional deficits that impede patient independence with activities of daily living.  No indicated need for skilled occupational therapy intervention in the acute care setting.  Occupational therapy will sign off at this time.     Time Calculation:   Evaluation Complexity (OT)  Review Occupational Profile/Medical/Therapy History Complexity: brief/low complexity  Assessment, Occupational Performance/Identification of Deficit Complexity: 1-3 performance deficits  Clinical Decision Making Complexity (OT): problem focused assessment/low complexity  Overall Complexity of Evaluation (OT): low complexity     Time Calculation- OT       Row Name 12/01/23 1253             Time Calculation- OT    OT Received On 12/01/23  -ES         Untimed Charges    OT Eval/Re-eval Minutes 18  -ES         Total Minutes    Untimed Charges Total Minutes 18  -ES       Total Minutes 18  -ES                User Key  (r) = Recorded By, (t) = Taken By, (c) = Cosigned By      Initials Name Provider Type    ES Babs Ramirez, OTR/L, CSRS Occupational Therapist                  Therapy Charges for Today       Code Description Service Date Service Provider Modifiers Qty    [id number]  OT EVAL LOW COMPLEXITY 2 12/1/2023 Babs Ramirez, OTR/L, CSRS GO 1                 Babs Ramirez, OTR/L, CSRS  12/1/2023

## 2023-12-02 PROCEDURE — 25010000002 DEXAMETHASONE SODIUM PHOSPHATE 10 MG/ML SOLUTION: Performed by: INTERNAL MEDICINE

## 2023-12-02 RX ADMIN — TAMSULOSIN HYDROCHLORIDE 0.8 MG: 0.4 CAPSULE ORAL at 08:50

## 2023-12-02 RX ADMIN — NYSTATIN 500000 UNITS: 100000 SUSPENSION ORAL at 12:40

## 2023-12-02 RX ADMIN — ASPIRIN 81 MG: 81 TABLET, COATED ORAL at 08:50

## 2023-12-02 RX ADMIN — NYSTATIN 500000 UNITS: 100000 SUSPENSION ORAL at 18:24

## 2023-12-02 RX ADMIN — NYSTATIN 500000 UNITS: 100000 SUSPENSION ORAL at 20:18

## 2023-12-02 RX ADMIN — DEXAMETHASONE SODIUM PHOSPHATE 10 MG: 10 INJECTION INTRAMUSCULAR; INTRAVENOUS at 08:50

## 2023-12-02 RX ADMIN — NYSTATIN 500000 UNITS: 100000 SUSPENSION ORAL at 08:49

## 2023-12-02 RX ADMIN — ATORVASTATIN CALCIUM 10 MG: 10 TABLET, FILM COATED ORAL at 20:18

## 2023-12-02 NOTE — PROGRESS NOTES
[facility]     Progress Note    Patient Name: Blair Lira  : 1945  MRN: [id number]  Primary Care Physician:  Babs Summers APRN  Date of admission: 2023    Subjective   Subjective     Chief Complaint: Stable and doing well we will continue current management he is more stable but still gets tachycardic when he stands up states that he is very weak and lives alone so we will observe him for couple of days he is recovering from COVID    HPI: With multiple myeloma recovering from COVID    Review of Systems   All systems were reviewed and negative except for: Has been reviewed    Objective   Objective     Vitals:   Temp:  [97.9 °F (36.6 °C)-98.4 °F (36.9 °C)] 98.4 °F (36.9 °C)  Heart Rate:  [] 99  Resp:  [18-22] 20  BP: (103-146)/(59-77) 118/72    Physical Exam    Constitutional: Awake, alert   Eyes: PERRLA, sclerae anicteric, no conjunctival injection   HENT: NCAT, mucous membranes moist   Neck: Supple, no thyromegaly, no lymphadenopathy, trachea midline   Respiratory: Clear to auscultation bilaterally, nonlabored respirations    Cardiovascular: RRR, no murmurs, rubs, or gallops, palpable pedal pulses bilaterally   Gastrointestinal: Positive bowel sounds, soft, nontender, nondistended   Musculoskeletal: No bilateral ankle edema, no clubbing or cyanosis to extremities   Psychiatric: Appropriate affect, cooperative   Neurologic: Oriented x 3, strength symmetric in all extremities, Cranial Nerves grossly intact to confrontation, speech clear   Skin: No rashes   No change  Result Review    Result Review:  I have personally reviewed the results from the time of this admission to 2023 09:38 EST and agree with these findings:  [x]  Laboratory  []  Microbiology  []  Radiology  []  EKG/Telemetry   []  Cardiology/Vascular   []  Pathology  []  Old records  []  Other:  Most notable findings include: Stable and doing well    Assessment & Plan   Assessment / Plan     Brief Patient Summary:  Blair  FROILAN Lira is a 78 y.o. male who with COVID and multiple myeloma renal failure status is stable    Active Hospital Problems:  Active Hospital Problems    Diagnosis     **Chest pain     Chronic kidney disease, stage IV (severe)     Hypotension     Syncope     COVID-19 virus infection     Chronic diastolic (congestive) heart failure     Multiple myeloma     CKD (chronic kidney disease) stage 3, GFR 30-59 ml/min        Plan:   Continue the current management    DVT prophylaxis:  No DVT prophylaxis order currently exists.    CODE STATUS:   Level Of Support Discussed With: Patient  Code Status (Patient has no pulse and is not breathing): CPR (Attempt to Resuscitate)  Medical Interventions (Patient has pulse or is breathing): Full Support    Disposition:  I expect patient to be discharged and will be discharged in a couple of days.    Electronically signed by Vamsi Mason MD, 12/02/23, 9:38 AM EST.      Part of this note may be an electronic transcription/translation of spoken language to printed text using the Dragon Dictation System.

## 2023-12-02 NOTE — PROGRESS NOTES
[facility]     Progress Note    Patient Name: Blair Lira  : 1945  MRN: [id number]  Primary Care Physician:  Babs Summers APRN  Date of admission: 2023    Subjective patient is feeling much better he is little weak because of the COVID which will take time  Blood pressure is stable    Review of Systems  Constitutional:        Weakness tiredness fatigue  Eyes:                       No blurry vision, eye discharge, eye irritation, eye pain  HEENT:                   No acute hair loss, earache and discharge, nasal congestion or discharge, sore throat, postnasal drip  Respiratory:           No shortness of breath coughing sputum production wheezing hemoptysis pleuritic chest pain  Cardiovascular:     No chest pain, orthopnea, PND, dizziness, palpitation, lower extremity edema  Gastrointestinal:   No nausea vomiting diarrhea abdominal pain constipation  Genitourinary:       No urinary incontinence, hesitancy, frequency, urgency, dysuria  Hematologic:         No bruising, bleeding, pallor, lymphadenopathy  Endocrine:            No coldness, hot flashes, polyuria, abnormal hair growth  Musculoskeletal:    No body pains, aches, arthritic pains, muscle pain ,muscle wasting  Psychiatric:          No low or high mood, anxiety, hallucinations, delusions  Skin.                      No rash, ulcers, bruising, itching  Neurological:        No confusion, headache, focal weakness, numbness, dysphasia    Objective   Objective     Vitals:   Temp:  [97.9 °F (36.6 °C)-98.4 °F (36.9 °C)] 98.4 °F (36.9 °C)  Heart Rate:  [] 99  Resp:  [18-22] 20  BP: (103-146)/(59-77) 118/72  Physical Exam    Constitutional: Awake, alert responsive, conversant, no obvious distress              Psychiatric:  Appropriate affect, cooperative   Neurologic:  Awake alert ,oriented x 3, strength symmetric in all extremities, Cranial Nerves grossly intact to confrontation, speech clear   Eyes:   PERRLA, sclerae anicteric, no  conjunctival injection   HEENT:  Moist mucous membranes, no nasal or eye discharge, no throat congestion   Neck:   Supple, no thyromegaly, no lymphadenopathy, trachea midline, no elevated JVD   Respiratory:  Clear to auscultation bilaterally, nonlabored respirations    Cardiovascular: RRR, no murmurs, rubs, or gallops, palpable pedal pulses bilaterally, No bilateral ankle edema   Gastrointestinal: Positive bowel sounds, soft, nontender, nondistended, no organomegaly   Musculoskeletal:  No clubbing or cyanosis to extremities,muscle wasting, joint swelling, muscle weakness             Skin:                      No rashes, bruising, skin ulcers, petechiae or ecchymosis    Result Review    Result Review:  I have personally reviewed the results from the time of this admission to 12/2/2023 10:35 EST and agree with these findings:  []  Laboratory  []  Microbiology  []  Radiology  []  EKG/Telemetry   []  Cardiology/Vascular   []  Pathology  []  Old records  []  Other:    Assessment & Plan   Assessment / Plan       Active Hospital Problems:    Active Hospital Problems    Diagnosis  POA    **Chest pain [R07.9]  Yes    Chronic kidney disease, stage IV (severe) [N18.4]  Yes     Secondary to light chain nephropathy      Hypotension [I95.9]  Unknown    Syncope [R55]  Yes    COVID-19 virus infection [U07.1]  Yes    Chronic diastolic (congestive) heart failure [I50.32]  Yes    Multiple myeloma [C90.00]  Yes    CKD (chronic kidney disease) stage 3, GFR 30-59 ml/min [N18.30]  Yes       Plan:   Patient is clinically stable  Discharge planning per primary team  COVID-related weakness will improve slowly and gradually       Electronically signed by Siri Fox MD, 12/02/23, 10:35 AM EST.

## 2023-12-03 ENCOUNTER — READMISSION MANAGEMENT (OUTPATIENT)
Dept: CALL CENTER | Facility: HOSPITAL | Age: 78
End: 2023-12-03
Payer: MEDICARE

## 2023-12-03 VITALS
DIASTOLIC BLOOD PRESSURE: 66 MMHG | SYSTOLIC BLOOD PRESSURE: 124 MMHG | BODY MASS INDEX: 25.07 KG/M2 | TEMPERATURE: 98.1 F | HEART RATE: 75 BPM | RESPIRATION RATE: 18 BRPM | HEIGHT: 73 IN | OXYGEN SATURATION: 93 % | WEIGHT: 189.15 LBS

## 2023-12-03 PROCEDURE — 25010000002 DEXAMETHASONE SODIUM PHOSPHATE 10 MG/ML SOLUTION: Performed by: INTERNAL MEDICINE

## 2023-12-03 RX ORDER — TAMSULOSIN HYDROCHLORIDE 0.4 MG/1
0.8 CAPSULE ORAL DAILY
Qty: 60 CAPSULE | Refills: 0 | Status: SHIPPED | OUTPATIENT
Start: 2023-12-03 | End: 2024-01-02

## 2023-12-03 RX ORDER — FOLIC ACID 1 MG/1
1 TABLET ORAL DAILY
Qty: 30 TABLET | Refills: 0 | Status: SHIPPED | OUTPATIENT
Start: 2023-12-03 | End: 2024-01-02

## 2023-12-03 RX ORDER — LANOLIN ALCOHOL/MO/W.PET/CERES
1000 CREAM (GRAM) TOPICAL DAILY
Qty: 30 TABLET | Refills: 0 | Status: SHIPPED | OUTPATIENT
Start: 2023-12-03 | End: 2024-01-02

## 2023-12-03 RX ADMIN — DEXAMETHASONE SODIUM PHOSPHATE 10 MG: 10 INJECTION INTRAMUSCULAR; INTRAVENOUS at 08:10

## 2023-12-03 RX ADMIN — ASPIRIN 81 MG: 81 TABLET, COATED ORAL at 08:10

## 2023-12-03 NOTE — OUTREACH NOTE
Prep Survey      Flowsheet Row Responses   [facility] facility patient discharged from? [facility]   Is LACE score < 7 ? No   Eligibility Readm Mgmt   Discharge diagnosis chest pain, syncope, COVID-19 virus   Does the patient have one of the following disease processes/diagnoses(primary or secondary)? Other   Does the patient have Home health ordered? No   Is there a DME ordered? No   Prep survey completed? Yes            Yany CAMPBELL - Registered Nurse

## 2023-12-03 NOTE — PLAN OF CARE
Goal Outcome Evaluation:  Plan of Care Reviewed With: patient      No acute changes during shift. No complaints from patient throughout the shift.

## 2023-12-03 NOTE — DISCHARGE SUMMARY
[facility]         DISCHARGE SUMMARY    Patient Name: Blair Lira  : 1945  MRN: [id number]    Date of Admission: 2023  Date of Discharge: 12/3/2023  Primary Care Physician: Babs Summers APRN    Consults       Date and Time Order Name Status Description    2023  8:39 PM Hematology & Oncology Inpatient Consult      2023  8:39 PM Inpatient Nephrology Consult      2023  5:36 PM Inpatient Cardiology Consult Completed     2023  4:53 PM Inpatient Cardiology Consult Completed     11/10/2023  6:27 PM Inpatient Nephrology Consult Completed     10/27/2023  5:16 PM Inpatient Nephrology Consult Completed             Presenting Problem:   Syncope and collapse [R55]  Syncope [R55]  Chest pain [R07.9]  Weakness [R53.1]  Troponin level elevated [R79.89]    Active and Resolved Hospital Problems:  Active Hospital Problems    Diagnosis POA    **Chest pain [R07.9] Yes    Chronic kidney disease, stage IV (severe) [N18.4] Yes    Hypotension [I95.9] Unknown    Syncope [R55] Yes    COVID-19 virus infection [U07.1] Yes    Chronic diastolic (congestive) heart failure [I50.32] Yes    Multiple myeloma [C90.00] Yes    CKD (chronic kidney disease) stage 3, GFR 30-59 ml/min [N18.30] Yes      Resolved Hospital Problems   No resolved problems to display.         Hospital Course     Hospital Course:  Blair Lira is a 78 y.o. male who was admitted to the hospital with the dizzy spells was found to have COVID symptoms were probably because of COVID and shortness of air which has now improved he is feeling much better he was given a short course of Decadron he will be started soon on his chemotherapy he is feeling better and wants to go home and is therefore being discharged he is not in any distress      DISCHARGE Follow Up Recommendations for labs and diagnostics: Stable and doing fine is going to be discharged today      Pertinent  and/or Most Recent Results     LAB RESULTS:      Lab  11/29/23  1651   WBC 8.17   HEMOGLOBIN 9.8*   HEMATOCRIT 29.2*   PLATELETS 214   NEUTROS ABS 6.45   IMMATURE GRANS (ABS) 0.07*   LYMPHS ABS 1.23   MONOS ABS 0.41   EOS ABS 0.00   MCV 95.4         Lab 12/01/23  0432 11/29/23  1651   SODIUM 135* 132*   POTASSIUM 4.2 3.9   CHLORIDE 102 90*   CO2 22.7 25.7   ANION GAP 10.3 16.3*   BUN 54* 58*   CREATININE 2.35* 2.80*   EGFR 27.8* 22.5*   GLUCOSE 163* 170*   CALCIUM 7.5* 8.1*   MAGNESIUM  --  1.5*         Lab 12/01/23  0432 11/29/23  1651   TOTAL PROTEIN 6.4 7.5   ALBUMIN 2.4* 2.8*   GLOBULIN 4.0 4.7   ALT (SGPT) 24 29   AST (SGOT) 13 16   BILIRUBIN 0.3 0.5   ALK PHOS 62 66         Lab 11/30/23  0007 11/29/23  1849 11/29/23  1651   HSTROP T 73* 76* 78*             Lab 11/29/23  1849   IRON 148   IRON SATURATION (TSAT) 76*   TIBC 194*   TRANSFERRIN 130*         Brief Urine Lab Results  (Last result in the past 365 days)        Color   Clarity   Blood   Leuk Est   Nitrite   Protein   CREAT   Urine HCG        11/12/23 1155 Yellow   Clear   Small (1+)   Negative   Negative   Negative                 Microbiology Results (last 10 days)       Procedure Component Value - Date/Time    COVID PRE-OP / PRE-PROCEDURE SCREENING ORDER (NO ISOLATION) - Swab, Nasopharynx [[id number]]  (Abnormal) Collected: 11/29/23 1733    Lab Status: Final result Specimen: Swab from Nasopharynx Updated: 11/29/23 1820    Narrative:      The following orders were created for panel order COVID PRE-OP / PRE-PROCEDURE SCREENING ORDER (NO ISOLATION) - Swab, Nasopharynx.  Procedure                               Abnormality         Status                     ---------                               -----------         ------                     COVID-19, FLU A/B, RSV P...[[id number]]  Abnormal            Final result                 Please view results for these tests on the individual orders.    COVID-19, FLU A/B, RSV PCR 1 HR TAT - Swab, Nasopharynx [[id number]]  (Abnormal) Collected: 11/29/23 1733    Lab  Status: Final result Specimen: Swab from Nasopharynx Updated: 11/29/23 1820     COVID19 Detected     Influenza A PCR Not Detected     Influenza B PCR Not Detected     RSV, PCR Not Detected    Narrative:      Fact sheet for providers: https://www.fda.gov/media/116749/download    Fact sheet for patients: https://www.fda.gov/media/400423/download    Test performed by PCR.            XR Elbow 3+ View Left    Result Date: 11/29/2023  Impression:  Mild degenerative change.  No acute osseous abnormalities are identified.      JOHN BENTON MD       Electronically Signed and Approved By: JOHN BENTON MD on 11/29/2023 at 17:48             XR Chest 1 View    Result Date: 11/12/2023  Impression:  Chronic elevation of the right hemidiaphragm.  No acute cardiopulmonary process demonstrated       ASHLY PARKER MD       Electronically Signed and Approved By: ASHLY PARKER MD on 11/12/2023 at 9:45             XR Chest 1 View    Result Date: 11/6/2023  Impression:   No acute infiltrate is appreciated.     Please note that portions of this note were completed with a voice recognition program.  THOMAS CHILDS JR, MD       Electronically Signed and Approved By: THOMAS CHILDS JR, MD on 11/06/2023 at 4:56               Results for orders placed during the hospital encounter of 11/06/23    Duplex Venous Lower Extremity - Right CAR    Interpretation Summary    Normal right lower extremity venous duplex scan.      Results for orders placed during the hospital encounter of 11/06/23    Duplex Venous Lower Extremity - Right CAR    Interpretation Summary    Normal right lower extremity venous duplex scan.      Results for orders placed during the hospital encounter of 11/06/23    Adult Transesophageal Echo (NIKHIL) W/ Cont if Necessary Per Protocol    Interpretation Summary    Left ventricular systolic function is normal. Estimated left ventricular EF = 55%    Patent foramen ovale present.    Saline test results are positive for right to  left atrial level shunt.      Labs Pending at Discharge:        Discharge Details        Discharge Medications        Continue These Medications        Instructions Start Date   albuterol sulfate  (90 Base) MCG/ACT inhaler  Commonly known as: PROVENTIL HFA;VENTOLIN HFA;PROAIR HFA   2 puffs, Inhalation, Every 4 Hours PRN      amLODIPine 5 MG tablet  Commonly known as: NORVASC   Take 1 tablet by mouth Daily.      atorvastatin 10 MG tablet  Commonly known as: LIPITOR   10 mg, Oral, Nightly      folic acid 1 MG tablet  Commonly known as: FOLVITE   1 mg, Oral, Daily      furosemide 40 MG tablet  Commonly known as: Lasix   40 mg, Oral, 2 Times Daily      HYDROcodone-acetaminophen 7.5-325 MG per tablet  Commonly known as: NORCO   1 tablet, Oral, Every 6 Hours PRN      linezolid 600 MG tablet  Commonly known as: ZYVOX   600 mg, Oral, 2 Times Daily      midodrine 10 MG tablet  Commonly known as: PROAMATINE   10 mg, Oral, 3 Times Daily Before Meals      pantoprazole 40 MG EC tablet  Commonly known as: PROTONIX   40 mg, Oral, Every Early Morning      sodium bicarbonate 650 MG tablet   1,300 mg, Oral, 2 Times Daily      tamsulosin 0.4 MG capsule 24 hr capsule  Commonly known as: FLOMAX   0.8 mg, Oral, Daily      vitamin B-12 1000 MCG tablet  Commonly known as: CYANOCOBALAMIN   1,000 mcg, Oral, Daily      vitamin D 1.25 MG (05982 UT) capsule capsule  Commonly known as: ERGOCALCIFEROL   50,000 Units, Oral, Weekly, WED               No Known Allergies      Discharge Disposition:  Home or Self Care    Diet:  Hospital:  Diet Order   Procedures    Diet: Regular/House Diet; Texture: Regular Texture (IDDSI 7); Fluid Consistency: Thin (IDDSI 0)         Discharge Activity: As tolerated        CODE STATUS:  Code Status and Medical Interventions:   Ordered at: 11/29/23 1922     Level Of Support Discussed With:    Patient     Code Status (Patient has no pulse and is not breathing):    CPR (Attempt to Resuscitate)     Medical  Interventions (Patient has pulse or is breathing):    Full Support         No future appointments.        Time spent on Discharge including face to face service: 45 minutes    Electronically signed by Vamsi Mason MD, 12/03/23, 10:09 AM EST.    Part of this note may be an electronic transcription/translation of spoken language to printed text using the Dragon Dictation System.

## 2023-12-06 ENCOUNTER — READMISSION MANAGEMENT (OUTPATIENT)
Dept: CALL CENTER | Facility: HOSPITAL | Age: 78
End: 2023-12-06
Payer: MEDICARE

## 2023-12-06 LAB
QT INTERVAL: 422 MS
QTC INTERVAL: 462 MS

## 2023-12-06 NOTE — OUTREACH NOTE
Medical Week 1 Survey      Flowsheet Row Responses   [facility] patient discharged from? [facility]   Does the patient have one of the following disease processes/diagnoses(primary or secondary)? Other   Week 1 attempt successful? No   Unsuccessful attempts Attempt 1            Jennifer SANDOVAL - Licensed Nurse

## 2023-12-13 ENCOUNTER — READMISSION MANAGEMENT (OUTPATIENT)
Dept: CALL CENTER | Facility: HOSPITAL | Age: 78
End: 2023-12-13
Payer: MEDICARE

## 2023-12-13 NOTE — OUTREACH NOTE
Medical Week 2 Survey      Flowsheet Row Responses   [facility] patient discharged from? [facility]   Does the patient have one of the following disease processes/diagnoses(primary or secondary)? Other   Week 2 attempt successful? Yes   Call start time 1416   Discharge diagnosis chest pain, syncope, COVID-19 virus   Call end time 1418   Meds reviewed with patient/caregiver? Yes   Is the patient having any side effects they believe may be caused by any medication additions or changes? No   Does the patient have all medications ordered at discharge? N/A   Is the patient taking all medications as directed (includes completed medication regime)? Yes   Medication comments reports change in BP meds post discharge   Does the patient have a primary care provider?  Yes   Does the patient have an appointment with their PCP within 7 days of discharge? Yes  [12/11/23]   Has the patient kept scheduled appointments due by today? Yes   Comments Pt has started chemo since discharge, first treatment today   Has home health visited the patient within 72 hours of discharge? N/A   Psychosocial issues? No   Did the patient receive a copy of their discharge instructions? Yes   Nursing interventions Reviewed instructions with patient   What is the patient's perception of their health status since discharge? Improving  [Pt is doing well--denies chest pain, no resp type s/s and covid related s/s resolved.  Pt started chemo today.  Pt is aware to update MD with any concerns.]   Is the patient/caregiver able to teach back signs and symptoms related to disease process for when to call PCP? Yes   Is the patient/caregiver able to teach back signs and symptoms related to disease process for when to call 911? Yes   If the patient is a current smoker, are they able to teach back resources for cessation? Not a smoker   Week 2 Call Completed? Yes   Graduated Yes  [No immediate needs, improving]   Call end time 1418            NAA Shields  Nurse

## 2023-12-20 ENCOUNTER — PREP FOR SURGERY (OUTPATIENT)
Dept: OTHER | Facility: HOSPITAL | Age: 78
End: 2023-12-20
Payer: MEDICARE

## 2023-12-20 ENCOUNTER — TRANSCRIBE ORDERS (OUTPATIENT)
Dept: ADMINISTRATIVE | Facility: HOSPITAL | Age: 78
End: 2023-12-20
Payer: MEDICARE

## 2023-12-20 ENCOUNTER — HOSPITAL ENCOUNTER (OUTPATIENT)
Facility: HOSPITAL | Age: 78
Setting detail: OBSERVATION
Discharge: HOME | End: 2023-12-22
Attending: INTERNAL MEDICINE | Admitting: INTERNAL MEDICINE
Payer: MEDICARE

## 2023-12-20 DIAGNOSIS — D64.9 LOW HEMOGLOBIN: Primary | ICD-10-CM

## 2023-12-20 DIAGNOSIS — Z78.9 DECREASED ACTIVITIES OF DAILY LIVING (ADL): Primary | ICD-10-CM

## 2023-12-20 LAB
ABO GROUP BLD: NORMAL
ALBUMIN SERPL-MCNC: 3.1 G/DL (ref 3.5–5.2)
ALBUMIN/GLOB SERPL: 0.9 G/DL
ALP SERPL-CCNC: 77 U/L (ref 39–117)
ALT SERPL W P-5'-P-CCNC: 12 U/L (ref 1–41)
ANION GAP SERPL CALCULATED.3IONS-SCNC: 13.5 MMOL/L (ref 5–15)
AST SERPL-CCNC: 11 U/L (ref 1–40)
BILIRUB SERPL-MCNC: 0.3 MG/DL (ref 0–1.2)
BLD GP AB SCN SERPL QL: POSITIVE
BUN SERPL-MCNC: 16 MG/DL (ref 8–23)
BUN/CREAT SERPL: 8.4 (ref 7–25)
CALCIUM SPEC-SCNC: 8 MG/DL (ref 8.6–10.5)
CHLORIDE SERPL-SCNC: 103 MMOL/L (ref 98–107)
CO2 SERPL-SCNC: 21.5 MMOL/L (ref 22–29)
CREAT SERPL-MCNC: 1.91 MG/DL (ref 0.76–1.27)
DEPRECATED RDW RBC AUTO: 63.7 FL (ref 37–54)
EGFRCR SERPLBLD CKD-EPI 2021: 35.7 ML/MIN/1.73
ERYTHROCYTE [DISTWIDTH] IN BLOOD BY AUTOMATED COUNT: 17.5 % (ref 12.3–15.4)
GLOBULIN UR ELPH-MCNC: 3.4 GM/DL
GLUCOSE SERPL-MCNC: 133 MG/DL (ref 65–99)
HCT VFR BLD AUTO: 22.8 % (ref 37.5–51)
HGB BLD-MCNC: 7.2 G/DL (ref 13–17.7)
MCH RBC QN AUTO: 31.3 PG (ref 26.6–33)
MCHC RBC AUTO-ENTMCNC: 31.6 G/DL (ref 31.5–35.7)
MCV RBC AUTO: 99.1 FL (ref 79–97)
PLATELET # BLD AUTO: 139 10*3/MM3 (ref 140–450)
PMV BLD AUTO: 11.1 FL (ref 6–12)
POTASSIUM SERPL-SCNC: 3.9 MMOL/L (ref 3.5–5.2)
PROT SERPL-MCNC: 6.5 G/DL (ref 6–8.5)
RBC # BLD AUTO: 2.3 10*6/MM3 (ref 4.14–5.8)
RH BLD: POSITIVE
SODIUM SERPL-SCNC: 138 MMOL/L (ref 136–145)
T&S EXPIRATION DATE: NORMAL
WBC NRBC COR # BLD AUTO: 4.55 10*3/MM3 (ref 3.4–10.8)

## 2023-12-20 PROCEDURE — 63710000001 VITAMIN B-12 500 MCG TABLET: Performed by: INTERNAL MEDICINE

## 2023-12-20 PROCEDURE — 96366 THER/PROPH/DIAG IV INF ADDON: CPT

## 2023-12-20 PROCEDURE — 25810000003 SODIUM CHLORIDE 0.9 % SOLUTION: Performed by: INTERNAL MEDICINE

## 2023-12-20 PROCEDURE — 96365 THER/PROPH/DIAG IV INF INIT: CPT

## 2023-12-20 PROCEDURE — A9270 NON-COVERED ITEM OR SERVICE: HCPCS | Performed by: INTERNAL MEDICINE

## 2023-12-20 PROCEDURE — 63710000001 TAMSULOSIN 0.4 MG CAPSULE: Performed by: INTERNAL MEDICINE

## 2023-12-20 PROCEDURE — 86921 COMPATIBILITY TEST INCUBATE: CPT

## 2023-12-20 PROCEDURE — 86900 BLOOD TYPING SEROLOGIC ABO: CPT

## 2023-12-20 PROCEDURE — 25010000002 NA FERRIC GLUC CPLX PER 12.5 MG: Performed by: INTERNAL MEDICINE

## 2023-12-20 PROCEDURE — 86870 RBC ANTIBODY IDENTIFICATION: CPT | Performed by: INTERNAL MEDICINE

## 2023-12-20 PROCEDURE — 86880 COOMBS TEST DIRECT: CPT

## 2023-12-20 PROCEDURE — G0378 HOSPITAL OBSERVATION PER HR: HCPCS

## 2023-12-20 PROCEDURE — 86850 RBC ANTIBODY SCREEN: CPT | Performed by: INTERNAL MEDICINE

## 2023-12-20 PROCEDURE — 86920 COMPATIBILITY TEST SPIN: CPT

## 2023-12-20 PROCEDURE — 86900 BLOOD TYPING SEROLOGIC ABO: CPT | Performed by: INTERNAL MEDICINE

## 2023-12-20 PROCEDURE — 63710000001 FOLIC ACID 1 MG TABLET: Performed by: INTERNAL MEDICINE

## 2023-12-20 PROCEDURE — 86976 RBC SERUM PRETX ID DILUTION: CPT

## 2023-12-20 PROCEDURE — 86922 COMPATIBILITY TEST ANTIGLOB: CPT

## 2023-12-20 PROCEDURE — 86850 RBC ANTIBODY SCREEN: CPT

## 2023-12-20 PROCEDURE — 86901 BLOOD TYPING SEROLOGIC RH(D): CPT | Performed by: INTERNAL MEDICINE

## 2023-12-20 PROCEDURE — 80053 COMPREHEN METABOLIC PANEL: CPT | Performed by: INTERNAL MEDICINE

## 2023-12-20 PROCEDURE — 63710000001 ATORVASTATIN 10 MG TABLET: Performed by: INTERNAL MEDICINE

## 2023-12-20 PROCEDURE — 85027 COMPLETE CBC AUTOMATED: CPT | Performed by: INTERNAL MEDICINE

## 2023-12-20 PROCEDURE — 86906 BLD TYPING SEROLOGIC RH PHNT: CPT

## 2023-12-20 PROCEDURE — 86870 RBC ANTIBODY IDENTIFICATION: CPT

## 2023-12-20 RX ORDER — ATORVASTATIN CALCIUM 10 MG/1
10 TABLET, FILM COATED ORAL NIGHTLY
Status: DISCONTINUED | OUTPATIENT
Start: 2023-12-20 | End: 2023-12-22 | Stop reason: HOSPADM

## 2023-12-20 RX ORDER — LENALIDOMIDE 25 MG/1
25 CAPSULE ORAL DAILY
COMMUNITY
Start: 2023-12-13 | End: 2027-01-27

## 2023-12-20 RX ORDER — ONDANSETRON 2 MG/ML
4 INJECTION INTRAMUSCULAR; INTRAVENOUS EVERY 6 HOURS PRN
Status: DISCONTINUED | OUTPATIENT
Start: 2023-12-20 | End: 2023-12-22 | Stop reason: HOSPADM

## 2023-12-20 RX ORDER — SODIUM CHLORIDE 9 MG/ML
40 INJECTION, SOLUTION INTRAVENOUS AS NEEDED
Status: CANCELLED | OUTPATIENT
Start: 2023-12-20

## 2023-12-20 RX ORDER — POLYETHYLENE GLYCOL 3350 17 G/17G
17 POWDER, FOR SOLUTION ORAL DAILY PRN
Status: DISCONTINUED | OUTPATIENT
Start: 2023-12-20 | End: 2023-12-22 | Stop reason: HOSPADM

## 2023-12-20 RX ORDER — AMOXICILLIN 250 MG
2 CAPSULE ORAL 2 TIMES DAILY
Status: CANCELLED | OUTPATIENT
Start: 2023-12-20

## 2023-12-20 RX ORDER — POLYETHYLENE GLYCOL 3350 17 G/17G
17 POWDER, FOR SOLUTION ORAL DAILY PRN
Status: CANCELLED | OUTPATIENT
Start: 2023-12-20

## 2023-12-20 RX ORDER — BISACODYL 10 MG
10 SUPPOSITORY, RECTAL RECTAL DAILY PRN
Status: CANCELLED | OUTPATIENT
Start: 2023-12-20

## 2023-12-20 RX ORDER — TAMSULOSIN HYDROCHLORIDE 0.4 MG/1
0.8 CAPSULE ORAL DAILY
Status: DISCONTINUED | OUTPATIENT
Start: 2023-12-20 | End: 2023-12-22 | Stop reason: HOSPADM

## 2023-12-20 RX ORDER — BISACODYL 10 MG
10 SUPPOSITORY, RECTAL RECTAL DAILY PRN
Status: DISCONTINUED | OUTPATIENT
Start: 2023-12-20 | End: 2023-12-22 | Stop reason: HOSPADM

## 2023-12-20 RX ORDER — BISACODYL 5 MG/1
5 TABLET, DELAYED RELEASE ORAL DAILY PRN
Status: DISCONTINUED | OUTPATIENT
Start: 2023-12-20 | End: 2023-12-22 | Stop reason: HOSPADM

## 2023-12-20 RX ORDER — DEXAMETHASONE 4 MG/1
20 TABLET ORAL TAKE AS DIRECTED
COMMUNITY
Start: 2023-12-05

## 2023-12-20 RX ORDER — AMOXICILLIN 250 MG
2 CAPSULE ORAL 2 TIMES DAILY
Status: DISCONTINUED | OUTPATIENT
Start: 2023-12-20 | End: 2023-12-22 | Stop reason: HOSPADM

## 2023-12-20 RX ORDER — ACETAMINOPHEN 325 MG/1
650 TABLET ORAL EVERY 4 HOURS PRN
Status: DISCONTINUED | OUTPATIENT
Start: 2023-12-20 | End: 2023-12-22 | Stop reason: HOSPADM

## 2023-12-20 RX ORDER — ACETAMINOPHEN 325 MG/1
650 TABLET ORAL EVERY 4 HOURS PRN
Status: CANCELLED | OUTPATIENT
Start: 2023-12-20

## 2023-12-20 RX ORDER — ALUMINA, MAGNESIA, AND SIMETHICONE 2400; 2400; 240 MG/30ML; MG/30ML; MG/30ML
15 SUSPENSION ORAL EVERY 6 HOURS PRN
Status: CANCELLED | OUTPATIENT
Start: 2023-12-20

## 2023-12-20 RX ORDER — SODIUM CHLORIDE 0.9 % (FLUSH) 0.9 %
10 SYRINGE (ML) INJECTION EVERY 12 HOURS SCHEDULED
Status: CANCELLED | OUTPATIENT
Start: 2023-12-20

## 2023-12-20 RX ORDER — SODIUM CHLORIDE 9 MG/ML
40 INJECTION, SOLUTION INTRAVENOUS AS NEEDED
Status: DISCONTINUED | OUTPATIENT
Start: 2023-12-20 | End: 2023-12-22 | Stop reason: HOSPADM

## 2023-12-20 RX ORDER — ALBUTEROL SULFATE 2.5 MG/3ML
2.5 SOLUTION RESPIRATORY (INHALATION) EVERY 6 HOURS PRN
Status: DISCONTINUED | OUTPATIENT
Start: 2023-12-20 | End: 2023-12-22 | Stop reason: HOSPADM

## 2023-12-20 RX ORDER — SODIUM CHLORIDE 0.9 % (FLUSH) 0.9 %
10 SYRINGE (ML) INJECTION AS NEEDED
Status: DISCONTINUED | OUTPATIENT
Start: 2023-12-20 | End: 2023-12-22 | Stop reason: HOSPADM

## 2023-12-20 RX ORDER — CHOLECALCIFEROL (VITAMIN D3) 125 MCG
1000 CAPSULE ORAL DAILY
Status: DISCONTINUED | OUTPATIENT
Start: 2023-12-20 | End: 2023-12-22 | Stop reason: HOSPADM

## 2023-12-20 RX ORDER — BISACODYL 5 MG/1
5 TABLET, DELAYED RELEASE ORAL DAILY PRN
Status: CANCELLED | OUTPATIENT
Start: 2023-12-20

## 2023-12-20 RX ORDER — ONDANSETRON 2 MG/ML
4 INJECTION INTRAMUSCULAR; INTRAVENOUS EVERY 6 HOURS PRN
Status: CANCELLED | OUTPATIENT
Start: 2023-12-20

## 2023-12-20 RX ORDER — FOLIC ACID 1 MG/1
1 TABLET ORAL DAILY
Status: DISCONTINUED | OUTPATIENT
Start: 2023-12-20 | End: 2023-12-22 | Stop reason: HOSPADM

## 2023-12-20 RX ORDER — SODIUM CHLORIDE 0.9 % (FLUSH) 0.9 %
10 SYRINGE (ML) INJECTION AS NEEDED
Status: CANCELLED | OUTPATIENT
Start: 2023-12-20

## 2023-12-20 RX ORDER — SODIUM CHLORIDE 0.9 % (FLUSH) 0.9 %
10 SYRINGE (ML) INJECTION EVERY 12 HOURS SCHEDULED
Status: DISCONTINUED | OUTPATIENT
Start: 2023-12-20 | End: 2023-12-22 | Stop reason: HOSPADM

## 2023-12-20 RX ORDER — DEXAMETHASONE 4 MG/1
20 TABLET ORAL TAKE AS DIRECTED
Status: DISCONTINUED | OUTPATIENT
Start: 2023-12-20 | End: 2023-12-20

## 2023-12-20 RX ORDER — ALUMINA, MAGNESIA, AND SIMETHICONE 2400; 2400; 240 MG/30ML; MG/30ML; MG/30ML
15 SUSPENSION ORAL EVERY 6 HOURS PRN
Status: DISCONTINUED | OUTPATIENT
Start: 2023-12-20 | End: 2023-12-22 | Stop reason: HOSPADM

## 2023-12-20 RX ADMIN — CYANOCOBALAMIN TAB 500 MCG 1000 MCG: 500 TAB at 17:06

## 2023-12-20 RX ADMIN — TAMSULOSIN HYDROCHLORIDE 0.8 MG: 0.4 CAPSULE ORAL at 17:06

## 2023-12-20 RX ADMIN — Medication 10 ML: at 12:02

## 2023-12-20 RX ADMIN — ATORVASTATIN CALCIUM 10 MG: 10 TABLET, FILM COATED ORAL at 20:39

## 2023-12-20 RX ADMIN — SODIUM CHLORIDE 250 MG: 9 INJECTION, SOLUTION INTRAVENOUS at 17:06

## 2023-12-20 RX ADMIN — Medication 10 ML: at 20:39

## 2023-12-20 RX ADMIN — FOLIC ACID 1 MG: 1 TABLET ORAL at 17:06

## 2023-12-20 NOTE — PLAN OF CARE
Goal Outcome Evaluation:      Patient waiting on blood bank to get PRBC's related to antibody identification, no complaints of pain or discomfort, vitals stable.

## 2023-12-21 LAB
ALBUMIN SERPL-MCNC: 2.7 G/DL (ref 3.5–5.2)
ALBUMIN/GLOB SERPL: 1 G/DL
ALP SERPL-CCNC: 63 U/L (ref 39–117)
ALT SERPL W P-5'-P-CCNC: 12 U/L (ref 1–41)
ANION GAP SERPL CALCULATED.3IONS-SCNC: 9.1 MMOL/L (ref 5–15)
AST SERPL-CCNC: 11 U/L (ref 1–40)
BASOPHILS # BLD AUTO: 0.01 10*3/MM3 (ref 0–0.2)
BASOPHILS NFR BLD AUTO: 0.2 % (ref 0–1.5)
BILIRUB SERPL-MCNC: 0.2 MG/DL (ref 0–1.2)
BUN SERPL-MCNC: 24 MG/DL (ref 8–23)
BUN/CREAT SERPL: 12.6 (ref 7–25)
CALCIUM SPEC-SCNC: 7.4 MG/DL (ref 8.6–10.5)
CHLORIDE SERPL-SCNC: 104 MMOL/L (ref 98–107)
CO2 SERPL-SCNC: 20.9 MMOL/L (ref 22–29)
CREAT SERPL-MCNC: 1.9 MG/DL (ref 0.76–1.27)
DEPRECATED RDW RBC AUTO: 62.9 FL (ref 37–54)
DEPRECATED RDW RBC AUTO: 66.4 FL (ref 37–54)
EGFRCR SERPLBLD CKD-EPI 2021: 35.9 ML/MIN/1.73
EOSINOPHIL # BLD AUTO: 0.09 10*3/MM3 (ref 0–0.4)
EOSINOPHIL NFR BLD AUTO: 1.4 % (ref 0.3–6.2)
ERYTHROCYTE [DISTWIDTH] IN BLOOD BY AUTOMATED COUNT: 17.3 % (ref 12.3–15.4)
ERYTHROCYTE [DISTWIDTH] IN BLOOD BY AUTOMATED COUNT: 19.3 % (ref 12.3–15.4)
GLOBULIN UR ELPH-MCNC: 2.8 GM/DL
GLUCOSE SERPL-MCNC: 160 MG/DL (ref 65–99)
HCT VFR BLD AUTO: 18.9 % (ref 37.5–51)
HCT VFR BLD AUTO: 26.3 % (ref 37.5–51)
HGB BLD-MCNC: 6.1 G/DL (ref 13–17.7)
HGB BLD-MCNC: 8.5 G/DL (ref 13–17.7)
IMM GRANULOCYTES # BLD AUTO: 0.06 10*3/MM3 (ref 0–0.05)
IMM GRANULOCYTES NFR BLD AUTO: 0.9 % (ref 0–0.5)
INR PPP: 1.27 (ref 0.86–1.15)
LYMPHOCYTES # BLD AUTO: 0.98 10*3/MM3 (ref 0.7–3.1)
LYMPHOCYTES NFR BLD AUTO: 15.1 % (ref 19.6–45.3)
MCH RBC QN AUTO: 30.6 PG (ref 26.6–33)
MCH RBC QN AUTO: 31.9 PG (ref 26.6–33)
MCHC RBC AUTO-ENTMCNC: 32.3 G/DL (ref 31.5–35.7)
MCHC RBC AUTO-ENTMCNC: 32.3 G/DL (ref 31.5–35.7)
MCV RBC AUTO: 94.6 FL (ref 79–97)
MCV RBC AUTO: 99 FL (ref 79–97)
MONOCYTES # BLD AUTO: 0.47 10*3/MM3 (ref 0.1–0.9)
MONOCYTES NFR BLD AUTO: 7.2 % (ref 5–12)
NEUTROPHILS NFR BLD AUTO: 4.9 10*3/MM3 (ref 1.7–7)
NEUTROPHILS NFR BLD AUTO: 75.2 % (ref 42.7–76)
NRBC BLD AUTO-RTO: 0 /100 WBC (ref 0–0.2)
PASSIVE ANTI-CD-38: NORMAL
PLATELET # BLD AUTO: 146 10*3/MM3 (ref 140–450)
PLATELET # BLD AUTO: 163 10*3/MM3 (ref 140–450)
PMV BLD AUTO: 10.5 FL (ref 6–12)
PMV BLD AUTO: 10.9 FL (ref 6–12)
POTASSIUM SERPL-SCNC: 4.4 MMOL/L (ref 3.5–5.2)
PROT SERPL-MCNC: 5.5 G/DL (ref 6–8.5)
PROTHROMBIN TIME: 16.2 SECONDS (ref 11.8–14.9)
RBC # BLD AUTO: 1.91 10*6/MM3 (ref 4.14–5.8)
RBC # BLD AUTO: 2.78 10*6/MM3 (ref 4.14–5.8)
SODIUM SERPL-SCNC: 134 MMOL/L (ref 136–145)
WBC NRBC COR # BLD AUTO: 6.05 10*3/MM3 (ref 3.4–10.8)
WBC NRBC COR # BLD AUTO: 6.51 10*3/MM3 (ref 3.4–10.8)

## 2023-12-21 PROCEDURE — 63710000001 TAMSULOSIN 0.4 MG CAPSULE: Performed by: INTERNAL MEDICINE

## 2023-12-21 PROCEDURE — 25010000002 NA FERRIC GLUC CPLX PER 12.5 MG: Performed by: INTERNAL MEDICINE

## 2023-12-21 PROCEDURE — 25810000003 SODIUM CHLORIDE 0.9 % SOLUTION: Performed by: INTERNAL MEDICINE

## 2023-12-21 PROCEDURE — G0378 HOSPITAL OBSERVATION PER HR: HCPCS

## 2023-12-21 PROCEDURE — 86902 BLOOD TYPE ANTIGEN DONOR EA: CPT

## 2023-12-21 PROCEDURE — 80053 COMPREHEN METABOLIC PANEL: CPT | Performed by: INTERNAL MEDICINE

## 2023-12-21 PROCEDURE — 63710000001 FOLIC ACID 1 MG TABLET: Performed by: INTERNAL MEDICINE

## 2023-12-21 PROCEDURE — 85027 COMPLETE CBC AUTOMATED: CPT | Performed by: INTERNAL MEDICINE

## 2023-12-21 PROCEDURE — A9270 NON-COVERED ITEM OR SERVICE: HCPCS | Performed by: INTERNAL MEDICINE

## 2023-12-21 PROCEDURE — P9040 RBC LEUKOREDUCED IRRADIATED: HCPCS

## 2023-12-21 PROCEDURE — 85025 COMPLETE CBC W/AUTO DIFF WBC: CPT | Performed by: INTERNAL MEDICINE

## 2023-12-21 PROCEDURE — 85610 PROTHROMBIN TIME: CPT | Performed by: INTERNAL MEDICINE

## 2023-12-21 PROCEDURE — 63710000001 ATORVASTATIN 10 MG TABLET: Performed by: INTERNAL MEDICINE

## 2023-12-21 PROCEDURE — 36430 TRANSFUSION BLD/BLD COMPNT: CPT

## 2023-12-21 PROCEDURE — 86900 BLOOD TYPING SEROLOGIC ABO: CPT

## 2023-12-21 PROCEDURE — 63710000001 VITAMIN B-12 500 MCG TABLET: Performed by: INTERNAL MEDICINE

## 2023-12-21 RX ADMIN — ATORVASTATIN CALCIUM 10 MG: 10 TABLET, FILM COATED ORAL at 21:13

## 2023-12-21 RX ADMIN — SODIUM CHLORIDE 250 MG: 9 INJECTION, SOLUTION INTRAVENOUS at 22:16

## 2023-12-21 RX ADMIN — CYANOCOBALAMIN TAB 500 MCG 1000 MCG: 500 TAB at 09:03

## 2023-12-21 RX ADMIN — FOLIC ACID 1 MG: 1 TABLET ORAL at 09:03

## 2023-12-21 RX ADMIN — TAMSULOSIN HYDROCHLORIDE 0.8 MG: 0.4 CAPSULE ORAL at 09:03

## 2023-12-21 RX ADMIN — Medication 10 ML: at 09:04

## 2023-12-21 RX ADMIN — Medication 10 ML: at 21:13

## 2023-12-21 NOTE — PROGRESS NOTES
[facility]     Progress Note    Patient Name: Blair Lira  : 1945  MRN: [id number]  Primary Care Physician:  Babs Summers APRN  Date of admission: 2023    Subjective   Subjective     Chief Complaint: Patient waiting for getting crossmatched blood to be available because he has been treated with Darzalex so he has antibody positive he probably will need more units of blood because his hemoglobin has dropped down to 6.1    HPI: Patient with anemia with multiple myeloma postchemotherapy and immunotherapy treatment    Review of Systems   All systems were reviewed and negative except for: Has been reviewed    Objective   Objective     Vitals:   Temp:  [97.9 °F (36.6 °C)-98.4 °F (36.9 °C)] 97.9 °F (36.6 °C)  Heart Rate:  [68-86] 75  Resp:  [18-20] 20  BP: (124-139)/(57-70) 131/61    Physical Exam    Constitutional: Awake, alert   Eyes: PERRLA, sclerae anicteric, no conjunctival injection   HENT: NCAT, mucous membranes moist   Neck: Supple, no thyromegaly, no lymphadenopathy, trachea midline   Respiratory: Clear to auscultation bilaterally, nonlabored respirations    Cardiovascular: RRR, no murmurs, rubs, or gallops, palpable pedal pulses bilaterally   Gastrointestinal: Positive bowel sounds, soft, nontender, nondistended   Musculoskeletal: No bilateral ankle edema, no clubbing or cyanosis to extremities   Psychiatric: Appropriate affect, cooperative   Neurologic: Oriented x 3, strength symmetric in all extremities, Cranial Nerves grossly intact to confrontation, speech clear   Skin: No rashes   No change  Result Review    Result Review:  I have personally reviewed the results from the time of this admission to 2023 07:57 EST and agree with these findings:  [x]  Laboratory  []  Microbiology  []  Radiology  []  EKG/Telemetry   []  Cardiology/Vascular   []  Pathology  []  Old records  []  Other:  Most notable findings include: CBC has been dropping down with hemoglobin will probably need more  treatment    Assessment & Plan   Assessment / Plan     Brief Patient Summary:  Blair Lira is a 78 y.o. male who stable myeloma postchemotherapy anemia    Active Hospital Problems:  Active Hospital Problems    Diagnosis     **Anemia        Plan:   Transfusion of blood today    DVT prophylaxis:  Mechanical DVT prophylaxis orders are present.    CODE STATUS:   Level Of Support Discussed With: Patient  Code Status (Patient has no pulse and is not breathing): CPR (Attempt to Resuscitate)  Medical Interventions (Patient has pulse or is breathing): Full Support    Disposition:  I expect patient to be discharged after the blood transfusion probably will require more transfusions.    Electronically signed by Vamsi Mason MD, 12/21/23, 7:57 AM EST.      Part of this note may be an electronic transcription/translation of spoken language to printed text using the Dragon Dictation System.

## 2023-12-21 NOTE — SIGNIFICANT NOTE
12/21/23 1215   Coping/Psychosocial   Observed Emotional State calm;cooperative   Verbalized Emotional State relief;hopefulness   Trust Relationship/Rapport empathic listening provided   Involvement in Care interacting with patient   Additional Documentation Spiritual Care (Group)   Spiritual Care   Use of Spiritual Resources non-Church use of spiritual care   Spiritual Care Source  initiative   Spiritual Care Follow-Up follow-up, none required as presently assessed   Response to Spiritual Care receptive of support;engaged in conversation   Spiritual Care Interventions supportive conversation provided   Spiritual Care Visit Type initial   Receptivity to Spiritual Care visit welcomed

## 2023-12-21 NOTE — PLAN OF CARE
Goal Outcome Evaluation:  Plan of Care Reviewed With: patient        Progress: improving  Outcome Evaluation: Pt pallor improving after 1 unit of PRBC's, 2nd unit currently infusing. CBC ordered for post infusion per provider for possible 2 more units depending on lab results. Pt has had no pain this shift. Pt tolerating blood transfusion, no reaction noted. Pt is in no apparent distress at this time, denies any needs currently, call light in reach. Will pass on to night shift about labs and possible transfusions needed based on results.

## 2023-12-22 ENCOUNTER — READMISSION MANAGEMENT (OUTPATIENT)
Dept: CALL CENTER | Facility: HOSPITAL | Age: 78
End: 2023-12-22
Payer: MEDICARE

## 2023-12-22 VITALS
OXYGEN SATURATION: 96 % | DIASTOLIC BLOOD PRESSURE: 69 MMHG | SYSTOLIC BLOOD PRESSURE: 118 MMHG | TEMPERATURE: 98.7 F | HEART RATE: 70 BPM | RESPIRATION RATE: 20 BRPM

## 2023-12-22 LAB
ALBUMIN SERPL-MCNC: 2.5 G/DL (ref 3.5–5.2)
ALBUMIN/GLOB SERPL: 0.8 G/DL
ALP SERPL-CCNC: 64 U/L (ref 39–117)
ALT SERPL W P-5'-P-CCNC: 60 U/L (ref 1–41)
ANION GAP SERPL CALCULATED.3IONS-SCNC: 9.9 MMOL/L (ref 5–15)
AST SERPL-CCNC: 48 U/L (ref 1–40)
BASOPHILS # BLD AUTO: 0.02 10*3/MM3 (ref 0–0.2)
BASOPHILS NFR BLD AUTO: 0.4 % (ref 0–1.5)
BILIRUB SERPL-MCNC: 0.5 MG/DL (ref 0–1.2)
BUN SERPL-MCNC: 25 MG/DL (ref 8–23)
BUN/CREAT SERPL: 12.4 (ref 7–25)
CALCIUM SPEC-SCNC: 7.3 MG/DL (ref 8.6–10.5)
CHLORIDE SERPL-SCNC: 107 MMOL/L (ref 98–107)
CO2 SERPL-SCNC: 21.1 MMOL/L (ref 22–29)
CREAT SERPL-MCNC: 2.01 MG/DL (ref 0.76–1.27)
DEPRECATED RDW RBC AUTO: 69.3 FL (ref 37–54)
EGFRCR SERPLBLD CKD-EPI 2021: 33.5 ML/MIN/1.73
EOSINOPHIL # BLD AUTO: 0.09 10*3/MM3 (ref 0–0.4)
EOSINOPHIL NFR BLD AUTO: 1.7 % (ref 0.3–6.2)
ERYTHROCYTE [DISTWIDTH] IN BLOOD BY AUTOMATED COUNT: 20.2 % (ref 12.3–15.4)
GLOBULIN UR ELPH-MCNC: 3.2 GM/DL
GLUCOSE SERPL-MCNC: 113 MG/DL (ref 65–99)
HCT VFR BLD AUTO: 26 % (ref 37.5–51)
HGB BLD-MCNC: 8.4 G/DL (ref 13–17.7)
IMM GRANULOCYTES # BLD AUTO: 0.06 10*3/MM3 (ref 0–0.05)
IMM GRANULOCYTES NFR BLD AUTO: 1.1 % (ref 0–0.5)
LYMPHOCYTES # BLD AUTO: 0.82 10*3/MM3 (ref 0.7–3.1)
LYMPHOCYTES NFR BLD AUTO: 15.6 % (ref 19.6–45.3)
MCH RBC QN AUTO: 30.7 PG (ref 26.6–33)
MCHC RBC AUTO-ENTMCNC: 32.3 G/DL (ref 31.5–35.7)
MCV RBC AUTO: 94.9 FL (ref 79–97)
MONOCYTES # BLD AUTO: 0.45 10*3/MM3 (ref 0.1–0.9)
MONOCYTES NFR BLD AUTO: 8.5 % (ref 5–12)
NEUTROPHILS NFR BLD AUTO: 3.83 10*3/MM3 (ref 1.7–7)
NEUTROPHILS NFR BLD AUTO: 72.7 % (ref 42.7–76)
NRBC BLD AUTO-RTO: 0 /100 WBC (ref 0–0.2)
PLAT MORPH BLD: NORMAL
PLATELET # BLD AUTO: 163 10*3/MM3 (ref 140–450)
PMV BLD AUTO: 10.9 FL (ref 6–12)
POTASSIUM SERPL-SCNC: 4 MMOL/L (ref 3.5–5.2)
PROT SERPL-MCNC: 5.7 G/DL (ref 6–8.5)
RBC # BLD AUTO: 2.74 10*6/MM3 (ref 4.14–5.8)
RBC MORPH BLD: NORMAL
SODIUM SERPL-SCNC: 138 MMOL/L (ref 136–145)
WBC MORPH BLD: NORMAL
WBC NRBC COR # BLD AUTO: 5.27 10*3/MM3 (ref 3.4–10.8)

## 2023-12-22 PROCEDURE — A9270 NON-COVERED ITEM OR SERVICE: HCPCS | Performed by: INTERNAL MEDICINE

## 2023-12-22 PROCEDURE — 63710000001 FOLIC ACID 1 MG TABLET: Performed by: INTERNAL MEDICINE

## 2023-12-22 PROCEDURE — 63710000001 TAMSULOSIN 0.4 MG CAPSULE: Performed by: INTERNAL MEDICINE

## 2023-12-22 PROCEDURE — G0378 HOSPITAL OBSERVATION PER HR: HCPCS

## 2023-12-22 PROCEDURE — 63710000001 VITAMIN B-12 500 MCG TABLET: Performed by: INTERNAL MEDICINE

## 2023-12-22 PROCEDURE — 85007 BL SMEAR W/DIFF WBC COUNT: CPT | Performed by: INTERNAL MEDICINE

## 2023-12-22 PROCEDURE — 80053 COMPREHEN METABOLIC PANEL: CPT | Performed by: INTERNAL MEDICINE

## 2023-12-22 PROCEDURE — 85025 COMPLETE CBC W/AUTO DIFF WBC: CPT | Performed by: INTERNAL MEDICINE

## 2023-12-22 PROCEDURE — 97166 OT EVAL MOD COMPLEX 45 MIN: CPT

## 2023-12-22 RX ADMIN — CYANOCOBALAMIN TAB 500 MCG 1000 MCG: 500 TAB at 09:24

## 2023-12-22 RX ADMIN — TAMSULOSIN HYDROCHLORIDE 0.8 MG: 0.4 CAPSULE ORAL at 09:24

## 2023-12-22 RX ADMIN — Medication 10 ML: at 09:24

## 2023-12-22 RX ADMIN — FOLIC ACID 1 MG: 1 TABLET ORAL at 09:24

## 2023-12-22 NOTE — PLAN OF CARE
Goal Outcome Evaluation:  Plan of Care Reviewed With: patient        Progress: improving  Outcome Evaluation: second unit of blood finished without reaction. hgb up to 8.5 after transfusion, notified dr palomino. iv iron given per order. pt rested well

## 2023-12-22 NOTE — PLAN OF CARE
Goal Outcome Evaluation:  Plan of Care Reviewed With: patient        Progress: improving  Outcome Evaluation: Pt hgb has improved since receiving 2 units on 12/21/2023. Provider confident with pt discharge at this time. Pt states he feels better, pallor has improved. Pt had no c/o pain this shift. Pt is awaiting personal ride for discharge. Pt is in no apparent distress at this time, denies any needs currently, call light in reach.

## 2023-12-22 NOTE — SIGNIFICANT NOTE
Pt refused wheelchair at discharge. Ambulated well with cane. Pt was accompanied by family. Pt had all personal belongings and was in no apparent distress at time of discharge.

## 2023-12-22 NOTE — THERAPY EVALUATION
Patient Name: Blair Lira  : 1945    MRN: [id number]                              Today's Date: 2023       Admit Date: 2023    Visit Dx:     ICD-10-CM ICD-9-CM   1. Decreased activities of daily living (ADL)  Z78.9 V49.89     Patient Active Problem List   Diagnosis    Rotator cuff tear, right    Impingement syndrome of right shoulder    Acute renal failure superimposed on chronic kidney disease    Pancytopenia    Abnormal weight loss    Hypertension    CKD (chronic kidney disease) stage 3, GFR 30-59 ml/min    Moderate malnutrition    Weakness generalized    Intractable pain    Multiple myeloma    Edema    Chronic diastolic (congestive) heart failure    Acute on chronic HFrEF (heart failure with reduced ejection fraction)    Chest pain    Syncope    COVID-19 virus infection    Chronic kidney disease, stage IV (severe)    Hypotension    Anemia     Past Medical History:   Diagnosis Date    BPH (benign prostatic hyperplasia)     Cancer     Chronic kidney disease     Frozen shoulder     Hyperlipidemia     Hypertension 10/26/2023    Periarthritis of shoulder     Rotator cuff disorder, right     Tennis elbow     RIGHT     Past Surgical History:   Procedure Laterality Date    CATARACT EXTRACTION EXTRACAPSULAR W/ INTRAOCULAR LENS IMPLANTATION Bilateral     COLONOSCOPY      JOINT REPLACEMENT Right     THR    SHOULDER ARTHROSCOPY W/ ROTATOR CUFF REPAIR Right 3/29/2023    Procedure: SHOULDER ARTHROSCOPY WITH ROTATOR CUFF REPAIR, SUBCROMIAL DECOMPRESSION,DISTAL CLAVICLE RESECTION;  Surgeon: Gustavo Samuels MD;  [place] [facility] [place] [facility] [place]  Service: Orthopedics;  Laterality: Right;    SHOULDER SURGERY Left     SCOPE      General Information       Row Name 23 0854          OT Time and Intention    Document Type evaluation  -EG     Mode of Treatment individual therapy;occupational therapy  -EG       Row Name 23 0854          General Information    Patient Profile Reviewed yes  Ind at home with  "ADL/IADL's; supportive family grandson drives him everywhere; Quad cane for mobility; Walk-in shower with seat; stands to groom; no home O2 use  -EG     Existing Precautions/Restrictions fall  -EG     Barriers to Rehab none identified  -EG       Row Name 12/22/23 0854          Occupational Profile    Reason for Services/Referral (Occupational Profile) Patient is a 78-year-old male admitted to [facility] on 12/20/2023.  OT was consulted due to anemia with renal failure secondary to multiple myeloma treatment.  OT to assess for new onset of deficits and limitations in ADL/transfer performance.  No previous OT services for current condition.  -EG     Patient Goals (Occupational Profile) \"Be home for rene\"  -EG       Row Name 12/22/23 0854          Living Environment    People in Home alone  -EG       Row Name 12/22/23 0854          Home Main Entrance    Number of Stairs, Main Entrance one  -EG     Stair Railings, Main Entrance none  -EG       Row Name 12/22/23 0854          Stairs Within Home, Primary    Stair Railings, Within Home, Primary none  -EG       Row Name 12/22/23 0854          Cognition    Orientation Status (Cognition) oriented x 4  -EG       Row Name 12/22/23 0854          Safety Issues, Functional Mobility    Impairments Affecting Function (Mobility) other (see comments)  no new onset of deficits eval only  -EG               User Key  (r) = Recorded By, (t) = Taken By, (c) = Cosigned By      Initials Name Provider Type    EG Ann Dasilva, OT Occupational Therapist                     Mobility/ADL's       Row Name 12/22/23 0857          Bed Mobility    Bed Mobility bed mobility (all) activities  -EG     All Activities, [place] (Bed Mobility) independent  -EG       Row Name 12/22/23 0857          Transfers    Comment, (Transfers) independent with all transfers in room; mod(I) if a bad day and wants to use cane; did not use an AD on eval in room  -EG       Row Name 12/22/23 0857    "       Functional Mobility    Functional Mobility- Ind. Level conditional independence  -EG     Functional Mobility- Comment Mobility performed around room to and from bathroom without use of an AD this am on evaluation; no LOB and no reports of pain  -EG       Row Name 12/22/23 0857          Activities of Daily Living    BADL Assessment/Intervention bathing;upper body dressing;lower body dressing;grooming;feeding;toileting  -EG       [facility] Name 12/22/23 0857          Bathing Assessment/Intervention    [place] Level (Bathing) bathing skills;upper body;lower body;supervision  -EG       Row Name 12/22/23 0857          Upper Body Dressing Assessment/Training    [place] Level (Upper Body Dressing) upper body dressing skills;modified independence  -EG       Row Name 12/22/23 0857          Lower Body Dressing Assessment/Training    [place] Level (Lower Body Dressing) lower body dressing skills;modified independence  -EG       Row Name 12/22/23 0857          Grooming Assessment/Training    [place] Level (Grooming) grooming skills;independent  -EG       [facility] Name 12/22/23 0857          Self-Feeding Assessment/Training    [place] Level (Feeding) feeding skills;set up  -EG       Row Name 12/22/23 0857          Toileting Assessment/Training    [place] Level (Toileting) toileting skills;modified independence  -EG               User Key  (r) = Recorded By, (t) = Taken By, (c) = Cosigned By      Initials Name Provider Type    EG Ann Dasilva OT Occupational Therapist                   Obj/Interventions       Row Name 12/22/23 0858          Sensory Assessment (Somatosensory)    Sensory Assessment (Somatosensory) sensation intact  -EG       [facility] Name 12/22/23 0858          Vision Assessment/Intervention    Visual Impairment/Limitations WFL  -EG       [facility] Name 12/22/23 0858          Range of Motion Comprehensive    General Range of Motion bilateral upper extremity ROM WNL  -EG       [facility] Name 12/22/23  0858          Strength Comprehensive (MMT)    Comment, General Manual Muscle Testing (MMT) Assessment BUE's grossly 4/5  -EG       Row Name 12/22/23 0858          Motor Skills    Motor Skills coordination;functional endurance  -EG     Coordination WFL  -EG     Functional Endurance fair on room air  -EG       Row Name 12/22/23 0858          Balance    Balance Assessment sit to stand dynamic balance;standing static balance  -EG     Sit to Stand Dynamic Balance independent  -EG     Static Standing Balance independent  -EG               User Key  (r) = Recorded By, (t) = Taken By, (c) = Cosigned By      Initials Name Provider Type    EG Ann Dasilva, ROSA M Occupational Therapist                   Goals/Plan    No documentation.                  Clinical Impression       Row Name 12/22/23 0859          Pain Assessment    Pretreatment Pain Rating 0/10 - no pain  -EG     Posttreatment Pain Rating 0/10 - no pain  -EG       Row Name 12/22/23 0859          Plan of Care Review    Plan of Care Reviewed With patient  -EG     Progress no change  -EG     Outcome Evaluation Patient displays no change in performance skills for ADL/Transfers; no need for continuation of skilled services past evaluation this date; Safe to discharge home with medically cleared by doctor.  -EG       Row Name 12/22/23 0859          Therapy Assessment/Plan (OT)    Rehab Potential (OT) other (see comments)  eval only  -EG     Criteria for Skilled Therapeutic Interventions Met (OT) does not meet criteria for skilled intervention  -EG     Therapy Frequency (OT) evaluation only  -EG       Row Name 12/22/23 0859          Therapy Plan Review/Discharge Plan (OT)    Anticipated Discharge Disposition (OT) home with home health  -EG               User Key  (r) = Recorded By, (t) = Taken By, (c) = Cosigned By      Initials Name Provider Type    Ann Son, ROSA M Occupational Therapist                   Outcome Measures       Row Name 12/22/23 0901           How much help from another is currently needed...    Putting on and taking off regular lower body clothing? 4  -EG     Bathing (including washing, rinsing, and drying) 4  -EG     Toileting (which includes using toilet bed pan or urinal) 4  -EG     Putting on and taking off regular upper body clothing 4  -EG     Taking care of personal grooming (such as brushing teeth) 4  -EG     Eating meals 4  -EG     AM-PAC 6 Clicks Score (OT) 24  -EG       Row Name 12/22/23 0716 12/22/23 0033       How much help from another person do you currently need...    Turning from your back to your side while in flat bed without using bedrails? 4  -BB 4  -AH    Moving from lying on back to sitting on the side of a flat bed without bedrails? 4  -BB 4  -AH    Moving to and from a bed to a chair (including a wheelchair)? 4  -BB 4  -AH    Standing up from a chair using your arms (e.g., wheelchair, bedside chair)? 4  -BB 4  -AH    Climbing 3-5 steps with a railing? 4  -BB 4  -AH    To walk in hospital room? 4  -BB 4  -AH    AM-PAC 6 Clicks Score (PT) 24  -BB 24  -AH    Highest Level of Mobility Goal 8 --> Walked 250 feet or more  -BB 8 --> Walked 250 feet or more  -AH      Row Name 12/22/23 0901          Functional Assessment    Outcome Measure Options AM-PAC 6 Clicks Daily Activity (OT);Optimal Instrument  -EG       Row Name 12/22/23 0901          Optimal Instrument    Optimal Instrument Optimal - 3  -EG     Bending/Stooping 1  -EG     Standing 1  -EG     Reaching 1  -EG     From the list, choose the 3 activities you would most like to be able to do without any difficulty Standing;Reaching;Bending/stooping  -EG     Total Score Optimal - 3 3  -EG               User Key  (r) = Recorded By, (t) = Taken By, (c) = Cosigned By      Initials Name Provider Type    Padmini Cardona, RN Registered Nurse    Terrie Hawley, RN Registered Nurse    Ann Son, OT Occupational Therapist                      OT Recommendation and  Plan  Therapy Frequency (OT): evaluation only  Plan of Care Review  Plan of Care Reviewed With: patient  Progress: no change  Outcome Evaluation: Patient displays no change in performance skills for ADL/Transfers; no need for continuation of skilled services past evaluation this date; Safe to discharge home with medically cleared by doctor.     Time Calculation:   Evaluation Complexity (OT)  Review Occupational Profile/Medical/Therapy History Complexity: expanded/moderate complexity  Assessment, Occupational Performance/Identification of Deficit Complexity: 3-5 performance deficits  Clinical Decision Making Complexity (OT): detailed assessment/moderate complexity  Overall Complexity of Evaluation (OT): moderate complexity     Time Calculation- OT       Row Name 12/22/23 0902             Time Calculation- OT    OT Received On 12/22/23  -EG      OT Goal Re-Cert Due Date 12/22/23  -EG         Untimed Charges    OT Eval/Re-eval Minutes 32  -EG         Total Minutes    Untimed Charges Total Minutes 32  -EG       Total Minutes 32  -EG                User Key  (r) = Recorded By, (t) = Taken By, (c) = Cosigned By      Initials Name Provider Type    EG Ann Dasilva OT Occupational Therapist                  Therapy Charges for Today       Code Description Service Date Service Provider Modifiers Qty    20889562365 HC OT EVAL MOD COMPLEXITY 3 12/22/2023 Ann Dasilva OT GO 1                 Ann Dasilva OT  12/22/2023

## 2023-12-22 NOTE — DISCHARGE SUMMARY
[facility]         DISCHARGE SUMMARY    Patient Name: Blair Lira  : 1945  MRN: [id number]    Date of Admission: 2023  Date of Discharge: 2023  Primary Care Physician: Babs Summers APRN    Consults       Date and Time Order Name Status Description    2023  5:36 PM Inpatient Cardiology Consult Completed             Presenting Problem:   Anemia [D64.9]    Active and Resolved Hospital Problems:  Active Hospital Problems    Diagnosis POA    **Anemia [D64.9] Yes      Resolved Hospital Problems   No resolved problems to display.         Hospital Course     Hospital Course:  Blair Lira is a 78 y.o. male send was feeling extremely weak tired falling off and was found to have hemoglobin of 6.7 but ultimately dropped down to 6.1 he was therefore admitted to the hospital to give blood transfusions patient has been treated with Darzalex which also causes the problems in obtaining the blood therefore he had to wait another day to be in the hospital he now has been transfused and his hemoglobin has gone up to 8.5 and stable and therefore is being discharged home today does have a history of coronary artery disease      DISCHARGE Follow Up Recommendations for labs and diagnostics: With multiple myeloma chemo induced as well as disease induced 6 severe new anemia which has now resolved      Pertinent  and/or Most Recent Results     LAB RESULTS:      Lab 23  0508 23  1118   WBC 5.27 6.51 6.05 4.55   HEMOGLOBIN 8.4* 8.5* 6.1* 7.2*   HEMATOCRIT 26.0* 26.3* 18.9* 22.8*   PLATELETS 163 163 146 139*   NEUTROS ABS 3.83 4.90  --   --    IMMATURE GRANS (ABS) 0.06* 0.06*  --   --    LYMPHS ABS 0.82 0.98  --   --    MONOS ABS 0.45 0.47  --   --    EOS ABS 0.09 0.09  --   --    MCV 94.9 94.6 99.0* 99.1*   PROTIME  --   --  16.2*  --          Lab 23  0411 23  0508 23  1118   SODIUM 138 134* 138   POTASSIUM 4.0 4.4 3.9   CHLORIDE  107 104 103   CO2 21.1* 20.9* 21.5*   ANION GAP 9.9 9.1 13.5   BUN 25* 24* 16   CREATININE 2.01* 1.90* 1.91*   EGFR 33.5* 35.9* 35.7*   GLUCOSE 113* 160* 133*   CALCIUM 7.3* 7.4* 8.0*         Lab 12/22/23  0411 12/21/23  0508 12/20/23  1118   TOTAL PROTEIN 5.7* 5.5* 6.5   ALBUMIN 2.5* 2.7* 3.1*   GLOBULIN 3.2 2.8 3.4   ALT (SGPT) 60* 12 12   AST (SGOT) 48* 11 11   BILIRUBIN 0.5 0.2 0.3   ALK PHOS 64 63 77         Lab 12/21/23  0508   PROTIME 16.2*   INR 1.27*             Lab 12/20/23  1118   ABO TYPING A   RH TYPING Positive   ANTIBODY SCREEN Positive         Brief Urine Lab Results  (Last result in the past 365 days)        Color   Clarity   Blood   Leuk Est   Nitrite   Protein   CREAT   Urine HCG        11/12/23 1155 Yellow   Clear   Small (1+)   Negative   Negative   Negative                 Microbiology Results (last 10 days)       ** No results found for the last 240 hours. **            XR Elbow 3+ View Left    Result Date: 11/29/2023  Impression:  Mild degenerative change.  No acute osseous abnormalities are identified.      JOHN BENTON MD       Electronically Signed and Approved By: JOHN BENTON MD on 11/29/2023 at 17:48              Results for orders placed during the hospital encounter of 11/06/23    Duplex Venous Lower Extremity - Right CAR    Interpretation Summary    Normal right lower extremity venous duplex scan.      Results for orders placed during the hospital encounter of 11/06/23    Duplex Venous Lower Extremity - Right CAR    Interpretation Summary    Normal right lower extremity venous duplex scan.      Results for orders placed during the hospital encounter of 11/06/23    Adult Transesophageal Echo (NIKHIL) W/ Cont if Necessary Per Protocol    Interpretation Summary    Left ventricular systolic function is normal. Estimated left ventricular EF = 55%    Patent foramen ovale present.    Saline test results are positive for right to left atrial level shunt.      Labs Pending at  Discharge:  Pending Labs       Order Current Status    [facility] REFERENCE LAB SENDOUT In process              Discharge Details        Discharge Medications        Continue These Medications        Instructions Start Date   albuterol sulfate  (90 Base) MCG/ACT inhaler  Commonly known as: PROVENTIL HFA;VENTOLIN HFA;PROAIR HFA   2 puffs, Inhalation, Every 4 Hours PRN      atorvastatin 10 MG tablet  Commonly known as: LIPITOR   10 mg, Oral, Nightly      dexAMETHasone 4 MG tablet  Commonly known as: DECADRON   20 mg, Oral, Take As Directed, Takes 20 mg the morning before chemo treatments      folic acid 1 MG tablet  Commonly known as: FOLVITE   1 mg, Oral, Daily      lenalidomide 25 MG capsule  Commonly known as: REVLIMID   25 mg, Oral, Daily      nystatin 100,000 unit/mL suspension  Commonly known as: MYCOSTATIN   5 mL, Oral, 4 Times Daily      tamsulosin 0.4 MG capsule 24 hr capsule  Commonly known as: FLOMAX   0.8 mg, Oral, Daily      vitamin B-12 1000 MCG tablet  Commonly known as: CYANOCOBALAMIN   1,000 mcg, Oral, Daily      vitamin D 1.25 MG (58644 UT) capsule capsule  Commonly known as: ERGOCALCIFEROL   50,000 Units, Oral, Weekly, Sunday of each week               No Known Allergies      Discharge Disposition:  Home or Self Care    Diet:  Hospital:  Diet Order   Procedures    Diet: Regular/House Diet; Texture: Regular Texture (IDDSI 7); Fluid Consistency: Thin (IDDSI 0)         Discharge Activity:         CODE STATUS:  Code Status and Medical Interventions:   Ordered at: 12/20/23 1058     Level Of Support Discussed With:    Patient     Code Status (Patient has no pulse and is not breathing):    CPR (Attempt to Resuscitate)     Medical Interventions (Patient has pulse or is breathing):    Full Support         No future appointments.        Time spent on Discharge including face to face service: 45 minutes    Electronically signed by Vamsi Mason MD, 12/22/23, 9:11 AM EST.    Part of this note may be an  electronic transcription/translation of spoken language to printed text using the Dragon Dictation System.

## 2023-12-22 NOTE — PLAN OF CARE
Goal Outcome Evaluation:  Plan of Care Reviewed With: patient        Progress: no change  Outcome Evaluation: Patient displays no change in performance skills for ADL/Transfers; no need for continuation of skilled services past evaluation this date; Safe to discharge home with medically cleared by doctor.      Anticipated Discharge Disposition (OT): home with home health

## 2023-12-23 LAB
BH BB BLOOD EXPIRATION DATE: NORMAL
BH BB BLOOD EXPIRATION DATE: NORMAL
BH BB BLOOD TYPE BARCODE: 6200
BH BB BLOOD TYPE BARCODE: 6200
BH BB DISPENSE STATUS: NORMAL
BH BB DISPENSE STATUS: NORMAL
BH BB PRODUCT CODE: NORMAL
BH BB PRODUCT CODE: NORMAL
BH BB UNIT NUMBER: NORMAL
BH BB UNIT NUMBER: NORMAL
CROSSMATCH INTERPRETATION: NORMAL
CROSSMATCH INTERPRETATION: NORMAL
UNIT  ABO: NORMAL
UNIT  ABO: NORMAL
UNIT  RH: NORMAL
UNIT  RH: NORMAL

## 2023-12-23 NOTE — OUTREACH NOTE
Prep Survey      Flowsheet Row Responses   [facility] facility patient discharged from? [facility]   Is LACE score < 7 ? No   Eligibility Readm Mgmt   Discharge diagnosis Anemia   Does the patient have one of the following disease processes/diagnoses(primary or secondary)? Other   Does the patient have Home health ordered? No   Is there a DME ordered? No   Prep survey completed? Yes            Tiffanie MCGOVERN - Registered Nurse

## 2023-12-27 ENCOUNTER — READMISSION MANAGEMENT (OUTPATIENT)
Dept: CALL CENTER | Facility: HOSPITAL | Age: 78
End: 2023-12-27
Payer: MEDICARE

## 2023-12-27 NOTE — OUTREACH NOTE
Medical Week 1 Survey      Flowsheet Row Responses   [facility] [place] patient discharged from? [facility]   Does the patient have one of the following disease processes/diagnoses(primary or secondary)? Other   Week 1 attempt successful? Yes   Call start time 1739   Call end time 1742   Discharge diagnosis Anemia   Is the patient taking all medications as directed (includes completed medication regime)? Yes   Medication comments No changes   Does the patient have a primary care provider?  Yes   Does the patient have an appointment with their PCP within 7 days of discharge? Yes   Has the patient kept scheduled appointments due by today? N/A   Psychosocial issues? No   Did the patient receive a copy of their discharge instructions? Yes   Nursing interventions Reviewed instructions with patient   What is the patient's perception of their health status since discharge? Improving   Is the patient/caregiver able to teach back signs and symptoms related to disease process for when to call PCP? Yes   Is the patient/caregiver able to teach back signs and symptoms related to disease process for when to call 911? Yes   Is the patient/caregiver able to teach back the hierarchy of who to call/visit for symptoms/problems? PCP, Specialist, Home health nurse, Urgent Care, ED, 911 Yes   If the patient is a current smoker, are they able to teach back resources for cessation? Not a smoker   Additional teach back comments States he is doing better.  Had 3rd cancer treatment today.   Week 1 call completed? Yes   Graduated Yes   Graduated/Revoked comments Denies questions or needs at this time   Call end time 1742            Jennifer SANDOVAL - Licensed Nurse

## 2023-12-29 ENCOUNTER — TRANSCRIBE ORDERS (OUTPATIENT)
Dept: ADMINISTRATIVE | Facility: HOSPITAL | Age: 78
End: 2023-12-29
Payer: MEDICARE

## 2023-12-29 ENCOUNTER — LAB (OUTPATIENT)
Dept: LAB | Facility: HOSPITAL | Age: 78
End: 2023-12-29
Payer: MEDICARE

## 2023-12-29 DIAGNOSIS — I10 ESSENTIAL HYPERTENSION, MALIGNANT: ICD-10-CM

## 2023-12-29 DIAGNOSIS — N18.4 CHRONIC KIDNEY DISEASE, STAGE IV (SEVERE): Primary | ICD-10-CM

## 2023-12-29 DIAGNOSIS — N18.4 CHRONIC KIDNEY DISEASE, STAGE IV (SEVERE): ICD-10-CM

## 2023-12-29 PROCEDURE — 80053 COMPREHEN METABOLIC PANEL: CPT

## 2023-12-29 PROCEDURE — 36415 COLL VENOUS BLD VENIPUNCTURE: CPT

## 2023-12-29 PROCEDURE — 85025 COMPLETE CBC W/AUTO DIFF WBC: CPT

## 2023-12-30 LAB
ALBUMIN SERPL-MCNC: 3.3 G/DL (ref 3.5–5.2)
ALBUMIN/GLOB SERPL: 1 G/DL
ALP SERPL-CCNC: 64 U/L (ref 39–117)
ALT SERPL W P-5'-P-CCNC: 21 U/L (ref 1–41)
ANION GAP SERPL CALCULATED.3IONS-SCNC: 12.3 MMOL/L (ref 5–15)
AST SERPL-CCNC: 12 U/L (ref 1–40)
BASOPHILS # BLD AUTO: 0.02 10*3/MM3 (ref 0–0.2)
BASOPHILS NFR BLD AUTO: 0.5 % (ref 0–1.5)
BILIRUB SERPL-MCNC: 0.5 MG/DL (ref 0–1.2)
BUN SERPL-MCNC: 24 MG/DL (ref 8–23)
BUN/CREAT SERPL: 13.3 (ref 7–25)
CALCIUM SPEC-SCNC: 8.1 MG/DL (ref 8.6–10.5)
CHLORIDE SERPL-SCNC: 106 MMOL/L (ref 98–107)
CO2 SERPL-SCNC: 21.7 MMOL/L (ref 22–29)
CREAT SERPL-MCNC: 1.81 MG/DL (ref 0.76–1.27)
DEPRECATED RDW RBC AUTO: 56.7 FL (ref 37–54)
EGFRCR SERPLBLD CKD-EPI 2021: 38 ML/MIN/1.73
EOSINOPHIL # BLD AUTO: 0.11 10*3/MM3 (ref 0–0.4)
EOSINOPHIL NFR BLD AUTO: 2.7 % (ref 0.3–6.2)
ERYTHROCYTE [DISTWIDTH] IN BLOOD BY AUTOMATED COUNT: 16.8 % (ref 12.3–15.4)
GLOBULIN UR ELPH-MCNC: 3.2 GM/DL
GLUCOSE SERPL-MCNC: 99 MG/DL (ref 65–99)
HCT VFR BLD AUTO: 26.7 % (ref 37.5–51)
HGB BLD-MCNC: 8.9 G/DL (ref 13–17.7)
IMM GRANULOCYTES # BLD AUTO: 0.04 10*3/MM3 (ref 0–0.05)
IMM GRANULOCYTES NFR BLD AUTO: 1 % (ref 0–0.5)
LYMPHOCYTES # BLD AUTO: 0.6 10*3/MM3 (ref 0.7–3.1)
LYMPHOCYTES NFR BLD AUTO: 14.8 % (ref 19.6–45.3)
MCH RBC QN AUTO: 30.8 PG (ref 26.6–33)
MCHC RBC AUTO-ENTMCNC: 33.3 G/DL (ref 31.5–35.7)
MCV RBC AUTO: 92.4 FL (ref 79–97)
MONOCYTES # BLD AUTO: 0.43 10*3/MM3 (ref 0.1–0.9)
MONOCYTES NFR BLD AUTO: 10.6 % (ref 5–12)
NEUTROPHILS NFR BLD AUTO: 2.85 10*3/MM3 (ref 1.7–7)
NEUTROPHILS NFR BLD AUTO: 70.4 % (ref 42.7–76)
NRBC BLD AUTO-RTO: 0 /100 WBC (ref 0–0.2)
PLATELET # BLD AUTO: 208 10*3/MM3 (ref 140–450)
PMV BLD AUTO: 11.1 FL (ref 6–12)
POTASSIUM SERPL-SCNC: 3.5 MMOL/L (ref 3.5–5.2)
PROT SERPL-MCNC: 6.5 G/DL (ref 6–8.5)
RBC # BLD AUTO: 2.89 10*6/MM3 (ref 4.14–5.8)
SODIUM SERPL-SCNC: 140 MMOL/L (ref 136–145)
WBC NRBC COR # BLD AUTO: 4.05 10*3/MM3 (ref 3.4–10.8)

## 2024-01-03 LAB — BB REFERENCE LAB SENDOUT: NORMAL

## 2024-01-05 ENCOUNTER — APPOINTMENT (OUTPATIENT)
Dept: GENERAL RADIOLOGY | Facility: HOSPITAL | Age: 79
End: 2024-01-05
Payer: MEDICARE

## 2024-01-05 LAB
BASOPHILS # BLD AUTO: 0.03 10*3/MM3 (ref 0–0.2)
BASOPHILS NFR BLD AUTO: 0.6 % (ref 0–1.5)
DEPRECATED RDW RBC AUTO: 59.5 FL (ref 37–54)
EOSINOPHIL # BLD AUTO: 0.11 10*3/MM3 (ref 0–0.4)
EOSINOPHIL NFR BLD AUTO: 2.2 % (ref 0.3–6.2)
ERYTHROCYTE [DISTWIDTH] IN BLOOD BY AUTOMATED COUNT: 17.6 % (ref 12.3–15.4)
HCT VFR BLD AUTO: 27.2 % (ref 37.5–51)
HGB BLD-MCNC: 8.9 G/DL (ref 13–17.7)
IMM GRANULOCYTES # BLD AUTO: 0.08 10*3/MM3 (ref 0–0.05)
IMM GRANULOCYTES NFR BLD AUTO: 1.6 % (ref 0–0.5)
LYMPHOCYTES # BLD AUTO: 0.6 10*3/MM3 (ref 0.7–3.1)
LYMPHOCYTES NFR BLD AUTO: 12.2 % (ref 19.6–45.3)
MCH RBC QN AUTO: 30.2 PG (ref 26.6–33)
MCHC RBC AUTO-ENTMCNC: 32.7 G/DL (ref 31.5–35.7)
MCV RBC AUTO: 92.2 FL (ref 79–97)
MONOCYTES # BLD AUTO: 0.24 10*3/MM3 (ref 0.1–0.9)
MONOCYTES NFR BLD AUTO: 4.9 % (ref 5–12)
NEUTROPHILS NFR BLD AUTO: 3.84 10*3/MM3 (ref 1.7–7)
NEUTROPHILS NFR BLD AUTO: 78.5 % (ref 42.7–76)
NRBC BLD AUTO-RTO: 0 /100 WBC (ref 0–0.2)
PLATELET # BLD AUTO: 207 10*3/MM3 (ref 140–450)
PMV BLD AUTO: 11.2 FL (ref 6–12)
QT INTERVAL: 400 MS
QTC INTERVAL: 477 MS
RBC # BLD AUTO: 2.95 10*6/MM3 (ref 4.14–5.8)
WBC NRBC COR # BLD AUTO: 4.9 10*3/MM3 (ref 3.4–10.8)

## 2024-01-05 PROCEDURE — 71045 X-RAY EXAM CHEST 1 VIEW: CPT

## 2024-01-05 PROCEDURE — 93005 ELECTROCARDIOGRAM TRACING: CPT

## 2024-01-05 PROCEDURE — 83735 ASSAY OF MAGNESIUM: CPT

## 2024-01-05 PROCEDURE — 85025 COMPLETE CBC W/AUTO DIFF WBC: CPT

## 2024-01-05 PROCEDURE — 84484 ASSAY OF TROPONIN QUANT: CPT

## 2024-01-05 PROCEDURE — 93005 ELECTROCARDIOGRAM TRACING: CPT | Performed by: EMERGENCY MEDICINE

## 2024-01-05 PROCEDURE — 99285 EMERGENCY DEPT VISIT HI MDM: CPT

## 2024-01-05 PROCEDURE — 80053 COMPREHEN METABOLIC PANEL: CPT

## 2024-01-05 PROCEDURE — 36415 COLL VENOUS BLD VENIPUNCTURE: CPT

## 2024-01-05 RX ORDER — SODIUM CHLORIDE 0.9 % (FLUSH) 0.9 %
10 SYRINGE (ML) INJECTION AS NEEDED
Status: DISCONTINUED | OUTPATIENT
Start: 2024-01-05 | End: 2024-01-07 | Stop reason: HOSPADM

## 2024-01-06 ENCOUNTER — HOSPITAL ENCOUNTER (INPATIENT)
Facility: HOSPITAL | Age: 79
LOS: 1 days | Discharge: HOME | End: 2024-01-07
Attending: EMERGENCY MEDICINE | Admitting: INTERNAL MEDICINE
Payer: MEDICARE

## 2024-01-06 DIAGNOSIS — E86.0 DEHYDRATION: ICD-10-CM

## 2024-01-06 DIAGNOSIS — N17.9 ACUTE RENAL FAILURE SUPERIMPOSED ON CHRONIC KIDNEY DISEASE, UNSPECIFIED ACUTE RENAL FAILURE TYPE, UNSPECIFIED CKD STAGE: Primary | ICD-10-CM

## 2024-01-06 DIAGNOSIS — R55 POSTURAL DIZZINESS WITH PRESYNCOPE: ICD-10-CM

## 2024-01-06 DIAGNOSIS — C90.00 MULTIPLE MYELOMA, REMISSION STATUS UNSPECIFIED: ICD-10-CM

## 2024-01-06 DIAGNOSIS — Z92.21 HISTORY OF ANTINEOPLASTIC THERAPY: ICD-10-CM

## 2024-01-06 DIAGNOSIS — N18.9 ACUTE RENAL FAILURE SUPERIMPOSED ON CHRONIC KIDNEY DISEASE, UNSPECIFIED ACUTE RENAL FAILURE TYPE, UNSPECIFIED CKD STAGE: Primary | ICD-10-CM

## 2024-01-06 DIAGNOSIS — R42 POSTURAL DIZZINESS WITH PRESYNCOPE: ICD-10-CM

## 2024-01-06 PROBLEM — I95.1 ORTHOSTATIC HYPOTENSION: Status: ACTIVE | Noted: 2023-11-30

## 2024-01-06 LAB
ALBUMIN SERPL-MCNC: 3.3 G/DL (ref 3.5–5.2)
ALBUMIN/GLOB SERPL: 0.9 G/DL
ALP SERPL-CCNC: 73 U/L (ref 39–117)
ALT SERPL W P-5'-P-CCNC: 28 U/L (ref 1–41)
ANION GAP SERPL CALCULATED.3IONS-SCNC: 21.2 MMOL/L (ref 5–15)
AST SERPL-CCNC: 15 U/L (ref 1–40)
BACTERIA UR QL AUTO: ABNORMAL /HPF
BILIRUB SERPL-MCNC: 0.5 MG/DL (ref 0–1.2)
BILIRUB UR QL STRIP: NEGATIVE
BUN SERPL-MCNC: 38 MG/DL (ref 8–23)
BUN/CREAT SERPL: 14.3 (ref 7–25)
CALCIUM SPEC-SCNC: 7.7 MG/DL (ref 8.6–10.5)
CHLORIDE SERPL-SCNC: 97 MMOL/L (ref 98–107)
CLARITY UR: CLEAR
CO2 SERPL-SCNC: 21.8 MMOL/L (ref 22–29)
COLOR UR: YELLOW
CREAT SERPL-MCNC: 2.66 MG/DL (ref 0.76–1.27)
EGFRCR SERPLBLD CKD-EPI 2021: 24 ML/MIN/1.73
GEN 5 2HR TROPONIN T REFLEX: 69 NG/L
GLOBULIN UR ELPH-MCNC: 3.6 GM/DL
GLUCOSE SERPL-MCNC: 112 MG/DL (ref 65–99)
GLUCOSE UR STRIP-MCNC: NEGATIVE MG/DL
HGB UR QL STRIP.AUTO: ABNORMAL
HOLD SPECIMEN: NORMAL
HOLD SPECIMEN: NORMAL
HYALINE CASTS UR QL AUTO: ABNORMAL /LPF
KETONES UR QL STRIP: NEGATIVE
LEUKOCYTE ESTERASE UR QL STRIP.AUTO: NEGATIVE
MAGNESIUM SERPL-MCNC: 1.3 MG/DL (ref 1.6–2.4)
NITRITE UR QL STRIP: NEGATIVE
PH UR STRIP.AUTO: 5.5 [PH] (ref 5–8)
POTASSIUM SERPL-SCNC: 3.5 MMOL/L (ref 3.5–5.2)
PROT SERPL-MCNC: 6.9 G/DL (ref 6–8.5)
PROT UR QL STRIP: NEGATIVE
RBC # UR STRIP: ABNORMAL /HPF
REF LAB TEST METHOD: ABNORMAL
SODIUM SERPL-SCNC: 140 MMOL/L (ref 136–145)
SP GR UR STRIP: 1.01 (ref 1–1.03)
SQUAMOUS #/AREA URNS HPF: ABNORMAL /HPF
TROPONIN T DELTA: -6 NG/L
TROPONIN T SERPL HS-MCNC: 75 NG/L
UROBILINOGEN UR QL STRIP: ABNORMAL
WBC # UR STRIP: ABNORMAL /HPF
WHOLE BLOOD HOLD COAG: NORMAL
WHOLE BLOOD HOLD SPECIMEN: NORMAL

## 2024-01-06 PROCEDURE — 84484 ASSAY OF TROPONIN QUANT: CPT | Performed by: EMERGENCY MEDICINE

## 2024-01-06 PROCEDURE — 25810000003 SODIUM CHLORIDE 0.9 % SOLUTION: Performed by: INTERNAL MEDICINE

## 2024-01-06 PROCEDURE — 25810000003 SODIUM CHLORIDE 0.9 % SOLUTION: Performed by: EMERGENCY MEDICINE

## 2024-01-06 PROCEDURE — 25010000002 EPOETIN ALFA PER 1000 UNITS: Performed by: INTERNAL MEDICINE

## 2024-01-06 PROCEDURE — 25010000002 ENOXAPARIN PER 10 MG: Performed by: INTERNAL MEDICINE

## 2024-01-06 PROCEDURE — 81001 URINALYSIS AUTO W/SCOPE: CPT | Performed by: EMERGENCY MEDICINE

## 2024-01-06 RX ORDER — ALPRAZOLAM 0.25 MG/1
0.5 TABLET ORAL EVERY 8 HOURS PRN
Status: DISCONTINUED | OUTPATIENT
Start: 2024-01-06 | End: 2024-01-07 | Stop reason: HOSPADM

## 2024-01-06 RX ORDER — NALOXONE HCL 0.4 MG/ML
0.4 VIAL (ML) INJECTION
Status: DISCONTINUED | OUTPATIENT
Start: 2024-01-06 | End: 2024-01-07 | Stop reason: HOSPADM

## 2024-01-06 RX ORDER — HYDROCODONE BITARTRATE AND ACETAMINOPHEN 10; 325 MG/1; MG/1
1 TABLET ORAL EVERY 4 HOURS PRN
Status: DISCONTINUED | OUTPATIENT
Start: 2024-01-06 | End: 2024-01-07 | Stop reason: HOSPADM

## 2024-01-06 RX ORDER — ALUMINA, MAGNESIA, AND SIMETHICONE 2400; 2400; 240 MG/30ML; MG/30ML; MG/30ML
15 SUSPENSION ORAL EVERY 6 HOURS PRN
Status: DISCONTINUED | OUTPATIENT
Start: 2024-01-06 | End: 2024-01-07 | Stop reason: HOSPADM

## 2024-01-06 RX ORDER — ENOXAPARIN SODIUM 100 MG/ML
30 INJECTION SUBCUTANEOUS EVERY 24 HOURS
Status: DISCONTINUED | OUTPATIENT
Start: 2024-01-06 | End: 2024-01-07 | Stop reason: HOSPADM

## 2024-01-06 RX ORDER — ONDANSETRON 2 MG/ML
4 INJECTION INTRAMUSCULAR; INTRAVENOUS EVERY 6 HOURS PRN
Status: DISCONTINUED | OUTPATIENT
Start: 2024-01-06 | End: 2024-01-07 | Stop reason: HOSPADM

## 2024-01-06 RX ORDER — FAMOTIDINE 20 MG/1
40 TABLET, FILM COATED ORAL DAILY
Status: DISCONTINUED | OUTPATIENT
Start: 2024-01-06 | End: 2024-01-07 | Stop reason: HOSPADM

## 2024-01-06 RX ORDER — SODIUM CHLORIDE 9 MG/ML
150 INJECTION, SOLUTION INTRAVENOUS CONTINUOUS
Status: DISCONTINUED | OUTPATIENT
Start: 2024-01-06 | End: 2024-01-07 | Stop reason: HOSPADM

## 2024-01-06 RX ORDER — POTASSIUM CHLORIDE 750 MG/1
40 CAPSULE, EXTENDED RELEASE ORAL 2 TIMES DAILY WITH MEALS
Status: COMPLETED | OUTPATIENT
Start: 2024-01-06 | End: 2024-01-06

## 2024-01-06 RX ORDER — ACETAMINOPHEN 325 MG/1
650 TABLET ORAL EVERY 4 HOURS PRN
Status: DISCONTINUED | OUTPATIENT
Start: 2024-01-06 | End: 2024-01-07 | Stop reason: HOSPADM

## 2024-01-06 RX ORDER — HYDROCODONE BITARTRATE AND ACETAMINOPHEN 5; 325 MG/1; MG/1
1 TABLET ORAL EVERY 4 HOURS PRN
Status: DISCONTINUED | OUTPATIENT
Start: 2024-01-06 | End: 2024-01-07 | Stop reason: HOSPADM

## 2024-01-06 RX ORDER — LENALIDOMIDE 25 MG/1
25 CAPSULE ORAL DAILY
Status: DISCONTINUED | OUTPATIENT
Start: 2024-01-06 | End: 2024-01-07 | Stop reason: HOSPADM

## 2024-01-06 RX ORDER — POLYETHYLENE GLYCOL 3350 17 G/17G
17 POWDER, FOR SOLUTION ORAL DAILY PRN
Status: DISCONTINUED | OUTPATIENT
Start: 2024-01-06 | End: 2024-01-07 | Stop reason: HOSPADM

## 2024-01-06 RX ORDER — BISACODYL 5 MG/1
5 TABLET, DELAYED RELEASE ORAL DAILY PRN
Status: DISCONTINUED | OUTPATIENT
Start: 2024-01-06 | End: 2024-01-07 | Stop reason: HOSPADM

## 2024-01-06 RX ORDER — AMOXICILLIN 250 MG
2 CAPSULE ORAL 2 TIMES DAILY
Status: DISCONTINUED | OUTPATIENT
Start: 2024-01-06 | End: 2024-01-07 | Stop reason: HOSPADM

## 2024-01-06 RX ORDER — ACETAMINOPHEN 160 MG/5ML
650 SOLUTION ORAL EVERY 4 HOURS PRN
Status: DISCONTINUED | OUTPATIENT
Start: 2024-01-06 | End: 2024-01-07 | Stop reason: HOSPADM

## 2024-01-06 RX ORDER — CHOLECALCIFEROL (VITAMIN D3) 125 MCG
5 CAPSULE ORAL NIGHTLY PRN
Status: DISCONTINUED | OUTPATIENT
Start: 2024-01-06 | End: 2024-01-07 | Stop reason: HOSPADM

## 2024-01-06 RX ORDER — BISACODYL 10 MG
10 SUPPOSITORY, RECTAL RECTAL DAILY PRN
Status: DISCONTINUED | OUTPATIENT
Start: 2024-01-06 | End: 2024-01-07 | Stop reason: HOSPADM

## 2024-01-06 RX ORDER — DEXAMETHASONE 4 MG/1
20 TABLET ORAL TAKE AS DIRECTED
Status: DISCONTINUED | OUTPATIENT
Start: 2024-01-06 | End: 2024-01-06

## 2024-01-06 RX ORDER — FUROSEMIDE 40 MG/1
40 TABLET ORAL 2 TIMES DAILY
COMMUNITY
End: 2024-01-07 | Stop reason: HOSPADM

## 2024-01-06 RX ORDER — ACETAMINOPHEN 650 MG/1
650 SUPPOSITORY RECTAL EVERY 4 HOURS PRN
Status: DISCONTINUED | OUTPATIENT
Start: 2024-01-06 | End: 2024-01-07 | Stop reason: HOSPADM

## 2024-01-06 RX ADMIN — FAMOTIDINE 40 MG: 20 TABLET, FILM COATED ORAL at 08:24

## 2024-01-06 RX ADMIN — POTASSIUM CHLORIDE 40 MEQ: 10 CAPSULE, COATED, EXTENDED RELEASE ORAL at 09:23

## 2024-01-06 RX ADMIN — SODIUM CHLORIDE 500 ML: 9 INJECTION, SOLUTION INTRAVENOUS at 03:12

## 2024-01-06 RX ADMIN — SODIUM CHLORIDE 150 ML/HR: 9 INJECTION, SOLUTION INTRAVENOUS at 17:56

## 2024-01-06 RX ADMIN — SODIUM CHLORIDE 150 ML/HR: 9 INJECTION, SOLUTION INTRAVENOUS at 11:47

## 2024-01-06 RX ADMIN — SODIUM CHLORIDE 100 ML/HR: 9 INJECTION, SOLUTION INTRAVENOUS at 04:23

## 2024-01-06 RX ADMIN — POTASSIUM CHLORIDE 40 MEQ: 10 CAPSULE, COATED, EXTENDED RELEASE ORAL at 17:07

## 2024-01-06 RX ADMIN — ERYTHROPOIETIN 40000 UNITS: 40000 INJECTION, SOLUTION INTRAVENOUS; SUBCUTANEOUS at 11:47

## 2024-01-06 RX ADMIN — LENALIDOMIDE 25 MG: 25 CAPSULE ORAL at 11:41

## 2024-01-06 RX ADMIN — ENOXAPARIN SODIUM 30 MG: 100 INJECTION SUBCUTANEOUS at 04:58

## 2024-01-06 NOTE — H&P
[facility]   HISTORY AND PHYSICAL    Patient Name: Blair Lira  : 1945  MRN: [id number]  Primary Care Physician:  Babs Summers APRN  Date of admission: 2024    Subjective   Subjective     Chief Complaint: Dizziness    HPI:    Blair Lira is a 78 y.o. male with past medical history significant for hyperlipidemia BPH longstanding hypertension, severe orthostasis, multiple myeloma with light chain nephropathy, chronic kidney disease stage IV, chronic anemia, chronic diastolic congestive heart failure started feeling very weak and dizzy yesterday to a point he thought he is going to pass out but he did not and he called the EMS and brought the patient to the emergency room where he was hypotensive and very orthostatic, because of that reason I was requested to admit the patient.  When I see the patient he denies any fever chills chest pain shortness of breath but he complains of severe dizziness when he is trying to stand up.  He has a history of severe orthostasis    Review of Systems  Constitutional:        Weakness tiredness fatigue  Eyes:                       No blurry vision, eye discharge, eye irritation, eye pain  HEENT:                   No acute hair loss, earache and discharge, nasal congestion or discharge, sore throat, postnasal drip  Respiratory:           No shortness of breath coughing sputum production wheezing hemoptysis pleuritic chest pain  Cardiovascular:     No chest pain, orthopnea, PND, dizziness, palpitation, lower extremity edema  Gastrointestinal:   No nausea vomiting diarrhea abdominal pain constipation  Genitourinary:       No urinary incontinence, hesitancy, frequency, urgency, dysuria  Neurological:        No confusion, headache, focal weakness, numbness, dysphasia  Hematologic:         No bruising, bleeding, pallor, lymphadenopathy  Endocrine:            No coldness, hot flashes, polyuria, abnormal hair growth  Musculoskeletal:    No body pains, aches,  arthritic pains, muscle pain ,muscle wasting  Psychiatric:          No low or high mood, anxiety, hallucinations, delusions  Skin.                      No rash, ulcers, bruising, itching    Personal History     Past Medical History:   Diagnosis Date    BPH (benign prostatic hyperplasia)     Cancer     Chronic kidney disease     Frozen shoulder     Hyperlipidemia     Hypertension 10/26/2023    Periarthritis of shoulder     Rotator cuff disorder, right     Tennis elbow     RIGHT       Past Surgical History:   Procedure Laterality Date    CATARACT EXTRACTION EXTRACAPSULAR W/ INTRAOCULAR LENS IMPLANTATION Bilateral     COLONOSCOPY      JOINT REPLACEMENT Right     THR    SHOULDER ARTHROSCOPY W/ ROTATOR CUFF REPAIR Right 3/29/2023    Procedure: SHOULDER ARTHROSCOPY WITH ROTATOR CUFF REPAIR, SUBCROMIAL DECOMPRESSION,DISTAL CLAVICLE RESECTION;  Surgeon: Gustavo Samuels MD;  Location: [facility] [place] [facility] [place];  Service: Orthopedics;  Laterality: Right;    SHOULDER SURGERY Left     SCOPE       Family History: family history is not on file. Otherwise pertinent FHx was reviewed and not pertinent to current issue.    Social History:  reports that he has never smoked. He has never been exposed to tobacco smoke. He has never used smokeless tobacco. He reports that he does not currently use alcohol. He reports that he does not use drugs.    Home Medications:  albuterol sulfate HFA, atorvastatin, dexAMETHasone, furosemide, lenalidomide, nystatin, and vitamin D      Allergies:  No Known Allergies    Objective   Objective     Vitals:   Temp:  [98.6 °F (37 °C)-99.1 °F (37.3 °C)] 98.8 °F (37.1 °C)  Heart Rate:  [] 74  Resp:  [18-24] 18  BP: ()/(44-64) 119/53  Physical Exam               Constitutional:         Awake, alert responsive, conversant, no obvious distress   Eyes:                       PERRLA, sclerae anicteric, no conjunctival injection   HEENT:                   Moist mucous membranes, no nasal or eye discharge, no throat  congestion   Neck:                      Supple, no thyromegaly, no lymphadenopathy, trachea midline, no elevated JVD   Respiratory:           Clear to auscultation bilaterally, nonlabored respirations    Cardiovascular:     RRR, no murmurs, rubs, or gallops, palpable pedal pulses bilaterally,No bilateral ankle edema   Gastrointestinal:   Positive bowel sounds, soft, nontender, nondistended, no organomegaly   Musculoskeletal:   No clubbing or cyanosis to extremities, muscle wasting, joint swelling, muscle weakness   Psychiatric:             Appropriate affect, cooperative   Neurologic:            Awake alert ,oriented x 3, strength symmetric in all extremities, Cranial Nerves grossly intact to confrontation, speech clear   Skin:                      No rashes, bruising, skin ulcers, petechiae or ecchymosis    Result Review    Result Review:  I have personally reviewed the results from the time of this admission to 1/6/2024 09:00 EST and agree with these findings:  []  Laboratory  []  Microbiology  []  Radiology  []  EKG/Telemetry   []  Cardiology/Vascular   []  Pathology  []  Old records  []  Other:    Results from last 7 days   Lab Units 01/05/24  2315   WBC 10*3/mm3 4.90   HEMOGLOBIN g/dL 8.9*   PLATELETS 10*3/mm3 207     Results from last 7 days   Lab Units 01/05/24  2315   SODIUM mmol/L 140   POTASSIUM mmol/L 3.5   CHLORIDE mmol/L 97*   CO2 mmol/L 21.8*   BUN mg/dL 38*   CREATININE mg/dL 2.66*   GLUCOSE mg/dL 112*         Assessment & Plan   Assessment / Plan     Active Hospital Problems:  Active Hospital Problems    Diagnosis     Orthostatic hypotension     Chronic kidney disease, stage IV (severe)     Chronic diastolic (congestive) heart failure     Multiple myeloma        Plan:   Hold any antihypertensive medications  Start IV fluids  Monitor orthostatics  Will make further recommendation depending on patient's progress    DVT prophylaxis:  Medical DVT prophylaxis orders are present.    CODE STATUS:    Code  Status (Patient has no pulse and is not breathing): CPR (Attempt to Resuscitate)  Medical Interventions (Patient has pulse or is breathing): Full Support    Admission Status:  I believe this patient meets inpatient status.    Electronically signed by Siri Fox MD, 01/06/24, 8:56 AM EST.

## 2024-01-06 NOTE — ED PROVIDER NOTES
Time: 11:08 PM EST  Date of encounter:  1/5/2024  Independent Historian/Clinical History and Information was obtained by:   Patient    History is limited by: N/A    Chief Complaint   Patient presents with    Weakness - Generalized         History of Present Illness:  Patient is a 78 y.o. year old male who presents to the emergency department for evaluation of generalized weakness, shortness of breath with activity.  Patient reports he is currently receiving treatment for cancer and has required recent blood transfusions.  Patient also reports he has had bilateral lower extremity edema, which has improved this week after being started on a diuretic.  (LARA Anguiano, provider in triage)     Patient Care Team  Primary Care Provider: Babs Summers APRN    Past Medical History:     No Known Allergies  Past Medical History:   Diagnosis Date    BPH (benign prostatic hyperplasia)     Cancer     Chronic kidney disease     Frozen shoulder     Hyperlipidemia     Hypertension 10/26/2023    Periarthritis of shoulder     Rotator cuff disorder, right     Tennis elbow     RIGHT     Past Surgical History:   Procedure Laterality Date    CATARACT EXTRACTION EXTRACAPSULAR W/ INTRAOCULAR LENS IMPLANTATION Bilateral     COLONOSCOPY      JOINT REPLACEMENT Right     THR    SHOULDER ARTHROSCOPY W/ ROTATOR CUFF REPAIR Right 3/29/2023    Procedure: SHOULDER ARTHROSCOPY WITH ROTATOR CUFF REPAIR, SUBCROMIAL DECOMPRESSION,DISTAL CLAVICLE RESECTION;  Surgeon: Gustavo Samuels MD;  Location: [facility] OR [facility] [place];  Service: Orthopedics;  Laterality: Right;    SHOULDER SURGERY Left     SCOPE     Family History   Problem Relation Age of Onset    [place] Hyperthermia Neg Hx        Home Medications:  Prior to Admission medications    Medication Sig Start Date End Date Taking? Authorizing Provider   albuterol sulfate  (90 Base) MCG/ACT inhaler Inhale 2 puffs Every 4 (Four) Hours As Needed for Wheezing.    Provider, Historical, MD   atorvastatin  "(LIPITOR) 10 MG tablet Take 1 tablet by mouth Every Night. 9/23/22   Shavonne Yin MD   dexAMETHasone (DECADRON) 4 MG tablet Take 5 tablets by mouth Take As Directed. Takes 20 mg the morning before chemo treatments 12/5/23   Shavonne Yin MD   lenalidomide (REVLIMID) 25 MG capsule Take 1 capsule by mouth Daily. 12/13/23 1/27/27  Shavonne Yin MD   nystatin (MYCOSTATIN) 100,000 unit/mL suspension Take 5 mL by mouth 4 (Four) Times a Day. 12/11/23   Shavonne Yin MD   vitamin D (ERGOCALCIFEROL) 1.25 MG (54307 UT) capsule capsule Take 1 capsule by mouth 1 (One) Time Per Week. Sunday of each week    Shavonne Yin MD        Social History:   Social History     Tobacco Use    Smoking status: Never     Passive exposure: Never    Smokeless tobacco: Never   Vaping Use    Vaping Use: Never used   Substance Use Topics    Alcohol use: Not Currently    Drug use: Never         Review of Systems:  Review of Systems   Constitutional:  Negative for chills and fever.   HENT:  Negative for congestion, ear pain and sore throat.    Eyes:  Negative for pain.   Respiratory:  Positive for shortness of breath. Negative for cough and chest tightness.    Cardiovascular:  Positive for leg swelling. Negative for chest pain.   Gastrointestinal:  Negative for abdominal pain, diarrhea, nausea and vomiting.   Genitourinary:  Negative for flank pain and hematuria.   Musculoskeletal:  Negative for joint swelling.   Skin:  Negative for pallor.   Neurological:  Positive for dizziness, weakness and light-headedness. Negative for seizures and headaches.   All other systems reviewed and are negative.       Physical Exam:  BP 90/44 (BP Location: Left arm, Patient Position: Standing)   Pulse 101   Temp 99.1 °F (37.3 °C) (Oral)   Resp 24   Ht 185.4 cm (72.99\")   Wt 85.8 kg (189 lb 2.5 oz)   SpO2 96%   BMI 24.96 kg/m²         Physical Exam  Vitals and nursing note reviewed.   Constitutional:       General: He is " not in acute distress.     Appearance: He is ill-appearing. He is not toxic-appearing.   HENT:      Head: Normocephalic and atraumatic.      Mouth/Throat:      Mouth: Mucous membranes are moist.   Eyes:      General: No scleral icterus.     Extraocular Movements: Extraocular movements intact.      Conjunctiva/sclera: Conjunctivae normal.   Cardiovascular:      Rate and Rhythm: Normal rate and regular rhythm.      Pulses: Normal pulses.      Heart sounds: Normal heart sounds.   Pulmonary:      Effort: Pulmonary effort is normal. No respiratory distress.      Breath sounds: Normal breath sounds.   Abdominal:      General: Abdomen is flat. There is no distension.      Palpations: Abdomen is soft.      Tenderness: There is no abdominal tenderness.   Musculoskeletal:         General: Normal range of motion.      Cervical back: Normal range of motion and neck supple.   Skin:     General: Skin is warm and dry.      Coloration: Skin is pale. Skin is not cyanotic.   Neurological:      Mental Status: He is alert and oriented to person, place, and time. Mental status is at baseline.   Psychiatric:         Attention and Perception: Attention and perception normal.         Mood and Affect: Mood normal.               Procedures:  Procedures      Medical Decision Making:      Comorbidities that affect care:    Hypertension, multiple myeloma, chronic kidney disease, hyperlipidemia    External Notes reviewed:    Hospital Discharge Summary: Hospital discharge summary 12/22/2023      The following orders were placed and all results were independently analyzed by me:  Orders Placed This Encounter   Procedures    XR Chest 1 View    [place] Draw    Comprehensive Metabolic Panel    Single High Sensitivity Troponin T    Magnesium    Urinalysis With Microscopic If Indicated (No Culture) - Urine, Clean Catch    CBC Auto Differential    High Sensitivity Troponin T 2Hr    Urinalysis, Microscopic Only - Urine, Clean Catch    Diet: Regular/House  Diet; Texture: Regular Texture (IDDSI 7); Fluid Consistency: Thin (IDDSI 0)    Undress & Gown    Continuous Pulse Oximetry    Vital Signs    Orthostatic Blood Pressure    PO fluids  Misc Nursing Order (Specify)    Vital Signs    Up in Chair    Activity (Specify Details)    Turn Patient    Up With Assistance    Weigh Patient    Daily Weights    Strict Intake & Output    Oral Care    Code Status and Medical Interventions:    IP General Consult (Use specialty-specific consult if known)    Nephrology  (on-call MD unless specified)    Oxygen Therapy- Nasal Cannula; Titrate 1-6 LPM Per SpO2; 90 - 95%    Pulse Oximetry,  Spot    POC Glucose Once    ECG 12 Lead ED Triage Standing Order; Weak / Dizzy / AMS    Insert Peripheral IV    Inpatient Admission    Fall Precautions    CBC & Differential    Green Top (Gel)    Lavender Top    Gold Top - SST    Light Blue Top       Medications Given in the Emergency Department:  Medications   sodium chloride 0.9 % flush 10 mL (has no administration in time range)   sodium chloride 0.9 % infusion (100 mL/hr Intravenous New Bag 1/6/24 0423)   acetaminophen (TYLENOL) tablet 650 mg (has no administration in time range)     Or   acetaminophen (TYLENOL) 160 MG/5ML oral solution 650 mg (has no administration in time range)     Or   acetaminophen (TYLENOL) suppository 650 mg (has no administration in time range)   HYDROcodone-acetaminophen (NORCO) 5-325 MG per tablet 1 tablet (has no administration in time range)   HYDROcodone-acetaminophen (NORCO)  MG per tablet 1 tablet (has no administration in time range)   HYDROmorphone (DILAUDID) injection 0.5 mg (has no administration in time range)     And   naloxone (NARCAN) injection 0.4 mg (has no administration in time range)   aluminum-magnesium hydroxide-simethicone (MAALOX MAX) 400-400-40 MG/5ML suspension 15 mL (has no administration in time range)   famotidine (PEPCID) tablet 40 mg (has no administration in time range)   ALPRAZolam (XANAX)  tablet 0.5 mg (has no administration in time range)   sennosides-docusate (PERICOLACE) 8.6-50 MG per tablet 2 tablet (has no administration in time range)     And   polyethylene glycol (MIRALAX) packet 17 g (has no administration in time range)     And   bisacodyl (DULCOLAX) EC tablet 5 mg (has no administration in time range)     And   bisacodyl (DULCOLAX) suppository 10 mg (has no administration in time range)   ondansetron (ZOFRAN) injection 4 mg (has no administration in time range)   melatonin tablet 5 mg (has no administration in time range)   Enoxaparin Sodium (LOVENOX) syringe 30 mg (30 mg Subcutaneous Given 1/6/24 5298)   !Patient Home Medications Stored in Pharmacy (has no administration in time range)   sodium chloride 0.9 % bolus 500 mL (500 mL Intravenous New Bag 1/6/24 0312)        ED Course:    The patient was initially evaluated in the triage area where orders were placed. The patient was later dispositioned by Saul Ricardo MD.      The patient was advised to stay for completion of workup which includes but is not limited to communication of labs and radiological results, reassessment and plan. The patient was advised that leaving prior to disposition by a provider could result in critical findings that are not communicated to the patient.          Labs:    Lab Results (last 24 hours)       Procedure Component Value Units Date/Time    CBC & Differential [[id number]]  (Abnormal) Collected: 01/05/24 2315    Specimen: Blood Updated: 01/05/24 2328    Narrative:      The following orders were created for panel order CBC & Differential.  Procedure                               Abnormality         Status                     ---------                               -----------         ------                     CBC Auto Differential[[id number]]        Abnormal            Final result                 Please view results for these tests on the individual orders.    Comprehensive Metabolic Panel [[id number]]   (Abnormal) Collected: 01/05/24 2315    Specimen: Blood Updated: 01/06/24 0044     Glucose 112 mg/dL      BUN 38 mg/dL      Creatinine 2.66 mg/dL      Sodium 140 mmol/L      Potassium 3.5 mmol/L      Chloride 97 mmol/L      CO2 21.8 mmol/L      Calcium 7.7 mg/dL      Total Protein 6.9 g/dL      Albumin 3.3 g/dL      ALT (SGPT) 28 U/L      AST (SGOT) 15 U/L      Alkaline Phosphatase 73 U/L      Total Bilirubin 0.5 mg/dL      Globulin 3.6 gm/dL      A/G Ratio 0.9 g/dL      BUN/Creatinine Ratio 14.3     Anion Gap 21.2 mmol/L      eGFR 24.0 mL/min/1.73     Narrative:      GFR Normal >60  Chronic Kidney Disease <60  Kidney Failure <15    The GFR formula is only valid for adults with stable renal function between ages 18 and 70.    Single High Sensitivity Troponin T [[id number]]  (Abnormal) Collected: 01/05/24 2315    Specimen: Blood Updated: 01/06/24 0124     HS Troponin T 75 ng/L     Narrative:      High Sensitive Troponin T Reference Range:  <14.0 ng/L- Negative Female for AMI  <22.0 ng/L- Negative Male for AMI  >=14 - Abnormal Female indicating possible myocardial injury.  >=22 - Abnormal Male indicating possible myocardial injury.   Clinicians would have to utilize clinical acumen, EKG, Troponin, and serial changes to determine if it is an Acute Myocardial Infarction or myocardial injury due to an underlying chronic condition.         Magnesium [[id number]]  (Abnormal) Collected: 01/05/24 2315    Specimen: Blood Updated: 01/06/24 0044     Magnesium 1.3 mg/dL     CBC Auto Differential [[id number]]  (Abnormal) Collected: 01/05/24 2315    Specimen: Blood Updated: 01/05/24 2328     WBC 4.90 10*3/mm3      RBC 2.95 10*6/mm3      Hemoglobin 8.9 g/dL      Hematocrit 27.2 %      MCV 92.2 fL      MCH 30.2 pg      MCHC 32.7 g/dL      RDW 17.6 %      RDW-SD 59.5 fl      MPV 11.2 fL      Platelets 207 10*3/mm3      Neutrophil % 78.5 %      Lymphocyte % 12.2 %      Monocyte % 4.9 %      Eosinophil % 2.2 %      Basophil % 0.6 %       Immature Grans % 1.6 %      Neutrophils, Absolute 3.84 10*3/mm3      Lymphocytes, Absolute 0.60 10*3/mm3      Monocytes, Absolute 0.24 10*3/mm3      Eosinophils, Absolute 0.11 10*3/mm3      Basophils, Absolute 0.03 10*3/mm3      Immature Grans, Absolute 0.08 10*3/mm3      nRBC 0.0 /100 WBC     Urinalysis With Microscopic If Indicated (No Culture) - Urine, Clean Catch [[id number]]  (Abnormal) Collected: 01/06/24 0320    Specimen: Urine, Clean Catch Updated: 01/06/24 0333     Color, UA Yellow     Appearance, UA Clear     pH, UA 5.5     Specific Gravity, UA 1.009     Glucose, UA Negative     Ketones, UA Negative     Bilirubin, UA Negative     Blood, UA Trace     Protein, UA Negative     Leuk Esterase, UA Negative     Nitrite, UA Negative     Urobilinogen, UA 0.2 E.U./dL    Urinalysis, Microscopic Only - Urine, Clean Catch [[id number]]  (Abnormal) Collected: 01/06/24 0320    Specimen: Urine, Clean Catch Updated: 01/06/24 0335     RBC, UA 0-2 /HPF      WBC, UA 3-5 /HPF      Bacteria, UA None Seen /HPF      Squamous Epithelial Cells, UA 0-2 /HPF      Hyaline Casts, UA 0-2 /LPF      Methodology Automated Microscopy    High Sensitivity Troponin T 2Hr [[id number]] Collected: 01/06/24 0625    Specimen: Blood from Arm, Left Updated: 01/06/24 0629             Imaging:    XR Chest 1 View    Result Date: 1/6/2024  PROCEDURE: XR CHEST 1 VW  COMPARISONS: 11/29/2023; 11/12/2023; 6/20/2023.  INDICATIONS: Weak/Dizzy/AMS (altered mental status) triage protocol.  FINDINGS: A single AP upright portable chest radiograph is provided for review.  Atelectasis, infiltrate(s), and/or fibrosis is (are) suggested bilaterally.  Pneumonia cannot be excluded.  Pulmonary edema is possible but thought be less likely.  Similar findings were seen on the 11/29/2023 study.  There is asymmetric elevation of the right diaphragm, seen previously.  No cardiomediastinal enlargement.  The thoracic aorta is atherosclerotic and ectatic, as before.  There may be  chronic calcified granulomatous disease of the chest.  External artifacts obscure detail.  There is generalized osteopenia.       Atelectasis, infiltrate(s), and/or fibrosis may be present bilaterally, especially in the mid left lung.  Pneumonia cannot be excluded.  Overall, no significant interval change is appreciated since the 11/29/2023 study.      Please note that portions of this note were completed with a voice recognition program.  THOMAS CHILDS JR, MD       Electronically Signed and Approved By: THOMAS CHILDS JR, MD on 1/06/2024 at 0:46                 Differential Diagnosis and Discussion:      Dizziness: Based on the patient's history, signs, and symptoms, the diffential diagnosis includes but is not limited to meningitis, stroke, sepsis, subarachnoid hemorrhage, intracranial bleeding, encephalitis, vertigo, electrolyte imbalance, and metabolic disorders.  Weakness: Based on the patient's history, signs, and symptoms, the diffential diagnosis includes but is not limited to meningitis, stroke, sepsis, subarachnoid hemorrhage, intracranial bleeding, encephalitis, acute uti, dehydration, MS, myasthenia gravis, [place], migraine variant, neuromuscular disorders vertigo, electrolyte imbalance, and metabolic disorders.    All labs were reviewed and interpreted by me.  All X-rays impressions were independently interpreted by me.  EKG was interpreted by me.    MDM     Amount and/or Complexity of Data Reviewed  Decide to obtain previous medical records or to obtain history from someone other than the patient: yes                 Patient Care Considerations:    SEPSIS was considered but is NOT present in the emergency department as SIRS criteria is not present.      Consultants/Shared Management Plan:    I discussed patient with his primary care provider Dr. Pederson who recommended discussion with the patient's nephrologist , who agrees to admission.    Social Determinants of Health:    Patient has  presented with family members who are responsible, reliable and will ensure follow up care.      Disposition and Care Coordination:    Admit:   Through independent evaluation of the patient's history, physical, and imperical data, the patient meets criteria for observation/admission to the hospital.        Final diagnoses:   Acute renal failure superimposed on chronic kidney disease, unspecified acute renal failure type, unspecified CKD stage   Dehydration   Postural dizziness with presyncope   Multiple myeloma, remission status unspecified   History of antineoplastic therapy        ED Disposition       ED Disposition   Decision to Admit    Condition   --    Comment   Level of Care: Telemetry [5]   Diagnosis: Acute on chronic renal failure [122248]   Admitting Physician: AHSAN GUERRERO [[id number]]   Attending Physician: AHSAN GUERRERO [[id number]]   Certification: I Certify That Inpatient Hospital Services Are Medically Necessary For Greater Than 2 Midnights                 This medical record created using voice recognition software.             Saul Ricardo MD  01/06/24 0642

## 2024-01-06 NOTE — PLAN OF CARE
Patient has been resting today. No complaints of pain. Plan of care reviewed with patient.     Yared Juarez RN

## 2024-01-07 ENCOUNTER — READMISSION MANAGEMENT (OUTPATIENT)
Dept: CALL CENTER | Facility: HOSPITAL | Age: 79
End: 2024-01-07
Payer: MEDICARE

## 2024-01-07 VITALS
SYSTOLIC BLOOD PRESSURE: 99 MMHG | RESPIRATION RATE: 20 BRPM | DIASTOLIC BLOOD PRESSURE: 49 MMHG | HEART RATE: 96 BPM | BODY MASS INDEX: 26.27 KG/M2 | OXYGEN SATURATION: 93 % | HEIGHT: 73 IN | WEIGHT: 198.19 LBS | TEMPERATURE: 98.2 F

## 2024-01-07 LAB
ALBUMIN SERPL-MCNC: 2.6 G/DL (ref 3.5–5.2)
ALBUMIN/GLOB SERPL: 1 G/DL
ALP SERPL-CCNC: 57 U/L (ref 39–117)
ALT SERPL W P-5'-P-CCNC: 14 U/L (ref 1–41)
ANION GAP SERPL CALCULATED.3IONS-SCNC: 11.6 MMOL/L (ref 5–15)
AST SERPL-CCNC: 12 U/L (ref 1–40)
BILIRUB SERPL-MCNC: 0.5 MG/DL (ref 0–1.2)
BUN SERPL-MCNC: 37 MG/DL (ref 8–23)
BUN/CREAT SERPL: 16.7 (ref 7–25)
CALCIUM SPEC-SCNC: 7.2 MG/DL (ref 8.6–10.5)
CHLORIDE SERPL-SCNC: 104 MMOL/L (ref 98–107)
CO2 SERPL-SCNC: 21.4 MMOL/L (ref 22–29)
CREAT SERPL-MCNC: 2.21 MG/DL (ref 0.76–1.27)
EGFRCR SERPLBLD CKD-EPI 2021: 29.9 ML/MIN/1.73
GLOBULIN UR ELPH-MCNC: 2.7 GM/DL
GLUCOSE SERPL-MCNC: 97 MG/DL (ref 65–99)
POTASSIUM SERPL-SCNC: 3.8 MMOL/L (ref 3.5–5.2)
PROT SERPL-MCNC: 5.3 G/DL (ref 6–8.5)
SODIUM SERPL-SCNC: 137 MMOL/L (ref 136–145)

## 2024-01-07 PROCEDURE — 25010000002 ENOXAPARIN PER 10 MG: Performed by: INTERNAL MEDICINE

## 2024-01-07 PROCEDURE — 80053 COMPREHEN METABOLIC PANEL: CPT | Performed by: INTERNAL MEDICINE

## 2024-01-07 RX ADMIN — FAMOTIDINE 40 MG: 20 TABLET, FILM COATED ORAL at 08:02

## 2024-01-07 RX ADMIN — ENOXAPARIN SODIUM 30 MG: 100 INJECTION SUBCUTANEOUS at 04:37

## 2024-01-07 RX ADMIN — LENALIDOMIDE 25 MG: 25 CAPSULE ORAL at 08:02

## 2024-01-07 NOTE — DISCHARGE SUMMARY
[facility]   DISCHARGE SUMMARY    Patient Name: Blair Lira  : 1945  MRN: [id number]    Date of Admission: 2024  Date of Discharge: 2024  Primary Care Physician: Babs Summers APRN    Consults       Date and Time Order Name Status Description    2024  2:44 AM Nephrology  (on-call MD unless specified)      2024  2:40 AM IP General Consult (Use specialty-specific consult if known)              Hospital Course     Presenting Problem:   Dehydration [E86.0]  Acute on chronic renal failure [N17.9, N18.9]  History of antineoplastic therapy [Z92.21]  Postural dizziness with presyncope [R42, R55]  Multiple myeloma, remission status unspecified [C90.00]  Acute renal failure superimposed on chronic kidney disease, unspecified acute renal failure type, unspecified CKD stage [N17.9, N18.9]    Active and resolved problems  Active Problems:    Multiple myeloma  Overview:    Chronic diastolic (congestive) heart failure  Overview:    Chronic kidney disease, stage IV (severe)  Overview:    Orthostatic hypotension  Overview:  Resolved Problems:    * No resolved hospital problems. *      Hospital Course:  Blair Lira is a 78 y.o. 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 Mason was admitted to the hospital because of severe dizziness and patient was very severely orthostatic.  He was on Lasix which probably contributed to his hypotension.  His Lasix was stopped and patient was given IV fluids this morning I checked his standing blood pressure and his systolic blood pressure is around 100 and he did not complain of any dizziness.  At this time as patient is feeling better he will be discharged home.  I have advised him to use Lasix as needed for swelling or shortness of breath but do not  use otherwise.  It contributed to his hypotension      Vital Signs:  Temp:  [98.2 °F (36.8 °C)-99.1 °F (37.3 °C)] 98.2 °F (36.8 °C)  Heart Rate:  [70-93] 90  Resp:  [18-20] 20  BP: ()/(53-63) 97/53      Discharge Details        Discharge Medications        Continue These Medications        Instructions Start Date   albuterol sulfate  (90 Base) MCG/ACT inhaler  Commonly known as: PROVENTIL HFA;VENTOLIN HFA;PROAIR HFA   2 puffs, Inhalation, Every 4 Hours PRN      atorvastatin 10 MG tablet  Commonly known as: LIPITOR   10 mg, Oral, Nightly      dexAMETHasone 4 MG tablet  Commonly known as: DECADRON   20 mg, Oral, Take As Directed, Takes 20 mg the morning before chemo treatments      lenalidomide 25 MG capsule  Commonly known as: REVLIMID   25 mg, Oral, Daily      nystatin 100,000 unit/mL suspension  Commonly known as: MYCOSTATIN   5 mL, Oral, 4 Times Daily             Stop These Medications      furosemide 40 MG tablet  Commonly known as: LASIX              No Known Allergies      Discharge Disposition:      Diet:        Discharge Activity:         CODE STATUS:    Code Status and Medical Interventions:   Ordered at: 01/06/24 0251     Code Status (Patient has no pulse and is not breathing):    CPR (Attempt to Resuscitate)     Medical Interventions (Patient has pulse or is breathing):    Full Support         No future appointments.        Time spent on Discharge including face to face service:  30 minutes    Electronically signed by Siri Fox MD, 01/07/24, 8:30 AM EST.

## 2024-01-07 NOTE — OUTREACH NOTE
Prep Survey      Flowsheet Row Responses   [facility] facility patient discharged from? [facility]   Is LACE score < 7 ? No   Eligibility Readm Mgmt   Discharge diagnosis Multiple myelomaChronic diastolic (congestive) heart failure   Does the patient have one of the following disease processes/diagnoses(primary or secondary)? CHF   Does the patient have Home health ordered? No   Is there a DME ordered? No   Prep survey completed? Yes            STEVEN HOLGUIN - Registered Nurse

## 2024-01-07 NOTE — PLAN OF CARE
Goal Outcome Evaluation:  Plan of Care Reviewed With: patient      Patients o2 sats were 79 then 81-83%. Patient denied SOA and lung sounds were clear. O2 is 97% on 3LNC. MD aware, stopped continuous IV fluids per MD. O2 sats are now 95% on 1LNC. No complaints from patient during the shift.

## 2024-01-09 LAB
ABO GROUP BLD: NORMAL
BLD GP AB SCN SERPL QL: POSITIVE
RH BLD: POSITIVE
T&S EXPIRATION DATE: NORMAL

## 2024-01-11 ENCOUNTER — READMISSION MANAGEMENT (OUTPATIENT)
Dept: CALL CENTER | Facility: HOSPITAL | Age: 79
End: 2024-01-11
Payer: MEDICARE

## 2024-01-11 NOTE — OUTREACH NOTE
CHF Week 1 Survey      Flowsheet Row Responses   [facility] patient discharged from? [facility]   Does the patient have one of the following disease processes/diagnoses(primary or secondary)? CHF   CHF Week 1 attempt successful? Yes   Call start time 1347   Call end time 1350   Discharge diagnosis Multiple myelomaChronic diastolic (congestive) heart failure   Meds reviewed with patient/caregiver? Yes   Is the patient having any side effects they believe may be caused by any medication additions or changes? No   Does the patient have all medications ordered at discharge? Yes   Is the patient taking all medications as directed (includes completed medication regime)? Yes   Does the patient have a primary care provider?  Yes   Does the patient have an appointment with their PCP within 7 days of discharge? Yes   Has the patient kept scheduled appointments due by today? Yes   Psychosocial issues? No   Did the patient receive a copy of their discharge instructions? Yes   Nursing interventions Reviewed instructions with patient   What is the patient's perception of their health status since discharge? Improving   Nursing interventions Nurse provided patient education   Is the patient able to teach back signs and symptoms of worsening condition? (i.e. weight gain, shortness of air, etc.) Yes   If the patient is a current smoker, are they able to teach back resources for cessation? Not a smoker   Is the patient/caregiver able to teach back the hierarchy of who to call/visit for symptoms/problems? PCP, Specialist, Home health nurse, Urgent Care, ED, 911 Yes   Is the patient able to teach back Heart Failure Zones? Yes   CHF Nursing Interventions Education provided on various zones   CHF Zone this Call Green Zone   Green Zone Patient reports doing well, No new or worsening shortness of breath, Physical activity level is normal for you, No new swelling -  feet, ankles and legs look normal for you, Weight check stable, No chest  pain   Green Zone Interventions Daily weight check    CHF Week 1 call completed? Yes   Is the patient interested in additional calls from an ambulatory ? No   Would this patient benefit from a Referral to [facility] Social Work? No   Wrap up additional comments pt doing well, weighing everyday and staying constant   Call end time 1350            STEVEN HOLGUIN - Registered Nurse

## 2024-01-17 ENCOUNTER — HOSPITAL ENCOUNTER (INPATIENT)
Facility: HOSPITAL | Age: 79
LOS: 11 days | Discharge: HOME HEALTH | DRG: 871 | End: 2024-01-28
Attending: EMERGENCY MEDICINE | Admitting: INTERNAL MEDICINE
Payer: MEDICARE

## 2024-01-17 ENCOUNTER — APPOINTMENT (OUTPATIENT)
Dept: GENERAL RADIOLOGY | Facility: HOSPITAL | Age: 79
DRG: 871 | End: 2024-01-17
Payer: MEDICARE

## 2024-01-17 DIAGNOSIS — D64.9 CHRONIC ANEMIA: ICD-10-CM

## 2024-01-17 DIAGNOSIS — R79.89 ELEVATED TROPONIN: ICD-10-CM

## 2024-01-17 DIAGNOSIS — I50.32 CHRONIC DIASTOLIC (CONGESTIVE) HEART FAILURE: ICD-10-CM

## 2024-01-17 DIAGNOSIS — R50.9 ACUTE FEBRILE ILLNESS: ICD-10-CM

## 2024-01-17 DIAGNOSIS — J18.9 PNEUMONIA OF LEFT LOWER LOBE DUE TO INFECTIOUS ORGANISM: ICD-10-CM

## 2024-01-17 DIAGNOSIS — A41.9 SEPSIS WITHOUT ACUTE ORGAN DYSFUNCTION, DUE TO UNSPECIFIED ORGANISM: Primary | ICD-10-CM

## 2024-01-17 DIAGNOSIS — Z78.9 DECREASED ACTIVITIES OF DAILY LIVING (ADL): ICD-10-CM

## 2024-01-17 DIAGNOSIS — R26.2 DIFFICULTY WALKING: ICD-10-CM

## 2024-01-17 DIAGNOSIS — N18.9 CHRONIC KIDNEY DISEASE, UNSPECIFIED CKD STAGE: ICD-10-CM

## 2024-01-17 PROBLEM — I95.9 SEPSIS ASSOCIATED HYPOTENSION: Status: ACTIVE | Noted: 2023-11-10

## 2024-01-17 LAB
ABO GROUP BLD: NORMAL
ALBUMIN SERPL-MCNC: 2.8 G/DL (ref 3.5–5.2)
ALBUMIN SERPL-MCNC: 3.1 G/DL (ref 3.5–5.2)
ALBUMIN/GLOB SERPL: 1 G/DL
ALBUMIN/GLOB SERPL: 1 G/DL
ALP SERPL-CCNC: 59 U/L (ref 39–117)
ALP SERPL-CCNC: 67 U/L (ref 39–117)
ALT SERPL W P-5'-P-CCNC: 21 U/L (ref 1–41)
ALT SERPL W P-5'-P-CCNC: 24 U/L (ref 1–41)
ANION GAP SERPL CALCULATED.3IONS-SCNC: 13.6 MMOL/L (ref 5–15)
ANION GAP SERPL CALCULATED.3IONS-SCNC: 16.6 MMOL/L (ref 5–15)
ANISOCYTOSIS BLD QL: NORMAL
AST SERPL-CCNC: 15 U/L (ref 1–40)
AST SERPL-CCNC: 19 U/L (ref 1–40)
BACTERIA BLD CULT: ABNORMAL
BACTERIA UR QL AUTO: ABNORMAL /HPF
BACTERIA UR QL AUTO: ABNORMAL /HPF
BASOPHILS # BLD AUTO: 0.06 10*3/MM3 (ref 0–0.2)
BASOPHILS NFR BLD AUTO: 1.7 % (ref 0–1.5)
BILIRUB SERPL-MCNC: 1.4 MG/DL (ref 0–1.2)
BILIRUB SERPL-MCNC: 1.4 MG/DL (ref 0–1.2)
BILIRUB UR QL STRIP: NEGATIVE
BILIRUB UR QL STRIP: NEGATIVE
BLD GP AB SCN SERPL QL: POSITIVE
BOTTLE TYPE: ABNORMAL
BUN SERPL-MCNC: 29 MG/DL (ref 8–23)
BUN SERPL-MCNC: 31 MG/DL (ref 8–23)
BUN/CREAT SERPL: 11.5 (ref 7–25)
BUN/CREAT SERPL: 11.7 (ref 7–25)
CALCIUM SPEC-SCNC: 7.9 MG/DL (ref 8.6–10.5)
CALCIUM SPEC-SCNC: 8.5 MG/DL (ref 8.6–10.5)
CHLORIDE SERPL-SCNC: 102 MMOL/L (ref 98–107)
CHLORIDE SERPL-SCNC: 103 MMOL/L (ref 98–107)
CLARITY UR: ABNORMAL
CLARITY UR: ABNORMAL
CO2 SERPL-SCNC: 17.4 MMOL/L (ref 22–29)
CO2 SERPL-SCNC: 18.4 MMOL/L (ref 22–29)
COLOR UR: YELLOW
COLOR UR: YELLOW
CORTIS SERPL-MCNC: 25.43 MCG/DL
CREAT SERPL-MCNC: 2.52 MG/DL (ref 0.76–1.27)
CREAT SERPL-MCNC: 2.66 MG/DL (ref 0.76–1.27)
D-LACTATE SERPL-SCNC: 0.8 MMOL/L (ref 0.5–2)
D-LACTATE SERPL-SCNC: 2.9 MMOL/L (ref 0.5–2)
DEPRECATED RDW RBC AUTO: 62.1 FL (ref 37–54)
DEPRECATED RDW RBC AUTO: 63.3 FL (ref 37–54)
EGFRCR SERPLBLD CKD-EPI 2021: 24 ML/MIN/1.73
EGFRCR SERPLBLD CKD-EPI 2021: 25.6 ML/MIN/1.73
EOSINOPHIL # BLD AUTO: 0.04 10*3/MM3 (ref 0–0.4)
EOSINOPHIL NFR BLD AUTO: 1.1 % (ref 0.3–6.2)
ERYTHROCYTE [DISTWIDTH] IN BLOOD BY AUTOMATED COUNT: 18.2 % (ref 12.3–15.4)
ERYTHROCYTE [DISTWIDTH] IN BLOOD BY AUTOMATED COUNT: 18.3 % (ref 12.3–15.4)
FLUAV SUBTYP SPEC NAA+PROBE: NOT DETECTED
FLUBV RNA ISLT QL NAA+PROBE: NOT DETECTED
GEN 5 2HR TROPONIN T REFLEX: 95 NG/L
GLOBULIN UR ELPH-MCNC: 2.7 GM/DL
GLOBULIN UR ELPH-MCNC: 3 GM/DL
GLUCOSE SERPL-MCNC: 142 MG/DL (ref 65–99)
GLUCOSE SERPL-MCNC: 153 MG/DL (ref 65–99)
GLUCOSE UR STRIP-MCNC: NEGATIVE MG/DL
GLUCOSE UR STRIP-MCNC: NEGATIVE MG/DL
HCT VFR BLD AUTO: 20.9 % (ref 37.5–51)
HCT VFR BLD AUTO: 24.6 % (ref 37.5–51)
HGB BLD-MCNC: 6.5 G/DL (ref 13–17.7)
HGB BLD-MCNC: 7.7 G/DL (ref 13–17.7)
HGB UR QL STRIP.AUTO: ABNORMAL
HGB UR QL STRIP.AUTO: ABNORMAL
HOLD SPECIMEN: NORMAL
HOLD SPECIMEN: NORMAL
HYALINE CASTS UR QL AUTO: ABNORMAL /LPF
HYALINE CASTS UR QL AUTO: ABNORMAL /LPF
IMM GRANULOCYTES # BLD AUTO: 0.1 10*3/MM3 (ref 0–0.05)
IMM GRANULOCYTES NFR BLD AUTO: 2.8 % (ref 0–0.5)
KETONES UR QL STRIP: NEGATIVE
KETONES UR QL STRIP: NEGATIVE
L PNEUMO1 AG UR QL IA: NEGATIVE
LARGE PLATELETS: NORMAL
LEUKOCYTE ESTERASE UR QL STRIP.AUTO: ABNORMAL
LEUKOCYTE ESTERASE UR QL STRIP.AUTO: ABNORMAL
LYMPHOCYTES # BLD AUTO: 0.43 10*3/MM3 (ref 0.7–3.1)
LYMPHOCYTES NFR BLD AUTO: 12.2 % (ref 19.6–45.3)
M PNEUMO IGM SER QL: NEGATIVE
MAGNESIUM SERPL-MCNC: 1.8 MG/DL (ref 1.6–2.4)
MCH RBC QN AUTO: 29.4 PG (ref 26.6–33)
MCH RBC QN AUTO: 29.5 PG (ref 26.6–33)
MCHC RBC AUTO-ENTMCNC: 31.1 G/DL (ref 31.5–35.7)
MCHC RBC AUTO-ENTMCNC: 31.3 G/DL (ref 31.5–35.7)
MCV RBC AUTO: 93.9 FL (ref 79–97)
MCV RBC AUTO: 95 FL (ref 79–97)
MONOCYTES # BLD AUTO: 0.23 10*3/MM3 (ref 0.1–0.9)
MONOCYTES NFR BLD AUTO: 6.5 % (ref 5–12)
NEUTROPHILS NFR BLD AUTO: 2.67 10*3/MM3 (ref 1.7–7)
NEUTROPHILS NFR BLD AUTO: 75.7 % (ref 42.7–76)
NITRITE UR QL STRIP: NEGATIVE
NITRITE UR QL STRIP: POSITIVE
NRBC BLD AUTO-RTO: 0 /100 WBC (ref 0–0.2)
PASSIVE ANTI-CD-38: NORMAL
PH UR STRIP.AUTO: 5.5 [PH] (ref 5–8)
PH UR STRIP.AUTO: 5.5 [PH] (ref 5–8)
PLATELET # BLD AUTO: 77 10*3/MM3 (ref 140–450)
PLATELET # BLD AUTO: 97 10*3/MM3 (ref 140–450)
PMV BLD AUTO: 12.2 FL (ref 6–12)
PMV BLD AUTO: 12.7 FL (ref 6–12)
POTASSIUM SERPL-SCNC: 3.8 MMOL/L (ref 3.5–5.2)
POTASSIUM SERPL-SCNC: 4.1 MMOL/L (ref 3.5–5.2)
PROCALCITONIN SERPL-MCNC: 0.43 NG/ML (ref 0–0.25)
PROT SERPL-MCNC: 5.5 G/DL (ref 6–8.5)
PROT SERPL-MCNC: 6.1 G/DL (ref 6–8.5)
PROT UR QL STRIP: ABNORMAL
PROT UR QL STRIP: ABNORMAL
RBC # BLD AUTO: 2.2 10*6/MM3 (ref 4.14–5.8)
RBC # BLD AUTO: 2.62 10*6/MM3 (ref 4.14–5.8)
RBC # UR STRIP: ABNORMAL /HPF
RBC # UR STRIP: ABNORMAL /HPF
REF LAB TEST METHOD: ABNORMAL
REF LAB TEST METHOD: ABNORMAL
RH BLD: POSITIVE
RSV RNA NPH QL NAA+NON-PROBE: NOT DETECTED
S PNEUM AG SPEC QL LA: NEGATIVE
SARS-COV-2 RNA RESP QL NAA+PROBE: NOT DETECTED
SODIUM SERPL-SCNC: 135 MMOL/L (ref 136–145)
SODIUM SERPL-SCNC: 136 MMOL/L (ref 136–145)
SP GR UR STRIP: 1.01 (ref 1–1.03)
SP GR UR STRIP: 1.01 (ref 1–1.03)
SQUAMOUS #/AREA URNS HPF: ABNORMAL /HPF
SQUAMOUS #/AREA URNS HPF: ABNORMAL /HPF
T&S EXPIRATION DATE: NORMAL
T-UPTAKE NFR SERPL: 0.79 TBI (ref 0.8–1.3)
T4 SERPL-MCNC: 6.69 MCG/DL (ref 4.5–11.7)
TROPONIN T DELTA: -3 NG/L
TROPONIN T SERPL HS-MCNC: 98 NG/L
TSH SERPL DL<=0.05 MIU/L-ACNC: 0.91 UIU/ML (ref 0.27–4.2)
UROBILINOGEN UR QL STRIP: ABNORMAL
UROBILINOGEN UR QL STRIP: ABNORMAL
WBC # UR STRIP: ABNORMAL /HPF
WBC # UR STRIP: ABNORMAL /HPF
WBC MORPH BLD: NORMAL
WBC NRBC COR # BLD AUTO: 2.69 10*3/MM3 (ref 3.4–10.8)
WBC NRBC COR # BLD AUTO: 3.53 10*3/MM3 (ref 3.4–10.8)
WHOLE BLOOD HOLD COAG: NORMAL
WHOLE BLOOD HOLD SPECIMEN: NORMAL
YEAST URNS QL MICRO: ABNORMAL /HPF

## 2024-01-17 PROCEDURE — 36430 TRANSFUSION BLD/BLD COMPNT: CPT

## 2024-01-17 PROCEDURE — 87899 AGENT NOS ASSAY W/OPTIC: CPT | Performed by: INTERNAL MEDICINE

## 2024-01-17 PROCEDURE — 84443 ASSAY THYROID STIM HORMONE: CPT | Performed by: INTERNAL MEDICINE

## 2024-01-17 PROCEDURE — 81001 URINALYSIS AUTO W/SCOPE: CPT | Performed by: EMERGENCY MEDICINE

## 2024-01-17 PROCEDURE — 25010000002 VANCOMYCIN 5 G RECONSTITUTED SOLUTION: Performed by: EMERGENCY MEDICINE

## 2024-01-17 PROCEDURE — 25010000002 PIPERACILLIN SOD-TAZOBACTAM PER 1 G: Performed by: EMERGENCY MEDICINE

## 2024-01-17 PROCEDURE — 83735 ASSAY OF MAGNESIUM: CPT | Performed by: EMERGENCY MEDICINE

## 2024-01-17 PROCEDURE — 36415 COLL VENOUS BLD VENIPUNCTURE: CPT | Performed by: EMERGENCY MEDICINE

## 2024-01-17 PROCEDURE — 86976 RBC SERUM PRETX ID DILUTION: CPT

## 2024-01-17 PROCEDURE — 84484 ASSAY OF TROPONIN QUANT: CPT | Performed by: EMERGENCY MEDICINE

## 2024-01-17 PROCEDURE — 93005 ELECTROCARDIOGRAM TRACING: CPT | Performed by: EMERGENCY MEDICINE

## 2024-01-17 PROCEDURE — 94799 UNLISTED PULMONARY SVC/PX: CPT

## 2024-01-17 PROCEDURE — 93010 ELECTROCARDIOGRAM REPORT: CPT | Performed by: INTERNAL MEDICINE

## 2024-01-17 PROCEDURE — 85007 BL SMEAR W/DIFF WBC COUNT: CPT | Performed by: EMERGENCY MEDICINE

## 2024-01-17 PROCEDURE — 87150 DNA/RNA AMPLIFIED PROBE: CPT | Performed by: EMERGENCY MEDICINE

## 2024-01-17 PROCEDURE — 86900 BLOOD TYPING SEROLOGIC ABO: CPT

## 2024-01-17 PROCEDURE — 85027 COMPLETE CBC AUTOMATED: CPT | Performed by: INTERNAL MEDICINE

## 2024-01-17 PROCEDURE — 87086 URINE CULTURE/COLONY COUNT: CPT | Performed by: EMERGENCY MEDICINE

## 2024-01-17 PROCEDURE — 87086 URINE CULTURE/COLONY COUNT: CPT | Performed by: INTERNAL MEDICINE

## 2024-01-17 PROCEDURE — 87147 CULTURE TYPE IMMUNOLOGIC: CPT | Performed by: EMERGENCY MEDICINE

## 2024-01-17 PROCEDURE — 25010000002 PIPERACILLIN SOD-TAZOBACTAM PER 1 G: Performed by: INTERNAL MEDICINE

## 2024-01-17 PROCEDURE — 82533 TOTAL CORTISOL: CPT | Performed by: INTERNAL MEDICINE

## 2024-01-17 PROCEDURE — 86920 COMPATIBILITY TEST SPIN: CPT

## 2024-01-17 PROCEDURE — 80053 COMPREHEN METABOLIC PANEL: CPT | Performed by: EMERGENCY MEDICINE

## 2024-01-17 PROCEDURE — 87637 SARSCOV2&INF A&B&RSV AMP PRB: CPT | Performed by: EMERGENCY MEDICINE

## 2024-01-17 PROCEDURE — 88312 SPECIAL STAINS GROUP 1: CPT | Performed by: EMERGENCY MEDICINE

## 2024-01-17 PROCEDURE — 86901 BLOOD TYPING SEROLOGIC RH(D): CPT | Performed by: INTERNAL MEDICINE

## 2024-01-17 PROCEDURE — 87040 BLOOD CULTURE FOR BACTERIA: CPT | Performed by: EMERGENCY MEDICINE

## 2024-01-17 PROCEDURE — 86906 BLD TYPING SEROLOGIC RH PHNT: CPT

## 2024-01-17 PROCEDURE — 84145 PROCALCITONIN (PCT): CPT | Performed by: EMERGENCY MEDICINE

## 2024-01-17 PROCEDURE — 86922 COMPATIBILITY TEST ANTIGLOB: CPT

## 2024-01-17 PROCEDURE — 94640 AIRWAY INHALATION TREATMENT: CPT

## 2024-01-17 PROCEDURE — 87449 NOS EACH ORGANISM AG IA: CPT | Performed by: INTERNAL MEDICINE

## 2024-01-17 PROCEDURE — 36415 COLL VENOUS BLD VENIPUNCTURE: CPT

## 2024-01-17 PROCEDURE — 81001 URINALYSIS AUTO W/SCOPE: CPT | Performed by: INTERNAL MEDICINE

## 2024-01-17 PROCEDURE — 97165 OT EVAL LOW COMPLEX 30 MIN: CPT

## 2024-01-17 PROCEDURE — 86880 COOMBS TEST DIRECT: CPT

## 2024-01-17 PROCEDURE — 86850 RBC ANTIBODY SCREEN: CPT | Performed by: INTERNAL MEDICINE

## 2024-01-17 PROCEDURE — 86870 RBC ANTIBODY IDENTIFICATION: CPT | Performed by: INTERNAL MEDICINE

## 2024-01-17 PROCEDURE — 83605 ASSAY OF LACTIC ACID: CPT | Performed by: EMERGENCY MEDICINE

## 2024-01-17 PROCEDURE — 86850 RBC ANTIBODY SCREEN: CPT

## 2024-01-17 PROCEDURE — 85025 COMPLETE CBC W/AUTO DIFF WBC: CPT | Performed by: EMERGENCY MEDICINE

## 2024-01-17 PROCEDURE — 84436 ASSAY OF TOTAL THYROXINE: CPT | Performed by: INTERNAL MEDICINE

## 2024-01-17 PROCEDURE — 25810000003 SODIUM CHLORIDE 0.9 % SOLUTION: Performed by: EMERGENCY MEDICINE

## 2024-01-17 PROCEDURE — 71045 X-RAY EXAM CHEST 1 VIEW: CPT

## 2024-01-17 PROCEDURE — 80053 COMPREHEN METABOLIC PANEL: CPT | Performed by: INTERNAL MEDICINE

## 2024-01-17 PROCEDURE — 87186 SC STD MICRODIL/AGAR DIL: CPT | Performed by: EMERGENCY MEDICINE

## 2024-01-17 PROCEDURE — 84479 ASSAY OF THYROID (T3 OR T4): CPT | Performed by: INTERNAL MEDICINE

## 2024-01-17 PROCEDURE — 87154 CUL TYP ID BLD PTHGN 6+ TRGT: CPT | Performed by: EMERGENCY MEDICINE

## 2024-01-17 PROCEDURE — 86738 MYCOPLASMA ANTIBODY: CPT | Performed by: INTERNAL MEDICINE

## 2024-01-17 PROCEDURE — 99285 EMERGENCY DEPT VISIT HI MDM: CPT

## 2024-01-17 PROCEDURE — 86870 RBC ANTIBODY IDENTIFICATION: CPT

## 2024-01-17 PROCEDURE — P9040 RBC LEUKOREDUCED IRRADIATED: HCPCS

## 2024-01-17 PROCEDURE — 86900 BLOOD TYPING SEROLOGIC ABO: CPT | Performed by: INTERNAL MEDICINE

## 2024-01-17 RX ORDER — MIDODRINE HYDROCHLORIDE 5 MG/1
5 TABLET ORAL
Status: DISCONTINUED | OUTPATIENT
Start: 2024-01-17 | End: 2024-01-28 | Stop reason: HOSPADM

## 2024-01-17 RX ORDER — BISACODYL 5 MG/1
5 TABLET, DELAYED RELEASE ORAL DAILY PRN
Status: DISCONTINUED | OUTPATIENT
Start: 2024-01-17 | End: 2024-01-28 | Stop reason: HOSPADM

## 2024-01-17 RX ORDER — SODIUM CHLORIDE 0.9 % (FLUSH) 0.9 %
10 SYRINGE (ML) INJECTION EVERY 12 HOURS SCHEDULED
Status: DISCONTINUED | OUTPATIENT
Start: 2024-01-17 | End: 2024-01-28 | Stop reason: HOSPADM

## 2024-01-17 RX ORDER — ACETAMINOPHEN 325 MG/1
650 TABLET ORAL EVERY 4 HOURS PRN
Status: DISCONTINUED | OUTPATIENT
Start: 2024-01-17 | End: 2024-01-28 | Stop reason: HOSPADM

## 2024-01-17 RX ORDER — ONDANSETRON 2 MG/ML
4 INJECTION INTRAMUSCULAR; INTRAVENOUS EVERY 6 HOURS PRN
Status: DISCONTINUED | OUTPATIENT
Start: 2024-01-17 | End: 2024-01-28 | Stop reason: HOSPADM

## 2024-01-17 RX ORDER — TORSEMIDE 20 MG/1
20 TABLET ORAL 2 TIMES DAILY
Status: ON HOLD | COMMUNITY
Start: 2024-01-05 | End: 2024-01-17

## 2024-01-17 RX ORDER — SODIUM CHLORIDE 9 MG/ML
40 INJECTION, SOLUTION INTRAVENOUS AS NEEDED
Status: DISCONTINUED | OUTPATIENT
Start: 2024-01-17 | End: 2024-01-28 | Stop reason: HOSPADM

## 2024-01-17 RX ORDER — POLYETHYLENE GLYCOL 3350 17 G/17G
17 POWDER, FOR SOLUTION ORAL DAILY PRN
Status: DISCONTINUED | OUTPATIENT
Start: 2024-01-17 | End: 2024-01-28 | Stop reason: HOSPADM

## 2024-01-17 RX ORDER — BENZONATATE 100 MG/1
100 CAPSULE ORAL EVERY 4 HOURS PRN
Status: DISCONTINUED | OUTPATIENT
Start: 2024-01-17 | End: 2024-01-28 | Stop reason: HOSPADM

## 2024-01-17 RX ORDER — DEXTROSE AND SODIUM CHLORIDE 5; .45 G/100ML; G/100ML
100 INJECTION, SOLUTION INTRAVENOUS CONTINUOUS
Status: DISCONTINUED | OUTPATIENT
Start: 2024-01-17 | End: 2024-01-20

## 2024-01-17 RX ORDER — SODIUM CHLORIDE 0.9 % (FLUSH) 0.9 %
10 SYRINGE (ML) INJECTION AS NEEDED
Status: DISCONTINUED | OUTPATIENT
Start: 2024-01-17 | End: 2024-01-28 | Stop reason: HOSPADM

## 2024-01-17 RX ORDER — ACETAMINOPHEN 325 MG/1
650 TABLET ORAL ONCE
Status: COMPLETED | OUTPATIENT
Start: 2024-01-17 | End: 2024-01-17

## 2024-01-17 RX ORDER — FLUTICASONE PROPIONATE 50 MCG
2 SPRAY, SUSPENSION (ML) NASAL DAILY
COMMUNITY
Start: 2024-01-10

## 2024-01-17 RX ORDER — MIRTAZAPINE 15 MG/1
15 TABLET, FILM COATED ORAL NIGHTLY
COMMUNITY
Start: 2024-01-15

## 2024-01-17 RX ORDER — TAMSULOSIN HYDROCHLORIDE 0.4 MG/1
1 CAPSULE ORAL DAILY
COMMUNITY

## 2024-01-17 RX ORDER — ALBUTEROL SULFATE 2.5 MG/3ML
2.5 SOLUTION RESPIRATORY (INHALATION)
Status: DISCONTINUED | OUTPATIENT
Start: 2024-01-17 | End: 2024-01-17

## 2024-01-17 RX ORDER — SULFAMETHOXAZOLE AND TRIMETHOPRIM 800; 160 MG/1; MG/1
1 TABLET ORAL 2 TIMES DAILY
Status: DISCONTINUED | OUTPATIENT
Start: 2024-01-17 | End: 2024-01-23

## 2024-01-17 RX ORDER — ALUMINA, MAGNESIA, AND SIMETHICONE 2400; 2400; 240 MG/30ML; MG/30ML; MG/30ML
15 SUSPENSION ORAL EVERY 6 HOURS PRN
Status: DISCONTINUED | OUTPATIENT
Start: 2024-01-17 | End: 2024-01-28 | Stop reason: HOSPADM

## 2024-01-17 RX ORDER — PANTOPRAZOLE SODIUM 40 MG/1
40 TABLET, DELAYED RELEASE ORAL DAILY
COMMUNITY

## 2024-01-17 RX ORDER — ALBUTEROL SULFATE 2.5 MG/3ML
2.5 SOLUTION RESPIRATORY (INHALATION)
Status: DISCONTINUED | OUTPATIENT
Start: 2024-01-17 | End: 2024-01-23

## 2024-01-17 RX ORDER — GINSENG 100 MG
1 CAPSULE ORAL ONCE
Status: COMPLETED | OUTPATIENT
Start: 2024-01-17 | End: 2024-01-17

## 2024-01-17 RX ORDER — AMLODIPINE BESYLATE 5 MG/1
5 TABLET ORAL DAILY
COMMUNITY
End: 2024-01-28 | Stop reason: HOSPADM

## 2024-01-17 RX ORDER — BISACODYL 10 MG
10 SUPPOSITORY, RECTAL RECTAL DAILY PRN
Status: DISCONTINUED | OUTPATIENT
Start: 2024-01-17 | End: 2024-01-28 | Stop reason: HOSPADM

## 2024-01-17 RX ORDER — AMOXICILLIN 250 MG
2 CAPSULE ORAL 2 TIMES DAILY
Status: DISCONTINUED | OUTPATIENT
Start: 2024-01-17 | End: 2024-01-28 | Stop reason: HOSPADM

## 2024-01-17 RX ADMIN — SODIUM CHLORIDE 500 ML: 9 INJECTION, SOLUTION INTRAVENOUS at 02:46

## 2024-01-17 RX ADMIN — SULFAMETHOXAZOLE AND TRIMETHOPRIM 1 TABLET: 800; 160 TABLET ORAL at 11:36

## 2024-01-17 RX ADMIN — BENZONATATE 100 MG: 100 CAPSULE ORAL at 15:31

## 2024-01-17 RX ADMIN — NYSTATIN 500000 UNITS: 100000 SUSPENSION ORAL at 11:23

## 2024-01-17 RX ADMIN — BENZONATATE 100 MG: 100 CAPSULE ORAL at 11:24

## 2024-01-17 RX ADMIN — BACITRACIN 0.9 G: 500 OINTMENT TOPICAL at 02:40

## 2024-01-17 RX ADMIN — SULFAMETHOXAZOLE AND TRIMETHOPRIM 1 TABLET: 800; 160 TABLET ORAL at 21:08

## 2024-01-17 RX ADMIN — ALBUTEROL SULFATE 2.5 MG: 2.5 SOLUTION RESPIRATORY (INHALATION) at 19:27

## 2024-01-17 RX ADMIN — DEXTROSE AND SODIUM CHLORIDE 100 ML/HR: 5; 450 INJECTION, SOLUTION INTRAVENOUS at 21:08

## 2024-01-17 RX ADMIN — PIPERACILLIN AND TAZOBACTAM 3.38 G: 3; .375 INJECTION, POWDER, FOR SOLUTION INTRAVENOUS at 03:44

## 2024-01-17 RX ADMIN — ALBUTEROL SULFATE 2.5 MG: 2.5 SOLUTION RESPIRATORY (INHALATION) at 08:18

## 2024-01-17 RX ADMIN — DEXTROSE AND SODIUM CHLORIDE 100 ML/HR: 5; 450 INJECTION, SOLUTION INTRAVENOUS at 09:09

## 2024-01-17 RX ADMIN — MIDODRINE HYDROCHLORIDE 5 MG: 5 TABLET ORAL at 11:23

## 2024-01-17 RX ADMIN — PIPERACILLIN AND TAZOBACTAM 3.38 G: 3; .375 INJECTION, POWDER, FOR SOLUTION INTRAVENOUS at 21:08

## 2024-01-17 RX ADMIN — NYSTATIN 500000 UNITS: 100000 SUSPENSION ORAL at 21:07

## 2024-01-17 RX ADMIN — MIDODRINE HYDROCHLORIDE 5 MG: 5 TABLET ORAL at 17:51

## 2024-01-17 RX ADMIN — ALBUTEROL SULFATE 2.5 MG: 2.5 SOLUTION RESPIRATORY (INHALATION) at 11:38

## 2024-01-17 RX ADMIN — ACETAMINOPHEN 650 MG: 325 TABLET ORAL at 02:46

## 2024-01-17 RX ADMIN — NYSTATIN 500000 UNITS: 100000 SUSPENSION ORAL at 17:51

## 2024-01-17 RX ADMIN — VANCOMYCIN HYDROCHLORIDE 1750 MG: 5 INJECTION, POWDER, LYOPHILIZED, FOR SOLUTION INTRAVENOUS at 04:24

## 2024-01-17 RX ADMIN — Medication 10 ML: at 09:11

## 2024-01-17 RX ADMIN — ACETAMINOPHEN 650 MG: 325 TABLET ORAL at 10:10

## 2024-01-17 RX ADMIN — PIPERACILLIN AND TAZOBACTAM 3.38 G: 3; .375 INJECTION, POWDER, FOR SOLUTION INTRAVENOUS at 11:36

## 2024-01-17 NOTE — H&P
[facility]   HISTORY AND PHYSICAL    Patient Name: Blair Lira  : 1945  MRN: [id number]  Primary Care Physician:  Babs Summers APRN  Date of admission: 2024    Subjective   Subjective     Chief Complaint: Patient is 78 years old white male patient known to me with recently diagnosed multiple myeloma has been receiving chemotherapy he has been complaining of some cough and had a temperature of 102 rectal in the emergency room chest x-ray worsening finding consistent with sepsis and pneumonia he is immune compromised extremely weak hemoglobin is low at 7.7 BUN and creatinine elevated partially because of the kidney damage caused by multiple myeloma and possibly also because of some element of dehydration we will give him IV hydration IV antibiotics we will get Dr. Pederson to see in follow-up because of his primary care issues complaining of extreme weakness and therefore a unit of blood will be transfused antibiotics will be given patient will continue with his chemotherapy as an outpatient    HPI: With multiple myeloma will be started on chemotherapy recently has had COVID so there is a multifactorial anemia causing his extreme weakness he also has postural hypotension    Blair Lira is a 78 y.o. male with multiple myeloma postural hypotension neuropathies    Review of Systems   All systems were reviewed and negative except for: Has been reviewed    Personal History     Past Medical History:   Diagnosis Date    BPH (benign prostatic hyperplasia)     Cancer     Chronic kidney disease     Frozen shoulder     Hyperlipidemia     Hypertension 10/26/2023    Periarthritis of shoulder     Rotator cuff disorder, right     Tennis elbow     RIGHT       Past Surgical History:   Procedure Laterality Date    CATARACT EXTRACTION EXTRACAPSULAR W/ INTRAOCULAR LENS IMPLANTATION Bilateral     COLONOSCOPY      JOINT REPLACEMENT Right     THR    SHOULDER ARTHROSCOPY W/ ROTATOR CUFF REPAIR Right 3/29/2023     Procedure: SHOULDER ARTHROSCOPY WITH ROTATOR CUFF REPAIR, SUBCROMIAL DECOMPRESSION,DISTAL CLAVICLE RESECTION;  Surgeon: Gustavo Samuels MD;  Location: [facility] [place] [facility] [place];  Service: Orthopedics;  Laterality: Right;    SHOULDER SURGERY Left     SCOPE       Family History: family history is not on file. Otherwise pertinent FHx was reviewed and not pertinent to current issue.    Social History:  reports that he has never smoked. He has never been exposed to tobacco smoke. He has never used smokeless tobacco. He reports that he does not currently use alcohol. He reports that he does not use drugs.    Home Medications:  albuterol sulfate HFA, atorvastatin, dexAMETHasone, lenalidomide, and nystatin      Allergies:  No Known Allergies    Objective   Objective     Vitals:   Temp:  [98.2 °F (36.8 °C)-102 °F (38.9 °C)] 98.2 °F (36.8 °C)  Heart Rate:  [] 73  Resp:  [20-33] 20  BP: (103-138)/(53-72) 104/64  Flow (L/min):  [2] 2  Physical Exam    Constitutional: Alert and oriented not in acute distress   Eyes: Pupils equal reacting to light and accommodation   HENT: Normal   Neck: Normal   Respiratory: No rales or rhonchi   Cardiovascular: S1-S2 normal no S3 or S4   Gastrointestinal: Normal   Musculoskeletal: Normal with weakness   Neurologic: Localizing signs  Skin: No rash    Result Review    Result Review:  I have personally reviewed the results from the time of this admission to 1/17/2024 07:17 EST and agree with these findings:  [x]  Laboratory  []  Microbiology  [x]  Radiology  []  EKG/Telemetry   []  Cardiology/Vascular   []  Pathology  [x]  Old records  []  Other:  Most notable findings include: Reviewed and discussed    Assessment & Plan   Assessment / Plan     Brief Patient Summary:  Blair Lira is a 78 y.o. male who immune compromised with multiple myeloma and multiple myeloma induced renal failure and anemia with pneumonia    Active Hospital Problems:  Active Hospital Problems    Diagnosis     **Pneumonia         Plan:   IV antibiotics IV fluids transfusion    DVT prophylaxis:  No DVT prophylaxis order currently exists.    CODE STATUS:         Admission Status:  I believe this patient meets inpatient status.    Electronically signed by Vamsi Mason MD, 01/17/24, 7:17 AM EST.      Part of this note may be an electronic transcription/translation of spoken language to printed text using the Dragon Dictation System.

## 2024-01-17 NOTE — THERAPY EVALUATION
Patient Name: Blair Lira  : 1945    MRN: [id number]                              Today's Date: 2024       Admit Date: 2024    Visit Dx:     ICD-10-CM ICD-9-CM   1. Sepsis without acute organ dysfunction, due to unspecified organism  A41.9 038.9     995.91   2. Pneumonia of left lower lobe due to infectious organism  J18.9 486   3. Acute febrile illness  R50.9 780.60   4. Chronic anemia  D64.9 285.9   5. Chronic kidney disease, unspecified CKD stage  N18.9 585.9   6. Elevated troponin  R79.89 790.6   7. Decreased activities of daily living (ADL)  Z78.9 V49.89     Patient Active Problem List   Diagnosis    Rotator cuff tear, right    Impingement syndrome of right shoulder    Acute renal failure superimposed on chronic kidney disease    Pancytopenia    Abnormal weight loss    Hypertension    CKD (chronic kidney disease) stage 3, GFR 30-59 ml/min    Moderate malnutrition    Weakness generalized    Intractable pain    Multiple myeloma    Edema    Chronic diastolic (congestive) heart failure    Acute on chronic HFrEF (heart failure with reduced ejection fraction)    Chest pain    Syncope    COVID-19 virus infection    Chronic kidney disease, stage IV (severe)    Hypotension    Anemia    Acute on chronic renal failure    Pneumonia    Anemia     Past Medical History:   Diagnosis Date    BPH (benign prostatic hyperplasia)     Cancer     Chronic kidney disease     Frozen shoulder     Hyperlipidemia     Hypertension 10/26/2023    Periarthritis of shoulder     Rotator cuff disorder, right     Tennis elbow     RIGHT     Past Surgical History:   Procedure Laterality Date    CATARACT EXTRACTION EXTRACAPSULAR W/ INTRAOCULAR LENS IMPLANTATION Bilateral     COLONOSCOPY      JOINT REPLACEMENT Right     THR    SHOULDER ARTHROSCOPY W/ ROTATOR CUFF REPAIR Right 3/29/2023    Procedure: SHOULDER ARTHROSCOPY WITH ROTATOR CUFF REPAIR, SUBCROMIAL DECOMPRESSION,DISTAL CLAVICLE RESECTION;  Surgeon: Gustavo Samuels MD;   [place] [facility] [place] [facility] [place]: [place]opedics;  Laterality: Right;    SHOULDER SURGERY Left     SCOPE      General Information       Row Name 01/17/24 1425          OT Time and Intention    Document Type evaluation  -AV     Mode of Treatment individual therapy;occupational therapy  -AV       Row Name 01/17/24 1425          General Information    Patient Profile Reviewed yes  -AV     Prior Level of Function independent:;ADL's;home management;transfer;all household mobility  stands to shower (walk-in).  Stands at sink to groom.  Elevated commode.  Ambulates with quad cane.  No home oxygen.  -AV     Existing Precautions/Restrictions fall;oxygen therapy device and L/min  -AV     Barriers to Rehab none identified  -AV       Row Name 01/17/24 1425          Occupational Profile    Reason for Services/Referral (Occupational Profile) Patient is a 78 year old male admitted to [facility] on January 17, 2024 with fever, cough and near syncope.  He is currently on 4N. [place]/ 2L O2.   OT consulted due to recent decline in ADL/transfer independence.  No previous OT services for current condition.  -AV       Row Name 01/17/24 1425          Living Environment    People in Home alone  -AV       Row Name 01/17/24 1425          Home Main Entrance    Number of Stairs, Main Entrance one  -AV       Row Name 01/17/24 1425          Stairs Within Home, Primary    Stairs, Within Home, Primary Non-essential basement stairs  -AV       Row Name 01/17/24 1425          Cognition    Orientation Status (Cognition) --  Patient is alert, pleasant and cooperative- able to retain information and follow commands.  -AV       Row Name 01/17/24 1425          Safety Issues, Functional Mobility    Impairments Affecting Function (Mobility) balance;endurance/activity tolerance  -AV               User Key  (r) = Recorded By, (t) = Taken By, (c) = Cosigned By      Initials Name Provider Type    Teddy Ambriz, OT Occupational Therapist                      Mobility/ADL's       Row Name 01/17/24 1427          Transfers    Comment, (Transfers) Testing deferred: 6.5 Hgb  -AV       Row Name 01/17/24 1427          Activities of Daily Living    BADL Assessment/Intervention --  Independent feeding and grooming with set up in bed.  Reports decreased appetite.  Mod-max assist bathing and dressing.  Independent use of urinal seated bedside.  No bowel movement during hospitalization.  -AV               User Key  (r) = Recorded By, (t) = Taken By, (c) = Cosigned By      Initials Name Provider Type    Teddy Ambriz OT Occupational Therapist                   Obj/Interventions       Row Name 01/17/24 1428          Sensory Assessment (Somatosensory)    Sensory Assessment (Somatosensory) UE sensation intact  -AV       Row Name 01/17/24 1428          Vision Assessment/Intervention    Visual Impairment/Limitations WFL  -AV     Vision Assessment Comment Noted he is able to manipulate cell phone independently  -AV       Row Name 01/17/24 1428          Range of Motion Comprehensive    General Range of Motion bilateral upper extremity ROM WFL  -AV     Comment, General Range of Motion AROM  -AV       Row Name 01/17/24 1428          Strength Comprehensive (MMT)    Comment, General Manual Muscle Testing (MMT) Assessment 4/5 bilateral upper extremities  -AV       Row Name 01/17/24 1428          Motor Skills    Motor Skills coordination;functional endurance  -AV     Coordination WFL  Left dominant  -AV     Functional Endurance Fair minus  -AV       Row Name 01/17/24 1428          Balance    Comment, Balance Testing deferred: 6.5 Hgb  -AV               User Key  (r) = Recorded By, (t) = Taken By, (c) = Cosigned By      Initials Name Provider Type    Teddy Ambriz OT Occupational Therapist                   Goals/Plan       Row Name 01/17/24 1430          Transfer Goal 1 (OT)    Activity/Assistive Device (Transfer Goal 1, OT) transfers, all  -AV     [place] Level/Cues Needed  (Transfer Goal 1, OT) modified independence  -AV     Time Frame (Transfer Goal 1, OT) long term goal (LTG);10 days  -AV       Row Name 01/17/24 1430          Bathing Goal 1 (OT)    Activity/Device (Bathing Goal 1, OT) bathing skills, all;shower chair  -AV     [place] Level/Cues Needed (Bathing Goal 1, OT) modified independence  -AV     Time Frame (Bathing Goal 1, OT) long term goal (LTG);10 days  -AV       Row Name 01/17/24 1430          Dressing Goal 1 (OT)    Activity/Device (Dressing Goal 1, OT) dressing skills, all  -AV     [place]/Cues Needed (Dressing Goal 1, OT) modified independence  -AV     Time Frame (Dressing Goal 1, OT) long term goal (LTG);10 days  -AV       Row Name 01/17/24 1430          Toileting Goal 1 (OT)    Activity/Device (Toileting Goal 1, OT) toileting skills, all;raised toilet seat  -AV     [place] Level/Cues Needed (Toileting Goal 1, OT) modified independence  -AV     Time Frame (Toileting Goal 1, OT) long term goal (LTG);10 days  -AV       Row Name 01/17/24 1430          Grooming Goal 1 (OT)    Activity/Device (Grooming Goal 1, OT) grooming skills, all  -AV     [place] (Grooming Goal 1, OT) modified independence  Standing at sink  -AV     Time Frame (Grooming Goal 1, OT) long term goal (LTG);10 days  -AV       Row Name 01/17/24 1430          Problem Specific Goal 1 (OT)    Problem Specific Goal 1 (OT) Patient will demonstrate fair/fair plus endurance/activity tolerance needed to support ADLs.  -AV     Time Frame (Problem Specific Goal 1, OT) long term goal (LTG);10 days  -AV       Row Name 01/17/24 1430          Therapy Assessment/Plan (OT)    Planned Therapy Interventions (OT) activity tolerance training;BADL retraining;functional balance retraining;IADL retraining;occupation/activity based interventions;patient/caregiver education/training;transfer/mobility retraining;ROM/therapeutic exercise  -AV               User Key  (r) = Recorded By, (t) = Taken By, (c) =  "Cosigned By      Initials Name Provider Type    AV Teddy Griffith, OT Occupational Therapist                   Clinical Impression       Row Name 01/17/24 1429          Pain Assessment    Additional Documentation Pain Scale: FACES Pre/Post-Treatment (Group)  -AV       Row Name 01/17/24 1429          Pain Scale: FACES Pre/Post-Treatment    Pain: FACES Scale, Pretreatment 2-->hurts little bit  -AV     Posttreatment Pain Rating 2-->hurts little bit  -AV     Pain Location generalized  \"Sore\"  -AV       Row Name 01/17/24 1429          Plan of Care Review    Plan of Care Reviewed With patient  -AV     Progress no change  First session for evaluation  -AV     Outcome Evaluation Patient presents with limitations of balance and endurance/activity tolerance which impede his ability to perform ADL and transfers as prior.  The skills of a therapist will be required to safely and effectively implement treatment plan to restore maximal level of function.  -AV       Row Name 01/17/24 1429          Therapy Assessment/Plan (OT)    Patient/Family Therapy Goal Statement (OT) Regain independence  -AV     Rehab Potential (OT) good, to achieve stated therapy goals  -AV     Criteria for Skilled Therapeutic Interventions Met (OT) yes;meets criteria;skilled treatment is necessary  -AV     Therapy Frequency (OT) 5 times/wk  -AV       Row Name 01/17/24 1429          Therapy Plan Review/Discharge Plan (OT)    Equipment Needs Upon Discharge (OT) walker, rolling;shower chair  -AV     Anticipated Discharge Disposition (OT) inpatient rehabilitation facility  -AV       Row Name 01/17/24 1429          Vital Signs    O2 Delivery Pre Treatment nasal cannula  2  -AV     O2 Delivery Intra Treatment nasal cannula  2  -AV     O2 Delivery Post Treatment nasal cannula  2  -AV       Row Name 01/17/24 1429          Positioning and Restraints    Pre-Treatment Position in bed  -AV     Post Treatment Position bed  -AV     In Bed call light within " reach;encouraged to call for assist  -AV               User Key  (r) = Recorded By, (t) = Taken By, (c) = Cosigned By      Initials Name Provider Type    Teddy Ambriz, OT Occupational Therapist                   Outcome Measures       Row Name 01/17/24 1431          How much help from another is currently needed...    Putting on and taking off regular lower body clothing? 2  -AV     Bathing (including washing, rinsing, and drying) 2  -AV     Toileting (which includes using toilet bed pan or urinal) 3  -AV     Putting on and taking off regular upper body clothing 4  -AV     Taking care of personal grooming (such as brushing teeth) 4  -AV     Eating meals 4  -AV     AM-PAC 6 Clicks Score (OT) 19  -AV       Row Name 01/17/24 0908 01/17/24 0643       How much help from another person do you currently need...    Turning from your back to your side while in flat bed without using bedrails? 4  -RH 4  -VS    Moving from lying on back to sitting on the side of a flat bed without bedrails? 3  -RH 3  -VS    Moving to and from a bed to a chair (including a wheelchair)? 2  -RH 2  -VS    Standing up from a chair using your arms (e.g., wheelchair, bedside chair)? 2  -RH 2  -VS    Climbing 3-5 steps with a railing? 2  -RH 2  -VS    To walk in hospital room? 2  -RH 2  -VS    AM-PAC 6 Clicks Score (PT) 15  -RH 15  -VS    Highest Level of Mobility Goal 4 --> Transfer to chair/commode  -RH 4 --> Transfer to chair/commode  -VS      Row Name 01/17/24 1431          Functional Assessment    Outcome Measure Options AM-PAC 6 Clicks Daily Activity (OT);Optimal Instrument  -AV       Row Name 01/17/24 1431          Optimal Instrument    Optimal Instrument Optimal - 3  -AV     Bending/Stooping 3  -AV     Standing 5  -AV     Reaching 1  -AV     From the list, choose the 3 activities you would most like to be able to do without any difficulty Bending/stooping;Standing;Reaching  -AV     Total Score Optimal - 3 9  -AV               User Key   (r) = Recorded By, (t) = Taken By, (c) = Cosigned By      Initials Name Provider Type    AV Teddy Griffith OT Occupational Therapist    RH Dominga Maynard, RN Registered Nurse    VS Fabiana Groves, RN Registered Nurse                    Occupational Therapy Education       Title: PT OT SLP Therapies (In Progress)       Topic: Occupational Therapy (In Progress)       Point: ADL training (Done)       Description:   Instruct learner(s) on proper safety adaptation and remediation techniques during self care or transfers.   Instruct in proper use of assistive devices.                  Learning Progress Summary             Patient Acceptance, E, VU by AV at 1/17/2024 1432                         Point: Home exercise program (Not Started)       Description:   Instruct learner(s) on appropriate technique for monitoring, assisting and/or progressing therapeutic exercises/activities.                  Learner Progress:  Not documented in this visit.              Point: Precautions (Not Started)       Description:   Instruct learner(s) on prescribed precautions during self-care and functional transfers.                  Learner Progress:  Not documented in this visit.              Point: Body mechanics (Not Started)       Description:   Instruct learner(s) on proper positioning and spine alignment during self-care, functional mobility activities and/or exercises.                  Learner Progress:  Not documented in this visit.                              User Key       Initials Effective Dates Name Provider Type Discipline     06/16/21 -  Teddy Griffith OT Occupational Therapist OT                  OT Recommendation and Plan  Planned Therapy Interventions (OT): activity tolerance training, BADL retraining, functional balance retraining, IADL retraining, occupation/activity based interventions, patient/caregiver education/training, transfer/mobility retraining, ROM/therapeutic exercise  Therapy Frequency (OT): 5  times/wk  Plan of Care Review  Plan of Care Reviewed With: patient  Progress: no change (First session for evaluation)  Outcome Evaluation: Patient presents with limitations of balance and endurance/activity tolerance which impede his ability to perform ADL and transfers as prior.  The skills of a therapist will be required to safely and effectively implement treatment plan to restore maximal level of function.     Time Calculation:   Evaluation Complexity (OT)  Review Occupational Profile/Medical/Therapy History Complexity: expanded/moderate complexity  Assessment, Occupational Performance/Identification of Deficit Complexity: 1-3 performance deficits  Clinical Decision Making Complexity (OT): problem focused assessment/low complexity  Overall Complexity of Evaluation (OT): low complexity     Time Calculation- OT       Row Name 01/17/24 1433             Time Calculation- OT    OT Received On 01/17/24  -AV      OT Goal Re-Cert Due Date 01/26/24  -AV         Untimed Charges    OT Eval/Re-eval Minutes 35  -AV         Total Minutes    Untimed Charges Total Minutes 35  -AV       Total Minutes 35  -AV                User Key  (r) = Recorded By, (t) = Taken By, (c) = Cosigned By      Initials Name Provider Type    AV Teddy Griffith OT Occupational Therapist                  Therapy Charges for Today       Code Description Service Date Service Provider Modifiers Qty    [id number] HC OT EVAL LOW COMPLEXITY 3 1/17/2024 Teddy Griffith OT GO 1                 Teddy Griffith OT  1/17/2024

## 2024-01-17 NOTE — H&P
[facility]   HISTORY AND PHYSICAL    Patient Name: Blair Lira  : 1945  MRN: [id number]  Primary Care Physician:  Babs Summers APRN  Date of admission: 2024    Subjective   Subjective     Chief Complaint:   Weakness, recurrent fall      HPI:    Blair Lira is a 78 y.o. male admitted to hospital for recurrent dizzy spells and almost passing out.  Denies any loss of consciousness.  Patient has been having the symptoms for some time, being followed closely by nephrologist who admitted him to hospital last week for similar problems and was discharged.  Patient also has some chest congestion cough and some shortness of breath.  He denies any fever chills.  Workup in the ER revealed pneumonia and hypotension  And examined patient is feeling tired and fatigued, anxious.      Review of Systems:    Fatigue and weakness.  Dizziness.  No chest pain.  Cough congestion shortness of breath.  No nausea vomiting or diarrhea  Swelling of legs.      Personal History     Past Medical History:   Diagnosis Date    BPH (benign prostatic hyperplasia)     Cancer     Chronic kidney disease     Frozen shoulder     Hyperlipidemia     Hypertension 10/26/2023    Periarthritis of shoulder     Rotator cuff disorder, right     Tennis elbow     RIGHT       Past Surgical History:   Procedure Laterality Date    CATARACT EXTRACTION EXTRACAPSULAR W/ INTRAOCULAR LENS IMPLANTATION Bilateral     COLONOSCOPY      JOINT REPLACEMENT Right     THR    SHOULDER ARTHROSCOPY W/ ROTATOR CUFF REPAIR Right 3/29/2023    Procedure: SHOULDER ARTHROSCOPY WITH ROTATOR CUFF REPAIR, SUBCROMIAL DECOMPRESSION,DISTAL CLAVICLE RESECTION;  Surgeon: Gustavo Samuels MD;  [place] [facility] [place] [facility] [place]  Service: Orthopedics;  Laterality: Right;    SHOULDER SURGERY Left     SCOPE       Family History: family history is not on file. Otherwise pertinent FHx was reviewed and not pertinent to current issue.    Social History:  reports that he has never smoked.  He has never been exposed to tobacco smoke. He has never used smokeless tobacco. He reports that he does not currently use alcohol. He reports that he does not use drugs.    Home Medications:  amLODIPine, atorvastatin, dexAMETHasone, fluticasone, lenalidomide, mirtazapine, pantoprazole, and tamsulosin      Allergies:  No Known Allergies    Objective   Objective     Vitals:   Temp:  [97.3 °F (36.3 °C)-102 °F (38.9 °C)] 97.3 °F (36.3 °C)  Heart Rate:  [] 79  Resp:  [18-33] 18  BP: ()/(43-72) 105/53  Flow (L/min):  [2-4] 4    Physical Exam    Elderly male looks of his stated age, not in acute distress but tired.  Neck supple.  Heart regular.  Lungs diminished breath sounds with coarse breath sounds bilaterally.  Abdomen soft and obese nontender.  Extremities with edema.      I have personally reviewed the results from the time of this admission to 1/17/2024 19:32 EST and agree with these findings:  [x]  Laboratory  []  Microbiology  [x]  Radiology  []  EKG/Telemetry   []  Cardiology/Vascular   []  Pathology  []  Old records  []  Other:    CBC:    WBC   Date Value Ref Range Status   01/17/2024 2.69 (L) 3.40 - 10.80 10*3/mm3 Final     RBC   Date Value Ref Range Status   01/17/2024 2.20 (L) 4.14 - 5.80 10*6/mm3 Final     Hemoglobin   Date Value Ref Range Status   01/17/2024 6.5 (C) 13.0 - 17.7 g/dL Final     Hematocrit   Date Value Ref Range Status   01/17/2024 20.9 (C) 37.5 - 51.0 % Final     MCV   Date Value Ref Range Status   01/17/2024 95.0 79.0 - 97.0 fL Final     MCH   Date Value Ref Range Status   01/17/2024 29.5 26.6 - 33.0 pg Final     MCHC   Date Value Ref Range Status   01/17/2024 31.1 (L) 31.5 - 35.7 g/dL Final     RDW   Date Value Ref Range Status   01/17/2024 18.3 (H) 12.3 - 15.4 % Final     RDW-SD   Date Value Ref Range Status   01/17/2024 63.3 (H) 37.0 - 54.0 fl Final     MPV   Date Value Ref Range Status   01/17/2024 12.2 (H) 6.0 - 12.0 fL Final     Platelets   Date Value Ref Range Status    01/17/2024 77 (L) 140 - 450 10*3/mm3 Final     Neutrophil %   Date Value Ref Range Status   01/17/2024 75.7 42.7 - 76.0 % Final     Lymphocyte %   Date Value Ref Range Status   01/17/2024 12.2 (L) 19.6 - 45.3 % Final     Monocyte %   Date Value Ref Range Status   01/17/2024 6.5 5.0 - 12.0 % Final     Eosinophil %   Date Value Ref Range Status   01/17/2024 1.1 0.3 - 6.2 % Final     Basophil %   Date Value Ref Range Status   01/17/2024 1.7 (H) 0.0 - 1.5 % Final     Immature Grans %   Date Value Ref Range Status   01/17/2024 2.8 (H) 0.0 - 0.5 % Final     Neutrophils, Absolute   Date Value Ref Range Status   01/17/2024 2.67 1.70 - 7.00 10*3/mm3 Final     Lymphocytes, Absolute   Date Value Ref Range Status   01/17/2024 0.43 (L) 0.70 - 3.10 10*3/mm3 Final     Monocytes, Absolute   Date Value Ref Range Status   01/17/2024 0.23 0.10 - 0.90 10*3/mm3 Final     Eosinophils, Absolute   Date Value Ref Range Status   01/17/2024 0.04 0.00 - 0.40 10*3/mm3 Final     Basophils, Absolute   Date Value Ref Range Status   01/17/2024 0.06 0.00 - 0.20 10*3/mm3 Final     Immature Grans, Absolute   Date Value Ref Range Status   01/17/2024 0.10 (H) 0.00 - 0.05 10*3/mm3 Final     nRBC   Date Value Ref Range Status   01/17/2024 0.0 0.0 - 0.2 /100 WBC Final        BMP:    Lab Results   Component Value Date    GLUCOSE 142 (H) 01/17/2024    BUN 29 (H) 01/17/2024    CREATININE 2.52 (H) 01/17/2024    BCR 11.5 01/17/2024    K 3.8 01/17/2024    CO2 18.4 (L) 01/17/2024    CALCIUM 7.9 (L) 01/17/2024    ALBUMIN 2.8 (L) 01/17/2024    LABIL2 1.0 (L) 09/24/2020    AST 15 01/17/2024    ALT 21 01/17/2024        XR Chest 1 View    Result Date: 1/17/2024   Possible worsening left-sided pneumonia since 1/5/2024.      Please note that portions of this note were completed with a voice recognition program.  THOMAS CHILDS JR, MD       Electronically Signed and Approved By: THOMAS CHILDS JR, MD on 1/17/2024 at 3:07                      Assessment & Plan    Assessment / Plan       Current Diagnosis:  Active Hospital Problems    Diagnosis     **Pneumonia     Anemia     Hypotension     Sepsis associated hypotension     CKD (chronic kidney disease) stage 3, GFR 30-59 ml/min      Plan:   IV fluids IV antibiotics, pneumonia workup.  Check orthostats.  Will start midodrine.  Monitor kidney functions.  Patient with recurrent symptoms of syncope and dizziness, orthostatic changes noted.  Will continue to monitor.  Oncologist on board admitted patient for anemia and recent chemo.  Further management will based on clinical course, lab results.      DVT prophylaxis:  Mechanical DVT prophylaxis orders are present.    GI Prophylaxis:       Pepcid/proton    CODE STATUS:    Level Of Support Discussed With: Patient  Code Status (Patient has no pulse and is not breathing): CPR (Attempt to Resuscitate)  Medical Interventions (Patient has pulse or is breathing): Full Support    Admission Status:  I believe this patient meets inpatient status.             I have dictated this note utilizing Dragon Dictation.             Please note that portions of this note were completed with a voice recognition program.             Part of this note may be an electronic transcription/translation of spoken language to printed text         using the Dragon Dictation System.       Electronically signed by Elieser Pederson MD, 01/17/24, 9:16 AM EST.    Total time spent with in evaluation and management:

## 2024-01-17 NOTE — ED PROVIDER NOTES
Time: 1:55 AM EST  Date of encounter:  1/17/2024  Independent Historian/Clinical History and Information was obtained by:   Patient  Chief Complaint: Fall and near syncope    History is limited by: N/A    History of Present Illness:  Patient is a 78 y.o. year old male who presents to the emergency department for evaluation of fall at home near-syncope.  The patient states that he stood up to go to the bathroom.  The patient states he became very lightheaded fell over to the side striking his ear on the coffee table.  And then falling to the ground.  The patient states that he did injure his ear.  The patient states he did not lose consciousness.  The patient has no headache.  The patient has no neck or back pain.  Patient states that he has had near syncope with standing for several months now.  The patient has seen his primary care physician for this.  Patient does note that he has had progressive weakness.  The patient states he does feel weak over the last several days.  The patient also notes that he has had a moderate to severe cough over the last several days.  The patient has been seen by his primary care physician and in the emergency room for this.  Patient denies any shortness of breath.  The patient had no nausea or vomiting.  The patient had no fever or rigors.  The patient does have some chills.  Patient does note some swelling in his legs but states this is chronic.  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  The patient states that he is actively on chemo.  Patient notes some swelling in his legs.  Otherwise he is without complaints.    HPI    Patient Care Team  Primary Care Provider: Babs Summers APRN    Past Medical History:     No Known Allergies  Past Medical History:   Diagnosis Date    BPH (benign prostatic hyperplasia)     Cancer     Chronic kidney disease     Frozen shoulder     Hyperlipidemia     Hypertension 10/26/2023    Periarthritis of shoulder      Rotator cuff disorder, right     Tennis elbow     RIGHT     Past Surgical History:   Procedure Laterality Date    CATARACT EXTRACTION EXTRACAPSULAR W/ INTRAOCULAR LENS IMPLANTATION Bilateral     COLONOSCOPY      JOINT REPLACEMENT Right     THR    SHOULDER ARTHROSCOPY W/ ROTATOR CUFF REPAIR Right 3/29/2023    Procedure: SHOULDER ARTHROSCOPY WITH ROTATOR CUFF REPAIR, SUBCROMIAL DECOMPRESSION,DISTAL CLAVICLE RESECTION;  Surgeon: Gustavo Samuels MD;  [place] [facility] [place] [facility] [place]: [place]opedics;  Laterality: Right;    SHOULDER SURGERY Left     SCOPE     Family History   Problem Relation Age of Onset    Malig Hyperthermia Neg Hx        Home Medications:  Prior to Admission medications    Medication Sig Start Date End Date Taking? Authorizing Provider   albuterol sulfate  (90 Base) MCG/ACT inhaler Inhale 2 puffs Every 4 (Four) Hours As Needed for Wheezing.    Shavonne Yin MD   atorvastatin (LIPITOR) 10 MG tablet Take 1 tablet by mouth Every Night. 9/23/22   Shavonne Yin MD   dexAMETHasone (DECADRON) 4 MG tablet Take 5 tablets by mouth Take As Directed. Takes 20 mg the morning before chemo treatments 12/5/23   Shavonne Yin MD   lenalidomide (REVLIMID) 25 MG capsule Take 1 capsule by mouth Daily. 12/13/23 1/27/27  Shavonne Yin MD   nystatin (MYCOSTATIN) 100,000 unit/mL suspension Take 5 mL by mouth 4 (Four) Times a Day. 12/11/23   Shavonne Yin MD        Social History:   Social History     Tobacco Use    Smoking status: Never     Passive exposure: Never    Smokeless tobacco: Never   Vaping Use    Vaping Use: Never used   Substance Use Topics    Alcohol use: Not Currently    Drug use: Never         Review of Systems:  Review of Systems   Constitutional:  Positive for fatigue. Negative for chills, diaphoresis and fever.   HENT:  Negative for congestion, postnasal drip, rhinorrhea and sore throat.    Eyes:  Negative for photophobia.   Respiratory:  Positive for cough.  "Negative for chest tightness and shortness of breath.    Cardiovascular:  Negative for chest pain, palpitations and leg swelling.   Gastrointestinal:  Negative for abdominal pain, diarrhea, nausea and vomiting.   Genitourinary:  Negative for difficulty urinating, dysuria, flank pain, frequency, hematuria and urgency.   Musculoskeletal:  Negative for neck pain and neck stiffness.   Skin:  Positive for wound. Negative for pallor and rash.   Neurological:  Positive for weakness and light-headedness. Negative for dizziness, syncope, numbness and headaches.   Hematological:  Negative for adenopathy. Does not bruise/bleed easily.   Psychiatric/Behavioral: Negative.          Physical Exam:  /60   Pulse 93   Temp (!) 102 °F (38.9 °C) (Rectal)   Resp (!) 32   Ht 185.4 cm (73\")   Wt 89.6 kg (197 lb 8.5 oz)   SpO2 91%   BMI 26.06 kg/m²     Physical Exam  Vitals and nursing note reviewed.   Constitutional:       General: He is not in acute distress.     Appearance: Normal appearance. He is not ill-appearing, toxic-appearing or diaphoretic.   HENT:      Head: Normocephalic and atraumatic.        Comments: The patient has some ecchymosis of the right antehelix as well as a deep abrasion, no active bleeding, no laceration that can be repaired      Mouth/Throat:      Mouth: Mucous membranes are moist.   Eyes:      Pupils: Pupils are equal, round, and reactive to light.   Cardiovascular:      Rate and Rhythm: Normal rate and regular rhythm.      Pulses: Normal pulses.           Carotid pulses are 2+ on the right side and 2+ on the left side.       Radial pulses are 2+ on the right side and 2+ on the left side.        Femoral pulses are 2+ on the right side and 2+ on the left side.       Popliteal pulses are 2+ on the right side and 2+ on the left side.        Dorsalis pedis pulses are 2+ on the right side and 2+ on the left side.        Posterior tibial pulses are 2+ on the right side and 2+ on the left side.      Heart " sounds: Normal heart sounds. No murmur heard.  Pulmonary:      Effort: Pulmonary effort is normal. No accessory muscle usage, respiratory distress or retractions.      Breath sounds: Examination of the right-upper field reveals decreased breath sounds. Examination of the left-upper field reveals decreased breath sounds. Examination of the right-middle field reveals decreased breath sounds. Examination of the left-middle field reveals decreased breath sounds. Examination of the right-lower field reveals decreased breath sounds. Examination of the left-lower field reveals decreased breath sounds. Decreased breath sounds present. No wheezing, rhonchi or rales.   Abdominal:      General: Abdomen is flat. There is no distension.      Palpations: Abdomen is soft. There is no mass or pulsatile mass.      Tenderness: There is no abdominal tenderness. There is no right CVA tenderness, left CVA tenderness, guarding or rebound.      Comments: No rigidity   Musculoskeletal:         General: No swelling, tenderness or deformity.      Cervical back: Neck supple. No tenderness.      Right lower leg: Edema present.      Left lower leg: Edema present.   Skin:     General: Skin is warm and dry.      Capillary Refill: Capillary refill takes less than 2 seconds.      Coloration: Skin is pale. Skin is not jaundiced.      Findings: No erythema.   Neurological:      General: No focal deficit present.      Mental Status: He is alert and oriented to person, place, and time. Mental status is at baseline.      Cranial Nerves: Cranial nerves 2-12 are intact. No cranial nerve deficit.      Sensory: Sensation is intact. No sensory deficit.      Motor: Motor function is intact. No weakness or pronator drift.      Coordination: Coordination is intact. Coordination normal.   Psychiatric:         Mood and Affect: Mood normal.         Behavior: Behavior normal.                  Procedures:  Procedures      Medical Decision Making:      Comorbidities  that affect care:    Renal cell carcinoma with metastasis to the bone, hyperlipidemia, chronic kidney disease, hypertension, currently receiving chemo    External Notes reviewed:    None      The following orders were placed and all results were independently analyzed by me:  Orders Placed This Encounter   Procedures    COVID-19, FLU A/B, RSV PCR 1 HR TAT - Swab, Nasopharynx    Blood Culture - Blood,    Blood Culture - Blood,    Urine Culture - Urine,    XR Chest 1 View    Rolfe Draw    Comprehensive Metabolic Panel    Magnesium    Single High Sensitivity Troponin T    CBC Auto Differential    Urinalysis With Culture If Indicated - Urine, Clean Catch    Lactic Acid, Plasma    Scan Slide    Urinalysis, Microscopic Only - Urine, Clean Catch    Procalcitonin    High Sensitivity Troponin T 2Hr    STAT Lactic Acid, Reflex    NPO Diet NPO Type: Strict NPO    Undress & Gown    Continuous Pulse Oximetry    Vital Signs    Orthostatic Blood Pressure    Please clean the dried blood off the right ear and then apply bacitracin  Nursing Communication    Please perform rectal temperature and notify physician with results  Nursing Communication    General MD Inpatient Consult    General MD Inpatient Consult    Oxygen Therapy- Nasal Cannula; Titrate 1-6 LPM Per SpO2; 90 - 95%    POC Glucose Once    ECG 12 Lead ED Triage Standing Order; Syncope    Insert Peripheral IV    Inpatient Admission    CBC & Differential    Green Top (Gel)    Lavender Top    Gold Top - SST    Light Blue Top       Medications Given in the Emergency Department:  Medications   sodium chloride 0.9 % flush 10 mL (has no administration in time range)   vancomycin 1750 mg/500 mL 0.9% NS IVPB ([facility]) (1,750 mg Intravenous New Bag 1/17/24 0424)   bacitracin 500 UNIT/GM ointment 0.9 g (0.9 g Topical Given 1/17/24 0240)   acetaminophen (TYLENOL) tablet 650 mg (650 mg Oral Given 1/17/24 0246)   sodium chloride 0.9 % bolus 500 mL (500 mL Intravenous New Bag 1/17/24 0246)    piperacillin-tazobactam (ZOSYN) 3.375 g/100 mL 0.9% NS IVPB (mbp) (0 g Intravenous Stopped 1/17/24 0424)        ED Course:    ED Course as of 01/17/24 0438 Wed Jan 17, 2024   0200 EKG:    Rhythm: Sinus tachycardia  Rate: 114  Intervals: Normal [place] and prolonged QT interval  T-wave: Nonspecific T wave flattening  ST Segment: No obvious pathological ST elevation and reciprocal ST depression to suggest STEMI    EKG Comparison: No change in the QRS and ST morphology from EKG performed January 5, 2024.  The patient does have new tachycardia present today.    Interpreted by me   [SD]      ED Course User Index  [SD] Nathan Celis DO       Labs:    Lab Results (last 24 hours)       Procedure Component Value Units Date/Time    CBC & Differential [[id number]]  (Abnormal) Collected: 01/17/24 0136    Specimen: Blood Updated: 01/17/24 0310    Narrative:      The following orders were created for panel order CBC & Differential.  Procedure                               Abnormality         Status                     ---------                               -----------         ------                     CBC Auto Differential[[id number]]        Abnormal            Final result               Scan Slide[[id number]]                                       Final result                 Please view results for these tests on the individual orders.    Comprehensive Metabolic Panel [[id number]]  (Abnormal) Collected: 01/17/24 0136    Specimen: Blood Updated: 01/17/24 0219     Glucose 153 mg/dL      BUN 31 mg/dL      Creatinine 2.66 mg/dL      Sodium 136 mmol/L      Potassium 4.1 mmol/L      Comment: Slight hemolysis detected by analyzer. Result may be falsely elevated.        Chloride 102 mmol/L      CO2 17.4 mmol/L      Calcium 8.5 mg/dL      Total Protein 6.1 g/dL      Albumin 3.1 g/dL      ALT (SGPT) 24 U/L      AST (SGOT) 19 U/L      Alkaline Phosphatase 67 U/L      Total Bilirubin 1.4 mg/dL      Globulin 3.0 gm/dL      A/G Ratio 1.0 g/dL       BUN/Creatinine Ratio 11.7     Anion Gap 16.6 mmol/L      eGFR 24.0 mL/min/1.73     Narrative:      GFR Normal >60  Chronic Kidney Disease <60  Kidney Failure <15    The GFR formula is only valid for adults with stable renal function between ages 18 and 70.    Magnesium [[id number]]  (Normal) Collected: 01/17/24 0136    Specimen: Blood Updated: 01/17/24 0219     Magnesium 1.8 mg/dL     Single High Sensitivity Troponin T [[id number]]  (Abnormal) Collected: 01/17/24 0136    Specimen: Blood Updated: 01/17/24 0303     HS Troponin T 98 ng/L     Narrative:      High Sensitive Troponin T Reference Range:  <14.0 ng/L- Negative Female for AMI  <22.0 ng/L- Negative Male for AMI  >=14 - Abnormal Female indicating possible myocardial injury.  >=22 - Abnormal Male indicating possible myocardial injury.   Clinicians would have to utilize clinical acumen, EKG, Troponin, and serial changes to determine if it is an Acute Myocardial Infarction or myocardial injury due to an underlying chronic condition.         CBC Auto Differential [[id number]]  (Abnormal) Collected: 01/17/24 0136    Specimen: Blood Updated: 01/17/24 0300     WBC 3.53 10*3/mm3      RBC 2.62 10*6/mm3      Hemoglobin 7.7 g/dL      Hematocrit 24.6 %      MCV 93.9 fL      MCH 29.4 pg      MCHC 31.3 g/dL      RDW 18.2 %      RDW-SD 62.1 fl      MPV 12.7 fL      Platelets 97 10*3/mm3      Neutrophil % 75.7 %      Lymphocyte % 12.2 %      Monocyte % 6.5 %      Eosinophil % 1.1 %      Basophil % 1.7 %      Immature Grans % 2.8 %      Neutrophils, Absolute 2.67 10*3/mm3      Lymphocytes, Absolute 0.43 10*3/mm3      Monocytes, Absolute 0.23 10*3/mm3      Eosinophils, Absolute 0.04 10*3/mm3      Basophils, Absolute 0.06 10*3/mm3      Immature Grans, Absolute 0.10 10*3/mm3      nRBC 0.0 /100 WBC     COVID-19, FLU A/B, RSV PCR 1 HR TAT - Swab, Nasopharynx [[id number]]  (Normal) Collected: 01/17/24 0136    Specimen: Swab from Nasopharynx Updated: 01/17/24 0237     COVID19 Not  "Detected     Influenza A PCR Not Detected     Influenza B PCR Not Detected     RSV, PCR Not Detected    Narrative:      Fact sheet for providers: https://www.fda.gov/media/989176/download    Fact sheet for patients: https://www.fda.gov/media/596882/download    Test performed by PCR.    Scan Slide [[id number]] Collected: 01/17/24 0136    Specimen: Blood Updated: 01/17/24 0310     Anisocytosis Slight/1+     WBC Morphology Normal     Large Platelets Slight/1+    Procalcitonin [[id number]]  (Abnormal) Collected: 01/17/24 0136    Specimen: Blood Updated: 01/17/24 0333     Procalcitonin 0.43 ng/mL     Narrative:      As a Marker for Sepsis (Non-Neonates):    1. <0.5 ng/mL represents a low risk of severe sepsis and/or septic shock.  2. >2 ng/mL represents a high risk of severe sepsis and/or septic shock.    As a Marker for Lower Respiratory Tract Infections that require antibiotic therapy:    PCT on Admission    Antibiotic Therapy       6-12 Hrs later    >0.5                Strongly Recommended  >0.25 - <0.5        Recommended  0.1 - 0.25          Discouraged              Remeasure/reassess PCT  <0.1                Strongly Discouraged     Remeasure/reassess PCT    As 28 day mortality risk marker: \"Change in Procalcitonin Result\" (>80% or <=80%) if Day 0 (or Day 1) and Day 4 values are available. Refer to [email or web address]    Change in PCT <=80%  A decrease of PCT levels below or equal to 80% defines a positive change in PCT test result representing a higher risk for 28-day all-cause mortality of patients diagnosed with severe sepsis for septic shock.    Change in PCT >80%  A decrease of PCT levels of more than 80% defines a negative change in PCT result representing a lower risk for 28-day all-cause mortality of patients diagnosed with severe sepsis or septic shock.    This test is Prognostic not Diagnostic, if elevated correlate with clinical findings before administering antibiotic treatment.        " Urinalysis With Culture If Indicated - Urine, Clean Catch [[id number]]  (Abnormal) Collected: 01/17/24 0216    Specimen: Urine, Clean Catch Updated: 01/17/24 0242     Color, UA Yellow     Appearance, UA Turbid     pH, UA 5.5     Specific Gravity, UA 1.012     Glucose, UA Negative     Ketones, UA Negative     Bilirubin, UA Negative     Blood, UA Large (3+)     Protein,  mg/dL (2+)     Leuk Esterase, UA Large (3+)     Nitrite, UA Negative     Urobilinogen, UA 0.2 E.U./dL    Narrative:      In absence of clinical symptoms, the presence of pyuria, bacteria, and/or nitrites on the urinalysis result does not correlate with infection.    Blood Culture - Blood, Arm, Right [[id number]] Collected: 01/17/24 0216    Specimen: Blood from Arm, Right Updated: 01/17/24 0222    Blood Culture - Blood, Arm, Left [[id number]] Collected: 01/17/24 0216    Specimen: Blood from Arm, Left Updated: 01/17/24 0222    Lactic Acid, Plasma [[id number]]  (Abnormal) Collected: 01/17/24 0216    Specimen: Blood Updated: 01/17/24 0303     Lactate 2.9 mmol/L     Urinalysis, Microscopic Only - Urine, Clean Catch [[id number]]  (Abnormal) Collected: 01/17/24 0216    Specimen: Urine, Clean Catch Updated: 01/17/24 0347     RBC, UA 6-10 /HPF      WBC, UA Too Numerous to Count /HPF      Bacteria, UA 2+ /HPF      Squamous Epithelial Cells, UA 0-2 /HPF      Hyaline Casts, UA 0-2 /LPF      Methodology Manual Light Microscopy    Urine Culture - Urine, Urine, Clean Catch [[id number]] Collected: 01/17/24 0216    Specimen: Urine, Clean Catch Updated: 01/17/24 0347    High Sensitivity Troponin T 2Hr [[id number]] Collected: 01/17/24 0400    Specimen: Blood Updated: 01/17/24 0414             Imaging:    XR Chest 1 View    Result Date: 1/17/2024  PROCEDURE: XR CHEST 1 VW  COMPARISONS: 1/5/2024; 11/29/2023; 11/12/2023.  INDICATIONS: cough  FINDINGS: A single AP upright portable chest radiograph is provided for review.  There is possible increased left-sided pneumonia  since 1/5/2024.  Greater left-sided pulmonary opacities are seen in the parahilar region of the xax-uw-razuckw left lung.  Consider close interval clinical and imaging follow-up of the findings to ensure a benign progression (and to exclude a postobstructive malignant process).  There may be subsegmental atelectasis in the right lung base with asymmetric elevation of the right diaphragm, thought to be chronic in nature.  No cardiac enlargement.  No pneumothorax.  No definite pneumomediastinum.  Probably no pleural effusion.  Chronic calcified granulomatous disease of the chest is suggested.  The thoracic aorta is atherosclerotic.  There is mild gaseous distension of colon and small bowel in the partially imaged upper abdomen.  Degenerative changes involve the imaged spine and the bilateral shoulders.  There may be mild thoracolumbar scoliosis.       Possible worsening left-sided pneumonia since 1/5/2024.      Please note that portions of this note were completed with a voice recognition program.  THOMAS CHILDS JR, MD       Electronically Signed and Approved By: THOMAS CHILDS JR, MD on 1/17/2024 at 3:07                 Differential Diagnosis and Discussion:    Weakness: Based on the patient's history, signs, and symptoms, the diffential diagnosis includes but is not limited to meningitis, stroke, sepsis, subarachnoid hemorrhage, intracranial bleeding, encephalitis, acute uti, dehydration, MS, myasthenia gravis, Guillan Alicia, migraine variant, neuromuscular disorders vertigo, electrolyte imbalance, and metabolic disorders.    All labs were reviewed and interpreted by me.  All X-rays impressions were independently interpreted by me.  EKG was interpreted by me.    MDM  Number of Diagnoses or Management Options  Acute febrile illness  Chronic anemia  Chronic kidney disease, unspecified CKD stage  Elevated troponin  Pneumonia of left lower lobe due to infectious organism  Sepsis without acute organ dysfunction, due to  unspecified organism  Diagnosis management comments:     The patient's temperature initially came back at 99.1.  The patient felt warm to me upon palpation.  I requested a rectal temperature.  The patient's rectal temperature was 102    03:16 EST  The patient's chest x-ray resulted at this time.  I do feel there is a bacterial source for the patient's fever.  Sepsis is identified at this time.  The patient will be prescribed broad-spectrum antibiotics.  Patient will be given IV fluids.  Full sepsis dose IV fluids could not be given due to the patient's history of renal failure for fear of fluid overload.    Sepsis criteria was met in the emergency department and the Sepsis protocol (including antibiotic administration) was initiated.      SIRS criteria considered:   1.                     Temperature > 100.4 or <98.6    2.                     Heart Rate > 90    3.                     Respiratory Rate > 22    4.                     WBC > 12K or <4K.             Severe Sepsis:     Respiratory: Mechanical Ventilation or Bipap  Hypotension: SBP > 90 or MAP < 65  Renal: Creatinine > 2  Metabolic: Lactic Acid > 2  Hematologic: Platelets < 100K or INR > 1.5  Hepatic: BILI  >  2  CNS: Sudden AMS     Septic Shock:     Severe Sepsis + Persistent hypotension or Lactic Acid > 4     Normal saline bolus, Antibiotics, and final disposition was based on these definitions.          Amount and/or Complexity of Data Reviewed  Clinical lab tests: reviewed  Tests in the radiology section of CPT®: reviewed  Tests in the medicine section of CPT®: reviewed  Decide to obtain previous medical records or to obtain history from someone other than the patient: yes  Discuss the patient with other providers: yes (03:39 EST  I discussed case with .  We have discussed the patient's presenting symptoms, physical exam, vital signs, laboratory findings and radiographic findings.  We have discussed the patient's treatment in the emergency  room.  He agrees with the current assessment and plan.  He will admit the patient to the hospital.)             Social Determinants of Health:    Patient is independent, reliable, and has access to care.       Disposition and Care Coordination:    Admit:   Through independent evaluation of the patient's history, physical, and imperical data, the patient meets criteria for observation/admission to the hospital.        Final diagnoses:   Sepsis without acute organ dysfunction, due to unspecified organism   Pneumonia of left lower lobe due to infectious organism   Acute febrile illness   Chronic anemia   Chronic kidney disease, unspecified CKD stage   Elevated troponin        ED Disposition       ED Disposition   Decision to Admit    Condition   --    Comment   Level of Care: Med/Surg [1]   Diagnosis: Pneumonia [622137]   Admitting Physician: ALYSON DIANA [[id number]]   Attending Physician: ALYSON DIANA [[id number]]   Isolate for COVID?: No [0]   Certification: I Certify That Inpatient Hospital Services Are Medically Necessary For Greater Than 2 Midnights                 This medical record created using voice recognition software.             Nathan Celis DO  01/17/24 0438

## 2024-01-17 NOTE — ED NOTES
Patient fell in living room and hit ear on side table, lac on right ear. Hx of bone cancer, feeling weak, Has had a cough over 2 weeks he is unable to get rid of. Patient states his blood pressure goes down when he stands up and that is why he falls. Patient is in kidney failure. Patient main concern is that he is worried about being weak and falling.

## 2024-01-17 NOTE — PROGRESS NOTES
"[facility] Clinical Pharmacy Services: Vancomycin Pharmacokinetic Initial Consult Note    Blair Lira is a 78 y.o. male who is on day 1 of pharmacy to dose vancomycin for Pneumonia and Sepsis.    Consult Information  Consulting Provider: Marlon  Planned Duration of Therapy: 7 days  Was Patient Receiving Prior to Admission/Consult?: No  Loading Dose Ordered or Given: 1750 mg on  at 0424  PK/PD Target: Dose by Levels  Other Antimicrobials: Piperacillin/Tazobactam    Imaging Reviewed?: Yes    Microbiology Data  Culture/Source:    blood and urine cultures in process    Vitals/Labs  Ht: 185.4 cm (73\"); Wt: 87.9 kg (193 lb 12.6 oz)  Temp (24hrs), Av.2 °F (37.3 °C), Min:97.5 °F (36.4 °C), Max:102 °F (38.9 °C)   Estimated Creatinine Clearance: 30.5 mL/min (A) (by C-G formula based on SCr of 2.52 mg/dL (H)).       Results from last 7 days   Lab Units 24  0751 24  0400 24  0136   CREATININE mg/dL  --  2.52* 2.66*   WBC 10*3/mm3 2.69*  --  3.53     Assessment/Plan:    Vancomycin Dose: pulse dosing as above with loading dose  Vanc Random ordered for  at 0600  Patient has order for Basic Metabolic Panel    Pharmacy will follow patient's kidney function and will adjust doses and obtain levels as necessary. Thank you for involving pharmacy in this patient's care. Please contact pharmacy with any questions or concerns.                           Babs Wright  Clinical Pharmacist    "

## 2024-01-18 LAB
ALBUMIN SERPL-MCNC: 2.4 G/DL (ref 3.5–5.2)
ALBUMIN/GLOB SERPL: 1 G/DL
ALP SERPL-CCNC: 50 U/L (ref 39–117)
ALT SERPL W P-5'-P-CCNC: 17 U/L (ref 1–41)
ANION GAP SERPL CALCULATED.3IONS-SCNC: 13.3 MMOL/L (ref 5–15)
AST SERPL-CCNC: 11 U/L (ref 1–40)
BACTERIA SPEC AEROBE CULT: NORMAL
BACTERIA SPEC AEROBE CULT: NORMAL
BASOPHILS # BLD AUTO: 0.06 10*3/MM3 (ref 0–0.2)
BASOPHILS NFR BLD AUTO: 2.3 % (ref 0–1.5)
BILIRUB SERPL-MCNC: 1.8 MG/DL (ref 0–1.2)
BUN SERPL-MCNC: 31 MG/DL (ref 8–23)
BUN/CREAT SERPL: 11.4 (ref 7–25)
CALCIUM SPEC-SCNC: 7.5 MG/DL (ref 8.6–10.5)
CHLORIDE SERPL-SCNC: 104 MMOL/L (ref 98–107)
CO2 SERPL-SCNC: 18.7 MMOL/L (ref 22–29)
CREAT SERPL-MCNC: 2.73 MG/DL (ref 0.76–1.27)
DEPRECATED RDW RBC AUTO: 60.8 FL (ref 37–54)
EGFRCR SERPLBLD CKD-EPI 2021: 23.2 ML/MIN/1.73
EOSINOPHIL # BLD AUTO: 0.19 10*3/MM3 (ref 0–0.4)
EOSINOPHIL NFR BLD AUTO: 7.3 % (ref 0.3–6.2)
ERYTHROCYTE [DISTWIDTH] IN BLOOD BY AUTOMATED COUNT: 18.1 % (ref 12.3–15.4)
GLOBULIN UR ELPH-MCNC: 2.5 GM/DL
GLUCOSE SERPL-MCNC: 94 MG/DL (ref 65–99)
HCT VFR BLD AUTO: 21.8 % (ref 37.5–51)
HGB BLD-MCNC: 7 G/DL (ref 13–17.7)
IMM GRANULOCYTES # BLD AUTO: 0.05 10*3/MM3 (ref 0–0.05)
IMM GRANULOCYTES NFR BLD AUTO: 1.9 % (ref 0–0.5)
INR PPP: 1.42 (ref 0.86–1.15)
LYMPHOCYTES # BLD AUTO: 0.28 10*3/MM3 (ref 0.7–3.1)
LYMPHOCYTES NFR BLD AUTO: 10.7 % (ref 19.6–45.3)
MCH RBC QN AUTO: 30.2 PG (ref 26.6–33)
MCHC RBC AUTO-ENTMCNC: 32.1 G/DL (ref 31.5–35.7)
MCV RBC AUTO: 94 FL (ref 79–97)
MONOCYTES # BLD AUTO: 0.11 10*3/MM3 (ref 0.1–0.9)
MONOCYTES NFR BLD AUTO: 4.2 % (ref 5–12)
NEUTROPHILS NFR BLD AUTO: 1.93 10*3/MM3 (ref 1.7–7)
NEUTROPHILS NFR BLD AUTO: 73.6 % (ref 42.7–76)
NRBC BLD AUTO-RTO: 0 /100 WBC (ref 0–0.2)
PLATELET # BLD AUTO: 74 10*3/MM3 (ref 140–450)
PMV BLD AUTO: 13 FL (ref 6–12)
POTASSIUM SERPL-SCNC: 4 MMOL/L (ref 3.5–5.2)
PROT SERPL-MCNC: 4.9 G/DL (ref 6–8.5)
PROTHROMBIN TIME: 17.6 SECONDS (ref 11.8–14.9)
RBC # BLD AUTO: 2.32 10*6/MM3 (ref 4.14–5.8)
RETICS # AUTO: 0.01 10*6/MM3 (ref 0.02–0.13)
RETICS/RBC NFR AUTO: 0.5 % (ref 0.7–1.9)
SODIUM SERPL-SCNC: 136 MMOL/L (ref 136–145)
VANCOMYCIN SERPL-MCNC: 13.2 MCG/ML (ref 5–40)
WBC NRBC COR # BLD AUTO: 2.62 10*3/MM3 (ref 3.4–10.8)

## 2024-01-18 PROCEDURE — 36430 TRANSFUSION BLD/BLD COMPNT: CPT

## 2024-01-18 PROCEDURE — 80202 ASSAY OF VANCOMYCIN: CPT | Performed by: INTERNAL MEDICINE

## 2024-01-18 PROCEDURE — P9040 RBC LEUKOREDUCED IRRADIATED: HCPCS

## 2024-01-18 PROCEDURE — 86334 IMMUNOFIX E-PHORESIS SERUM: CPT | Performed by: INTERNAL MEDICINE

## 2024-01-18 PROCEDURE — 25010000002 PIPERACILLIN SOD-TAZOBACTAM PER 1 G: Performed by: INTERNAL MEDICINE

## 2024-01-18 PROCEDURE — 94799 UNLISTED PULMONARY SVC/PX: CPT

## 2024-01-18 PROCEDURE — 86738 MYCOPLASMA ANTIBODY: CPT | Performed by: INTERNAL MEDICINE

## 2024-01-18 PROCEDURE — 85045 AUTOMATED RETICULOCYTE COUNT: CPT | Performed by: INTERNAL MEDICINE

## 2024-01-18 PROCEDURE — 86900 BLOOD TYPING SEROLOGIC ABO: CPT

## 2024-01-18 PROCEDURE — 25010000002 VANCOMYCIN 5 G RECONSTITUTED SOLUTION: Performed by: INTERNAL MEDICINE

## 2024-01-18 PROCEDURE — 82784 ASSAY IGA/IGD/IGG/IGM EACH: CPT | Performed by: INTERNAL MEDICINE

## 2024-01-18 PROCEDURE — 80053 COMPREHEN METABOLIC PANEL: CPT | Performed by: INTERNAL MEDICINE

## 2024-01-18 PROCEDURE — 25810000003 SODIUM CHLORIDE 0.9 % SOLUTION: Performed by: INTERNAL MEDICINE

## 2024-01-18 PROCEDURE — 85610 PROTHROMBIN TIME: CPT | Performed by: INTERNAL MEDICINE

## 2024-01-18 PROCEDURE — 97161 PT EVAL LOW COMPLEX 20 MIN: CPT

## 2024-01-18 PROCEDURE — 85025 COMPLETE CBC W/AUTO DIFF WBC: CPT | Performed by: INTERNAL MEDICINE

## 2024-01-18 PROCEDURE — 94760 N-INVAS EAR/PLS OXIMETRY 1: CPT

## 2024-01-18 PROCEDURE — 94664 DEMO&/EVAL PT USE INHALER: CPT

## 2024-01-18 RX ORDER — GUAIFENESIN/DEXTROMETHORPHAN 100-10MG/5
5 SYRUP ORAL EVERY 4 HOURS PRN
Status: DISCONTINUED | OUTPATIENT
Start: 2024-01-18 | End: 2024-01-28 | Stop reason: HOSPADM

## 2024-01-18 RX ADMIN — DOCUSATE SODIUM 50MG AND SENNOSIDES 8.6MG 2 TABLET: 8.6; 5 TABLET, FILM COATED ORAL at 10:07

## 2024-01-18 RX ADMIN — BENZONATATE 100 MG: 100 CAPSULE ORAL at 22:32

## 2024-01-18 RX ADMIN — ACETAMINOPHEN 650 MG: 325 TABLET ORAL at 10:07

## 2024-01-18 RX ADMIN — PIPERACILLIN AND TAZOBACTAM 3.38 G: 3; .375 INJECTION, POWDER, FOR SOLUTION INTRAVENOUS at 04:09

## 2024-01-18 RX ADMIN — SULFAMETHOXAZOLE AND TRIMETHOPRIM 1 TABLET: 800; 160 TABLET ORAL at 10:07

## 2024-01-18 RX ADMIN — NYSTATIN 500000 UNITS: 100000 SUSPENSION ORAL at 22:32

## 2024-01-18 RX ADMIN — NYSTATIN 500000 UNITS: 100000 SUSPENSION ORAL at 10:07

## 2024-01-18 RX ADMIN — ACETAMINOPHEN 650 MG: 325 TABLET ORAL at 04:09

## 2024-01-18 RX ADMIN — VANCOMYCIN HYDROCHLORIDE 750 MG: 5 INJECTION, POWDER, LYOPHILIZED, FOR SOLUTION INTRAVENOUS at 13:07

## 2024-01-18 RX ADMIN — BENZONATATE 100 MG: 100 CAPSULE ORAL at 04:14

## 2024-01-18 RX ADMIN — SULFAMETHOXAZOLE AND TRIMETHOPRIM 1 TABLET: 800; 160 TABLET ORAL at 22:32

## 2024-01-18 RX ADMIN — NYSTATIN 500000 UNITS: 100000 SUSPENSION ORAL at 12:15

## 2024-01-18 RX ADMIN — BENZONATATE 100 MG: 100 CAPSULE ORAL at 10:07

## 2024-01-18 RX ADMIN — ALBUTEROL SULFATE 2.5 MG: 2.5 SOLUTION RESPIRATORY (INHALATION) at 19:15

## 2024-01-18 RX ADMIN — MIDODRINE HYDROCHLORIDE 5 MG: 5 TABLET ORAL at 17:27

## 2024-01-18 RX ADMIN — Medication 10 ML: at 10:08

## 2024-01-18 RX ADMIN — PIPERACILLIN AND TAZOBACTAM 3.38 G: 3; .375 INJECTION, POWDER, FOR SOLUTION INTRAVENOUS at 12:15

## 2024-01-18 RX ADMIN — Medication: at 13:07

## 2024-01-18 RX ADMIN — NYSTATIN 500000 UNITS: 100000 SUSPENSION ORAL at 17:26

## 2024-01-18 RX ADMIN — PIPERACILLIN AND TAZOBACTAM 3.38 G: 3; .375 INJECTION, POWDER, FOR SOLUTION INTRAVENOUS at 22:32

## 2024-01-18 RX ADMIN — ALBUTEROL SULFATE 2.5 MG: 2.5 SOLUTION RESPIRATORY (INHALATION) at 07:47

## 2024-01-18 RX ADMIN — MIDODRINE HYDROCHLORIDE 5 MG: 5 TABLET ORAL at 12:15

## 2024-01-18 RX ADMIN — ALBUTEROL SULFATE 2.5 MG: 2.5 SOLUTION RESPIRATORY (INHALATION) at 11:29

## 2024-01-18 RX ADMIN — MIDODRINE HYDROCHLORIDE 5 MG: 5 TABLET ORAL at 10:07

## 2024-01-18 RX ADMIN — BENZONATATE 100 MG: 100 CAPSULE ORAL at 17:26

## 2024-01-18 NOTE — THERAPY EVALUATION
Acute Care - Physical Therapy Initial Evaluation   [facility]     Patient Name: Blair Lira  : 1945  MRN: [id number]  Today's Date: 2024      Visit Dx:     ICD-10-CM ICD-9-CM   1. Sepsis without acute organ dysfunction, due to unspecified organism  A41.9 038.9     995.91   2. Pneumonia of left lower lobe due to infectious organism  J18.9 486   3. Acute febrile illness  R50.9 780.60   4. Chronic anemia  D64.9 285.9   5. Chronic kidney disease, unspecified CKD stage  N18.9 585.9   6. Elevated troponin  R79.89 790.6   7. Decreased activities of daily living (ADL)  Z78.9 V49.89   8. Difficulty walking  R26.2 719.7     Patient Active Problem List   Diagnosis    Rotator cuff tear, right    Impingement syndrome of right shoulder    Acute renal failure superimposed on chronic kidney disease    Pancytopenia    Abnormal weight loss    Hypertension    CKD (chronic kidney disease) stage 3, GFR 30-59 ml/min    Moderate malnutrition    Weakness generalized    Intractable pain    Sepsis associated hypotension    Multiple myeloma    Edema    Chronic diastolic (congestive) heart failure    Acute on chronic HFrEF (heart failure with reduced ejection fraction)    Chest pain    Syncope    COVID-19 virus infection    Chronic kidney disease, stage IV (severe)    Hypotension    Anemia    Acute on chronic renal failure    Pneumonia    Anemia     Past Medical History:   Diagnosis Date    BPH (benign prostatic hyperplasia)     Cancer     Chronic kidney disease     Frozen shoulder     Hyperlipidemia     Hypertension 10/26/2023    Periarthritis of shoulder     Rotator cuff disorder, right     Tennis elbow     RIGHT     Past Surgical History:   Procedure Laterality Date    CATARACT EXTRACTION EXTRACAPSULAR W/ INTRAOCULAR LENS IMPLANTATION Bilateral     COLONOSCOPY      JOINT REPLACEMENT Right     THR    SHOULDER ARTHROSCOPY W/ ROTATOR CUFF REPAIR Right 3/29/2023    Procedure: SHOULDER ARTHROSCOPY WITH ROTATOR CUFF REPAIR,  SUBCROMIAL DECOMPRESSION,DISTAL CLAVICLE RESECTION;  Surgeon: Gustavo Samuels MD;  Location: [facility] OR [facility];  Service: Orthopedics;  Laterality: Right;    SHOULDER SURGERY Left     SCOPE     PT Assessment (last 12 hours)       PT Evaluation and Treatment       Row Name 01/18/24 1400          Physical Therapy Time and Intention    Subjective Information complains of;fatigue;weakness  -CS     Document Type evaluation  -CS     Mode of Treatment individual therapy;physical therapy  -CS     Patient Effort fair  -CS     Symptoms Noted During/After Treatment other (see comments)  Patient shaking uncontrollably due to being/feeling cold. Warm blankets provided post session.  -CS       Row Name 01/18/24 1400          General Information    Patient Profile Reviewed yes  -CS     Patient Observations alert;cooperative;agree to therapy  -CS     Prior Level of Function independent:;all household mobility;gait;transfer;bed mobility;ADL's  -CS     Equipment Currently Used at Home cane, straight;cane, quad;shower chair  -CS     Existing Precautions/Restrictions fall;oxygen therapy device and L/min  4-5L O2 currently  -CS     Barriers to Rehab none identified  -CS       Row Name 01/18/24 1400          Living Environment    Current Living Arrangements home  -CS     Home Accessibility stairs to enter home;stairs within home  -CS     People in Home other (see comments)  daughter and grandson have been there staying to help out  -CS     Primary Care Provided by self  family helps when needed  -CS       Row Name 01/18/24 1400          Home Main Entrance    Number of Stairs, Main Entrance one  -CS     Stair Railings, Main Entrance none  -CS       Row Name 01/18/24 1400          Stairs Within Home, Primary    Stairs, Within Home, Primary Non-essential flight to the basement  -CS       Row Name 01/18/24 1400          Range of Motion Comprehensive    General Range of Motion bilateral lower extremity ROM WFL  -CS       Row Name 01/18/24 1400           Strength Comprehensive (MMT)    General Manual Muscle Testing (MMT) Assessment lower extremity strength deficits identified  -CS     Comment, General Manual Muscle Testing (MMT) Assessment BLEs assessed at 3/5; formal manual muscle testing unable to be completed as patient was shaking so bad due to reports of felling very cold; RN aware and he was provided with warm blankets  -       Row Name 01/18/24 1400          Bed Mobility    Bed Mobility bed mobility (all) activities  -CS     All Activities, [place] (Bed Mobility) moderate assist (50% patient effort)  -CS     Bed Mobility, Safety Issues decreased use of legs for bridging/pushing;decreased use of arms for pushing/pulling  -CS     Assistive Device (Bed Mobility) bed rails;head of bed elevated  -CS     Comment, (Bed Mobility) supine to long sit completed only due to patient's medical state at time of session.  -       Row Name 01/18/24 1400          Safety Issues, Functional Mobility    Impairments Affecting Function (Mobility) balance;endurance/activity tolerance;strength;pain;shortness of breath  -       Row Name 01/18/24 1400          Balance    Comment, Balance Unable to be accurately assessed but likely impaired  -       Row Name 01/18/24 1400          Plan of Care Review    Plan of Care Reviewed With patient  -CS     Progress no change  -CS     Outcome Evaluation Pt presents with limitations that impede their ability to safely and independently transfer and ambulate. The skills of a therapist will be required to safely and effectively implement the following treatment plan to restore maximal level of function.  -       Row Name 01/18/24 1400          Positioning and Restraints    Pre-Treatment Position in bed  -CS     Post Treatment Position bed  -CS     In Bed fowlers;call light within reach;encouraged to call for assist;exit alarm on  -       Row Name 01/18/24 1400          Therapy Assessment/Plan (PT)    Rehab Potential (PT)  fair, will monitor progress closely  -CS     Criteria for Skilled Interventions Met (PT) yes;skilled treatment is necessary  -CS     Therapy Frequency (PT) daily  -CS     Predicted Duration of Therapy Intervention (PT) 10 days  -CS     Problem List (PT) problems related to;balance;mobility;strength;pain  -CS     Activity Limitations Related to Problem List (PT) unable to ambulate safely;unable to transfer safely  -CS       Row Name 01/18/24 1400          PT Evaluation Complexity    History, PT Evaluation Complexity 1-2 personal factors and/or comorbidities  -CS     Examination of Body Systems (PT Eval Complexity) total of 4 or more elements  -CS     Clinical Presentation (PT Evaluation Complexity) stable  -CS     Clinical Decision Making (PT Evaluation Complexity) low complexity  -CS     Overall Complexity (PT Evaluation Complexity) low complexity  -CS       Row Name 01/18/24 1400          Therapy Plan Review/Discharge Plan (PT)    Therapy Plan Review (PT) evaluation/treatment results reviewed;patient  -CS       Row Name 01/18/24 1400          Physical Therapy Goals    Bed Mobility Goal Selection (PT) bed mobility, PT goal 1  -CS     Transfer Goal Selection (PT) transfer, PT goal 1  -CS     Gait Training Goal Selection (PT) gait training, PT goal 1  -       Row Name 01/18/24 1400          Bed Mobility Goal 1 (PT)    Activity/Assistive Device (Bed Mobility Goal 1, PT) bed mobility activities, all  -CS     [place] Level/Cues Needed (Bed Mobility Goal 1, PT) modified independence  -CS     Time Frame (Bed Mobility Goal 1, PT) long term goal (LTG);10 days  -       Row Name 01/18/24 1400          Transfer Goal 1 (PT)    Activity/Assistive Device (Transfer Goal 1, PT) sit-to-stand/stand-to-sit;bed-to-chair/chair-to-bed;walker, rolling  -CS     [place] Level/Cues Needed (Transfer Goal 1, PT) standby assist  -CS     Time Frame (Transfer Goal 1, PT) long term goal (LTG);10 days  -       Row Name 01/18/24 1400           Gait Training Goal 1 (PT)    Activity/Assistive Device (Gait Training Goal 1, PT) gait (walking locomotion);assistive device use;walker, rolling  -CS     [place] Level (Gait Training Goal 1, PT) standby assist  -     Distance (Gait Training Goal 1, PT) 100  -CS     Time Frame (Gait Training Goal 1, PT) long term goal (LTG);10 days  -CS               User Key  (r) = Recorded By, (t) = Taken By, (c) = Cosigned By      Initials Name Provider Type    Nimo Huertas PT Physical Therapist                    Physical Therapy Education       Title: PT OT SLP Therapies (In Progress)       Topic: Physical Therapy (In Progress)       Point: Mobility training (In Progress)       Learning Progress Summary             Patient Acceptance, E, NR by  at 1/18/2024 1431                         Point: Home exercise program (Not Started)       Learner Progress:  Not documented in this visit.              Point: Body mechanics (Not Started)       Learner Progress:  Not documented in this visit.              Point: Precautions (In Progress)       Learning Progress Summary             Patient Acceptance, E, NR by  at 1/18/2024 1431                                         User Key       Initials Effective Dates Name Provider Type Discipline     04/25/21 -  Nimo Tsai PT Physical Therapist PT                  PT Recommendation and Plan  Anticipated Discharge Disposition (PT): inpatient rehabilitation facility  Planned Therapy Interventions (PT): balance training, bed mobility training, gait training, transfer training, strengthening  Therapy Frequency (PT): daily  Plan of Care Reviewed With: patient  Progress: no change  Outcome Evaluation: Pt presents with limitations that impede their ability to safely and independently transfer and ambulate. The skills of a therapist will be required to safely and effectively implement the following treatment plan to restore maximal level of function.   Outcome Measures        Row Name 01/18/24 1400             How much help from another person do you currently need...    Turning from your back to your side while in flat bed without using bedrails? 2  -CS      Moving from lying on back to sitting on the side of a flat bed without bedrails? 2  -CS      Moving to and from a bed to a chair (including a wheelchair)? 2  -CS      Standing up from a chair using your arms (e.g., wheelchair, bedside chair)? 2  -CS      Climbing 3-5 steps with a railing? 2  -CS      To walk in hospital room? 2  -CS      AM-PAC 6 Clicks Score (PT) 12  -CS      Highest Level of Mobility Goal 4 --> Transfer to chair/commode  -CS         Functional Assessment    Outcome Measure Options AM-PAC 6 Clicks Basic Mobility (PT)  -CS                User Key  (r) = Recorded By, (t) = Taken By, (c) = Cosigned By      Initials Name Provider Type    CS Nimo Tsai, PT Physical Therapist                     Time Calculation:    PT Charges       Row Name 01/18/24 1423             Time Calculation    PT Received On 01/18/24  -CS      PT Goal Re-Cert Due Date 01/27/24  -CS         Untimed Charges    PT Eval/Re-eval Minutes 22  -CS         Total Minutes    Untimed Charges Total Minutes 22  -CS       Total Minutes 22  -CS                User Key  (r) = Recorded By, (t) = Taken By, (c) = Cosigned By      Initials Name Provider Type    Nimo Huertas, PT Physical Therapist                  Therapy Charges for Today       Code Description Service Date Service Provider Modifiers Qty    [id number] HC PT EVAL LOW COMPLEXITY 2 1/18/2024 Nimo Tsai, PT GP 1            PT G-Codes  Outcome Measure Options: AM-PAC 6 Clicks Basic Mobility (PT)  AM-PAC 6 Clicks Score (PT): 12  AM-PAC 6 Clicks Score (OT): 19    Nimo Tsai PT  1/18/2024

## 2024-01-18 NOTE — PROGRESS NOTES
[facility]     Progress Note    Patient Name: Blair Lira  : 1945  MRN: [id number]  Primary Care Physician:  Babs Summers APRN  Date of admission: 2024    Subjective   Subjective     Chief Complaint: Patient has pneumonia and sepsis, he has been started on Septra in addition to piperacillin and vancomycin patient immune compromised with history of multiple myeloma he is being given Septra to cover possible pneumocystis which is common in these patients and with give prophylactic doses as well been having cough has pneumonia has blood cultures positive and anemia    HPI: Patient with multiple myeloma with chemotherapy on Darzalex and Revlimid    Review of Systems   All systems were reviewed and negative except for: Been reviewed in disc    Objective   Objective     Vitals:   Temp:  [97.3 °F (36.3 °C)-101.2 °F (38.4 °C)] 99.5 °F (37.5 °C)  Heart Rate:  [] 78  Resp:  [18-20] 18  BP: ()/(41-64) 104/41  Flow (L/min):  [2-4] 4    Physical Exam    Constitutional: Awake, alert   Eyes: PERRLA, sclerae anicteric, no conjunctival injection   HENT: NCAT, mucous membranes moist   Neck: Supple, no thyromegaly, no lymphadenopathy, trachea midline   Respiratory: Clear to auscultation bilaterally, nonlabored respirations    Cardiovascular: RRR, no murmurs, rubs, or gallops, palpable pedal pulses bilaterally   Gastrointestinal: Positive bowel sounds, soft, nontender, nondistended   Musculoskeletal: No bilateral ankle edema, no clubbing or cyanosis to extremities   Psychiatric: Appropriate affect, cooperative   Neurologic: Oriented x 3, strength symmetric in all extremities, Cranial Nerves grossly intact to confrontation, speech clear   Skin: No rashes   No change  Result Review    Result Review:  I have personally reviewed the results from the time of this admission to 2024 07:33 EST and agree with these findings:  [x]  Laboratory  [x]  Microbiology  []  Radiology  []  EKG/Telemetry   []   Cardiology/Vascular   []  Pathology  []  Old records  []  Other:  Most notable findings include: Labs reviewed and discussed    Assessment & Plan   Assessment / Plan     Brief Patient Summary:  Blair Lira is a 78 y.o. male who patient with fever pneumonia sepsis    Active Hospital Problems:  Active Hospital Problems    Diagnosis     **Pneumonia     Anemia     Hypotension     Sepsis associated hypotension     CKD (chronic kidney disease) stage 3, GFR 30-59 ml/min        Plan:   Continue IV antibiotics transfusion to be given    DVT prophylaxis:  Mechanical DVT prophylaxis orders are present.    CODE STATUS:   Level Of Support Discussed With: Patient  Code Status (Patient has no pulse and is not breathing): CPR (Attempt to Resuscitate)  Medical Interventions (Patient has pulse or is breathing): Full Support    Disposition:  I expect patient to be discharged after the patient has been stabilized.    Electronically signed by Vamsi Mason MD, 01/18/24, 7:33 AM EST.      Part of this note may be an electronic transcription/translation of spoken language to printed text using the Dragon Dictation System.

## 2024-01-18 NOTE — PROGRESS NOTES
"[facility] Clinical Pharmacy Services: Vancomycin Monitoring Note    Blair Lira is a 78 y.o. male who is on day  of pharmacy to dose vancomycin for Bacteremia, Pneumonia, and Sepsis.    Previous Vancomycin Dose:   1750 mg IV x 1 yesterday  4:24  Imaging Reviewed?: Yes  Updated Cultures and Sensitivities:     blood cx  -staph aureus   BCID-     Staph aureus. mecA/C and MREJ (methicillin resistance gene) NOT detected. Identification by BCID2 PCR       Vitals/Labs  Ht: 185.4 cm (73\"); Wt: 87.9 kg (193 lb 12.6 oz)   Temp (24hrs), Av.7 °F (37.1 °C), Min:97.3 °F (36.3 °C), Max:101.2 °F (38.4 °C)   Estimated Creatinine Clearance: 28.2 mL/min (A) (by C-G formula based on SCr of 2.73 mg/dL (H)).       Results from last 7 days   Lab Units 24  0505 24  0751 24  0400 24  0136   VANCOMYCIN RM mcg/mL 13.20  --   --   --    CREATININE mg/dL 2.73*  --  2.52* 2.66*   WBC 10*3/mm3 2.62* 2.69*  --  3.53     Assessment/Plan    Current Vancomycin Dose: pulse dosing , will give vancomycin 750mg iv x1 today    Next Vanc Random ordered for am.  We will continue to monitor patient changes and renal function     Thank you for involving pharmacy in this patient's care. Please contact pharmacy with any questions or concerns.    Angela Lucas  Clinical Pharmacist    "

## 2024-01-18 NOTE — PLAN OF CARE
Goal Outcome Evaluation:  Plan of Care Reviewed With: patient        Progress: no change  Outcome Evaluation: Pt presents with limitations that impede their ability to safely and independently transfer and ambulate. The skills of a therapist will be required to safely and effectively implement the following treatment plan to restore maximal level of function.      Anticipated Discharge Disposition (PT): inpatient rehabilitation facility

## 2024-01-18 NOTE — PLAN OF CARE
Goal Outcome Evaluation:           Progress: no change  Outcome Evaluation: patient alert and oriented x4. medicated per orders. one unit RBCs completed. patient educated to frequently turn and reposition. no new issues/ needs at this time.

## 2024-01-18 NOTE — PLAN OF CARE
Goal Outcome Evaluation:              Outcome Evaluation: VSS. Pt had low bp this morning d/t low hgb (see eMAR). Pt is to recieve 2 units of blood today. Waiting on blood bank to send off blood to verify. Pt c/o intermittent cough (see eMAR). Pt rested well throughout the shift. Continue plan of care.

## 2024-01-18 NOTE — CASE MANAGEMENT/SOCIAL WORK
Discharge Planning Assessment  [facility]     Patient Name: Blair Lira  MRN: [id number]  Today's Date: 1/18/2024    Admit Date: 1/17/2024  Plan: Pt admitted after sustaining a fall and near syncope. Pt was recently admitted to [facility] due to orthostasis. Pt found to have pneumonia this admission- currently on 4L 02. SW met with pt at bedside to assess needs. Pt lives at home alone and is fairly independent in ADLs. Pt's daughter and grandson lives near by and assist as needed. Pt uses a cane at at home but no other DME at baseline. May need a 6MWT at discharge if unable to be weaned. SW also discussed potential need for HHC due to frequent readmissions. Pt seemed to be interested in this. SW notified him we will follow up closer to discharge and send referrals as needed. No additional needs noted at this time. Pharmacy/PCP confirmed.   Discharge Needs Assessment       Row Name 01/18/24 1435       Living Environment    People in Home alone    Current Living Arrangements home    Potentially Unsafe Housing Conditions none    Primary Care Provided by self    Provides Primary Care For no one    Family Caregiver if Needed child(justin), adult;grandchild(justin), adult    Family Caregiver Names Liz Lira- Daughter; Chinedu Lira- Grandson    Quality of Family Relationships supportive;involved;helpful    Able to Return to Prior Arrangements yes       Resource/Environmental Concerns    Resource/Environmental Concerns none       Transition Planning    Patient/Family Anticipates Transition to home;home with help/services    Patient/Family Anticipated Services at Transition durable medical equipment;home health care    Transportation Anticipated family or friend will provide       Discharge Needs Assessment    Readmission Within the Last 30 Days no previous admission in last 30 days    Equipment Currently Used at Home walker, rolling;cane, quad    Concerns to be Addressed basic needs;discharge planning    Anticipated Changes  Related to Illness none    Equipment Needed After Discharge oxygen    Outpatient/Agency/Support Group Needs homecare agency              Discharge Plan       Row Name 01/18/24 1438       Plan    Plan Pt admitted after sustaining a fall and near syncope. Pt was recently admitted to [facility] due to orthostasis. Pt found to have pneumonia this admission- currently on 4L 02. SW met with pt at bedside to assess needs. Pt lives at home alone and is fairly independent in ADLs. Pt's daughter and grandson lives near by and assist as needed. Pt uses a cane at at home but no other DME at baseline. May need a 6MWT at discharge if unable to be weaned. SW also discussed potential need for HHC due to frequent readmissions. Pt seemed to be interested in this. SW notified him we will follow up closer to discharge and send referrals as needed. No additional needs noted at this time. Pharmacy/PCP confirmed.    Patient/Family in Agreement with Plan yes              Demographic Summary       Row Name 01/18/24 1431       General Information    Admission Type inpatient    Arrived From emergency department    Referral Source admission list    Reason for Consult discharge planning    Preferred Language English       Contact Information    Permission Granted to Share Info With family/designee                   Functional Status       Row Name 01/18/24 1432       Functional Status    Usual Activity Tolerance good    Current Activity Tolerance good       Functional Status, IADL    Medications independent    Meal Preparation independent    Housekeeping independent    Laundry independent    Shopping independent       Mental Status    General Appearance WDL WDL       Mental Status Summary    Recent Changes in Mental Status/Cognitive Functioning no changes                   Psychosocial       Row Name 01/18/24 1435       Behavior WDL    Behavior WDL WDL       Emotion Mood WDL    Emotion/Mood/Affect WDL WDL       Speech WDL    Speech WDL WDL        Perceptual State WDL    Perceptual State WDL WDL       Thought Process WDL    Thought Process WDL WDL       Intellectual Performance WDL    Intellectual Performance WDL WDL       Coping/Stress    Sources of Support adult child(justin);other family members    Techniques to [place] with Loss/Stress/Change not applicable    Reaction to Health Status accepting    Understanding of Condition and Treatment adequate understanding of medical condition       Developmental Stage (Eriksson's)    Developmental Stage Stage 8 (65 years-death/Late Adulthood) Integrity vs. Despair           Juanita Gil, MSW

## 2024-01-18 NOTE — PROGRESS NOTES
[facility]     Progress Note    Patient Name: Blair Lira  : 1945  MRN: [id number]  Primary Care Physician:  Babs Summers APRN  Date of admission: 2024      Subjective   Brief summary.  Patient admitted with pneumonia and hypotension      HPI:  Somewhat better still chest congested.  Hemoglobin dropped.  No bleeding.    Review of Systems     Chest congestion, cough, fatigue, less dizzy      Objective     Vitals:   Temp:  [97.7 °F (36.5 °C)-101.2 °F (38.4 °C)] 98.1 °F (36.7 °C)  Heart Rate:  [70-99] 83  Resp:  [16-20] 18  BP: ()/(41-61) 103/58  Flow (L/min):  [4] 4    Physical Exam :     Elderly male not in acute distress but tired looking.  Heart regular.  Lungs diminished breath sounds with rhonchi.  Abdomen soft.  Extremities no edema      Result Review:  I have personally reviewed the results from the time of this admission to 2024 17:53 EST and agree with these findings:  [x]  Laboratory  []  Microbiology  []  Radiology  []  EKG/Telemetry   []  Cardiology/Vascular   []  Pathology  []  Old records  []  Other:           Assessment / Plan       Active Hospital Problems:  Active Hospital Problems    Diagnosis     **Pneumonia     Anemia     Hypotension     Sepsis associated hypotension     CKD (chronic kidney disease) stage 3, GFR 30-59 ml/min        Plan:   Patient improving.  Transfused 2 units of packed red blood cells.  Continue supportive care, blood pressure improving.  Check orthostats, continue midodrine.  Continue current antibiotics and follow cultures.       DVT prophylaxis:  Mechanical DVT prophylaxis orders are present.    CODE STATUS:   Level Of Support Discussed With: Patient  Code Status (Patient has no pulse and is not breathing): CPR (Attempt to Resuscitate)  Medical Interventions (Patient has pulse or is breathing): Full Support            Electronically signed by Elieser Pederson MD, 24, 5:53 PM EST.

## 2024-01-19 LAB
ALBUMIN SERPL-MCNC: 2 G/DL (ref 3.5–5.2)
ALBUMIN/GLOB SERPL: 0.7 G/DL
ALP SERPL-CCNC: 54 U/L (ref 39–117)
ALT SERPL W P-5'-P-CCNC: 14 U/L (ref 1–41)
ANION GAP SERPL CALCULATED.3IONS-SCNC: 12.8 MMOL/L (ref 5–15)
ANISOCYTOSIS BLD QL: NORMAL
AST SERPL-CCNC: 16 U/L (ref 1–40)
BACTERIA SPEC AEROBE CULT: ABNORMAL
BACTERIA SPEC AEROBE CULT: ABNORMAL
BASOPHILS # BLD AUTO: 0.07 10*3/MM3 (ref 0–0.2)
BASOPHILS NFR BLD AUTO: 3.2 % (ref 0–1.5)
BH BB BLOOD EXPIRATION DATE: NORMAL
BH BB BLOOD EXPIRATION DATE: NORMAL
BH BB BLOOD TYPE BARCODE: 6200
BH BB BLOOD TYPE BARCODE: 6200
BH BB DISPENSE STATUS: NORMAL
BH BB DISPENSE STATUS: NORMAL
BH BB PRODUCT CODE: NORMAL
BH BB PRODUCT CODE: NORMAL
BH BB UNIT NUMBER: NORMAL
BH BB UNIT NUMBER: NORMAL
BILIRUB SERPL-MCNC: 1 MG/DL (ref 0–1.2)
BUN SERPL-MCNC: 35 MG/DL (ref 8–23)
BUN/CREAT SERPL: 12 (ref 7–25)
BURR CELLS BLD QL SMEAR: NORMAL
CALCIUM SPEC-SCNC: 7.4 MG/DL (ref 8.6–10.5)
CHLORIDE SERPL-SCNC: 107 MMOL/L (ref 98–107)
CO2 SERPL-SCNC: 12.2 MMOL/L (ref 22–29)
CREAT SERPL-MCNC: 2.92 MG/DL (ref 0.76–1.27)
CROSSMATCH INTERPRETATION: NORMAL
CROSSMATCH INTERPRETATION: NORMAL
DEPRECATED RDW RBC AUTO: 61.1 FL (ref 37–54)
EGFRCR SERPLBLD CKD-EPI 2021: 21.4 ML/MIN/1.73
ELLIPTOCYTES BLD QL SMEAR: NORMAL
EOSINOPHIL # BLD AUTO: 0.2 10*3/MM3 (ref 0–0.4)
EOSINOPHIL NFR BLD AUTO: 9.2 % (ref 0.3–6.2)
ERYTHROCYTE [DISTWIDTH] IN BLOOD BY AUTOMATED COUNT: 18.7 % (ref 12.3–15.4)
GLOBULIN UR ELPH-MCNC: 2.9 GM/DL
GLUCOSE SERPL-MCNC: 95 MG/DL (ref 65–99)
GRAM STN SPEC: ABNORMAL
HCT VFR BLD AUTO: 21.8 % (ref 37.5–51)
HGB BLD-MCNC: 7.2 G/DL (ref 13–17.7)
IGA SERPL-MCNC: 75 MG/DL (ref 61–437)
IGG SERPL-MCNC: 794 MG/DL (ref 603–1613)
IGM SERPL-MCNC: 17 MG/DL (ref 15–143)
IMM GRANULOCYTES # BLD AUTO: 0.12 10*3/MM3 (ref 0–0.05)
IMM GRANULOCYTES NFR BLD AUTO: 5.5 % (ref 0–0.5)
ISOLATED FROM: ABNORMAL
ISOLATED FROM: ABNORMAL
LARGE PLATELETS: NORMAL
LYMPHOCYTES # BLD AUTO: 0.23 10*3/MM3 (ref 0.7–3.1)
LYMPHOCYTES NFR BLD AUTO: 10.6 % (ref 19.6–45.3)
M PNEUMO IGG SER IA-ACNC: <100 U/ML (ref 0–99)
M PNEUMO IGM SER IA-ACNC: <770 U/ML (ref 0–769)
MCH RBC QN AUTO: 29.9 PG (ref 26.6–33)
MCHC RBC AUTO-ENTMCNC: 33 G/DL (ref 31.5–35.7)
MCV RBC AUTO: 90.5 FL (ref 79–97)
MONOCYTES # BLD AUTO: 0.08 10*3/MM3 (ref 0.1–0.9)
MONOCYTES NFR BLD AUTO: 3.7 % (ref 5–12)
NEUTROPHILS NFR BLD AUTO: 1.47 10*3/MM3 (ref 1.7–7)
NEUTROPHILS NFR BLD AUTO: 67.8 % (ref 42.7–76)
NRBC BLD AUTO-RTO: 0 /100 WBC (ref 0–0.2)
OVALOCYTES BLD QL SMEAR: NORMAL
PLATELET # BLD AUTO: 64 10*3/MM3 (ref 140–450)
PMV BLD AUTO: 12.5 FL (ref 6–12)
POIKILOCYTOSIS BLD QL SMEAR: NORMAL
POTASSIUM SERPL-SCNC: 4.4 MMOL/L (ref 3.5–5.2)
PROT PATTERN SERPL IFE-IMP: ABNORMAL
PROT SERPL-MCNC: 4.9 G/DL (ref 6–8.5)
RBC # BLD AUTO: 2.41 10*6/MM3 (ref 4.14–5.8)
SMALL PLATELETS BLD QL SMEAR: NORMAL
SODIUM SERPL-SCNC: 132 MMOL/L (ref 136–145)
UNIT  ABO: NORMAL
UNIT  ABO: NORMAL
UNIT  RH: NORMAL
UNIT  RH: NORMAL
VANCOMYCIN SERPL-MCNC: 16.68 MCG/ML (ref 5–40)
WBC MORPH BLD: NORMAL
WBC NRBC COR # BLD AUTO: 2.17 10*3/MM3 (ref 3.4–10.8)

## 2024-01-19 PROCEDURE — 87040 BLOOD CULTURE FOR BACTERIA: CPT | Performed by: INTERNAL MEDICINE

## 2024-01-19 PROCEDURE — 94664 DEMO&/EVAL PT USE INHALER: CPT

## 2024-01-19 PROCEDURE — 85025 COMPLETE CBC W/AUTO DIFF WBC: CPT | Performed by: INTERNAL MEDICINE

## 2024-01-19 PROCEDURE — 25810000003 SODIUM CHLORIDE 0.9 % SOLUTION 500 ML FLEX CONT: Performed by: INTERNAL MEDICINE

## 2024-01-19 PROCEDURE — 94799 UNLISTED PULMONARY SVC/PX: CPT

## 2024-01-19 PROCEDURE — 25010000002 ONDANSETRON PER 1 MG: Performed by: INTERNAL MEDICINE

## 2024-01-19 PROCEDURE — 25010000002 PIPERACILLIN SOD-TAZOBACTAM PER 1 G: Performed by: INTERNAL MEDICINE

## 2024-01-19 PROCEDURE — 80053 COMPREHEN METABOLIC PANEL: CPT | Performed by: INTERNAL MEDICINE

## 2024-01-19 PROCEDURE — 80202 ASSAY OF VANCOMYCIN: CPT | Performed by: INTERNAL MEDICINE

## 2024-01-19 PROCEDURE — 85007 BL SMEAR W/DIFF WBC COUNT: CPT | Performed by: INTERNAL MEDICINE

## 2024-01-19 PROCEDURE — 25010000002 CEFAZOLIN IN DEXTROSE 2-4 GM/100ML-% SOLUTION: Performed by: INTERNAL MEDICINE

## 2024-01-19 RX ORDER — GUAIFENESIN 200 MG/10ML
100 LIQUID ORAL EVERY 4 HOURS PRN
Status: DISCONTINUED | OUTPATIENT
Start: 2024-01-19 | End: 2024-01-28 | Stop reason: HOSPADM

## 2024-01-19 RX ORDER — CEFAZOLIN SODIUM 2 G/100ML
2000 INJECTION, SOLUTION INTRAVENOUS EVERY 12 HOURS
Status: DISCONTINUED | OUTPATIENT
Start: 2024-01-19 | End: 2024-01-28 | Stop reason: HOSPADM

## 2024-01-19 RX ADMIN — ONDANSETRON 4 MG: 2 INJECTION INTRAMUSCULAR; INTRAVENOUS at 14:53

## 2024-01-19 RX ADMIN — DEXTROSE AND SODIUM CHLORIDE 100 ML/HR: 5; 450 INJECTION, SOLUTION INTRAVENOUS at 16:36

## 2024-01-19 RX ADMIN — GUAIFENESIN AND DEXTROMETHORPHAN 5 ML: 100; 10 SYRUP ORAL at 21:10

## 2024-01-19 RX ADMIN — GUAIFENESIN AND DEXTROMETHORPHAN 5 ML: 100; 10 SYRUP ORAL at 15:52

## 2024-01-19 RX ADMIN — BENZONATATE 100 MG: 100 CAPSULE ORAL at 15:52

## 2024-01-19 RX ADMIN — BENZONATATE 100 MG: 100 CAPSULE ORAL at 11:45

## 2024-01-19 RX ADMIN — DOCUSATE SODIUM 50MG AND SENNOSIDES 8.6MG 2 TABLET: 8.6; 5 TABLET, FILM COATED ORAL at 09:43

## 2024-01-19 RX ADMIN — SODIUM CHLORIDE 40 ML: 9 INJECTION, SOLUTION INTRAVENOUS at 03:45

## 2024-01-19 RX ADMIN — CEFAZOLIN SODIUM 2000 MG: 2 INJECTION, SOLUTION INTRAVENOUS at 21:11

## 2024-01-19 RX ADMIN — GUAIFENESIN AND DEXTROMETHORPHAN 5 ML: 100; 10 SYRUP ORAL at 00:00

## 2024-01-19 RX ADMIN — PIPERACILLIN AND TAZOBACTAM 3.38 G: 3; .375 INJECTION, POWDER, FOR SOLUTION INTRAVENOUS at 03:45

## 2024-01-19 RX ADMIN — SULFAMETHOXAZOLE AND TRIMETHOPRIM 1 TABLET: 800; 160 TABLET ORAL at 21:11

## 2024-01-19 RX ADMIN — GUAIFENESIN AND DEXTROMETHORPHAN 5 ML: 100; 10 SYRUP ORAL at 11:45

## 2024-01-19 RX ADMIN — BENZONATATE 100 MG: 100 CAPSULE ORAL at 03:45

## 2024-01-19 RX ADMIN — BENZONATATE 100 MG: 100 CAPSULE ORAL at 21:11

## 2024-01-19 RX ADMIN — MIDODRINE HYDROCHLORIDE 5 MG: 5 TABLET ORAL at 16:36

## 2024-01-19 RX ADMIN — CEFAZOLIN SODIUM 2000 MG: 2 INJECTION, SOLUTION INTRAVENOUS at 09:43

## 2024-01-19 RX ADMIN — SULFAMETHOXAZOLE AND TRIMETHOPRIM 1 TABLET: 800; 160 TABLET ORAL at 09:43

## 2024-01-19 RX ADMIN — GUAIFENESIN AND DEXTROMETHORPHAN 5 ML: 100; 10 SYRUP ORAL at 03:45

## 2024-01-19 RX ADMIN — MIDODRINE HYDROCHLORIDE 5 MG: 5 TABLET ORAL at 11:45

## 2024-01-19 RX ADMIN — MIDODRINE HYDROCHLORIDE 5 MG: 5 TABLET ORAL at 09:43

## 2024-01-19 RX ADMIN — ACETAMINOPHEN 650 MG: 325 TABLET ORAL at 03:45

## 2024-01-19 RX ADMIN — Medication 10 ML: at 09:43

## 2024-01-19 RX ADMIN — ALBUTEROL SULFATE 2.5 MG: 2.5 SOLUTION RESPIRATORY (INHALATION) at 20:32

## 2024-01-19 RX ADMIN — ALBUTEROL SULFATE 2.5 MG: 2.5 SOLUTION RESPIRATORY (INHALATION) at 07:59

## 2024-01-19 NOTE — PROGRESS NOTES
[facility]     Progress Note    Patient Name: Blair Lira  : 1945  MRN: [id number]  Primary Care Physician:  Babs Summers APRN  Date of admission: 2024    Subjective   Subjective     Chief Complaint: Patient receiving antibiotics but will now scale down because he is growing staph from the blood which is MSSA he is afebrile vital signs are stable he may require 1 more unit of blood but platelet counts have dropped down    HPI: Stable and doing well will be given cefazolin because of the staph septicemia    Review of Systems   All systems were reviewed and negative except for: Has been reviewed    Objective   Objective     Vitals:   Temp:  [97.7 °F (36.5 °C)-99.5 °F (37.5 °C)] 97.7 °F (36.5 °C)  Heart Rate:  [69-99] 69  Resp:  [16-18] 18  BP: ()/(42-79) 93/79  Flow (L/min):  [4] 4    Physical Exam    Constitutional: Awake, alert   Eyes: PERRLA, sclerae anicteric, no conjunctival injection   HENT: NCAT, mucous membranes moist   Neck: Supple, no thyromegaly, no lymphadenopathy, trachea midline   Respiratory: Clear to auscultation bilaterally, nonlabored respirations    Cardiovascular: RRR, no murmurs, rubs, or gallops, palpable pedal pulses bilaterally   Gastrointestinal: Positive bowel sounds, soft, nontender, nondistended   Musculoskeletal: No bilateral ankle edema, no clubbing or cyanosis to extremities   Psychiatric: Appropriate affect, cooperative   Neurologic: Oriented x 3, strength symmetric in all extremities, Cranial Nerves grossly intact to confrontation, speech clear   Skin: No rashes   No change  Result Review    Result Review:  I have personally reviewed the results from the time of this admission to 2024 10:50 EST and agree with these findings:  [x]  Laboratory  [x]  Microbiology  []  Radiology  []  EKG/Telemetry   []  Cardiology/Vascular   []  Pathology  []  Old records  []  Other:  Most notable findings include: Continue antibiotics have discussed with the  pharmacy    Assessment & Plan   Assessment / Plan     Brief Patient Summary:  Blair Lira is a 78 y.o. male who patient with staph septicemia    Active Hospital Problems:  Active Hospital Problems    Diagnosis     **Pneumonia     Anemia     Hypotension     Sepsis associated hypotension     CKD (chronic kidney disease) stage 3, GFR 30-59 ml/min        Plan:   Started on cefazolin patient appears to be much better    DVT prophylaxis:  Mechanical DVT prophylaxis orders are present.    CODE STATUS:   Level Of Support Discussed With: Patient  Code Status (Patient has no pulse and is not breathing): CPR (Attempt to Resuscitate)  Medical Interventions (Patient has pulse or is breathing): Full Support    Disposition:  I expect patient to be discharged afebrile vital signs stable May need further transfusions.    Electronically signed by Vamsi Mason MD, 01/19/24, 10:50 AM EST.      Part of this note may be an electronic transcription/translation of spoken language to printed text using the Dragon Dictation System.

## 2024-01-19 NOTE — PROGRESS NOTES
"[facility] Clinical Pharmacy Services: Vancomycin Monitoring Note    Blair Lira is a 78 y.o. male who is on day 3/7 of pharmacy to dose vancomycin for Bacteremia, Pneumonia, and Sepsis.    Previous Vancomycin Dose:   750 mg IV x 1 yesterday  13:07  Imaging Reviewed?: Yes  Updated Cultures and Sensitivities:    urine- normal urogenital lottie    blood cx / -MSSA   urine- mixed lottie      Vitals/Labs  Ht: 185.4 cm (73\"); Wt: 87.9 kg (193 lb 12.6 oz)   Temp (24hrs), Av.2 °F (36.8 °C), Min:97.7 °F (36.5 °C), Max:99.5 °F (37.5 °C)   Estimated Creatinine Clearance: 26.3 mL/min (A) (by C-G formula based on SCr of 2.92 mg/dL (H)).       Results from last 7 days   Lab Units 24  0529 24  0410 24  0505 24  0751 24  0400   VANCOMYCIN RM mcg/mL 16.68  --  13.20  --   --    CREATININE mg/dL  --  2.92* 2.73*  --  2.52*   WBC 10*3/mm3 2.17*  --  2.62* 2.69*  --      Assessment/Plan      Current Vancomycin Dose: pulse dosing , will give vancomycin 500mg iv x1 today    Next Vanc Random ordered for am.  We will continue to monitor patient changes and renal function     Spoke with MD yesterday regarding MSSA in blood cx and suggested changing to kefzol.     Thank you for involving pharmacy in this patient's care. Please contact pharmacy with any questions or concerns.    Angela Lucas  Clinical Pharmacist      "

## 2024-01-19 NOTE — PLAN OF CARE
Goal Outcome Evaluation:  Plan of Care Reviewed With: patient        Progress: no change  Outcome Evaluation: Pt remained A&Ox4 this shift. Pt was titrated to 3L via nasal cannula maintaining stable oxygen saturation. Pt was medicated with PRN Tessalon and Robitussin to help relieve coughing. All other vital signs remained stable.

## 2024-01-20 ENCOUNTER — APPOINTMENT (OUTPATIENT)
Dept: CT IMAGING | Facility: HOSPITAL | Age: 79
DRG: 871 | End: 2024-01-20
Payer: MEDICARE

## 2024-01-20 LAB
ANION GAP SERPL CALCULATED.3IONS-SCNC: 12.4 MMOL/L (ref 5–15)
ANISOCYTOSIS BLD QL: NORMAL
BASOPHILS # BLD AUTO: 0.06 10*3/MM3 (ref 0–0.2)
BASOPHILS NFR BLD AUTO: 2.7 % (ref 0–1.5)
BUN SERPL-MCNC: 33 MG/DL (ref 8–23)
BUN/CREAT SERPL: 12.5 (ref 7–25)
BURR CELLS BLD QL SMEAR: NORMAL
CALCIUM SPEC-SCNC: 7.6 MG/DL (ref 8.6–10.5)
CHLORIDE SERPL-SCNC: 103 MMOL/L (ref 98–107)
CO2 SERPL-SCNC: 16.6 MMOL/L (ref 22–29)
CREAT SERPL-MCNC: 2.65 MG/DL (ref 0.76–1.27)
DEPRECATED RDW RBC AUTO: 62.9 FL (ref 37–54)
EGFRCR SERPLBLD CKD-EPI 2021: 24.1 ML/MIN/1.73
EOSINOPHIL # BLD AUTO: 0.16 10*3/MM3 (ref 0–0.4)
EOSINOPHIL NFR BLD AUTO: 7.2 % (ref 0.3–6.2)
ERYTHROCYTE [DISTWIDTH] IN BLOOD BY AUTOMATED COUNT: 18.7 % (ref 12.3–15.4)
GLUCOSE SERPL-MCNC: 127 MG/DL (ref 65–99)
HCT VFR BLD AUTO: 22.5 % (ref 37.5–51)
HGB BLD-MCNC: 7.2 G/DL (ref 13–17.7)
IMM GRANULOCYTES # BLD AUTO: 0.04 10*3/MM3 (ref 0–0.05)
IMM GRANULOCYTES NFR BLD AUTO: 1.8 % (ref 0–0.5)
LYMPHOCYTES # BLD AUTO: 0.26 10*3/MM3 (ref 0.7–3.1)
LYMPHOCYTES NFR BLD AUTO: 11.7 % (ref 19.6–45.3)
MCH RBC QN AUTO: 29.8 PG (ref 26.6–33)
MCHC RBC AUTO-ENTMCNC: 32 G/DL (ref 31.5–35.7)
MCV RBC AUTO: 93 FL (ref 79–97)
MONOCYTES # BLD AUTO: 0.07 10*3/MM3 (ref 0.1–0.9)
MONOCYTES NFR BLD AUTO: 3.2 % (ref 5–12)
NEUTROPHILS NFR BLD AUTO: 1.63 10*3/MM3 (ref 1.7–7)
NEUTROPHILS NFR BLD AUTO: 73.4 % (ref 42.7–76)
NRBC BLD AUTO-RTO: 0 /100 WBC (ref 0–0.2)
OVALOCYTES BLD QL SMEAR: NORMAL
PLATELET # BLD AUTO: 64 10*3/MM3 (ref 140–450)
PMV BLD AUTO: 12.7 FL (ref 6–12)
POIKILOCYTOSIS BLD QL SMEAR: NORMAL
POTASSIUM SERPL-SCNC: 3.7 MMOL/L (ref 3.5–5.2)
PROCALCITONIN SERPL-MCNC: 2.15 NG/ML (ref 0–0.25)
QT INTERVAL: 400 MS
QTC INTERVAL: 477 MS
RBC # BLD AUTO: 2.42 10*6/MM3 (ref 4.14–5.8)
SMALL PLATELETS BLD QL SMEAR: NORMAL
SODIUM SERPL-SCNC: 132 MMOL/L (ref 136–145)
WBC MORPH BLD: NORMAL
WBC NRBC COR # BLD AUTO: 2.22 10*3/MM3 (ref 3.4–10.8)

## 2024-01-20 PROCEDURE — 86921 COMPATIBILITY TEST INCUBATE: CPT

## 2024-01-20 PROCEDURE — 86922 COMPATIBILITY TEST ANTIGLOB: CPT

## 2024-01-20 PROCEDURE — 99223 1ST HOSP IP/OBS HIGH 75: CPT | Performed by: INTERNAL MEDICINE

## 2024-01-20 PROCEDURE — 85025 COMPLETE CBC W/AUTO DIFF WBC: CPT | Performed by: INTERNAL MEDICINE

## 2024-01-20 PROCEDURE — 85007 BL SMEAR W/DIFF WBC COUNT: CPT | Performed by: INTERNAL MEDICINE

## 2024-01-20 PROCEDURE — 86900 BLOOD TYPING SEROLOGIC ABO: CPT

## 2024-01-20 PROCEDURE — 94799 UNLISTED PULMONARY SVC/PX: CPT

## 2024-01-20 PROCEDURE — 25010000002 CEFAZOLIN IN DEXTROSE 2-4 GM/100ML-% SOLUTION: Performed by: INTERNAL MEDICINE

## 2024-01-20 PROCEDURE — 94664 DEMO&/EVAL PT USE INHALER: CPT

## 2024-01-20 PROCEDURE — 36430 TRANSFUSION BLD/BLD COMPNT: CPT

## 2024-01-20 PROCEDURE — 71250 CT THORAX DX C-: CPT

## 2024-01-20 PROCEDURE — 25010000002 FUROSEMIDE PER 20 MG: Performed by: INTERNAL MEDICINE

## 2024-01-20 PROCEDURE — P9040 RBC LEUKOREDUCED IRRADIATED: HCPCS

## 2024-01-20 PROCEDURE — 80048 BASIC METABOLIC PNL TOTAL CA: CPT | Performed by: INTERNAL MEDICINE

## 2024-01-20 PROCEDURE — 84145 PROCALCITONIN (PCT): CPT | Performed by: INTERNAL MEDICINE

## 2024-01-20 PROCEDURE — 86920 COMPATIBILITY TEST SPIN: CPT

## 2024-01-20 RX ORDER — ACYCLOVIR 200 MG/1
800 CAPSULE ORAL 3 TIMES DAILY
Status: DISCONTINUED | OUTPATIENT
Start: 2024-01-20 | End: 2024-01-22

## 2024-01-20 RX ORDER — FUROSEMIDE 10 MG/ML
20 INJECTION INTRAMUSCULAR; INTRAVENOUS EVERY 12 HOURS
Status: DISCONTINUED | OUTPATIENT
Start: 2024-01-20 | End: 2024-01-28 | Stop reason: HOSPADM

## 2024-01-20 RX ORDER — HYDROCODONE POLISTIREX AND CHLORPHENIRAMINE POLISTIREX 10; 8 MG/5ML; MG/5ML
2.5 SUSPENSION, EXTENDED RELEASE ORAL 2 TIMES DAILY
Status: DISPENSED | OUTPATIENT
Start: 2024-01-20 | End: 2024-01-25

## 2024-01-20 RX ADMIN — GUAIFENESIN AND DEXTROMETHORPHAN 5 ML: 100; 10 SYRUP ORAL at 04:57

## 2024-01-20 RX ADMIN — ALBUTEROL SULFATE 2.5 MG: 2.5 SOLUTION RESPIRATORY (INHALATION) at 07:51

## 2024-01-20 RX ADMIN — MIDODRINE HYDROCHLORIDE 5 MG: 5 TABLET ORAL at 06:02

## 2024-01-20 RX ADMIN — BENZONATATE 100 MG: 100 CAPSULE ORAL at 04:57

## 2024-01-20 RX ADMIN — ACYCLOVIR 800 MG: 200 CAPSULE ORAL at 20:50

## 2024-01-20 RX ADMIN — ALBUTEROL SULFATE 2.5 MG: 2.5 SOLUTION RESPIRATORY (INHALATION) at 12:01

## 2024-01-20 RX ADMIN — BENZONATATE 100 MG: 100 CAPSULE ORAL at 12:14

## 2024-01-20 RX ADMIN — ACYCLOVIR 800 MG: 200 CAPSULE ORAL at 16:56

## 2024-01-20 RX ADMIN — MIDODRINE HYDROCHLORIDE 5 MG: 5 TABLET ORAL at 16:56

## 2024-01-20 RX ADMIN — MIDODRINE HYDROCHLORIDE 5 MG: 5 TABLET ORAL at 12:14

## 2024-01-20 RX ADMIN — Medication 10 ML: at 20:50

## 2024-01-20 RX ADMIN — Medication 10 ML: at 09:18

## 2024-01-20 RX ADMIN — SULFAMETHOXAZOLE AND TRIMETHOPRIM 1 TABLET: 800; 160 TABLET ORAL at 09:18

## 2024-01-20 RX ADMIN — DEXTROSE AND SODIUM CHLORIDE 100 ML/HR: 5; 450 INJECTION, SOLUTION INTRAVENOUS at 03:09

## 2024-01-20 RX ADMIN — DOCUSATE SODIUM 50MG AND SENNOSIDES 8.6MG 2 TABLET: 8.6; 5 TABLET, FILM COATED ORAL at 20:51

## 2024-01-20 RX ADMIN — CEFAZOLIN SODIUM 2000 MG: 2 INJECTION, SOLUTION INTRAVENOUS at 20:50

## 2024-01-20 RX ADMIN — HYDROCODONE POLISTIREX AND CHLORPHENIRAMINE POLISTIREX 2.5 ML: 10; 8 SUSPENSION, EXTENDED RELEASE ORAL at 20:50

## 2024-01-20 RX ADMIN — ACYCLOVIR 800 MG: 200 CAPSULE ORAL at 12:13

## 2024-01-20 RX ADMIN — SULFAMETHOXAZOLE AND TRIMETHOPRIM 1 TABLET: 800; 160 TABLET ORAL at 20:50

## 2024-01-20 RX ADMIN — GUAIFENESIN 100 MG: 200 SOLUTION ORAL at 12:14

## 2024-01-20 RX ADMIN — FUROSEMIDE 20 MG: 10 INJECTION, SOLUTION INTRAMUSCULAR; INTRAVENOUS at 16:56

## 2024-01-20 RX ADMIN — CEFAZOLIN SODIUM 2000 MG: 2 INJECTION, SOLUTION INTRAVENOUS at 09:18

## 2024-01-20 RX ADMIN — ALBUTEROL SULFATE 2.5 MG: 2.5 SOLUTION RESPIRATORY (INHALATION) at 20:11

## 2024-01-20 RX ADMIN — ACETAMINOPHEN 650 MG: 325 TABLET ORAL at 14:27

## 2024-01-20 RX ADMIN — BENZONATATE 100 MG: 100 CAPSULE ORAL at 20:50

## 2024-01-20 NOTE — PROGRESS NOTES
[facility]     Progress Note    Patient Name: Blair Lira  : 1945  MRN: [id number]  Primary Care Physician:  Babs Summers APRN  Date of admission: 2024    Subjective   Subjective     Chief Complaint: Patient complaining of persistent cough not responding to any treatment we will get a pulmonary consultation a CT scan of the chest will be done hemoglobin is less than 8 patient symptomatic 1 unit of blood will be transfused    HPI: With persistent cough history of multiple myeloma status post chemotherapy with a good response    Review of Systems   All systems were reviewed and negative except for: Has been reviewed    Objective   Objective     Vitals:   Temp:  [97.3 °F (36.3 °C)-99.1 °F (37.3 °C)] 98.8 °F (37.1 °C)  Heart Rate:  [69-90] 75  Resp:  [18-20] 18  BP: (101-132)/(47-84) 101/56  Flow (L/min):  [3] 3    Physical Exam    Constitutional: Awake, alert   Eyes: PERRLA, sclerae anicteric, no conjunctival injection   HENT: NCAT, mucous membranes moist   Neck: Supple, no thyromegaly, no lymphadenopathy, trachea midline   Respiratory: Clear to auscultation bilaterally, nonlabored respirations    Cardiovascular: RRR, no murmurs, rubs, or gallops, palpable pedal pulses bilaterally   Gastrointestinal: Positive bowel sounds, soft, nontender, nondistended   Musculoskeletal: No bilateral ankle edema, no clubbing or cyanosis to extremities   Psychiatric: Appropriate affect, cooperative   Neurologic: Oriented x 3, strength symmetric in all extremities, Cranial Nerves grossly intact to confrontation, speech clear   Skin: No rashes   Persistent cough  Result Review    Result Review:  I have personally reviewed the results from the time of this admission to 2024 10:19 EST and agree with these findings:  [x]  Laboratory  []  Microbiology  [x]  Radiology  []  EKG/Telemetry   []  Cardiology/Vascular   []  Pathology  []  Old records  []  Other:  Most notable findings include: Pneumonia with persistent  cough neutropenia improving    Assessment & Plan   Assessment / Plan     Brief Patient Summary:  Blair Lira is a 78 y.o. male who persistent cough with pneumonia history of multiple myeloma    Active Hospital Problems:  Active Hospital Problems    Diagnosis     **Pneumonia     Anemia     Hypotension     Sepsis associated hypotension     CKD (chronic kidney disease) stage 3, GFR 30-59 ml/min        Plan:   Continue the antibiotics will get pulmonary consultation    DVT prophylaxis:  Mechanical DVT prophylaxis orders are present.    CODE STATUS:   Level Of Support Discussed With: Patient  Code Status (Patient has no pulse and is not breathing): CPR (Attempt to Resuscitate)  Medical Interventions (Patient has pulse or is breathing): Full Support    Disposition:  I expect patient to be discharged after the patient has been stabilized.    Electronically signed by Vamsi Mason MD, 01/20/24, 10:19 AM EST.      Part of this note may be an electronic transcription/translation of spoken language to printed text using the Dragon Dictation System.

## 2024-01-20 NOTE — CONSULTS
Pulmonary / Critical Care Consult Note      Patient Name: Blair Lira  : 1945  MRN: [id number]  Primary Care Physician:  Babs Summers APRN  Referring Physician: No Known Provider  Date of admission: 2024    Subjective   Subjective     Reason for Consult/ Chief Complaint: Pneumonia, cough    HPI:  Blair Lira is a 78 y.o. male with history of multiple myeloma who had 5 rounds of chemotherapy so far, presented to the hospital with cough and fever up to 102 °F, last found to have hemoglobin of 7.7 g/dL, acute kidney injury.  He was on IV fluids and antibiotics.  During the hospitalization, patient has been positive for MSSA bacteremia.  He continues to have persistent cough and has bilateral trace lower extremity swelling.  His NT proBNP is 1970, has pancytopenia with WBC count of 2.22, hemoglobin 7.2 and platelet count of 64.  Pulmonary critical care services consulted for extensive management of his pneumonia.  Chest x-ray shows worsening left-sided infiltrate.  CT scan of chest has been ordered.  Echocardiogram from 2023 showed patent foramen ovale.  He has cough with scanty sputum production.  He has temperature of 99.1 °F.  He is a lifelong non-smoker.  He had right hip replacement in past.    Review of Systems  General:  No Fatigue, Fever.  HEENT: No dysphagia, No Visual Changes, no rhinorrhea  Respiratory:  +cough,+Dyspnea, scant phlegm, No Pleuritic Pain, + wheezing, no hemoptysis  Cardiovascular: Denies chest pain, denies palpitations,+[place], + Chest Pressure  Gastrointestinal:  No Abdominal Pain, No Nausea, No Vomiting, No Diarrhea  Genitourinary:  No Dysuria, No Frequency, No Hesitancy  Musculoskeletal: No muscle pain or swelling  Endocrine:  No Heat Intolerance, No Cold Intolerance  Hematologic:  No Bleeding, No Bruising  Psychiatric:  No Anxiety, No Depression  Neurologic:  No Confusion, no Dysarthria, No Headaches  Skin:  No Rash, No Open Wounds        Personal History      Past Medical History:   Diagnosis Date    BPH (benign prostatic hyperplasia)     Cancer     Chronic kidney disease     Frozen shoulder     Hyperlipidemia     Hypertension 10/26/2023    Periarthritis of shoulder     Rotator cuff disorder, right     Tennis elbow     RIGHT       Past Surgical History:   Procedure Laterality Date    CATARACT EXTRACTION EXTRACAPSULAR W/ INTRAOCULAR LENS IMPLANTATION Bilateral     COLONOSCOPY      JOINT REPLACEMENT Right     THR    SHOULDER ARTHROSCOPY W/ ROTATOR CUFF REPAIR Right 3/29/2023    Procedure: SHOULDER ARTHROSCOPY WITH ROTATOR CUFF REPAIR, SUBCROMIAL DECOMPRESSION,DISTAL CLAVICLE RESECTION;  Surgeon: Gustavo Samuels MD;  Location: [facility] OR [facility] [place], [place];  Service: Orthopedics;  Laterality: Right;    SHOULDER SURGERY Left     SCOPE       Family History: No known family history of prior coronary disease or heart disease    Social History:  reports that he has never smoked. He has never been exposed to tobacco smoke. He has never used smokeless tobacco. He reports that he does not currently use alcohol. He reports that he does not use drugs.    Home Medications:  amLODIPine, atorvastatin, dexAMETHasone, fluticasone, lenalidomide, mirtazapine, pantoprazole, and tamsulosin    Allergies:  No Known Allergies    Objective    Objective     Vitals:   Temp:  [97.5 °F (36.4 °C)-99.1 °F (37.3 °C)] 99.1 °F (37.3 °C)  Heart Rate:  [69-90] 90  Resp:  [18-20] 20  BP: (101-138)/(47-75) 131/51  Flow (L/min):  [2-3] 2    Physical Exam:  Vital Signs Reviewed   General: Frail looking male, in no acute distress, has coughing fits  HEENT:  PERRL, EOMI.  OP, nares clear, no sinus tenderness  Neck:  Supple, no JVD, no thyromegaly  Lymph: no axillary, cervical, supraclavicular lymphadenopathy noted bilaterally  Chest: Poor aeration, bilateral diminished breath sounds, expiratory wheezing, no crackles or rhonchi, resonant to percussion bilaterally  CV: RRR, no MGR, pulses 2+, equal.  Abd:  Soft, NT, ND, + BS,  no HSM  EXT:  no clubbing, no cyanosis, trace bilateral lower extremity edema, no joint tenderness  Neuro:  A&Ox3, CN grossly intact, no focal deficits.  Skin: No rashes or lesions noted      Result Review    Result Review:  I have personally reviewed the results from the time of this admission to 1/20/2024 14:38 EST and agree with these findings:  [x]  Laboratory  [x]  Microbiology  [x]  Radiology  [x]  EKG/Telemetry   [x]  Cardiology/Vascular   []  Pathology  []  Old records  []  Other:  Most notable findings include:         Lab 01/20/24  0527 01/19/24  0529 01/19/24  0410 01/18/24  0505 01/17/24  0751 01/17/24  0400 01/17/24  0136   WBC 2.22* 2.17*  --  2.62* 2.69*  --  3.53   HEMOGLOBIN 7.2* 7.2*  --  7.0* 6.5*  --  7.7*   HEMATOCRIT 22.5* 21.8*  --  21.8* 20.9*  --  24.6*   PLATELETS 64* 64*  --  74* 77*  --  97*   SODIUM 132*  --  132* 136  --  135* 136   POTASSIUM 3.7  --  4.4 4.0  --  3.8 4.1   CHLORIDE 103  --  107 104  --  103 102   CO2 16.6*  --  12.2* 18.7*  --  18.4* 17.4*   BUN 33*  --  35* 31*  --  29* 31*   CREATININE 2.65*  --  2.92* 2.73*  --  2.52* 2.66*   GLUCOSE 127*  --  95 94  --  142* 153*   CALCIUM 7.6*  --  7.4* 7.5*  --  7.9* 8.5*   TOTAL PROTEIN  --   --  4.9* 4.9*  --  5.5* 6.1   ALBUMIN  --   --  2.0* 2.4*  --  2.8* 3.1*   GLOBULIN  --   --  2.9 2.5  --  2.7 3.0            Chest x-ray with left-sided infiltrate.      Assessment & Plan   Assessment / Plan     Active Hospital Problems:  Active Hospital Problems    Diagnosis     **Pneumonia     Anemia     Hypotension     Sepsis associated hypotension     CKD (chronic kidney disease) stage 3, GFR 30-59 ml/min          Impression:  Persistent cough  MSSA bacteremia  Left lower lobe pneumonia  History of multiple myeloma Multiple myeloma  CKD  Hypertensive  Pancytopenia  Immunosuppressed status      Plan:  Check CT scan of the chest.  Continue with cefazolin 2 g twice daily IV.  Patient is also on oral Bactrim.  Start Brovana and  Pulmicort.  Discontinue D5 half-normal saline.  Check BNP.  Start lasix 20 mg IV bid.   Tussionex twice daily for cough.    I have reviewed labs, imaging, pertinent clinical data and provider notes.   I have discussed with bedside nurse and primary service.   Thank you for allowing me to participate in care of Mr Lira. I will follow along.     Electronically signed by Lamberto Nevarez MD, 1/20/2024, 14:38 EST.

## 2024-01-20 NOTE — PLAN OF CARE
Goal Outcome Evaluation:           Progress: no change  Outcome Evaluation: Patient A/Ox4. On 2L nasal cannula. PRN Tessalon and Robitussin administered as ordered for coughing. 1 unit blood administered as ordered. Waiting on CT scan. No other issues/needs as this time.

## 2024-01-21 LAB
ANION GAP SERPL CALCULATED.3IONS-SCNC: 12.6 MMOL/L (ref 5–15)
ANISOCYTOSIS BLD QL: NORMAL
BASOPHILS # BLD AUTO: 0.06 10*3/MM3 (ref 0–0.2)
BASOPHILS NFR BLD AUTO: 3.2 % (ref 0–1.5)
BH BB BLOOD EXPIRATION DATE: NORMAL
BH BB BLOOD TYPE BARCODE: 6200
BH BB DISPENSE STATUS: NORMAL
BH BB PRODUCT CODE: NORMAL
BH BB UNIT NUMBER: NORMAL
BUN SERPL-MCNC: 34 MG/DL (ref 8–23)
BUN/CREAT SERPL: 12.9 (ref 7–25)
BURR CELLS BLD QL SMEAR: NORMAL
CALCIUM SPEC-SCNC: 7.9 MG/DL (ref 8.6–10.5)
CHLORIDE SERPL-SCNC: 105 MMOL/L (ref 98–107)
CO2 SERPL-SCNC: 17.4 MMOL/L (ref 22–29)
CREAT SERPL-MCNC: 2.63 MG/DL (ref 0.76–1.27)
CROSSMATCH INTERPRETATION: NORMAL
DEPRECATED RDW RBC AUTO: 58.8 FL (ref 37–54)
EGFRCR SERPLBLD CKD-EPI 2021: 24.3 ML/MIN/1.73
EOSINOPHIL # BLD AUTO: 0.16 10*3/MM3 (ref 0–0.4)
EOSINOPHIL NFR BLD AUTO: 8.6 % (ref 0.3–6.2)
ERYTHROCYTE [DISTWIDTH] IN BLOOD BY AUTOMATED COUNT: 18.4 % (ref 12.3–15.4)
GLUCOSE SERPL-MCNC: 86 MG/DL (ref 65–99)
HCT VFR BLD AUTO: 25.7 % (ref 37.5–51)
HGB BLD-MCNC: 8.5 G/DL (ref 13–17.7)
IMM GRANULOCYTES # BLD AUTO: 0.02 10*3/MM3 (ref 0–0.05)
IMM GRANULOCYTES NFR BLD AUTO: 1.1 % (ref 0–0.5)
LYMPHOCYTES # BLD AUTO: 0.22 10*3/MM3 (ref 0.7–3.1)
LYMPHOCYTES NFR BLD AUTO: 11.8 % (ref 19.6–45.3)
MCH RBC QN AUTO: 29.4 PG (ref 26.6–33)
MCHC RBC AUTO-ENTMCNC: 33.1 G/DL (ref 31.5–35.7)
MCV RBC AUTO: 88.9 FL (ref 79–97)
MONOCYTES # BLD AUTO: 0.06 10*3/MM3 (ref 0.1–0.9)
MONOCYTES NFR BLD AUTO: 3.2 % (ref 5–12)
NEUTROPHILS NFR BLD AUTO: 1.35 10*3/MM3 (ref 1.7–7)
NEUTROPHILS NFR BLD AUTO: 72.1 % (ref 42.7–76)
NRBC BLD AUTO-RTO: 0 /100 WBC (ref 0–0.2)
PLATELET # BLD AUTO: 68 10*3/MM3 (ref 140–450)
PMV BLD AUTO: 12.7 FL (ref 6–12)
POTASSIUM SERPL-SCNC: 4 MMOL/L (ref 3.5–5.2)
RBC # BLD AUTO: 2.89 10*6/MM3 (ref 4.14–5.8)
SMALL PLATELETS BLD QL SMEAR: NORMAL
SMUDGE CELLS BLD QL SMEAR: NORMAL
SODIUM SERPL-SCNC: 135 MMOL/L (ref 136–145)
UNIT  ABO: NORMAL
UNIT  RH: NORMAL
WBC NRBC COR # BLD AUTO: 1.87 10*3/MM3 (ref 3.4–10.8)

## 2024-01-21 PROCEDURE — 25010000002 FUROSEMIDE PER 20 MG: Performed by: INTERNAL MEDICINE

## 2024-01-21 PROCEDURE — 94799 UNLISTED PULMONARY SVC/PX: CPT

## 2024-01-21 PROCEDURE — 94664 DEMO&/EVAL PT USE INHALER: CPT

## 2024-01-21 PROCEDURE — 85025 COMPLETE CBC W/AUTO DIFF WBC: CPT | Performed by: INTERNAL MEDICINE

## 2024-01-21 PROCEDURE — 25010000002 ONDANSETRON PER 1 MG: Performed by: INTERNAL MEDICINE

## 2024-01-21 PROCEDURE — 25010000002 CEFAZOLIN IN DEXTROSE 2-4 GM/100ML-% SOLUTION: Performed by: INTERNAL MEDICINE

## 2024-01-21 PROCEDURE — 99233 SBSQ HOSP IP/OBS HIGH 50: CPT | Performed by: INTERNAL MEDICINE

## 2024-01-21 PROCEDURE — 97110 THERAPEUTIC EXERCISES: CPT

## 2024-01-21 PROCEDURE — 86738 MYCOPLASMA ANTIBODY: CPT | Performed by: INTERNAL MEDICINE

## 2024-01-21 PROCEDURE — 85007 BL SMEAR W/DIFF WBC COUNT: CPT | Performed by: INTERNAL MEDICINE

## 2024-01-21 PROCEDURE — 80048 BASIC METABOLIC PNL TOTAL CA: CPT | Performed by: INTERNAL MEDICINE

## 2024-01-21 PROCEDURE — 97530 THERAPEUTIC ACTIVITIES: CPT

## 2024-01-21 RX ORDER — FLUCONAZOLE 200 MG/1
200 TABLET ORAL EVERY 24 HOURS
Status: DISCONTINUED | OUTPATIENT
Start: 2024-01-21 | End: 2024-01-28 | Stop reason: HOSPADM

## 2024-01-21 RX ADMIN — HYDROCODONE POLISTIREX AND CHLORPHENIRAMINE POLISTIREX 2.5 ML: 10; 8 SUSPENSION, EXTENDED RELEASE ORAL at 20:18

## 2024-01-21 RX ADMIN — HYDROCODONE POLISTIREX AND CHLORPHENIRAMINE POLISTIREX 2.5 ML: 10; 8 SUSPENSION, EXTENDED RELEASE ORAL at 09:37

## 2024-01-21 RX ADMIN — MIDODRINE HYDROCHLORIDE 5 MG: 5 TABLET ORAL at 18:03

## 2024-01-21 RX ADMIN — ACETAMINOPHEN 650 MG: 325 TABLET ORAL at 03:26

## 2024-01-21 RX ADMIN — ALBUTEROL SULFATE 2.5 MG: 2.5 SOLUTION RESPIRATORY (INHALATION) at 18:26

## 2024-01-21 RX ADMIN — ACYCLOVIR 800 MG: 200 CAPSULE ORAL at 20:18

## 2024-01-21 RX ADMIN — MIDODRINE HYDROCHLORIDE 5 MG: 5 TABLET ORAL at 06:40

## 2024-01-21 RX ADMIN — DOCUSATE SODIUM 50MG AND SENNOSIDES 8.6MG 2 TABLET: 8.6; 5 TABLET, FILM COATED ORAL at 20:18

## 2024-01-21 RX ADMIN — CEFAZOLIN SODIUM 2000 MG: 2 INJECTION, SOLUTION INTRAVENOUS at 20:30

## 2024-01-21 RX ADMIN — ACYCLOVIR 800 MG: 200 CAPSULE ORAL at 15:34

## 2024-01-21 RX ADMIN — ALBUTEROL SULFATE 2.5 MG: 2.5 SOLUTION RESPIRATORY (INHALATION) at 13:26

## 2024-01-21 RX ADMIN — Medication 10 ML: at 09:39

## 2024-01-21 RX ADMIN — ONDANSETRON 4 MG: 2 INJECTION INTRAMUSCULAR; INTRAVENOUS at 13:17

## 2024-01-21 RX ADMIN — MIDODRINE HYDROCHLORIDE 5 MG: 5 TABLET ORAL at 12:05

## 2024-01-21 RX ADMIN — BENZONATATE 100 MG: 100 CAPSULE ORAL at 13:21

## 2024-01-21 RX ADMIN — SULFAMETHOXAZOLE AND TRIMETHOPRIM 1 TABLET: 800; 160 TABLET ORAL at 20:19

## 2024-01-21 RX ADMIN — SULFAMETHOXAZOLE AND TRIMETHOPRIM 1 TABLET: 800; 160 TABLET ORAL at 09:40

## 2024-01-21 RX ADMIN — ONDANSETRON 4 MG: 2 INJECTION INTRAMUSCULAR; INTRAVENOUS at 20:18

## 2024-01-21 RX ADMIN — Medication 10 ML: at 20:19

## 2024-01-21 RX ADMIN — ALBUTEROL SULFATE 2.5 MG: 2.5 SOLUTION RESPIRATORY (INHALATION) at 07:55

## 2024-01-21 RX ADMIN — CEFAZOLIN SODIUM 2000 MG: 2 INJECTION, SOLUTION INTRAVENOUS at 10:31

## 2024-01-21 RX ADMIN — ACYCLOVIR 800 MG: 200 CAPSULE ORAL at 09:38

## 2024-01-21 RX ADMIN — FLUCONAZOLE 200 MG: 200 TABLET ORAL at 10:31

## 2024-01-21 NOTE — THERAPY TREATMENT NOTE
Acute Care - Physical Therapy Treatment Note  TONY Keith     Patient Name: Blair Lira  : 1945  MRN: [id number]  Today's Date: 2024      Visit Dx:     ICD-10-CM ICD-9-CM   1. Sepsis without acute organ dysfunction, due to unspecified organism  A41.9 038.9     995.91   2. Pneumonia of left lower lobe due to infectious organism  J18.9 486   3. Acute febrile illness  R50.9 780.60   4. Chronic anemia  D64.9 285.9   5. Chronic kidney disease, unspecified CKD stage  N18.9 585.9   6. Elevated troponin  R79.89 790.6   7. Decreased activities of daily living (ADL)  Z78.9 V49.89   8. Difficulty walking  R26.2 719.7     Patient Active Problem List   Diagnosis    Rotator cuff tear, right    Impingement syndrome of right shoulder    Acute renal failure superimposed on chronic kidney disease    Pancytopenia    Abnormal weight loss    Hypertension    CKD (chronic kidney disease) stage 3, GFR 30-59 ml/min    Moderate malnutrition    Weakness generalized    Intractable pain    Sepsis associated hypotension    Multiple myeloma    Edema    Chronic diastolic (congestive) heart failure    Acute on chronic HFrEF (heart failure with reduced ejection fraction)    Chest pain    Syncope    COVID-19 virus infection    Chronic kidney disease, stage IV (severe)    Hypotension    Anemia    Acute on chronic renal failure    Pneumonia    Anemia     Past Medical History:   Diagnosis Date    BPH (benign prostatic hyperplasia)     Cancer     Chronic kidney disease     Frozen shoulder     Hyperlipidemia     Hypertension 10/26/2023    Periarthritis of shoulder     Rotator cuff disorder, right     Tennis elbow     RIGHT     Past Surgical History:   Procedure Laterality Date    CATARACT EXTRACTION EXTRACAPSULAR W/ INTRAOCULAR LENS IMPLANTATION Bilateral     COLONOSCOPY      JOINT REPLACEMENT Right     THR    SHOULDER ARTHROSCOPY W/ ROTATOR CUFF REPAIR Right 3/29/2023    Procedure: SHOULDER ARTHROSCOPY WITH ROTATOR CUFF REPAIR, SUBCROMIAL  DECOMPRESSION,DISTAL CLAVICLE RESECTION;  Surgeon: Gustavo Samuels MD;  [place] [facility] [place] [facility] [place]  Service: Orthopedics;  Laterality: Right;    SHOULDER SURGERY Left     SCOPE     PT Assessment (last 12 hours)       PT Evaluation and Treatment       Row Name 01/21/24 0843          Physical Therapy Time and Intention    Subjective Information complains of;weakness;fatigue;pain  -DK     Document Type therapy note (daily note)  -DK     Mode of Treatment individual therapy;physical therapy  -DK     Patient Effort good  -DK     Symptoms Noted During/After Treatment fatigue;increased pain;shortness of breath  -DK       Row Name 01/21/24 0843          Pain    Pretreatment Pain Rating 2/10  -DK     Posttreatment Pain Rating 2/10  -DK     Pain Location - Side/Orientation Left  -DK     Pain Location generalized  -DK     Pain Location - calf  -DK     Pain Intervention(s) Repositioned;Ambulation/increased activity;Distraction;Therapeutic presence  -DK       Row Name 01/21/24 0843          Cognition    Affect/Mental Status (Cognition) WFL  -DK     Orientation Status (Cognition) oriented x 4  -DK     Follows Commands (Cognition) WFL  -DK     Cognitive Function WFL  -DK     Personal Safety Interventions gait belt;nonskid shoes/slippers when out of bed;supervised activity  -DK       Row Name 01/21/24 0843          Bed Mobility    Bed Mobility supine-sit  -DK     All Activities, [place] (Bed Mobility) standby assist  -DK     Supine-Sit [place] (Bed Mobility) standby assist  -DK     Assistive Device (Bed Mobility) bed rails  -       Row Name 01/21/24 0843          Transfers    Transfers sit-stand transfer;stand-sit transfer  -       Row Name 01/21/24 0843          Sit-Stand Transfer    Sit-Stand [place] (Transfers) standby assist;contact guard;1 person assist  -     Assistive Device (Sit-Stand Transfers) walker, front-wheeled  -       Row Name 01/21/24 0843          Stand-Sit Transfer    Stand-Sit [place]  (Transfers) standby assist;contact guard;1 person assist  -DK     Assistive Device (Stand-Sit Transfers) walker, front-wheeled  -DK       Row Name 01/21/24 0843          Gait/Stairs (Locomotion)    Gait/Stairs Locomotion gait/ambulation independence;gait/ambulation assistive device;distance ambulated;gait pattern  -DK     [place] Level (Gait) standby assist;contact guard;1 person assist  -DK     Assistive Device (Gait) walker, front-wheeled  -DK     Distance in Feet (Gait) 35  -DK     Pattern (Gait) step-to  -DK     Deviations/Abnormal Patterns (Gait) eliza decreased;festinating/shuffling;gait speed decreased;stride length decreased  -DK     Bilateral Gait Deviations forward flexed posture  -DK     Comment, (Gait/Stairs) Pt ambulated on room air with a rolling walker.  He requested to use the bathroom but was unsuccessful. Pt reports having urinated several times overnight. Pt was left in the recliner on alert post treatment.  -DK       Row Name 01/21/24 0843          Safety Issues, Functional Mobility    Safety Issues Affecting Function (Mobility) judgment;impulsivity;safety precaution awareness  -DK     Impairments Affecting Function (Mobility) balance;endurance/activity tolerance;pain;strength  -DK       Row Name 01/21/24 0843          Balance    Balance Assessment sitting static balance;sitting dynamic balance;standing static balance;standing dynamic balance  -DK     Static Sitting Balance standby assist  -DK     Dynamic Sitting Balance standby assist  -DK     Position, Sitting Balance unsupported;sitting edge of bed;sitting in chair  -DK     Static Standing Balance standby assist;contact guard;1-person assist  -DK     Dynamic Standing Balance standby assist;contact guard;1-person assist  -DK     Position/Device Used, Standing Balance walker, front-wheeled  -DK     Balance Interventions standing;dynamic;tandem gait  -DK       Row Name 01/21/24 0843          Motor Skills    Motor Skills --  therapeutic  exercises  -DK     Coordination WFL  -DK     Therapeutic Exercise hip;knee;ankle  -DK       Row Name 01/21/24 0843          Hip (Therapeutic Exercise)    Hip (Therapeutic Exercise) AAROM (active assistive range of motion)  -DK     Hip AAROM (Therapeutic Exercise) bilateral;flexion;extension;aBduction;aDduction;supine;10 repetitions;2 sets  -       Row Name 01/21/24 0843          Knee (Therapeutic Exercise)    Knee (Therapeutic Exercise) AAROM (active assistive range of motion)  -DK     Knee AAROM (Therapeutic Exercise) bilateral;flexion;extension;supine;10 repetitions;2 sets  -       Row Name 01/21/24 0843          Ankle (Therapeutic Exercise)    Ankle (Therapeutic Exercise) AAROM (active assistive range of motion)  -DK     Ankle AAROM (Therapeutic Exercise) bilateral;dorsiflexion;plantarflexion;supine;10 repetitions;2 sets  -       Row Name 01/21/24 0843          Plan of Care Review    Plan of Care Reviewed With patient  -     Progress improving  -       Row Name 01/21/24 0843          Positioning and Restraints    Pre-Treatment Position in bed  -DK     Post Treatment Position chair  -DK     In Chair reclined;call light within reach;encouraged to call for assist;exit alarm on;legs elevated  -       Row Name 01/21/24 0843          Therapy Assessment/Plan (PT)    Rehab Potential (PT) good, to achieve stated therapy goals  -     Criteria for Skilled Interventions Met (PT) skilled treatment is necessary  -     Therapy Frequency (PT) daily  -     Problem List (PT) problems related to;balance;strength;pain  -     Activity Limitations Related to Problem List (PT) unable to ambulate safely  -       Row Name 01/21/24 0843          Progress Summary (PT)    Progress Toward Functional Goals (PT) progress toward functional goals is good  -               User Key  (r) = Recorded By, (t) = Taken By, (c) = Cosigned By      Initials Name Provider Type    Stacia Casey PTA Physical Therapist Assistant                     Physical Therapy Education       Title: PT OT SLP Therapies (In Progress)       Topic: Physical Therapy (In Progress)       Point: Mobility training (In Progress)       Learning Progress Summary             Patient Acceptance, E, NR by CS at 1/18/2024 1431                         Point: Home exercise program (Not Started)       Learner Progress:  Not documented in this visit.              Point: Body mechanics (Not Started)       Learner Progress:  Not documented in this visit.              Point: Precautions (In Progress)       Learning Progress Summary             Patient Acceptance, E, NR by CS at 1/18/2024 1431                                         User Key       Initials Effective Dates Name Provider Type Discipline    CS 04/25/21 -  Nimo Tsai, PT Physical Therapist PT                  PT Recommendation and Plan  Planned Therapy Interventions (PT): balance training, bed mobility training, gait training, strengthening, transfer training  Therapy Frequency (PT): daily  Progress Summary (PT)  Progress Toward Functional Goals (PT): progress toward functional goals is good  Plan of Care Reviewed With: patient  Progress: improving   Outcome Measures       Row Name 01/21/24 0843 01/18/24 1400          How much help from another person do you currently need...    Turning from your back to your side while in flat bed without using bedrails? 4  -DK 2  -CS     Moving from lying on back to sitting on the side of a flat bed without bedrails? 4  -DK 2  -CS     Moving to and from a bed to a chair (including a wheelchair)? 3  -DK 2  -CS     Standing up from a chair using your arms (e.g., wheelchair, bedside chair)? 3  -DK 2  -CS     Climbing 3-5 steps with a railing? 2  -DK 2  -CS     To walk in hospital room? 3  -DK 2  -CS     AM-PAC 6 Clicks Score (PT) 19  -DK 12  -CS     Highest Level of Mobility Goal 6 --> Walk 10 steps or more  -DK 4 --> Transfer to chair/commode  -CS        Functional  Assessment    Outcome Measure Options AM-PAC 6 Clicks Basic Mobility (PT)  -DK AM-PAC 6 Clicks Basic Mobility (PT)  -CS               User Key  (r) = Recorded By, (t) = Taken By, (c) = Cosigned By      Initials Name Provider Type    Stacia Casey PTA Physical Therapist Assistant    CS Nimo Tsai, PT Physical Therapist                     Time Calculation:    PT Charges       Row Name 01/21/24 0848             Time Calculation    PT Received On 01/21/24  -DK      PT Goal Re-Cert Due Date 01/27/24  -DK         Timed Charges    14658 - PT Therapeutic Exercise Minutes 14  -DK      06884 - Gait Training Minutes  6  -DK      22833 - PT Therapeutic Activity Minutes 8  -DK         Total Minutes    Timed Charges Total Minutes 28  -DK       Total Minutes 28  -DK                User Key  (r) = Recorded By, (t) = Taken By, (c) = Cosigned By      Initials Name Provider Type    Stacia Casey PTA Physical Therapist Assistant                  Therapy Charges for Today       Code Description Service Date Service Provider Modifiers Qty    47276760090 HC PT THER PROC EA 15 MIN 1/21/2024 Stacia Ordonez PTA GP 1    21918450779 HC PT THERAPEUTIC ACT EA 15 MIN 1/21/2024 Stacia Ordonez, KRISTIE GP 1            PT G-Codes  Outcome Measure Options: AM-PAC 6 Clicks Basic Mobility (PT)  AM-PAC 6 Clicks Score (PT): 19  AM-PAC 6 Clicks Score (OT): 19    Stacia Ordonez PTA  1/21/2024

## 2024-01-21 NOTE — PROGRESS NOTES
[facility]     Progress Note    Patient Name: Blair Lira  : 1945  MRN: [id number]  Primary Care Physician:  Babs Summers APRN  Date of admission: 2024    Subjective   Subjective     Chief Complaint: Has been started on Diflucan empirically persistent cough have discussed with the pulmonologist patient may get bronchoscopy we will also get Doppler studies left leg is swollen appears to have deep venous thrombosis    HPI: Patient with neutropenia chemo induced with Doppler venous studies to rule out DVT    Review of Systems   All systems were reviewed and negative except for: Has been reviewed    Objective   Objective     Vitals:   Temp:  [97.7 °F (36.5 °C)-99.9 °F (37.7 °C)] 97.9 °F (36.6 °C)  Heart Rate:  [67-90] 76  Resp:  [18-20] 20  BP: (104-138)/(50-65) 109/54  Flow (L/min):  [2-3] 2    Physical Exam    Constitutional: Awake, alert   Eyes: PERRLA, sclerae anicteric, no conjunctival injection   HENT: NCAT, mucous membranes moist   Neck: Supple, no thyromegaly, no lymphadenopathy, trachea midline   Respiratory: Clear to auscultation bilaterally, nonlabored respirations    Cardiovascular: RRR, no murmurs, rubs, or gallops, palpable pedal pulses bilaterally   Gastrointestinal: Positive bowel sounds, soft, nontender, nondistended   Musculoskeletal:bilateral ankle edema, worse on left side no clubbing or cyanosis to extremities   Psychiatric: Appropriate affect, cooperative   Neurologic: Oriented x 3, strength symmetric in all extremities, Cranial Nerves grossly intact to confrontation, speech clear   Skin: No rashes     Result Review    Result Review:  I have personally reviewed the results from the time of this admission to 2024 10:49 EST and agree with these findings:  [x]  Laboratory  []  Microbiology  [x]  Radiology  []  EKG/Telemetry   []  Cardiology/Vascular   []  Pathology  []  Old records  []  Other:  Most notable findings include: Neutropenia    Assessment & Plan   Assessment /  Plan     Brief Patient Summary:  Blair Lira is a 78 y.o. male who neutropenia with anemia and pneumonia    Active Hospital Problems:  Active Hospital Problems    Diagnosis     **Pneumonia     Anemia     Hypotension     Sepsis associated hypotension     CKD (chronic kidney disease) stage 3, GFR 30-59 ml/min        Plan:   Continue the antibiotics possible bronchoscopy    DVT prophylaxis:  Mechanical DVT prophylaxis orders are present.    CODE STATUS:   Level Of Support Discussed With: Patient  Code Status (Patient has no pulse and is not breathing): CPR (Attempt to Resuscitate)  Medical Interventions (Patient has pulse or is breathing): Full Support    Disposition:  I expect patient to be discharged after the patient is stabilized.    Electronically signed by Vamsi Mason MD, 01/21/24, 10:49 AM EST.      Part of this note may be an electronic transcription/translation of spoken language to printed text using the Dragon Dictation System.

## 2024-01-21 NOTE — PLAN OF CARE
Goal Outcome Evaluation:  Plan of Care Reviewed With: patient        Progress: no change  Outcome Evaluation: Patient A/Ox4. On room air. Short of breath with exertion. PRN Tessalon administered as ordered for cough. PRN Zofran administered as ordered for nausea. Orthostatic vitals completed. Waiting on duplex scan on left leg. Bronchoscopy scheduled for tomorrow. Consent signed. NPO at midnight. No other issues/needs at this time.

## 2024-01-21 NOTE — PROGRESS NOTES
Pulmonary / Critical Care Progress Note      Patient Name: Blair Lira  : 1945  MRN: [id number]  Primary Care Physician:  Babs Summers APRN  Date of admission: 2024    Subjective   Subjective   Follow-up for immunosuppressed state, history of multiple myeloma, on chemotherapy, hypoxic respiratory failure, MSSA bacteremia, groundglass opacity and persistent cough.    Over past 24 hours: Checked CT scan of the chest.  Remained on antibiotics including cefazolin and Bactrim.  Remained on Brovana and Pulmicort.  IV fluids were stopped.  Started on Lasix.    No acute events overnight.     This morning,   Cough is slightly better.  Still has shortness of breath with minimal exertion.  Has no nausea or vomiting  No chest pain or chest tightness.  No fever or chills.    Review of Systems  General:  Fatigue, No Fever  HEENT: No dysphagia, No Visual Changes, no rhinorrhea  Respiratory:  + cough,+Dyspnea, No Pleuritic Pain, + wheezing, no hemoptysis  Cardiovascular: Denies chest pain, denies palpitations,+[place], improving chest Pressure  Gastrointestinal:  No Abdominal Pain, No Nausea, No Vomiting, No Diarrhea  Genitourinary:  No Dysuria, No Frequency, No Hesitancy  Musculoskeletal: No muscle pain or swelling  Endocrine:  No Heat Intolerance, No Cold Intolerance  Hematologic:  No Bleeding, No Bruising  Psychiatric:  No Anxiety, No Depression  Neurologic:  No Confusion, no Dysarthria, No Headaches  Skin:  No Rash, No Open Wounds          Objective   Objective     Vitals:   Temp:  [97.7 °F (36.5 °C)-99.9 °F (37.7 °C)] 97.7 °F (36.5 °C)  Heart Rate:  [69-90] 88  Resp:  [18-20] 20  BP: (101-138)/(50-65) 120/60  Flow (L/min):  [2-3] 2  Physical Exam   Vital Signs Reviewed   General: Frail looking male, in no acute distress, has coughing fits  HEENT:  PERRL, EOMI.  OP, nares clear, no sinus tenderness  Neck:  Supple, no JVD, no thyromegaly  Lymph: no axillary, cervical, supraclavicular lymphadenopathy noted  bilaterally  Chest: Poor aeration, bilateral diminished breath sounds, improving expiratory wheezing, no crackles or rhonchi, resonant to percussion bilaterally  CV: RRR, no MGR, pulses 2+, equal.  Abd:  Soft, NT, ND, + BS, no HSM  EXT:  no clubbing, no cyanosis, trace bilateral lower extremity edema, no joint tenderness  Neuro:  A&Ox3, CN grossly intact, no focal deficits.  Skin: No rashes or lesions noted      Result Review    Result Review:  I have personally reviewed the results from the time of this admission to 1/21/2024 07:22 EST and agree with these findings:  [x]  Laboratory  [x]  Microbiology  [x]  Radiology  [x]  EKG/Telemetry   [x]  Cardiology/Vascular   []  Pathology  []  Old records  []  Other:  Most notable findings include:         Lab 01/21/24  0535 01/20/24  0527 01/19/24  0529 01/19/24  0410 01/18/24  0505 01/17/24  0751 01/17/24  0400 01/17/24  0136   WBC 1.87* 2.22* 2.17*  --  2.62* 2.69*  --  3.53   HEMOGLOBIN 8.5* 7.2* 7.2*  --  7.0* 6.5*  --  7.7*   HEMATOCRIT 25.7* 22.5* 21.8*  --  21.8* 20.9*  --  24.6*   PLATELETS 68* 64* 64*  --  74* 77*  --  97*   SODIUM 135* 132*  --  132* 136  --  135* 136   POTASSIUM 4.0 3.7  --  4.4 4.0  --  3.8 4.1   CHLORIDE 105 103  --  107 104  --  103 102   CO2 17.4* 16.6*  --  12.2* 18.7*  --  18.4* 17.4*   BUN 34* 33*  --  35* 31*  --  29* 31*   CREATININE 2.63* 2.65*  --  2.92* 2.73*  --  2.52* 2.66*   GLUCOSE 86 127*  --  95 94  --  142* 153*   CALCIUM 7.9* 7.6*  --  7.4* 7.5*  --  7.9* 8.5*   TOTAL PROTEIN  --   --   --  4.9* 4.9*  --  5.5* 6.1   ALBUMIN  --   --   --  2.0* 2.4*  --  2.8* 3.1*   GLOBULIN  --   --   --  2.9 2.5  --  2.7 3.0        CT Chest Without Contrast Diagnostic    Result Date: 1/20/2024  PROCEDURE: CT CHEST WO CONTRAST DIAGNOSTIC  COMPARISON: [facility], CT, CT ABDOMEN PELVIS WO CONTRAST, 10/26/2023, 20:23.  [facility], CT, CT CHEST WO CONTRAST DIAGNOSTIC, 10/26/2023, 20:23.  INDICATIONS: Cough   TECHNIQUE: CT images were created without the administration of contrast material.   PROTOCOL:   Standard imaging protocol performed    RADIATION:   DLP: 381.5 mGy*cm   Automated exposure control was utilized to minimize radiation dose.  FINDINGS:  Ritika/mediastinum:  No adenopathy.  Thoracic aorta normal in caliber.  Coronary artery calcification.  No pericardial effusion  Lungs/pleura:  Chronic elevation of the right hemidiaphragm with secondary atelectasis in the basilar right lower and right middle lobes.  Multifocal mixed ground-glass/interstitial opacities in the left upper and left lower lobe.  Trace bilateral pleural effusions  Upper abdomen:  No acute abnormality.  Bilateral renal cysts  Bones/soft tissues:  No acute bony abnormality.  Stable mild multiple upper thoracic wedge compression fractures      Impression:    1. Multifocal mixed ground-glass/interstitial infiltrates in the left lung suspicious for viral/atypical pattern of pneumonia  2. Chronic elevation of the right hemidiaphragm with secondary basilar atelectasis  3. Coronary artery atherosclerosis     ASHLY PRAKER MD       Electronically Signed and Approved By: ASHLY PARKER MD on 1/20/2024 at 18:54                Assessment & Plan   Assessment / Plan     Active Hospital Problems:  Active Hospital Problems    Diagnosis     **Pneumonia     Anemia     Hypotension     Sepsis associated hypotension     CKD (chronic kidney disease) stage 3, GFR 30-59 ml/min          Impression:   Persistent cough  MSSA bacteremia  Left lower lobe pneumonia  History of multiple myeloma Multiple myeloma  CKD  Hypertensive  Pancytopenia  Immunosuppressed status    Plan:   CT scan of the chest with groundglass opacities in upper lobes  Continue with cefazolin 2 g twice daily IV.  Patient is also on oral Bactrim for PCP prophylaxis per oncology.  Continue Brovana and Pulmicort.  Continue lasix 20 mg IV bid.   Patient with persistent cough, hypoxic respiratory failure,  groundglass opacity on CT scan with history of immunosuppressed status on chemotherapy for multiple myeloma.  Discussed bronchoscopy with bronchoalveolar lavage, bronchial washing, possible transbronchial biopsy, endobronchial brushing, airway inspection.  Risks and benefits of the procedure were discussed in detail.  Alternatives and options were reviewed.  Risks including pneumothorax, pneumonia, bleeding, respiratory depression and that it could be fatal were all reviewed.  Patient understands and is agreeable for the procedure.  Proceed with bronchoscopy with Dr. Anderson tomorrow.  N.p.o. after midnight.  Tussionex twice daily for cough.       DVT prophylaxis:  Mechanical DVT prophylaxis orders are present.    CODE STATUS:   Level Of Support Discussed With: Patient  Code Status (Patient has no pulse and is not breathing): CPR (Attempt to Resuscitate)  Medical Interventions (Patient has pulse or is breathing): Full Support      I personally reviewed pertinent labs, imaging and provider notes. Discussed with bedside nurse and will discuss with primary service.       Electronically signed by Lamberto Nevarez MD, 1/21/2024, 07:22 EST.

## 2024-01-21 NOTE — PLAN OF CARE
Goal Outcome Evaluation:  Plan of Care Reviewed With: patient        Progress: no change     Pt congested ,non-productive cough,. Diminished breath sounds. Pt wears oxygen per nasal cannula at times takes oxygen off. Pt pleasant affect voices needs with clear speech. Refused iv lasix., Pt urinating frequently per urinal. Pt is alert and oriented. Resting in bed at this time.

## 2024-01-22 ENCOUNTER — ANESTHESIA (OUTPATIENT)
Dept: GASTROENTEROLOGY | Facility: HOSPITAL | Age: 79
End: 2024-01-22
Payer: MEDICARE

## 2024-01-22 ENCOUNTER — ANESTHESIA EVENT (OUTPATIENT)
Dept: GASTROENTEROLOGY | Facility: HOSPITAL | Age: 79
End: 2024-01-22
Payer: MEDICARE

## 2024-01-22 ENCOUNTER — APPOINTMENT (OUTPATIENT)
Facility: HOSPITAL | Age: 79
DRG: 871 | End: 2024-01-22
Payer: MEDICARE

## 2024-01-22 PROBLEM — R50.9 ACUTE FEBRILE ILLNESS: Status: ACTIVE | Noted: 2024-01-17

## 2024-01-22 PROBLEM — T17.500A MUCUS PLUGGING OF BRONCHI: Status: ACTIVE | Noted: 2024-01-22

## 2024-01-22 LAB
ACB CMPLX DNA BAL NAA+NON-PRB-NCNCRNG: NOT DETECTED
ANION GAP SERPL CALCULATED.3IONS-SCNC: 14.7 MMOL/L (ref 5–15)
ANISOCYTOSIS BLD QL: NORMAL
BASOPHILS # BLD AUTO: 0.05 10*3/MM3 (ref 0–0.2)
BASOPHILS NFR BLD AUTO: 4.3 % (ref 0–1.5)
BLACTX-M ISLT/SPM QL: ABNORMAL
BLAIMP ISLT/SPM QL: ABNORMAL
BLAKPC ISLT/SPM QL: ABNORMAL
BLAOXA-48-LIKE ISLT/SPM QL: ABNORMAL
BLAVIM ISLT/SPM QL: ABNORMAL
BUN SERPL-MCNC: 34 MG/DL (ref 8–23)
BUN/CREAT SERPL: 13.1 (ref 7–25)
BURR CELLS BLD QL SMEAR: NORMAL
C PNEUM DNA NPH QL NAA+NON-PROBE: NOT DETECTED
CALCIUM SPEC-SCNC: 7.9 MG/DL (ref 8.6–10.5)
CHLORIDE SERPL-SCNC: 104 MMOL/L (ref 98–107)
CILIATED BAL QL: 16 %
CO2 SERPL-SCNC: 13.3 MMOL/L (ref 22–29)
CREAT SERPL-MCNC: 2.6 MG/DL (ref 0.76–1.27)
DEPRECATED RDW RBC AUTO: 65.3 FL (ref 37–54)
E CLOAC COMP DNA BAL NAA+NON-PRB-NCNCRNG: NOT DETECTED
E COLI DNA BAL NAA+NON-PRB-NCNCRNG: NOT DETECTED
EGFRCR SERPLBLD CKD-EPI 2021: 24.6 ML/MIN/1.73
EOSINOPHIL # BLD AUTO: 0.09 10*3/MM3 (ref 0–0.4)
EOSINOPHIL NFR BLD AUTO: 7.8 % (ref 0.3–6.2)
ERYTHROCYTE [DISTWIDTH] IN BLOOD BY AUTOMATED COUNT: 18.6 % (ref 12.3–15.4)
FLUAV SUBTYP SPEC NAA+PROBE: NOT DETECTED
FLUBV RNA ISLT QL NAA+PROBE: NOT DETECTED
GLUCOSE SERPL-MCNC: 79 MG/DL (ref 65–99)
GP B STREP DNA BAL NAA+NON-PRB-NCNCRNG: NOT DETECTED
HADV DNA SPEC NAA+PROBE: NOT DETECTED
HAEM INFLU DNA BAL NAA+NON-PRB-NCNCRNG: NOT DETECTED
HCOV RNA LOWER RESP QL NAA+NON-PROBE: DETECTED
HCT VFR BLD AUTO: 25.1 % (ref 37.5–51)
HGB BLD-MCNC: 7.7 G/DL (ref 13–17.7)
HMPV RNA NPH QL NAA+NON-PROBE: NOT DETECTED
HPIV RNA LOWER RESP QL NAA+NON-PROBE: NOT DETECTED
IMM GRANULOCYTES # BLD AUTO: 0.02 10*3/MM3 (ref 0–0.05)
IMM GRANULOCYTES NFR BLD AUTO: 1.7 % (ref 0–0.5)
K AEROGENES DNA BAL NAA+NON-PRB-NCNCRNG: NOT DETECTED
K OXYTOCA DNA BAL NAA+NON-PRB-NCNCRNG: NOT DETECTED
K PNEU GRP DNA BAL NAA+NON-PRB-NCNCRNG: NOT DETECTED
L PNEUMO DNA LOWER RESP QL NAA+NON-PROBE: NOT DETECTED
LYMPHOCYTES # BLD AUTO: 0.32 10*3/MM3 (ref 0.7–3.1)
LYMPHOCYTES NFR BLD AUTO: 27.6 % (ref 19.6–45.3)
LYMPHOCYTES NFR FLD MANUAL: 4 %
M CATARRHALIS DNA BAL NAA+NON-PRB-NCNCRNG: NOT DETECTED
M PNEUMO IGG SER IA-ACNC: NOT DETECTED
MACROPHAGE FLUID: 64 %
MCH RBC QN AUTO: 29.6 PG (ref 26.6–33)
MCHC RBC AUTO-ENTMCNC: 30.7 G/DL (ref 31.5–35.7)
MCV RBC AUTO: 96.5 FL (ref 79–97)
MECA+MECC ISLT/SPM QL: ABNORMAL
MONOCYTES # BLD AUTO: 0.04 10*3/MM3 (ref 0.1–0.9)
MONOCYTES NFR BLD AUTO: 3.4 % (ref 5–12)
NDM GENE: ABNORMAL
NEUTROPHILS NFR BLD AUTO: 0.64 10*3/MM3 (ref 1.7–7)
NEUTROPHILS NFR BLD AUTO: 55.2 % (ref 42.7–76)
NEUTROPHILS NFR FLD MANUAL: 16 %
NRBC BLD AUTO-RTO: 0 /100 WBC (ref 0–0.2)
P AERUGINOSA DNA BAL NAA+NON-PRB-NCNCRNG: NOT DETECTED
PLATELET # BLD AUTO: 51 10*3/MM3 (ref 140–450)
PMV BLD AUTO: 13.4 FL (ref 6–12)
POTASSIUM SERPL-SCNC: 4.5 MMOL/L (ref 3.5–5.2)
PROTEUS SP DNA BAL NAA+NON-PRB-NCNCRNG: NOT DETECTED
RBC # BLD AUTO: 2.6 10*6/MM3 (ref 4.14–5.8)
RHINOVIRUS RNA SPEC NAA+PROBE: NOT DETECTED
RSV RNA NPH QL NAA+NON-PROBE: NOT DETECTED
S AUREUS DNA BAL NAA+NON-PRB-NCNCRNG: NOT DETECTED
S MARCESCENS DNA BAL NAA+NON-PRB-NCNCRNG: NOT DETECTED
S PNEUM DNA BAL NAA+NON-PRB-NCNCRNG: NOT DETECTED
S PYO DNA BAL NAA+NON-PRB-NCNCRNG: NOT DETECTED
SMALL PLATELETS BLD QL SMEAR: NORMAL
SODIUM SERPL-SCNC: 132 MMOL/L (ref 136–145)
VISUAL PRESENCE OF BLOOD: NORMAL
WBC MORPH BLD: NORMAL
WBC NRBC COR # BLD AUTO: 1.16 10*3/MM3 (ref 3.4–10.8)

## 2024-01-22 PROCEDURE — 25810000003 LACTATED RINGERS PER 1000 ML

## 2024-01-22 PROCEDURE — 25010000002 PROPOFOL 10 MG/ML EMULSION

## 2024-01-22 PROCEDURE — 88312 SPECIAL STAINS GROUP 1: CPT | Performed by: INTERNAL MEDICINE

## 2024-01-22 PROCEDURE — 87071 CULTURE AEROBIC QUANT OTHER: CPT | Performed by: INTERNAL MEDICINE

## 2024-01-22 PROCEDURE — 94799 UNLISTED PULMONARY SVC/PX: CPT

## 2024-01-22 PROCEDURE — 87205 SMEAR GRAM STAIN: CPT | Performed by: INTERNAL MEDICINE

## 2024-01-22 PROCEDURE — 93970 EXTREMITY STUDY: CPT

## 2024-01-22 PROCEDURE — 99233 SBSQ HOSP IP/OBS HIGH 50: CPT | Performed by: INTERNAL MEDICINE

## 2024-01-22 PROCEDURE — 80048 BASIC METABOLIC PNL TOTAL CA: CPT | Performed by: INTERNAL MEDICINE

## 2024-01-22 PROCEDURE — 85007 BL SMEAR W/DIFF WBC COUNT: CPT | Performed by: INTERNAL MEDICINE

## 2024-01-22 PROCEDURE — 94664 DEMO&/EVAL PT USE INHALER: CPT

## 2024-01-22 PROCEDURE — 88108 CYTOPATH CONCENTRATE TECH: CPT | Performed by: INTERNAL MEDICINE

## 2024-01-22 PROCEDURE — 87206 SMEAR FLUORESCENT/ACID STAI: CPT | Performed by: INTERNAL MEDICINE

## 2024-01-22 PROCEDURE — 31624 DX BRONCHOSCOPE/LAVAGE: CPT | Performed by: INTERNAL MEDICINE

## 2024-01-22 PROCEDURE — 31645 BRNCHSC W/THER ASPIR 1ST: CPT | Performed by: INTERNAL MEDICINE

## 2024-01-22 PROCEDURE — 0BD78ZX EXTRACTION OF LEFT MAIN BRONCHUS, VIA NATURAL OR ARTIFICIAL OPENING ENDOSCOPIC, DIAGNOSTIC: ICD-10-PCS | Performed by: INTERNAL MEDICINE

## 2024-01-22 PROCEDURE — 87102 FUNGUS ISOLATION CULTURE: CPT | Performed by: INTERNAL MEDICINE

## 2024-01-22 PROCEDURE — 93970 EXTREMITY STUDY: CPT | Performed by: SURGERY

## 2024-01-22 PROCEDURE — 89051 BODY FLUID CELL COUNT: CPT | Performed by: INTERNAL MEDICINE

## 2024-01-22 PROCEDURE — 87116 MYCOBACTERIA CULTURE: CPT | Performed by: INTERNAL MEDICINE

## 2024-01-22 PROCEDURE — 25010000002 CEFAZOLIN IN DEXTROSE 2-4 GM/100ML-% SOLUTION: Performed by: INTERNAL MEDICINE

## 2024-01-22 PROCEDURE — 25010000002 FILGRASTIM PER 480 MCG: Performed by: INTERNAL MEDICINE

## 2024-01-22 PROCEDURE — 87070 CULTURE OTHR SPECIMN AEROBIC: CPT | Performed by: INTERNAL MEDICINE

## 2024-01-22 PROCEDURE — 0B9J8ZX DRAINAGE OF LEFT LOWER LUNG LOBE, VIA NATURAL OR ARTIFICIAL OPENING ENDOSCOPIC, DIAGNOSTIC: ICD-10-PCS | Performed by: INTERNAL MEDICINE

## 2024-01-22 PROCEDURE — 87633 RESP VIRUS 12-25 TARGETS: CPT | Performed by: INTERNAL MEDICINE

## 2024-01-22 PROCEDURE — 85025 COMPLETE CBC W/AUTO DIFF WBC: CPT | Performed by: INTERNAL MEDICINE

## 2024-01-22 PROCEDURE — 25010000002 FUROSEMIDE PER 20 MG: Performed by: INTERNAL MEDICINE

## 2024-01-22 RX ORDER — PROPOFOL 10 MG/ML
VIAL (ML) INTRAVENOUS AS NEEDED
Status: DISCONTINUED | OUTPATIENT
Start: 2024-01-22 | End: 2024-01-22 | Stop reason: SURG

## 2024-01-22 RX ORDER — SODIUM CHLORIDE, SODIUM LACTATE, POTASSIUM CHLORIDE, CALCIUM CHLORIDE 600; 310; 30; 20 MG/100ML; MG/100ML; MG/100ML; MG/100ML
INJECTION, SOLUTION INTRAVENOUS CONTINUOUS PRN
Status: DISCONTINUED | OUTPATIENT
Start: 2024-01-22 | End: 2024-01-22 | Stop reason: SURG

## 2024-01-22 RX ORDER — LIDOCAINE HYDROCHLORIDE 20 MG/ML
INJECTION, SOLUTION EPIDURAL; INFILTRATION; INTRACAUDAL; PERINEURAL AS NEEDED
Status: DISCONTINUED | OUTPATIENT
Start: 2024-01-22 | End: 2024-01-22 | Stop reason: SURG

## 2024-01-22 RX ORDER — LIDOCAINE HYDROCHLORIDE 40 MG/ML
INJECTION, SOLUTION RETROBULBAR; TOPICAL AS NEEDED
Status: DISCONTINUED | OUTPATIENT
Start: 2024-01-22 | End: 2024-01-22 | Stop reason: HOSPADM

## 2024-01-22 RX ORDER — ACYCLOVIR 800 MG/1
800 TABLET ORAL 3 TIMES DAILY
Status: DISCONTINUED | OUTPATIENT
Start: 2024-01-22 | End: 2024-01-23

## 2024-01-22 RX ORDER — SODIUM CHLORIDE, SODIUM LACTATE, POTASSIUM CHLORIDE, CALCIUM CHLORIDE 600; 310; 30; 20 MG/100ML; MG/100ML; MG/100ML; MG/100ML
30 INJECTION, SOLUTION INTRAVENOUS CONTINUOUS
Status: CANCELLED | OUTPATIENT
Start: 2024-01-22

## 2024-01-22 RX ADMIN — SULFAMETHOXAZOLE AND TRIMETHOPRIM 1 TABLET: 800; 160 TABLET ORAL at 21:02

## 2024-01-22 RX ADMIN — PROPOFOL 150 MCG/KG/MIN: 10 INJECTION, EMULSION INTRAVENOUS at 14:17

## 2024-01-22 RX ADMIN — Medication 10 ML: at 09:49

## 2024-01-22 RX ADMIN — FILGRASTIM 480 MCG: 480 INJECTION, SOLUTION INTRAVENOUS; SUBCUTANEOUS at 16:31

## 2024-01-22 RX ADMIN — PROPOFOL 80 MG: 10 INJECTION, EMULSION INTRAVENOUS at 14:17

## 2024-01-22 RX ADMIN — CEFAZOLIN SODIUM 2000 MG: 2 INJECTION, SOLUTION INTRAVENOUS at 09:46

## 2024-01-22 RX ADMIN — CEFAZOLIN SODIUM 2000 MG: 2 INJECTION, SOLUTION INTRAVENOUS at 22:12

## 2024-01-22 RX ADMIN — ALBUTEROL SULFATE 2.5 MG: 2.5 SOLUTION RESPIRATORY (INHALATION) at 12:07

## 2024-01-22 RX ADMIN — ALBUTEROL SULFATE 2.5 MG: 2.5 SOLUTION RESPIRATORY (INHALATION) at 07:22

## 2024-01-22 RX ADMIN — PROPOFOL 20 MG: 10 INJECTION, EMULSION INTRAVENOUS at 14:21

## 2024-01-22 RX ADMIN — ACYCLOVIR 800 MG: 200 CAPSULE ORAL at 16:32

## 2024-01-22 RX ADMIN — HYDROCODONE POLISTIREX AND CHLORPHENIRAMINE POLISTIREX 2.5 ML: 10; 8 SUSPENSION, EXTENDED RELEASE ORAL at 21:03

## 2024-01-22 RX ADMIN — ALBUTEROL SULFATE 2.5 MG: 2.5 SOLUTION RESPIRATORY (INHALATION) at 20:04

## 2024-01-22 RX ADMIN — Medication 10 ML: at 21:05

## 2024-01-22 RX ADMIN — MIDODRINE HYDROCHLORIDE 5 MG: 5 TABLET ORAL at 16:31

## 2024-01-22 RX ADMIN — SODIUM CHLORIDE, POTASSIUM CHLORIDE, SODIUM LACTATE AND CALCIUM CHLORIDE: 600; 310; 30; 20 INJECTION, SOLUTION INTRAVENOUS at 14:13

## 2024-01-22 RX ADMIN — LIDOCAINE HYDROCHLORIDE 60 MG: 20 INJECTION, SOLUTION EPIDURAL; INFILTRATION; INTRACAUDAL; PERINEURAL at 14:17

## 2024-01-22 RX ADMIN — FUROSEMIDE 20 MG: 10 INJECTION, SOLUTION INTRAMUSCULAR; INTRAVENOUS at 16:31

## 2024-01-22 RX ADMIN — ACYCLOVIR 800 MG: 800 TABLET ORAL at 22:12

## 2024-01-22 NOTE — OP NOTE
Procedure:  Bronchoscopy with clearance of airways, bronchoalveolar lavage, bronchial washings     Pre-Operative Diagnosis: Immune suppressed, abnormal chest CT, pneumonia     Post-Operative Diagnosis: Mucous plugging     Timeout performed     Anesthesia: MAC anesthesia     Procedure Details: The patient was consented for the procedure with all risk and benefit of the procedure explained in detail.  He was given the opportunity to ask questions and all concerns were answered. The bronchoscope was inserted into the main airway via the oral cavity. An anatomical survey was done of the main airways and the subsegmental bronchus.      Findings: The vocal cords were normal in appearance and movement with abduction and adduction without difficulty. The trachea was normal in caliber and had no mucosal abnormalities. The left tracheobronchial tree appeared anatomically normal with no mucosal abnormalities. The right tracheobronchial tree appeared anatomically normal with no mucosal abnormalities.  There were copious amounts of thick viscous cloudy secretions obstructing the left upper and left lower lobe bronchi.  These were cleared and removed via suctioning.  A bronchoalveolar lavage was performed using  150mL aliquots of normal saline  instilled into the left lower lobe bronchus then aspirated back. There was 30 mL turbid and cloudy fluid in return.  Bronchial washings were collected.     Findings:  Mucous plugging left upper and lower lobe bronchi with thick viscous cloudy secretions     Estimated Blood Loss: 0mL     Specimens:  Bronchoalveolar lavage left lower lobe bronchus  Bronchial washings     Complications: None; patient tolerated the procedure well.     Disposition: Stable to return to floor.  Follow-up test results.     Patient tolerated the procedure well.    Electronically signed by Dionte Ruvalcaba MD, 01/22/24, 2:27 PM EST.

## 2024-01-22 NOTE — PROGRESS NOTES
[facility]     Progress Note    Patient Name: Blair Lira  : 1945  MRN: [id number]  Primary Care Physician:  Babs Summers APRN  Date of admission: 2024      Subjective   Brief summary.  Patient admitted with pneumonia and hypotension      HPI:  Patient still short of breath.  Anxious.  Complains of leg pain.    Review of Systems     Chest congestion, cough, short of breath, pain in the legs.  Status post bronchoscopy      Objective     Vitals:   Temp:  [97.1 °F (36.2 °C)-100 °F (37.8 °C)] 100 °F (37.8 °C)  Heart Rate:  [] 77  Resp:  [16-28] 22  BP: (100-139)/() 116/54  Flow (L/min):  [3-6] 3    Physical Exam :     Elderly male in mild respiratory distress but tired looking.  Heart regular.  Lungs diminished breath sounds with rhonchi.  Abdomen soft.  Extremities no edema      Result Review:  I have personally reviewed the results from the time of this admission to 2024 17:51 EST and agree with these findings:  [x]  Laboratory  []  Microbiology  []  Radiology  []  EKG/Telemetry   []  Cardiology/Vascular   []  Pathology  []  Old records  []  Other:           Assessment / Plan       Active Hospital Problems:  Active Hospital Problems    Diagnosis     **Pneumonia     Mucus plugging of bronchi     Anemia     Acute febrile illness     Hypotension     Chronic diastolic (congestive) heart failure     Sepsis associated hypotension     CKD (chronic kidney disease) stage 3, GFR 30-59 ml/min        Plan:   Not much change continues to remain weak and short of breath, post bronchoscopy.  Labs reviewed sodium still low around 132.  White cell count and hemoglobin dropping.  Continue to monitor.       DVT prophylaxis:  Mechanical DVT prophylaxis orders are present.    CODE STATUS:   Level Of Support Discussed With: Patient  Code Status (Patient has no pulse and is not breathing): CPR (Attempt to Resuscitate)  Medical Interventions (Patient has pulse or is breathing): Full  Support              Electronically signed by Elieser Pederson MD, 01/22/24, 5:52 PM EST.

## 2024-01-22 NOTE — PROGRESS NOTES
Pulmonary / Critical Care Progress Note      Patient Name: Blair Lira  : 1945  MRN: [id number]  Primary Care Physician:  Babs Summers APRN  Date of admission: 2024    Subjective   Subjective   Follow-up for immunosuppressed state, history of multiple myeloma, on chemotherapy, hypoxic respiratory failure, MSSA bacteremia, groundglass opacity and persistent cough.    Cough unchanged  Short of breath with little activity.  Unchanged  Agreeable for bronchoscopy this morning,   Has no nausea or vomiting  No chest pain or chest tightness.  No fever or chills.  On room air      Objective   Objective     Vitals:   Temp:  [98.2 °F (36.8 °C)-99.7 °F (37.6 °C)] 99.7 °F (37.6 °C)  Heart Rate:  [] 89  Resp:  [16-20] 18  BP: ()/(50-70) 118/63    Physical Exam   Vital Signs Reviewed   General: Frail looking male, in no acute distress  HEENT:  PERRL, EOMI.  OP, nares clear  Chest: Poor aeration, bilateral diminished breath sounds, no wheezing, rhonchi bilaterally, resonant to percussion bilaterally  CV: RRR, no MGR, pulses 2+, equal.  Abd:  Soft, NT, ND, + BS, no HSM  EXT:  no clubbing, no cyanosis, trace bilateral lower extremity edema  Neuro:  A&Ox3, CN grossly intact, no focal deficits.  Skin: No rashes or lesions noted      Result Review    Result Review:  I have personally reviewed the results from the time of this admission to 2024 11:47 EST and agree with these findings:  [x]  Laboratory  [x]  Microbiology  [x]  Radiology  [x]  EKG/Telemetry   [x]  Cardiology/Vascular   []  Pathology  []  Old records  []  Other:  Most notable findings include:         Lab 24  0409 24  0535 24  0527 24  0529 24  0410 24  0505 24  0751 24  0400 24  0136   WBC 1.16* 1.87* 2.22* 2.17*  --  2.62* 2.69*  --  3.53   HEMOGLOBIN 7.7* 8.5* 7.2* 7.2*  --  7.0* 6.5*  --  7.7*   HEMATOCRIT 25.1* 25.7* 22.5* 21.8*  --  21.8* 20.9*  --  24.6*   PLATELETS 51* 68* 64*  64*  --  74* 77*  --  97*   SODIUM 132* 135* 132*  --  132* 136  --  135* 136   POTASSIUM 4.5 4.0 3.7  --  4.4 4.0  --  3.8 4.1   CHLORIDE 104 105 103  --  107 104  --  103 102   CO2 13.3* 17.4* 16.6*  --  12.2* 18.7*  --  18.4* 17.4*   BUN 34* 34* 33*  --  35* 31*  --  29* 31*   CREATININE 2.60* 2.63* 2.65*  --  2.92* 2.73*  --  2.52* 2.66*   GLUCOSE 79 86 127*  --  95 94  --  142* 153*   CALCIUM 7.9* 7.9* 7.6*  --  7.4* 7.5*  --  7.9* 8.5*   TOTAL PROTEIN  --   --   --   --  4.9* 4.9*  --  5.5* 6.1   ALBUMIN  --   --   --   --  2.0* 2.4*  --  2.8* 3.1*   GLOBULIN  --   --   --   --  2.9 2.5  --  2.7 3.0        CT Chest Without Contrast Diagnostic    Result Date: 1/20/2024  PROCEDURE: CT CHEST WO CONTRAST DIAGNOSTIC  COMPARISON: [facility], CT, CT ABDOMEN PELVIS WO CONTRAST, 10/26/2023, 20:23.  [facility], CT, CT CHEST WO CONTRAST DIAGNOSTIC, 10/26/2023, 20:23.  INDICATIONS: Cough  TECHNIQUE: CT images were created without the administration of contrast material.   PROTOCOL:   Standard imaging protocol performed    RADIATION:   DLP: 381.5 mGy*cm   Automated exposure control was utilized to minimize radiation dose.  FINDINGS:  Ritika/mediastinum:  No adenopathy.  Thoracic aorta normal in caliber.  Coronary artery calcification.  No pericardial effusion  Lungs/pleura:  Chronic elevation of the right hemidiaphragm with secondary atelectasis in the basilar right lower and right middle lobes.  Multifocal mixed ground-glass/interstitial opacities in the left upper and left lower lobe.  Trace bilateral pleural effusions  Upper abdomen:  No acute abnormality.  Bilateral renal cysts  Bones/soft tissues:  No acute bony abnormality.  Stable mild multiple upper thoracic wedge compression fractures      Impression:    1. Multifocal mixed ground-glass/interstitial infiltrates in the left lung suspicious for viral/atypical pattern of pneumonia  2. Chronic elevation of the right hemidiaphragm with  secondary basilar atelectasis  3. Coronary artery atherosclerosis     ASHLY PARKER MD       Electronically Signed and Approved By: ASHLY PARKER MD on 1/20/2024 at 18:54                Assessment & Plan   Assessment / Plan     Active Hospital Problems:  Active Hospital Problems    Diagnosis     **Pneumonia     Anemia     Acute febrile illness     Hypotension     Chronic diastolic (congestive) heart failure     Sepsis associated hypotension     CKD (chronic kidney disease) stage 3, GFR 30-59 ml/min        Impression:   Persistent cough  MSSA bacteremia  Left lower lobe pneumonia  Multiple myeloma  CKD  Hypertensive  Pancytopenia  Immunosuppressed status on chemotherapy    Plan:   Continue with cefazolin 2 g twice daily IV.  Patient is also on oral Bactrim and acyclovir for PCP prophylaxis per oncology.  Continue Brovana and Pulmicort.  Continue lasix 20 mg IV bid.  Trend renal panel and electrolytes  Chest CT was personally reviewed.  Does have groundglass opacities and is immunosuppressed on chemotherapy.  Given he is slow to respond to treatment, bronchoscopy is warranted  Agreeable for bronchoscopy with bronchoalveolar lavage, bronchial washing, possible transbronchial biopsy, endobronchial brushing, airway inspection.  Risks and benefits of the procedure were discussed in detail.  Alternatives and options were reviewed.  Risks including pneumothorax, pneumonia, bleeding, respiratory depression and that it could be fatal were all reviewed.  Patient understands and is agreeable for the procedure.  Continue antitussives  Neupogen per oncology    DVT prophylaxis:  Mechanical DVT prophylaxis orders are present.    CODE STATUS:   Level Of Support Discussed With: Patient  Code Status (Patient has no pulse and is not breathing): CPR (Attempt to Resuscitate)  Medical Interventions (Patient has pulse or is breathing): Full Support    Labs, imaging, microbiology, notes and medications personally reviewed  Discussed with  primary    Electronically signed by Dionte Ruvalcaba MD, 1/22/2024, 11:47 EST.

## 2024-01-22 NOTE — ANESTHESIA PREPROCEDURE EVALUATION
Anesthesia Evaluation     Patient summary reviewed and Nursing notes reviewed   NPO Solid Status: > 8 hours  NPO Liquid Status: > 8 hours           Airway   Mallampati: I  TM distance: >3 FB  Neck ROM: full  No difficulty expected  Dental    (+) edentulous    Pulmonary     breath sounds clear to auscultation  (+) pneumonia ,  Cardiovascular   Exercise tolerance: poor (<4 METS)    ECG reviewed  Rhythm: regular  Rate: normal    (+) hypertension well controlled, CHF , hyperlipidemia      Neuro/Psych  (+) syncope  GI/Hepatic/Renal/Endo    (+) renal disease- CRI    Musculoskeletal     Abdominal    Substance History      OB/GYN          Other   arthritis,   history of cancer (currently receiving chemo treatments) active    ROS/Med Hx Other: EKG 01/17/24: ,   Sinus tachycardia  Ventricular premature complex  Abnormal R-wave progression, early transition  Minimal ST depression, anterolateral leads  Prolonged QT interval    NIKHIL study 11/14/23:   ·  Left ventricular systolic function is normal. Estimated left ventricular EF = 55%  ·  Patent foramen ovale present.  ·  Saline test results are positive for right to left atrial level shunt.    CT chest 1/20/24:  1. Multifocal mixed ground-glass/interstitial infiltrates in the left lung suspicious for   viral/atypical pattern of pneumonia   2. Chronic elevation of the right hemidiaphragm with secondary basilar atelectasis   3. Coronary artery atherosclerosis     LABS:   01/22/24 04:09  WBC: 1.16 (C)  RBC: 2.60 (L)  Hemoglobin: 7.7 (L)  Hematocrit: 25.1 (L)  Platelets: 51 (L)  RDW: 18.6 (H)    Received 3 units PRBC's since admission         Phys Exam Other: Patient is in reverse isolation ( is currently receiving chemo)               Anesthesia Plan    ASA 4     general   total IV anesthesia  (TIVA; )  intravenous induction     Anesthetic plan, risks, benefits, and alternatives have been provided, discussed and informed consent has been obtained with: patient and  spouse/significant other.  Pre-procedure education provided  Plan discussed with CRNA.      CODE STATUS:    Level Of Support Discussed With: Patient  Code Status (Patient has no pulse and is not breathing): CPR (Attempt to Resuscitate)  Medical Interventions (Patient has pulse or is breathing): Full Support

## 2024-01-22 NOTE — PROGRESS NOTES
[facility]     Progress Note    Patient Name: Blair Lira  : 1945  MRN: [id number]  Primary Care Physician:  Babs Summers APRN  Date of admission: 2024    Subjective   Subjective     Chief Complaint: Stable and doing well not in acute distress still continues to have coughing Doppler studies pending to have a bronchoscopy today    HPI: contines to have cough chemo induced pancytopenia    Review of Systems   All systems were reviewed and negative except for: Has been reviewed    Objective   Objective     Vitals:   Temp:  [98.2 °F (36.8 °C)-99.7 °F (37.6 °C)] 99.7 °F (37.6 °C)  Heart Rate:  [] 89  Resp:  [16-20] 18  BP: ()/(50-70) 118/63    Physical Exam    Constitutional: Awake, alert   Eyes: PERRLA, sclerae anicteric, no conjunctival injection   HENT: NCAT, mucous membranes moist   Neck: Supple, no thyromegaly, no lymphadenopathy, trachea midline   Respiratory: Clear to auscultation bilaterally, nonlabored respirations    Cardiovascular: RRR, no murmurs, rubs, or gallops, palpable pedal pulses bilaterally   Gastrointestinal: Positive bowel sounds, soft, nontender, nondistended   Musculoskeletal: No bilateral ankle edema, no clubbing or cyanosis to extremities   Psychiatric: Appropriate affect, cooperative   Neurologic: Oriented x 3, strength symmetric in all extremities, Cranial Nerves grossly intact to confrontation, speech clear   Skin: No rashes   Swelling of the left leg  Result Review    Result Review:  I have personally reviewed the results from the time of this admission to 2024 08:34 EST and agree with these findings:  [x]  Laboratory  []  Microbiology  []  Radiology  []  EKG/Telemetry   []  Cardiology/Vascular   []  Pathology  []  Old records  []  Other:  Most notable findings include: Pancytopenia chemo induced    Assessment & Plan   Assessment / Plan     Brief Patient Summary:  Blair Lira is a 78 y.o. male who has multiple myeloma chemo induced pancytopenia  and pneumonia    Active Hospital Problems:  Active Hospital Problems    Diagnosis     **Pneumonia     Anemia     Acute febrile illness     Hypotension     Chronic diastolic (congestive) heart failure     Sepsis associated hypotension     CKD (chronic kidney disease) stage 3, GFR 30-59 ml/min        Plan:   Leann P to be done today    DVT prophylaxis:  Mechanical DVT prophylaxis orders are present.    CODE STATUS:   Level Of Support Discussed With: Patient  Code Status (Patient has no pulse and is not breathing): CPR (Attempt to Resuscitate)  Medical Interventions (Patient has pulse or is breathing): Full Support    Disposition:  I expect patient to be discharged to the patient has been stabilized.    Electronically signed by Vamsi Mason MD, 01/22/24, 8:34 AM EST.      Part of this note may be an electronic transcription/translation of spoken language to printed text using the Dragon Dictation System.

## 2024-01-22 NOTE — ANESTHESIA POSTPROCEDURE EVALUATION
Patient: Blair Lira    Procedure Summary       Date: 01/22/24 Room / Location: [facility] ENDOSCOPY 3 / [facility] ENDOSCOPY    Anesthesia Start: 1413 Anesthesia Stop: 1434    Procedure: BRONCHOSCOPY WITH BAL AND WASHINGS (Bronchus) Diagnosis:       Pneumonia of left lower lobe due to infectious organism      Acute febrile illness      Chronic diastolic (congestive) heart failure      (Pneumonia of left lower lobe due to infectious organism [J18.9])      (Acute febrile illness [R50.9])      (Chronic diastolic (congestive) heart failure [I50.32])    Surgeons: Dionte Ruvalcaba MD Provider: Elizabeth Duffy CRNA    Anesthesia Type: general ASA Status: 4            Anesthesia Type: general    Vitals  Vitals Value Taken Time   /105 01/22/24 1448   Temp 36.6 °C (97.8 °F) 01/22/24 1447   Pulse 103 01/22/24 1448   Resp 27 01/22/24 1447   SpO2 94 % 01/22/24 1448   Vitals shown include unfiled device data.        Post Anesthesia Care and Evaluation    Patient location during evaluation: bedside  Patient participation: complete - patient participated  Level of consciousness: awake  Pain score: 0  Pain management: adequate    Airway patency: patent  Anesthetic complications: No anesthetic complications  PONV Status: controlled  Cardiovascular status: acceptable and stable  Respiratory status: acceptable, spontaneous ventilation and room air  Hydration status: acceptable

## 2024-01-23 ENCOUNTER — APPOINTMENT (OUTPATIENT)
Dept: CARDIOLOGY | Facility: HOSPITAL | Age: 79
DRG: 871 | End: 2024-01-23
Payer: MEDICARE

## 2024-01-23 LAB
ANION GAP SERPL CALCULATED.3IONS-SCNC: 14.6 MMOL/L (ref 5–15)
BASOPHILS # BLD AUTO: 0.05 10*3/MM3 (ref 0–0.2)
BASOPHILS NFR BLD AUTO: 4.2 % (ref 0–1.5)
BH CV LOW VAS LEFT DISTAL FEMORAL SPONT: 1
BH CV LOW VAS LEFT GASTRONEMIUS VESSEL: 1
BH CV LOW VAS LEFT LESSER SAPH VESSEL: 1
BH CV LOW VAS LEFT MID FEMORAL SPONT: 1
BH CV LOW VAS LEFT PERONEAL VESSEL: 1
BH CV LOW VAS LEFT POPLITEAL SPONT: 1
BH CV LOW VAS LEFT POSTERIOR TIBIAL VESSEL: 1
BH CV LOWER VASCULAR LEFT COMMON FEMORAL AUGMENT: NORMAL
BH CV LOWER VASCULAR LEFT COMMON FEMORAL COMPETENT: NORMAL
BH CV LOWER VASCULAR LEFT COMMON FEMORAL COMPRESS: NORMAL
BH CV LOWER VASCULAR LEFT COMMON FEMORAL PHASIC: NORMAL
BH CV LOWER VASCULAR LEFT COMMON FEMORAL SPONT: NORMAL
BH CV LOWER VASCULAR LEFT DISTAL FEMORAL AUGMENT: NORMAL
BH CV LOWER VASCULAR LEFT DISTAL FEMORAL COMPETENT: NORMAL
BH CV LOWER VASCULAR LEFT DISTAL FEMORAL COMPRESS: NORMAL
BH CV LOWER VASCULAR LEFT DISTAL FEMORAL PHASIC: NORMAL
BH CV LOWER VASCULAR LEFT DISTAL FEMORAL SPONT: NORMAL
BH CV LOWER VASCULAR LEFT GASTRONEMIUS COMPRESS: NORMAL
BH CV LOWER VASCULAR LEFT GREATER SAPH AK AUGMENT: NORMAL
BH CV LOWER VASCULAR LEFT GREATER SAPH AK COMPETENT: NORMAL
BH CV LOWER VASCULAR LEFT GREATER SAPH AK COMPRESS: NORMAL
BH CV LOWER VASCULAR LEFT GREATER SAPH AK PHASIC: NORMAL
BH CV LOWER VASCULAR LEFT GREATER SAPH AK SPONT: NORMAL
BH CV LOWER VASCULAR LEFT GREATER SAPH BK COMPRESS: NORMAL
BH CV LOWER VASCULAR LEFT LESSER SAPH COMPRESS: NORMAL
BH CV LOWER VASCULAR LEFT MID FEMORAL AUGMENT: NORMAL
BH CV LOWER VASCULAR LEFT MID FEMORAL COMPETENT: NORMAL
BH CV LOWER VASCULAR LEFT MID FEMORAL COMPRESS: NORMAL
BH CV LOWER VASCULAR LEFT MID FEMORAL PHASIC: NORMAL
BH CV LOWER VASCULAR LEFT MID FEMORAL SPONT: NORMAL
BH CV LOWER VASCULAR LEFT PERONEAL AUGMENT: NORMAL
BH CV LOWER VASCULAR LEFT PERONEAL COMPETENT: NORMAL
BH CV LOWER VASCULAR LEFT PERONEAL COMPRESS: NORMAL
BH CV LOWER VASCULAR LEFT PERONEAL PHASIC: NORMAL
BH CV LOWER VASCULAR LEFT PERONEAL SPONT: NORMAL
BH CV LOWER VASCULAR LEFT POPLITEAL AUGMENT: NORMAL
BH CV LOWER VASCULAR LEFT POPLITEAL COMPETENT: NORMAL
BH CV LOWER VASCULAR LEFT POPLITEAL COMPRESS: NORMAL
BH CV LOWER VASCULAR LEFT POPLITEAL PHASIC: NORMAL
BH CV LOWER VASCULAR LEFT POPLITEAL SPONT: NORMAL
BH CV LOWER VASCULAR LEFT POSTERIOR TIBIAL AUGMENT: NORMAL
BH CV LOWER VASCULAR LEFT POSTERIOR TIBIAL COMPETENT: NORMAL
BH CV LOWER VASCULAR LEFT POSTERIOR TIBIAL COMPRESS: NORMAL
BH CV LOWER VASCULAR LEFT POSTERIOR TIBIAL PHASIC: NORMAL
BH CV LOWER VASCULAR LEFT POSTERIOR TIBIAL SPONT: NORMAL
BH CV LOWER VASCULAR LEFT PROXIMAL FEMORAL COMPRESS: NORMAL
BH CV LOWER VASCULAR RIGHT COMMON FEMORAL AUGMENT: NORMAL
BH CV LOWER VASCULAR RIGHT COMMON FEMORAL COMPETENT: NORMAL
BH CV LOWER VASCULAR RIGHT COMMON FEMORAL COMPRESS: NORMAL
BH CV LOWER VASCULAR RIGHT COMMON FEMORAL PHASIC: NORMAL
BH CV LOWER VASCULAR RIGHT COMMON FEMORAL SPONT: NORMAL
BH CV LOWER VASCULAR RIGHT DISTAL FEMORAL COMPRESS: NORMAL
BH CV LOWER VASCULAR RIGHT GASTRONEMIUS COMPRESS: NORMAL
BH CV LOWER VASCULAR RIGHT GREATER SAPH AK AUGMENT: NORMAL
BH CV LOWER VASCULAR RIGHT GREATER SAPH AK COMPETENT: NORMAL
BH CV LOWER VASCULAR RIGHT GREATER SAPH AK COMPRESS: NORMAL
BH CV LOWER VASCULAR RIGHT GREATER SAPH AK PHASIC: NORMAL
BH CV LOWER VASCULAR RIGHT GREATER SAPH AK SPONT: NORMAL
BH CV LOWER VASCULAR RIGHT GREATER SAPH BK COMPRESS: NORMAL
BH CV LOWER VASCULAR RIGHT LESSER SAPH COMPRESS: NORMAL
BH CV LOWER VASCULAR RIGHT MID FEMORAL AUGMENT: NORMAL
BH CV LOWER VASCULAR RIGHT MID FEMORAL COMPETENT: NORMAL
BH CV LOWER VASCULAR RIGHT MID FEMORAL COMPRESS: NORMAL
BH CV LOWER VASCULAR RIGHT MID FEMORAL PHASIC: NORMAL
BH CV LOWER VASCULAR RIGHT MID FEMORAL SPONT: NORMAL
BH CV LOWER VASCULAR RIGHT PERONEAL AUGMENT: NORMAL
BH CV LOWER VASCULAR RIGHT PERONEAL COMPETENT: NORMAL
BH CV LOWER VASCULAR RIGHT PERONEAL COMPRESS: NORMAL
BH CV LOWER VASCULAR RIGHT PERONEAL PHASIC: NORMAL
BH CV LOWER VASCULAR RIGHT PERONEAL SPONT: NORMAL
BH CV LOWER VASCULAR RIGHT POPLITEAL AUGMENT: NORMAL
BH CV LOWER VASCULAR RIGHT POPLITEAL COMPETENT: NORMAL
BH CV LOWER VASCULAR RIGHT POPLITEAL COMPRESS: NORMAL
BH CV LOWER VASCULAR RIGHT POPLITEAL PHASIC: NORMAL
BH CV LOWER VASCULAR RIGHT POPLITEAL SPONT: NORMAL
BH CV LOWER VASCULAR RIGHT POSTERIOR TIBIAL AUGMENT: NORMAL
BH CV LOWER VASCULAR RIGHT POSTERIOR TIBIAL COMPETENT: NORMAL
BH CV LOWER VASCULAR RIGHT POSTERIOR TIBIAL COMPRESS: NORMAL
BH CV LOWER VASCULAR RIGHT POSTERIOR TIBIAL PHASIC: NORMAL
BH CV LOWER VASCULAR RIGHT POSTERIOR TIBIAL SPONT: NORMAL
BH CV LOWER VASCULAR RIGHT PROXIMAL FEMORAL COMPRESS: NORMAL
BH CV VAS PRELIMINARY FINDINGS SCRIPTING: 1
BUN SERPL-MCNC: 36 MG/DL (ref 8–23)
BUN/CREAT SERPL: 12.9 (ref 7–25)
BURR CELLS BLD QL SMEAR: NORMAL
CALCIUM SPEC-SCNC: 8 MG/DL (ref 8.6–10.5)
CHLORIDE SERPL-SCNC: 102 MMOL/L (ref 98–107)
CO2 SERPL-SCNC: 15.4 MMOL/L (ref 22–29)
CREAT SERPL-MCNC: 2.8 MG/DL (ref 0.76–1.27)
DEPRECATED RDW RBC AUTO: 62.3 FL (ref 37–54)
EGFRCR SERPLBLD CKD-EPI 2021: 22.5 ML/MIN/1.73
EOSINOPHIL # BLD AUTO: 0.07 10*3/MM3 (ref 0–0.4)
EOSINOPHIL NFR BLD AUTO: 5.9 % (ref 0.3–6.2)
ERYTHROCYTE [DISTWIDTH] IN BLOOD BY AUTOMATED COUNT: 17.9 % (ref 12.3–15.4)
GLUCOSE SERPL-MCNC: 91 MG/DL (ref 65–99)
HCT VFR BLD AUTO: 27.1 % (ref 37.5–51)
HGB BLD-MCNC: 8.5 G/DL (ref 13–17.7)
HYPOCHROMIA BLD QL: NORMAL
IMM GRANULOCYTES # BLD AUTO: 0.11 10*3/MM3 (ref 0–0.05)
IMM GRANULOCYTES NFR BLD AUTO: 9.3 % (ref 0–0.5)
LARGE PLATELETS: NORMAL
LYMPHOCYTES # BLD AUTO: 0.3 10*3/MM3 (ref 0.7–3.1)
LYMPHOCYTES NFR BLD AUTO: 25.4 % (ref 19.6–45.3)
M PNEUMO IGG SER IA-ACNC: <100 U/ML (ref 0–99)
M PNEUMO IGM SER IA-ACNC: <770 U/ML (ref 0–769)
MCH RBC QN AUTO: 30 PG (ref 26.6–33)
MCHC RBC AUTO-ENTMCNC: 31.4 G/DL (ref 31.5–35.7)
MCV RBC AUTO: 95.8 FL (ref 79–97)
MONOCYTES # BLD AUTO: 0.02 10*3/MM3 (ref 0.1–0.9)
MONOCYTES NFR BLD AUTO: 1.7 % (ref 5–12)
NEUTROPHILS NFR BLD AUTO: 0.63 10*3/MM3 (ref 1.7–7)
NEUTROPHILS NFR BLD AUTO: 53.5 % (ref 42.7–76)
NRBC BLD AUTO-RTO: 0 /100 WBC (ref 0–0.2)
OVALOCYTES BLD QL SMEAR: NORMAL
PLATELET # BLD AUTO: 58 10*3/MM3 (ref 140–450)
PMV BLD AUTO: 12 FL (ref 6–12)
POIKILOCYTOSIS BLD QL SMEAR: NORMAL
POTASSIUM SERPL-SCNC: 4 MMOL/L (ref 3.5–5.2)
RBC # BLD AUTO: 2.83 10*6/MM3 (ref 4.14–5.8)
SMALL PLATELETS BLD QL SMEAR: NORMAL
SODIUM SERPL-SCNC: 132 MMOL/L (ref 136–145)
WBC MORPH BLD: NORMAL
WBC NRBC COR # BLD AUTO: 1.18 10*3/MM3 (ref 3.4–10.8)

## 2024-01-23 PROCEDURE — 93306 TTE W/DOPPLER COMPLETE: CPT | Performed by: INTERNAL MEDICINE

## 2024-01-23 PROCEDURE — 94664 DEMO&/EVAL PT USE INHALER: CPT

## 2024-01-23 PROCEDURE — 25010000002 FILGRASTIM PER 480 MCG: Performed by: INTERNAL MEDICINE

## 2024-01-23 PROCEDURE — 25010000002 CEFAZOLIN IN DEXTROSE 2-4 GM/100ML-% SOLUTION: Performed by: INTERNAL MEDICINE

## 2024-01-23 PROCEDURE — 85025 COMPLETE CBC W/AUTO DIFF WBC: CPT | Performed by: INTERNAL MEDICINE

## 2024-01-23 PROCEDURE — 97530 THERAPEUTIC ACTIVITIES: CPT

## 2024-01-23 PROCEDURE — 25010000002 FUROSEMIDE PER 20 MG: Performed by: INTERNAL MEDICINE

## 2024-01-23 PROCEDURE — 94799 UNLISTED PULMONARY SVC/PX: CPT

## 2024-01-23 PROCEDURE — 25010000002 ONDANSETRON PER 1 MG: Performed by: INTERNAL MEDICINE

## 2024-01-23 PROCEDURE — 80048 BASIC METABOLIC PNL TOTAL CA: CPT | Performed by: INTERNAL MEDICINE

## 2024-01-23 PROCEDURE — 99233 SBSQ HOSP IP/OBS HIGH 50: CPT | Performed by: INTERNAL MEDICINE

## 2024-01-23 PROCEDURE — 85007 BL SMEAR W/DIFF WBC COUNT: CPT | Performed by: INTERNAL MEDICINE

## 2024-01-23 PROCEDURE — 97110 THERAPEUTIC EXERCISES: CPT

## 2024-01-23 PROCEDURE — 99221 1ST HOSP IP/OBS SF/LOW 40: CPT | Performed by: INTERNAL MEDICINE

## 2024-01-23 PROCEDURE — 87040 BLOOD CULTURE FOR BACTERIA: CPT | Performed by: INTERNAL MEDICINE

## 2024-01-23 PROCEDURE — 93306 TTE W/DOPPLER COMPLETE: CPT

## 2024-01-23 RX ORDER — SULFAMETHOXAZOLE AND TRIMETHOPRIM 800; 160 MG/1; MG/1
1 TABLET ORAL 3 TIMES WEEKLY
Status: DISCONTINUED | OUTPATIENT
Start: 2024-01-26 | End: 2024-01-24 | Stop reason: DRUGHIGH

## 2024-01-23 RX ORDER — IPRATROPIUM BROMIDE AND ALBUTEROL SULFATE 2.5; .5 MG/3ML; MG/3ML
3 SOLUTION RESPIRATORY (INHALATION)
Status: DISCONTINUED | OUTPATIENT
Start: 2024-01-23 | End: 2024-01-28 | Stop reason: HOSPADM

## 2024-01-23 RX ORDER — MONTELUKAST SODIUM 10 MG/1
10 TABLET ORAL NIGHTLY
Status: DISCONTINUED | OUTPATIENT
Start: 2024-01-23 | End: 2024-01-28 | Stop reason: HOSPADM

## 2024-01-23 RX ORDER — ACYCLOVIR 800 MG/1
400 TABLET ORAL 2 TIMES DAILY
Status: DISCONTINUED | OUTPATIENT
Start: 2024-01-24 | End: 2024-01-28 | Stop reason: HOSPADM

## 2024-01-23 RX ADMIN — HYDROCODONE POLISTIREX AND CHLORPHENIRAMINE POLISTIREX 2.5 ML: 10; 8 SUSPENSION, EXTENDED RELEASE ORAL at 21:30

## 2024-01-23 RX ADMIN — ALBUTEROL SULFATE 2.5 MG: 2.5 SOLUTION RESPIRATORY (INHALATION) at 12:08

## 2024-01-23 RX ADMIN — IPRATROPIUM BROMIDE AND ALBUTEROL SULFATE 3 ML: .5; 3 SOLUTION RESPIRATORY (INHALATION) at 19:41

## 2024-01-23 RX ADMIN — APIXABAN 10 MG: 5 TABLET, FILM COATED ORAL at 09:28

## 2024-01-23 RX ADMIN — FUROSEMIDE 20 MG: 10 INJECTION, SOLUTION INTRAMUSCULAR; INTRAVENOUS at 03:25

## 2024-01-23 RX ADMIN — ACYCLOVIR 800 MG: 800 TABLET ORAL at 16:58

## 2024-01-23 RX ADMIN — Medication 10 ML: at 09:29

## 2024-01-23 RX ADMIN — MIDODRINE HYDROCHLORIDE 5 MG: 5 TABLET ORAL at 16:58

## 2024-01-23 RX ADMIN — CEFAZOLIN SODIUM 2000 MG: 2 INJECTION, SOLUTION INTRAVENOUS at 21:30

## 2024-01-23 RX ADMIN — SULFAMETHOXAZOLE AND TRIMETHOPRIM 1 TABLET: 800; 160 TABLET ORAL at 09:28

## 2024-01-23 RX ADMIN — APIXABAN 10 MG: 5 TABLET, FILM COATED ORAL at 21:30

## 2024-01-23 RX ADMIN — Medication 10 ML: at 21:31

## 2024-01-23 RX ADMIN — CEFAZOLIN SODIUM 2000 MG: 2 INJECTION, SOLUTION INTRAVENOUS at 09:29

## 2024-01-23 RX ADMIN — ONDANSETRON 4 MG: 2 INJECTION INTRAMUSCULAR; INTRAVENOUS at 16:58

## 2024-01-23 RX ADMIN — ALBUTEROL SULFATE 2.5 MG: 2.5 SOLUTION RESPIRATORY (INHALATION) at 07:47

## 2024-01-23 RX ADMIN — BENZONATATE 100 MG: 100 CAPSULE ORAL at 18:26

## 2024-01-23 RX ADMIN — HYDROCODONE POLISTIREX AND CHLORPHENIRAMINE POLISTIREX 2.5 ML: 10; 8 SUSPENSION, EXTENDED RELEASE ORAL at 09:28

## 2024-01-23 RX ADMIN — MIDODRINE HYDROCHLORIDE 5 MG: 5 TABLET ORAL at 09:30

## 2024-01-23 RX ADMIN — DOCUSATE SODIUM 50MG AND SENNOSIDES 8.6MG 2 TABLET: 8.6; 5 TABLET, FILM COATED ORAL at 21:30

## 2024-01-23 RX ADMIN — MONTELUKAST 10 MG: 10 TABLET, FILM COATED ORAL at 21:31

## 2024-01-23 RX ADMIN — FUROSEMIDE 20 MG: 10 INJECTION, SOLUTION INTRAMUSCULAR; INTRAVENOUS at 16:58

## 2024-01-23 RX ADMIN — FLUCONAZOLE 200 MG: 200 TABLET ORAL at 09:28

## 2024-01-23 RX ADMIN — MIDODRINE HYDROCHLORIDE 5 MG: 5 TABLET ORAL at 12:20

## 2024-01-23 RX ADMIN — ACYCLOVIR 800 MG: 800 TABLET ORAL at 09:28

## 2024-01-23 RX ADMIN — FILGRASTIM 480 MCG: 480 INJECTION, SOLUTION INTRAVENOUS; SUBCUTANEOUS at 18:26

## 2024-01-23 NOTE — PROGRESS NOTES
[facility]     Progress Note    Patient Name: Blair Lira  : 1945  MRN: [id number]  Primary Care Physician:  Babs Summers APRN  Date of admission: 2024      Subjective   Brief summary.  Patient admitted with pneumonia and hypotension      HPI:  Patient's testing came back positive for DVT.  Started on Eliquis.  Still coughing short of breath.  Chest congested noted.    Review of Systems     No fever.  Cough and chest congestion and wheezing.  Leg pain.      Objective     Vitals:   Temp:  [97.1 °F (36.2 °C)-100 °F (37.8 °C)] 98.2 °F (36.8 °C)  Heart Rate:  [] 52  Resp:  [18-28] 18  BP: (100-130)/() 111/62  Flow (L/min):  [3-6] 3    Physical Exam :     Elderly male in mild respiratory distress but tired looking.  Heart regular.  Lungs diminished breath sounds with rhonchi.  Abdomen soft nontender.  Lower extremities with some edema.  Result Review:  I have personally reviewed the results from the time of this admission to 2024 13:21 EST and agree with these findings:  [x]  Laboratory  []  Microbiology  []  Radiology  []  EKG/Telemetry   []  Cardiology/Vascular   []  Pathology  []  Old records  []  Other:           Assessment / Plan       Active Hospital Problems:  Active Hospital Problems    Diagnosis     **Pneumonia     Mucus plugging of bronchi     Anemia     Acute febrile illness     Hypotension     Chronic diastolic (congestive) heart failure     Sepsis associated hypotension     CKD (chronic kidney disease) stage 3, GFR 30-59 ml/min        Plan:   Patient's respiratory status still not better.  Congested.  Will change nebs to DuoNeb  .  Start Singulair.  Eliquis ordered for DVT  Increase activity with PT and OT  Blood pressure stable    DVT prophylaxis:  Medical and mechanical DVT prophylaxis orders are present.    CODE STATUS:   Level Of Support Discussed With: Patient  Code Status (Patient has no pulse and is not breathing): CPR (Attempt to Resuscitate)  Medical  Interventions (Patient has pulse or is breathing): Full Support              Electronically signed by Elieser Pederson MD, 01/23/24, 1:23 PM EST.

## 2024-01-23 NOTE — PROGRESS NOTES
Pulmonary / Critical Care Progress Note      Patient Name: Blair Lira  : 1945  MRN: [id number]  Primary Care Physician:  Babs Summers APRN  Date of admission: 2024    Subjective   Subjective   Follow-up for immunosuppressed state, history of multiple myeloma, on chemotherapy, hypoxic respiratory failure, MSSA bacteremia, groundglass opacity and persistent cough.    Feels better after bronchoscopy yesterday.  Mucous plugs removed  BAL PCR positive for coronavirus  Dyspnea and cough improved.  Bringing up thin clear secretions  No nausea or vomiting  Duplex ultrasound did show extensive left lower extremity DVT  Low-grade temperature overnight  No nausea or chills    Objective   Objective     Vitals:   Temp:  [97.1 °F (36.2 °C)-100 °F (37.8 °C)] 98.8 °F (37.1 °C)  Heart Rate:  [] 78  Resp:  [18-28] 18  BP: (100-139)/() 120/66  Flow (L/min):  [3-6] 3    Physical Exam   Vital Signs Reviewed   General: Frail looking male, in no acute distress  HEENT:  PERRL, EOMI.  OP, nares clear  Chest: Improved aeration, no wheezing, rhonchi bilaterally, resonant to percussion bilaterally  CV: RRR, no MGR, pulses 2+, equal.  Abd:  Soft, NT, ND, + BS, no HSM  EXT:  no clubbing, no cyanosis, improving left greater than right lower extremity pitting edema  Neuro:  A&Ox3, CN grossly intact, no focal deficits.  Skin: No rashes or lesions noted      Result Review    Result Review:  I have personally reviewed the results from the time of this admission to 2024 09:46 EST and agree with these findings:  [x]  Laboratory  [x]  Microbiology  [x]  Radiology  [x]  EKG/Telemetry   [x]  Cardiology/Vascular   []  Pathology  []  Old records  []  Other:  Most notable findings include:         Lab 24  0551 24  0409 24  0535 24  0527 24  0529 24  0410 24  0505 24  0751 24  0400 24  0136   WBC 1.18* 1.16* 1.87* 2.22* 2.17*  --  2.62* 2.69*  --  3.53    HEMOGLOBIN 8.5* 7.7* 8.5* 7.2* 7.2*  --  7.0* 6.5*  --  7.7*   HEMATOCRIT 27.1* 25.1* 25.7* 22.5* 21.8*  --  21.8* 20.9*  --  24.6*   PLATELETS 58* 51* 68* 64* 64*  --  74* 77*  --  97*   SODIUM 132* 132* 135* 132*  --  132* 136  --  135* 136   POTASSIUM 4.0 4.5 4.0 3.7  --  4.4 4.0  --  3.8 4.1   CHLORIDE 102 104 105 103  --  107 104  --  103 102   CO2 15.4* 13.3* 17.4* 16.6*  --  12.2* 18.7*  --  18.4* 17.4*   BUN 36* 34* 34* 33*  --  35* 31*  --  29* 31*   CREATININE 2.80* 2.60* 2.63* 2.65*  --  2.92* 2.73*  --  2.52* 2.66*   GLUCOSE 91 79 86 127*  --  95 94  --  142* 153*   CALCIUM 8.0* 7.9* 7.9* 7.6*  --  7.4* 7.5*  --  7.9* 8.5*   TOTAL PROTEIN  --   --   --   --   --  4.9* 4.9*  --  5.5* 6.1   ALBUMIN  --   --   --   --   --  2.0* 2.4*  --  2.8* 3.1*   GLOBULIN  --   --   --   --   --  2.9 2.5  --  2.7 3.0        CT Chest Without Contrast Diagnostic    Result Date: 1/20/2024  PROCEDURE: CT CHEST WO CONTRAST DIAGNOSTIC  COMPARISON: [facility], CT, CT ABDOMEN PELVIS WO CONTRAST, 10/26/2023, 20:23.  [facility], CT, CT CHEST WO CONTRAST DIAGNOSTIC, 10/26/2023, 20:23.  INDICATIONS: Cough  TECHNIQUE: CT images were created without the administration of contrast material.   PROTOCOL:   Standard imaging protocol performed    RADIATION:   DLP: 381.5 mGy*cm   Automated exposure control was utilized to minimize radiation dose.  FINDINGS:  Ritika/mediastinum:  No adenopathy.  Thoracic aorta normal in caliber.  Coronary artery calcification.  No pericardial effusion  Lungs/pleura:  Chronic elevation of the right hemidiaphragm with secondary atelectasis in the basilar right lower and right middle lobes.  Multifocal mixed ground-glass/interstitial opacities in the left upper and left lower lobe.  Trace bilateral pleural effusions  Upper abdomen:  No acute abnormality.  Bilateral renal cysts  Bones/soft tissues:  No acute bony abnormality.  Stable mild multiple upper thoracic wedge compression  fractures      Impression:    1. Multifocal mixed ground-glass/interstitial infiltrates in the left lung suspicious for viral/atypical pattern of pneumonia  2. Chronic elevation of the right hemidiaphragm with secondary basilar atelectasis  3. Coronary artery atherosclerosis     ASHLY PARKER MD       Electronically Signed and Approved By: ASHLY PARKER MD on 1/20/2024 at 18:54              BAL PCR positive for coronavirus  Left lower extremity Doppler positive for DVT    Assessment & Plan   Assessment / Plan     Active Hospital Problems:  Active Hospital Problems    Diagnosis     **Pneumonia     Mucus plugging of bronchi     Anemia     Acute febrile illness     Hypotension     Chronic diastolic (congestive) heart failure     Sepsis associated hypotension     CKD (chronic kidney disease) stage 3, GFR 30-59 ml/min        Impression:   Persistent cough  MSSA bacteremia  Left lower lobe pneumonia  Mucous plugging/issues with airway clearance  Coronavirus lower respiratory tract infection  Acute left lower extremity DVT  Multiple myeloma  CKD  Hypertensive  Pancytopenia  Immunosuppressed status on chemotherapy  Hyponatremia, clinically insignificant    Plan:   Continue with cefazolin 2 g twice daily IV.  Recommend 14 days of therapy for MSSA bacteremia  Pneumocystis, antifungal and antiviral prophylaxis per oncology per oncology.  Continue nebulizers and bronchopulmonary hygiene  Continue lasix 20 mg IV bid.  Trend renal panel and electrolytes  Start Eliquis for acute DVT.  Recommend lifelong anticoagulation in the setting of active malignancy  Clinically improved after bronchoscopy.  BAL positive for coronavirus.  Will follow-up on cultures  Continue antitussives  Neupogen per oncology.  Transfuse for hemoglobin less than 7 or platelets less than 10    DVT prophylaxis:  Medical and mechanical DVT prophylaxis orders are present.    CODE STATUS:   Level Of Support Discussed With: Patient  Code Status (Patient has no  pulse and is not breathing): CPR (Attempt to Resuscitate)  Medical Interventions (Patient has pulse or is breathing): Full Support      Labs, imaging, microbiology, notes and medications personally reviewed  Discussed with primary    Electronically signed by Dionte Ruvalcaba MD, 1/23/2024, 09:46 EST.

## 2024-01-23 NOTE — CONSULTS
[facility]   Cardiology Consult Note    Patient Name: Blair Lira  : 1945  MRN: [id number]  Primary Care Physician:  Babs Summers APRN  Referring Physician: Vamsi Mason MD    Date of admission: 2024    Subjective   Subjective     Reason for Consultation : Staphylococcus bacteremia, evaluate for transesophageal echocardiogram    Chief Complaint : Weakness, fatigue, fall, shortness of breath and cough    HPI:  Blair Lira is a 78 y.o. male with BPH, hypertension hyperlipidemia, chronic kidney disease, multiple myeloma.  He was admitted to the hospital on 2024 because of fatigue and falls.  He also reported shortness of breath and cough.  He is currently being treated for left lower lobe pneumonia, pancytopenia, neutropenic fever.  Blood cultures at the time of admission was positive for MSSA.  Patient underwent bronchoscopy yesterday and currently on broad-spectrum antibiotics along with prophylaxis for pneumocystis and fungus.  He was also noted to have DVT of left lower extremity, and currently on anticoagulants.  Cardiology is consulted for MSSA bacteremia.    He had admission to the hospital in November of last year, during which blood culture positive for MSSA.  NIKHIL done at that time was negative for infective endocarditis.      Review of Systems   All systems were reviewed and negative except for: Fatigue, shortness of breath, cough.  Negative for chest pain or palpitations.    Personal History     Past Medical History:   Diagnosis Date    BPH (benign prostatic hyperplasia)     Cancer     Chronic kidney disease     Frozen shoulder     Hyperlipidemia     Hypertension 10/26/2023    Periarthritis of shoulder     Rotator cuff disorder, right     Tennis elbow     RIGHT        Family History: Reviewed and found noncontributory for current admission    Social History:  reports that he has never smoked. He has never been exposed to tobacco smoke. He has never used smokeless  tobacco. He reports that he does not currently use alcohol. He reports that he does not use drugs.    Home Medications:  amLODIPine, atorvastatin, dexAMETHasone, fluticasone, lenalidomide, mirtazapine, pantoprazole, and tamsulosin    Allergies:  No Known Allergies    Objective    Objective     Vitals:   Temp:  [98.2 °F (36.8 °C)-100 °F (37.8 °C)] 98.8 °F (37.1 °C)  Heart Rate:  [] 93  Resp:  [18-22] 18  BP: (111-130)/(54-73) 119/57      Physical Exam:   Constitutional: Awake, alert, in mild distress because of cough and shortness of breath   Eyes: PERRLA, conjunctival pallor present, no conjunctival injection   HENT: NCAT, mucous membranes moist   Neck: Supple, no thyromegaly, no lymphadenopathy, trachea midline   Respiratory: Bilateral crackles and wheezing heard,   Cardiovascular: RRR, no murmurs, rubs, or gallops, palpable pedal pulses bilaterally   Gastrointestinal: Positive bowel sounds, soft, nontender, nondistended   Musculoskeletal: No bilateral ankle edema, no clubbing or cyanosis to extremities   Psychiatric: Appropriate affect, cooperative   Neurologic: Oriented x 3, speech clear   Skin: No rashes     Result Review    Result Review:  I have personally reviewed the results from the time of this admission to 1/23/2024 17:19 EST and agree with these findings:  [x]  Laboratory  []  Microbiology  [x]  Radiology  [x]  EKG/Telemetry   [x]  Cardiology/Vascular   []  Pathology  [x]  Old records  []  Other:  Most notable findings include:     CMP          1/21/2024    05:35 1/22/2024    04:09 1/23/2024    05:51   CMP   Glucose 86  79  91    BUN 34  34  36    Creatinine 2.63  2.60  2.80    EGFR 24.3  24.6  22.5    Sodium 135  132  132    Potassium 4.0  4.5  4.0    Chloride 105  104  102    Calcium 7.9  7.9  8.0    BUN/Creatinine Ratio 12.9  13.1  12.9    Anion Gap 12.6  14.7  14.6       CBC          1/21/2024    05:35 1/22/2024    04:09 1/23/2024    05:51   CBC   WBC 1.87  1.16  1.18    RBC 2.89  2.60  2.83     Hemoglobin 8.5  7.7  8.5    Hematocrit 25.7  25.1  27.1    MCV 88.9  96.5  95.8    MCH 29.4  29.6  30.0    MCHC 33.1  30.7  31.4    RDW 18.4  18.6  17.9    Platelets 68  51  58       Lab Results   Component Value Date    TROPONINT 95 (C) 01/17/2024     Results for orders placed during the hospital encounter of 11/06/23    Adult Transesophageal Echo (NIKHIL) W/ Cont if Necessary Per Protocol    Interpretation Summary    Left ventricular systolic function is normal. Estimated left ventricular EF = 55%    Patent foramen ovale present.    Saline test results are positive for right to left atrial level shunt.       Assessment & Plan   Assessment / Plan     Brief Patient Summary:  Blair Lira is a 78 y.o. male with BPH, hypertension hyperlipidemia, chronic kidney disease, multiple myeloma.  Currently admitted with pneumonia, pancytopenia.  Also noted to have a DVT of the left lower extremity.  Blood cultures admission were positive for MSSA.      Active Hospital Problems:  Active Hospital Problems    Diagnosis     **Pneumonia     Mucus plugging of bronchi     Anemia     Acute febrile illness     Hypotension     Chronic diastolic (congestive) heart failure     Sepsis associated hypotension     CKD (chronic kidney disease) stage 3, GFR 30-59 ml/min      MSSA bacteremia : Blood cultures done on 1/17/2024 was positive for MSSA.  Subsequent blood cultures done on 1/19/2024 is negative as of now.  He had a NIKHIL done during previous admission in November which was negative for infective endocarditis.    Plan:     We will hold off on doing another NIKHIL at this time since repeat cultures have been negative once the patient was started on antibiotics.    Transthoracic echocardiogram to be done to reevaluate LV function and valvular function.    Antibiotics, respiratory management per primary team and pulmonology    We will follow intermittently    Electronically signed by Saul Grant MD, 01/23/24, 5:19 PM EST.

## 2024-01-23 NOTE — PROGRESS NOTES
[facility]     Progress Note    Patient Name: Blair Lria  : 1945  MRN: [id number]  Primary Care Physician:  Babs Summers APRN  Date of admission: 2024    Subjective   Subjective     Chief Complaint: History of PFO arterial endocarditis will have to be ruled out has been growing staph from the blood repeat blood cultures and will get a cardiology consultation    HPI: With multiple myeloma also deep venous thrombosis will start him on heparin    Review of Systems   All systems were reviewed and negative except for: Has been reviewed    Objective   Objective     Vitals:   Temp:  [97.1 °F (36.2 °C)-100 °F (37.8 °C)] 98.8 °F (37.1 °C)  Heart Rate:  [] 78  Resp:  [18-28] 18  BP: (100-139)/() 120/66  Flow (L/min):  [3-6] 3    Physical Exam    Constitutional: Awake, alert   Eyes: PERRLA, sclerae anicteric, no conjunctival injection   HENT: NCAT, mucous membranes moist   Neck: Supple, no thyromegaly, no lymphadenopathy, trachea midline   Respiratory: Clear to auscultation bilaterally, nonlabored respirations    Cardiovascular: RRR, no murmurs, rubs, or gallops, palpable pedal pulses bilaterally   Gastrointestinal: Positive bowel sounds, soft, nontender, nondistended   Musculoskeletal: No bilateral ankle edema, no clubbing or cyanosis to extremities   Psychiatric: Appropriate affect, cooperative   Neurologic: Oriented x 3, strength symmetric in all extremities, Cranial Nerves grossly intact to confrontation, speech clear   Skin: No rashes   No change  Result Review    Result Review:  I have personally reviewed the results from the time of this admission to 2024 08:40 EST and agree with these findings:  [x]  Laboratory  []  Microbiology  []  Radiology  []  EKG/Telemetry   []  Cardiology/Vascular   []  Pathology  []  Old records  []  Other:  Most notable findings include: Bone induced pancytopenia    Assessment & Plan   Assessment / Plan     Brief Patient Summary:  Blair Lira is a  78 y.o. male who induced pancytopenia with bacterial growth on the blood DVT    Active Hospital Problems:  Active Hospital Problems    Diagnosis     **Pneumonia     Mucus plugging of bronchi     Anemia     Acute febrile illness     Hypotension     Chronic diastolic (congestive) heart failure     Sepsis associated hypotension     CKD (chronic kidney disease) stage 3, GFR 30-59 ml/min        Plan:   Start on heparin urology consultation    DVT prophylaxis:  Medical and mechanical DVT prophylaxis orders are present.        CODE STATUS:   Level Of Support Discussed With: Patient  Code Status (Patient has no pulse and is not breathing): CPR (Attempt to Resuscitate)  Medical Interventions (Patient has pulse or is breathing): Full Support    Disposition:  I expect patient to be discharged after the patient has been stable.    Electronically signed by Vamsi Mason MD, 01/23/24, 8:40 AM EST.      Part of this note may be an electronic transcription/translation of spoken language to printed text using the Dragon Dictation System.

## 2024-01-23 NOTE — PLAN OF CARE
Goal Outcome Evaluation:  Plan of Care Reviewed With: patient        Progress: no change  Outcome Evaluation: Patient A/Ox4. On room air. No complaints of pain. Eliquis administered as ordered for DVT. PRN Zofran administered as ordered for nausea. Waiting on Echo. No other issues/needs at this time.

## 2024-01-23 NOTE — PLAN OF CARE
Goal Outcome Evaluation:  Plan of Care Reviewed With: patient        Progress: no change  Outcome Evaluation: patient is alert and oriented. no c/o pain. vascular ultrasound this shift and DVT of left leg found, MD notified via phone call. IV antibiotics administered. no other changes at this time.

## 2024-01-23 NOTE — THERAPY TREATMENT NOTE
Acute Care - Physical Therapy Treatment Note   [facility]     Patient Name: Blair Lira  : 1945  MRN: [id number]  Today's Date: 2024      Visit Dx:     ICD-10-CM ICD-9-CM   1. Sepsis without acute organ dysfunction, due to unspecified organism  A41.9 038.9     995.91   2. Pneumonia of left lower lobe due to infectious organism  J18.9 486   3. Acute febrile illness  R50.9 780.60   4. Chronic anemia  D64.9 285.9   5. Chronic kidney disease, unspecified CKD stage  N18.9 585.9   6. Elevated troponin  R79.89 790.6   7. Decreased activities of daily living (ADL)  Z78.9 V49.89   8. Difficulty walking  R26.2 719.7   9. Chronic diastolic (congestive) heart failure  I50.32 428.32     428.0     Patient Active Problem List   Diagnosis    Rotator cuff tear, right    Impingement syndrome of right shoulder    Acute renal failure superimposed on chronic kidney disease    Pancytopenia    Abnormal weight loss    Hypertension    CKD (chronic kidney disease) stage 3, GFR 30-59 ml/min    Moderate malnutrition    Weakness generalized    Intractable pain    Sepsis associated hypotension    Multiple myeloma    Edema    Chronic diastolic (congestive) heart failure    Acute on chronic HFrEF (heart failure with reduced ejection fraction)    Chest pain    Syncope    COVID-19 virus infection    Chronic kidney disease, stage IV (severe)    Hypotension    Anemia    Acute on chronic renal failure    Pneumonia    Anemia    Acute febrile illness    Mucus plugging of bronchi     Past Medical History:   Diagnosis Date    BPH (benign prostatic hyperplasia)     Cancer     Chronic kidney disease     Frozen shoulder     Hyperlipidemia     Hypertension 10/26/2023    Periarthritis of shoulder     Rotator cuff disorder, right     Tennis elbow     RIGHT     Past Surgical History:   Procedure Laterality Date    BRONCHOSCOPY N/A 2024    Procedure: BRONCHOSCOPY WITH BAL AND WASHINGS;  Surgeon: Dionte Ruvalcaba MD;  Location: [facility]  ENDOSCOPY;  Service: Pulmonary;  Laterality: N/A;  MUCUS PLUGGING    CATARACT EXTRACTION EXTRACAPSULAR W/ INTRAOCULAR LENS IMPLANTATION Bilateral     COLONOSCOPY      JOINT REPLACEMENT Right     THR    SHOULDER ARTHROSCOPY W/ ROTATOR CUFF REPAIR Right 3/29/2023    Procedure: SHOULDER ARTHROSCOPY WITH ROTATOR CUFF REPAIR, SUBCROMIAL DECOMPRESSION,DISTAL CLAVICLE RESECTION;  Surgeon: Gustavo Samuels MD;  [place] [facility] [place] [facility] [place]  Service: [place]opedics;  Laterality: Right;    SHOULDER SURGERY Left     SCOPE     PT Assessment (last 12 hours)       PT Evaluation and Treatment       Row Name 01/23/24 1032          Physical Therapy Time and Intention    Subjective Information complains of;weakness;fatigue;pain  -DK     Document Type therapy note (daily note)  -DK     Mode of Treatment individual therapy;physical therapy  -DK     Patient Effort good  -DK     Symptoms Noted During/After Treatment fatigue;increased pain  -DK     Comment Pt now with a newly diagnosed DVT in the LLE.  -DK       Row Name 01/23/24 1032          Pain    Pretreatment Pain Rating 2/10  -DK     Posttreatment Pain Rating 4/10  -DK     Pain Location - Side/Orientation Left  -DK     Pain Location generalized  -DK     Pain Location - calf  -DK     Pain Intervention(s) Repositioned;Ambulation/increased activity;Distraction;Therapeutic presence  -DK       Row Name 01/23/24 1032          Cognition    Affect/Mental Status (Cognition) WFL  -DK     Orientation Status (Cognition) oriented x 4  -DK     Follows Commands (Cognition) WFL  -DK     Cognitive Function WFL  -DK     Personal Safety Interventions gait belt;nonskid shoes/slippers when out of bed;supervised activity  -DK       Row Name 01/23/24 1032          Bed Mobility    Bed Mobility supine-sit  -DK     All Activities, [place] (Bed Mobility) standby assist  -DK     Supine-Sit [place] (Bed Mobility) standby assist  -DK     Assistive Device (Bed Mobility) bed rails  -DK       Row Name 01/23/24  1032          Transfers    Transfers sit-stand transfer;stand-sit transfer  -DK       Row Name 01/23/24 1032          Sit-Stand Transfer    Sit-Stand [place] (Transfers) standby assist;contact guard;1 person assist  -DK     Assistive Device (Sit-Stand Transfers) walker, front-wheeled  -DK       Row Name 01/23/24 1032          Stand-Sit Transfer    Stand-Sit [place] (Transfers) standby assist;contact guard;1 person assist  -DK     Assistive Device (Stand-Sit Transfers) walker, front-wheeled  -DK       Row Name 01/23/24 1032          Gait/Stairs (Locomotion)    Gait/Stairs Locomotion gait/ambulation independence;gait/ambulation assistive device;distance ambulated;gait pattern  -DK     [place] Level (Gait) standby assist;contact guard;1 person assist  -DK     Assistive Device (Gait) walker, front-wheeled  -DK     Distance in Feet (Gait) 60  -DK     Pattern (Gait) step-to  -DK     Deviations/Abnormal Patterns (Gait) eliza decreased;festinating/shuffling;gait speed decreased;stride length decreased  -DK     Bilateral Gait Deviations forward flexed posture  -DK     Comment, (Gait/Stairs) Pt ambulated on room air with a rolling walker.  He returned to bed on alert post treatment.  -DK       Row Name 01/23/24 1032          Safety Issues, Functional Mobility    Safety Issues Affecting Function (Mobility) safety precaution awareness  -DK     Impairments Affecting Function (Mobility) balance;endurance/activity tolerance;pain;strength  -DK       Row Name 01/23/24 1032          Balance    Balance Assessment sitting static balance;sitting dynamic balance;standing static balance;standing dynamic balance  -DK     Static Sitting Balance standby assist  -DK     Dynamic Sitting Balance standby assist  -DK     Position, Sitting Balance unsupported;sitting edge of bed  -DK     Static Standing Balance standby assist  -DK     Dynamic Standing Balance standby assist;contact guard;1-person assist  -DK     Position/Device  Used, Standing Balance walker, front-wheeled  -     Balance Interventions standing;dynamic;tandem gait  -       Row Name 01/23/24 1032          Motor Skills    Motor Skills --  therapeutic exercises  -     Coordination WFL  -     Therapeutic Exercise hip;knee;ankle  -       Row Name 01/23/24 1032          Hip (Therapeutic Exercise)    Hip (Therapeutic Exercise) AROM (active range of motion)  -DK     Hip AROM (Therapeutic Exercise) bilateral;flexion;extension;aBduction;aDduction;sitting;20 repititions  -DK       Row Name 01/23/24 1032          Knee (Therapeutic Exercise)    Knee (Therapeutic Exercise) AROM (active range of motion)  -DK     Knee AROM (Therapeutic Exercise) bilateral;flexion;extension;LAQ (long arc quad);sitting;20 repititions  -       Row Name 01/23/24 1032          Ankle (Therapeutic Exercise)    Ankle (Therapeutic Exercise) AROM (active range of motion)  -DK     Ankle AROM (Therapeutic Exercise) bilateral;dorsiflexion;plantarflexion;sitting;20 repititions  -       Row Name 01/23/24 1032          Plan of Care Review    Plan of Care Reviewed With patient  -     Progress improving  -       Row Name 01/23/24 1032          Positioning and Restraints    Pre-Treatment Position in bed  -DK     Post Treatment Position bed  -DK     In Bed supine;call light within reach;encouraged to call for assist;exit alarm on;side rails up x2;legs elevated  -       Row Name 01/23/24 1032          Therapy Assessment/Plan (PT)    Rehab Potential (PT) good, to achieve stated therapy goals  -DK     Criteria for Skilled Interventions Met (PT) skilled treatment is necessary  -     Therapy Frequency (PT) daily  -     Problem List (PT) problems related to;balance;strength;pain  -DK     Activity Limitations Related to Problem List (PT) unable to ambulate safely  -       Row Name 01/23/24 1032          Progress Summary (PT)    Progress Toward Functional Goals (PT) progress toward functional goals is good   -DK               User Key  (r) = Recorded By, (t) = Taken By, (c) = Cosigned By      Initials Name Provider Type    Stacia Casey PTA Physical Therapist Assistant                    Physical Therapy Education       Title: PT OT SLP Therapies (In Progress)       Topic: Physical Therapy (In Progress)       Point: Mobility training (In Progress)       Learning Progress Summary             Patient Acceptance, E, NR by CS at 1/18/2024 1431                         Point: Home exercise program (Not Started)       Learner Progress:  Not documented in this visit.              Point: Body mechanics (Not Started)       Learner Progress:  Not documented in this visit.              Point: Precautions (In Progress)       Learning Progress Summary             Patient Acceptance, E, NR by CS at 1/18/2024 1431                                         User Key       Initials Effective Dates Name Provider Type Discipline    MAGALY 04/25/21 -  Nimo Tsai, PT Physical Therapist PT                  PT Recommendation and Plan  Planned Therapy Interventions (PT): balance training, bed mobility training, gait training, home exercise program, strengthening, transfer training  Therapy Frequency (PT): daily  Progress Summary (PT)  Progress Toward Functional Goals (PT): progress toward functional goals is good  Plan of Care Reviewed With: patient  Progress: improving   Outcome Measures       Row Name 01/23/24 1032 01/21/24 0843          How much help from another person do you currently need...    Turning from your back to your side while in flat bed without using bedrails? 4  -DK 4  -DK     Moving from lying on back to sitting on the side of a flat bed without bedrails? 4  -DK 4  -DK     Moving to and from a bed to a chair (including a wheelchair)? 3  -DK 3  -DK     Standing up from a chair using your arms (e.g., wheelchair, bedside chair)? 3  -DK 3  -DK     Climbing 3-5 steps with a railing? 2  -DK 2  -DK     To walk in hospital room? 3   -DK 3  -DK     AM-PAC 6 Clicks Score (PT) 19  -DK 19  -DK     Highest Level of Mobility Goal 6 --> Walk 10 steps or more  -DK 6 --> Walk 10 steps or more  -DK        Functional Assessment    Outcome Measure Options AM-PAC 6 Clicks Basic Mobility (PT)  -DK AM-PAC 6 Clicks Basic Mobility (PT)  -DK               User Key  (r) = Recorded By, (t) = Taken By, (c) = Cosigned By      Initials Name Provider Type    Stacia Casey PTA Physical Therapist Assistant                     Time Calculation:    PT Charges       Row Name 01/23/24 1036             Time Calculation    PT Received On 01/23/24  -DK      PT Goal Re-Cert Due Date 01/27/24  -DK         Timed Charges    38171 - PT Therapeutic Exercise Minutes 14  -DK      02364 - Gait Training Minutes  5  -DK      11085 - PT Therapeutic Activity Minutes 7  -DK         Total Minutes    Timed Charges Total Minutes 26  -DK       Total Minutes 26  -DK                User Key  (r) = Recorded By, (t) = Taken By, (c) = Cosigned By      Initials Name Provider Type    Stacia Casey PTA Physical Therapist Assistant                  Therapy Charges for Today       Code Description Service Date Service Provider Modifiers Qty    95789286093 HC PT THER PROC EA 15 MIN 1/23/2024 Stacia Ordonez PTA GP 1    15805505624 HC PT THERAPEUTIC ACT EA 15 MIN 1/23/2024 Stacia Ordonez PTA GP 1            PT G-Codes  Outcome Measure Options: AM-PAC 6 Clicks Basic Mobility (PT)  AM-PAC 6 Clicks Score (PT): 19  AM-PAC 6 Clicks Score (OT): 19    Stacia Ordonez PTA  1/23/2024

## 2024-01-24 PROBLEM — D61.810 PANCYTOPENIA DUE TO CHEMOTHERAPY: Status: ACTIVE | Noted: 2023-10-26

## 2024-01-24 LAB
ALBUMIN SERPL-MCNC: 2.3 G/DL (ref 3.5–5.2)
ALBUMIN/GLOB SERPL: 0.8 G/DL
ALP SERPL-CCNC: 93 U/L (ref 39–117)
ALT SERPL W P-5'-P-CCNC: 8 U/L (ref 1–41)
ANION GAP SERPL CALCULATED.3IONS-SCNC: 13.4 MMOL/L (ref 5–15)
ANISOCYTOSIS BLD QL: ABNORMAL
AST SERPL-CCNC: 29 U/L (ref 1–40)
BACTERIA SPEC AEROBE CULT: ABNORMAL
BACTERIA SPEC AEROBE CULT: ABNORMAL
BACTERIA SPEC AEROBE CULT: NORMAL
BACTERIA SPEC AEROBE CULT: NORMAL
BACTERIA SPEC RESP CULT: NORMAL
BASOPHILS # BLD MANUAL: 0.03 10*3/MM3 (ref 0–0.2)
BASOPHILS NFR BLD MANUAL: 2 % (ref 0–1.5)
BILIRUB SERPL-MCNC: 0.4 MG/DL (ref 0–1.2)
BUN SERPL-MCNC: 39 MG/DL (ref 8–23)
BUN/CREAT SERPL: 13.6 (ref 7–25)
BURR CELLS BLD QL SMEAR: ABNORMAL
CALCIUM SPEC-SCNC: 7.5 MG/DL (ref 8.6–10.5)
CHLORIDE SERPL-SCNC: 100 MMOL/L (ref 98–107)
CK SERPL-CCNC: 22 U/L (ref 20–200)
CO2 SERPL-SCNC: 18.6 MMOL/L (ref 22–29)
CREAT SERPL-MCNC: 2.87 MG/DL (ref 0.76–1.27)
CYTO UR: NORMAL
DEPRECATED RDW RBC AUTO: 56.9 FL (ref 37–54)
EGFRCR SERPLBLD CKD-EPI 2021: 21.9 ML/MIN/1.73
EOSINOPHIL # BLD MANUAL: 0.09 10*3/MM3 (ref 0–0.4)
EOSINOPHIL NFR BLD MANUAL: 6 % (ref 0.3–6.2)
ERYTHROCYTE [DISTWIDTH] IN BLOOD BY AUTOMATED COUNT: 17.2 % (ref 12.3–15.4)
GLOBULIN UR ELPH-MCNC: 2.9 GM/DL
GLUCOSE SERPL-MCNC: 100 MG/DL (ref 65–99)
GRAM STN SPEC: ABNORMAL
GRAM STN SPEC: NORMAL
GRAM STN SPEC: NORMAL
HCT VFR BLD AUTO: 25.6 % (ref 37.5–51)
HGB BLD-MCNC: 8.4 G/DL (ref 13–17.7)
LAB AP CASE REPORT: NORMAL
LAB AP CLINICAL INFORMATION: NORMAL
LAB AP DIAGNOSIS COMMENT: NORMAL
LAB AP SPECIAL STAINS: NORMAL
LYMPHOCYTES # BLD MANUAL: 0.2 10*3/MM3 (ref 0.7–3.1)
MAGNESIUM SERPL-MCNC: 1.7 MG/DL (ref 1.6–2.4)
MCH RBC QN AUTO: 29.5 PG (ref 26.6–33)
MCHC RBC AUTO-ENTMCNC: 32.8 G/DL (ref 31.5–35.7)
MCV RBC AUTO: 89.8 FL (ref 79–97)
NEUTROPHILS # BLD AUTO: 1.13 10*3/MM3 (ref 1.7–7)
NEUTROPHILS NFR BLD MANUAL: 78 % (ref 42.7–76)
OVALOCYTES BLD QL SMEAR: ABNORMAL
PATH REPORT.FINAL DX SPEC: NORMAL
PATH REPORT.GROSS SPEC: NORMAL
PLATELET # BLD AUTO: 72 10*3/MM3 (ref 140–450)
PMV BLD AUTO: 12.2 FL (ref 6–12)
POTASSIUM SERPL-SCNC: 3.9 MMOL/L (ref 3.5–5.2)
PROT SERPL-MCNC: 5.2 G/DL (ref 6–8.5)
RBC # BLD AUTO: 2.85 10*6/MM3 (ref 4.14–5.8)
SCAN SLIDE: NORMAL
SMALL PLATELETS BLD QL SMEAR: ABNORMAL
SODIUM SERPL-SCNC: 132 MMOL/L (ref 136–145)
TOXIC GRANULATION: ABNORMAL
VARIANT LYMPHS NFR BLD MANUAL: 14 % (ref 19.6–45.3)
WBC NRBC COR # BLD AUTO: 1.45 10*3/MM3 (ref 3.4–10.8)

## 2024-01-24 PROCEDURE — 85007 BL SMEAR W/DIFF WBC COUNT: CPT | Performed by: INTERNAL MEDICINE

## 2024-01-24 PROCEDURE — 25010000002 FUROSEMIDE PER 20 MG: Performed by: INTERNAL MEDICINE

## 2024-01-24 PROCEDURE — 82550 ASSAY OF CK (CPK): CPT | Performed by: INTERNAL MEDICINE

## 2024-01-24 PROCEDURE — 83735 ASSAY OF MAGNESIUM: CPT | Performed by: INTERNAL MEDICINE

## 2024-01-24 PROCEDURE — 80053 COMPREHEN METABOLIC PANEL: CPT | Performed by: INTERNAL MEDICINE

## 2024-01-24 PROCEDURE — 94664 DEMO&/EVAL PT USE INHALER: CPT

## 2024-01-24 PROCEDURE — 85025 COMPLETE CBC W/AUTO DIFF WBC: CPT | Performed by: INTERNAL MEDICINE

## 2024-01-24 PROCEDURE — 99233 SBSQ HOSP IP/OBS HIGH 50: CPT | Performed by: INTERNAL MEDICINE

## 2024-01-24 PROCEDURE — 25010000002 CEFAZOLIN IN DEXTROSE 2-4 GM/100ML-% SOLUTION: Performed by: INTERNAL MEDICINE

## 2024-01-24 PROCEDURE — 94799 UNLISTED PULMONARY SVC/PX: CPT

## 2024-01-24 PROCEDURE — 25010000002 MAGNESIUM SULFATE 2 GM/50ML SOLUTION: Performed by: INTERNAL MEDICINE

## 2024-01-24 PROCEDURE — 25010000002 FILGRASTIM PER 480 MCG: Performed by: INTERNAL MEDICINE

## 2024-01-24 RX ORDER — SULFAMETHOXAZOLE AND TRIMETHOPRIM 400; 80 MG/1; MG/1
1 TABLET ORAL
Status: DISCONTINUED | OUTPATIENT
Start: 2024-01-26 | End: 2024-01-28 | Stop reason: HOSPADM

## 2024-01-24 RX ORDER — MAGNESIUM SULFATE HEPTAHYDRATE 40 MG/ML
2 INJECTION, SOLUTION INTRAVENOUS ONCE
Status: COMPLETED | OUTPATIENT
Start: 2024-01-24 | End: 2024-01-24

## 2024-01-24 RX ORDER — POTASSIUM CHLORIDE 750 MG/1
40 CAPSULE, EXTENDED RELEASE ORAL ONCE
Status: COMPLETED | OUTPATIENT
Start: 2024-01-24 | End: 2024-01-24

## 2024-01-24 RX ADMIN — Medication 10 ML: at 14:57

## 2024-01-24 RX ADMIN — FUROSEMIDE 20 MG: 10 INJECTION, SOLUTION INTRAMUSCULAR; INTRAVENOUS at 14:57

## 2024-01-24 RX ADMIN — IPRATROPIUM BROMIDE AND ALBUTEROL SULFATE 3 ML: .5; 3 SOLUTION RESPIRATORY (INHALATION) at 12:04

## 2024-01-24 RX ADMIN — APIXABAN 10 MG: 5 TABLET, FILM COATED ORAL at 08:18

## 2024-01-24 RX ADMIN — MIDODRINE HYDROCHLORIDE 5 MG: 5 TABLET ORAL at 06:08

## 2024-01-24 RX ADMIN — CEFAZOLIN SODIUM 2000 MG: 2 INJECTION, SOLUTION INTRAVENOUS at 22:42

## 2024-01-24 RX ADMIN — APIXABAN 10 MG: 5 TABLET, FILM COATED ORAL at 22:15

## 2024-01-24 RX ADMIN — FLUCONAZOLE 200 MG: 200 TABLET ORAL at 11:11

## 2024-01-24 RX ADMIN — MIDODRINE HYDROCHLORIDE 5 MG: 5 TABLET ORAL at 11:12

## 2024-01-24 RX ADMIN — MAGNESIUM SULFATE HEPTAHYDRATE 2 G: 40 INJECTION, SOLUTION INTRAVENOUS at 08:17

## 2024-01-24 RX ADMIN — POTASSIUM CHLORIDE 40 MEQ: 10 CAPSULE, COATED, EXTENDED RELEASE ORAL at 08:18

## 2024-01-24 RX ADMIN — MIDODRINE HYDROCHLORIDE 5 MG: 5 TABLET ORAL at 17:18

## 2024-01-24 RX ADMIN — DOCUSATE SODIUM 50MG AND SENNOSIDES 8.6MG 2 TABLET: 8.6; 5 TABLET, FILM COATED ORAL at 22:15

## 2024-01-24 RX ADMIN — HYDROCODONE POLISTIREX AND CHLORPHENIRAMINE POLISTIREX 2.5 ML: 10; 8 SUSPENSION, EXTENDED RELEASE ORAL at 08:18

## 2024-01-24 RX ADMIN — Medication 10 ML: at 08:19

## 2024-01-24 RX ADMIN — IPRATROPIUM BROMIDE AND ALBUTEROL SULFATE 3 ML: .5; 3 SOLUTION RESPIRATORY (INHALATION) at 07:46

## 2024-01-24 RX ADMIN — DOCUSATE SODIUM 50MG AND SENNOSIDES 8.6MG 2 TABLET: 8.6; 5 TABLET, FILM COATED ORAL at 08:18

## 2024-01-24 RX ADMIN — ACYCLOVIR 400 MG: 800 TABLET ORAL at 22:16

## 2024-01-24 RX ADMIN — Medication 10 ML: at 22:16

## 2024-01-24 RX ADMIN — HYDROCODONE POLISTIREX AND CHLORPHENIRAMINE POLISTIREX 2.5 ML: 10; 8 SUSPENSION, EXTENDED RELEASE ORAL at 22:15

## 2024-01-24 RX ADMIN — MONTELUKAST 10 MG: 10 TABLET, FILM COATED ORAL at 22:15

## 2024-01-24 RX ADMIN — IPRATROPIUM BROMIDE AND ALBUTEROL SULFATE 3 ML: .5; 3 SOLUTION RESPIRATORY (INHALATION) at 18:53

## 2024-01-24 RX ADMIN — CEFAZOLIN SODIUM 2000 MG: 2 INJECTION, SOLUTION INTRAVENOUS at 13:18

## 2024-01-24 RX ADMIN — ACYCLOVIR 400 MG: 800 TABLET ORAL at 08:18

## 2024-01-24 RX ADMIN — FUROSEMIDE 20 MG: 10 INJECTION, SOLUTION INTRAMUSCULAR; INTRAVENOUS at 03:56

## 2024-01-24 RX ADMIN — FILGRASTIM 480 MCG: 480 INJECTION, SOLUTION INTRAVENOUS; SUBCUTANEOUS at 17:18

## 2024-01-24 NOTE — PLAN OF CARE
Goal Outcome Evaluation:           Progress: no change  Outcome Evaluation: patient is alert and oriented x4 and on room air. no c/o pain throughout shift. still awaiting echo. patient had a dry non-productive cough throughout the night and mentioned he gets SOB after a coughing spell. Critical WBC of 1.45 from morning labs. no other issues at this time.

## 2024-01-24 NOTE — PLAN OF CARE
Goal Outcome Evaluation:  Plan of Care Reviewed With: patient        Progress: no change  Outcome Evaluation: Patient is A/Ox4. On room air. No complaints of pain. No other issues/needs at this time.

## 2024-01-24 NOTE — PROGRESS NOTES
Pulmonary / Critical Care Progress Note      Patient Name: Blair Lira  : 1945  MRN: [id number]  Primary Care Physician:  Babs Summers APRN  Date of admission: 2024    Subjective   Subjective   Follow-up for immunosuppressed state, history of multiple myeloma, on chemotherapy, hypoxic respiratory failure, MSSA bacteremia, groundglass opacity and persistent cough.    Feels better after bronchoscopy yesterday.  Mucous plugs removed  BAL PCR positive for coronavirus  Dyspnea and cough improving.  Minimal secretions noted  No nausea or vomiting  Leg swelling improving  No fevers, nausea or chills    Objective   Objective     Vitals:   Temp:  [97.3 °F (36.3 °C)-99 °F (37.2 °C)] 98.2 °F (36.8 °C)  Heart Rate:  [52-95] 83  Resp:  [18-22] 20  BP: (108-124)/(52-67) 111/66    Physical Exam   Vital Signs Reviewed   General: Frail looking male, in no acute distress  HEENT:  PERRL, EOMI.  OP, nares clear  Chest: Improved aeration, no wheezing, decreasing rhonchi bilaterally, resonant to percussion bilaterally  CV: RRR, no MGR, pulses 2+, equal.  Abd:  Soft, NT, ND, + BS, no HSM  EXT:  no clubbing, no cyanosis, decreasing left greater than right lower extremity pitting edema  Neuro:  A&Ox3, CN grossly intact, no focal deficits.  Skin: No rashes or lesions noted      Result Review    Result Review:  I have personally reviewed the results from the time of this admission to 2024 12:06 EST and agree with these findings:  [x]  Laboratory  [x]  Microbiology  [x]  Radiology  [x]  EKG/Telemetry   [x]  Cardiology/Vascular   []  Pathology  []  Old records  []  Other:  Most notable findings include:         Lab 24  0419 24  0551 24  0409 24  0535 24  0527 24  0529 24  0410 24  0505   WBC 1.45* 1.18* 1.16* 1.87* 2.22* 2.17*  --  2.62*   HEMOGLOBIN 8.4* 8.5* 7.7* 8.5* 7.2* 7.2*  --  7.0*   HEMATOCRIT 25.6* 27.1* 25.1* 25.7* 22.5* 21.8*  --  21.8*   PLATELETS 72* 58* 51*  68* 64* 64*  --  74*   SODIUM 132* 132* 132* 135* 132*  --  132* 136   POTASSIUM 3.9 4.0 4.5 4.0 3.7  --  4.4 4.0   CHLORIDE 100 102 104 105 103  --  107 104   CO2 18.6* 15.4* 13.3* 17.4* 16.6*  --  12.2* 18.7*   BUN 39* 36* 34* 34* 33*  --  35* 31*   CREATININE 2.87* 2.80* 2.60* 2.63* 2.65*  --  2.92* 2.73*   GLUCOSE 100* 91 79 86 127*  --  95 94   CALCIUM 7.5* 8.0* 7.9* 7.9* 7.6*  --  7.4* 7.5*   TOTAL PROTEIN 5.2*  --   --   --   --   --  4.9* 4.9*   ALBUMIN 2.3*  --   --   --   --   --  2.0* 2.4*   GLOBULIN 2.9  --   --   --   --   --  2.9 2.5        CT Chest Without Contrast Diagnostic    Result Date: 1/20/2024  PROCEDURE: CT CHEST WO CONTRAST DIAGNOSTIC  COMPARISON: [facility], CT, CT ABDOMEN PELVIS WO CONTRAST, 10/26/2023, 20:23.  [facility], CT, CT CHEST WO CONTRAST DIAGNOSTIC, 10/26/2023, 20:23.  INDICATIONS: Cough  TECHNIQUE: CT images were created without the administration of contrast material.   PROTOCOL:   Standard imaging protocol performed    RADIATION:   DLP: 381.5 mGy*cm   Automated exposure control was utilized to minimize radiation dose.  FINDINGS:  Ritika/mediastinum:  No adenopathy.  Thoracic aorta normal in caliber.  Coronary artery calcification.  No pericardial effusion  Lungs/pleura:  Chronic elevation of the right hemidiaphragm with secondary atelectasis in the basilar right lower and right middle lobes.  Multifocal mixed ground-glass/interstitial opacities in the left upper and left lower lobe.  Trace bilateral pleural effusions  Upper abdomen:  No acute abnormality.  Bilateral renal cysts  Bones/soft tissues:  No acute bony abnormality.  Stable mild multiple upper thoracic wedge compression fractures      Impression:    1. Multifocal mixed ground-glass/interstitial infiltrates in the left lung suspicious for viral/atypical pattern of pneumonia  2. Chronic elevation of the right hemidiaphragm with secondary basilar atelectasis  3. Coronary artery atherosclerosis      ASHLY PARKER MD       Electronically Signed and Approved By: ASHLY PARKER MD on 1/20/2024 at 18:54              BAL PCR positive for coronavirus  Left lower extremity Doppler positive for DVT    Assessment & Plan   Assessment / Plan     Active Hospital Problems:  Active Hospital Problems    Diagnosis     **Pneumonia     Mucus plugging of bronchi     Anemia     Acute febrile illness     Hypotension     Chronic diastolic (congestive) heart failure     Sepsis associated hypotension     CKD (chronic kidney disease) stage 3, GFR 30-59 ml/min        Impression:   Persistent cough  MSSA bacteremia  Left lower lobe pneumonia  Mucous plugging/issues with airway clearance  Coronavirus lower respiratory tract infection  Acute left lower extremity DVT  Multiple myeloma  CKD  Hypertensive  Pancytopenia  Immunosuppressed status on chemotherapy  Hyponatremia, clinically insignificant  Hypokalemia  Hypomagnesemia    Plan:   Continue with cefazolin 2 g twice daily IV.  Needs 14 days of therapy for MSSA bacteremia  Pneumocystis, antifungal and antiviral prophylaxis per oncology per oncology.  Continue nebulizers and bronchopulmonary hygiene  Continue lasix 20 mg IV bid.  Trend renal panel and electrolytes.  Replace magnesium IV and potassium orally  Continue Eliquis acute DVT.  Recommend lifelong anticoagulation in the setting of active malignancy  Clinically improved after bronchoscopy.  BAL positive for coronavirus.  Otherwise cultures unremarkable  Continue antitussives  Neupogen per oncology.  Transfuse for hemoglobin less than 7 or platelets less than 10    DVT prophylaxis:  Medical and mechanical DVT prophylaxis orders are present.    CODE STATUS:   Level Of Support Discussed With: Patient  Code Status (Patient has no pulse and is not breathing): CPR (Attempt to Resuscitate)  Medical Interventions (Patient has pulse or is breathing): Full Support      Labs, imaging, microbiology, notes and medications personally  reviewed  Discussed with primary    Electronically signed by Dionte Ruvalcaba MD, 1/24/2024, 12:06 EST.

## 2024-01-24 NOTE — PROGRESS NOTES
[facility]     Progress Note    Patient Name: Blair Lira  : 1945  MRN: [id number]  Primary Care Physician:  Babs Summers APRN  Date of admission: 2024    Subjective   Subjective     Chief Complaint: With multiple myeloma and septicemia pancytopenia probably chemo induced patient is on growth factors Neupogen white counts have started trending up fever has subsided cough is much better pulmonary medicine on board has been following the case as well has been evaluated by cardiology and the status remains the same    HPI: Stable feeling much better anticipate discharge in a couple of days    Review of Systems   All systems were reviewed and negative except for: Has been reviewed and discussed    Objective   Objective     Vitals:   Temp:  [97.3 °F (36.3 °C)-99 °F (37.2 °C)] 98.4 °F (36.9 °C)  Heart Rate:  [52-95] 80  Resp:  [18-22] 18  BP: (108-126)/(52-69) 108/67    Physical Exam    Constitutional: Awake, alert   Eyes: PERRLA, sclerae anicteric, no conjunctival injection   HENT: NCAT, mucous membranes moist   Neck: Supple, no thyromegaly, no lymphadenopathy, trachea midline   Respiratory: Clear to auscultation bilaterally, nonlabored respirations    Cardiovascular: RRR, no murmurs, rubs, or gallops, palpable pedal pulses bilaterally   Gastrointestinal: Positive bowel sounds, soft, nontender, nondistended   Musculoskeletal: No bilateral ankle edema, no clubbing or cyanosis to extremities   Psychiatric: Appropriate affect, cooperative   Neurologic: Oriented x 3, strength symmetric in all extremities, Cranial Nerves grossly intact to confrontation, speech clear   Skin: No rashes   No change  Result Review    Result Review:  I have personally reviewed the results from the time of this admission to 2024 08:12 EST and agree with these findings:  [x]  Laboratory  []  Microbiology  []  Radiology  []  EKG/Telemetry   []  Cardiology/Vascular   []  Pathology  []  Old records  []  Other:  Most notable  findings include: Neutropenia and anemia chemo induced and because of the disease    Assessment & Plan   Assessment / Plan     Brief Patient Summary:  Blair Lira is a 78 y.o. male who triple myeloma postchemotherapy pneumonia with this MSSA septicemia    Active Hospital Problems:  Active Hospital Problems    Diagnosis     **Pneumonia     Mucus plugging of bronchi     Anemia     Acute febrile illness     Hypotension     Chronic diastolic (congestive) heart failure     Sepsis associated hypotension     CKD (chronic kidney disease) stage 3, GFR 30-59 ml/min        Plan:   Continue antibiotics    DVT prophylaxis:  Medical and mechanical DVT prophylaxis orders are present.        CODE STATUS:   Level Of Support Discussed With: Patient  Code Status (Patient has no pulse and is not breathing): CPR (Attempt to Resuscitate)  Medical Interventions (Patient has pulse or is breathing): Full Support    Disposition:  I expect patient to be discharged anticipate discharge in a couple of days.    Electronically signed by Vamsi Mason MD, 01/24/24, 8:12 AM EST.      Part of this note may be an electronic transcription/translation of spoken language to printed text using the Dragon Dictation System.

## 2024-01-25 LAB
ANION GAP SERPL CALCULATED.3IONS-SCNC: 15.8 MMOL/L (ref 5–15)
BASOPHILS # BLD AUTO: 0.09 10*3/MM3 (ref 0–0.2)
BASOPHILS NFR BLD AUTO: 6.8 % (ref 0–1.5)
BH CV ECHO MEAS - AO ROOT DIAM: 3.4 CM
BH CV ECHO MEAS - EDV(CUBED): 104.1 ML
BH CV ECHO MEAS - EDV(MOD-SP2): 48.1 ML
BH CV ECHO MEAS - EDV(MOD-SP4): 70.5 ML
BH CV ECHO MEAS - EF(MOD-BP): 51.5 %
BH CV ECHO MEAS - EF(MOD-SP2): 51.1 %
BH CV ECHO MEAS - EF(MOD-SP4): 50.6 %
BH CV ECHO MEAS - ESV(CUBED): 58 ML
BH CV ECHO MEAS - ESV(MOD-SP2): 23.5 ML
BH CV ECHO MEAS - ESV(MOD-SP4): 34.8 ML
BH CV ECHO MEAS - FS: 17.7 %
BH CV ECHO MEAS - IVS/LVPW: 1.2 CM
BH CV ECHO MEAS - IVSD: 1.46 CM
BH CV ECHO MEAS - LA DIMENSION: 3.5 CM
BH CV ECHO MEAS - LAT PEAK E' VEL: 13.5 CM/SEC
BH CV ECHO MEAS - LV MASS(C)D: 248.6 GRAMS
BH CV ECHO MEAS - LVIDD: 4.7 CM
BH CV ECHO MEAS - LVIDS: 3.9 CM
BH CV ECHO MEAS - LVOT AREA: 3.3 CM2
BH CV ECHO MEAS - LVOT DIAM: 2.04 CM
BH CV ECHO MEAS - LVPWD: 1.22 CM
BH CV ECHO MEAS - MED PEAK E' VEL: 7.2 CM/SEC
BH CV ECHO MEAS - MV A MAX VEL: 99.9 CM/SEC
BH CV ECHO MEAS - MV DEC SLOPE: 370.9 CM/SEC2
BH CV ECHO MEAS - MV DEC TIME: 0.19 SEC
BH CV ECHO MEAS - MV E MAX VEL: 70.5 CM/SEC
BH CV ECHO MEAS - MV E/A: 0.71
BH CV ECHO MEAS - RVDD: 4.1 CM
BH CV ECHO MEAS - SV(MOD-SP2): 24.6 ML
BH CV ECHO MEAS - SV(MOD-SP4): 35.7 ML
BH CV ECHO MEAS - TR MAX PG: 16.1 MMHG
BH CV ECHO MEAS - TR MAX VEL: 200.9 CM/SEC
BH CV ECHO MEASUREMENTS AVERAGE E/E' RATIO: 6.81
BUN SERPL-MCNC: 45 MG/DL (ref 8–23)
BUN/CREAT SERPL: 14.9 (ref 7–25)
CALCIUM SPEC-SCNC: 8 MG/DL (ref 8.6–10.5)
CHLORIDE SERPL-SCNC: 99 MMOL/L (ref 98–107)
CO2 SERPL-SCNC: 18.2 MMOL/L (ref 22–29)
CREAT SERPL-MCNC: 3.02 MG/DL (ref 0.76–1.27)
DEPRECATED RDW RBC AUTO: 57.4 FL (ref 37–54)
EGFRCR SERPLBLD CKD-EPI 2021: 20.6 ML/MIN/1.73
EOSINOPHIL # BLD AUTO: 0.08 10*3/MM3 (ref 0–0.4)
EOSINOPHIL NFR BLD AUTO: 6.1 % (ref 0.3–6.2)
ERYTHROCYTE [DISTWIDTH] IN BLOOD BY AUTOMATED COUNT: 17 % (ref 12.3–15.4)
GLUCOSE SERPL-MCNC: 110 MG/DL (ref 65–99)
HCT VFR BLD AUTO: 27.7 % (ref 37.5–51)
HGB BLD-MCNC: 8.9 G/DL (ref 13–17.7)
IMM GRANULOCYTES # BLD AUTO: 0.09 10*3/MM3 (ref 0–0.05)
IMM GRANULOCYTES NFR BLD AUTO: 6.8 % (ref 0–0.5)
LYMPHOCYTES # BLD AUTO: 0.39 10*3/MM3 (ref 0.7–3.1)
LYMPHOCYTES NFR BLD AUTO: 29.5 % (ref 19.6–45.3)
MCH RBC QN AUTO: 29.9 PG (ref 26.6–33)
MCHC RBC AUTO-ENTMCNC: 32.1 G/DL (ref 31.5–35.7)
MCV RBC AUTO: 93 FL (ref 79–97)
MONOCYTES # BLD AUTO: 0.03 10*3/MM3 (ref 0.1–0.9)
MONOCYTES NFR BLD AUTO: 2.3 % (ref 5–12)
NEUTROPHILS NFR BLD AUTO: 0.64 10*3/MM3 (ref 1.7–7)
NEUTROPHILS NFR BLD AUTO: 48.5 % (ref 42.7–76)
NRBC BLD AUTO-RTO: 0 /100 WBC (ref 0–0.2)
PLATELET # BLD AUTO: 67 10*3/MM3 (ref 140–450)
PMV BLD AUTO: 12 FL (ref 6–12)
POTASSIUM SERPL-SCNC: 4.2 MMOL/L (ref 3.5–5.2)
RBC # BLD AUTO: 2.98 10*6/MM3 (ref 4.14–5.8)
SODIUM SERPL-SCNC: 133 MMOL/L (ref 136–145)
WBC NRBC COR # BLD AUTO: 1.32 10*3/MM3 (ref 3.4–10.8)

## 2024-01-25 PROCEDURE — 25010000002 CEFAZOLIN IN DEXTROSE 2-4 GM/100ML-% SOLUTION: Performed by: INTERNAL MEDICINE

## 2024-01-25 PROCEDURE — 25010000002 FILGRASTIM PER 480 MCG: Performed by: INTERNAL MEDICINE

## 2024-01-25 PROCEDURE — 94799 UNLISTED PULMONARY SVC/PX: CPT

## 2024-01-25 PROCEDURE — 99233 SBSQ HOSP IP/OBS HIGH 50: CPT | Performed by: INTERNAL MEDICINE

## 2024-01-25 PROCEDURE — 94664 DEMO&/EVAL PT USE INHALER: CPT

## 2024-01-25 PROCEDURE — 85025 COMPLETE CBC W/AUTO DIFF WBC: CPT | Performed by: INTERNAL MEDICINE

## 2024-01-25 PROCEDURE — 80048 BASIC METABOLIC PNL TOTAL CA: CPT | Performed by: INTERNAL MEDICINE

## 2024-01-25 PROCEDURE — 25010000002 FUROSEMIDE PER 20 MG: Performed by: INTERNAL MEDICINE

## 2024-01-25 RX ADMIN — IPRATROPIUM BROMIDE AND ALBUTEROL SULFATE 3 ML: .5; 3 SOLUTION RESPIRATORY (INHALATION) at 12:12

## 2024-01-25 RX ADMIN — HYDROCODONE POLISTIREX AND CHLORPHENIRAMINE POLISTIREX 2.5 ML: 10; 8 SUSPENSION, EXTENDED RELEASE ORAL at 09:13

## 2024-01-25 RX ADMIN — MIDODRINE HYDROCHLORIDE 5 MG: 5 TABLET ORAL at 06:31

## 2024-01-25 RX ADMIN — APIXABAN 10 MG: 5 TABLET, FILM COATED ORAL at 09:11

## 2024-01-25 RX ADMIN — FILGRASTIM 480 MCG: 480 INJECTION, SOLUTION INTRAVENOUS; SUBCUTANEOUS at 16:10

## 2024-01-25 RX ADMIN — DOCUSATE SODIUM 50MG AND SENNOSIDES 8.6MG 2 TABLET: 8.6; 5 TABLET, FILM COATED ORAL at 21:56

## 2024-01-25 RX ADMIN — IPRATROPIUM BROMIDE AND ALBUTEROL SULFATE 3 ML: .5; 3 SOLUTION RESPIRATORY (INHALATION) at 18:42

## 2024-01-25 RX ADMIN — DOCUSATE SODIUM 50MG AND SENNOSIDES 8.6MG 2 TABLET: 8.6; 5 TABLET, FILM COATED ORAL at 09:13

## 2024-01-25 RX ADMIN — APIXABAN 10 MG: 5 TABLET, FILM COATED ORAL at 21:56

## 2024-01-25 RX ADMIN — FUROSEMIDE 20 MG: 10 INJECTION, SOLUTION INTRAMUSCULAR; INTRAVENOUS at 16:10

## 2024-01-25 RX ADMIN — Medication 10 ML: at 09:16

## 2024-01-25 RX ADMIN — Medication 10 ML: at 21:57

## 2024-01-25 RX ADMIN — FLUCONAZOLE 200 MG: 200 TABLET ORAL at 09:19

## 2024-01-25 RX ADMIN — CEFAZOLIN SODIUM 2000 MG: 2 INJECTION, SOLUTION INTRAVENOUS at 09:14

## 2024-01-25 RX ADMIN — ACYCLOVIR 400 MG: 800 TABLET ORAL at 21:56

## 2024-01-25 RX ADMIN — MIDODRINE HYDROCHLORIDE 5 MG: 5 TABLET ORAL at 10:36

## 2024-01-25 RX ADMIN — MIDODRINE HYDROCHLORIDE 5 MG: 5 TABLET ORAL at 17:34

## 2024-01-25 RX ADMIN — IPRATROPIUM BROMIDE AND ALBUTEROL SULFATE 3 ML: .5; 3 SOLUTION RESPIRATORY (INHALATION) at 00:45

## 2024-01-25 RX ADMIN — ACYCLOVIR 400 MG: 800 TABLET ORAL at 09:16

## 2024-01-25 RX ADMIN — IPRATROPIUM BROMIDE AND ALBUTEROL SULFATE 3 ML: .5; 3 SOLUTION RESPIRATORY (INHALATION) at 06:37

## 2024-01-25 RX ADMIN — FUROSEMIDE 20 MG: 10 INJECTION, SOLUTION INTRAMUSCULAR; INTRAVENOUS at 03:48

## 2024-01-25 RX ADMIN — MONTELUKAST 10 MG: 10 TABLET, FILM COATED ORAL at 21:56

## 2024-01-25 RX ADMIN — CEFAZOLIN SODIUM 2000 MG: 2 INJECTION, SOLUTION INTRAVENOUS at 21:56

## 2024-01-25 NOTE — PROGRESS NOTES
[facility]     Progress Note    Patient Name: Blair Lira  : 1945  MRN: [id number]  Primary Care Physician:  Babs Summers APRN  Date of admission: 2024      Subjective   Brief summary.  Patient admitted with pneumonia and hypotension      HPI:    Feeling better after blood transfusion, less cough, no shortness of breath, swelling improving.    Review of Systems   No dizziness or lightheadedness.  No bruising or bleeding    Objective     Vitals:   Temp:  [98.2 °F (36.8 °C)-100.8 °F (38.2 °C)] 98.2 °F (36.8 °C)  Heart Rate:  [78-95] 95  Resp:  [18-20] 20  BP: (106-121)/(60-75) 118/75    Physical Exam :     Well-developed well-nourished, not in acute distress today.  Heart regular.  Lungs diminished breath sounds with rhonchi.  Abdomen soft nontender.  Lower extremities with some edema.    Result Review:  I have personally reviewed the results from the time of this admission to 2024 18:05 EST and agree with these findings:  [x]  Laboratory  []  Microbiology  []  Radiology  []  EKG/Telemetry   []  Cardiology/Vascular   []  Pathology  []  Old records  []  Other:           Assessment / Plan       Active Hospital Problems:  Active Hospital Problems    Diagnosis     **Pneumonia     Mucus plugging of bronchi     Anemia     Acute febrile illness     Hypotension     Chronic diastolic (congestive) heart failure     Sepsis associated hypotension     CKD (chronic kidney disease) stage 3, GFR 30-59 ml/min     Pancytopenia due to chemotherapy        Plan:   Improving, blood pressure stabilized.  Transfusions of PRBC due to anemia and pancytopenia related from chemo  Increase activity      DVT prophylaxis:  Medical and mechanical DVT prophylaxis orders are present.    CODE STATUS:   Level Of Support Discussed With: Patient  Code Status (Patient has no pulse and is not breathing): CPR (Attempt to Resuscitate)  Medical Interventions (Patient has pulse or is breathing): Full  Support                Electronically signed by Elieser Pederson MD, 01/25/24, 6:06 PM EST.

## 2024-01-25 NOTE — PROGRESS NOTES
[facility]     Progress Note    Patient Name: Blair Lira  : 1945  MRN: [id number]  Primary Care Physician:  Babs Summers APRN  Date of admission: 2024    Subjective   Subjective     Chief Complaint: Sputum BAL growing Candida and has been started on Diflucan patient is feeling much better cough has markedly improved I should be able to discharge him in a couple of days    HPI: Stable and doing well continue current management    Review of Systems   All systems were reviewed and negative except for: Has been reviewed    Objective   Objective     Vitals:   Temp:  [98.2 °F (36.8 °C)-100.8 °F (38.2 °C)] 98.6 °F (37 °C)  Heart Rate:  [78-92] 78  Resp:  [18-20] 18  BP: (108-121)/(60-67) 110/60    Physical Exam    Constitutional: Awake, alert   Eyes: PERRLA, sclerae anicteric, no conjunctival injection   HENT: NCAT, mucous membranes moist   Neck: Supple, no thyromegaly, no lymphadenopathy, trachea midline   Respiratory: Clear to auscultation bilaterally, nonlabored respirations    Cardiovascular: RRR, no murmurs, rubs, or gallops, palpable pedal pulses bilaterally   Gastrointestinal: Positive bowel sounds, soft, nontender, nondistended   Musculoskeletal: No bilateral ankle edema, no clubbing or cyanosis to extremities   Psychiatric: Appropriate affect, cooperative   Neurologic: Oriented x 3, strength symmetric in all extremities, Cranial Nerves grossly intact to confrontation, speech clear   Skin: No rashes   No change  Result Review    Result Review:  I have personally reviewed the results from the time of this admission to 2024 07:49 EST and agree with these findings:  [x]  Laboratory  [x]  Microbiology  [x]  Radiology  []  EKG/Telemetry   []  Cardiology/Vascular   []  Pathology  []  Old records  []  Other:  Most notable findings include: has been reviewed and discussed    Assessment & Plan   Assessment / Plan     Brief Patient Summary:  Blair Lira is a 78 y.o. male who with pneumonia  COVID-pneumonia of coronavirus as well as Candida with multiple myeloma and cytopenias possible chemo induced or drug-induced or possibly because of multiple myeloma    Active Hospital Problems:  Active Hospital Problems    Diagnosis     **Pneumonia     Mucus plugging of bronchi     Anemia     Acute febrile illness     Hypotension     Chronic diastolic (congestive) heart failure     Sepsis associated hypotension     CKD (chronic kidney disease) stage 3, GFR 30-59 ml/min     Pancytopenia due to chemotherapy        Plan:   Continue current management    DVT prophylaxis:  Medical and mechanical DVT prophylaxis orders are present.        CODE STATUS:   Level Of Support Discussed With: Patient  Code Status (Patient has no pulse and is not breathing): CPR (Attempt to Resuscitate)  Medical Interventions (Patient has pulse or is breathing): Full Support    Disposition:  I expect patient to be discharged after the patient has been stabilized.    Electronically signed by Vamis Mason MD, 01/25/24, 7:49 AM EST.      Part of this note may be an electronic transcription/translation of spoken language to printed text using the Dragon Dictation System.

## 2024-01-25 NOTE — PROGRESS NOTES
[facility]     Progress Note    Patient Name: Blair Lira  : 1945  MRN: [id number]  Primary Care Physician:  Babs Summers APRN  Date of admission: 2024      Subjective   Brief summary.  Patient admitted with pneumonia and hypotension      HPI:    .  Patient seen earlier this morning  Feeling better  Less short of breath, less cough  Anxious  No fever chills    Review of Systems     Denies any fever, complains of fatigue, minimal cough today.  Leg swelling and pain improving      Objective     Vitals:   Temp:  [98.2 °F (36.8 °C)-99 °F (37.2 °C)] 98.4 °F (36.9 °C)  Heart Rate:  [75-92] 92  Resp:  [18-20] 18  BP: (108-122)/(52-67) 115/60    Physical Exam :     Elderly male not in any distress today but tired looking.  Heart regular.  Lungs diminished breath sounds with rhonchi.  Abdomen soft nontender.  Lower extremities with some edema.    Result Review:  I have personally reviewed the results from the time of this admission to 2024 20:29 EST and agree with these findings:  [x]  Laboratory  []  Microbiology  []  Radiology  []  EKG/Telemetry   []  Cardiology/Vascular   []  Pathology  []  Old records  []  Other:           Assessment / Plan       Active Hospital Problems:  Active Hospital Problems    Diagnosis     **Pneumonia     Mucus plugging of bronchi     Anemia     Acute febrile illness     Hypotension     Chronic diastolic (congestive) heart failure     Sepsis associated hypotension     CKD (chronic kidney disease) stage 3, GFR 30-59 ml/min     Pancytopenia due to chemotherapy        Plan:   Patient feeling better post bronch.  Continue current treatment.  Increase activity  On Eliquis, patient pancytopenic.  Monitor for anemia and thrombocytopenia.    DVT prophylaxis:  Medical and mechanical DVT prophylaxis orders are present.    CODE STATUS:   Level Of Support Discussed With: Patient  Code Status (Patient has no pulse and is not breathing): CPR (Attempt to Resuscitate)  Medical  Interventions (Patient has pulse or is breathing): Full Support              Electronically signed by Elieser Pederson MD, 01/24/24, 8:29 PM EST.

## 2024-01-25 NOTE — PROGRESS NOTES
Cardiology progress note    Transthoracic echocardiogram done today showed normal LV function with no significant valvular abnormalities.    Blood cultures from 1/19/2024 : No growth in 5 days  Blood cultures from 1/23/2024 : No growth in 2 days    Repeat blood cultures are sterile as of now.  Previous transesophageal echocardiogram done in November did not show any evidence of infective endocarditis.  Transthoracic echo done during current admission showed no significant valvular abnormalities.  Another NIKHIL is not indicated at this time.  Would consider NIKHIL if blood cultures coming back as positive on antibiotic therapy.      Electronically signed by Saul Grant MD, 01/25/24, 10:40 AM EST.

## 2024-01-25 NOTE — PROGRESS NOTES
Pulmonary / Critical Care Progress Note      Patient Name: Blair Lira  : 1945  MRN: [id number]  Primary Care Physician:  Babs Summers APRN  Date of admission: 2024    Subjective   Subjective   Follow-up for immunosuppressed state, history of multiple myeloma, on chemotherapy, hypoxic respiratory failure, MSSA bacteremia, groundglass opacity and persistent cough.    Continues to improve  Cough decreased with thin clear secretions  Dyspnea better  On room air  No nausea or vomiting  Leg swelling improving  No fevers, nausea or chills    Objective   Objective     Vitals:   Temp:  [98.2 °F (36.8 °C)-100.8 °F (38.2 °C)] 98.2 °F (36.8 °C)  Heart Rate:  [78-92] 88  Resp:  [18-20] 20  BP: (106-121)/(60-73) 106/73    Physical Exam   Vital Signs Reviewed   General: Frail looking male, in no acute distress  HEENT:  PERRL, EOMI.  OP, nares clear  Chest: Improved aeration, no wheezing, scant rhonchi bilaterally, resonant to percussion bilaterally  CV: RRR, no MGR, pulses 2+, equal.  Abd:  Soft, NT, ND, + BS, no HSM  EXT:  no clubbing, no cyanosis, decreasing left greater than right lower extremity pitting edema  Neuro:  A&Ox3, CN grossly intact, no focal deficits.  Skin: No rashes or lesions noted      Result Review    Result Review:  I have personally reviewed the results from the time of this admission to 2024 13:43 EST and agree with these findings:  [x]  Laboratory  [x]  Microbiology  [x]  Radiology  [x]  EKG/Telemetry   [x]  Cardiology/Vascular   []  Pathology  []  Old records  []  Other:  Most notable findings include:         Lab 24  0419 24  0419 24  0551 24  0409 24  0535 24  0527 24  0529 24  0410   WBC 1.32* 1.45* 1.18* 1.16* 1.87* 2.22* 2.17*  --    HEMOGLOBIN 8.9* 8.4* 8.5* 7.7* 8.5* 7.2* 7.2*  --    HEMATOCRIT 27.7* 25.6* 27.1* 25.1* 25.7* 22.5* 21.8*  --    PLATELETS 67* 72* 58* 51* 68* 64* 64*  --    SODIUM 133* 132* 132* 132* 135* 132*  --   132*   POTASSIUM 4.2 3.9 4.0 4.5 4.0 3.7  --  4.4   CHLORIDE 99 100 102 104 105 103  --  107   CO2 18.2* 18.6* 15.4* 13.3* 17.4* 16.6*  --  12.2*   BUN 45* 39* 36* 34* 34* 33*  --  35*   CREATININE 3.02* 2.87* 2.80* 2.60* 2.63* 2.65*  --  2.92*   GLUCOSE 110* 100* 91 79 86 127*  --  95   CALCIUM 8.0* 7.5* 8.0* 7.9* 7.9* 7.6*  --  7.4*   TOTAL PROTEIN  --  5.2*  --   --   --   --   --  4.9*   ALBUMIN  --  2.3*  --   --   --   --   --  2.0*   GLOBULIN  --  2.9  --   --   --   --   --  2.9        CT Chest Without Contrast Diagnostic    Result Date: 1/20/2024  PROCEDURE: CT CHEST WO CONTRAST DIAGNOSTIC  COMPARISON: [facility], CT, CT ABDOMEN PELVIS WO CONTRAST, 10/26/2023, 20:23.  [facility], CT, CT CHEST WO CONTRAST DIAGNOSTIC, 10/26/2023, 20:23.  INDICATIONS: Cough  TECHNIQUE: CT images were created without the administration of contrast material.   PROTOCOL:   Standard imaging protocol performed    RADIATION:   DLP: 381.5 mGy*cm   Automated exposure control was utilized to minimize radiation dose.  FINDINGS:  Ritika/mediastinum:  No adenopathy.  Thoracic aorta normal in caliber.  Coronary artery calcification.  No pericardial effusion  Lungs/pleura:  Chronic elevation of the right hemidiaphragm with secondary atelectasis in the basilar right lower and right middle lobes.  Multifocal mixed ground-glass/interstitial opacities in the left upper and left lower lobe.  Trace bilateral pleural effusions  Upper abdomen:  No acute abnormality.  Bilateral renal cysts  Bones/soft tissues:  No acute bony abnormality.  Stable mild multiple upper thoracic wedge compression fractures      Impression:    1. Multifocal mixed ground-glass/interstitial infiltrates in the left lung suspicious for viral/atypical pattern of pneumonia  2. Chronic elevation of the right hemidiaphragm with secondary basilar atelectasis  3. Coronary artery atherosclerosis     ASHLY PARKER MD       Electronically Signed and Approved  By: ASHLY PARKER MD on 1/20/2024 at 18:54              BAL PCR positive for coronavirus.  Cytology negative  Left lower extremity Doppler positive for DVT      Assessment & Plan   Assessment / Plan     Active Hospital Problems:  Active Hospital Problems    Diagnosis     **Pneumonia     Mucus plugging of bronchi     Anemia     Acute febrile illness     Hypotension     Chronic diastolic (congestive) heart failure     Sepsis associated hypotension     CKD (chronic kidney disease) stage 3, GFR 30-59 ml/min     Pancytopenia due to chemotherapy        Impression:   Persistent cough  MSSA bacteremia  Left lower lobe pneumonia  Mucous plugging/issues with airway clearance  Coronavirus lower respiratory tract infection  Acute left lower extremity DVT  Multiple myeloma  CKD  Hypertensive  Pancytopenia  Immunosuppressed status on chemotherapy  Hyponatremia, clinically insignificant  Hypokalemia  Hypomagnesemia    Plan:   Continue cefazolin 2 g twice daily IV.  Needs 14 days of therapy for MSSA bacteremia  Pneumocystis, antifungal and antiviral prophylaxis per oncology per oncology.  Continue nebulizers and bronchopulmonary hygiene  Continue lasix 20 mg IV bid.  Trend renal panel and electrolytes.   Continue Eliquis loading for acute DVT.  Needs lifelong anticoagulation in the setting of active malignancy.  Will monitor for bleeding  BAL positive for coronavirus.  Cytology negative for pneumocystis  Continue bronchopulmonary hygiene  Continue antitussives  Neupogen per oncology.  Transfuse for hemoglobin less than 7 or platelets less than 10    DVT prophylaxis:  Medical and mechanical DVT prophylaxis orders are present.    CODE STATUS:   Level Of Support Discussed With: Patient  Code Status (Patient has no pulse and is not breathing): CPR (Attempt to Resuscitate)  Medical Interventions (Patient has pulse or is breathing): Full Support      Labs, imaging, microbiology, notes and medications personally reviewed  Discussed with  primary    Electronically signed by Dionte Ruvalcaba MD, 1/25/2024, 13:43 EST.

## 2024-01-25 NOTE — PLAN OF CARE
Goal Outcome Evaluation: Patient pleasant and cooperative no c/o pain. Pleasant and cooperative with care. SBA with ambulation to and from bathroom.

## 2024-01-25 NOTE — PLAN OF CARE
Goal Outcome Evaluation:           Progress: no change  Outcome Evaluation: patient is alert and oriented x4 and on room air. IV antibiotics given. no c/o pain. critical WBC of 1.32 from morning lab draws. no other issues at this time.

## 2024-01-26 LAB
ANION GAP SERPL CALCULATED.3IONS-SCNC: 13 MMOL/L (ref 5–15)
ANISOCYTOSIS BLD QL: NORMAL
BASOPHILS # BLD AUTO: 0.06 10*3/MM3 (ref 0–0.2)
BASOPHILS NFR BLD AUTO: 6.1 % (ref 0–1.5)
BB REFERENCE LAB SENDOUT: NORMAL
BUN SERPL-MCNC: 46 MG/DL (ref 8–23)
BUN/CREAT SERPL: 15.9 (ref 7–25)
CALCIUM SPEC-SCNC: 8.7 MG/DL (ref 8.6–10.5)
CHLORIDE SERPL-SCNC: 98 MMOL/L (ref 98–107)
CLUMPED PLATELETS: PRESENT
CO2 SERPL-SCNC: 21 MMOL/L (ref 22–29)
CREAT SERPL-MCNC: 2.89 MG/DL (ref 0.76–1.27)
DEPRECATED RDW RBC AUTO: 57.4 FL (ref 37–54)
EGFRCR SERPLBLD CKD-EPI 2021: 21.7 ML/MIN/1.73
EOSINOPHIL # BLD AUTO: 0.05 10*3/MM3 (ref 0–0.4)
EOSINOPHIL NFR BLD AUTO: 5.1 % (ref 0.3–6.2)
ERYTHROCYTE [DISTWIDTH] IN BLOOD BY AUTOMATED COUNT: 16.9 % (ref 12.3–15.4)
GLUCOSE SERPL-MCNC: 112 MG/DL (ref 65–99)
HCT VFR BLD AUTO: 28 % (ref 37.5–51)
HGB BLD-MCNC: 9.1 G/DL (ref 13–17.7)
IMM GRANULOCYTES # BLD AUTO: 0.02 10*3/MM3 (ref 0–0.05)
IMM GRANULOCYTES NFR BLD AUTO: 2 % (ref 0–0.5)
LARGE PLATELETS: NORMAL
LYMPHOCYTES # BLD AUTO: 0.4 10*3/MM3 (ref 0.7–3.1)
LYMPHOCYTES NFR BLD AUTO: 40.4 % (ref 19.6–45.3)
MAGNESIUM SERPL-MCNC: 2.1 MG/DL (ref 1.6–2.4)
MCH RBC QN AUTO: 30.1 PG (ref 26.6–33)
MCHC RBC AUTO-ENTMCNC: 32.5 G/DL (ref 31.5–35.7)
MCV RBC AUTO: 92.7 FL (ref 79–97)
MICROCYTES BLD QL: NORMAL
MONOCYTES # BLD AUTO: 0.04 10*3/MM3 (ref 0.1–0.9)
MONOCYTES NFR BLD AUTO: 4 % (ref 5–12)
NEUTROPHILS NFR BLD AUTO: 0.42 10*3/MM3 (ref 1.7–7)
NEUTROPHILS NFR BLD AUTO: 42.4 % (ref 42.7–76)
NRBC BLD AUTO-RTO: 0 /100 WBC (ref 0–0.2)
PLATELET # BLD AUTO: 76 10*3/MM3 (ref 140–450)
PMV BLD AUTO: 12.3 FL (ref 6–12)
POIKILOCYTOSIS BLD QL SMEAR: NORMAL
POTASSIUM SERPL-SCNC: 4.5 MMOL/L (ref 3.5–5.2)
RBC # BLD AUTO: 3.02 10*6/MM3 (ref 4.14–5.8)
SMALL PLATELETS BLD QL SMEAR: NORMAL
SODIUM SERPL-SCNC: 132 MMOL/L (ref 136–145)
WBC MORPH BLD: NORMAL
WBC NRBC COR # BLD AUTO: 0.99 10*3/MM3 (ref 3.4–10.8)

## 2024-01-26 PROCEDURE — 25010000002 FUROSEMIDE PER 20 MG: Performed by: INTERNAL MEDICINE

## 2024-01-26 PROCEDURE — 94799 UNLISTED PULMONARY SVC/PX: CPT

## 2024-01-26 PROCEDURE — 85007 BL SMEAR W/DIFF WBC COUNT: CPT | Performed by: INTERNAL MEDICINE

## 2024-01-26 PROCEDURE — 83735 ASSAY OF MAGNESIUM: CPT | Performed by: INTERNAL MEDICINE

## 2024-01-26 PROCEDURE — 94664 DEMO&/EVAL PT USE INHALER: CPT

## 2024-01-26 PROCEDURE — 85025 COMPLETE CBC W/AUTO DIFF WBC: CPT | Performed by: INTERNAL MEDICINE

## 2024-01-26 PROCEDURE — 25010000002 FILGRASTIM PER 480 MCG: Performed by: INTERNAL MEDICINE

## 2024-01-26 PROCEDURE — 99233 SBSQ HOSP IP/OBS HIGH 50: CPT | Performed by: INTERNAL MEDICINE

## 2024-01-26 PROCEDURE — 25010000002 CEFAZOLIN IN DEXTROSE 2-4 GM/100ML-% SOLUTION: Performed by: INTERNAL MEDICINE

## 2024-01-26 PROCEDURE — 25010000002 ONDANSETRON PER 1 MG: Performed by: INTERNAL MEDICINE

## 2024-01-26 PROCEDURE — 80048 BASIC METABOLIC PNL TOTAL CA: CPT | Performed by: INTERNAL MEDICINE

## 2024-01-26 RX ORDER — METOCLOPRAMIDE 5 MG/1
5 TABLET ORAL
Status: DISCONTINUED | OUTPATIENT
Start: 2024-01-26 | End: 2024-01-28 | Stop reason: HOSPADM

## 2024-01-26 RX ADMIN — CEFAZOLIN SODIUM 2000 MG: 2 INJECTION, SOLUTION INTRAVENOUS at 10:12

## 2024-01-26 RX ADMIN — ONDANSETRON 4 MG: 2 INJECTION INTRAMUSCULAR; INTRAVENOUS at 14:59

## 2024-01-26 RX ADMIN — IPRATROPIUM BROMIDE AND ALBUTEROL SULFATE 3 ML: .5; 3 SOLUTION RESPIRATORY (INHALATION) at 20:13

## 2024-01-26 RX ADMIN — IPRATROPIUM BROMIDE AND ALBUTEROL SULFATE 3 ML: .5; 3 SOLUTION RESPIRATORY (INHALATION) at 00:36

## 2024-01-26 RX ADMIN — APIXABAN 10 MG: 5 TABLET, FILM COATED ORAL at 10:12

## 2024-01-26 RX ADMIN — SULFAMETHOXAZOLE AND TRIMETHOPRIM 1 TABLET: 400; 80 TABLET ORAL at 10:31

## 2024-01-26 RX ADMIN — MIDODRINE HYDROCHLORIDE 5 MG: 5 TABLET ORAL at 06:37

## 2024-01-26 RX ADMIN — FLUCONAZOLE 200 MG: 200 TABLET ORAL at 10:12

## 2024-01-26 RX ADMIN — APIXABAN 10 MG: 5 TABLET, FILM COATED ORAL at 22:48

## 2024-01-26 RX ADMIN — MIDODRINE HYDROCHLORIDE 5 MG: 5 TABLET ORAL at 12:28

## 2024-01-26 RX ADMIN — DOCUSATE SODIUM 50MG AND SENNOSIDES 8.6MG 2 TABLET: 8.6; 5 TABLET, FILM COATED ORAL at 10:11

## 2024-01-26 RX ADMIN — IPRATROPIUM BROMIDE AND ALBUTEROL SULFATE 3 ML: .5; 3 SOLUTION RESPIRATORY (INHALATION) at 08:14

## 2024-01-26 RX ADMIN — FILGRASTIM 480 MCG: 480 INJECTION, SOLUTION INTRAVENOUS; SUBCUTANEOUS at 17:32

## 2024-01-26 RX ADMIN — CEFAZOLIN SODIUM 2000 MG: 2 INJECTION, SOLUTION INTRAVENOUS at 22:48

## 2024-01-26 RX ADMIN — METOCLOPRAMIDE 5 MG: 5 TABLET ORAL at 12:28

## 2024-01-26 RX ADMIN — MONTELUKAST 10 MG: 10 TABLET, FILM COATED ORAL at 22:48

## 2024-01-26 RX ADMIN — METOCLOPRAMIDE 5 MG: 5 TABLET ORAL at 17:32

## 2024-01-26 RX ADMIN — Medication 10 ML: at 10:12

## 2024-01-26 RX ADMIN — MIDODRINE HYDROCHLORIDE 5 MG: 5 TABLET ORAL at 17:32

## 2024-01-26 RX ADMIN — FUROSEMIDE 20 MG: 10 INJECTION, SOLUTION INTRAMUSCULAR; INTRAVENOUS at 14:58

## 2024-01-26 RX ADMIN — FUROSEMIDE 20 MG: 10 INJECTION, SOLUTION INTRAMUSCULAR; INTRAVENOUS at 04:17

## 2024-01-26 RX ADMIN — ACYCLOVIR 400 MG: 800 TABLET ORAL at 10:31

## 2024-01-26 RX ADMIN — DOCUSATE SODIUM 50MG AND SENNOSIDES 8.6MG 2 TABLET: 8.6; 5 TABLET, FILM COATED ORAL at 22:49

## 2024-01-26 RX ADMIN — ACYCLOVIR 400 MG: 800 TABLET ORAL at 23:13

## 2024-01-26 RX ADMIN — Medication 10 ML: at 22:50

## 2024-01-26 RX ADMIN — IPRATROPIUM BROMIDE AND ALBUTEROL SULFATE 3 ML: .5; 3 SOLUTION RESPIRATORY (INHALATION) at 13:40

## 2024-01-26 NOTE — PROGRESS NOTES
[facility]     Progress Note    Patient Name: Blair Lira  : 1945  MRN: [id number]  Primary Care Physician:  Babs Summers APRN  Date of admission: 2024    Subjective   Subjective     Chief Complaint: Stable and doing well has had chemo induced pancytopenia we will continue with Neupogen continue on antibiotics and antifungal agents    HPI: Cough has improved however he continues to feel very weak has neutropenia resulting from chemotherapy    Review of Systems   All systems were reviewed and negative except for: With multiple myeloma and chemo induced neutropenia    Objective   Objective     Vitals:   Temp:  [98.2 °F (36.8 °C)-99 °F (37.2 °C)] 98.2 °F (36.8 °C)  Heart Rate:  [81-95] 81  Resp:  [18-20] 18  BP: ()/(54-75) 109/69    Physical Exam    Constitutional: Awake, alert   Eyes: PERRLA, sclerae anicteric, no conjunctival injection   HENT: NCAT, mucous membranes moist   Neck: Supple, no thyromegaly, no lymphadenopathy, trachea midline   Respiratory: Clear to auscultation bilaterally, nonlabored respirations    Cardiovascular: RRR, no murmurs, rubs, or gallops, palpable pedal pulses bilaterally   Gastrointestinal: Positive bowel sounds, soft, nontender, nondistended   Musculoskeletal: No bilateral ankle edema, no clubbing or cyanosis to extremities   Psychiatric: Appropriate affect, cooperative   Neurologic: Oriented x 3, strength symmetric in all extremities, Cranial Nerves grossly intact to confrontation, speech clear   Skin: No rashes     Result Review    Result Review:  I have personally reviewed the results from the time of this admission to 2024 10:21 EST and agree with these findings:  [x]  Laboratory  []  Microbiology  []  Radiology  []  EKG/Telemetry   []  Cardiology/Vascular   []  Pathology  []  Old records  []  Other:  Most notable findings include: Has been reviewed and discussed    Assessment & Plan   Assessment / Plan     Brief Patient Summary:  Blair Lira is  a 78 y.o. male who with neutropenia    Active Hospital Problems:  Active Hospital Problems    Diagnosis     **Pneumonia     Mucus plugging of bronchi     Anemia     Acute febrile illness     Hypotension     Chronic diastolic (congestive) heart failure     Sepsis associated hypotension     CKD (chronic kidney disease) stage 3, GFR 30-59 ml/min     Pancytopenia due to chemotherapy        Plan:   Continue antibiotics    DVT prophylaxis:  Medical and mechanical DVT prophylaxis orders are present.        CODE STATUS:   Level Of Support Discussed With: Patient  Code Status (Patient has no pulse and is not breathing): CPR (Attempt to Resuscitate)  Medical Interventions (Patient has pulse or is breathing): Full Support    Disposition:  I expect patient to be discharged after the patient has been stabilized.    Electronically signed by Vamsi Mason MD, 01/26/24, 10:21 AM EST.      Part of this note may be an electronic transcription/translation of spoken language to printed text using the Dragon Dictation System.

## 2024-01-26 NOTE — PLAN OF CARE
Goal Outcome Evaluation:           Progress: no change  Outcome Evaluation: patient is alert and oriented x4 and on room air. IV antibiotics given per mar. no c/o pain throughout shift. critical WBC of 0.99 from morning lab draws. Vamsi Mason MD, notified and waiting for response. no other issues at this time.

## 2024-01-26 NOTE — PROGRESS NOTES
[facility]     Progress Note    Patient Name: Blair Lira  : 1945  MRN: [id number]  Primary Care Physician:  Babs Summers APRN  Date of admission: 2024      Subjective   Brief summary.  Patient admitted with pneumonia and hypotension      HPI:    Patient seen this afternoon extremely tired.  Unable to walk few feet getting out of breath.  No chest pain.  Denies dizziness.  Minimal cough    Review of Systems       Germaine weakness, short of breath with exertion.    Objective     Vitals:   Temp:  [98.1 °F (36.7 °C)-99 °F (37.2 °C)] 98.8 °F (37.1 °C)  Heart Rate:  [] 103  Resp:  [18-20] 18  BP: ()/(54-76) 103/76    Physical Exam :     Well-developed well-nourished, not in acute distress today.  Heart regular.  Lungs diminished breath sounds with rhonchi.  Abdomen soft nontender.  Lower extremities with some edema.    Result Review:  I have personally reviewed the results from the time of this admission to 2024 17:58 EST and agree with these findings:  [x]  Laboratory  []  Microbiology  []  Radiology  []  EKG/Telemetry   []  Cardiology/Vascular   []  Pathology  []  Old records  []  Other:           Assessment / Plan       Active Hospital Problems:  Active Hospital Problems    Diagnosis     **Pneumonia     Mucus plugging of bronchi     Anemia     Acute febrile illness     Hypotension     Chronic diastolic (congestive) heart failure     Sepsis associated hypotension     CKD (chronic kidney disease) stage 3, GFR 30-59 ml/min     Pancytopenia due to chemotherapy        Plan:   Patient continues to be pancytopenic white cell count low.  Admitted and followed by oncologist.  Pneumonia improving.  Respiratory status better.  Extreme fatigue and weakness.  Will benefit from inpatient rehab.  Creatinine and renal status testable.  Blood pressure improving hypotension resolved.  Monitor labs, continue PT OT      DVT prophylaxis:  Medical and mechanical DVT prophylaxis orders are  present.    CODE STATUS:   Level Of Support Discussed With: Patient  Code Status (Patient has no pulse and is not breathing): CPR (Attempt to Resuscitate)  Medical Interventions (Patient has pulse or is breathing): Full Support                Electronically signed by Elieser Pederson MD, 01/26/24, 6:00 PM EST.

## 2024-01-26 NOTE — PLAN OF CARE
Goal Outcome Evaluation:  Plan of Care Reviewed With: patient        Progress: improving  Outcome Evaluation: Alert and oriented x4. No complaints of pain. x1 complaint of nausea, medicated per mar. Complaints of SOA with ambulation, O2 sats remain above 90% on room air. Up to chair this shift. No new issues at this time.

## 2024-01-26 NOTE — SIGNIFICANT NOTE
01/26/24 1215   Coping/Psychosocial   Observed Emotional State calm;cooperative   Verbalized Emotional State relief;hopefulness   Trust Relationship/Rapport empathic listening provided   Involvement in Care interacting with patient   Additional Documentation Spiritual Care (Group)   Spiritual Care   Use of Spiritual Resources non-[employer] use of spiritual care   Spiritual Care Source  initiative   Spiritual Care Follow-Up follow-up, none required as presently assessed   Response to Spiritual Care receptive of support;engaged in conversation   Spiritual Care Interventions supportive conversation provided   Spiritual Care Visit Type initial   Receptivity to Spiritual Care visit welcomed

## 2024-01-26 NOTE — PROGRESS NOTES
Pulmonary / Critical Care Progress Note      Patient Name: Blair Lira  : 1945  MRN: [id number]  Primary Care Physician:  Babs Summers APRN  Date of admission: 2024    Subjective   Subjective   Follow-up for immunosuppressed state, history of multiple myeloma, on chemotherapy, hypoxic respiratory failure, MSSA bacteremia, groundglass opacity and persistent cough.    Cough markedly improved.  Is minimal with thin clear secretions  Shortness of breath on activity improved  On room air  No nausea or vomiting  Leg swe left lower extremity swelling and pain improved  No fevers, nausea or chills    Objective   Objective     Vitals:   Temp:  [98.1 °F (36.7 °C)-99 °F (37.2 °C)] 98.1 °F (36.7 °C)  Heart Rate:  [81-95] 95  Resp:  [18-20] 18  BP: ()/(54-75) 119/71    Physical Exam   Vital Signs Reviewed   General: Frail looking male, in no acute distress  HEENT:  PERRL, EOMI.  OP, nares clear  Chest: Improved aeration, no wheezing, unchanged scant rhonchi bilaterally, resonant to percussion bilaterally  CV: RRR, no MGR, pulses 2+, equal.  Abd:  Soft, NT, ND, + BS, no HSM  EXT:  no clubbing, no cyanosis, decreasing left greater than right lower extremity pitting edema  Neuro:  A&Ox3, CN grossly intact, no focal deficits.  Skin: No rashes or lesions noted      Result Review    Result Review:  I have personally reviewed the results from the time of this admission to 2024 12:26 EST and agree with these findings:  [x]  Laboratory  [x]  Microbiology  [x]  Radiology  [x]  EKG/Telemetry   [x]  Cardiology/Vascular   []  Pathology  []  Old records  []  Other:  Most notable findings include:         Lab 24  0511 24  0419 24  0419 24  0551 24  0409 24  0535 24  0527   WBC 0.99* 1.32* 1.45* 1.18* 1.16* 1.87* 2.22*   HEMOGLOBIN 9.1* 8.9* 8.4* 8.5* 7.7* 8.5* 7.2*   HEMATOCRIT 28.0* 27.7* 25.6* 27.1* 25.1* 25.7* 22.5*   PLATELETS 76* 67* 72* 58* 51* 68* 64*   SODIUM 132*  133* 132* 132* 132* 135* 132*   POTASSIUM 4.5 4.2 3.9 4.0 4.5 4.0 3.7   CHLORIDE 98 99 100 102 104 105 103   CO2 21.0* 18.2* 18.6* 15.4* 13.3* 17.4* 16.6*   BUN 46* 45* 39* 36* 34* 34* 33*   CREATININE 2.89* 3.02* 2.87* 2.80* 2.60* 2.63* 2.65*   GLUCOSE 112* 110* 100* 91 79 86 127*   CALCIUM 8.7 8.0* 7.5* 8.0* 7.9* 7.9* 7.6*   TOTAL PROTEIN  --   --  5.2*  --   --   --   --    ALBUMIN  --   --  2.3*  --   --   --   --    GLOBULIN  --   --  2.9  --   --   --   --         CT Chest Without Contrast Diagnostic    Result Date: 1/20/2024  PROCEDURE: CT CHEST WO CONTRAST DIAGNOSTIC  COMPARISON: [facility], CT, CT ABDOMEN PELVIS WO CONTRAST, 10/26/2023, 20:23.  [facility], CT, CT CHEST WO CONTRAST DIAGNOSTIC, 10/26/2023, 20:23.  INDICATIONS: Cough  TECHNIQUE: CT images were created without the administration of contrast material.   PROTOCOL:   Standard imaging protocol performed    RADIATION:   DLP: 381.5 mGy*cm   Automated exposure control was utilized to minimize radiation dose.  FINDINGS:  Ritika/mediastinum:  No adenopathy.  Thoracic aorta normal in caliber.  Coronary artery calcification.  No pericardial effusion  Lungs/pleura:  Chronic elevation of the right hemidiaphragm with secondary atelectasis in the basilar right lower and right middle lobes.  Multifocal mixed ground-glass/interstitial opacities in the left upper and left lower lobe.  Trace bilateral pleural effusions  Upper abdomen:  No acute abnormality.  Bilateral renal cysts  Bones/soft tissues:  No acute bony abnormality.  Stable mild multiple upper thoracic wedge compression fractures      Impression:    1. Multifocal mixed ground-glass/interstitial infiltrates in the left lung suspicious for viral/atypical pattern of pneumonia  2. Chronic elevation of the right hemidiaphragm with secondary basilar atelectasis  3. Coronary artery atherosclerosis     ASHLY PARKER MD       Electronically Signed and Approved By: ASHLY PARKER MD  on 1/20/2024 at 18:54              BAL PCR positive for coronavirus.  Cytology negative  Left lower extremity Doppler positive for DVT      Assessment & Plan   Assessment / Plan     Active Hospital Problems:  Active Hospital Problems    Diagnosis     **Pneumonia     Mucus plugging of bronchi     Anemia     Acute febrile illness     Hypotension     Chronic diastolic (congestive) heart failure     Sepsis associated hypotension     CKD (chronic kidney disease) stage 3, GFR 30-59 ml/min     Pancytopenia due to chemotherapy        Impression:   Persistent cough  MSSA bacteremia  Left lower lobe pneumonia  Mucous plugging/issues with airway clearance  Coronavirus lower respiratory tract infection  Acute left lower extremity DVT  Multiple myeloma  CKD  Hypertensive  Pancytopenia  Immunosuppressed status on chemotherapy  Hyponatremia, clinically insignificant  Hypokalemia  Hypomagnesemia    Plan:   Continue cefazolin 2 g twice daily IV.  Recommend 14 days of therapy for MSSA bacteremia  Pneumocystis, antifungal and antiviral prophylaxis per oncology per oncology.  Continue nebulizers and bronchopulmonary hygiene  Continue lasix 20 mg IV bid.  Trend renal panel and electrolytes.   Continue Eliquis loading for acute DVT.  Recommend lifelong anticoagulation in the setting of active malignancy.  High risk of bleeding in the setting of his pancytopenia  BAL positive for coronavirus.  Cytology negative for pneumocystis  Continue bronchopulmonary hygiene  Continue antitussives  Neupogen per oncology.  Transfuse for hemoglobin less than 7 or platelets less than 10    DVT prophylaxis:  Medical and mechanical DVT prophylaxis orders are present.    CODE STATUS:   Level Of Support Discussed With: Patient  Code Status (Patient has no pulse and is not breathing): CPR (Attempt to Resuscitate)  Medical Interventions (Patient has pulse or is breathing): Full Support      Labs, imaging, microbiology, notes and medications personally  reviewed  Discussed with primary    Electronically signed by Dionte Ruvalcaba MD, 1/26/2024, 12:26 EST.

## 2024-01-27 LAB
ANION GAP SERPL CALCULATED.3IONS-SCNC: 13.2 MMOL/L (ref 5–15)
ANISOCYTOSIS BLD QL: NORMAL
BASOPHILS # BLD AUTO: 0.07 10*3/MM3 (ref 0–0.2)
BASOPHILS NFR BLD AUTO: 6.9 % (ref 0–1.5)
BUN SERPL-MCNC: 55 MG/DL (ref 8–23)
BUN/CREAT SERPL: 17.4 (ref 7–25)
BURR CELLS BLD QL SMEAR: NORMAL
CALCIUM SPEC-SCNC: 8.5 MG/DL (ref 8.6–10.5)
CHLORIDE SERPL-SCNC: 96 MMOL/L (ref 98–107)
CO2 SERPL-SCNC: 21.8 MMOL/L (ref 22–29)
CREAT SERPL-MCNC: 3.16 MG/DL (ref 0.76–1.27)
DEPRECATED RDW RBC AUTO: 55.2 FL (ref 37–54)
EGFRCR SERPLBLD CKD-EPI 2021: 19.5 ML/MIN/1.73
EOSINOPHIL # BLD AUTO: 0.05 10*3/MM3 (ref 0–0.4)
EOSINOPHIL NFR BLD AUTO: 5 % (ref 0.3–6.2)
ERYTHROCYTE [DISTWIDTH] IN BLOOD BY AUTOMATED COUNT: 16.5 % (ref 12.3–15.4)
GLUCOSE SERPL-MCNC: 117 MG/DL (ref 65–99)
HCT VFR BLD AUTO: 26.7 % (ref 37.5–51)
HGB BLD-MCNC: 8.3 G/DL (ref 13–17.7)
HYPOCHROMIA BLD QL: NORMAL
IMM GRANULOCYTES # BLD AUTO: 0 10*3/MM3 (ref 0–0.05)
IMM GRANULOCYTES NFR BLD AUTO: 0 % (ref 0–0.5)
LYMPHOCYTES # BLD AUTO: 0.35 10*3/MM3 (ref 0.7–3.1)
LYMPHOCYTES NFR BLD AUTO: 34.7 % (ref 19.6–45.3)
MCH RBC QN AUTO: 28.3 PG (ref 26.6–33)
MCHC RBC AUTO-ENTMCNC: 31.1 G/DL (ref 31.5–35.7)
MCV RBC AUTO: 91.1 FL (ref 79–97)
MONOCYTES # BLD AUTO: 0.04 10*3/MM3 (ref 0.1–0.9)
MONOCYTES NFR BLD AUTO: 4 % (ref 5–12)
NEUTROPHILS NFR BLD AUTO: 0.5 10*3/MM3 (ref 1.7–7)
NEUTROPHILS NFR BLD AUTO: 49.4 % (ref 42.7–76)
NRBC BLD AUTO-RTO: 0 /100 WBC (ref 0–0.2)
PLATELET # BLD AUTO: 74 10*3/MM3 (ref 140–450)
PMV BLD AUTO: 11 FL (ref 6–12)
POTASSIUM SERPL-SCNC: 4.6 MMOL/L (ref 3.5–5.2)
RBC # BLD AUTO: 2.93 10*6/MM3 (ref 4.14–5.8)
SMALL PLATELETS BLD QL SMEAR: NORMAL
SODIUM SERPL-SCNC: 131 MMOL/L (ref 136–145)
WBC MORPH BLD: NORMAL
WBC NRBC COR # BLD AUTO: 1.01 10*3/MM3 (ref 3.4–10.8)

## 2024-01-27 PROCEDURE — 94664 DEMO&/EVAL PT USE INHALER: CPT

## 2024-01-27 PROCEDURE — 25010000002 CEFAZOLIN IN DEXTROSE 2-4 GM/100ML-% SOLUTION: Performed by: INTERNAL MEDICINE

## 2024-01-27 PROCEDURE — 80048 BASIC METABOLIC PNL TOTAL CA: CPT | Performed by: INTERNAL MEDICINE

## 2024-01-27 PROCEDURE — 25010000002 FUROSEMIDE PER 20 MG: Performed by: INTERNAL MEDICINE

## 2024-01-27 PROCEDURE — 94799 UNLISTED PULMONARY SVC/PX: CPT

## 2024-01-27 PROCEDURE — 85025 COMPLETE CBC W/AUTO DIFF WBC: CPT | Performed by: INTERNAL MEDICINE

## 2024-01-27 PROCEDURE — 25010000002 FILGRASTIM PER 480 MCG: Performed by: INTERNAL MEDICINE

## 2024-01-27 PROCEDURE — 85007 BL SMEAR W/DIFF WBC COUNT: CPT | Performed by: INTERNAL MEDICINE

## 2024-01-27 PROCEDURE — 99232 SBSQ HOSP IP/OBS MODERATE 35: CPT | Performed by: INTERNAL MEDICINE

## 2024-01-27 RX ADMIN — FUROSEMIDE 20 MG: 10 INJECTION, SOLUTION INTRAMUSCULAR; INTRAVENOUS at 04:45

## 2024-01-27 RX ADMIN — METOCLOPRAMIDE 5 MG: 5 TABLET ORAL at 16:54

## 2024-01-27 RX ADMIN — FILGRASTIM 480 MCG: 480 INJECTION, SOLUTION INTRAVENOUS; SUBCUTANEOUS at 16:55

## 2024-01-27 RX ADMIN — MIDODRINE HYDROCHLORIDE 5 MG: 5 TABLET ORAL at 06:34

## 2024-01-27 RX ADMIN — DOCUSATE SODIUM 50MG AND SENNOSIDES 8.6MG 2 TABLET: 8.6; 5 TABLET, FILM COATED ORAL at 09:15

## 2024-01-27 RX ADMIN — BISACODYL 10 MG: 10 SUPPOSITORY RECTAL at 20:57

## 2024-01-27 RX ADMIN — DOCUSATE SODIUM 50MG AND SENNOSIDES 8.6MG 2 TABLET: 8.6; 5 TABLET, FILM COATED ORAL at 20:57

## 2024-01-27 RX ADMIN — METOCLOPRAMIDE 5 MG: 5 TABLET ORAL at 12:33

## 2024-01-27 RX ADMIN — MIDODRINE HYDROCHLORIDE 5 MG: 5 TABLET ORAL at 12:33

## 2024-01-27 RX ADMIN — IPRATROPIUM BROMIDE AND ALBUTEROL SULFATE 3 ML: .5; 3 SOLUTION RESPIRATORY (INHALATION) at 07:52

## 2024-01-27 RX ADMIN — IPRATROPIUM BROMIDE AND ALBUTEROL SULFATE 3 ML: .5; 3 SOLUTION RESPIRATORY (INHALATION) at 18:56

## 2024-01-27 RX ADMIN — APIXABAN 10 MG: 5 TABLET, FILM COATED ORAL at 20:57

## 2024-01-27 RX ADMIN — FLUCONAZOLE 200 MG: 200 TABLET ORAL at 09:15

## 2024-01-27 RX ADMIN — METOCLOPRAMIDE 5 MG: 5 TABLET ORAL at 06:34

## 2024-01-27 RX ADMIN — MIDODRINE HYDROCHLORIDE 5 MG: 5 TABLET ORAL at 16:55

## 2024-01-27 RX ADMIN — ACYCLOVIR 400 MG: 800 TABLET ORAL at 09:11

## 2024-01-27 RX ADMIN — IPRATROPIUM BROMIDE AND ALBUTEROL SULFATE 3 ML: .5; 3 SOLUTION RESPIRATORY (INHALATION) at 00:46

## 2024-01-27 RX ADMIN — POLYETHYLENE GLYCOL 3350 17 G: 17 POWDER, FOR SOLUTION ORAL at 09:15

## 2024-01-27 RX ADMIN — CEFAZOLIN SODIUM 2000 MG: 2 INJECTION, SOLUTION INTRAVENOUS at 20:58

## 2024-01-27 RX ADMIN — APIXABAN 10 MG: 5 TABLET, FILM COATED ORAL at 09:11

## 2024-01-27 RX ADMIN — ACYCLOVIR 400 MG: 800 TABLET ORAL at 20:57

## 2024-01-27 RX ADMIN — Medication 10 ML: at 09:12

## 2024-01-27 RX ADMIN — CEFAZOLIN SODIUM 2000 MG: 2 INJECTION, SOLUTION INTRAVENOUS at 09:12

## 2024-01-27 RX ADMIN — MONTELUKAST 10 MG: 10 TABLET, FILM COATED ORAL at 20:58

## 2024-01-27 RX ADMIN — IPRATROPIUM BROMIDE AND ALBUTEROL SULFATE 3 ML: .5; 3 SOLUTION RESPIRATORY (INHALATION) at 12:25

## 2024-01-27 RX ADMIN — Medication 10 ML: at 20:58

## 2024-01-27 RX ADMIN — GUAIFENESIN AND DEXTROMETHORPHAN 5 ML: 100; 10 SYRUP ORAL at 09:27

## 2024-01-27 NOTE — PLAN OF CARE
Goal Outcome Evaluation:  Plan of Care Reviewed With: patient        Progress: improving  Outcome Evaluation: Alert and oriented x4. Up to chair this shift. No complaints of pain. Ambulated to bathroom several times. x1 bowel regimen med given per order. No new issues at this time.

## 2024-01-27 NOTE — PROGRESS NOTES
[facility]     Progress Note    Patient Name: Blair Lira  : 1945  MRN: [id number]  Primary Care Physician:  Babs Summers APRN  Date of admission: 2024    Subjective   Subjective     Chief Complaint: Patient comfortable cough has resolved CBC still pending but patient has neutropenia further decisions after looking into the blood results but he is a whole lot more comfortable    HPI: Patient with multiple myeloma status post chemotherapy induced neutropenia    Review of Systems   All systems were reviewed and negative except for: Has been reviewed    Objective   Objective     Vitals:   Temp:  [98.1 °F (36.7 °C)-99.9 °F (37.7 °C)] 99.9 °F (37.7 °C)  Heart Rate:  [] 91  Resp:  [18-20] 18  BP: ()/(51-76) 103/55    Physical Exam    Constitutional: Awake, alert   Eyes: PERRLA, sclerae anicteric, no conjunctival injection   HENT: NCAT, mucous membranes moist   Neck: Supple, no thyromegaly, no lymphadenopathy, trachea midline   Respiratory: Clear to auscultation bilaterally, nonlabored respirations    Cardiovascular: RRR, no murmurs, rubs, or gallops, palpable pedal pulses bilaterally   Gastrointestinal: Positive bowel sounds, soft, nontender, nondistended   Musculoskeletal: No bilateral ankle edema, no clubbing or cyanosis to extremities   Psychiatric: Appropriate affect, cooperative   Neurologic: Oriented x 3, strength symmetric in all extremities, Cranial Nerves grossly intact to confrontation, speech clear   Skin: No rashes   No change  Result Review    Result Review:  I have personally reviewed the results from the time of this admission to 2024 08:28 EST and agree with these findings:  [x]  Laboratory  []  Microbiology  []  Radiology  []  EKG/Telemetry   []  Cardiology/Vascular   []  Pathology  []  Old records  []  Other:  Most notable findings include: Pancytopenia with multiple myeloma    Assessment & Plan   Assessment / Plan     Brief Patient Summary:  Blair Lira is a  78 y.o. male who multiple myeloma with pancytopenia labs pending    Active Hospital Problems:  Active Hospital Problems    Diagnosis     **Pneumonia     Mucus plugging of bronchi     Anemia     Acute febrile illness     Hypotension     Chronic diastolic (congestive) heart failure     Sepsis associated hypotension     CKD (chronic kidney disease) stage 3, GFR 30-59 ml/min     Pancytopenia due to chemotherapy        Plan:   Continue supportive care    DVT prophylaxis:  Medical and mechanical DVT prophylaxis orders are present.        CODE STATUS:   Level Of Support Discussed With: Patient  Code Status (Patient has no pulse and is not breathing): CPR (Attempt to Resuscitate)  Medical Interventions (Patient has pulse or is breathing): Full Support    Disposition:  I expect patient to be discharged after the patient has been stabilized.    Electronically signed by Vamsi Mason MD, 01/27/24, 8:28 AM EST.      Part of this note may be an electronic transcription/translation of spoken language to printed text using the Dragon Dictation System.

## 2024-01-27 NOTE — PROGRESS NOTES
Pulmonary / Critical Care Progress Note      Patient Name: Blair Lira  : 1945  MRN: [id number]  Primary Care Physician:  Babs Summers APRN  Date of admission: 2024    Subjective   Subjective   Follow-up for immunosuppressed state, history of multiple myeloma, on chemotherapy, hypoxic respiratory failure, MSSA bacteremia, groundglass opacity and persistent cough.    Cough markedly improved.  Is minimal with thin clear secretions  Shortness of breath on activity improved  On room air  Feels better    Objective   Objective     Vitals:   Temp:  [98.1 °F (36.7 °C)-99.9 °F (37.7 °C)] 98.8 °F (37.1 °C)  Heart Rate:  [] 88  Resp:  [18-20] 18  BP: ()/(49-76) 89/55    Physical Exam   Vital Signs Reviewed   General: Frail looking male, in no acute distress  HEENT:  PERRL, EOMI.  OP, nares clear  Chest: Improved aeration, no wheezing, unchanged scant rhonchi bilaterally, resonant to percussion bilaterally  CV: RRR, no MGR, pulses 2+, equal.  Abd:  Soft, NT, ND, + BS, no HSM  EXT:  no clubbing, no cyanosis, decreasing left greater than right lower extremity pitting edema  Neuro:  A&Ox3, CN grossly intact, no focal deficits.  Skin: No rashes or lesions noted      Result Review    Result Review:  I have personally reviewed the results from the time of this admission to 2024 16:43 EST and agree with these findings:  [x]  Laboratory  [x]  Microbiology  [x]  Radiology  [x]  EKG/Telemetry   [x]  Cardiology/Vascular   []  Pathology  []  Old records  []  Other:  Most notable findings include:         Lab 24  0621 24  0511 24  0419 24  0419 24  0551 24  0409 24  0535   WBC 1.01* 0.99* 1.32* 1.45* 1.18* 1.16* 1.87*   HEMOGLOBIN 8.3* 9.1* 8.9* 8.4* 8.5* 7.7* 8.5*   HEMATOCRIT 26.7* 28.0* 27.7* 25.6* 27.1* 25.1* 25.7*   PLATELETS 74* 76* 67* 72* 58* 51* 68*   SODIUM 131* 132* 133* 132* 132* 132* 135*   POTASSIUM 4.6 4.5 4.2 3.9 4.0 4.5 4.0   CHLORIDE 96* 98 99  100 102 104 105   CO2 21.8* 21.0* 18.2* 18.6* 15.4* 13.3* 17.4*   BUN 55* 46* 45* 39* 36* 34* 34*   CREATININE 3.16* 2.89* 3.02* 2.87* 2.80* 2.60* 2.63*   GLUCOSE 117* 112* 110* 100* 91 79 86   CALCIUM 8.5* 8.7 8.0* 7.5* 8.0* 7.9* 7.9*   TOTAL PROTEIN  --   --   --  5.2*  --   --   --    ALBUMIN  --   --   --  2.3*  --   --   --    GLOBULIN  --   --   --  2.9  --   --   --         CT Chest Without Contrast Diagnostic    Result Date: 1/20/2024  PROCEDURE: CT CHEST WO CONTRAST DIAGNOSTIC  COMPARISON: [facility], CT, CT ABDOMEN PELVIS WO CONTRAST, 10/26/2023, 20:23.  [facility], CT, CT CHEST WO CONTRAST DIAGNOSTIC, 10/26/2023, 20:23.  INDICATIONS: Cough  TECHNIQUE: CT images were created without the administration of contrast material.   PROTOCOL:   Standard imaging protocol performed    RADIATION:   DLP: 381.5 mGy*cm   Automated exposure control was utilized to minimize radiation dose.  FINDINGS:  Ritika/mediastinum:  No adenopathy.  Thoracic aorta normal in caliber.  Coronary artery calcification.  No pericardial effusion  Lungs/pleura:  Chronic elevation of the right hemidiaphragm with secondary atelectasis in the basilar right lower and right middle lobes.  Multifocal mixed ground-glass/interstitial opacities in the left upper and left lower lobe.  Trace bilateral pleural effusions  Upper abdomen:  No acute abnormality.  Bilateral renal cysts  Bones/soft tissues:  No acute bony abnormality.  Stable mild multiple upper thoracic wedge compression fractures      Impression:    1. Multifocal mixed ground-glass/interstitial infiltrates in the left lung suspicious for viral/atypical pattern of pneumonia  2. Chronic elevation of the right hemidiaphragm with secondary basilar atelectasis  3. Coronary artery atherosclerosis     ASHLY PARKER MD       Electronically Signed and Approved By: ASHLY PARKER MD on 1/20/2024 at 18:54              BAL PCR positive for coronavirus.  Cytology  negative  Left lower extremity Doppler positive for DVT      Assessment & Plan   Assessment / Plan     Active Hospital Problems:  Active Hospital Problems    Diagnosis     **Pneumonia     Mucus plugging of bronchi     Anemia     Acute febrile illness     Hypotension     Chronic diastolic (congestive) heart failure     Sepsis associated hypotension     CKD (chronic kidney disease) stage 3, GFR 30-59 ml/min     Pancytopenia due to chemotherapy        Impression:   Persistent cough  MSSA bacteremia  Left lower lobe pneumonia  Mucous plugging/issues with airway clearance  Coronavirus lower respiratory tract infection  Acute left lower extremity DVT  Multiple myeloma  CKD  Hypertensive  Pancytopenia  Immunosuppressed status on chemotherapy  Hyponatremia, clinically insignificant  Hypokalemia  Hypomagnesemia    Plan:   Continue cefazolin 2 g twice daily IV.  Recommend 14 days of therapy for MSSA bacteremia  Pneumocystis, antifungal and antiviral prophylaxis per oncology per oncology.  Continue nebulizers and bronchopulmonary hygiene  Continue lasix 20 mg IV bid.  Trend renal panel and electrolytes.   Continue Eliquis loading for acute DVT.  Recommend lifelong anticoagulation in the setting of active malignancy.  High risk of bleeding in the setting of his pancytopenia  BAL positive for coronavirus.  Cytology negative for pneumocystis  Continue bronchopulmonary hygiene  Continue antitussives  Neupogen per oncology.  Transfuse for hemoglobin less than 7 or platelets less than 10    DVT prophylaxis:  Medical and mechanical DVT prophylaxis orders are present.    CODE STATUS:   Level Of Support Discussed With: Patient  Code Status (Patient has no pulse and is not breathing): CPR (Attempt to Resuscitate)  Medical Interventions (Patient has pulse or is breathing): Full Support      Labs, imaging, microbiology, notes and medications personally reviewed  Discussed with primary    Electronically signed by Charly Bess,  DO, 1/27/2024, 16:43 EST.

## 2024-01-27 NOTE — PLAN OF CARE
Goal Outcome Evaluation:  Plan of Care Reviewed With: patient           Outcome Evaluation: bilateral pedal pulses 2+, vs wnl, pt a/ox4, denies any new issues at this time

## 2024-01-28 ENCOUNTER — READMISSION MANAGEMENT (OUTPATIENT)
Dept: CALL CENTER | Facility: HOSPITAL | Age: 79
End: 2024-01-28
Payer: MEDICARE

## 2024-01-28 VITALS
TEMPERATURE: 97.9 F | HEART RATE: 98 BPM | DIASTOLIC BLOOD PRESSURE: 67 MMHG | HEIGHT: 73 IN | BODY MASS INDEX: 25.68 KG/M2 | OXYGEN SATURATION: 94 % | RESPIRATION RATE: 20 BRPM | WEIGHT: 193.78 LBS | SYSTOLIC BLOOD PRESSURE: 114 MMHG

## 2024-01-28 PROBLEM — R65.20: Status: ACTIVE | Noted: 2024-01-28

## 2024-01-28 PROBLEM — J15.211 PNEUMONIA DUE TO METHICILLIN SUSCEPTIBLE STAPHYLOCOCCUS AUREUS (MSSA): Status: ACTIVE | Noted: 2024-01-28

## 2024-01-28 PROBLEM — A41.01: Status: ACTIVE | Noted: 2024-01-28

## 2024-01-28 PROBLEM — G62.81: Status: ACTIVE | Noted: 2024-01-28

## 2024-01-28 LAB
ANION GAP SERPL CALCULATED.3IONS-SCNC: 13.5 MMOL/L (ref 5–15)
ANISOCYTOSIS BLD QL: ABNORMAL
BACTERIA SPEC AEROBE CULT: NORMAL
BACTERIA SPEC AEROBE CULT: NORMAL
BASOPHILS # BLD AUTO: 0.08 10*3/MM3 (ref 0–0.2)
BASOPHILS # BLD MANUAL: 0.12 10*3/MM3 (ref 0–0.2)
BASOPHILS NFR BLD AUTO: 5.4 % (ref 0–1.5)
BASOPHILS NFR BLD MANUAL: 8 % (ref 0–1.5)
BUN SERPL-MCNC: 59 MG/DL (ref 8–23)
BUN/CREAT SERPL: 18.3 (ref 7–25)
BURR CELLS BLD QL SMEAR: ABNORMAL
CALCIUM SPEC-SCNC: 8.3 MG/DL (ref 8.6–10.5)
CHLORIDE SERPL-SCNC: 96 MMOL/L (ref 98–107)
CO2 SERPL-SCNC: 19.5 MMOL/L (ref 22–29)
CREAT SERPL-MCNC: 3.22 MG/DL (ref 0.76–1.27)
DACRYOCYTES BLD QL SMEAR: ABNORMAL
DEPRECATED RDW RBC AUTO: 55 FL (ref 37–54)
EGFRCR SERPLBLD CKD-EPI 2021: 19.1 ML/MIN/1.73
EOSINOPHIL # BLD AUTO: 0.05 10*3/MM3 (ref 0–0.4)
EOSINOPHIL # BLD MANUAL: 0.03 10*3/MM3 (ref 0–0.4)
EOSINOPHIL NFR BLD AUTO: 3.4 % (ref 0.3–6.2)
EOSINOPHIL NFR BLD MANUAL: 2 % (ref 0.3–6.2)
ERYTHROCYTE [DISTWIDTH] IN BLOOD BY AUTOMATED COUNT: 16.5 % (ref 12.3–15.4)
GLUCOSE SERPL-MCNC: 112 MG/DL (ref 65–99)
HCT VFR BLD AUTO: 25.7 % (ref 37.5–51)
HGB BLD-MCNC: 8.1 G/DL (ref 13–17.7)
HYPOCHROMIA BLD QL: ABNORMAL
IMM GRANULOCYTES # BLD AUTO: 0.01 10*3/MM3 (ref 0–0.05)
IMM GRANULOCYTES NFR BLD AUTO: 0.7 % (ref 0–0.5)
LARGE PLATELETS: ABNORMAL
LYMPHOCYTES # BLD AUTO: 0.43 10*3/MM3 (ref 0.7–3.1)
LYMPHOCYTES # BLD MANUAL: 0.33 10*3/MM3 (ref 0.7–3.1)
LYMPHOCYTES NFR BLD AUTO: 28.9 % (ref 19.6–45.3)
MCH RBC QN AUTO: 28.8 PG (ref 26.6–33)
MCHC RBC AUTO-ENTMCNC: 31.5 G/DL (ref 31.5–35.7)
MCV RBC AUTO: 91.5 FL (ref 79–97)
MICROCYTES BLD QL: ABNORMAL
MONOCYTES # BLD AUTO: 0.05 10*3/MM3 (ref 0.1–0.9)
MONOCYTES NFR BLD AUTO: 3.4 % (ref 5–12)
NEUTROPHILS # BLD AUTO: 0.98 10*3/MM3 (ref 1.7–7)
NEUTROPHILS NFR BLD AUTO: 0.87 10*3/MM3 (ref 1.7–7)
NEUTROPHILS NFR BLD AUTO: 58.2 % (ref 42.7–76)
NEUTROPHILS NFR BLD MANUAL: 64 % (ref 42.7–76)
NEUTS BAND NFR BLD MANUAL: 2 % (ref 0–5)
NRBC BLD AUTO-RTO: 0 /100 WBC (ref 0–0.2)
OVALOCYTES BLD QL SMEAR: ABNORMAL
PATHOLOGY REVIEW: YES
PLATELET # BLD AUTO: 86 10*3/MM3 (ref 140–450)
PMV BLD AUTO: 11.3 FL (ref 6–12)
POTASSIUM SERPL-SCNC: 4.8 MMOL/L (ref 3.5–5.2)
PROMYELOCYTES NFR BLD MANUAL: 2 % (ref 0–0)
RBC # BLD AUTO: 2.81 10*6/MM3 (ref 4.14–5.8)
SMALL PLATELETS BLD QL SMEAR: ABNORMAL
SODIUM SERPL-SCNC: 129 MMOL/L (ref 136–145)
SPHEROCYTES BLD QL SMEAR: ABNORMAL
VARIANT LYMPHS NFR BLD MANUAL: 22 % (ref 19.6–45.3)
WBC MORPH BLD: NORMAL
WBC NRBC COR # BLD AUTO: 1.49 10*3/MM3 (ref 3.4–10.8)

## 2024-01-28 PROCEDURE — 25010000002 CEFAZOLIN IN DEXTROSE 2-4 GM/100ML-% SOLUTION: Performed by: INTERNAL MEDICINE

## 2024-01-28 PROCEDURE — 94799 UNLISTED PULMONARY SVC/PX: CPT

## 2024-01-28 PROCEDURE — 80048 BASIC METABOLIC PNL TOTAL CA: CPT | Performed by: INTERNAL MEDICINE

## 2024-01-28 PROCEDURE — 85025 COMPLETE CBC W/AUTO DIFF WBC: CPT | Performed by: INTERNAL MEDICINE

## 2024-01-28 PROCEDURE — 25010000002 ONDANSETRON PER 1 MG: Performed by: INTERNAL MEDICINE

## 2024-01-28 PROCEDURE — 85007 BL SMEAR W/DIFF WBC COUNT: CPT | Performed by: INTERNAL MEDICINE

## 2024-01-28 PROCEDURE — 94664 DEMO&/EVAL PT USE INHALER: CPT

## 2024-01-28 PROCEDURE — 99232 SBSQ HOSP IP/OBS MODERATE 35: CPT | Performed by: INTERNAL MEDICINE

## 2024-01-28 RX ORDER — MIDODRINE HYDROCHLORIDE 5 MG/1
5 TABLET ORAL
Qty: 90 TABLET | Refills: 1 | Status: SHIPPED | OUTPATIENT
Start: 2024-01-28 | End: 2024-03-28

## 2024-01-28 RX ORDER — FLUCONAZOLE 200 MG/1
200 TABLET ORAL EVERY 24 HOURS
Qty: 5 TABLET | Refills: 0 | Status: SHIPPED | OUTPATIENT
Start: 2024-01-29 | End: 2024-02-03

## 2024-01-28 RX ORDER — GUAIFENESIN 200 MG/10ML
100 LIQUID ORAL EVERY 4 HOURS PRN
Qty: 500 ML | Refills: 2 | Status: SHIPPED | OUTPATIENT
Start: 2024-01-28 | End: 2024-02-27

## 2024-01-28 RX ORDER — ACYCLOVIR 400 MG/1
400 TABLET ORAL 2 TIMES DAILY
Qty: 19 TABLET | Refills: 0 | Status: SHIPPED | OUTPATIENT
Start: 2024-01-28 | End: 2024-02-07

## 2024-01-28 RX ORDER — MONTELUKAST SODIUM 10 MG/1
10 TABLET ORAL NIGHTLY
Qty: 30 TABLET | Refills: 2 | Status: SHIPPED | OUTPATIENT
Start: 2024-01-28

## 2024-01-28 RX ADMIN — ONDANSETRON 4 MG: 2 INJECTION INTRAMUSCULAR; INTRAVENOUS at 10:10

## 2024-01-28 RX ADMIN — Medication 10 ML: at 09:24

## 2024-01-28 RX ADMIN — APIXABAN 10 MG: 5 TABLET, FILM COATED ORAL at 09:23

## 2024-01-28 RX ADMIN — METOCLOPRAMIDE 5 MG: 5 TABLET ORAL at 10:46

## 2024-01-28 RX ADMIN — MIDODRINE HYDROCHLORIDE 5 MG: 5 TABLET ORAL at 10:46

## 2024-01-28 RX ADMIN — IPRATROPIUM BROMIDE AND ALBUTEROL SULFATE 3 ML: .5; 3 SOLUTION RESPIRATORY (INHALATION) at 00:23

## 2024-01-28 RX ADMIN — MIDODRINE HYDROCHLORIDE 5 MG: 5 TABLET ORAL at 17:40

## 2024-01-28 RX ADMIN — CEFAZOLIN SODIUM 2000 MG: 2 INJECTION, SOLUTION INTRAVENOUS at 09:23

## 2024-01-28 RX ADMIN — FLUCONAZOLE 200 MG: 200 TABLET ORAL at 09:23

## 2024-01-28 RX ADMIN — METOCLOPRAMIDE 5 MG: 5 TABLET ORAL at 07:11

## 2024-01-28 RX ADMIN — MIDODRINE HYDROCHLORIDE 5 MG: 5 TABLET ORAL at 09:24

## 2024-01-28 RX ADMIN — IPRATROPIUM BROMIDE AND ALBUTEROL SULFATE 3 ML: .5; 3 SOLUTION RESPIRATORY (INHALATION) at 12:19

## 2024-01-28 RX ADMIN — ACYCLOVIR 400 MG: 800 TABLET ORAL at 09:23

## 2024-01-28 RX ADMIN — IPRATROPIUM BROMIDE AND ALBUTEROL SULFATE 3 ML: .5; 3 SOLUTION RESPIRATORY (INHALATION) at 07:13

## 2024-01-28 RX ADMIN — METOCLOPRAMIDE 5 MG: 5 TABLET ORAL at 17:40

## 2024-01-28 NOTE — PLAN OF CARE
Problem: Adult Inpatient Plan of Care  Goal: Absence of Hospital-Acquired Illness or Injury  Intervention: Identify and Manage Fall Risk  Recent Flowsheet Documentation  Taken 1/28/2024 0300 by Latisha Eason LPN  Safety Promotion/Fall Prevention: safety round/check completed  Taken 1/28/2024 0100 by Latisha Eason LPN  Safety Promotion/Fall Prevention: safety round/check completed  Taken 1/27/2024 2300 by Latisha Eason LPN  Safety Promotion/Fall Prevention: safety round/check completed  Taken 1/27/2024 2100 by Latisha Eason LPN  Safety Promotion/Fall Prevention: safety round/check completed  Taken 1/27/2024 1920 by Latisha Eason LPN  Safety Promotion/Fall Prevention: safety round/check completed  Intervention: Prevent Skin Injury  Recent Flowsheet Documentation  Taken 1/27/2024 1920 by Latisha Eason LPN  Body Position: position changed independently  Skin Protection: adhesive use limited  Intervention: Prevent and Manage VTE (Venous Thromboembolism) Risk  Recent Flowsheet Documentation  Taken 1/28/2024 0011 by Latisha Eason LPN  Activity Management: ambulated to bathroom  Taken 1/27/2024 1920 by Latisha Eason LPN  Activity Management: activity encouraged  VTE Prevention/Management: (see MAR) other (see comments)  Range of Motion: active ROM (range of motion) encouraged  Goal: Optimal Comfort and Wellbeing  Intervention: Monitor Pain and Promote Comfort  Recent Flowsheet Documentation  Taken 1/27/2024 1920 by Latisha Eason LPN  Pain Management Interventions:   see MAR   relaxation techniques promoted   quiet environment facilitated  Intervention: Provide Person-Centered Care  Recent Flowsheet Documentation  Taken 1/27/2024 1920 by Latisha Eason LPN  Trust Relationship/Rapport:   care explained   choices provided   Goal Outcome Evaluation:      Pt A&O, no c/o pain, VSS, encourage ambulation this shift, noted SOA with exertion. PRN suppository given this shift per pt he had a large bowel  movement this morning. MD unable to contact this morning for critical lab result left a voice mail. Pt resting in bed, is able to make needs known, call light in reach, will continue current POC

## 2024-01-28 NOTE — SIGNIFICANT NOTE
01/28/24 1646   Plan   Final Note SW notified that pt is needing home health this date. HELDER sent referral to [facility].

## 2024-01-28 NOTE — PROGRESS NOTES
Pulmonary / Critical Care Progress Note      Patient Name: Blair Lira  : 1945  MRN: [id number]  Primary Care Physician:  Babs Summers APRN  Date of admission: 2024    Subjective   Subjective   Follow-up for immunosuppressed state, history of multiple myeloma, on chemotherapy, hypoxic respiratory failure, MSSA bacteremia, groundglass opacity and persistent cough.    Cough markedly improved.  Is minimal with thin clear secretions  Shortness of breath on activity improved  On room air  Feels better  No significant change in renal function    Objective   Objective     Vitals:   Temp:  [98.2 °F (36.8 °C)-99 °F (37.2 °C)] 98.2 °F (36.8 °C)  Heart Rate:  [87-98] 89  Resp:  [16-20] 18  BP: ()/(55-71) 95/59    Physical Exam   Vital Signs Reviewed   General: Frail looking male, in no acute distress  HEENT:  PERRL, EOMI.  OP, nares clear  Chest: Improved aeration, no wheezing, unchanged scant rhonchi bilaterally, resonant to percussion bilaterally  Neuro:  A&Ox3, CN grossly intact, no focal deficits.  Skin: No rashes or lesions noted      Result Review    Result Review:  I have personally reviewed the results from the time of this admission to 2024 15:28 EST and agree with these findings:  [x]  Laboratory  [x]  Microbiology  [x]  Radiology  [x]  EKG/Telemetry   [x]  Cardiology/Vascular   []  Pathology  []  Old records  []  Other:  Most notable findings include:         Lab 24  0543 24  0621 24  0511 24  0419 24  0419 24  0551 24  0409   WBC 1.49* 1.01* 0.99* 1.32* 1.45* 1.18* 1.16*   HEMOGLOBIN 8.1* 8.3* 9.1* 8.9* 8.4* 8.5* 7.7*   HEMATOCRIT 25.7* 26.7* 28.0* 27.7* 25.6* 27.1* 25.1*   PLATELETS 86* 74* 76* 67* 72* 58* 51*   SODIUM 129* 131* 132* 133* 132* 132* 132*   POTASSIUM 4.8 4.6 4.5 4.2 3.9 4.0 4.5   CHLORIDE 96* 96* 98 99 100 102 104   CO2 19.5* 21.8* 21.0* 18.2* 18.6* 15.4* 13.3*   BUN 59* 55* 46* 45* 39* 36* 34*   CREATININE 3.22* 3.16* 2.89*  3.02* 2.87* 2.80* 2.60*   GLUCOSE 112* 117* 112* 110* 100* 91 79   CALCIUM 8.3* 8.5* 8.7 8.0* 7.5* 8.0* 7.9*   TOTAL PROTEIN  --   --   --   --  5.2*  --   --    ALBUMIN  --   --   --   --  2.3*  --   --    GLOBULIN  --   --   --   --  2.9  --   --         CT Chest Without Contrast Diagnostic    Result Date: 1/20/2024  PROCEDURE: CT CHEST WO CONTRAST DIAGNOSTIC  COMPARISON: [facility], CT, CT ABDOMEN PELVIS WO CONTRAST, 10/26/2023, 20:23.  [facility], CT, CT CHEST WO CONTRAST DIAGNOSTIC, 10/26/2023, 20:23.  INDICATIONS: Cough  TECHNIQUE: CT images were created without the administration of contrast material.   PROTOCOL:   Standard imaging protocol performed    RADIATION:   DLP: 381.5 mGy*cm   Automated exposure control was utilized to minimize radiation dose.  FINDINGS:  Ritika/mediastinum:  No adenopathy.  Thoracic aorta normal in caliber.  Coronary artery calcification.  No pericardial effusion  Lungs/pleura:  Chronic elevation of the right hemidiaphragm with secondary atelectasis in the basilar right lower and right middle lobes.  Multifocal mixed ground-glass/interstitial opacities in the left upper and left lower lobe.  Trace bilateral pleural effusions  Upper abdomen:  No acute abnormality.  Bilateral renal cysts  Bones/soft tissues:  No acute bony abnormality.  Stable mild multiple upper thoracic wedge compression fractures      Impression:    1. Multifocal mixed ground-glass/interstitial infiltrates in the left lung suspicious for viral/atypical pattern of pneumonia  2. Chronic elevation of the right hemidiaphragm with secondary basilar atelectasis  3. Coronary artery atherosclerosis     ASHLY PARKER MD       Electronically Signed and Approved By: ASHLY PARKER MD on 1/20/2024 at 18:54              BAL PCR positive for coronavirus.  Cytology negative  Left lower extremity Doppler positive for DVT      Assessment & Plan   Assessment / Plan     Active Hospital Problems:  Active  Hospital Problems    Diagnosis     **Pneumonia     Mucus plugging of bronchi     Anemia     Acute febrile illness     Hypotension     Chronic diastolic (congestive) heart failure     Sepsis associated hypotension     CKD (chronic kidney disease) stage 3, GFR 30-59 ml/min     Pancytopenia due to chemotherapy        Impression:   Persistent cough  MSSA bacteremia  Left lower lobe pneumonia  Mucous plugging/issues with airway clearance  Coronavirus lower respiratory tract infection  Acute left lower extremity DVT  Multiple myeloma  CKD  Hypertensive  Pancytopenia  Immunosuppressed status on chemotherapy  Hyponatremia, clinically insignificant  Hypokalemia  Hypomagnesemia    Plan:   Continue cefazolin 2 g twice daily IV.  Recommend 14 days of therapy for MSSA bacteremia  Pneumocystis, antifungal and antiviral prophylaxis per oncology per oncology.  Continue nebulizers and bronchopulmonary hygiene  Continue lasix 20 mg IV bid.  Trend renal panel and electrolytes.   Continue Eliquis loading for acute DVT.  Recommend lifelong anticoagulation in the setting of active malignancy.  High risk of bleeding in the setting of his pancytopenia  Continue bronchopulmonary hygiene  Continue antitussives  Neupogen per oncology.  Transfuse for hemoglobin less than 7 or platelets less than 10    DVT prophylaxis:  Medical and mechanical DVT prophylaxis orders are present.    CODE STATUS:   Level Of Support Discussed With: Patient  Code Status (Patient has no pulse and is not breathing): CPR (Attempt to Resuscitate)  Medical Interventions (Patient has pulse or is breathing): Full Support      Labs, imaging, microbiology, notes and medications personally reviewed  Discussed with primary    Electronically signed by Charly Bess DO, 1/28/2024, 15:28 EST.

## 2024-01-28 NOTE — PROGRESS NOTES
[facility]     Progress Note    Patient Name: Blair Lira  : 1945  MRN: [id number]  Primary Care Physician:  Babs Summers APRN  Date of admission: 2024    Subjective   Subjective     Chief Complaint: Stable and doing well cough has resolved but counts have started trending up and is therefore being discharged    HPI: With multiple myeloma COVID-pneumonia and secondary pneumonia plus also had fungal infection but improved and is being discharged    Review of Systems   All systems were reviewed and negative except for: Has been reviewed    Objective   Objective     Vitals:   Temp:  [98.2 °F (36.8 °C)-99 °F (37.2 °C)] 98.2 °F (36.8 °C)  Heart Rate:  [87-98] 89  Resp:  [16-20] 18  BP: ()/(59-71) 95/59    Physical Exam    Constitutional: Awake, alert   Eyes: PERRLA, sclerae anicteric, no conjunctival injection   HENT: NCAT, mucous membranes moist   Neck: Supple, no thyromegaly, no lymphadenopathy, trachea midline   Respiratory: Clear to auscultation bilaterally, nonlabored respirations    Cardiovascular: RRR, no murmurs, rubs, or gallops, palpable pedal pulses bilaterally   Gastrointestinal: Positive bowel sounds, soft, nontender, nondistended   Musculoskeletal: No bilateral ankle edema, no clubbing or cyanosis to extremities   Psychiatric: Appropriate affect, cooperative   Neurologic: Oriented x 3, strength symmetric in all extremities, Cranial Nerves grossly intact to confrontation, speech clear   Skin: No rashes   No change  Result Review    Result Review:  I have personally reviewed the results from the time of this admission to 2024 16:02 EST and agree with these findings:  [x]  Laboratory  []  Microbiology  []  Radiology  []  EKG/Telemetry   []  Cardiology/Vascular   []  Pathology  []  Old records  []  Other:  Most notable findings include: Chemo induced pancytopenia improving    Assessment & Plan   Assessment / Plan     Brief Patient Summary:  Blair Lira is a 78 y.o. male  who patient was admitted because of pancytopenia is improved and being    Active Hospital Problems:  Active Hospital Problems    Diagnosis     **Pneumonia     Mucus plugging of bronchi     Anemia     Acute febrile illness     Hypotension     Chronic diastolic (congestive) heart failure     Sepsis associated hypotension     CKD (chronic kidney disease) stage 3, GFR 30-59 ml/min     Pancytopenia due to chemotherapy        Plan:   Will be seen in 10 days in the office    DVT prophylaxis:  Medical and mechanical DVT prophylaxis orders are present.        CODE STATUS:   Level Of Support Discussed With: Patient  Code Status (Patient has no pulse and is not breathing): CPR (Attempt to Resuscitate)  Medical Interventions (Patient has pulse or is breathing): Full Support    Disposition:  I expect patient to be discharged to be discharged today.    Electronically signed by Vamsi Mason MD, 01/28/24, 4:02 PM EST.      Part of this note may be an electronic transcription/translation of spoken language to printed text using the Dragon Dictation System.

## 2024-01-28 NOTE — DISCHARGE SUMMARY
[facility]         DISCHARGE SUMMARY    Patient Name: Blair Lira  : 1945  MRN: [id number]    Date of Admission: 2024  Date of Discharge: 2024  Primary Care Physician: Bbas Summers APRN    Consults       Date and Time Order Name Status Description    2024  8:40 AM Inpatient Cardiology Consult Completed     2024 10:19 AM Inpatient Pulmonology Consult Completed     2024  7:12 AM Inpatient Internal Medicine Consult      2024  3:25 AM General MD Inpatient Consult      2024  3:21 AM General MD Inpatient Consult              Presenting Problem:   Pneumonia [J18.9]  Elevated troponin [R79.89]  Chronic anemia [D64.9]  Acute febrile illness [R50.9]  Pneumonia of left lower lobe due to infectious organism [J18.9]  Chronic kidney disease, unspecified CKD stage [N18.9]  Sepsis without acute organ dysfunction, due to unspecified organism [A41.9]    Active and Resolved Hospital Problems:  Active Hospital Problems    Diagnosis POA    **Pneumonia [J18.9] Yes    Mucus plugging of bronchi [T17.500A] Yes    Anemia [D64.9] Yes    Acute febrile illness [R50.9] Unknown    Hypotension [I95.9] Yes    Chronic diastolic (congestive) heart failure [I50.32] Unknown    Sepsis associated hypotension [A41.9, I95.9] Yes    CKD (chronic kidney disease) stage 3, GFR 30-59 ml/min [N18.30] Yes    Pancytopenia due to chemotherapy [D61.810] Yes      Resolved Hospital Problems   No resolved problems to display.         Hospital Course     Hospital Course:  Blair Lira is a 78 y.o. male who was admitted to the hospital with COVID-pneumonia she also had bacteremia MSSA septicemia history of cardiac issues in the past with the PFO he had been treated with Kefzol for about 13 days now tolerated very well afebrile cultures are negative he had neutropenia resulting from chemotherapy which has been resolving and he was given filgrastim dose # improving trending up he is otherwise stable he  did have a lot of cough and therefore a consultation was made with pulmonary they perform bronchoscopy patient has been started on Diflucan he was growing Candida from the sputum he was also kept on Septra DS and acyclovir because of his immune compromised status as a prophylaxis he is now afebrile vital signs stable cough has resolved he does have poor prognosis with renal failure resulting from multiple myeloma he also probably does not have enough help at home with his family seems to be quite supportive and has been taking care of him we will try to get home health agency also patient will be seen in about couple of weeks in the office and at that time decisions regarding the further management will be made, he developed deep venous thrombosis and has been started on apixaban      DISCHARGE Follow Up Recommendations for labs and diagnostics: With COVID-pneumonia MSSA septicemia renal failure multiple myeloma and chemo induced pancytopenia      Pertinent  and/or Most Recent Results     LAB RESULTS:      Lab 01/28/24  0543 01/27/24  0621 01/26/24  0511 01/25/24 0419 01/24/24 0419 01/23/24  0551   WBC 1.49* 1.01* 0.99* 1.32* 1.45* 1.18*   HEMOGLOBIN 8.1* 8.3* 9.1* 8.9* 8.4* 8.5*   HEMATOCRIT 25.7* 26.7* 28.0* 27.7* 25.6* 27.1*   PLATELETS 86* 74* 76* 67* 72* 58*   NEUTROS ABS 0.87*  0.98* 0.50* 0.42* 0.64* 1.13* 0.63*   IMMATURE GRANS (ABS) 0.01 0.00 0.02 0.09*  --  0.11*   LYMPHS ABS 0.43* 0.35* 0.40* 0.39*  --  0.30*   MONOS ABS 0.05* 0.04* 0.04* 0.03*  --  0.02*   EOS ABS 0.05  0.03 0.05 0.05 0.08 0.09 0.07   MCV 91.5 91.1 92.7 93.0 89.8 95.8         Lab 01/28/24  0543 01/27/24  0621 01/26/24  0511 01/25/24 0419 01/24/24 0419   SODIUM 129* 131* 132* 133* 132*   POTASSIUM 4.8 4.6 4.5 4.2 3.9   CHLORIDE 96* 96* 98 99 100   CO2 19.5* 21.8* 21.0* 18.2* 18.6*   ANION GAP 13.5 13.2 13.0 15.8* 13.4   BUN 59* 55* 46* 45* 39*   CREATININE 3.22* 3.16* 2.89* 3.02* 2.87*   EGFR 19.1* 19.5* 21.7* 20.6* 21.9*   GLUCOSE  112* 117* 112* 110* 100*   CALCIUM 8.3* 8.5* 8.7 8.0* 7.5*   MAGNESIUM  --   --  2.1  --  1.7         Lab 01/24/24  0419   TOTAL PROTEIN 5.2*   ALBUMIN 2.3*   GLOBULIN 2.9   ALT (SGPT) 8   AST (SGOT) 29   BILIRUBIN 0.4   ALK PHOS 93                     Brief Urine Lab Results  (Last result in the past 365 days)        Color   Clarity   Blood   Leuk Est   Nitrite   Protein   CREAT   Urine HCG        01/17/24 1514 Yellow   Turbid   Large (3+)   Large (3+)   Positive   100 mg/dL (2+)                 Microbiology Results (last 10 days)       Procedure Component Value - Date/Time    Blood Culture - Blood, Arm, Left [[id number]]  (Normal) Collected: 01/23/24 0911    Lab Status: Final result Specimen: Blood from Arm, Left Updated: 01/28/24 0930     Blood Culture No growth at 5 days    Blood Culture - Blood, Arm, Right [[id number]]  (Normal) Collected: 01/23/24 0906    Lab Status: Final result Specimen: Blood from Arm, Right Updated: 01/28/24 0930     Blood Culture No growth at 5 days    Fungus Culture - Wash, Bronchus [[id number]] Collected: 01/22/24 1424    Lab Status: Preliminary result Specimen: Wash from Bronchus Updated: 01/27/24 1500     Fungus Culture No fungus isolated at less than 1 week    AFB Culture - Wash, Bronchus [[id number]] Collected: 01/22/24 1424    Lab Status: Preliminary result Specimen: Wash from Bronchus Updated: 01/27/24 1500     AFB Culture No AFB isolated at less than 1 week     AFB Stain No acid fast bacilli seen on direct smear      No acid fast bacilli seen on concentrated smear    Respiratory Culture - Wash, Bronchus [[id number]] Collected: 01/22/24 1424    Lab Status: Final result Specimen: Wash from Bronchus Updated: 01/24/24 1017     Respiratory Culture Scant growth (1+) Normal respiratory lottie. No S. aureus or Pseudomonas aeruginosa detected. Final report.     Gram Stain Occasional WBCs seen      No organisms seen    Fungus Culture - Lavage, Lung, Left Lower Lobe [[id number]] Collected:  01/22/24 1423    Lab Status: Preliminary result Specimen: Lavage from Lung, Left Lower Lobe Updated: 01/27/24 1500     Fungus Culture No fungus isolated at less than 1 week    AFB Culture - Lavage, Lung, Left Lower Lobe [[id number]] Collected: 01/22/24 1423    Lab Status: Preliminary result Specimen: Lavage from Lung, Left Lower Lobe Updated: 01/27/24 1500     AFB Culture No AFB isolated at less than 1 week     AFB Stain No acid fast bacilli seen on direct smear      No acid fast bacilli seen on concentrated smear    BAL Culture, Quantitative - Lavage, Lung, Left Lower Lobe [[id number]]  (Abnormal) Collected: 01/22/24 1423    Lab Status: Final result Specimen: Lavage from Lung, Left Lower Lobe Updated: 01/24/24 1013     BAL Culture 25,000 CFU/mL Candida albicans      <25,000 CFU/mL Normal Respiratory Kay     Gram Stain Occasional Gram negative bacilli    Pneumonia Panel - Lavage, Lung, Left Lower Lobe [[id number]]  (Abnormal) Collected: 01/22/24 1423    Lab Status: Final result Specimen: Lavage from Lung, Left Lower Lobe Updated: 01/22/24 1900     Escherichia coli PCR Not Detected     Acinetobacter calcoaceticus-baumannii complex PCR Not Detected     Enterobacter cloacae PCR Not Detected     Klebsiella oxytoca PCR Not Detected     Klebsiella pneumoniae group PCR Not Detected     Klebsiella aerogenes PCR Not Detected     Moraxella catarrhalis PCR Not Detected     Proteus species PCR Not Detected     Pseudomonas aeroginosa PCR Not Detected     Serratia marcescens PCR Not Detected     Staphylococcus aureus PCR Not Detected     Streptococcus pyogenes PCR Not Detected     Haemophilus influenzae PCR Not Detected     Streptococcus agalactiae PCR Not Detected     Streptococcus pneumoniae PCR Not Detected     Chlamydophila pneumoniae PCR Not Detected     Legionella pneumophilia PCR Not Detected     Mycoplasma pneumo by PCR Not Detected     ADENOVIRUS, PCR Not Detected     CTX-M Gene N/A     IMP Gene N/A     KPC Gene N/A      mecA/C and MREJ Gene N/A     NDM Gene N/A     OXA-48-like Gene N/A     VIM Gene N/A     Coronavirus Detected     Human Metapneumovirus Not Detected     Human Rhinovirus/Enterovirus Not Detected     Influenza A PCR Not Detected     Influenza B PCR Not Detected     RSV, PCR Not Detected     Parainfluenza virus PCR Not Detected    Blood Culture - Blood, Arm, Right [[id number]]  (Normal) Collected: 01/19/24 1515    Lab Status: Final result Specimen: Blood from Arm, Right Updated: 01/24/24 1545     Blood Culture No growth at 5 days    Blood Culture - Blood, Hand, Left [[id number]]  (Normal) Collected: 01/19/24 1515    Lab Status: Final result Specimen: Blood from Hand, Left Updated: 01/24/24 1545     Blood Culture No growth at 5 days            CT Chest Without Contrast Diagnostic    Result Date: 1/20/2024  Impression:    1. Multifocal mixed ground-glass/interstitial infiltrates in the left lung suspicious for viral/atypical pattern of pneumonia  2. Chronic elevation of the right hemidiaphragm with secondary basilar atelectasis  3. Coronary artery atherosclerosis     ASHLY PARKER MD       Electronically Signed and Approved By: ASHLY PARKER MD on 1/20/2024 at 18:54             XR Chest 1 View    Result Date: 1/17/2024  Impression:  Possible worsening left-sided pneumonia since 1/5/2024.      Please note that portions of this note were completed with a voice recognition program.  THOMAS CHILDS JR, MD       Electronically Signed and Approved By: THOMAS CHILDS JR, MD on 1/17/2024 at 3:07              XR Chest 1 View    Result Date: 1/6/2024  Impression:  Atelectasis, infiltrate(s), and/or fibrosis may be present bilaterally, especially in the mid left lung.  Pneumonia cannot be excluded.  Overall, no significant interval change is appreciated since the 11/29/2023 study.      Please note that portions of this note were completed with a voice recognition program.  THOMAS CHILDS JR, MD       Electronically Signed and  Approved By: THOMAS CHILDS JR, MD on 1/06/2024 at 0:46               Results for orders placed during the hospital encounter of 01/17/24    Duplex Venous Lower Extremity - Bilateral CV-READ    Interpretation Summary    Acute left lower extremity deep vein thrombosis noted in the mid femoral, distal femoral, popliteal, posterial tibial, peroneal and gastrocnemius.    Chronic left lower extremity superficial thrombophlebitis noted in the small saphenous.    All other veins appeared normal bilaterally.      Results for orders placed during the hospital encounter of 01/17/24    Duplex Venous Lower Extremity - Bilateral CV-READ    Interpretation Summary    Acute left lower extremity deep vein thrombosis noted in the mid femoral, distal femoral, popliteal, posterial tibial, peroneal and gastrocnemius.    Chronic left lower extremity superficial thrombophlebitis noted in the small saphenous.    All other veins appeared normal bilaterally.      Results for orders placed during the hospital encounter of 01/17/24    Adult Transthoracic Echo Complete w/ Color, Spectral and Contrast if necessary per protocol    Interpretation Summary    Left ventricular systolic function is normal. Left ventricular ejection fraction appears to be 51 - 55%.    Left ventricular wall thickness is consistent with mild concentric hypertrophy.    Left ventricular diastolic function is consistent with (grade I) impaired relaxation.    There are no significant valvular abnormalities noted on the study.    Estimated right ventricular systolic pressure from tricuspid regurgitation is normal (<35 mmHg).      Labs Pending at Discharge:  Pending Labs       Order Current Status    Pathology Consultation Collected (01/28/24 1454)    AFB Culture - Lavage, Lung, Left Lower Lobe Preliminary result    AFB Culture - Wash, Bronchus Preliminary result    Fungus Culture - Lavage, Lung, Left Lower Lobe Preliminary result    Fungus Culture - Wash, Bronchus Preliminary  result              Discharge Details        Discharge Medications        New Medications        Instructions Start Date   acyclovir 400 MG tablet  Commonly known as: ZOVIRAX   400 mg, Oral, 2 Times Daily      apixaban 5 MG tablet tablet  Commonly known as: ELIQUIS   Take 1 tablet by mouth 2 (Two) Times a Day for 2 days, THEN 1 tablet 2 (Two) Times a Day for 30 days. Indications: DVT/PE (active thrombosis)   Start Date: January 28, 2024     fluconazole 200 MG tablet  Commonly known as: DIFLUCAN   200 mg, Oral, Every 24 Hours   Start Date: January 29, 2024     guaifenesin 100 MG/5ML liquid  Commonly known as: ROBITUSSIN   100 mg, Oral, Every 4 Hours PRN      midodrine 5 MG tablet  Commonly known as: PROAMATINE   5 mg, Oral, 3 Times Daily Before Meals      montelukast 10 MG tablet  Commonly known as: SINGULAIR   10 mg, Oral, Nightly             Continue These Medications        Instructions Start Date   atorvastatin 10 MG tablet  Commonly known as: LIPITOR   10 mg, Oral, Nightly      fluticasone 50 MCG/ACT nasal spray  Commonly known as: FLONASE   2 sprays, Nasal, Daily      mirtazapine 15 MG tablet  Commonly known as: REMERON   15 mg, Oral, Nightly      pantoprazole 40 MG EC tablet  Commonly known as: PROTONIX   40 mg, Oral, Daily      tamsulosin 0.4 MG capsule 24 hr capsule  Commonly known as: FLOMAX   1 capsule, Oral, Daily             Stop These Medications      amLODIPine 5 MG tablet  Commonly known as: NORVASC     dexAMETHasone 4 MG tablet  Commonly known as: DECADRON     lenalidomide 25 MG capsule  Commonly known as: REVLIMID              No Known Allergies      Discharge Disposition:  Home-Health Care [facility]    Diet:  Hospital:  Diet Order   Procedures    Diet: Regular/House Diet, Cardiac Diets, Diabetic Diets; Healthy Heart (2-3 Na+); Consistent Carbohydrate; Texture: Regular Texture (IDDSI 7); Fluid Consistency: Thin (IDDSI 0)         Discharge Activity: As tolerated        CODE STATUS:  Code Status and Medical  Interventions:   Ordered at: 01/17/24 0730     Level Of Support Discussed With:    Patient     Code Status (Patient has no pulse and is not breathing):    CPR (Attempt to Resuscitate)     Medical Interventions (Patient has pulse or is breathing):    Full Support         No future appointments.        Time spent on Discharge including face to face service: 45 minutes    Electronically signed by Vamsi Mason MD, 01/28/24, 4:09 PM EST.    Part of this note may be an electronic transcription/translation of spoken language to printed text using the Dragon Dictation System.

## 2024-01-28 NOTE — PLAN OF CARE
Goal Outcome Evaluation:  Plan of Care Reviewed With: patient        Progress: no change  Outcome Evaluation: Pt remains SOA with exertion, states he feels weak-provider aware and states he will feel weak while going through chemo tx. Pt had large BM this AM. Pt is awaiting d/c home with home health. Pt in no apparent distress at this time, denies any needs currently, call light in reach.

## 2024-01-28 NOTE — OUTREACH NOTE
Prep Survey      Flowsheet Row Responses   [facility] facility patient discharged from? [facility]   Is LACE score < 7 ? No   Eligibility Readm Mgmt   Discharge diagnosis PNA, sepsis,   Does the patient have one of the following disease processes/diagnoses(primary or secondary)? Sepsis   Does the patient have Home health ordered? Yes   What is the Home health agency?  [facility]    Is there a DME ordered? No   Prep survey completed? Yes            Yany CAMPBELL - Registered Nurse

## 2024-01-29 LAB
CYTO UR: NORMAL
LAB AP CASE REPORT: NORMAL
LAB AP CLINICAL INFORMATION: NORMAL
PATH REPORT.FINAL DX SPEC: NORMAL
PATH REPORT.GROSS SPEC: NORMAL

## 2024-01-31 ENCOUNTER — READMISSION MANAGEMENT (OUTPATIENT)
Dept: CALL CENTER | Facility: HOSPITAL | Age: 79
End: 2024-01-31
Payer: MEDICARE

## 2024-01-31 LAB
FUNGUS WND CULT: ABNORMAL
FUNGUS WND CULT: ABNORMAL
QT INTERVAL: 376 MS
QTC INTERVAL: 517 MS

## 2024-01-31 NOTE — PROGRESS NOTES
"Enter Query Response Below      Query Response: Unable to determine Briseyda multifactorial  Electronically signed by Vamsi Mason MD, 24, 8:14 AM EST.               If applicable, please update the problem list.   Patient: Blair Lira        : 1945  Account: [id number]           Admit Date: 2024        How to Respond to this query:       a. Click New Note     b. Answer query within the yellow box.                c. Update the Problem List, if applicable.      If you have any questions about this query contact me at: [email or web address]     :    78-year-old patient with multiple myeloma and history of COVID-19 was admitted on 2024 with pneumonia and sepsis. Discharge Summary states \"was admitted to the hospital with COVID-pneumonia\".   Pulmonology PNs - state \"Coronavirus lower respiratory tract infection\".  COVID-19 nasal swab  resulted as \"Not Detected\".   Bronchoscopy  with BAL of left lower lobe bronchus. Specimen to lab resulted as \"coronavirus Detected\".   Treatment has included supplemental oxygen as high as 6 LPM, Vancomycin IV, Zosyn IV, Ancef IV, Bactrim DS, Proventil nebs, and Duo-Nebs.  After study, can the conflicting documentation be clarified as:    Pneumonia due to Coronavirus, excluding COVID-19  Pneumonia due to COVID-19  Lower respiratory tract infection due to Coronavirus, excluding COVID-19  Other, please specify: __________  Unable to clinically determine    By submitting this query, we are merely seeking further clarification of documentation to accurately reflect all conditions that you are monitoring, evaluating, treating or that extend the hospitalization or utilize additional resources of care. Please utilize your independent clinical judgment when addressing the question(s) above.     This query and your response, once completed, will be entered into the legal medical record.    Sincerely,  Heavenly Hayes RN, BSN, " CCDS  Clinical Documentation Integrity Program   [email or web address]

## 2024-01-31 NOTE — OUTREACH NOTE
Pt medicated per mar, antibiotics and fluids continue  No new needs at this time, waiting US   Patient resting in bed. Respirations easy and unlabored. No distress noted. Call light within reach.        David Tilley RN  07/28/22 2038 Sepsis Week 1 Survey      Flowsheet Row Responses   [facility] patient discharged from? [facility]   Does the patient have one of the following disease processes/diagnoses(primary or secondary)? Sepsis   Week 1 attempt successful? Yes   Call start time 0946   Call end time 0950   Discharge diagnosis PNA, sepsis,   Meds reviewed with patient/caregiver? Yes   Is the patient having any side effects they believe may be caused by any medication additions or changes? No   Does the patient have all medications related to this admission filled (includes all antibiotics, inhalers, nebulizers,steroids,etc.) Yes   Is the patient taking all medications as directed (includes completed medication regime)? Yes   Medication comments States he didn't get one of the medications but home health nurse contacted his PCP and son picked up today.   Does the patient have a primary care provider?  Yes   Does the patient have an appointment with their PCP within 7 days of discharge? Yes   Has the patient kept scheduled appointments due by today? N/A   What is the Home health agency?  [facility]    Has home health visited the patient within 72 hours of discharge? Yes   Psychosocial issues? No   Did the patient receive a copy of their discharge instructions? Yes   Nursing interventions Reviewed instructions with patient   What is the patient's perception of their health status since discharge? Improving   Nursing interventions Nurse provided patient education   Is the patient/caregiver able to teach back TIME? T emperature - higher or lower than normal, I nfection - may have signs and symptoms of an infection, E xtremely Ill - severe pain, discomfort, shortness of breath, M ental Decline - confused, sleepy, difficult to arouse   Nursing interventions Nurse provided patient education   Is patient/caregiver able to teach back steps to recovery at home? Set small, achievable goals for return to baseline health, Rest and regain strength, Eat a  balanced diet, Make a list of questions for PCP appoinment   Is the patient/caregiver able to teach back signs and symptoms of worsening condition: Fever, Hyperthermia, Rapid heart rate (>90), Shortness of breath/rapid respiratory rate, Altered mental status(confusion/coma)   If the patient is a current smoker, are they able to teach back resources for cessation? Not a smoker   Is the patient/caregiver able to teach back the hierarchy of who to call/visit for symptoms/problems? PCP, Specialist, Home health nurse, Urgent Care, ED, 911 Yes   Additional teach back comments States he is still week but is improving.  Appetite is starting to come back.   Week 1 call completed? Yes   Graduated Yes   Graduated/Revoked comments Denies questions or needs at this time.   Call end time 0950            Jennifer SANDOVAL - Licensed Nurse

## 2024-02-02 ENCOUNTER — TRANSCRIBE ORDERS (OUTPATIENT)
Dept: LAB | Facility: HOSPITAL | Age: 79
End: 2024-02-02
Payer: MEDICARE

## 2024-02-02 ENCOUNTER — LAB (OUTPATIENT)
Dept: LAB | Facility: HOSPITAL | Age: 79
End: 2024-02-02
Payer: MEDICARE

## 2024-02-02 DIAGNOSIS — I10 HYPERTENSION, ESSENTIAL: ICD-10-CM

## 2024-02-02 DIAGNOSIS — N18.4 CHRONIC KIDNEY DISEASE, STAGE IV (SEVERE): Primary | ICD-10-CM

## 2024-02-02 DIAGNOSIS — N18.4 CHRONIC KIDNEY DISEASE, STAGE IV (SEVERE): ICD-10-CM

## 2024-02-02 LAB
ALBUMIN SERPL-MCNC: 3 G/DL (ref 3.5–5.2)
ALBUMIN UR-MCNC: 9.9 MG/DL
ALBUMIN/GLOB SERPL: 1 G/DL
ALP SERPL-CCNC: 81 U/L (ref 39–117)
ALT SERPL W P-5'-P-CCNC: 8 U/L (ref 1–41)
ANION GAP SERPL CALCULATED.3IONS-SCNC: 12.7 MMOL/L (ref 5–15)
AST SERPL-CCNC: 18 U/L (ref 1–40)
BACTERIA UR QL AUTO: ABNORMAL /HPF
BASOPHILS # BLD AUTO: 0.04 10*3/MM3 (ref 0–0.2)
BASOPHILS NFR BLD AUTO: 1.1 % (ref 0–1.5)
BILIRUB SERPL-MCNC: 0.3 MG/DL (ref 0–1.2)
BILIRUB UR QL STRIP: NEGATIVE
BUN SERPL-MCNC: 36 MG/DL (ref 8–23)
BUN/CREAT SERPL: 17.6 (ref 7–25)
CALCIUM SPEC-SCNC: 8.7 MG/DL (ref 8.6–10.5)
CHLORIDE SERPL-SCNC: 105 MMOL/L (ref 98–107)
CLARITY UR: ABNORMAL
CO2 SERPL-SCNC: 19.3 MMOL/L (ref 22–29)
COLOR UR: YELLOW
CREAT SERPL-MCNC: 2.04 MG/DL (ref 0.76–1.27)
CREAT UR-MCNC: 125.6 MG/DL
CREAT UR-MCNC: 137.7 MG/DL
DEPRECATED RDW RBC AUTO: 53.5 FL (ref 37–54)
EGFRCR SERPLBLD CKD-EPI 2021: 32.9 ML/MIN/1.73
EOSINOPHIL # BLD AUTO: 0.11 10*3/MM3 (ref 0–0.4)
EOSINOPHIL NFR BLD AUTO: 3.1 % (ref 0.3–6.2)
ERYTHROCYTE [DISTWIDTH] IN BLOOD BY AUTOMATED COUNT: 16 % (ref 12.3–15.4)
GLOBULIN UR ELPH-MCNC: 2.9 GM/DL
GLUCOSE SERPL-MCNC: 125 MG/DL (ref 65–99)
GLUCOSE UR STRIP-MCNC: NEGATIVE MG/DL
HCT VFR BLD AUTO: 24.9 % (ref 37.5–51)
HGB BLD-MCNC: 8.1 G/DL (ref 13–17.7)
HGB UR QL STRIP.AUTO: NEGATIVE
HYALINE CASTS UR QL AUTO: ABNORMAL /LPF
IMM GRANULOCYTES # BLD AUTO: 0.11 10*3/MM3 (ref 0–0.05)
IMM GRANULOCYTES NFR BLD AUTO: 3.1 % (ref 0–0.5)
KETONES UR QL STRIP: NEGATIVE
LEUKOCYTE ESTERASE UR QL STRIP.AUTO: ABNORMAL
LYMPHOCYTES # BLD AUTO: 1.29 10*3/MM3 (ref 0.7–3.1)
LYMPHOCYTES NFR BLD AUTO: 36.8 % (ref 19.6–45.3)
MCH RBC QN AUTO: 30.2 PG (ref 26.6–33)
MCHC RBC AUTO-ENTMCNC: 32.5 G/DL (ref 31.5–35.7)
MCV RBC AUTO: 92.9 FL (ref 79–97)
MICROALBUMIN/CREAT UR: 78.8 MG/G (ref 0–29)
MONOCYTES # BLD AUTO: 0.17 10*3/MM3 (ref 0.1–0.9)
MONOCYTES NFR BLD AUTO: 4.8 % (ref 5–12)
NEUTROPHILS NFR BLD AUTO: 1.79 10*3/MM3 (ref 1.7–7)
NEUTROPHILS NFR BLD AUTO: 51.1 % (ref 42.7–76)
NITRITE UR QL STRIP: NEGATIVE
NRBC BLD AUTO-RTO: 0.6 /100 WBC (ref 0–0.2)
PH UR STRIP.AUTO: 5.5 [PH] (ref 5–8)
PLATELET # BLD AUTO: 427 10*3/MM3 (ref 140–450)
PMV BLD AUTO: 10.3 FL (ref 6–12)
POTASSIUM SERPL-SCNC: 4.8 MMOL/L (ref 3.5–5.2)
PROT ?TM UR-MCNC: 53.1 MG/DL
PROT SERPL-MCNC: 5.9 G/DL (ref 6–8.5)
PROT UR QL STRIP: ABNORMAL
PROT/CREAT UR: 0.39 MG/G{CREAT}
RBC # BLD AUTO: 2.68 10*6/MM3 (ref 4.14–5.8)
RBC # UR STRIP: ABNORMAL /HPF
REF LAB TEST METHOD: ABNORMAL
SODIUM SERPL-SCNC: 137 MMOL/L (ref 136–145)
SP GR UR STRIP: 1.02 (ref 1–1.03)
SQUAMOUS #/AREA URNS HPF: ABNORMAL /HPF
UROBILINOGEN UR QL STRIP: ABNORMAL
WBC # UR STRIP: ABNORMAL /HPF
WBC NRBC COR # BLD AUTO: 3.51 10*3/MM3 (ref 3.4–10.8)

## 2024-02-02 PROCEDURE — 36415 COLL VENOUS BLD VENIPUNCTURE: CPT

## 2024-02-02 PROCEDURE — 85025 COMPLETE CBC W/AUTO DIFF WBC: CPT

## 2024-02-02 PROCEDURE — 82043 UR ALBUMIN QUANTITATIVE: CPT

## 2024-02-02 PROCEDURE — 84156 ASSAY OF PROTEIN URINE: CPT

## 2024-02-02 PROCEDURE — 80053 COMPREHEN METABOLIC PANEL: CPT

## 2024-02-02 PROCEDURE — 82570 ASSAY OF URINE CREATININE: CPT

## 2024-02-02 PROCEDURE — 81001 URINALYSIS AUTO W/SCOPE: CPT

## 2024-02-14 LAB
FUNGUS WND CULT: ABNORMAL
FUNGUS WND CULT: ABNORMAL

## 2024-02-18 ENCOUNTER — HOSPITAL ENCOUNTER (EMERGENCY)
Facility: HOSPITAL | Age: 79
Discharge: HOME | End: 2024-02-18
Attending: EMERGENCY MEDICINE | Admitting: EMERGENCY MEDICINE
Payer: MEDICARE

## 2024-02-18 ENCOUNTER — APPOINTMENT (OUTPATIENT)
Facility: HOSPITAL | Age: 79
End: 2024-02-18
Payer: MEDICARE

## 2024-02-18 VITALS
HEART RATE: 64 BPM | BODY MASS INDEX: 27.29 KG/M2 | WEIGHT: 205.91 LBS | SYSTOLIC BLOOD PRESSURE: 130 MMHG | RESPIRATION RATE: 20 BRPM | HEIGHT: 73 IN | DIASTOLIC BLOOD PRESSURE: 69 MMHG | OXYGEN SATURATION: 99 % | TEMPERATURE: 97.8 F

## 2024-02-18 DIAGNOSIS — I82.5Y9 CHRONIC DEEP VEIN THROMBOSIS (DVT) OF PROXIMAL VEIN OF LOWER EXTREMITY, UNSPECIFIED LATERALITY: Primary | ICD-10-CM

## 2024-02-18 LAB
ALBUMIN SERPL-MCNC: 2.9 G/DL (ref 3.5–5.2)
ALBUMIN/GLOB SERPL: 1.2 G/DL
ALP SERPL-CCNC: 84 U/L (ref 39–117)
ALT SERPL W P-5'-P-CCNC: 28 U/L (ref 1–41)
ANION GAP SERPL CALCULATED.3IONS-SCNC: 10.1 MMOL/L (ref 5–15)
AST SERPL-CCNC: 21 U/L (ref 1–40)
BASOPHILS # BLD AUTO: 0.02 10*3/MM3 (ref 0–0.2)
BASOPHILS NFR BLD AUTO: 0.3 % (ref 0–1.5)
BH CV LOW VAS LEFT PERONEAL VESSEL: 1
BH CV LOW VAS LEFT POPLITEAL SPONT: 1
BH CV LOW VAS LEFT POSTERIOR TIBIAL VESSEL: 1
BH CV LOWER VASCULAR LEFT COMMON FEMORAL AUGMENT: NORMAL
BH CV LOWER VASCULAR LEFT COMMON FEMORAL COMPETENT: NORMAL
BH CV LOWER VASCULAR LEFT COMMON FEMORAL COMPRESS: NORMAL
BH CV LOWER VASCULAR LEFT COMMON FEMORAL PHASIC: NORMAL
BH CV LOWER VASCULAR LEFT COMMON FEMORAL SPONT: NORMAL
BH CV LOWER VASCULAR LEFT DISTAL FEMORAL COMPRESS: NORMAL
BH CV LOWER VASCULAR LEFT GASTRONEMIUS COMPRESS: NORMAL
BH CV LOWER VASCULAR LEFT GREATER SAPH AK COMPRESS: NORMAL
BH CV LOWER VASCULAR LEFT GREATER SAPH BK COMPRESS: NORMAL
BH CV LOWER VASCULAR LEFT LESSER SAPH COMPRESS: NORMAL
BH CV LOWER VASCULAR LEFT MID FEMORAL AUGMENT: NORMAL
BH CV LOWER VASCULAR LEFT MID FEMORAL COMPETENT: NORMAL
BH CV LOWER VASCULAR LEFT MID FEMORAL COMPRESS: NORMAL
BH CV LOWER VASCULAR LEFT MID FEMORAL PHASIC: NORMAL
BH CV LOWER VASCULAR LEFT MID FEMORAL SPONT: NORMAL
BH CV LOWER VASCULAR LEFT PERONEAL COMPRESS: NORMAL
BH CV LOWER VASCULAR LEFT PERONEAL THROMBUS: NORMAL
BH CV LOWER VASCULAR LEFT POPLITEAL AUGMENT: NORMAL
BH CV LOWER VASCULAR LEFT POPLITEAL COMPETENT: NORMAL
BH CV LOWER VASCULAR LEFT POPLITEAL COMPRESS: NORMAL
BH CV LOWER VASCULAR LEFT POPLITEAL PHASIC: NORMAL
BH CV LOWER VASCULAR LEFT POPLITEAL SPONT: NORMAL
BH CV LOWER VASCULAR LEFT POPLITEAL THROMBUS: NORMAL
BH CV LOWER VASCULAR LEFT POSTERIOR TIBIAL AUGMENT: NORMAL
BH CV LOWER VASCULAR LEFT POSTERIOR TIBIAL COMPRESS: NORMAL
BH CV LOWER VASCULAR LEFT POSTERIOR TIBIAL THROMBUS: NORMAL
BH CV LOWER VASCULAR LEFT PROXIMAL FEMORAL COMPRESS: NORMAL
BH CV LOWER VASCULAR RIGHT COMMON FEMORAL AUGMENT: NORMAL
BH CV LOWER VASCULAR RIGHT COMMON FEMORAL COMPETENT: NORMAL
BH CV LOWER VASCULAR RIGHT COMMON FEMORAL COMPRESS: NORMAL
BH CV LOWER VASCULAR RIGHT COMMON FEMORAL PHASIC: NORMAL
BH CV LOWER VASCULAR RIGHT COMMON FEMORAL SPONT: NORMAL
BH CV VAS PRELIMINARY FINDINGS SCRIPTING: 1
BILIRUB SERPL-MCNC: 0.3 MG/DL (ref 0–1.2)
BUN SERPL-MCNC: 19 MG/DL (ref 8–23)
BUN/CREAT SERPL: 13 (ref 7–25)
CALCIUM SPEC-SCNC: 8.2 MG/DL (ref 8.6–10.5)
CHLORIDE SERPL-SCNC: 106 MMOL/L (ref 98–107)
CO2 SERPL-SCNC: 22.9 MMOL/L (ref 22–29)
CREAT SERPL-MCNC: 1.46 MG/DL (ref 0.76–1.27)
DEPRECATED RDW RBC AUTO: 75.5 FL (ref 37–54)
EGFRCR SERPLBLD CKD-EPI 2021: 49.2 ML/MIN/1.73
EOSINOPHIL # BLD AUTO: 0.07 10*3/MM3 (ref 0–0.4)
EOSINOPHIL NFR BLD AUTO: 1.2 % (ref 0.3–6.2)
ERYTHROCYTE [DISTWIDTH] IN BLOOD BY AUTOMATED COUNT: 21.4 % (ref 12.3–15.4)
GLOBULIN UR ELPH-MCNC: 2.4 GM/DL
GLUCOSE SERPL-MCNC: 117 MG/DL (ref 65–99)
HCT VFR BLD AUTO: 27 % (ref 37.5–51)
HGB BLD-MCNC: 8.4 G/DL (ref 13–17.7)
HOLD SPECIMEN: NORMAL
HOLD SPECIMEN: NORMAL
IMM GRANULOCYTES # BLD AUTO: 0.28 10*3/MM3 (ref 0–0.05)
IMM GRANULOCYTES NFR BLD AUTO: 4.9 % (ref 0–0.5)
LYMPHOCYTES # BLD AUTO: 1.18 10*3/MM3 (ref 0.7–3.1)
LYMPHOCYTES NFR BLD AUTO: 20.5 % (ref 19.6–45.3)
MCH RBC QN AUTO: 31.1 PG (ref 26.6–33)
MCHC RBC AUTO-ENTMCNC: 31.1 G/DL (ref 31.5–35.7)
MCV RBC AUTO: 100 FL (ref 79–97)
MONOCYTES # BLD AUTO: 0.48 10*3/MM3 (ref 0.1–0.9)
MONOCYTES NFR BLD AUTO: 8.3 % (ref 5–12)
NEUTROPHILS NFR BLD AUTO: 3.73 10*3/MM3 (ref 1.7–7)
NEUTROPHILS NFR BLD AUTO: 64.8 % (ref 42.7–76)
NRBC BLD AUTO-RTO: 0 /100 WBC (ref 0–0.2)
PLATELET # BLD AUTO: 264 10*3/MM3 (ref 140–450)
PMV BLD AUTO: 10 FL (ref 6–12)
POTASSIUM SERPL-SCNC: 3.9 MMOL/L (ref 3.5–5.2)
PROT SERPL-MCNC: 5.3 G/DL (ref 6–8.5)
RBC # BLD AUTO: 2.7 10*6/MM3 (ref 4.14–5.8)
SODIUM SERPL-SCNC: 139 MMOL/L (ref 136–145)
WBC NRBC COR # BLD AUTO: 5.76 10*3/MM3 (ref 3.4–10.8)
WHOLE BLOOD HOLD COAG: NORMAL
WHOLE BLOOD HOLD SPECIMEN: NORMAL

## 2024-02-18 PROCEDURE — 80053 COMPREHEN METABOLIC PANEL: CPT | Performed by: EMERGENCY MEDICINE

## 2024-02-18 PROCEDURE — 93971 EXTREMITY STUDY: CPT | Performed by: SURGERY

## 2024-02-18 PROCEDURE — 36415 COLL VENOUS BLD VENIPUNCTURE: CPT

## 2024-02-18 PROCEDURE — 85025 COMPLETE CBC W/AUTO DIFF WBC: CPT

## 2024-02-18 PROCEDURE — 93971 EXTREMITY STUDY: CPT

## 2024-02-18 PROCEDURE — 99284 EMERGENCY DEPT VISIT MOD MDM: CPT

## 2024-02-18 NOTE — ED PROVIDER NOTES
Time: 1:57 PM EST  Date of encounter:  2/18/2024  Independent Historian/Clinical History and Information was obtained by:   Patient    History is limited by: N/A    Chief Complaint: Left lower extremity swelling      History of Present Illness:  Patient is a 78 y.o. year old male who presents to the emergency department for evaluation of left lower extremity swelling this gotten worse over the past several days.  Patient denies fever and chills.  Patient has no chest pain or shortness of breath.  Patient has no cough hemoptysis.  Patient has no dysuria or urinary frequency.  Patient denies subjective neurological doves including numbness and tingling.    HPI    Patient Care Team  Primary Care Provider: aBbs Summers APRN    Past Medical History:     No Known Allergies  Past Medical History:   Diagnosis Date    BPH (benign prostatic hyperplasia)     Cancer     Chronic kidney disease     Frozen shoulder     Hyperlipidemia     Hypertension 10/26/2023    Periarthritis of shoulder     Rotator cuff disorder, right     Tennis elbow     RIGHT     Past Surgical History:   Procedure Laterality Date    BRONCHOSCOPY N/A 1/22/2024    Procedure: BRONCHOSCOPY WITH BAL AND WASHINGS;  Surgeon: Dionte Ruvalcaba MD;  Location: [facility] ENDOSCOPY;  Service: Pulmonary;  Laterality: N/A;  MUCUS PLUGGING    CATARACT EXTRACTION EXTRACAPSULAR W/ INTRAOCULAR LENS IMPLANTATION Bilateral     COLONOSCOPY      JOINT REPLACEMENT Right     THR    SHOULDER ARTHROSCOPY W/ ROTATOR CUFF REPAIR Right 3/29/2023    Procedure: SHOULDER ARTHROSCOPY WITH ROTATOR CUFF REPAIR, SUBCROMIAL DECOMPRESSION,DISTAL CLAVICLE RESECTION;  Surgeon: Gustavo Samuels MD;  Location: [facility] OR [facility];  Service: Orthopedics;  Laterality: Right;    SHOULDER SURGERY Left     SCOPE     Family History   Problem Relation Age of Onset    Malig Hyperthermia Neg Hx        Home Medications:  Prior to Admission medications    Medication Sig Start Date End Date Taking? Authorizing  Provider   apixaban (ELIQUIS) 5 MG tablet tablet Take 1 tablet by mouth 2 (Two) Times a Day for 2 days, THEN 1 tablet 2 (Two) Times a Day for 30 days. Indications: DVT/PE (active thrombosis) 1/28/24 2/29/24  Vamsi Mason MD   atorvastatin (LIPITOR) 10 MG tablet Take 1 tablet by mouth Every Night. 9/23/22   Shavonne Yin MD   fluticasone (FLONASE) 50 MCG/ACT nasal spray 2 sprays into the nostril(s) as directed by provider Daily. 1/10/24   Shavonne Yin MD   guaifenesin (ROBITUSSIN) 100 MG/5ML liquid Take 5 mL by mouth Every 4 (Four) Hours As Needed for Cough for up to 30 days. 1/28/24 2/27/24  Vamsi Mason MD   midodrine (PROAMATINE) 5 MG tablet Take 1 tablet by mouth 3 (Three) Times a Day Before Meals for 60 days. 1/28/24 3/28/24  Vamsi Mason MD   mirtazapine (REMERON) 15 MG tablet Take 1 tablet by mouth Every Night. 1/15/24   Shavonne Yin MD   montelukast (SINGULAIR) 10 MG tablet Take 1 tablet by mouth Every Night. 1/28/24   Vamsi Mason MD   pantoprazole (PROTONIX) 40 MG EC tablet Take 1 tablet by mouth Daily.    Shavonne Yin MD   tamsulosin (FLOMAX) 0.4 MG capsule 24 hr capsule Take 1 capsule by mouth Daily.    Shavonne Yin MD        Social History:   Social History     Tobacco Use    Smoking status: Never     Passive exposure: Never    Smokeless tobacco: Never   Vaping Use    Vaping Use: Never used   Substance Use Topics    Alcohol use: Not Currently    Drug use: Never         Review of Systems:  Review of Systems   Constitutional:  Negative for chills and fever.   HENT:  Negative for congestion, rhinorrhea and sore throat.    Eyes:  Negative for pain and visual disturbance.   Respiratory:  Negative for apnea, cough, chest tightness and shortness of breath.    Cardiovascular:  Negative for chest pain and palpitations.   Gastrointestinal:  Negative for abdominal pain, diarrhea, nausea and vomiting.   Genitourinary:  Negative for difficulty urinating and  "dysuria.   Musculoskeletal:  Negative for joint swelling and myalgias.   Skin:  Negative for color change.   Neurological:  Negative for seizures and headaches.   Psychiatric/Behavioral: Negative.     All other systems reviewed and are negative.       Physical Exam:  BP (!) 153/111 (BP Location: Right arm, Patient Position: Sitting)   Pulse 120   Temp 97.8 °F (36.6 °C) (Oral)   Resp 20   Ht 185.4 cm (73\")   Wt 93.4 kg (205 lb 14.6 oz)   SpO2 100%   BMI 27.17 kg/m²     Physical Exam  Vitals and nursing note reviewed.   Constitutional:       General: He is not in acute distress.     Appearance: Normal appearance. He is not toxic-appearing.   HENT:      Head: Normocephalic and atraumatic.      Jaw: There is normal jaw occlusion.   Eyes:      General: Lids are normal.      Extraocular Movements: Extraocular movements intact.      Conjunctiva/sclera: Conjunctivae normal.      Pupils: Pupils are equal, round, and reactive to light.   Cardiovascular:      Rate and Rhythm: Normal rate and regular rhythm.      Pulses: Normal pulses.      Heart sounds: Normal heart sounds.   Pulmonary:      Effort: Pulmonary effort is normal. No respiratory distress.      Breath sounds: Normal breath sounds. No wheezing or rhonchi.   Abdominal:      General: Abdomen is flat.      Palpations: Abdomen is soft.      Tenderness: There is no abdominal tenderness. There is no guarding or rebound.   Musculoskeletal:         General: Normal range of motion.      Cervical back: Normal range of motion and neck supple.      Right lower leg: No edema.      Left lower leg: Edema present.   Skin:     General: Skin is warm and dry.   Neurological:      Mental Status: He is alert and oriented to person, place, and time. Mental status is at baseline.   Psychiatric:         Mood and Affect: Mood normal.                  Procedures:  Procedures      Medical Decision Making:      Comorbidities that affect care:    Previous blood clot    External Notes " reviewed:    Hospital Discharge Summary: Patient was last admitted by Dr. Crews for pneumonia.      The following orders were placed and all results were independently analyzed by me:  Orders Placed This Encounter   Procedures    [place] Draw    Comprehensive Metabolic Panel    CBC Auto Differential    NPO Diet NPO Type: Strict NPO    Undress & Gown    CBC & Differential    Green Top (Gel)    Lavender Top    Gold Top - SST    Light Blue Top       Medications Given in the Emergency Department:  Medications - No data to display     ED Course:         Labs:    Lab Results (last 24 hours)       Procedure Component Value Units Date/Time    CBC & Differential [[id number]]  (Abnormal) Collected: 02/18/24 1307    Specimen: Blood Updated: 02/18/24 1313    Narrative:      The following orders were created for panel order CBC & Differential.  Procedure                               Abnormality         Status                     ---------                               -----------         ------                     CBC Auto Differential[[id number]]        Abnormal            Final result                 Please view results for these tests on the individual orders.    Comprehensive Metabolic Panel [[id number]]  (Abnormal) Collected: 02/18/24 1307    Specimen: Blood Updated: 02/18/24 1329     Glucose 117 mg/dL      BUN 19 mg/dL      Creatinine 1.46 mg/dL      Sodium 139 mmol/L      Potassium 3.9 mmol/L      Chloride 106 mmol/L      CO2 22.9 mmol/L      Calcium 8.2 mg/dL      Total Protein 5.3 g/dL      Albumin 2.9 g/dL      ALT (SGPT) 28 U/L      AST (SGOT) 21 U/L      Alkaline Phosphatase 84 U/L      Total Bilirubin 0.3 mg/dL      Globulin 2.4 gm/dL      A/G Ratio 1.2 g/dL      BUN/Creatinine Ratio 13.0     Anion Gap 10.1 mmol/L      eGFR 49.2 mL/min/1.73     Narrative:      GFR Normal >60  Chronic Kidney Disease <60  Kidney Failure <15    The GFR formula is only valid for adults with stable renal function between ages 18 and 70.     CBC Auto Differential [[id number]]  (Abnormal) Collected: 02/18/24 1307    Specimen: Blood Updated: 02/18/24 1313     WBC 5.76 10*3/mm3      RBC 2.70 10*6/mm3      Hemoglobin 8.4 g/dL      Hematocrit 27.0 %      .0 fL      MCH 31.1 pg      MCHC 31.1 g/dL      RDW 21.4 %      RDW-SD 75.5 fl      MPV 10.0 fL      Platelets 264 10*3/mm3      Neutrophil % 64.8 %      Lymphocyte % 20.5 %      Monocyte % 8.3 %      Eosinophil % 1.2 %      Basophil % 0.3 %      Immature Grans % 4.9 %      Neutrophils, Absolute 3.73 10*3/mm3      Lymphocytes, Absolute 1.18 10*3/mm3      Monocytes, Absolute 0.48 10*3/mm3      Eosinophils, Absolute 0.07 10*3/mm3      Basophils, Absolute 0.02 10*3/mm3      Immature Grans, Absolute 0.28 10*3/mm3      nRBC 0.0 /100 WBC              Imaging:    No Radiology Exams Resulted Within Past 24 Hours      Differential Diagnosis and Discussion:    Edema: Based on the patient's history, signs, and symptoms, the differential diagnosis includes but is not limited to heart failure, kidney disease, liver disease, venous insufficiency, lymphedema, pregnancy, medications, allergic reactions.    Ultrasound impression was interpreted by me.     MDM     The patient´s CBC that was reviewed and interpreted by me shows no abnormalities of critical concern. Of note, there is no anemia requiring a blood transfusion and the platelet count is acceptable.  The patient´s CMP that was reviewed and interpretted by me shows no abnormalities of critical concern. Of note, the patient´s sodium and potassium are acceptable. The patient´s liver enzymes are unremarkable. The patient´s renal function (creatinine) is preserved. The patient has a normal anion gap.  Ultrasound shows chronic DVT.          Patient Care Considerations:    CT EXTREMITY: I considered ordering an extremity CT, however patient has a bounding dorsalis pedis pulse.      Consultants/Shared Management Plan:    None    Social Determinants of  Health:    Patient is independent, reliable, and has access to care.       Disposition and Care Coordination:    Discharged: I considered escalation of care by admitting this patient to the hospital, however the patient has improved and is suitable and  stable for discharge.    I have explained the patient´s condition, diagnoses and treatment plan based on the information available to me at this time. I have answered questions and addressed any concerns. The patient has a good  understanding of the patient´s diagnosis, condition, and treatment plan as can be expected at this point. The vital signs have been stable. The patient´s condition is stable and appropriate for discharge from the emergency department.      The patient will pursue further outpatient evaluation with the primary care physician or other designated or consulting physician as outlined in the discharge instructions. They are agreeable to this plan of care and follow-up instructions have been explained in detail. The patient has received these instructions in written format and has expressed an understanding of the discharge instructions. The patient is aware that any significant change in condition or worsening of symptoms should prompt an immediate return to this or the closest emergency department or call to 911.  I have explained discharge medications and the need for follow up with the patient/caretakers. This was also printed in the discharge instructions. Patient was discharged with the following medications and follow up:      Medication List      No changes were made to your prescriptions during this visit.      Babs Summers, APRN  [address] [address]  [phone]    In 2 days         Final diagnoses:   Chronic deep vein thrombosis (DVT) of proximal vein of lower extremity, unspecified laterality        ED Disposition       ED Disposition   Discharge    Condition   Stable    Comment   --               This medical  record created using voice recognition software.             Sofya Fay MD  02/18/24 0323

## 2024-03-04 LAB
MYCOBACTERIUM SPEC CULT: NORMAL
MYCOBACTERIUM SPEC CULT: NORMAL
NIGHT BLUE STAIN TISS: NORMAL

## 2024-03-28 ENCOUNTER — TRANSCRIBE ORDERS (OUTPATIENT)
Dept: ADMINISTRATIVE | Facility: HOSPITAL | Age: 79
End: 2024-03-28
Payer: MEDICARE

## 2024-03-28 ENCOUNTER — LAB (OUTPATIENT)
Dept: LAB | Facility: HOSPITAL | Age: 79
End: 2024-03-28
Payer: MEDICARE

## 2024-03-28 DIAGNOSIS — I10 ESSENTIAL HYPERTENSION, MALIGNANT: ICD-10-CM

## 2024-03-28 DIAGNOSIS — N18.30 STAGE 3 CHRONIC KIDNEY DISEASE, UNSPECIFIED WHETHER STAGE 3A OR 3B CKD: ICD-10-CM

## 2024-03-28 DIAGNOSIS — E11.9 DIABETES MELLITUS WITHOUT COMPLICATION: ICD-10-CM

## 2024-03-28 DIAGNOSIS — N18.30 STAGE 3 CHRONIC KIDNEY DISEASE, UNSPECIFIED WHETHER STAGE 3A OR 3B CKD: Primary | ICD-10-CM

## 2024-03-28 LAB
ALBUMIN UR-MCNC: 15 MG/DL
BACTERIA UR QL AUTO: ABNORMAL /HPF
BASOPHILS # BLD AUTO: 0.08 10*3/MM3 (ref 0–0.2)
BASOPHILS NFR BLD AUTO: 3 % (ref 0–1.5)
BILIRUB UR QL STRIP: NEGATIVE
CLARITY UR: ABNORMAL
COLOR UR: YELLOW
CREAT UR-MCNC: 129.8 MG/DL
DEPRECATED RDW RBC AUTO: 59.1 FL (ref 37–54)
EOSINOPHIL # BLD AUTO: 0.03 10*3/MM3 (ref 0–0.4)
EOSINOPHIL NFR BLD AUTO: 1.1 % (ref 0.3–6.2)
ERYTHROCYTE [DISTWIDTH] IN BLOOD BY AUTOMATED COUNT: 16.8 % (ref 12.3–15.4)
GLUCOSE UR STRIP-MCNC: NEGATIVE MG/DL
HBA1C MFR BLD: 6.1 % (ref 4.8–5.6)
HCT VFR BLD AUTO: 23.2 % (ref 37.5–51)
HGB BLD-MCNC: 7.9 G/DL (ref 13–17.7)
HGB UR QL STRIP.AUTO: ABNORMAL
HYALINE CASTS UR QL AUTO: ABNORMAL /LPF
IMM GRANULOCYTES # BLD AUTO: 0.02 10*3/MM3 (ref 0–0.05)
IMM GRANULOCYTES NFR BLD AUTO: 0.7 % (ref 0–0.5)
KETONES UR QL STRIP: NEGATIVE
LEUKOCYTE ESTERASE UR QL STRIP.AUTO: ABNORMAL
LYMPHOCYTES # BLD AUTO: 1.09 10*3/MM3 (ref 0.7–3.1)
LYMPHOCYTES NFR BLD AUTO: 40.4 % (ref 19.6–45.3)
MCH RBC QN AUTO: 33.2 PG (ref 26.6–33)
MCHC RBC AUTO-ENTMCNC: 34.1 G/DL (ref 31.5–35.7)
MCV RBC AUTO: 97.5 FL (ref 79–97)
MONOCYTES # BLD AUTO: 0.47 10*3/MM3 (ref 0.1–0.9)
MONOCYTES NFR BLD AUTO: 17.4 % (ref 5–12)
NEUTROPHILS NFR BLD AUTO: 1.01 10*3/MM3 (ref 1.7–7)
NEUTROPHILS NFR BLD AUTO: 37.4 % (ref 42.7–76)
NITRITE UR QL STRIP: NEGATIVE
NRBC BLD AUTO-RTO: 0 /100 WBC (ref 0–0.2)
PH UR STRIP.AUTO: 5.5 [PH] (ref 5–8)
PLATELET # BLD AUTO: 238 10*3/MM3 (ref 140–450)
PMV BLD AUTO: 10 FL (ref 6–12)
PROT ?TM UR-MCNC: 48.5 MG/DL
PROT UR QL STRIP: ABNORMAL
PROT/CREAT UR: 0.37 MG/G{CREAT}
RBC # BLD AUTO: 2.38 10*6/MM3 (ref 4.14–5.8)
RBC # UR STRIP: ABNORMAL /HPF
REF LAB TEST METHOD: ABNORMAL
SP GR UR STRIP: 1.01 (ref 1–1.03)
SQUAMOUS #/AREA URNS HPF: ABNORMAL /HPF
UROBILINOGEN UR QL STRIP: ABNORMAL
WBC # UR STRIP: ABNORMAL /HPF
WBC NRBC COR # BLD AUTO: 2.7 10*3/MM3 (ref 3.4–10.8)

## 2024-03-28 PROCEDURE — 85025 COMPLETE CBC W/AUTO DIFF WBC: CPT

## 2024-03-28 PROCEDURE — 83036 HEMOGLOBIN GLYCOSYLATED A1C: CPT

## 2024-03-28 PROCEDURE — 84156 ASSAY OF PROTEIN URINE: CPT

## 2024-03-28 PROCEDURE — 36415 COLL VENOUS BLD VENIPUNCTURE: CPT

## 2024-03-28 PROCEDURE — 81001 URINALYSIS AUTO W/SCOPE: CPT

## 2024-03-28 PROCEDURE — 82043 UR ALBUMIN QUANTITATIVE: CPT

## 2024-03-28 PROCEDURE — 82570 ASSAY OF URINE CREATININE: CPT

## 2024-04-28 ENCOUNTER — APPOINTMENT (OUTPATIENT)
Dept: GENERAL RADIOLOGY | Facility: HOSPITAL | Age: 79
DRG: 521 | End: 2024-04-28
Payer: MEDICARE

## 2024-04-28 ENCOUNTER — APPOINTMENT (OUTPATIENT)
Dept: CT IMAGING | Facility: HOSPITAL | Age: 79
DRG: 521 | End: 2024-04-28
Payer: MEDICARE

## 2024-04-28 ENCOUNTER — HOSPITAL ENCOUNTER (INPATIENT)
Facility: HOSPITAL | Age: 79
LOS: 4 days | Discharge: NURSING, REHAB OR LONG-TERM CARE | DRG: 521 | End: 2024-05-03
Attending: EMERGENCY MEDICINE | Admitting: INTERNAL MEDICINE
Payer: MEDICARE

## 2024-04-28 DIAGNOSIS — R13.10 DYSPHAGIA, UNSPECIFIED TYPE: ICD-10-CM

## 2024-04-28 DIAGNOSIS — I63.9 CEREBROVASCULAR ACCIDENT (CVA), UNSPECIFIED MECHANISM: ICD-10-CM

## 2024-04-28 DIAGNOSIS — Z78.9 DECREASED ACTIVITIES OF DAILY LIVING (ADL): ICD-10-CM

## 2024-04-28 DIAGNOSIS — R26.2 DIFFICULTY IN WALKING: ICD-10-CM

## 2024-04-28 DIAGNOSIS — C90.00 MULTIPLE MYELOMA NOT HAVING ACHIEVED REMISSION: ICD-10-CM

## 2024-04-28 DIAGNOSIS — S72.002A CLOSED FRACTURE OF NECK OF LEFT FEMUR, INITIAL ENCOUNTER: Primary | ICD-10-CM

## 2024-04-28 PROBLEM — S72.009A FEMORAL NECK FRACTURE: Status: ACTIVE | Noted: 2024-04-28

## 2024-04-28 LAB
ALBUMIN SERPL-MCNC: 2.9 G/DL (ref 3.5–5.2)
ALBUMIN/GLOB SERPL: 1 G/DL
ALP SERPL-CCNC: 71 U/L (ref 39–117)
ALT SERPL W P-5'-P-CCNC: 12 U/L (ref 1–41)
ANION GAP SERPL CALCULATED.3IONS-SCNC: 13 MMOL/L (ref 5–15)
APTT PPP: 34.1 SECONDS (ref 24.2–34.2)
AST SERPL-CCNC: 9 U/L (ref 1–40)
BASOPHILS # BLD AUTO: 0.02 10*3/MM3 (ref 0–0.2)
BASOPHILS NFR BLD AUTO: 0.4 % (ref 0–1.5)
BILIRUB SERPL-MCNC: 0.4 MG/DL (ref 0–1.2)
BUN SERPL-MCNC: 15 MG/DL (ref 8–23)
BUN/CREAT SERPL: 8.9 (ref 7–25)
CALCIUM SPEC-SCNC: 8.3 MG/DL (ref 8.6–10.5)
CHLORIDE SERPL-SCNC: 105 MMOL/L (ref 98–107)
CO2 SERPL-SCNC: 21 MMOL/L (ref 22–29)
CREAT SERPL-MCNC: 1.69 MG/DL (ref 0.76–1.27)
DEPRECATED RDW RBC AUTO: 61.9 FL (ref 37–54)
EGFRCR SERPLBLD CKD-EPI 2021: 41.3 ML/MIN/1.73
EOSINOPHIL # BLD AUTO: 0.06 10*3/MM3 (ref 0–0.4)
EOSINOPHIL NFR BLD AUTO: 1.2 % (ref 0.3–6.2)
ERYTHROCYTE [DISTWIDTH] IN BLOOD BY AUTOMATED COUNT: 16.4 % (ref 12.3–15.4)
GLOBULIN UR ELPH-MCNC: 2.9 GM/DL
GLUCOSE BLDC GLUCOMTR-MCNC: 107 MG/DL (ref 70–99)
GLUCOSE SERPL-MCNC: 116 MG/DL (ref 65–99)
HCT VFR BLD AUTO: 27 % (ref 37.5–51)
HGB BLD-MCNC: 8.5 G/DL (ref 13–17.7)
HOLD SPECIMEN: NORMAL
HOLD SPECIMEN: NORMAL
IMM GRANULOCYTES # BLD AUTO: 0.03 10*3/MM3 (ref 0–0.05)
IMM GRANULOCYTES NFR BLD AUTO: 0.6 % (ref 0–0.5)
INR PPP: 1.21 (ref 0.86–1.15)
LYMPHOCYTES # BLD AUTO: 1.31 10*3/MM3 (ref 0.7–3.1)
LYMPHOCYTES NFR BLD AUTO: 27.1 % (ref 19.6–45.3)
MAGNESIUM SERPL-MCNC: 1.6 MG/DL (ref 1.6–2.4)
MCH RBC QN AUTO: 32.2 PG (ref 26.6–33)
MCHC RBC AUTO-ENTMCNC: 31.5 G/DL (ref 31.5–35.7)
MCV RBC AUTO: 102.3 FL (ref 79–97)
MONOCYTES # BLD AUTO: 0.55 10*3/MM3 (ref 0.1–0.9)
MONOCYTES NFR BLD AUTO: 11.4 % (ref 5–12)
NEUTROPHILS NFR BLD AUTO: 2.87 10*3/MM3 (ref 1.7–7)
NEUTROPHILS NFR BLD AUTO: 59.3 % (ref 42.7–76)
NRBC BLD AUTO-RTO: 0 /100 WBC (ref 0–0.2)
PLATELET # BLD AUTO: 189 10*3/MM3 (ref 140–450)
PMV BLD AUTO: 11.2 FL (ref 6–12)
POTASSIUM SERPL-SCNC: 3.7 MMOL/L (ref 3.5–5.2)
PROT SERPL-MCNC: 5.8 G/DL (ref 6–8.5)
PROTHROMBIN TIME: 15.6 SECONDS (ref 11.8–14.9)
RBC # BLD AUTO: 2.64 10*6/MM3 (ref 4.14–5.8)
SODIUM SERPL-SCNC: 139 MMOL/L (ref 136–145)
TROPONIN T SERPL HS-MCNC: 76 NG/L
WBC NRBC COR # BLD AUTO: 4.84 10*3/MM3 (ref 3.4–10.8)
WHOLE BLOOD HOLD COAG: NORMAL
WHOLE BLOOD HOLD SPECIMEN: NORMAL

## 2024-04-28 PROCEDURE — 85610 PROTHROMBIN TIME: CPT | Performed by: EMERGENCY MEDICINE

## 2024-04-28 PROCEDURE — 85730 THROMBOPLASTIN TIME PARTIAL: CPT | Performed by: EMERGENCY MEDICINE

## 2024-04-28 PROCEDURE — 93005 ELECTROCARDIOGRAM TRACING: CPT

## 2024-04-28 PROCEDURE — 84484 ASSAY OF TROPONIN QUANT: CPT | Performed by: EMERGENCY MEDICINE

## 2024-04-28 PROCEDURE — 99291 CRITICAL CARE FIRST HOUR: CPT

## 2024-04-28 PROCEDURE — 80053 COMPREHEN METABOLIC PANEL: CPT | Performed by: EMERGENCY MEDICINE

## 2024-04-28 PROCEDURE — 70450 CT HEAD/BRAIN W/O DYE: CPT

## 2024-04-28 PROCEDURE — 85025 COMPLETE CBC W/AUTO DIFF WBC: CPT

## 2024-04-28 PROCEDURE — 70498 CT ANGIOGRAPHY NECK: CPT

## 2024-04-28 PROCEDURE — 83735 ASSAY OF MAGNESIUM: CPT | Performed by: EMERGENCY MEDICINE

## 2024-04-28 PROCEDURE — 25510000001 IOPAMIDOL PER 1 ML: Performed by: EMERGENCY MEDICINE

## 2024-04-28 PROCEDURE — 99222 1ST HOSP IP/OBS MODERATE 55: CPT | Performed by: STUDENT IN AN ORGANIZED HEALTH CARE EDUCATION/TRAINING PROGRAM

## 2024-04-28 PROCEDURE — 93010 ELECTROCARDIOGRAM REPORT: CPT | Performed by: INTERNAL MEDICINE

## 2024-04-28 PROCEDURE — 25010000002 MORPHINE PER 10 MG: Performed by: EMERGENCY MEDICINE

## 2024-04-28 PROCEDURE — 0042T HC CT CEREBRAL PERFUSION W/WO CONTRAST: CPT

## 2024-04-28 PROCEDURE — 71045 X-RAY EXAM CHEST 1 VIEW: CPT

## 2024-04-28 PROCEDURE — 93005 ELECTROCARDIOGRAM TRACING: CPT | Performed by: EMERGENCY MEDICINE

## 2024-04-28 PROCEDURE — 70496 CT ANGIOGRAPHY HEAD: CPT

## 2024-04-28 PROCEDURE — 73502 X-RAY EXAM HIP UNI 2-3 VIEWS: CPT

## 2024-04-28 PROCEDURE — 82948 REAGENT STRIP/BLOOD GLUCOSE: CPT | Performed by: EMERGENCY MEDICINE

## 2024-04-28 PROCEDURE — 25010000002 LEVETIRACETAM IN NACL 0.75% 1000 MG/100ML SOLUTION: Performed by: EMERGENCY MEDICINE

## 2024-04-28 RX ORDER — SODIUM CHLORIDE 0.9 % (FLUSH) 0.9 %
10 SYRINGE (ML) INJECTION AS NEEDED
Status: DISCONTINUED | OUTPATIENT
Start: 2024-04-28 | End: 2024-05-03 | Stop reason: HOSPADM

## 2024-04-28 RX ORDER — LEVETIRACETAM 10 MG/ML
1000 INJECTION INTRAVASCULAR ONCE
Status: COMPLETED | OUTPATIENT
Start: 2024-04-28 | End: 2024-04-28

## 2024-04-28 RX ORDER — SODIUM CHLORIDE 0.9 % (FLUSH) 0.9 %
10 SYRINGE (ML) INJECTION AS NEEDED
Status: DISCONTINUED | OUTPATIENT
Start: 2024-04-28 | End: 2024-04-30

## 2024-04-28 RX ADMIN — MORPHINE SULFATE 4 MG: 4 INJECTION, SOLUTION INTRAMUSCULAR; INTRAVENOUS at 23:32

## 2024-04-28 RX ADMIN — LEVETIRACETAM 1000 MG: 10 INJECTION, SOLUTION INTRAVENOUS at 23:35

## 2024-04-28 RX ADMIN — IOPAMIDOL 150 ML: 755 INJECTION, SOLUTION INTRAVENOUS at 22:23

## 2024-04-29 ENCOUNTER — APPOINTMENT (OUTPATIENT)
Dept: MRI IMAGING | Facility: HOSPITAL | Age: 79
DRG: 521 | End: 2024-04-29
Payer: MEDICARE

## 2024-04-29 ENCOUNTER — APPOINTMENT (OUTPATIENT)
Dept: NEUROLOGY | Facility: HOSPITAL | Age: 79
DRG: 521 | End: 2024-04-29
Payer: MEDICARE

## 2024-04-29 ENCOUNTER — ANESTHESIA EVENT (OUTPATIENT)
Dept: TELEMETRY | Facility: HOSPITAL | Age: 79
End: 2024-04-29

## 2024-04-29 ENCOUNTER — ANESTHESIA (OUTPATIENT)
Dept: TELEMETRY | Facility: HOSPITAL | Age: 79
End: 2024-04-29

## 2024-04-29 PROBLEM — R41.82 ALTERED MENTAL STATUS: Status: ACTIVE | Noted: 2024-04-29

## 2024-04-29 LAB
ABO GROUP BLD: NORMAL
ALBUMIN SERPL-MCNC: 2.9 G/DL (ref 3.5–5.2)
ALBUMIN/GLOB SERPL: 1 G/DL
ALP SERPL-CCNC: 71 U/L (ref 39–117)
ALT SERPL W P-5'-P-CCNC: 14 U/L (ref 1–41)
ANION GAP SERPL CALCULATED.3IONS-SCNC: 10.7 MMOL/L (ref 5–15)
AST SERPL-CCNC: 10 U/L (ref 1–40)
BACTERIA UR QL AUTO: ABNORMAL /HPF
BASOPHILS # BLD AUTO: 0.02 10*3/MM3 (ref 0–0.2)
BASOPHILS NFR BLD AUTO: 0.3 % (ref 0–1.5)
BILIRUB SERPL-MCNC: 0.6 MG/DL (ref 0–1.2)
BILIRUB UR QL STRIP: NEGATIVE
BLD GP AB SCN SERPL QL: POSITIVE
BUN SERPL-MCNC: 16 MG/DL (ref 8–23)
BUN/CREAT SERPL: 9.6 (ref 7–25)
CALCIUM SPEC-SCNC: 8.2 MG/DL (ref 8.6–10.5)
CHLORIDE SERPL-SCNC: 106 MMOL/L (ref 98–107)
CLARITY UR: ABNORMAL
CO2 SERPL-SCNC: 21.3 MMOL/L (ref 22–29)
COLOR UR: YELLOW
CREAT SERPL-MCNC: 1.67 MG/DL (ref 0.76–1.27)
DEPRECATED RDW RBC AUTO: 60.7 FL (ref 37–54)
EGFRCR SERPLBLD CKD-EPI 2021: 41.9 ML/MIN/1.73
EOSINOPHIL # BLD AUTO: 0.01 10*3/MM3 (ref 0–0.4)
EOSINOPHIL NFR BLD AUTO: 0.2 % (ref 0.3–6.2)
ERYTHROCYTE [DISTWIDTH] IN BLOOD BY AUTOMATED COUNT: 16.5 % (ref 12.3–15.4)
GEN 5 2HR TROPONIN T REFLEX: 76 NG/L
GLOBULIN UR ELPH-MCNC: 2.8 GM/DL
GLUCOSE BLDC GLUCOMTR-MCNC: 108 MG/DL (ref 70–99)
GLUCOSE BLDC GLUCOMTR-MCNC: 111 MG/DL (ref 70–99)
GLUCOSE SERPL-MCNC: 131 MG/DL (ref 65–99)
GLUCOSE UR STRIP-MCNC: NEGATIVE MG/DL
HCT VFR BLD AUTO: 28.1 % (ref 37.5–51)
HGB BLD-MCNC: 8.7 G/DL (ref 13–17.7)
HGB UR QL STRIP.AUTO: ABNORMAL
HOLD SPECIMEN: NORMAL
HYALINE CASTS UR QL AUTO: ABNORMAL /LPF
IMM GRANULOCYTES # BLD AUTO: 0.05 10*3/MM3 (ref 0–0.05)
IMM GRANULOCYTES NFR BLD AUTO: 0.8 % (ref 0–0.5)
KETONES UR QL STRIP: NEGATIVE
LEUKOCYTE ESTERASE UR QL STRIP.AUTO: ABNORMAL
LYMPHOCYTES # BLD AUTO: 0.55 10*3/MM3 (ref 0.7–3.1)
LYMPHOCYTES NFR BLD AUTO: 9.2 % (ref 19.6–45.3)
MCH RBC QN AUTO: 31.4 PG (ref 26.6–33)
MCHC RBC AUTO-ENTMCNC: 31 G/DL (ref 31.5–35.7)
MCV RBC AUTO: 101.4 FL (ref 79–97)
MONOCYTES # BLD AUTO: 0.44 10*3/MM3 (ref 0.1–0.9)
MONOCYTES NFR BLD AUTO: 7.4 % (ref 5–12)
NEUTROPHILS NFR BLD AUTO: 4.91 10*3/MM3 (ref 1.7–7)
NEUTROPHILS NFR BLD AUTO: 82.1 % (ref 42.7–76)
NITRITE UR QL STRIP: NEGATIVE
NRBC BLD AUTO-RTO: 0 /100 WBC (ref 0–0.2)
PH UR STRIP.AUTO: 8 [PH] (ref 5–8)
PLATELET # BLD AUTO: 184 10*3/MM3 (ref 140–450)
PMV BLD AUTO: 10.7 FL (ref 6–12)
POTASSIUM SERPL-SCNC: 3.8 MMOL/L (ref 3.5–5.2)
PROT SERPL-MCNC: 5.7 G/DL (ref 6–8.5)
PROT UR QL STRIP: ABNORMAL
RBC # BLD AUTO: 2.77 10*6/MM3 (ref 4.14–5.8)
RBC # UR STRIP: ABNORMAL /HPF
REF LAB TEST METHOD: ABNORMAL
RH BLD: POSITIVE
SODIUM SERPL-SCNC: 138 MMOL/L (ref 136–145)
SP GR UR STRIP: 1.02 (ref 1–1.03)
SQUAMOUS #/AREA URNS HPF: ABNORMAL /HPF
T&S EXPIRATION DATE: NORMAL
TROPONIN T DELTA: 0 NG/L
UROBILINOGEN UR QL STRIP: ABNORMAL
WBC # UR STRIP: ABNORMAL /HPF
WBC NRBC COR # BLD AUTO: 5.98 10*3/MM3 (ref 3.4–10.8)

## 2024-04-29 PROCEDURE — 86870 RBC ANTIBODY IDENTIFICATION: CPT | Performed by: INTERNAL MEDICINE

## 2024-04-29 PROCEDURE — 82948 REAGENT STRIP/BLOOD GLUCOSE: CPT

## 2024-04-29 PROCEDURE — 85025 COMPLETE CBC W/AUTO DIFF WBC: CPT | Performed by: INTERNAL MEDICINE

## 2024-04-29 PROCEDURE — 36415 COLL VENOUS BLD VENIPUNCTURE: CPT | Performed by: INTERNAL MEDICINE

## 2024-04-29 PROCEDURE — 81001 URINALYSIS AUTO W/SCOPE: CPT | Performed by: EMERGENCY MEDICINE

## 2024-04-29 PROCEDURE — 86901 BLOOD TYPING SEROLOGIC RH(D): CPT | Performed by: INTERNAL MEDICINE

## 2024-04-29 PROCEDURE — 99231 SBSQ HOSP IP/OBS SF/LOW 25: CPT | Performed by: PSYCHIATRY & NEUROLOGY

## 2024-04-29 PROCEDURE — 86880 COOMBS TEST DIRECT: CPT

## 2024-04-29 PROCEDURE — 86850 RBC ANTIBODY SCREEN: CPT

## 2024-04-29 PROCEDURE — 86900 BLOOD TYPING SEROLOGIC ABO: CPT | Performed by: INTERNAL MEDICINE

## 2024-04-29 PROCEDURE — 94799 UNLISTED PULMONARY SVC/PX: CPT

## 2024-04-29 PROCEDURE — 86870 RBC ANTIBODY IDENTIFICATION: CPT

## 2024-04-29 PROCEDURE — 84484 ASSAY OF TROPONIN QUANT: CPT | Performed by: EMERGENCY MEDICINE

## 2024-04-29 PROCEDURE — 95816 EEG AWAKE AND DROWSY: CPT

## 2024-04-29 PROCEDURE — 86900 BLOOD TYPING SEROLOGIC ABO: CPT

## 2024-04-29 PROCEDURE — 80053 COMPREHEN METABOLIC PANEL: CPT | Performed by: INTERNAL MEDICINE

## 2024-04-29 PROCEDURE — 36415 COLL VENOUS BLD VENIPUNCTURE: CPT

## 2024-04-29 PROCEDURE — 25010000002 MAGNESIUM SULFATE IN D5W 1G/100ML (PREMIX) 1-5 GM/100ML-% SOLUTION: Performed by: INTERNAL MEDICINE

## 2024-04-29 PROCEDURE — 94761 N-INVAS EAR/PLS OXIMETRY MLT: CPT

## 2024-04-29 PROCEDURE — 86906 BLD TYPING SEROLOGIC RH PHNT: CPT

## 2024-04-29 PROCEDURE — 86850 RBC ANTIBODY SCREEN: CPT | Performed by: INTERNAL MEDICINE

## 2024-04-29 PROCEDURE — 92610 EVALUATE SWALLOWING FUNCTION: CPT

## 2024-04-29 RX ORDER — ONDANSETRON 2 MG/ML
4 INJECTION INTRAMUSCULAR; INTRAVENOUS EVERY 4 HOURS PRN
Status: DISCONTINUED | OUTPATIENT
Start: 2024-04-29 | End: 2024-04-30 | Stop reason: SDUPTHER

## 2024-04-29 RX ORDER — SODIUM CHLORIDE 0.9 % (FLUSH) 0.9 %
10 SYRINGE (ML) INJECTION AS NEEDED
Status: DISCONTINUED | OUTPATIENT
Start: 2024-04-29 | End: 2024-05-03 | Stop reason: HOSPADM

## 2024-04-29 RX ORDER — MORPHINE SULFATE 2 MG/ML
2 INJECTION, SOLUTION INTRAMUSCULAR; INTRAVENOUS
Status: ACTIVE | OUTPATIENT
Start: 2024-04-29 | End: 2024-05-02

## 2024-04-29 RX ORDER — ACETAMINOPHEN 325 MG/1
650 TABLET ORAL EVERY 4 HOURS PRN
Status: DISCONTINUED | OUTPATIENT
Start: 2024-04-29 | End: 2024-04-30 | Stop reason: SDUPTHER

## 2024-04-29 RX ORDER — NITROGLYCERIN 0.4 MG/1
0.4 TABLET SUBLINGUAL
Status: DISCONTINUED | OUTPATIENT
Start: 2024-04-29 | End: 2024-05-03 | Stop reason: HOSPADM

## 2024-04-29 RX ORDER — MIDODRINE HYDROCHLORIDE 5 MG/1
5 TABLET ORAL
Status: DISCONTINUED | OUTPATIENT
Start: 2024-04-29 | End: 2024-04-29

## 2024-04-29 RX ORDER — SODIUM CHLORIDE 9 MG/ML
40 INJECTION, SOLUTION INTRAVENOUS AS NEEDED
Status: DISCONTINUED | OUTPATIENT
Start: 2024-04-29 | End: 2024-05-03 | Stop reason: HOSPADM

## 2024-04-29 RX ORDER — MAGNESIUM SULFATE 1 G/100ML
1 INJECTION INTRAVENOUS ONCE
Status: COMPLETED | OUTPATIENT
Start: 2024-04-29 | End: 2024-04-29

## 2024-04-29 RX ORDER — CLOPIDOGREL BISULFATE 75 MG/1
300 TABLET ORAL ONCE
Status: DISCONTINUED | OUTPATIENT
Start: 2024-04-29 | End: 2024-04-29

## 2024-04-29 RX ORDER — FAMOTIDINE 20 MG/1
40 TABLET, FILM COATED ORAL DAILY
Status: DISCONTINUED | OUTPATIENT
Start: 2024-04-29 | End: 2024-04-30 | Stop reason: SDUPTHER

## 2024-04-29 RX ORDER — MONTELUKAST SODIUM 10 MG/1
10 TABLET ORAL NIGHTLY
Status: DISCONTINUED | OUTPATIENT
Start: 2024-04-29 | End: 2024-05-03 | Stop reason: HOSPADM

## 2024-04-29 RX ORDER — TEMAZEPAM 15 MG/1
15 CAPSULE ORAL NIGHTLY PRN
Status: DISCONTINUED | OUTPATIENT
Start: 2024-04-29 | End: 2024-05-03 | Stop reason: HOSPADM

## 2024-04-29 RX ORDER — ALUMINA, MAGNESIA, AND SIMETHICONE 2400; 2400; 240 MG/30ML; MG/30ML; MG/30ML
15 SUSPENSION ORAL EVERY 6 HOURS PRN
Status: DISCONTINUED | OUTPATIENT
Start: 2024-04-29 | End: 2024-05-03 | Stop reason: HOSPADM

## 2024-04-29 RX ORDER — ATORVASTATIN CALCIUM 10 MG/1
10 TABLET, FILM COATED ORAL NIGHTLY
Status: DISCONTINUED | OUTPATIENT
Start: 2024-04-29 | End: 2024-05-03 | Stop reason: HOSPADM

## 2024-04-29 RX ORDER — CLOPIDOGREL BISULFATE 75 MG/1
75 TABLET ORAL DAILY
Status: DISCONTINUED | OUTPATIENT
Start: 2024-04-29 | End: 2024-04-29

## 2024-04-29 RX ORDER — BISACODYL 10 MG
10 SUPPOSITORY, RECTAL RECTAL DAILY PRN
Status: DISCONTINUED | OUTPATIENT
Start: 2024-04-29 | End: 2024-05-03 | Stop reason: HOSPADM

## 2024-04-29 RX ORDER — DEXAMETHASONE 4 MG/1
4 TABLET ORAL DAILY
COMMUNITY
End: 2024-05-03 | Stop reason: HOSPADM

## 2024-04-29 RX ORDER — POLYETHYLENE GLYCOL 3350 17 G/17G
17 POWDER, FOR SOLUTION ORAL DAILY PRN
Status: DISCONTINUED | OUTPATIENT
Start: 2024-04-29 | End: 2024-05-03 | Stop reason: HOSPADM

## 2024-04-29 RX ORDER — AMOXICILLIN 250 MG
2 CAPSULE ORAL 2 TIMES DAILY PRN
Status: DISCONTINUED | OUTPATIENT
Start: 2024-04-29 | End: 2024-05-03 | Stop reason: HOSPADM

## 2024-04-29 RX ORDER — ALLOPURINOL 100 MG/1
100 TABLET ORAL DAILY
COMMUNITY
Start: 2024-04-16

## 2024-04-29 RX ORDER — MIRTAZAPINE 15 MG/1
15 TABLET, FILM COATED ORAL NIGHTLY
Status: DISCONTINUED | OUTPATIENT
Start: 2024-04-29 | End: 2024-05-03 | Stop reason: HOSPADM

## 2024-04-29 RX ORDER — SODIUM CHLORIDE 0.9 % (FLUSH) 0.9 %
10 SYRINGE (ML) INJECTION EVERY 12 HOURS SCHEDULED
Status: DISCONTINUED | OUTPATIENT
Start: 2024-04-29 | End: 2024-05-03 | Stop reason: HOSPADM

## 2024-04-29 RX ORDER — ALPRAZOLAM 0.25 MG/1
0.25 TABLET ORAL EVERY 6 HOURS PRN
Status: DISCONTINUED | OUTPATIENT
Start: 2024-04-29 | End: 2024-05-03 | Stop reason: HOSPADM

## 2024-04-29 RX ORDER — HYDROCODONE BITARTRATE AND ACETAMINOPHEN 5; 325 MG/1; MG/1
1 TABLET ORAL EVERY 4 HOURS PRN
Status: DISCONTINUED | OUTPATIENT
Start: 2024-04-29 | End: 2024-05-03 | Stop reason: HOSPADM

## 2024-04-29 RX ORDER — NALOXONE HCL 0.4 MG/ML
0.4 VIAL (ML) INJECTION
Status: DISCONTINUED | OUTPATIENT
Start: 2024-04-29 | End: 2024-05-03 | Stop reason: HOSPADM

## 2024-04-29 RX ORDER — APIXABAN 5 MG/1
5 TABLET, FILM COATED ORAL 2 TIMES DAILY
COMMUNITY
Start: 2024-03-27

## 2024-04-29 RX ORDER — LENALIDOMIDE 10 MG/1
10 CAPSULE ORAL DAILY
COMMUNITY

## 2024-04-29 RX ORDER — BISACODYL 5 MG/1
5 TABLET, DELAYED RELEASE ORAL DAILY PRN
Status: DISCONTINUED | OUTPATIENT
Start: 2024-04-29 | End: 2024-05-03 | Stop reason: HOSPADM

## 2024-04-29 RX ORDER — TAMSULOSIN HYDROCHLORIDE 0.4 MG/1
0.4 CAPSULE ORAL DAILY
Status: DISCONTINUED | OUTPATIENT
Start: 2024-04-29 | End: 2024-05-03 | Stop reason: HOSPADM

## 2024-04-29 RX ADMIN — MONTELUKAST 10 MG: 10 TABLET, FILM COATED ORAL at 21:19

## 2024-04-29 RX ADMIN — MAGNESIUM SULFATE HEPTAHYDRATE 1 G: 1 INJECTION, SOLUTION INTRAVENOUS at 21:20

## 2024-04-29 RX ADMIN — MIRTAZAPINE 15 MG: 15 TABLET, FILM COATED ORAL at 21:19

## 2024-04-29 RX ADMIN — Medication 10 ML: at 21:30

## 2024-04-29 RX ADMIN — ATORVASTATIN CALCIUM 10 MG: 10 TABLET, FILM COATED ORAL at 21:19

## 2024-04-29 NOTE — PLAN OF CARE
Goal Outcome Evaluation:                 Patient remains lethargic throughout shift. No acute changes, VSS.

## 2024-04-29 NOTE — H&P
[facility]   HISTORY AND PHYSICAL    Patient Name: Blair Lira  : 1945  MRN: [id number]  Primary Care Physician:  Babs Summers APRN  Date of admission: 2024    Subjective   Subjective     Chief Complaint:   Fall, hip fracture      HPI:    Blair Lira is a 78 y.o. male brought into the hospital post fall.  Fall was witnessed by neighbor as per ER records.  Patient was subsequently brought into ER.  Workup showed left hip fracture, subsequently became more confused.  Patient admitted to medical service.  Cording to history provided by ER physician patient was on anticoagulation for possible A-fib as an outpatient on [facility].  This morning when examined patient is awake alert and oriented.  Complains of left hip pain.  He does not recollect events preceding fall he said he fell at home.  He denies any chest pain or shortness of breath he lives by himself.        Review of Systems:    Fatigue and weakness.  Fall  No chest pain or palpitations.  No shortness of breath.  Hip pain.  Confusion and slurred speech on admission improved.  .    Personal History     Past Medical History:   Diagnosis Date    BPH (benign prostatic hyperplasia)     Cancer     Chronic kidney disease     Femoral neck fracture 2024    Frozen shoulder     Hyperlipidemia     Hypertension 10/26/2023    Periarthritis of shoulder     Rotator cuff disorder, right     Tennis elbow     RIGHT       Past Surgical History:   Procedure Laterality Date    BRONCHOSCOPY N/A 2024    Procedure: BRONCHOSCOPY WITH BAL AND WASHINGS;  Surgeon: Dionte Ruvalcaba MD;  Location: [facility] ENDOSCOPY;  Service: Pulmonary;  Laterality: N/A;  MUCUS PLUGGING    CATARACT EXTRACTION EXTRACAPSULAR W/ INTRAOCULAR LENS IMPLANTATION Bilateral     COLONOSCOPY      JOINT REPLACEMENT Right     THR    SHOULDER ARTHROSCOPY W/ ROTATOR CUFF REPAIR Right 3/29/2023    Procedure: SHOULDER ARTHROSCOPY WITH ROTATOR CUFF REPAIR, SUBCROMIAL DECOMPRESSION,DISTAL  CLAVICLE RESECTION;  Surgeon: Gustavo Samuels MD;  [place] [facility] [place] [facility] [place]  Service: Orthopedics;  Laterality: Right;    SHOULDER SURGERY Left     SCOPE       Family History: family history is not on file. Otherwise pertinent FHx was reviewed and not pertinent to current issue.    Social History:  reports that he has never smoked. He has never been exposed to tobacco smoke. He has never used smokeless tobacco. He reports that he does not currently use alcohol. He reports that he does not use drugs.    Home Medications:  atorvastatin, fluticasone, midodrine, mirtazapine, montelukast, pantoprazole, and tamsulosin      Allergies:  No Known Allergies    Objective   Objective     Vitals:   Temp:  [97.4 °F (36.3 °C)-99.7 °F (37.6 °C)] 99.1 °F (37.3 °C)  Heart Rate:  [] 94  Resp:  [20-34] 20  BP: (105-161)/(52-91) 148/89    Physical Exam    Elderly male not in acute distress, tired looking.  HEENT reveals dry mucous membranes.  Neck supple.  Heart regular.  Lungs clear, abdomen soft.  Extremities tenderness on the left hip.  Some edema of lower extremities.  No calf tenderness      I have personally reviewed the results from the time of this admission to 4/29/2024 09:14 EDT and agree with these findings:  []  Laboratory  []  Microbiology  []  Radiology  []  EKG/Telemetry   []  Cardiology/Vascular   []  Pathology  []  Old records  []  Other:    CBC:    WBC   Date Value Ref Range Status   04/29/2024 5.98 3.40 - 10.80 10*3/mm3 Final     RBC   Date Value Ref Range Status   04/29/2024 2.77 (L) 4.14 - 5.80 10*6/mm3 Final     Hemoglobin   Date Value Ref Range Status   04/29/2024 8.7 (L) 13.0 - 17.7 g/dL Final     Hematocrit   Date Value Ref Range Status   04/29/2024 28.1 (L) 37.5 - 51.0 % Final     MCV   Date Value Ref Range Status   04/29/2024 101.4 (H) 79.0 - 97.0 fL Final     MCH   Date Value Ref Range Status   04/29/2024 31.4 26.6 - 33.0 pg Final     MCHC   Date Value Ref Range Status   04/29/2024 31.0 (L) 31.5 - 35.7  g/dL Final     RDW   Date Value Ref Range Status   04/29/2024 16.5 (H) 12.3 - 15.4 % Final     RDW-SD   Date Value Ref Range Status   04/29/2024 60.7 (H) 37.0 - 54.0 fl Final     MPV   Date Value Ref Range Status   04/29/2024 10.7 6.0 - 12.0 fL Final     Platelets   Date Value Ref Range Status   04/29/2024 184 140 - 450 10*3/mm3 Final     Neutrophil %   Date Value Ref Range Status   04/29/2024 82.1 (H) 42.7 - 76.0 % Final     Lymphocyte %   Date Value Ref Range Status   04/29/2024 9.2 (L) 19.6 - 45.3 % Final     Monocyte %   Date Value Ref Range Status   04/29/2024 7.4 5.0 - 12.0 % Final     Eosinophil %   Date Value Ref Range Status   04/29/2024 0.2 (L) 0.3 - 6.2 % Final     Basophil %   Date Value Ref Range Status   04/29/2024 0.3 0.0 - 1.5 % Final     Immature Grans %   Date Value Ref Range Status   04/29/2024 0.8 (H) 0.0 - 0.5 % Final     Neutrophils, Absolute   Date Value Ref Range Status   04/29/2024 4.91 1.70 - 7.00 10*3/mm3 Final     Lymphocytes, Absolute   Date Value Ref Range Status   04/29/2024 0.55 (L) 0.70 - 3.10 10*3/mm3 Final     Monocytes, Absolute   Date Value Ref Range Status   04/29/2024 0.44 0.10 - 0.90 10*3/mm3 Final     Eosinophils, Absolute   Date Value Ref Range Status   04/29/2024 0.01 0.00 - 0.40 10*3/mm3 Final     Basophils, Absolute   Date Value Ref Range Status   04/29/2024 0.02 0.00 - 0.20 10*3/mm3 Final     Immature Grans, Absolute   Date Value Ref Range Status   04/29/2024 0.05 0.00 - 0.05 10*3/mm3 Final     nRBC   Date Value Ref Range Status   04/29/2024 0.0 0.0 - 0.2 /100 WBC Final        BMP:    Lab Results   Component Value Date    GLUCOSE 131 (H) 04/29/2024    BUN 16 04/29/2024    CREATININE 1.67 (H) 04/29/2024    BCR 9.6 04/29/2024    K 3.8 04/29/2024    CO2 21.3 (L) 04/29/2024    CALCIUM 8.2 (L) 04/29/2024    ALBUMIN 2.9 (L) 04/29/2024    LABIL2 1.0 (L) 09/24/2020    AST 10 04/29/2024    ALT 14 04/29/2024        CT Angiogram Head w AI Analysis of LVO    Result Date: 4/28/2024    1. Technically degraded exam due to patient motion and suboptimal contrast bolus timing. 2. There is some atherosclerotic plaque at the right carotid bifurcation. No significant carotid or vertebral artery stenosis identified in the neck. 3. No intracranial flow limiting stenosis or large vessel occlusion. No clear evidence of aneurysm.  Electronically Signed By-Dr. Govind Cisse MD On:4/28/2024 11:41 PM      CT Angiogram Neck    Result Date: 4/28/2024   1. Technically degraded exam due to patient motion and suboptimal contrast bolus timing. 2. There is some atherosclerotic plaque at the right carotid bifurcation. No significant carotid or vertebral artery stenosis identified in the neck. 3. No intracranial flow limiting stenosis or large vessel occlusion. No clear evidence of aneurysm.  Electronically Signed By-Dr. Govind Cisse MD On:4/28/2024 11:41 PM      XR Chest 1 View    Result Date: 4/28/2024  No acute cardiopulmonary findings.  Electronically Signed By-Dr. Govind Cisse MD On:4/28/2024 11:02 PM      CT CEREBRAL PERFUSION WITH & WITHOUT CONTRAST    Result Date: 4/28/2024  Abnormal examination showing 114 mL of ischemic tissue in a patchy distribution throughout the brain. This may be secondary to artifact or global hypoperfusion. No infarct core is identified. Consider MRI for follow-up if patient is a candidate.    Electronically Signed By-Dr. Govind Cisse MD On:4/28/2024 10:39 PM      CT Head Without Contrast Stroke Protocol    Result Date: 4/28/2024  Senescent changes without acute abnormality.    Electronically Signed By-Dr. Govind Cisse MD On:4/28/2024 10:22 PM      XR Hip With or Without Pelvis 2 - 3 View Left    Result Date: 4/28/2024  Acute left femoral neck fracture. No dislocation.  Electronically Signed By-Dr. Govind Cisse MD On:4/28/2024 9:19 PM              Assessment & Plan   Assessment / Plan       Current Diagnosis:  Active Hospital Problems    Diagnosis     **Femoral neck  fracture     Altered mental status     Multiple myeloma     CKD (chronic kidney disease) stage 3, GFR 30-59 ml/min      Plan:   Admitted to medical service, completed workup for altered mental status, MRI of the brain, EEG.  Anticoagulation on hold, appears that patient is not on Eliquis he is on Plavix.  Will hold Plavix and aspirin for now.  Patient mental status cleared, appears to have some confusion which could be related to fall and injury.  At this point we will proceed with MRI.  Adjust home meds.  Discussed with orthopedic surgeon for possible surgery tomorrow, also discussed with hematologist oncologist who follows patient closely.  Further management will based on clinical course      DVT prophylaxis:  Mechanical DVT prophylaxis orders are present.        GI Prophylaxis:       Pepcid    CODE STATUS:    Code Status (Patient has no pulse and is not breathing): CPR (Attempt to Resuscitate)  Medical Interventions (Patient has pulse or is breathing): Full Support    Admission Status:  I believe this patient meets inpatient status.             I have dictated this note utilizing Dragon Dictation.             Please note that portions of this note were completed with a voice recognition program.             Part of this note may be an electronic transcription/translation of spoken language to printed text         using the Dragon Dictation System.       Electronically signed by Elieser Pederson MD, 04/29/24, 8:01 AM EDT.    Total time spent with in evaluation and management:

## 2024-04-29 NOTE — ED PROVIDER NOTES
Time: 1:02 AM EDT  Date of encounter:  4/28/2024  Independent Historian/Clinical History and Information was obtained by:   Nursing Staff and EMS    History is limited by: Altered Mental Status    Chief Complaint: Fall      History of Present Illness:  Patient is a 78 y.o. year old male who presents to the emergency department for evaluation of injury status post fall.  Patient reportedly fell today witnessed by neighbor and complains of left hip pain.    HPI    Patient Care Team  Primary Care Provider: Babs Summers APRN    Past Medical History:     No Known Allergies  Past Medical History:   Diagnosis Date    BPH (benign prostatic hyperplasia)     Cancer     Chronic kidney disease     Femoral neck fracture 4/28/2024    Frozen shoulder     Hyperlipidemia     Hypertension 10/26/2023    Periarthritis of shoulder     Rotator cuff disorder, right     Tennis elbow     RIGHT     Past Surgical History:   Procedure Laterality Date    BRONCHOSCOPY N/A 1/22/2024    Procedure: BRONCHOSCOPY WITH BAL AND WASHINGS;  Surgeon: Dionte Ruvalcaba MD;  Location: [facility] ENDOSCOPY;  Service: Pulmonary;  Laterality: N/A;  MUCUS PLUGGING    CATARACT EXTRACTION EXTRACAPSULAR W/ INTRAOCULAR LENS IMPLANTATION Bilateral     COLONOSCOPY      JOINT REPLACEMENT Right     THR    SHOULDER ARTHROSCOPY W/ ROTATOR CUFF REPAIR Right 3/29/2023    Procedure: SHOULDER ARTHROSCOPY WITH ROTATOR CUFF REPAIR, SUBCROMIAL DECOMPRESSION,DISTAL CLAVICLE RESECTION;  Surgeon: Gustavo Samuels MD;  Location: [facility] OR [facility];  Service: Orthopedics;  Laterality: Right;    SHOULDER SURGERY Left     SCOPE     Family History   Problem Relation Age of Onset    Malig Hyperthermia Neg Hx        Home Medications:  Prior to Admission medications    Medication Sig Start Date End Date Taking? Authorizing Provider   atorvastatin (LIPITOR) 10 MG tablet Take 1 tablet by mouth Every Night. 9/23/22   Provider, MD Shavonne   fluticasone (FLONASE) 50 MCG/ACT nasal spray 2 sprays  "into the nostril(s) as directed by provider Daily. 1/10/24   Shavonne Yin MD   midodrine (PROAMATINE) 5 MG tablet Take 1 tablet by mouth 3 (Three) Times a Day Before Meals for 60 days. 1/28/24 3/28/24  Vamsi Mason MD   mirtazapine (REMERON) 15 MG tablet Take 1 tablet by mouth Every Night. 1/15/24   Shavonne Yin MD   montelukast (SINGULAIR) 10 MG tablet Take 1 tablet by mouth Every Night. 1/28/24   Vamsi Mason MD   pantoprazole (PROTONIX) 40 MG EC tablet Take 1 tablet by mouth Daily.    Shavonne Yin MD   tamsulosin (FLOMAX) 0.4 MG capsule 24 hr capsule Take 1 capsule by mouth Daily.    Shavonne Yin MD        Social History:   Social History     Tobacco Use    Smoking status: Never     Passive exposure: Never    Smokeless tobacco: Never   Vaping Use    Vaping status: Never Used   Substance Use Topics    Alcohol use: Not Currently    Drug use: Never         Review of Systems:  Review of Systems   Unable to perform ROS: Mental status change        Physical Exam:  /75 (BP Location: Right arm, Patient Position: Lying)   Pulse 103   Temp 97.4 °F (36.3 °C) (Oral)   Resp (!) 34   Ht 185.4 cm (73\")   SpO2 94%   BMI 27.17 kg/m²     Physical Exam  Vitals and nursing note reviewed.   Constitutional:       General: He is not in acute distress.     Appearance: Normal appearance. He is not toxic-appearing.   HENT:      Head: Normocephalic and atraumatic.      Jaw: There is normal jaw occlusion.   Eyes:      General: Lids are normal.      Extraocular Movements: Extraocular movements intact.      Conjunctiva/sclera: Conjunctivae normal.      Pupils: Pupils are equal, round, and reactive to light.   Cardiovascular:      Rate and Rhythm: Normal rate and regular rhythm.      Pulses: Normal pulses.      Heart sounds: Normal heart sounds.   Pulmonary:      Effort: Pulmonary effort is normal. No respiratory distress.      Breath sounds: Normal breath sounds. No wheezing or rhonchi. "   Abdominal:      General: Abdomen is flat.      Palpations: Abdomen is soft.      Tenderness: There is no abdominal tenderness. There is no guarding or rebound.   Musculoskeletal:      Cervical back: Normal range of motion and neck supple.      Right lower leg: No edema.      Left lower leg: No edema.      Comments: Left lower extremity: Shortening with external rotation, tenderness at the hip, neurovascular intact distally   Skin:     General: Skin is warm and dry.   Neurological:      Mental Status: He is alert.      Comments: Garbled speech   Psychiatric:         Mood and Affect: Mood normal.                  Procedures:  Procedures      Medical Decision Making:      Comorbidities that affect care:    CKD, hypertension, multiple myeloma    External Notes reviewed:    None      The following orders were placed and all results were independently analyzed by me:  Orders Placed This Encounter   Procedures    XR Hip With or Without Pelvis 2 - 3 View Left    CT Head Without Contrast Stroke Protocol    CT Angiogram Head w AI Analysis of LVO    CT Angiogram Neck    CT CEREBRAL PERFUSION WITH & WITHOUT CONTRAST    XR Chest 1 View    [place] Draw    Comprehensive Metabolic Panel    Magnesium    Single High Sensitivity Troponin T    CBC Auto Differential    High Sensitivity Troponin T 2Hr    [place] Draw    Protime-INR    aPTT    Urinalysis With Microscopic If Indicated (No Culture) - Urine, Clean Catch    Urinalysis, Microscopic Only - Urine, Clean Catch    NPO Diet NPO Type: Strict NPO    Undress & Gown    Vital Signs    Orthostatic Blood Pressure    Initiate Department's Acute Stroke Process (Team D, Code 19, etc)    Perform NIH Stroke Scale    Measure Actual Weight    Head of Bed 30 Degrees or Less    Undress and Gown    Continuous Pulse Oximetry    Vital Signs    Neuro Checks    Notify Provider for SBP <80 or >200    Notify Provider for SBP >140 (For Hemorrhagic Stroke)    No Hypotonic Fluids    Nursing Dysphagia  Screening (Complete Prior to Giving anything PO)    RN to Place Order SLP Consult (IF swallow screen failed) - Eval & Treat Choosing Reason of RN Dysphagia Screen Failed    IP General Consult (Use specialty-specific consult if known)    Oxygen Therapy- Nasal Cannula; Titrate 1-6 LPM Per SpO2; 90 - 95%    Oxygen Therapy- Nasal Cannula; Titrate 1-6 LPM Per SpO2; 90 - 95%    POC Glucose Once    POC Glucose Once    ECG 12 Lead ED Triage Standing Order; Syncope    ECG 12 Lead ED Triage Standing Order; Acute Stroke (Onset <12 hrs)    Type & Screen    Insert Peripheral IV    Insert Large Bore Peripheral IV - Right AC Preferred    Inpatient Admission    CBC & Differential    Green Top (Gel)    Lavender Top    Gold Top - SST    Light Blue Top    Green Top (Gel)    Lavender Top    Gold Top - SST    Light Blue Top       Medications Given in the Emergency Department:  Medications   sodium chloride 0.9 % flush 10 mL (has no administration in time range)   sodium chloride 0.9 % flush 10 mL (has no administration in time range)   iopamidol (ISOVUE-370) 76 % injection 150 mL (150 mL Intravenous Given 4/28/24 2223)   morphine injection 4 mg (4 mg Intravenous Given 4/28/24 2332)   levETIRAcetam in NaCl 0.75% (KEPPRA) IVPB 1,000 mg (0 mg Intravenous Stopped 4/28/24 2350)        ED Course:         Labs:    Lab Results (last 24 hours)       Procedure Component Value Units Date/Time    CBC & Differential [[id number]]  (Abnormal) Collected: 04/28/24 2052    Specimen: Blood from Arm, Right Updated: 04/28/24 2100    Narrative:      The following orders were created for panel order CBC & Differential.  Procedure                               Abnormality         Status                     ---------                               -----------         ------                     CBC Auto Differential[[id number]]        Abnormal            Final result                 Please view results for these tests on the individual orders.    Comprehensive  Metabolic Panel [[id number]]  (Abnormal) Collected: 04/28/24 2052    Specimen: Blood from Arm, Right Updated: 04/28/24 2122     Glucose 116 mg/dL      BUN 15 mg/dL      Creatinine 1.69 mg/dL      Sodium 139 mmol/L      Potassium 3.7 mmol/L      Chloride 105 mmol/L      CO2 21.0 mmol/L      Calcium 8.3 mg/dL      Total Protein 5.8 g/dL      Albumin 2.9 g/dL      ALT (SGPT) 12 U/L      AST (SGOT) 9 U/L      Alkaline Phosphatase 71 U/L      Total Bilirubin 0.4 mg/dL      Globulin 2.9 gm/dL      A/G Ratio 1.0 g/dL      BUN/Creatinine Ratio 8.9     Anion Gap 13.0 mmol/L      eGFR 41.3 mL/min/1.73     Narrative:      GFR Normal >60  Chronic Kidney Disease <60  Kidney Failure <15    The GFR formula is only valid for adults with stable renal function between ages 18 and 70.    Magnesium [[id number]]  (Normal) Collected: 04/28/24 2052    Specimen: Blood from Arm, Right Updated: 04/28/24 2122     Magnesium 1.6 mg/dL     Single High Sensitivity Troponin T [[id number]]  (Abnormal) Collected: 04/28/24 2052    Specimen: Blood from Arm, Right Updated: 04/28/24 2134     HS Troponin T 76 ng/L     Narrative:      High Sensitive Troponin T Reference Range:  <14.0 ng/L- Negative Female for AMI  <22.0 ng/L- Negative Male for AMI  >=14 - Abnormal Female indicating possible myocardial injury.  >=22 - Abnormal Male indicating possible myocardial injury.   Clinicians would have to utilize clinical acumen, EKG, Troponin, and serial changes to determine if it is an Acute Myocardial Infarction or myocardial injury due to an underlying chronic condition.         CBC Auto Differential [[id number]]  (Abnormal) Collected: 04/28/24 2052    Specimen: Blood from Arm, Right Updated: 04/28/24 2100     WBC 4.84 10*3/mm3      RBC 2.64 10*6/mm3      Hemoglobin 8.5 g/dL      Hematocrit 27.0 %      .3 fL      MCH 32.2 pg      MCHC 31.5 g/dL      RDW 16.4 %      RDW-SD 61.9 fl      MPV 11.2 fL      Platelets 189 10*3/mm3      Neutrophil % 59.3 %       Lymphocyte % 27.1 %      Monocyte % 11.4 %      Eosinophil % 1.2 %      Basophil % 0.4 %      Immature Grans % 0.6 %      Neutrophils, Absolute 2.87 10*3/mm3      Lymphocytes, Absolute 1.31 10*3/mm3      Monocytes, Absolute 0.55 10*3/mm3      Eosinophils, Absolute 0.06 10*3/mm3      Basophils, Absolute 0.02 10*3/mm3      Immature Grans, Absolute 0.03 10*3/mm3      nRBC 0.0 /100 WBC     Protime-INR [[id number]]  (Abnormal) Collected: 04/28/24 2052    Specimen: Blood from Arm, Right Updated: 04/28/24 2220     Protime 15.6 Seconds      INR 1.21    Narrative:      Suggested Therapeutic Ranges For Oral Anticoagulant Therapy:  Level of Therapy                      INR Target Range  Standard Dose                            2.0-3.0  High Dose                                2.5-3.5  Patients not receiving anticoagulant  Therapy Normal Range                     0.86-1.15    aPTT [[id number]]  (Normal) Collected: 04/28/24 2052    Specimen: Blood from Arm, Right Updated: 04/28/24 2220     PTT 34.1 seconds     POC Glucose Once [[id number]]  (Abnormal) Collected: 04/28/24 2203    Specimen: Blood Updated: 04/28/24 2205     Glucose 107 mg/dL      Comment: Serial Number: [id number]:  [id number]       High Sensitivity Troponin T 2Hr [[id number]]  (Abnormal) Collected: 04/29/24 0007    Specimen: Blood from Arm, Right Updated: 04/29/24 0057     HS Troponin T 76 ng/L      Troponin T Delta 0 ng/L     Narrative:      High Sensitive Troponin T Reference Range:  <14.0 ng/L- Negative Female for AMI  <22.0 ng/L- Negative Male for AMI  >=14 - Abnormal Female indicating possible myocardial injury.  >=22 - Abnormal Male indicating possible myocardial injury.   Clinicians would have to utilize clinical acumen, EKG, Troponin, and serial changes to determine if it is an Acute Myocardial Infarction or myocardial injury due to an underlying chronic condition.         Urinalysis With Microscopic If Indicated (No Culture) - Urine, Clean Catch  [[id number]]  (Abnormal) Collected: 04/29/24 0016    Specimen: Urine, Clean Catch Updated: 04/29/24 0031     Color, UA Yellow     Appearance, UA Cloudy     pH, UA 8.0     Specific Gravity, UA 1.018     Glucose, UA Negative     Ketones, UA Negative     Bilirubin, UA Negative     Blood, UA Large (3+)     Protein, UA Trace     Leuk Esterase, UA Moderate (2+)     Nitrite, UA Negative     Urobilinogen, UA 0.2 E.U./dL    Urinalysis, Microscopic Only - Urine, Clean Catch [[id number]]  (Abnormal) Collected: 04/29/24 0016    Specimen: Urine, Clean Catch Updated: 04/29/24 0031     RBC, UA Too Numerous to Count /HPF      WBC, UA Too Numerous to Count /HPF      Bacteria, UA None Seen /HPF      Squamous Epithelial Cells, UA 0-2 /HPF      Hyaline Casts, UA 0-2 /LPF      Methodology Automated Microscopy             Imaging:    CT Angiogram Head w AI Analysis of LVO, CT Angiogram Neck    Result Date: 4/28/2024  CT ANGIOGRAM HEAD W AI ANALYSIS OF LVO-, CT ANGIOGRAM NECK-  INDICATION: Mental status changes and aphasia. Fall earlier today.  TECHNIQUE: CT angiogram of the head and neck with IV contrast. 3-D reconstructions were obtained and reviewed.  Evaluation for a significant carotid arterial stenosis is based on the NASCET criteria. Radiation dose reduction techniques included automated exposure control or exposure modulation based on body size.  AI analysis of LVO was utilized.  COMPARISON: Head CT same day  FINDINGS: The entire exam is degraded by motion. Bolus timing is suboptimal as well.  CTA neck: There is atherosclerotic disease in the aortic arch. The great vessel origins appear widely patent. The vertebral arteries appear grossly widely patent. Left side is dominant. Motion particularly degrades the evaluation of the carotid bifurcations. No significant plaque disease is seen at the left carotid bifurcation and there is no evidence of a left-sided carotid stenosis. There is some coarse calcified plaque at the right carotid  bifurcation. However, a significant right-sided carotid stenosis is not clearly identified allowing for motion. There is no gross evidence of dissection.  CTA head: There is some mild plaque in the distal right vertebral artery. The right vertebral ovary all is smaller in caliber than the left that appears to be congenital. The basilar artery is widely patent. There is diffuse plaque in both carotid siphons without significant associated stenosis. No definite intracranial flow limiting stenosis is seen. There is no large vessel occlusion. No aneurysm is identified. The major dural venous sinuses appear patent. No pathologic enhancement in the brain.       1. Technically degraded exam due to patient motion and suboptimal contrast bolus timing. 2. There is some atherosclerotic plaque at the right carotid bifurcation. No significant carotid or vertebral artery stenosis identified in the neck. 3. No intracranial flow limiting stenosis or large vessel occlusion. No clear evidence of aneurysm.  Electronically Signed By-Dr. Govind Cisse MD On:4/28/2024 11:41 PM      XR Chest 1 View    Result Date: 4/28/2024  AP PORTABLE CHEST  HISTORY: Stroke protocol.  COMPARISON: 1/17/2024  TECHNIQUE: AP portable chest x-ray.  FINDINGS: Heart is enlarged. Lung volumes remain quite low and there is chronic elevation of the right hemidiaphragm. There is chronic scarring/atelectasis at the right base. There are chronic opacities in the left midlung suggesting some scarring. The lungs are otherwise clear with no pneumothorax identified.      No acute cardiopulmonary findings.  Electronically Signed By-Dr. Govind Cisse MD On:4/28/2024 11:02 PM      CT CEREBRAL PERFUSION WITH & WITHOUT CONTRAST    Result Date: 4/28/2024  CT CEREBRAL PERFUSION W WO CONTRAST-  Date of Exam: 4/28/2024 10:21 PM  Indication: Neuro deficit, acute stroke suspected.  Comparison: None available.  Technique: Axial CT images of the brain were obtained prior to and  after the administration of 150 cc of Isovue-370 contrast. Core blood volume, core blood flow, mean transit time, and Tmax images were obtained utilizing the Rapid software protocol. A limited CT angiogram of the head was also performed to measure the blood vessel density.  The radiation dose reduction device was turned on for each scan per the ALARA (As Low as Reasonably Achievable) protocol.  Findings: Ischemic tissue volume (Tmax > 6 seconds, includes core and penumbra): 114 mL, bilateral cerebellar hemispheres and bilateral cerebral hemispheres in a patchy distribution. Ischemic core volume (rCBF < 30%): 0 mL Mismatch (penumbra) volume 114 mL, ratio infinite      Abnormal examination showing 114 mL of ischemic tissue in a patchy distribution throughout the brain. This may be secondary to artifact or global hypoperfusion. No infarct core is identified. Consider MRI for follow-up if patient is a candidate.    Electronically Signed By-Dr. Govind Cisse MD On:4/28/2024 10:39 PM      CT Head Without Contrast Stroke Protocol    Result Date: 4/28/2024  CT HEAD WO CONTRAST STROKE PROTOCOL-  HISTORY: New onset mental status changes and aphasia. Fall earlier today.  TECHNIQUE: Axial unenhanced head CT with multiplanar reformats. Radiation dose reduction techniques included automated exposure control or exposure modulation based on body size.  COMPARISON: 11/29/2023  FINDINGS: Ventricular size and configuration are normal. No acute infarct or hemorrhage is identified. There are no masses. There is no skull fracture. There is age-appropriate atrophy with mild chronic small vessel ischemic disease in the white matter. There are old bilateral basal ganglia lacunar infarcts, right greater than left. Atherosclerotic calcifications are noted in the distal vertebral arteries and carotid siphons. There is some chronic mucosal thickening in the sphenoid sinuses, ethmoid air cells, and right maxillary sinus.      Senescent changes  without acute abnormality.    Electronically Signed By-Dr. Govind Cisse MD On:4/28/2024 10:22 PM      XR Hip With or Without Pelvis 2 - 3 View Left    Result Date: 4/28/2024  XR HIP W OR WO PELVIS 2-3 VIEW LEFT-: 4/28/2024 8:50 PM  INDICATION: Hip pain after fall.  COMPARISON: None available.  FINDINGS: AP pelvis and 2 view(s) of the left hip. There is a well-positioned right hip arthroplasty. No hip dislocation on either side. No sacroiliac joint diastasis. However, there is an acute fracture of the left femoral neck. No other fractures are identified. Note is made of atherosclerotic disease.      Acute left femoral neck fracture. No dislocation.  Electronically Signed By-Dr. Govind Cisse MD On:4/28/2024 9:19 PM         Differential Diagnosis and Discussion:    Altered Mental Status: Based on the patient's signs and symptoms, differential diagnosis includes but is not limited to meningitis, stroke, sepsis, subarachnoid hemorrhage, intracranial bleeding, encephalitis, and metabolic encephalopathy.  Extremity Pain: Differential diagnosis includes but is not limited to soft tissue sprain, tendonitis, tendon injury, dislocation, fracture, deep vein thrombosis, arterial insufficiency, osteoarthritis, bursitis, and ligamentous damage.    All labs were reviewed and interpreted by me.  All X-rays impressions were independently interpreted by me.  CT scan radiology impression was interpreted by me.    MDM  Number of Diagnoses or Management Options  Cerebrovascular accident (CVA), unspecified mechanism  Closed fracture of neck of left femur, initial encounter  Diagnosis management comments: In summary this is a 78-year-old male patient who presents to the [facility] for evaluation after fall complaint of left hip pain.  X-ray confirms left hip fracture for which orthopedic surgery was consulted.  During my evaluation patient also had significant decline in his neurologic status with garbled speech and inability  to understand his words.  Stroke alert was initiated and teleneurology was consulted.  CBC independently reviewed by me and shows no critical abnormalities.  CMP independently reviewed by me and shows no critical abnormalities.  CT brain without contrast unremarkable for acute pathology.  Patient be admitted for both the hip fracture for definitive repair along with stroke workup.  Patient case has been discussed with the PCP team who will admit to the hospital for further evaluation and continuation of treatment.         Critical Care Note: Total Critical Care time of 40 minutes. Total critical care time documented does not include time spent on separately billed procedures for services of nurses or physician assistants. I personally saw and examined the patient. I have reviewed all diagnostic interpretations and treatment plans as written. I was present for the key portions of any procedures performed and the inclusive time noted in any critical care statement. Critical care time includes patient management by me, time spent at the patients bedside,  time to review lab and imaging results, discussing patient care, documentation in the medical record, and time spent with family or caregiver.        Patient Care Considerations:    MRI: I considered ordering an MRI however this can be accomplished inpatient      Consultants/Shared Management Plan:    Consultant: I have discussed the case with Dr. Osborne who agrees to consult on the patient.  Consultant: I have discussed the case with teleneurology who states possible stroke, possible seizure  PCP: I have discussed the case with Dr. Pederson who agrees to accept the patient for admission.    Social Determinants of Health:    Patient is independent, reliable, and has access to care.       Disposition and Care Coordination:    Admit:   Through independent evaluation of the patient's history, physical, and imperical data, the patient meets criteria for inpatient admission to  the [facility].        Final diagnoses:   Closed fracture of neck of left femur, initial encounter   Cerebrovascular accident (CVA), unspecified mechanism        ED Disposition       ED Disposition   Decision to Admit    Condition   --    Comment   Level of Care: Telemetry [5]   Diagnosis: Femoral neck fracture [195717]   Admitting Physician: LONNIE RUBY [[id number]]   Attending Physician: LONNIE RUBY [[id number]]   Certification: I Certify That Inpatient Hospital Services Are Medically Necessary For Greater Than 2 Midnights                 This medical record created using voice recognition software.             Nestor Francis MD  04/29/24 0107

## 2024-04-29 NOTE — ANESTHESIA PREPROCEDURE EVALUATION
Anesthesia Evaluation     Patient summary reviewed and Nursing notes reviewed   no history of anesthetic complications:   NPO Solid Status: > 8 hours  NPO Liquid Status: > 2 hours           Airway   Mallampati: II  TM distance: >3 FB  Neck ROM: full  No difficulty expected  Dental      Pulmonary     breath sounds clear to auscultation  (+) pneumonia resolved ,decreased breath sounds  Cardiovascular - normal exam  Exercise tolerance: poor (<4 METS)    PT is on anticoagulation therapy  Rhythm: regular  Rate: normal    (+) hypertension, CHF , hyperlipidemia    ROS comment: Pt states last eliquis 4/28  Admitting note states on PLAVIX also    Neuro/Psych- negative ROS  GI/Hepatic/Renal/Endo    (+) renal disease- CRI    Musculoskeletal     Abdominal    Substance History      OB/GYN          Other   blood dyscrasia,   history of cancer (metastatic multiple myeloma) active    ROS/Med Hx Other:               Interpretation Summary 1/2024         Left ventricular systolic function is normal. Left ventricular ejection fraction appears to be 51 - 55%.    Left ventricular wall thickness is consistent with mild concentric hypertrophy.    Left ventricular diastolic function is consistent with (grade I) impaired relaxation.    There are no significant valvular abnormalities noted on the study.    Estimated right ventricular systolic pressure from tricuspid regurgitation is normal (<35 mmHg).       Anesthesia Plan    ASA 4     general     Reason for not using neuraxial anesthesia or peripheral nerve block: Anticoagulated  (Metastatic multiple myeloma, pathologic fracture)    Anesthetic plan, risks, benefits, and alternatives have been provided, discussed and informed consent has been obtained with: patient.      CODE STATUS:    Code Status (Patient has no pulse and is not breathing): CPR (Attempt to Resuscitate)  Medical Interventions (Patient has pulse or is breathing): Full Support

## 2024-04-29 NOTE — CONSULTS
[facility]   Consult Note    Patient Name: Blair Lira  : 1945  MRN: [id number]  Primary Care Physician:  Babs Summers APRN  Date of admission: 2024    Subjective   Subjective     Reason for Consult: Multiple myeloma follow-up    HPI: Patient is being admitted with confusion disorientation he fell down and sustained fracture of the hip this may be a pathological fracture he has multiple myeloma receiving chemotherapy but appears to be in good remission he has been admitted with possibility of stroke and seizure disorder    Blair Lira is a 78 y.o. male patient known to me has been receiving treatment for multiple myeloma which is in remission at this time    Review of Systems   All systems were reviewed and negative except for: Has been reviewed    Personal History     Past Medical History:   Diagnosis Date    BPH (benign prostatic hyperplasia)     Cancer     Chronic kidney disease     Femoral neck fracture 2024    Frozen shoulder     Hyperlipidemia     Hypertension 10/26/2023    Periarthritis of shoulder     Rotator cuff disorder, right     Tennis elbow     RIGHT       Past Surgical History:   Procedure Laterality Date    BRONCHOSCOPY N/A 2024    Procedure: BRONCHOSCOPY WITH BAL AND WASHINGS;  Surgeon: Dionte Ruvalcaba MD;  Location: [facility] ENDOSCOPY;  Service: Pulmonary;  Laterality: N/A;  MUCUS PLUGGING    CATARACT EXTRACTION EXTRACAPSULAR W/ INTRAOCULAR LENS IMPLANTATION Bilateral     COLONOSCOPY      JOINT REPLACEMENT Right     THR    SHOULDER ARTHROSCOPY W/ ROTATOR CUFF REPAIR Right 3/29/2023    Procedure: SHOULDER ARTHROSCOPY WITH ROTATOR CUFF REPAIR, SUBCROMIAL DECOMPRESSION,DISTAL CLAVICLE RESECTION;  Surgeon: Gustavo Samuels MD;  Location: [facility] OR [facility];  Service: Orthopedics;  Laterality: Right;    SHOULDER SURGERY Left     SCOPE       Family History: family history is not on file. Otherwise pertinent FHx was reviewed and not pertinent to current issue.    Social  History:  reports that he has never smoked. He has never been exposed to tobacco smoke. He has never used smokeless tobacco. He reports that he does not currently use alcohol. He reports that he does not use drugs.    Home Medications:  atorvastatin, fluticasone, midodrine, mirtazapine, montelukast, pantoprazole, and tamsulosin      Allergies:  No Known Allergies    Objective   Objective     Vitals:   Temp:  [97.4 °F (36.3 °C)-99.7 °F (37.6 °C)] 99.1 °F (37.3 °C)  Heart Rate:  [] 94  Resp:  [20-34] 20  BP: (105-161)/(52-91) 148/89  Physical Exam    Constitutional: Awake, alert but somewhat confused and disoriented   Eyes: PERRLA, sclerae anicteric, no conjunctival injection   HENT: NCAT, mucous membranes moist   Neck: Supple, no thyromegaly, no lymphadenopathy, trachea midline   Respiratory: Clear to auscultation bilaterally, nonlabored respirations    Cardiovascular: RRR, no murmurs, rubs, or gallops, palpable pedal pulses bilaterally   Gastrointestinal: Positive bowel sounds, soft, nontender, nondistended   Musculoskeletal: No bilateral ankle edema, no clubbing or cyanosis to extremities   Psychiatric: Appropriate affect, cooperative   Neurologic: Oriented x 3, strength symmetric in all extremities, Cranial Nerves grossly intact to confrontation, speech clear   Skin: No rashes   No change  Result Review    Result Review:  I have personally reviewed the results from the time of this admission to 4/29/2024 08:23 EDT and agree with these findings:  [x]  Laboratory  []  Microbiology  [x]  Radiology  []  EKG/Telemetry   []  Cardiology/Vascular   []  Pathology  []  Old records  []  Other:  Most notable findings include: Been reviewed and discussed    Assessment & Plan   Assessment / Plan     Brief Patient Summary:  Blair Lira is a 78 y.o. male who with multiple myeloma status post fall and has developed fracture of the hip supportive care     Active Hospital Problems:  Active Hospital Problems    Diagnosis      **Femoral neck fracture        Plan:   Agree with the plan rule out possibility of stroke orthopedic consultation for surgery        Electronically signed by Vamsi Mason MD, 04/29/24, 8:23 AM EDT.    Part of this note may be an electronic transcription/translation of spoken language to printed text using the Dragon Dictation System.

## 2024-04-29 NOTE — PROGRESS NOTES
[facility]  [facility] TeleNeurology Consult Services    Routine Consult Follow-Up    Patient Name:   Blair Lira  YOB: 1945  Identification Number:   MRN - [id number]  Date of Service:   04/29/2024 09:09:52    Diagnosis        R47.01 - Aphasia    Impression  77yo male pmh CHF, HTN, HLD, multiple myeloma, pancytopenia d/t chemotherapy, CKD4, malnutrition, hypotension, who presented to the ER with ? syncopal episode/fall, found to have hip fracture with subsequent episode of transient nonsensical speech on 4/28/24. NIHSS=3. CT head, CTA head/neck without acute findings. CTP without core infarct and revealed global hypoperfusion. Sx c/f toxic/metabolic encephalopathy v stroke. Recommend:    ASA and statin for secondary stroke prevention  MRI brain w/o cont  routine EEG  PT/OT  Supportive care and cardiac work up of possible syncope per primary team  Neurology will follow    Our recommendations are outlined below    Diagnostic Studies :  MRI head without contrastRoutine EEG    Antithrombotic Medication :  Aspirin 81 mg PO dailyStatins for LDL goal less than 70    Nursing Recommendations :  IV Fluids, avoid dextrose containing fluids, Maintain euglycemiaNeuro checks q4 hrs x 24 hrs and then per shiftHead of bed 30 degreesContinue with Telemetry    Consultations :  Recommend Speech therapy if failed dysphagia screenPhysical therapy/Occupational therapyInpatient rehab if recommended by physical/occupational therapy    Disposition :  Neurology will followOutpatient Neurology follow up in 1-3 weeks    Subjective  No acute events overnight. Received morphine overnight and altered since then    Hospital Course  77yo male pmh CHF, HTN, HLD, multiple myeloma, pancytopenia d/t chemotherapy, CKD4, malnutrition, hypotension, who presented to the ER with syncopal episode/fall, found to have hip fracture. Then around 2105 staff went into assess him and found him to have nonsenical speech, on arrival  had been fully oriented with no focal deficits, uses no assistive devices for walking at baseline. No fam at bedside.    Imaging  CTA head/neckL  1. Technically degraded exam due to patient motion and suboptimal  contrast bolus timing.  2. There is some atherosclerotic plaque at the right carotid  bifurcation. No significant carotid or vertebral artery stenosis  identified in the neck.  3. No intracranial flow limiting stenosis or large vessel occlusion. No  clear evidence of aneurysm.    CT Head w/o cont:  Senescent changes without acute abnormality.    CTP:  Abnormal examination showing 114 mL of ischemic tissue in a patchy  distribution throughout the brain. This may be secondary to artifact or  global hypoperfusion. No infarct core is identified. Consider MRI for  follow-up if patient is a candidate.      Examination  BP(153/91), Pulse(97), Temp(99.7), Resp(20),  1A: Level of Consciousness - Requires repeated stimulation to arouse + 2  1B: Ask Month and Age - 1 Question Right + 1  1C: Blink Eyes & Squeeze Hands - Performs Both Tasks + 0  2: Test Horizontal Extraocular Movements - Normal + 0  3: Test Visual Fields - No Visual Loss + 0  4: Test Facial Palsy (Use Grimace if Obtunded) - Normal symmetry + 0  5A: Test Left Arm Motor Drift - No Effort Against Gravity + 3  5B: Test Right Arm Motor Drift - Some Effort Against Gravity + 2  6A: Test Left Leg Motor Drift - Some Effort Against Gravity + 2  6B: Test Right Leg Motor Drift - Some Effort Against Gravity + 2  7: Test Limb Ataxia (FNF/Heel-Shin) - No Ataxia + 0  8: Test Sensation - Normal; No sensory loss + 0  9: Test Language/Aphasia - Severe Aphasia: Fragmentary Expression, Inference Needed, Cannot Identify Materials + 2  10: Test Dysarthria - Normal + 0  11: Test Extinction/Inattention - No abnormality + 0    NIHSS Score: 14  NIHSS Free Text : AAOx self, hospital Christianity, April does not know year        NIHSS may not be reliable due to:  Obtunded    Patient/Family was informed the Neurology Consult would happen via TeleHealth consult by way of interactive audio and video telecommunications and consented to receiving care in this manner.    Telehealth Neurology consultation was provided. I spent minutes providing telehealth care. This includes time spent for face to face visit via telemedicine, review of medical records, imaging studies and discussion of findings with providers, the patient and/or family.      Dr Liseth Lara      [facility]  For Inpatient follow-up with [facility] physician please call [facility] [phone]. This is not an outpatient service. Post hospital discharge, please contact hospital directly.    Please do not communicate with [facility] physicians via secure chat. If you have any questions, Please contact [facility].  Please call or reconsult our service if there are any clinical or diagnostic changes.

## 2024-04-29 NOTE — PLAN OF CARE
Goal Outcome Evaluation:   Pt is alert and oriented to self and place.  Pt rested well through the night with no complaints of pain.  SAVANA SQUIRES RN

## 2024-04-29 NOTE — CONSULTS
[facility] TeleNeurology Consult Services      Patient Name:   Blair Lira  YOB: 1945  Identification Number:   MRN - [id number]  Date of Service:   04/28/2024 22:05:48    Diagnosis:        I63.89 - Cerebrovascular accident (CVA) due to other mechanism ([facility])    Impression:       1. fluent aphasia   - CTH no acute findings   - not an IV thorombolytic candidate due to eliquis use   - CTP no infarct core, patchy distribution ischemia which could be artifact or global hypoperfusion or secondary to seizure activity.   - CTA limited by motion but no lvo present   - acute ischemic stroke most likely diagnosis, small vessel ischemia could certainly produce this fluent aphasia with absent repetition.             Our recommendations are outlined below.    Recommendations:          Bedside Swallow Eval        DVT Prophylaxis        IV Fluids, Normal Saline        Head of Bed 30 Degrees        Euglycemia and Avoid Hyperthermia (PRN Acetaminophen)        Hold Anticoagulation for Now        Aspirin per rectum        Antihypertensives PRN if Blood pressure is greater than 220/120 or there is a concern for End organ damage/contraindications for permissive HTN. If blood pressure is greater than 220/120 give labetalol PO or IV or Vasotec IV with a goal of 15% reduction in BP during the first 24 hours.        stat 60min EEG to evaluate for seizure activity        Telemetry, q2h neurochecks    Recommended Scan:       MRI Head Without Contrast   (CT head negative stroke still suspected)    Lipid Panel to Be Obtained, if Not Done in the Last 30 Days    Therapies:        Physical Therapy, Occupational Therapy, Speech Therapy Assessment When Applicable    Dysphagia:        Swallow Evaluation, Bedside        NPO Until Swallow Evaluation    Disposition:        Neurology Follow Up Recommended    Sign Out:        Discussed with Emergency Department  Provider        ------------------------------------------------------------------------------    Advanced Imaging:  Advanced imaging has been ordered. Results pending.      Metrics:  Last Known Well: 04/28/2024 21:05:00  [facility] Notification Time: 04/28/2024 22:05:04  Arrival Time: 04/28/2024 20:38:00  Stamp Time: 04/28/2024 22:05:48  Initial Response Time: 04/28/2024 22:08:51  Symptoms: nonsensical speech.  Initial patient interaction: 04/28/2024 22:11:58  NIHSS Assessment Completed: 04/28/2024 22:15:32  Patient is not a candidate for Thrombolytic.  Thrombolytic Medical Decision: 04/28/2024 22:14:00  Patient was not deemed candidate for Thrombolytic because of following reasons:  Use of NOAs within 48 hours.    CT head showed no acute hemorrhage or acute core infarct.  I personally Reviewed the CT Head and it Showed No Acute Hemorrhage or Acute Core Infarct    Primary Provider Notified of Diagnostic Impression and Management Plan on: 04/28/2024 22:49:05        ------------------------------------------------------------------------------    History of Present Illness:  Patient is a 78 year old Male.    Patient was brought by EMS for symptoms of nonsensical speech.  Blair Lira is a 79yo male pmh CHF, HTN, HLD, multiple myeloma, pancytopenia d/t chemotherapy, CKD4, malnutrition, hypotension, who presented to the ER with syncopal episode/fall, found to have hip fracture. Then around 2105 staff went into assess him and found him to have nonsenical speech, on arrival had been fully oriented with no focal deficits, uses no assistive devices for walking at baseline. No fam at bedside.       Past Medical History:       There is no history of Stroke       There is no history of Seizures       There is no history of Dementia/MCI    Medications:    Anticoagulant use:  Yes eliquis  No Antiplatelet use  Reviewed EMR for current medications    Allergies:   Reviewed    Social History:  Smoking: No  Alcohol Use:  "No  Drug Use: No    Family History:    There is no family history of premature cerebrovascular disease pertinent to this consultation    ROS : ROS Cannot Be Obtained Because:  Patient Cannot Speak    Past Surgical History:  There Is No Surgical History Contributory To Today’s Visit         Examination:  BP(117/55), Pulse(68), Blood Glucose(107)  1A: Level of Consciousness - Alert; keenly responsive + 0  1B: Ask Month and Age - Both Questions Right + 0  1C: Blink Eyes & Squeeze Hands - Performs Both Tasks + 0  2: Test Horizontal Extraocular Movements - Normal + 0  3: Test Visual Fields - No Visual Loss + 0  4: Test Facial Palsy (Use Grimace if Obtunded) - Normal symmetry + 0  5A: Test Left Arm Motor Drift - No Drift for 10 Seconds + 0  5B: Test Right Arm Motor Drift - No Drift for 10 Seconds + 0  6A: Test Left Leg Motor Drift - No Drift for 5 Seconds + 0  6B: Test Right Leg Motor Drift - No Drift for 5 Seconds + 0  7: Test Limb Ataxia (FNF/Heel-Shin) - No Ataxia + 0  8: Test Sensation - Normal; No sensory loss + 0  9: Test Language/Aphasia - Mute/Global Aphasia: No Usable Speech/Auditory Comprehension + 3  10: Test Dysarthria - Normal + 0  11: Test Extinction/Inattention - No abnormality + 0    NIHSS Score: 3  NIHSS Free Text : repeats variations of \"thank you, thank you lots\" \"    Pre-Morbid Modified Patty Scale:  0 Points = No symptoms at all    Spoke with : ER Provider  I reviewed the available imaging via Rapid and initiated discussion with the primary provider    Patient/Family was informed the Neurology Consult would occur via TeleHealth consult by way of interactive audio and video telecommunications and consented to receiving care in this manner.      Patient is being evaluated for possible acute neurologic impairment and high probability of imminent or life-threatening deterioration. I spent total of 35 minutes providing care to this patient, including time for face to face visit via telemedicine, review of " medical records, imaging studies and discussion of findings with providers, the patient and/or family.      Dr Govind Blanco      [facility]  For Inpatient follow-up with [facility] physician please call [facility] [phone]. This is not an outpatient service. Post hospital discharge, please contact hospital directly.    Please do not communicate with [facility] physicians via secure chat. If you have any questions, Please contact [facility].  Please call or reconsult our service if there are any clinical or diagnostic changes.

## 2024-04-29 NOTE — THERAPY EVALUATION
Acute Care - Speech Language Pathology   Swallow Initial Evaluation TONY Keith     Patient Name: Blair Lira  : 1945  MRN: [id number]  Today's Date: 2024               Admit Date: 2024    Visit Dx:     ICD-10-CM ICD-9-CM   1. Closed fracture of neck of left femur, initial encounter  S72.002A 820.8   2. Cerebrovascular accident (CVA), unspecified mechanism  I63.9 434.91   3. Dysphagia, unspecified type  R13.10 787.20     Patient Active Problem List   Diagnosis    Rotator cuff tear, right    Impingement syndrome of right shoulder    Acute renal failure superimposed on chronic kidney disease    Pancytopenia due to chemotherapy    Abnormal weight loss    Hypertension    CKD (chronic kidney disease) stage 3, GFR 30-59 ml/min    Moderate malnutrition    Weakness generalized    Intractable pain    Sepsis associated hypotension    Multiple myeloma    Edema    Chronic diastolic (congestive) heart failure    Acute on chronic HFrEF (heart failure with reduced ejection fraction)    Chest pain    Syncope    COVID-19 virus infection    Chronic kidney disease, stage IV (severe)    Hypotension    Anemia    Acute on chronic renal failure    Pneumonia    Anemia    Acute febrile illness    Mucus plugging of bronchi    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    Pneumonia due to methicillin susceptible Staphylococcus aureus (MSSA)    Femoral neck fracture    Altered mental status     Past Medical History:   Diagnosis Date    BPH (benign prostatic hyperplasia)     Cancer     Chronic kidney disease     Femoral neck fracture 2024    Frozen shoulder     Hyperlipidemia     Hypertension 10/26/2023    Periarthritis of shoulder     Rotator cuff disorder, right     Tennis elbow     RIGHT     Past Surgical History:   Procedure Laterality Date    BRONCHOSCOPY N/A 2024    Procedure: BRONCHOSCOPY WITH BAL AND WASHINGS;  Surgeon:  "Dionte Ruvalcaba MD;  Location: [facility] ENDOSCOPY;  Service: Pulmonary;  Laterality: N/A;  MUCUS PLUGGING    CATARACT EXTRACTION EXTRACAPSULAR W/ INTRAOCULAR LENS IMPLANTATION Bilateral     COLONOSCOPY      JOINT REPLACEMENT Right     THR    SHOULDER ARTHROSCOPY W/ ROTATOR CUFF REPAIR Right 3/29/2023    Procedure: SHOULDER ARTHROSCOPY WITH ROTATOR CUFF REPAIR, SUBCROMIAL DECOMPRESSION,DISTAL CLAVICLE RESECTION;  Surgeon: Gustavo Samuels MD;  Location: [facility];  Service: Orthopedics;  Laterality: Right;    SHOULDER SURGERY Left     SCOPE       SLP Recommendation and Plan             Inpatient Speech Pathology Dysphagia Evaluation        PAIN SCALE: none indicated    PRECAUTIONS/CONTRAINDICATIONS:  standard, fall    SUSPECTED ABUSE/NEGLECT/EXPLOITATION:  none identified    SOCIAL/PSYCHOLOGICAL NEEDS/BARRIERS:  none identified    PAST SOCIAL HISTORY:  78 year old male, lives at home    PRIOR FUNCTION:  eating a home    PATIENT GOALS/EXPECTATIONS:  patient wants \"water\"    HISTORY:  78 year old male with fall at home and confusion. Patient with femoral neck fracture however surgery on hold at this time due to concern for stroke. Head CT without acute abnormality however MRI pending. Patient is currently NPO.    CURRENT DIET LEVEL:  npo    OBJECTIVE:    TEST ADMINISTERED:  clinical dysphagia evaluation    COGNITION/SAFETY AWARENESS:  not thoroughly evaluated at this time. Patient is oriented to self and current environment.     BEHAVIORAL OBSERVATIONS:  awake, cooperative    ORAL MOTOR EXAM:  generalized decreased oral motor strength/rom, +4/5    VOICE QUALITY:  clear    REFLEX EXAM:  adequate    POSTURE:  assisted sitting upright    FEEDING/SWALLOWING FUNCTION:  assessed with thin liquids, nectar thick liquids, pureed solids, regular solids    CLINICAL OBSERVATIONS:  Nectar and thin liquids by spoon, cup and controlled straw drink. Swallows completed with mild delay. Vocal quality clear and laryngeal sounds clear " to auscultation. Pureed with swallow completed. Solids with anterior chewing, swallow completed. Laryngeal elevation noted to palpation. No overt s/s of aspiration observed at bedside however cannot rule out silent aspiration.     DYSPHAGIA CRITERIA:  n/a    FUNCTIONAL ASSESSMENT INSTRUMENT: Patient currently scored a level 7 of 7 on Functional Communication Measures for swallowing indicating a 0% limitation in function.    ASSESSMENT/ PLAN OF CARE:  No further direct speech pathology services recommended at this time regarding dysphagia. If patient should demonstrate a decline in status please re-consult speech pathology   PROBLEMS:  1.  na   LTG 1: na   STG 1a: na     RECOMMENDATIONS:   1.   DIET: soft to chew solids, thin liquids    2.  POSITION: fully upright for all po, 30 minutes following    3.  COMPENSATORY STRATEGIES: assist with feeding/set up, meds one at a time.     Pt/responsible party agrees with plan of care and has been informed of all alternatives, risks and benefits.                 Anticipated Discharge Disposition (SLP): No further SLP services warranted (04/29/24 1231)                                                               EDUCATION  The patient has been educated in the following areas:   Dysphagia (Swallowing Impairment).              Time Calculation:    Time Calculation- SLP       Row Name 04/29/24 1231             Time Calculation- SLP    SLP Start Time 1130  -SN      SLP Stop Time 1230  -SN      SLP Time Calculation (min) 60 min  -SN      SLP Received On 04/29/24  -SN         Untimed Charges    54585-JS Eval Oral Pharyng Swallow Minutes 60  -SN         Total Minutes    Untimed Charges Total Minutes 60  -SN       Total Minutes 60  -SN                User Key  (r) = Recorded By, (t) = Taken By, (c) = Cosigned By      Initials Name Provider Type    Fang Romeo MS-CCC/SLP, CNT Speech and Language Pathologist                    Therapy Charges for Today       Code Description  Service Date Service Provider Modifiers Qty    [id number]  ST EVAL ORAL PHARYNG SWALLOW 4 4/29/2024 Fang Lozada, MS-CCC/SLP, CNT GN 1                 ELENO Soto/SLP, SHARON  4/29/2024

## 2024-04-29 NOTE — CASE MANAGEMENT/SOCIAL WORK
Discharge Planning Assessment   [facility]     Patient Name: Blair Lira  MRN: [id number]  Today's Date: 4/29/2024    Admit Date: 4/28/2024    Plan: Pt states he lives home alone. Pt has support from family. Pt denies fin. stressors, transportation concerns or issues affording groceries. PCP: ALL Summers, Pharm: Sumi. Pt has POA/healthcare surrogate paperwork on file. Pt plans to return home at discharge. SW will continue to follow for needs.   Discharge Needs Assessment       Row Name 04/29/24 1108       Living Environment    People in Home alone    Current Living Arrangements home    Potentially Unsafe Housing Conditions none    In the past 12 months has the electric, gas, oil, or water company threatened to shut off services in your home? No    Primary Care Provided by self    Provides Primary Care For no one    Family Caregiver if Needed child(justin), adult;grandchild(justin), adult    Quality of Family Relationships helpful;supportive;involved    Able to Return to Prior Arrangements yes       Resource/Environmental Concerns    Resource/Environmental Concerns none    Transportation Concerns none       Transportation Needs    In the past 12 months, has lack of transportation kept you from medical appointments or from getting medications? no    In the past 12 months, has lack of transportation kept you from meetings, work, or from getting things needed for daily living? No       Food Insecurity    Within the past 12 months, you worried that your food would run out before you got the money to buy more. Never true    Within the past 12 months, the food you bought just didn't last and you didn't have money to get more. Never true       Transition Planning    Patient/Family Anticipates Transition to home with help/services    Transportation Anticipated family or friend will provide       Discharge Needs Assessment    Readmission Within the Last 30 Days no previous admission in last 30 days    Equipment Currently Used at  Home none    Concerns to be Addressed discharge planning    Anticipated Changes Related to Illness none    Equipment Needed After Discharge walker, rolling    Discharge Coordination/Progress Pt states he lives home alone. Pt has support from family. Pt denies fin. stressors, transportation concerns or issues affording groceries. PCP: ALL Summers, Pharm: Sumi. Pt has POA/healthcare surrogate paperwork on file. Pt plans to return home at discharge. SW will continue to follow for needs.                   Discharge Plan       Row Name 04/29/24 1112       Plan    Plan Pt states he lives home alone. Pt has support from family. Pt denies fin. stressors, transportation concerns or issues affording groceries. PCP: ALL Summers, Pharm: Sumi. Pt has POA/healthcare surrogate paperwork on file. Pt plans to return home at discharge. SW will continue to follow for needs.                  Continued Care and Services - Admitted Since 4/28/2024    No active coordination exists for this encounter.          Demographic Summary       Row Name 04/29/24 1104       General Information    Admission Type inpatient    Arrived From emergency department    Referral Source admission list    Reason for Consult discharge planning    Preferred Language English       Contact Information    Permission Granted to Share Info With permission denied                   Functional Status       Row Name 04/29/24 1108       Employment/    Employment Status retired      Row Name 04/29/24 1105       Functional Status    Usual Activity Tolerance good    Current Activity Tolerance good       Physical Activity    On average, how many days per week do you engage in moderate to strenuous exercise (like a brisk walk)? 0 days       Assessment of Health Literacy    How often do you have someone help you read hospital materials? Never    How often do you have problems learning about your medical condition because of difficulty understanding written information?  Never    How often do you have a problem understanding what is told to you about your medical condition? Never    How confident are you filling out medical forms by yourself? Quite a bit    Health Literacy Good       Functional Status, IADL    Medications independent    Meal Preparation independent    Housekeeping independent    Laundry independent    Shopping independent    IADL Comments Pt states he lives alone but has good support from family.       Mental Status    General Appearance WDL WDL       Mental Status Summary    Recent Changes in Mental Status/Cognitive Functioning no changes                   Psychosocial    No documentation.                  Abuse/Neglect    No documentation.                  Legal       Row Name 04/29/24 1104       Financial Resource Strain    How hard is it for you to pay for the very basics like food, housing, medical care, and heating? Not hard       Financial/Legal    Source of Income social security    Application for Public Assistance not applied       Legal    Criminal Activity/Legal Involvement none                   Substance Abuse    No documentation.                  Patient Forms    No documentation.                     Mirtha Diego

## 2024-04-30 ENCOUNTER — APPOINTMENT (OUTPATIENT)
Dept: MRI IMAGING | Facility: HOSPITAL | Age: 79
DRG: 521 | End: 2024-04-30
Payer: MEDICARE

## 2024-04-30 ENCOUNTER — ANESTHESIA EVENT (OUTPATIENT)
Dept: PERIOP | Facility: HOSPITAL | Age: 79
End: 2024-04-30
Payer: MEDICARE

## 2024-04-30 ENCOUNTER — APPOINTMENT (OUTPATIENT)
Dept: GENERAL RADIOLOGY | Facility: HOSPITAL | Age: 79
DRG: 521 | End: 2024-04-30
Payer: MEDICARE

## 2024-04-30 ENCOUNTER — PREP FOR SURGERY (OUTPATIENT)
Dept: OTHER | Facility: HOSPITAL | Age: 79
End: 2024-04-30
Payer: MEDICARE

## 2024-04-30 ENCOUNTER — ANESTHESIA (OUTPATIENT)
Dept: PERIOP | Facility: HOSPITAL | Age: 79
End: 2024-04-30
Payer: MEDICARE

## 2024-04-30 DIAGNOSIS — S72.002A CLOSED FRACTURE OF NECK OF LEFT FEMUR, INITIAL ENCOUNTER: Primary | ICD-10-CM

## 2024-04-30 LAB
GLUCOSE BLDC GLUCOMTR-MCNC: 100 MG/DL (ref 70–99)
GLUCOSE BLDC GLUCOMTR-MCNC: 64 MG/DL (ref 70–99)
GLUCOSE BLDC GLUCOMTR-MCNC: 77 MG/DL (ref 70–99)
GLUCOSE BLDC GLUCOMTR-MCNC: 80 MG/DL (ref 70–99)
GLUCOSE BLDC GLUCOMTR-MCNC: 85 MG/DL (ref 70–99)
HCT VFR BLD AUTO: 30.3 % (ref 37.5–51)
HGB BLD-MCNC: 9.2 G/DL (ref 13–17.7)
QT INTERVAL: 417 MS
QTC INTERVAL: 475 MS

## 2024-04-30 PROCEDURE — C1776 JOINT DEVICE (IMPLANTABLE): HCPCS | Performed by: ORTHOPAEDIC SURGERY

## 2024-04-30 PROCEDURE — 99222 1ST HOSP IP/OBS MODERATE 55: CPT | Performed by: ORTHOPAEDIC SURGERY

## 2024-04-30 PROCEDURE — C1713 ANCHOR/SCREW BN/BN,TIS/BN: HCPCS | Performed by: ORTHOPAEDIC SURGERY

## 2024-04-30 PROCEDURE — 25010000002 EPINEPHRINE 1 MG/ML SOLUTION: Performed by: ORTHOPAEDIC SURGERY

## 2024-04-30 PROCEDURE — 85018 HEMOGLOBIN: CPT | Performed by: ORTHOPAEDIC SURGERY

## 2024-04-30 PROCEDURE — 25010000002 ROPIVACAINE PER 1 MG: Performed by: ORTHOPAEDIC SURGERY

## 2024-04-30 PROCEDURE — 73502 X-RAY EXAM HIP UNI 2-3 VIEWS: CPT

## 2024-04-30 PROCEDURE — 82948 REAGENT STRIP/BLOOD GLUCOSE: CPT | Performed by: INTERNAL MEDICINE

## 2024-04-30 PROCEDURE — 25810000003 LACTATED RINGERS PER 1000 ML: Performed by: ANESTHESIOLOGY

## 2024-04-30 PROCEDURE — 25810000003 LACTATED RINGERS PER 1000 ML: Performed by: ORTHOPAEDIC SURGERY

## 2024-04-30 PROCEDURE — 25010000002 MORPHINE PER 10 MG: Performed by: ORTHOPAEDIC SURGERY

## 2024-04-30 PROCEDURE — 99231 SBSQ HOSP IP/OBS SF/LOW 25: CPT | Performed by: PSYCHIATRY & NEUROLOGY

## 2024-04-30 PROCEDURE — 25010000002 CEFAZOLIN PER 500 MG

## 2024-04-30 PROCEDURE — 76000 FLUOROSCOPY <1 HR PHYS/QHP: CPT

## 2024-04-30 PROCEDURE — 27130 TOTAL HIP ARTHROPLASTY: CPT | Performed by: ORTHOPAEDIC SURGERY

## 2024-04-30 PROCEDURE — 25010000002 ONDANSETRON PER 1 MG

## 2024-04-30 PROCEDURE — 94761 N-INVAS EAR/PLS OXIMETRY MLT: CPT

## 2024-04-30 PROCEDURE — 94799 UNLISTED PULMONARY SVC/PX: CPT

## 2024-04-30 PROCEDURE — 25010000002 PROPOFOL 10 MG/ML EMULSION

## 2024-04-30 PROCEDURE — 25010000002 DEXAMETHASONE PER 1 MG

## 2024-04-30 PROCEDURE — 82948 REAGENT STRIP/BLOOD GLUCOSE: CPT

## 2024-04-30 PROCEDURE — 0SRB06Z REPLACEMENT OF LEFT HIP JOINT WITH OXIDIZED ZIRCONIUM ON POLYETHYLENE SYNTHETIC SUBSTITUTE, OPEN APPROACH: ICD-10-PCS | Performed by: ORTHOPAEDIC SURGERY

## 2024-04-30 PROCEDURE — 25010000002 HYDROMORPHONE 1 MG/ML SOLUTION

## 2024-04-30 PROCEDURE — 25010000002 FENTANYL CITRATE (PF) 50 MCG/ML SOLUTION

## 2024-04-30 PROCEDURE — 25010000002 KETOROLAC TROMETHAMINE PER 15 MG: Performed by: ORTHOPAEDIC SURGERY

## 2024-04-30 PROCEDURE — 85014 HEMATOCRIT: CPT | Performed by: ORTHOPAEDIC SURGERY

## 2024-04-30 PROCEDURE — 25010000002 SUGAMMADEX 200 MG/2ML SOLUTION: Performed by: NURSE ANESTHETIST, CERTIFIED REGISTERED

## 2024-04-30 PROCEDURE — 70551 MRI BRAIN STEM W/O DYE: CPT

## 2024-04-30 DEVICE — LINER ACET G7/LONGEVITY NTRL HXPE SZF 36MM: Type: IMPLANTABLE DEVICE | Site: ACETABULUM | Status: FUNCTIONAL

## 2024-04-30 DEVICE — SUT NONABS MAXBRAID NMBR5 K60 38IN WHT/BLU: Type: IMPLANTABLE DEVICE | Site: ACETABULUM | Status: FUNCTIONAL

## 2024-04-30 DEVICE — BIOLOX® DELTA, CERAMIC FEMORAL HEAD, M, Ø 36/0, TAPER 12/14
Type: IMPLANTABLE DEVICE | Site: ACETABULUM | Status: FUNCTIONAL
Brand: BIOLOX® DELTA

## 2024-04-30 DEVICE — SHLL ACET G7 PPS LTD/HL TI SZF 54MM: Type: IMPLANTABLE DEVICE | Site: ACETABULUM | Status: FUNCTIONAL

## 2024-04-30 DEVICE — TOTAL HIP PRIMARY: Type: IMPLANTABLE DEVICE | Site: HIP | Status: FUNCTIONAL

## 2024-04-30 DEVICE — STEM FEM/HIP AVENIRCOMPLETE COLAR STFF HA SZ6.5: Type: IMPLANTABLE DEVICE | Site: ACETABULUM | Status: FUNCTIONAL

## 2024-04-30 RX ORDER — MIDAZOLAM HYDROCHLORIDE 2 MG/2ML
2 INJECTION, SOLUTION INTRAMUSCULAR; INTRAVENOUS ONCE
Status: DISCONTINUED | OUTPATIENT
Start: 2024-04-30 | End: 2024-04-30 | Stop reason: HOSPADM

## 2024-04-30 RX ORDER — ONDANSETRON 2 MG/ML
4 INJECTION INTRAMUSCULAR; INTRAVENOUS ONCE AS NEEDED
Status: DISCONTINUED | OUTPATIENT
Start: 2024-04-30 | End: 2024-04-30

## 2024-04-30 RX ORDER — OXYCODONE HYDROCHLORIDE AND ACETAMINOPHEN 5; 325 MG/1; MG/1
1 TABLET ORAL EVERY 4 HOURS PRN
Status: DISCONTINUED | OUTPATIENT
Start: 2024-04-30 | End: 2024-05-03 | Stop reason: HOSPADM

## 2024-04-30 RX ORDER — CEFAZOLIN SODIUM 1 G/3ML
INJECTION, POWDER, FOR SOLUTION INTRAMUSCULAR; INTRAVENOUS AS NEEDED
Status: DISCONTINUED | OUTPATIENT
Start: 2024-04-30 | End: 2024-04-30 | Stop reason: SURG

## 2024-04-30 RX ORDER — CEFAZOLIN SODIUM IN 0.9 % NACL 3 G/100 ML
3 INTRAVENOUS SOLUTION, PIGGYBACK (ML) INTRAVENOUS ONCE
Status: CANCELLED | OUTPATIENT
Start: 2024-04-30 | End: 2024-04-30

## 2024-04-30 RX ORDER — LIDOCAINE HYDROCHLORIDE 20 MG/ML
INJECTION, SOLUTION EPIDURAL; INFILTRATION; INTRACAUDAL; PERINEURAL AS NEEDED
Status: DISCONTINUED | OUTPATIENT
Start: 2024-04-30 | End: 2024-04-30

## 2024-04-30 RX ORDER — BUPIVACAINE HCL/0.9 % NACL/PF 0.1 %
2 PLASTIC BAG, INJECTION (ML) EPIDURAL EVERY 8 HOURS
Qty: 200 ML | Refills: 0 | Status: COMPLETED | OUTPATIENT
Start: 2024-05-01 | End: 2024-05-01

## 2024-04-30 RX ORDER — FAMOTIDINE 20 MG/1
40 TABLET, FILM COATED ORAL DAILY
Status: DISCONTINUED | OUTPATIENT
Start: 2024-04-30 | End: 2024-05-03 | Stop reason: HOSPADM

## 2024-04-30 RX ORDER — PROMETHAZINE HYDROCHLORIDE 12.5 MG/1
12.5 TABLET ORAL EVERY 6 HOURS PRN
Status: DISCONTINUED | OUTPATIENT
Start: 2024-04-30 | End: 2024-05-03 | Stop reason: HOSPADM

## 2024-04-30 RX ORDER — SODIUM CHLORIDE, SODIUM LACTATE, POTASSIUM CHLORIDE, CALCIUM CHLORIDE 600; 310; 30; 20 MG/100ML; MG/100ML; MG/100ML; MG/100ML
9 INJECTION, SOLUTION INTRAVENOUS CONTINUOUS PRN
Status: DISCONTINUED | OUTPATIENT
Start: 2024-04-30 | End: 2024-05-03 | Stop reason: HOSPADM

## 2024-04-30 RX ORDER — PROPOFOL 10 MG/ML
VIAL (ML) INTRAVENOUS AS NEEDED
Status: DISCONTINUED | OUTPATIENT
Start: 2024-04-30 | End: 2024-04-30 | Stop reason: SURG

## 2024-04-30 RX ORDER — PROMETHAZINE HYDROCHLORIDE 12.5 MG/1
12.5 SUPPOSITORY RECTAL EVERY 6 HOURS PRN
Status: DISCONTINUED | OUTPATIENT
Start: 2024-04-30 | End: 2024-05-03 | Stop reason: HOSPADM

## 2024-04-30 RX ORDER — SODIUM CHLORIDE 0.9 % (FLUSH) 0.9 %
3 SYRINGE (ML) INJECTION EVERY 12 HOURS SCHEDULED
Status: DISCONTINUED | OUTPATIENT
Start: 2024-04-30 | End: 2024-05-03 | Stop reason: HOSPADM

## 2024-04-30 RX ORDER — ONDANSETRON 2 MG/ML
INJECTION INTRAMUSCULAR; INTRAVENOUS AS NEEDED
Status: DISCONTINUED | OUTPATIENT
Start: 2024-04-30 | End: 2024-04-30 | Stop reason: SURG

## 2024-04-30 RX ORDER — ONDANSETRON 2 MG/ML
4 INJECTION INTRAMUSCULAR; INTRAVENOUS EVERY 6 HOURS PRN
Status: DISCONTINUED | OUTPATIENT
Start: 2024-04-30 | End: 2024-05-03 | Stop reason: HOSPADM

## 2024-04-30 RX ORDER — PROMETHAZINE HYDROCHLORIDE 25 MG/1
25 SUPPOSITORY RECTAL ONCE AS NEEDED
Status: DISCONTINUED | OUTPATIENT
Start: 2024-04-30 | End: 2024-04-30

## 2024-04-30 RX ORDER — ACETAMINOPHEN 500 MG
1000 TABLET ORAL EVERY 8 HOURS
Qty: 12 TABLET | Refills: 0 | Status: COMPLETED | OUTPATIENT
Start: 2024-04-30 | End: 2024-05-02

## 2024-04-30 RX ORDER — FERROUS SULFATE 325(65) MG
325 TABLET ORAL
Status: DISCONTINUED | OUTPATIENT
Start: 2024-05-01 | End: 2024-05-03 | Stop reason: HOSPADM

## 2024-04-30 RX ORDER — SODIUM CHLORIDE 0.9 % (FLUSH) 0.9 %
10 SYRINGE (ML) INJECTION AS NEEDED
Status: DISCONTINUED | OUTPATIENT
Start: 2024-04-30 | End: 2024-05-03 | Stop reason: HOSPADM

## 2024-04-30 RX ORDER — PHENYLEPHRINE HCL IN 0.9% NACL 1 MG/10 ML
SYRINGE (ML) INTRAVENOUS AS NEEDED
Status: DISCONTINUED | OUTPATIENT
Start: 2024-04-30 | End: 2024-04-30 | Stop reason: SURG

## 2024-04-30 RX ORDER — DEXAMETHASONE SODIUM PHOSPHATE 4 MG/ML
INJECTION, SOLUTION INTRA-ARTICULAR; INTRALESIONAL; INTRAMUSCULAR; INTRAVENOUS; SOFT TISSUE AS NEEDED
Status: DISCONTINUED | OUTPATIENT
Start: 2024-04-30 | End: 2024-04-30 | Stop reason: SURG

## 2024-04-30 RX ORDER — CEFAZOLIN SODIUM IN 0.9 % NACL 3 G/100 ML
3 INTRAVENOUS SOLUTION, PIGGYBACK (ML) INTRAVENOUS ONCE
Status: DISCONTINUED | OUTPATIENT
Start: 2024-04-30 | End: 2024-04-30 | Stop reason: SDUPTHER

## 2024-04-30 RX ORDER — OXYCODONE HYDROCHLORIDE 5 MG/1
5 TABLET ORAL
Status: DISCONTINUED | OUTPATIENT
Start: 2024-04-30 | End: 2024-04-30

## 2024-04-30 RX ORDER — ACETAMINOPHEN 325 MG/1
325 TABLET ORAL EVERY 4 HOURS PRN
Status: DISCONTINUED | OUTPATIENT
Start: 2024-04-30 | End: 2024-05-03 | Stop reason: HOSPADM

## 2024-04-30 RX ORDER — PROMETHAZINE HYDROCHLORIDE 12.5 MG/1
25 TABLET ORAL ONCE AS NEEDED
Status: DISCONTINUED | OUTPATIENT
Start: 2024-04-30 | End: 2024-04-30

## 2024-04-30 RX ORDER — CEFAZOLIN SODIUM 2 G/100ML
2 INJECTION, SOLUTION INTRAVENOUS ONCE
Status: CANCELLED | OUTPATIENT
Start: 2024-04-30 | End: 2024-04-30

## 2024-04-30 RX ORDER — DEXTROSE MONOHYDRATE 25 G/50ML
25 INJECTION, SOLUTION INTRAVENOUS ONCE
Status: COMPLETED | OUTPATIENT
Start: 2024-04-30 | End: 2024-04-30

## 2024-04-30 RX ORDER — SODIUM CHLORIDE, SODIUM LACTATE, POTASSIUM CHLORIDE, CALCIUM CHLORIDE 600; 310; 30; 20 MG/100ML; MG/100ML; MG/100ML; MG/100ML
100 INJECTION, SOLUTION INTRAVENOUS CONTINUOUS
Status: DISCONTINUED | OUTPATIENT
Start: 2024-04-30 | End: 2024-05-01

## 2024-04-30 RX ORDER — MAGNESIUM HYDROXIDE 1200 MG/15ML
LIQUID ORAL AS NEEDED
Status: DISCONTINUED | OUTPATIENT
Start: 2024-04-30 | End: 2024-04-30 | Stop reason: HOSPADM

## 2024-04-30 RX ORDER — ONDANSETRON 4 MG/1
4 TABLET, ORALLY DISINTEGRATING ORAL EVERY 6 HOURS PRN
Status: DISCONTINUED | OUTPATIENT
Start: 2024-04-30 | End: 2024-05-03 | Stop reason: HOSPADM

## 2024-04-30 RX ORDER — OXYCODONE HYDROCHLORIDE AND ACETAMINOPHEN 5; 325 MG/1; MG/1
2 TABLET ORAL EVERY 4 HOURS PRN
Status: DISCONTINUED | OUTPATIENT
Start: 2024-04-30 | End: 2024-05-03 | Stop reason: HOSPADM

## 2024-04-30 RX ORDER — AMOXICILLIN 250 MG
2 CAPSULE ORAL 2 TIMES DAILY PRN
Status: DISCONTINUED | OUTPATIENT
Start: 2024-04-30 | End: 2024-05-03 | Stop reason: HOSPADM

## 2024-04-30 RX ORDER — KETOROLAC TROMETHAMINE 30 MG/ML
15 INJECTION, SOLUTION INTRAMUSCULAR; INTRAVENOUS EVERY 6 HOURS
Status: COMPLETED | OUTPATIENT
Start: 2024-04-30 | End: 2024-05-01

## 2024-04-30 RX ORDER — SODIUM CHLORIDE 9 MG/ML
40 INJECTION, SOLUTION INTRAVENOUS AS NEEDED
Status: DISCONTINUED | OUTPATIENT
Start: 2024-04-30 | End: 2024-05-03 | Stop reason: HOSPADM

## 2024-04-30 RX ORDER — FENTANYL CITRATE 50 UG/ML
INJECTION, SOLUTION INTRAMUSCULAR; INTRAVENOUS AS NEEDED
Status: DISCONTINUED | OUTPATIENT
Start: 2024-04-30 | End: 2024-04-30 | Stop reason: SURG

## 2024-04-30 RX ORDER — LIDOCAINE HYDROCHLORIDE 20 MG/ML
INJECTION, SOLUTION EPIDURAL; INFILTRATION; INTRACAUDAL; PERINEURAL AS NEEDED
Status: DISCONTINUED | OUTPATIENT
Start: 2024-04-30 | End: 2024-04-30 | Stop reason: SURG

## 2024-04-30 RX ORDER — ROCURONIUM BROMIDE 10 MG/ML
INJECTION, SOLUTION INTRAVENOUS AS NEEDED
Status: DISCONTINUED | OUTPATIENT
Start: 2024-04-30 | End: 2024-04-30 | Stop reason: SURG

## 2024-04-30 RX ORDER — ACETAMINOPHEN 500 MG
1000 TABLET ORAL ONCE
Status: COMPLETED | OUTPATIENT
Start: 2024-04-30 | End: 2024-04-30

## 2024-04-30 RX ORDER — BUPIVACAINE HCL/0.9 % NACL/PF 0.1 %
2 PLASTIC BAG, INJECTION (ML) EPIDURAL
Qty: 100 ML | Refills: 0 | Status: DISCONTINUED | OUTPATIENT
Start: 2024-04-30 | End: 2024-04-30 | Stop reason: HOSPADM

## 2024-04-30 RX ADMIN — FAMOTIDINE 40 MG: 20 TABLET ORAL at 21:04

## 2024-04-30 RX ADMIN — DEXTROSE MONOHYDRATE 25 ML: 25 INJECTION, SOLUTION INTRAVENOUS at 15:44

## 2024-04-30 RX ADMIN — Medication 10 ML: at 08:59

## 2024-04-30 RX ADMIN — ATORVASTATIN CALCIUM 10 MG: 10 TABLET, FILM COATED ORAL at 21:04

## 2024-04-30 RX ADMIN — Medication 100 MCG: at 16:38

## 2024-04-30 RX ADMIN — Medication 10 ML: at 21:06

## 2024-04-30 RX ADMIN — ROCURONIUM BROMIDE 50 MG: 10 INJECTION, SOLUTION INTRAVENOUS at 15:57

## 2024-04-30 RX ADMIN — PROPOFOL 140 MG: 10 INJECTION, EMULSION INTRAVENOUS at 15:57

## 2024-04-30 RX ADMIN — FENTANYL CITRATE 25 MCG: 50 INJECTION, SOLUTION INTRAMUSCULAR; INTRAVENOUS at 15:57

## 2024-04-30 RX ADMIN — ACETAMINOPHEN 1000 MG: 500 TABLET ORAL at 23:38

## 2024-04-30 RX ADMIN — LIDOCAINE HYDROCHLORIDE 100 MG: 20 INJECTION, SOLUTION INTRAVENOUS at 15:57

## 2024-04-30 RX ADMIN — MONTELUKAST 10 MG: 10 TABLET, FILM COATED ORAL at 21:04

## 2024-04-30 RX ADMIN — ACETAMINOPHEN 1000 MG: 500 TABLET ORAL at 15:36

## 2024-04-30 RX ADMIN — Medication 200 MCG: at 16:45

## 2024-04-30 RX ADMIN — HYDROMORPHONE HYDROCHLORIDE 0.5 MG: 1 INJECTION, SOLUTION INTRAMUSCULAR; INTRAVENOUS; SUBCUTANEOUS at 16:33

## 2024-04-30 RX ADMIN — FENTANYL CITRATE 25 MCG: 50 INJECTION, SOLUTION INTRAMUSCULAR; INTRAVENOUS at 16:30

## 2024-04-30 RX ADMIN — FENTANYL CITRATE 50 MCG: 50 INJECTION, SOLUTION INTRAMUSCULAR; INTRAVENOUS at 16:07

## 2024-04-30 RX ADMIN — Medication 3 ML: at 21:06

## 2024-04-30 RX ADMIN — ONDANSETRON HYDROCHLORIDE 4 MG: 2 SOLUTION INTRAMUSCULAR; INTRAVENOUS at 16:04

## 2024-04-30 RX ADMIN — Medication 100 MCG: at 16:17

## 2024-04-30 RX ADMIN — KETOROLAC TROMETHAMINE 15 MG: 30 INJECTION, SOLUTION INTRAMUSCULAR; INTRAVENOUS at 21:04

## 2024-04-30 RX ADMIN — DEXAMETHASONE SODIUM PHOSPHATE 4 MG: 4 INJECTION, SOLUTION INTRAMUSCULAR; INTRAVENOUS at 16:04

## 2024-04-30 RX ADMIN — SODIUM CHLORIDE, POTASSIUM CHLORIDE, SODIUM LACTATE AND CALCIUM CHLORIDE 9 ML/HR: 600; 310; 30; 20 INJECTION, SOLUTION INTRAVENOUS at 15:34

## 2024-04-30 RX ADMIN — Medication 100 MCG: at 16:12

## 2024-04-30 RX ADMIN — CEFAZOLIN 2 G: 1 INJECTION, POWDER, FOR SOLUTION INTRAMUSCULAR; INTRAVENOUS; PARENTERAL at 16:02

## 2024-04-30 RX ADMIN — MIRTAZAPINE 15 MG: 15 TABLET, FILM COATED ORAL at 21:05

## 2024-04-30 RX ADMIN — SUGAMMADEX 200 MG: 100 INJECTION, SOLUTION INTRAVENOUS at 17:04

## 2024-04-30 RX ADMIN — SODIUM CHLORIDE, POTASSIUM CHLORIDE, SODIUM LACTATE AND CALCIUM CHLORIDE 100 ML/HR: 600; 310; 30; 20 INJECTION, SOLUTION INTRAVENOUS at 18:59

## 2024-04-30 NOTE — ANESTHESIA POSTPROCEDURE EVALUATION
Patient: Blair Lira    Procedure Summary       Date: 04/30/24 Room / Location: [facility] OR 03 / [facility] MA[facility] OR    Anesthesia Start: 1553 Anesthesia Stop: 1713    Procedure: TOTAL HIP ARTHROPLASTY  ANTERIOR APROACH (Left: Hip) Diagnosis:       Closed fracture of neck of left femur, initial encounter      (Closed fracture of neck of left femur, initial encounter [S72.002A])    Surgeons: Adán Osborne MD Provider: Beau Slade MD    Anesthesia Type: general ASA Status: 4            Anesthesia Type: general    Vitals  Vitals Value Taken Time   BP 80/34 04/30/24 1823   Temp 36.7 °C (98 °F) 04/30/24 1745   Pulse 102 04/30/24 1824   Resp 16 04/30/24 1810   SpO2 95 % 04/30/24 1824   Vitals shown include unfiled device data.        Post Anesthesia Care and Evaluation    Patient location during evaluation: bedside  Patient participation: complete - patient participated  Level of consciousness: awake  Pain score: 2  Pain management: adequate    Airway patency: patent  PONV Status: none  Cardiovascular status: acceptable and stable  Respiratory status: acceptable  Hydration status: acceptable    Comments: An Anesthesiologist personally participated in the most demanding procedures (including induction and emergence if applicable) in the anesthesia plan, monitored the course of anesthesia administration at frequent intervals and remained physically present and available for immediate diagnosis and treatment of emergencies.

## 2024-04-30 NOTE — PROGRESS NOTES
[facility]  [facility] Tele[facility]urology Consult [facility]    Routine Consult Follow-Up    Patient Name:   Blair Lira  YOB: 1945  Identification Number:   MRN - [id number]  Date of Service:   04/30/2024 09:27:42    Diagnosis        G93.41 - Encephalopathy Metabolic    Impression  79yo male pmh CHF, HTN, HLD, multiple myeloma, pancytopenia d/t chemotherapy, CKD4, malnutrition, hypotension, who presented to the ER with witnessed fall, found to have hip fracture with subsequent episode of transient nonsensical speech on 4/28/24 (unclear if occurred after pain medication given). NIHSS=3. CT head, CTA head/neck without acute findings. CTP without core infarct and revealed global hypoperfusion. MRI brain w/o acute stroke. rEEG without epileptiform discharge. Sx c/f toxic/metabolic encephalopathy v less likely seizure. Recommend:    No indication for AED at this time.  Continue home medications  Delirium precautions  PT/OT  Supportive care otherwise per primary team/ortho  Outpatient f/u with neurology in 4-6 weeks  Please call with any further questions.    Our recommendations are outlined below    Nursing Recommendations :  Delirium precautions: Blinds open during the day, closed at night, frequent reorientation, minimize nighttime interruptionsWhen possible avoid benzodiazepines, opioid pain medications, and anticholinergic medications    Consultations :  Toxic metabolic work up per primary team    Disposition :  Outpatient Neurology follow up in 3-6 weeks    Subjective  No acute events overnight.    Hospital Course  79yo male pmh CHF, HTN, HLD, multiple myeloma, pancytopenia d/t chemotherapy, CKD4, malnutrition, hypotension, who presented to the ER with syncopal episode/fall, found to have hip fracture. Then around 2105 staff went into assess him and found him to have nonsenical speech, on arrival had been fully oriented with no focal deficits, uses no assistive devices for walking at  baseline. No fam at bedside.    Imaging  CTA head/neckL  1. Technically degraded exam due to patient motion and suboptimal  contrast bolus timing.  2. There is some atherosclerotic plaque at the right carotid  bifurcation. No significant carotid or vertebral artery stenosis  identified in the neck.  3. No intracranial flow limiting stenosis or large vessel occlusion. No  clear evidence of aneurysm.    CT Head w/o cont:  Senescent changes without acute abnormality.    CTP:  Abnormal examination showing 114 mL of ischemic tissue in a patchy  distribution throughout the brain. This may be secondary to artifact or  global hypoperfusion. No infarct core is identified. Consider MRI for  follow-up if patient is a candidate.    MRI brain w/o cont:  No evidence of acute brain ischemia. No acute intracranial process  demonstrated        Examination  BP(166/89), Pulse(95), Temp(100.2), Resp(20),  1A: Level of Consciousness - Alert; keenly responsive + 0  1B: Ask Month and Age - Both Questions Right + 0  1C: Blink Eyes & Squeeze Hands - Performs Both Tasks + 0  2: Test Horizontal Extraocular Movements - Normal + 0  3: Test Visual Fields - No Visual Loss + 0  4: Test Facial Palsy (Use Grimace if Obtunded) - Normal symmetry + 0  5A: Test Left Arm Motor Drift - No Drift for 10 Seconds + 0  5B: Test Right Arm Motor Drift - No Drift for 10 Seconds + 0  6A: Test Left Leg Motor Drift - Amputation/Joint Fusion + 0  6B: Test Right Leg Motor Drift - No Drift for 5 Seconds + 0  7: Test Limb Ataxia (FNF/Heel-Shin) - No Ataxia + 0  8: Test Sensation - Normal; No sensory loss + 0  9: Test Language/Aphasia - Normal; No aphasia + 0  10: Test Dysarthria - Normal + 0  11: Test Extinction/Inattention - No abnormality + 0    NIHSS Score: 0  NIHSS Free Text : AAOx 3  LLE deferred given hip fracture            Patient/Family was informed the Neurology Consult would happen via TeleHealth consult by way of interactive audio and video  telecommunications and consented to receiving care in this manner.    Telehealth Neurology consultation was provided. I spent minutes providing telehealth care. This includes time spent for face to face visit via telemedicine, review of medical records, imaging studies and discussion of findings with providers, the patient and/or family.      Dr Liseth Lara      [facility]  For Inpatient follow-up with [facility] physician please call [facility] [phone]. This is not an outpatient service. Post hospital discharge, please contact hospital directly.    Please do not communicate with [facility] physicians via secure chat. If you have any questions, Please contact [facility].  Please call or reconsult our service if there are any clinical or diagnostic changes.

## 2024-04-30 NOTE — SIGNIFICANT NOTE
04/30/24 0604   OTHER   Discipline occupational therapist   Rehab Time/Intention   Session Not Performed   (pending ortho surgery)

## 2024-04-30 NOTE — CONSULTS
[facility]   Consult Note    Patient Name: Blair Lira  : 1945  MRN: [id number]  Primary Care Physician:  Babs Summers APRN  Referring Physician: No ref. provider found  Date of admission: 2024    Subjective   Subjective     Reason for Consult/ Chief Complaint: Fracture    HPI:  Blair Lira is a 78 y.o. male to hospital couple of days ago status post fall.  He is brought to the emergency room and I was consulted after being admitted by Dr. Galvin.    Review of Systems   14 Point ROS is negative except as noted above.     Personal History     Past Medical History:   Diagnosis Date    BPH (benign prostatic hyperplasia)     Cancer     Chronic kidney disease     Femoral neck fracture 2024    Frozen shoulder     Hyperlipidemia     Hypertension 10/26/2023    Periarthritis of shoulder     Rotator cuff disorder, right     Tennis elbow     RIGHT       Past Surgical History:   Procedure Laterality Date    BRONCHOSCOPY N/A 2024    Procedure: BRONCHOSCOPY WITH BAL AND WASHINGS;  Surgeon: Dionte Ruvalcaba MD;  Location: [facility] ENDOSCOPY;  Service: Pulmonary;  Laterality: N/A;  MUCUS PLUGGING    CATARACT EXTRACTION EXTRACAPSULAR W/ INTRAOCULAR LENS IMPLANTATION Bilateral     COLONOSCOPY      JOINT REPLACEMENT Right     THR    SHOULDER ARTHROSCOPY W/ ROTATOR CUFF REPAIR Right 3/29/2023    Procedure: SHOULDER ARTHROSCOPY WITH ROTATOR CUFF REPAIR, SUBCROMIAL DECOMPRESSION,DISTAL CLAVICLE RESECTION;  Surgeon: Gustavo Samuels MD;  Location: [facility] OR [facility];  Service: Orthopedics;  Laterality: Right;    SHOULDER SURGERY Left     SCOPE       Family History: family history is not on file. Otherwise pertinent FHx was reviewed and not pertinent to current issue.    Social History:  reports that he has never smoked. He has never been exposed to tobacco smoke. He has never used smokeless tobacco. He reports that he does not currently use alcohol. He reports that he does not use drugs.    Home  Medications:  allopurinol, apixaban, atorvastatin, dexAMETHasone, fluticasone, lenalidomide, midodrine, mirtazapine, montelukast, pantoprazole, and tamsulosin    Allergies:  No Known Allergies    Objective    Objective     Vitals:   Temp:  [98.6 °F (37 °C)-100.8 °F (38.2 °C)] 98.6 °F (37 °C)  Heart Rate:  [] 103  Resp:  [18-20] 18  BP: (123-181)/() 123/91    Physical Exam:   Constitutional: Awake, alert   HENT: Atraumatic, Normocephalic   Respiratory: Nonlabored respirations    Cardiovascular: Intact peripheral pulses    Musculoskeletal: Clavicles shoulders elbows wrist and hand are all nontender full range of motion.  Right lower extremity shortened externally rotated compared to the left bilateral knees and ankles nontender calves are soft no signs of DVT     Imaging:  Imaging Results (Last 24 Hours)       Procedure Component Value Units Date/Time    MRI Brain Without Contrast [[id number]] Collected: 04/30/24 0732     Updated: 04/30/24 0741    Narrative:      MRI BRAIN WO CONTRAST-     Date of Exam: 4/30/2024 6:30 AM     Indication: Stroke, follow up; S72.002A-Fracture of unspecified part of  neck of left femur, initial encounter for closed fracture;  I63.9-Cerebral infarction, unspecified; R13.10-Dysphagia, unspecified.     Comparison: CT perfusion brain 4/28/2024     Technique:  Routine multiplanar/multisequence sequence images of the  brain were obtained without contrast administration.        Findings:  No restricted diffusion or suspicious susceptibility. No edema or mass  effect. Mild scattered T2 signal hyperintensities in the supratentorial  white matter compatible with chronic microvascular ischemic changes. No  cortical signal abnormality. No abnormal extra-axial fluid collection.  CSF spaces/ventricles appear age-appropriate. Major intracranial flow  voids are present. Cerebellar tonsils are in normal position. Pituitary  is normal in size. Mucosal thickening noted in the right-sided  paranasal  sinuses       Impression:      Impression:  No evidence of acute brain ischemia. No acute intracranial process  demonstrated           Electronically Signed ByVibha Rivas On:4/30/2024 7:39 AM                Result Review    Result Review:  I have personally reviewed the results from the time of this admission to 4/30/2024 11:25 EDT and agree with these findings:  []  Laboratory  []  Microbiology  []  Radiology  []  EKG/Telemetry   []  Cardiology/Vascular   []  Pathology  []  Old records  []  Other:      Assessment & Plan   Assessment / Plan     Brief Patient Summary:  Blair Lira is a 78 y.o. male who Garden 4 femoral neck fracture    Active Hospital Problems:  Active Hospital Problems    Diagnosis     **Femoral neck fracture     Altered mental status     Multiple myeloma     CKD (chronic kidney disease) stage 3, GFR 30-59 ml/min        Plan: Discussed the risk misperceiving with right hip replacement with his grandson bleed infection death blood clots lung problems heart attacks possible need for future surgery possible damage neurovascular structures leg length discrepancy among others and wished to proceed.      Electronically signed by Adán Osborne MD, 04/30/24, 11:25 AM EDT.

## 2024-04-30 NOTE — PROGRESS NOTES
[facility]     Progress Note    Patient Name: Blair Lira  : 1945  MRN: [id number]  Primary Care Physician:  Babs Summers APRN  Date of admission: 2024      Subjective   Brief summary.  Patient admitted post fall with hip fracture.      HPI:  Awake alert.  No confusion.  Waiting for surgery.  No chest pain or shortness of breath.  Hip pain.    Review of Systems     No fever chills, no palpitations, no shortness of breath, hip pain.      Objective     Vitals:   Temp:  [98.2 °F (36.8 °C)-100.8 °F (38.2 °C)] 98.6 °F (37 °C)  Heart Rate:  [] 103  Resp:  [20] 20  BP: (123-181)/() 123/91    Physical Exam :     Elderly male obese, not in acute distress.  Heart regular.  Slightly tachycardic.  Lungs diminished breath sounds.  Abdomen soft and obese.  Nontender.  Extremities left hip tenderness.      Result Review:  I have personally reviewed the results from the time of this admission to 2024 09:12 EDT and agree with these findings:  [x]  Laboratory  [x]  Microbiology  [x]  Radiology  []  EKG/Telemetry   []  Cardiology/Vascular   []  Pathology  []  Old records  []  Other:           Assessment / Plan       Active Hospital Problems:  Active Hospital Problems    Diagnosis     **Femoral neck fracture     Altered mental status     Multiple myeloma     CKD (chronic kidney disease) stage 3, GFR 30-59 ml/min        Plan:   Patient stable.  For surgery this afternoon.  Will monitor blood pressure, heart rate and sugars closely.  Monitor labs.  Kidney function stable and improving.    DVT prophylaxis:  Medical and mechanical DVT prophylaxis orders are present.        CODE STATUS:   Code Status (Patient has no pulse and is not breathing): CPR (Attempt to Resuscitate)  Medical Interventions (Patient has pulse or is breathing): Full Support            Electronically signed by Elieser Pederson MD, 24, 9:12 AM EDT.

## 2024-04-30 NOTE — PLAN OF CARE
Goal Outcome Evaluation:  Plan of Care Reviewed With: patient        Progress: declining  Outcome Evaluation: Patient experiencing intermitten fevers without triggering positive sepsis. Patient has productive cough. Patient remains lethargic and altered with NIHSS score 8. NPO after midnight for ortho but family consent still not obtained.

## 2024-04-30 NOTE — SIGNIFICANT NOTE
04/30/24 0700   Physical Therapy Time and Intention   Session Not Performed patient unavailable for evaluation   Comment, Session Not Performed hemiarthroplasty scheduled for today at 4pm

## 2024-04-30 NOTE — NURSING NOTE
Dr. Pederson called regarding potassium 3.8 and Mag 1.6 that were not replaced since 0400 4/29. Verbal order to replace with 1g Mag IV and no orders received regarding K replacement. Dr. Pederson states he will assess in AM. Maintaining current plan of care to collect AM electrolytes.

## 2024-04-30 NOTE — OP NOTE
HIP HEMIARTHROPLASTY ANTERIOR  Procedure Report    Patient Name:  Blair Lira  YOB: 1945    Date of Surgery:  4/30/2024       Pre-op Diagnosis:   Closed fracture of neck of left femur, initial encounter [S72.002A]       Post-Op Diagnosis Codes:     * Closed fracture of neck of left femur, initial encounter [S72.002A]    Procedure/CPT® Codes:      Procedure(s):  Left TOTAL HIP ARTHROPLASTY  ANTERIOR APROACH    Staff:  Surgeon(s):  Adán Osborne MD    Assistant: Jasiel Galvan          Anesthesia: Choice    Estimated Blood Loss: 200 mL    Implants:    Implant Name Type Inv. Item Serial No.  Lot No. LRB No. Used Action   SUT NONABS MAXBRAID NMBR5 K60 38IN WHT/SANDRA - [id number] Implant SUT NONABS MAXBRAID NMBR5 K60 38IN WHT/SANDRA  [employer]  Left 1 Implanted   SHLL ACET G7 PPS LTD/HL TI SZF 54MM - [id number] Implant SHLL ACET G7 PPS LTD/HL TI SZF 54MM  [employer] [id number] Left 1 Implanted   LINER ACET G7/LONGEVITY NTRL HXPE SZF 36MM - [id number] Implant LINER ACET G7/LONGEVITY NTRL HXPE SZF 36MM  [employer] [id number] Left 1 Implanted   STEM FEM/HIP AVENIRCOMPLETE COLAR STFF MARTE SZ6.5 - [id number] Implant STEM FEM/HIP AVENIRCOMPLETE COLAR STFF MARTE SZ6.5  [employer] [id number] Left 1 Implanted   HD FEM/HIP BIOLOX/DELTA CERAM 12/84W16IU PLS0MM - [id number] Implant HD FEM/HIP BIOLOX/DELTA CERAM 12/07F97GA PLS0MM  [employer] [id number] Left 1 Implanted       Specimen:          None      Complications: None    Description of Procedure: See H&P for risks and benefits. The patient was taken to the operating room and placed supine on the [place] table after general endotracheal anesthesia established. The patient was placed in appropriate position for an anterior approach to the hip. The left hip and lower extremity were prepped and draped in standard fashion using alcohol and ChloraPrep.  Standard 4 inch incision was made starting lateral to the anterior and inferior to the anterior superior  spine over the tensor musculature. Dissection was carried down through the skin and a Bovie was used to dissect down to the fascial layer which was then incised in line with the incision.  Blunt dissection was taken down to place the extracapsular retractor over the superior neck.  The cobra was used to elevate the rectus off the anterior hip capsule and another cobra was placed extracapsularly over the inferior femoral neck .   The inferior vessels were ligated with an Aquamantys and Bovie cautery, and the fascial layer over the vastus was released.  L-shaped capsulotomy was carried out in standard fashion placing FiberWire and suture in both limbs of the capsule and retractors placed anterior capsularlyexposing the fracture site. The saddle region of the femur was dissected with Bovie as well as medial femoral neck.  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 Polyethylene liner was placed and pigtail retractor was used in proximal femur and the leg was brought in extension external rotation and adduction. Proximal femur was lateralized using a chili pepper and rat tail grasp and the canal was sequentially broached to accommodate the appropriate size femoral trial. The trial was undertaken and found to be satisfactory, stable posteriorly and anteriorly and appropriate leg lengths was assessed.  The trials were removed and after copious irrigation and drying the canal the  real femoral stem was placed in the same version as the trial. The trials were again undertaken. C-arm shots were taken to both sides measuring offset as well as length and appropriate head was chosen and implanted. The hip was relocated and stable posterior anterior appear to have equal leg lengths.  The wound was copiously irrigated and capsular layer was closed with previously placed suture.  More copious irrigation followed and the fascial layer was closed with 0 Vicryl. Deep fat was closed with 0 Vicryl. I injected the anterior hip capsule, the tensor fascia, and the subcutaneous fat with a combination of ropivacaine, morphine, and Toradol mixture.  The subcutaneous was closed with 2-0 Vicryl and skin was closed with staples.  The incision was washed and dried and sterile dressing applied. The patient tolerated the procedure well. The patient was extubated and taken to recovery room.      Adán Osborne MD     Date: 4/30/2024  Time: 17:15 EDT

## 2024-04-30 NOTE — ANESTHESIA PREPROCEDURE EVALUATION
Anesthesia Evaluation     Patient summary reviewed and Nursing notes reviewed   no history of anesthetic complications:   NPO Solid Status: > 8 hours  NPO Liquid Status: > 2 hours           Airway   Mallampati: II  TM distance: >3 FB  Neck ROM: full  No difficulty expected  Dental      Pulmonary     breath sounds clear to auscultation  (+) pneumonia resolved ,decreased breath sounds  Cardiovascular - normal exam  Exercise tolerance: poor (<4 METS)    PT is on anticoagulation therapy  Rhythm: regular  Rate: normal    (+) hypertension, CHF Systolic <55%, hyperlipidemia    ROS comment: Pt states last eliquis 4/28  Admitting note states on PLAVIX also    Neuro/Psych  (+) syncope  GI/Hepatic/Renal/Endo    (+) renal disease- CRI    Musculoskeletal     Abdominal    Substance History      OB/GYN          Other   arthritis, blood dyscrasia,   history of cancer (metastatic multiple myeloma) active    ROS/Med Hx Other:               Interpretation Summary 1/2024         Left ventricular systolic function is normal. Left ventricular ejection fraction appears to be 51 - 55%.    Left ventricular wall thickness is consistent with mild concentric hypertrophy.    Left ventricular diastolic function is consistent with (grade I) impaired relaxation.    There are no significant valvular abnormalities noted on the study.    Estimated right ventricular systolic pressure from tricuspid regurgitation is normal (<35 mmHg).     OTHERWISE NORMAL ECG -  Sinus rhythm  Abnormal R-wave progression, early transition    IMPRESSION:  ct head  Abnormal examination showing 114 mL of ischemic tissue in a patchy  distribution throughout the brain. This may be secondary to artifact or  global hypoperfusion. No infarct core is identified. Consider MRI for  follow-up if patient is a candidate.      Anesthesia Plan    ASA 4     general     Reason for not using neuraxial anesthesia or peripheral nerve block: Anticoagulated  (Metastatic multiple myeloma,  pathologic fracture    Pt does not take steroids daily only twice every 2 weeks with his cancer treatment    Dextrose 25 g iv for hypoglycemia, will keep MAP elevated since CT scan revealed global hypoperfusion vs. artifact)    Anesthetic plan, risks, benefits, and alternatives have been provided, discussed and informed consent has been obtained with: patient.      CODE STATUS:    Code Status (Patient has no pulse and is not breathing): CPR (Attempt to Resuscitate)  Medical Interventions (Patient has pulse or is breathing): Full Support

## 2024-04-30 NOTE — PLAN OF CARE
Goal Outcome Evaluation:                 Patient to OR this shift. Patient more alert in the afternoon, NIH of 5. VSS.

## 2024-04-30 NOTE — PROGRESS NOTES
[facility]     Progress Note    Patient Name: Blair Lira  : 1945  MRN: [id number]  Primary Care Physician:  Babs Summers APRN  Date of admission: 2024    Subjective   Subjective     Chief Complaint: Hematological status is stable not in acute distress patient to have surgery for the hip fracture MRI to be done    HPI: Able and doing well no new findings    Review of Systems   All systems were reviewed and negative except for: Has been reviewed    Objective   Objective     Vitals:   Temp:  [98.2 °F (36.8 °C)-100.8 °F (38.2 °C)] 100.2 °F (37.9 °C)  Heart Rate:  [] 95  Resp:  [20] 20  BP: (142-181)/() 166/89    Physical Exam    Constitutional: Awake, alert   Eyes: PERRLA, sclerae anicteric, no conjunctival injection   HENT: NCAT, mucous membranes moist   Neck: Supple, no thyromegaly, no lymphadenopathy, trachea midline   Respiratory: Clear to auscultation bilaterally, nonlabored respirations    Cardiovascular: RRR, no murmurs, rubs, or gallops, palpable pedal pulses bilaterally   Gastrointestinal: Positive bowel sounds, soft, nontender, nondistended   Musculoskeletal: No bilateral ankle edema, no clubbing or cyanosis to extremities   Psychiatric: Appropriate affect, cooperative   Neurologic: Oriented x 3, strength symmetric in all extremities, Cranial Nerves grossly intact to confrontation, speech clear   Skin: No rashes   No change  Result Review    Result Review:  I have personally reviewed the results from the time of this admission to 2024 07:39 EDT and agree with these findings:  [x]  Laboratory  []  Microbiology  [x]  Radiology  []  EKG/Telemetry   []  Cardiology/Vascular   []  Pathology  []  Old records  []  Other:  Most notable findings include: Have been reviewed and discussed    Assessment & Plan   Assessment / Plan     Brief Patient Summary:  Blair Lira is a 78 y.o. male who with multiple myeloma status post fall and fracture    Active Hospital  Problems:  Active Hospital Problems    Diagnosis     **Femoral neck fracture     Altered mental status     Multiple myeloma     CKD (chronic kidney disease) stage 3, GFR 30-59 ml/min        Plan:   Patient is getting MRI patient will be getting surgery    DVT prophylaxis:  Medical and mechanical DVT prophylaxis orders are present.        CODE STATUS:   Code Status (Patient has no pulse and is not breathing): CPR (Attempt to Resuscitate)  Medical Interventions (Patient has pulse or is breathing): Full Support    Disposition:  I expect patient to be discharged after the patient has been stable.    Electronically signed by Vamsi Mason MD, 04/30/24, 7:39 AM EDT.      Part of this note may be an electronic transcription/translation of spoken language to printed text using the Dragon Dictation System.

## 2024-05-01 LAB
ANION GAP SERPL CALCULATED.3IONS-SCNC: 9.6 MMOL/L (ref 5–15)
BUN SERPL-MCNC: 28 MG/DL (ref 8–23)
BUN/CREAT SERPL: 11.6 (ref 7–25)
CALCIUM SPEC-SCNC: 7.9 MG/DL (ref 8.6–10.5)
CHLORIDE SERPL-SCNC: 105 MMOL/L (ref 98–107)
CO2 SERPL-SCNC: 22.4 MMOL/L (ref 22–29)
CREAT SERPL-MCNC: 2.42 MG/DL (ref 0.76–1.27)
EGFRCR SERPLBLD CKD-EPI 2021: 26.8 ML/MIN/1.73
GLUCOSE SERPL-MCNC: 171 MG/DL (ref 65–99)
HCT VFR BLD AUTO: 25.7 % (ref 37.5–51)
HGB BLD-MCNC: 7.7 G/DL (ref 13–17.7)
POTASSIUM SERPL-SCNC: 4.7 MMOL/L (ref 3.5–5.2)
SODIUM SERPL-SCNC: 137 MMOL/L (ref 136–145)

## 2024-05-01 PROCEDURE — 97165 OT EVAL LOW COMPLEX 30 MIN: CPT

## 2024-05-01 PROCEDURE — 85018 HEMOGLOBIN: CPT | Performed by: ORTHOPAEDIC SURGERY

## 2024-05-01 PROCEDURE — 80048 BASIC METABOLIC PNL TOTAL CA: CPT | Performed by: ORTHOPAEDIC SURGERY

## 2024-05-01 PROCEDURE — 25010000002 CEFAZOLIN SODIUM 2 G RECONSTITUTED SOLUTION: Performed by: ORTHOPAEDIC SURGERY

## 2024-05-01 PROCEDURE — 94799 UNLISTED PULMONARY SVC/PX: CPT

## 2024-05-01 PROCEDURE — 97161 PT EVAL LOW COMPLEX 20 MIN: CPT

## 2024-05-01 PROCEDURE — 85014 HEMATOCRIT: CPT | Performed by: ORTHOPAEDIC SURGERY

## 2024-05-01 PROCEDURE — 97150 GROUP THERAPEUTIC PROCEDURES: CPT

## 2024-05-01 PROCEDURE — 25010000002 KETOROLAC TROMETHAMINE PER 15 MG: Performed by: ORTHOPAEDIC SURGERY

## 2024-05-01 PROCEDURE — 97116 GAIT TRAINING THERAPY: CPT

## 2024-05-01 PROCEDURE — 92610 EVALUATE SWALLOWING FUNCTION: CPT

## 2024-05-01 RX ORDER — GUAIFENESIN/DEXTROMETHORPHAN 100-10MG/5
5 SYRUP ORAL EVERY 4 HOURS PRN
Status: DISCONTINUED | OUTPATIENT
Start: 2024-05-01 | End: 2024-05-03 | Stop reason: HOSPADM

## 2024-05-01 RX ORDER — ALBUTEROL SULFATE 2.5 MG/3ML
2.5 SOLUTION RESPIRATORY (INHALATION) EVERY 6 HOURS PRN
Status: DISCONTINUED | OUTPATIENT
Start: 2024-05-01 | End: 2024-05-03 | Stop reason: HOSPADM

## 2024-05-01 RX ADMIN — ATORVASTATIN CALCIUM 10 MG: 10 TABLET, FILM COATED ORAL at 21:43

## 2024-05-01 RX ADMIN — APIXABAN 2.5 MG: 2.5 TABLET, FILM COATED ORAL at 08:36

## 2024-05-01 RX ADMIN — ACETAMINOPHEN 1000 MG: 500 TABLET ORAL at 08:05

## 2024-05-01 RX ADMIN — Medication 3 ML: at 08:37

## 2024-05-01 RX ADMIN — FERROUS SULFATE TAB 325 MG (65 MG ELEMENTAL FE) 325 MG: 325 (65 FE) TAB at 08:36

## 2024-05-01 RX ADMIN — FAMOTIDINE 40 MG: 20 TABLET ORAL at 08:36

## 2024-05-01 RX ADMIN — ACETAMINOPHEN 1000 MG: 500 TABLET ORAL at 23:12

## 2024-05-01 RX ADMIN — KETOROLAC TROMETHAMINE 15 MG: 30 INJECTION, SOLUTION INTRAMUSCULAR; INTRAVENOUS at 08:05

## 2024-05-01 RX ADMIN — KETOROLAC TROMETHAMINE 15 MG: 30 INJECTION, SOLUTION INTRAMUSCULAR; INTRAVENOUS at 15:57

## 2024-05-01 RX ADMIN — TAMSULOSIN HYDROCHLORIDE 0.4 MG: 0.4 CAPSULE ORAL at 08:37

## 2024-05-01 RX ADMIN — ACETAMINOPHEN 1000 MG: 500 TABLET ORAL at 15:57

## 2024-05-01 RX ADMIN — CEFAZOLIN 2 G: 2 INJECTION, POWDER, FOR SOLUTION INTRAVENOUS at 01:09

## 2024-05-01 RX ADMIN — MONTELUKAST 10 MG: 10 TABLET, FILM COATED ORAL at 21:43

## 2024-05-01 RX ADMIN — KETOROLAC TROMETHAMINE 15 MG: 30 INJECTION, SOLUTION INTRAMUSCULAR; INTRAVENOUS at 01:09

## 2024-05-01 RX ADMIN — CEFAZOLIN 2 G: 2 INJECTION, POWDER, FOR SOLUTION INTRAVENOUS at 08:37

## 2024-05-01 RX ADMIN — APIXABAN 2.5 MG: 2.5 TABLET, FILM COATED ORAL at 21:43

## 2024-05-01 RX ADMIN — MIRTAZAPINE 15 MG: 15 TABLET, FILM COATED ORAL at 21:43

## 2024-05-01 RX ADMIN — Medication 10 ML: at 08:37

## 2024-05-01 NOTE — PLAN OF CARE
Goal Outcome Evaluation:  Plan of Care Reviewed With: patient        Progress: improving  Outcome Evaluation: Pt alert and able to make needs known. Pain controlled with scheduled APAP and Toradol. Pt urinating without difficulty. Pt one person assist with gait belt and rolling walker for transfers, ambulated 50+ feet-tolerated well. Pt waiting on discharge planning.

## 2024-05-01 NOTE — PROGRESS NOTES
[facility]     Progress Note    Patient Name: Blair Lira  : 1945  MRN: [id number]  Primary Care Physician:  Babs Summers APRN  Date of admission: 2024    Subjective   Subjective     Chief Complaint: Patient with multiple myeloma will discuss with pathology to see if we could find out if there is a myelomatous involvement in that case may consider radiation therapy however pathological bone probably has been removed and it has been stable    HPI: With multiple myeloma with fracture of the hip    Review of Systems   All systems were reviewed and negative except for: Has been reviewed    Objective   Objective     Vitals:   Temp:  [97.3 °F (36.3 °C)-99.1 °F (37.3 °C)] 97.5 °F (36.4 °C)  Heart Rate:  [] 69  Resp:  [11-21] 18  BP: ()/(46-91) 118/67  Flow (L/min):  [2-5] 2    Physical Exam    Constitutional: Awake, alert   Eyes: PERRLA, sclerae anicteric, no conjunctival injection   HENT: NCAT, mucous membranes moist   Neck: Supple, no thyromegaly, no lymphadenopathy, trachea midline   Respiratory: Clear to auscultation bilaterally, nonlabored respirations    Cardiovascular: RRR, no murmurs, rubs, or gallops, palpable pedal pulses bilaterally   Gastrointestinal: Positive bowel sounds, soft, nontender, nondistended   Musculoskeletal: No bilateral ankle edema, no clubbing or cyanosis to extremities   Psychiatric: Appropriate affect, cooperative   Neurologic: Oriented x 3, strength symmetric in all extremities, Cranial Nerves grossly intact to confrontation, speech clear   Skin: No rashes   No change  Result Review    Result Review:  I have personally reviewed the results from the time of this admission to 2024 08:05 EDT and agree with these findings:  [x]  Laboratory  []  Microbiology  [x]  Radiology  []  EKG/Telemetry   []  Cardiology/Vascular   []  Pathology  []  Old records  []  Other:  Most notable findings include: Has been reviewed and discussed    Assessment & Plan   Assessment  / Plan     Brief Patient Summary:  Blair Lira is a 78 y.o. male who patient with fracture of the hip after fall possible pathological will check with pathology    Active Hospital Problems:  Active Hospital Problems    Diagnosis     **Femoral neck fracture     Altered mental status     Multiple myeloma     CKD (chronic kidney disease) stage 3, GFR 30-59 ml/min        Plan:   Continue the current management    DVT prophylaxis:  Medical and mechanical DVT prophylaxis orders are present.        CODE STATUS:   Code Status (Patient has no pulse and is not breathing): CPR (Attempt to Resuscitate)  Medical Interventions (Patient has pulse or is breathing): Full Support    Disposition:  I expect patient to be discharged after the patient has been stable and.    Electronically signed by Vamsi Mason MD, 05/01/24, 8:05 AM EDT.      Part of this note may be an electronic transcription/translation of spoken language to printed text using the Dragon Dictation System.

## 2024-05-01 NOTE — THERAPY EVALUATION
Acute Care - Speech Language Pathology   Swallow Initial Evaluation TONY Keith     Patient Name: Blair Lira  : 1945  MRN: [id number]  Today's Date: 2024               Admit Date: 2024    Visit Dx:     ICD-10-CM ICD-9-CM   1. Closed fracture of neck of left femur, initial encounter  S72.002A 820.8   2. Cerebrovascular accident (CVA), unspecified mechanism  I63.9 434.91   3. Dysphagia, unspecified type  R13.10 787.20   4. Difficulty in walking  R26.2 719.7     Patient Active Problem List   Diagnosis    Rotator cuff tear, right    Impingement syndrome of right shoulder    Acute renal failure superimposed on chronic kidney disease    Pancytopenia due to chemotherapy    Abnormal weight loss    Hypertension    CKD (chronic kidney disease) stage 3, GFR 30-59 ml/min    Moderate malnutrition    Weakness generalized    Intractable pain    Sepsis associated hypotension    Multiple myeloma    Edema    Chronic diastolic (congestive) heart failure    Acute on chronic HFrEF (heart failure with reduced ejection fraction)    Chest pain    Syncope    COVID-19 virus infection    Chronic kidney disease, stage IV (severe)    Hypotension    Anemia    Acute on chronic renal failure    Pneumonia    Anemia    Acute febrile illness    Mucus plugging of bronchi    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    Pneumonia due to methicillin susceptible Staphylococcus aureus (MSSA)    Femoral neck fracture    Altered mental status     Past Medical History:   Diagnosis Date    BPH (benign prostatic hyperplasia)     Cancer     Chronic kidney disease     Femoral neck fracture 2024    Frozen shoulder     Hyperlipidemia     Hypertension 10/26/2023    Periarthritis of shoulder     Rotator cuff disorder, right     Tennis elbow     RIGHT     Past Surgical History:   Procedure Laterality Date    BRONCHOSCOPY N/A 2024    Procedure: BRONCHOSCOPY  WITH BAL AND WASHINGS;  Surgeon: Dionte Ruvalcaba MD;  Location: [facility] ENDOSCOPY;  Service: Pulmonary;  Laterality: N/A;  MUCUS PLUGGING    CATARACT EXTRACTION EXTRACAPSULAR W/ INTRAOCULAR LENS IMPLANTATION Bilateral     COLONOSCOPY      HIP HEMIARTHROPLASTY Left 4/30/2024    Procedure: TOTAL HIP ARTHROPLASTY  ANTERIOR APROACH;  Surgeon: Adán Osborne MD;  Location: [facility] MAIN OR;  Service: Orthopedics;  Laterality: Left;    JOINT REPLACEMENT Right     THR    SHOULDER ARTHROSCOPY W/ ROTATOR CUFF REPAIR Right 3/29/2023    Procedure: SHOULDER ARTHROSCOPY WITH ROTATOR CUFF REPAIR, SUBCROMIAL DECOMPRESSION,DISTAL CLAVICLE RESECTION;  Surgeon: Gustavo Samuels MD;  Location: [facility] OR OSC;  Service: Orthopedics;  Laterality: Right;    SHOULDER SURGERY Left     SCOPE       SLP Recommendation and Plan                Inpatient Speech Pathology Dysphagia Evaluation        PAIN SCALE: None indicated    PRECAUTIONS/CONTRAINDICATIONS: Standard, fall    SUSPECTED ABUSE/NEGLECT/EXPLOITATION: None identified    SOCIAL/PSYCHOLOGICAL NEEDS/BARRIERS: None identified    PAST SOCIAL HISTORY: 78-year-old male, lives at home    PRIOR FUNCTION: On a p.o. diet    PATIENT GOALS/EXPECTATIONS: To continue eating and drinking    HISTORY: Patient initially evaluated by speech pathology services on 4/29/2024 due to altered mental status, suspected stroke.  At that time diet recommendations of soft to chew solids and thin liquids were given.  Per report, stroke has been ruled out.  Patient underwent surgery for femoral neck fracture on 4/30/2024.  Patient failed nursing dysphagia screening following surgery and speech pathology services was reconsulted.  At time of evaluation patient sitting upright in a chair, mental status appears at baseline (improved from initial evaluation on 4/29/2024).  He is alert and cooperative.    CURRENT DIET LEVEL: Regular soft to chew, thin liquids    OBJECTIVE:    TEST ADMINISTERED: Clinical dysphagia  evaluation    COGNITION/SAFETY AWARENESS: Appears appropriate for environment    BEHAVIORAL OBSERVATIONS: Pleasant, alert and cooperative    ORAL MOTOR EXAM: Within normal limits, patient with dentition    VOICE QUALITY: Clear    REFLEX EXAM: Adequate    POSTURE: Sitting fully upright in chair    FEEDING/SWALLOWING FUNCTION: Assessed with thin liquids, purée solids, regular crunchy solids    CLINICAL OBSERVATIONS: Patient assisting for self administration of consistencies.  Thin liquids by cup and straw with swallows completed within a timely manner.  Vocal quality clear laryngeal sounds clear with cervical auscultation.  Purée solids by spoon with swallow completed.  Regular crunchy solids with adequate chewing, manipulation and bolus transit.  Swallows were completed.  Laryngeal elevation on palpation.  No overt signs or symptoms of aspiration observed however cannot rule out silent aspiration at bedside.    DYSPHAGIA CRITERIA: N/A    FUNCTIONAL ASSESSMENT INSTRUMENT: Patient currently scored a level 7 of 7 on Functional Communication Measures for swallowing indicating a 0% limitation in function.    ASSESSMENT/ PLAN OF CARE:  At bedside, patient exhibit swallow function adequate for tolerance of regular crunchy solids and thin liquids.  No further direct speech pathology services are recommended at this time.  PROBLEMS:  1.  na   LTG 1: na   STG 1a: na     RECOMMENDATIONS:   1.   DIET: Regular solids, thin liquids    2.  POSITION: Fully upright for all p.o. intake        Pt/responsible party agrees with plan of care and has been informed of all alternatives, risks and benefits.                                                                              EDUCATION  The patient has been educated in the following areas:   Dysphagia (Swallowing Impairment).              Time Calculation:    Time Calculation- SLP       Row Name 05/01/24 1019             Time Calculation- SLP    SLP Start Time 0630  -SN      SLP Stop  Time 0730  -SN      SLP Time Calculation (min) 60 min  -SN      SLP Received On 05/01/24  -SN         Untimed Charges    [id number] Eval Oral Pharyng Swallow Minutes 60  -SN         Total Minutes    Untimed Charges Total Minutes 60  -SN       Total Minutes 60  -SN                User Key  (r) = Recorded By, (t) = Taken By, (c) = Cosigned By      Initials Name Provider Type    SN Fang Lozada MS-CCC/SLP, SHARON Speech and Language Pathologist                    Therapy Charges for Today       Code Description Service Date Service Provider Modifiers Qty    [id number]  ST EVAL ORAL PHARYNG SWALLOW 4 5/1/2024 Fang Lozada MS-CCC/SLP, SHARON GN 1                 ELENO Soto/AMIRAH, SHARON  5/1/2024

## 2024-05-01 NOTE — THERAPY EVALUATION
Patient Name: Blair Lira  : 1945    MRN: [id number]                              Today's Date: 2024       Admit Date: 2024    Visit Dx:     ICD-10-CM ICD-9-CM   1. Closed fracture of neck of left femur, initial encounter  S72.002A 820.8   2. Cerebrovascular accident (CVA), unspecified mechanism  I63.9 434.91   3. Dysphagia, unspecified type  R13.10 787.20   4. Difficulty in walking  R26.2 719.7   5. Decreased activities of daily living (ADL)  Z78.9 V49.89     Patient Active Problem List   Diagnosis    Rotator cuff tear, right    Impingement syndrome of right shoulder    Acute renal failure superimposed on chronic kidney disease    Pancytopenia due to chemotherapy    Abnormal weight loss    Hypertension    CKD (chronic kidney disease) stage 3, GFR 30-59 ml/min    Moderate malnutrition    Weakness generalized    Intractable pain    Sepsis associated hypotension    Multiple myeloma    Edema    Chronic diastolic (congestive) heart failure    Acute on chronic HFrEF (heart failure with reduced ejection fraction)    Chest pain    Syncope    COVID-19 virus infection    Chronic kidney disease, stage IV (severe)    Hypotension    Anemia    Acute on chronic renal failure    Pneumonia    Anemia    Acute febrile illness    Mucus plugging of bronchi    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    Pneumonia due to methicillin susceptible Staphylococcus aureus (MSSA)    Femoral neck fracture    Altered mental status     Past Medical History:   Diagnosis Date    BPH (benign prostatic hyperplasia)     Cancer     Chronic kidney disease     Femoral neck fracture 2024    Frozen shoulder     Hyperlipidemia     Hypertension 10/26/2023    Periarthritis of shoulder     Rotator cuff disorder, right     Tennis elbow     RIGHT     Past Surgical History:   Procedure Laterality Date    BRONCHOSCOPY N/A 2024    Procedure:  BRONCHOSCOPY WITH BAL AND WASHINGS;  Surgeon: Dionte Ruvalcaba MD;  Location: [facility] ENDOSCOPY;  Service: Pulmonary;  Laterality: N/A;  MUCUS PLUGGING    CATARACT EXTRACTION EXTRACAPSULAR W/ INTRAOCULAR LENS IMPLANTATION Bilateral     COLONOSCOPY      HIP HEMIARTHROPLASTY Left 4/30/2024    Procedure: TOTAL HIP ARTHROPLASTY  ANTERIOR APROACH;  Surgeon: Adán Osborne MD;  Location: [facility] MAIN OR;  Service: Orthopedics;  Laterality: Left;    JOINT REPLACEMENT Right     THR    SHOULDER ARTHROSCOPY W/ ROTATOR CUFF REPAIR Right 3/29/2023    Procedure: SHOULDER ARTHROSCOPY WITH ROTATOR CUFF REPAIR, SUBCROMIAL DECOMPRESSION,DISTAL CLAVICLE RESECTION;  Surgeon: Gustavo Samuels MD;  Location: [facility] OR [facility];  Service: Orthopedics;  Laterality: Right;    SHOULDER SURGERY Left     SCOPE      General Information       Row Name 05/01/24 1133          OT Time and Intention    Document Type evaluation  -ES     Mode of Treatment individual therapy;occupational therapy  -ES       Row Name 05/01/24 1133          General Information    Patient Profile Reviewed yes  -ES     Prior Level of Function independent:;ADL's;all household mobility;community mobility  Patient reports he is independent with ADLs at baseline. No device in home, SPC for community mobility. Tub/shower, standing shower and grooming completion. No home O2 use. x1 fall in three months.  -ES     Existing Precautions/Restrictions fall;weight bearing  -ES       Row Name 05/01/24 1133          Occupational Profile    Reason for Services/Referral (Occupational Profile) Patient is 78 yr old male admitted to [facility] on 4/28/2024 after mechanical fall at home resulting in left femoral neck fracture. Patient is POD 1 left hip hemiarthroplasty, WBAT LLE. OT evaluation and treatment ordered d/t recent decline in ADLs/transfer ability and discharge planning recommendations. No previous OT services for current condition.  -ES       Row Name 05/01/24 1133           Living Environment    People in Home alone  -ES       Row Name 05/01/24 1133          Home Main Entrance    Number of Stairs, Main Entrance one  -ES       Row Name 05/01/24 1133          Stairs Within Home, Primary    Number of Stairs, Within Home, Primary none  -ES       Row Name 05/01/24 1133          Cognition    Orientation Status (Cognition) oriented x 3  patient pleasant and cooperative, agreeable to therapy evaluation  -ES       Row Name 05/01/24 1133          Safety Issues, Functional Mobility    Impairments Affecting Function (Mobility) balance;range of motion (ROM);strength  -ES               User Key  (r) = Recorded By, (t) = Taken By, (c) = Cosigned By      Initials Name Provider Type    ES Babs Ramirez, OTR/L, CSRS Occupational Therapist                     Mobility/ADL's       Row Name 05/01/24 1138          Bed Mobility    Bed Mobility bed mobility (all) activities  -ES     All Activities, [place] (Bed Mobility) not tested  -ES     Comment, (Bed Mobility) not tested. Patient met seated in recliner at therapy arrival to room.  -ES       Row Name 05/01/24 1138          Transfers    Transfers sit-stand transfer;stand-sit transfer  -ES       Row Name 05/01/24 1138          Sit-Stand Transfer    Sit-Stand [place] (Transfers) contact guard;1 person assist  -ES     Assistive Device (Sit-Stand Transfers) walker, front-wheeled  -ES       Row Name 05/01/24 1138          Stand-Sit Transfer    Stand-Sit [place] (Transfers) contact guard;1 person assist  -ES     Assistive Device (Stand-Sit Transfers) walker, front-wheeled  -ES       Row Name 05/01/24 1138          Functional Mobility    Functional Mobility- Ind. Level contact guard assist;1 person  -ES     Functional Mobility- Device walker, front-wheeled  -ES       Row Name 05/01/24 1138          Activities of Daily Living    BADL Assessment/Intervention bathing;upper body dressing;lower body dressing;grooming;feeding;toileting  -ES       Row Name  05/01/24 1138          Bathing Assessment/Intervention    [place] Level (Bathing) bathing skills;minimum assist (75% patient effort)  -ES       Row Name 05/01/24 1138          Upper Body Dressing Assessment/Training    [place] Level (Upper Body Dressing) upper body dressing skills;set up  -ES       Row Name 05/01/24 1138          Lower Body Dressing Assessment/Training    [place] Level (Lower Body Dressing) lower body dressing skills;minimum assist (75% patient effort)  -ES       Row Name 05/01/24 1138          Grooming Assessment/Training    [place] Level (Grooming) grooming skills;set up  -ES       Row Name 05/01/24 1138          Self-Feeding Assessment/Training    [place] Level (Feeding) feeding skills;set up  -ES       Row Name 05/01/24 1138          Toileting Assessment/Training    [place] Level (Toileting) toileting skills;contact guard assist  -ES               User Key  (r) = Recorded By, (t) = Taken By, (c) = Cosigned By      Initials Name Provider Type    ES Babs Ramirez, OTR/L, CSRS Occupational Therapist                   Obj/Interventions       Row Name 05/01/24 1140          Range of Motion Comprehensive    General Range of Motion bilateral upper extremity ROM WNL  -ES       Row Name 05/01/24 1140          Strength Comprehensive (MMT)    General Manual Muscle Testing (MMT) Assessment lower extremity strength deficits identified  -ES     Comment, General Manual Muscle Testing (MMT) Assessment BUEs 4+/5  -ES       Row Name 05/01/24 1140          Motor Skills    Motor Skills functional endurance  -ES     Functional Endurance fair  -ES       [facility] Name 05/01/24 1140          Balance    Balance Assessment sitting dynamic balance;standing dynamic balance  -ES     Dynamic Sitting Balance standby assist  -ES     Position, Sitting Balance unsupported;sitting in chair  -ES     Dynamic Standing Balance contact guard;1-person assist  -ES     Position/Device Used, Standing Balance  supported;walker, front-wheeled  -ES               User Key  (r) = Recorded By, (t) = Taken By, (c) = Cosigned By      Initials Name Provider Type    Babs Wilkins, OTR/L, CSRS Occupational Therapist                   Goals/Plan       Row Name 05/01/24 1141          Bed Mobility Goal 1 (OT)    Activity/Assistive Device (Bed Mobility Goal 1, OT) bed mobility activities, all  -ES     [place] Level/Cues Needed (Bed Mobility Goal 1, OT) modified independence  -ES     Time Frame (Bed Mobility Goal 1, OT) long term goal (LTG);10 days  -ES       Row Name 05/01/24 1141          Transfer Goal 1 (OT)    Activity/Assistive Device (Transfer Goal 1, OT) transfers, all  -ES     [place] Level/Cues Needed (Transfer Goal 1, OT) modified independence  -ES     Time Frame (Transfer Goal 1, OT) long term goal (LTG);10 days  -ES       Row Name 05/01/24 1141          Bathing Goal 1 (OT)    Activity/Device (Bathing Goal 1, OT) bathing skills, all  -ES     [place] Level/Cues Needed (Bathing Goal 1, OT) modified independence  -ES     Time Frame (Bathing Goal 1, OT) long term goal (LTG);10 days  -ES       Row Name 05/01/24 1141          Dressing Goal 1 (OT)    Activity/Device (Dressing Goal 1, OT) dressing skills, all  -ES     [place]/Cues Needed (Dressing Goal 1, OT) modified independence  -ES     Time Frame (Dressing Goal 1, OT) long term goal (LTG);10 days  -ES       Row Name 05/01/24 1141          Toileting Goal 1 (OT)    Activity/Device (Toileting Goal 1, OT) toileting skills, all  -ES     [place] Level/Cues Needed (Toileting Goal 1, OT) modified independence  -ES     Time Frame (Toileting Goal 1, OT) long term goal (LTG);10 days  -ES       Row Name 05/01/24 1141          Grooming Goal 1 (OT)    Activity/Device (Grooming Goal 1, OT) grooming skills, all  -ES     [place] (Grooming Goal 1, OT) modified independence  -ES     Time Frame (Grooming Goal 1, OT) long term goal (LTG);10 days  -ES       Row Name  05/01/24 1141          Problem Specific Goal 1 (OT)    Problem Specific Goal 1 (OT) Patient will demonstrate good graded dynamic standing balance in preperation for independent ADL routine completion at time of discharge  -ES     Time Frame (Problem Specific Goal 1, OT) long term goal (LTG);10 days  -ES       Row Name 05/01/24 1141          Therapy Assessment/Plan (OT)    Planned Therapy Interventions (OT) activity tolerance training;BADL retraining;functional balance retraining;occupation/activity based interventions;patient/caregiver education/training;transfer/mobility retraining;IADL retraining  -ES               User Key  (r) = Recorded By, (t) = Taken By, (c) = Cosigned By      Initials Name Provider Type    ES Babs Ramirez, OTR/L, CSRS Occupational Therapist                   Clinical Impression       Row Name 05/01/24 1140          Plan of Care Review    Plan of Care Reviewed With patient  -ES     Outcome Evaluation Patient has experienced decline in function from baseline status, presenting w/ deficits related to balance, tolerance, transfers and mobility that impede patient independence with activities of daily living.  Patient would benefit from skilled Occupational Therapy intervention to maxamize patient safety, and promote return to baseline independence.  -ES       Row Name 05/01/24 1140          Therapy Assessment/Plan (OT)    Rehab Potential (OT) good, to achieve stated therapy goals  -ES     Criteria for Skilled Therapeutic Interventions Met (OT) yes;meets criteria;skilled treatment is necessary  -ES     Therapy Frequency (OT) 5 times/wk  -ES       Row Name 05/01/24 1140          Therapy Plan Review/Discharge Plan (OT)    Equipment Needs Upon Discharge (OT) walker, rolling;commode chair  -ES     Anticipated Discharge Disposition (OT) home with outpatient therapy services  -ES       Row Name 05/01/24 1140          Positioning and Restraints    Pre-Treatment Position sitting in chair/recliner  -ES      Post Treatment Position chair  -ES     In Chair reclined;call light within reach;encouraged to call for assist;exit alarm on  -ES               User Key  (r) = Recorded By, (t) = Taken By, (c) = Cosigned By      Initials Name Provider Type    Babs Wilkins OTR/L, CSRS Occupational Therapist                   Outcome Measures       Row Name 05/01/24 1142          How much help from another is currently needed...    Putting on and taking off regular lower body clothing? 3  -ES     Bathing (including washing, rinsing, and drying) 3  -ES     Toileting (which includes using toilet bed pan or urinal) 3  -ES     Putting on and taking off regular upper body clothing 4  -ES     Taking care of personal grooming (such as brushing teeth) 4  -ES     Eating meals 4  -ES     AM-PAC 6 Clicks Score (OT) 21  -ES       Row Name 05/01/24 0900 05/01/24 0745       How much help from another person do you currently need...    Turning from your back to your side while in flat bed without using bedrails? 4  -CHLOE 4  -JW    Moving from lying on back to sitting on the side of a flat bed without bedrails? 3  -CHLOE 3  -JW    Moving to and from a bed to a chair (including a wheelchair)? 3  -CHLOE 3  -JW    Standing up from a chair using your arms (e.g., wheelchair, bedside chair)? 3  -CHLOE 3  -JW    Climbing 3-5 steps with a railing? 3  -CHLOE 3  -JW    To walk in hospital room? 3  -CHLOE 3  -JW    AM-PAC 6 Clicks Score (PT) 19  -CHLOE 19  -JW    Highest Level of Mobility Goal 6 --> Walk 10 steps or more  -CHLOE 6 --> Walk 10 steps or more  -JW      Row Name 05/01/24 1142 05/01/24 0900       Functional Assessment    Outcome Measure Options AM-PAC 6 Clicks Daily Activity (OT)  -ES AM-PAC 6 Clicks Basic Mobility (PT)  -CHLOE              User Key  (r) = Recorded By, (t) = Taken By, (c) = Cosigned By      Initials Name Provider Type    Silke Eli RN Registered Nurse    Woodrow Vivar, PT Physical Therapist    Babs Wilkins, ROSA MR/L, SHERICES  Occupational Therapist                      OT Recommendation and Plan  Planned Therapy Interventions (OT): activity tolerance training, BADL retraining, functional balance retraining, occupation/activity based interventions, patient/caregiver education/training, transfer/mobility retraining, IADL retraining  Therapy Frequency (OT): 5 times/wk  Plan of Care Review  Plan of Care Reviewed With: patient  Outcome Evaluation: Patient has experienced decline in function from baseline status, presenting w/ deficits related to balance, tolerance, transfers and mobility that impede patient independence with activities of daily living.  Patient would benefit from skilled Occupational Therapy intervention to maxamize patient safety, and promote return to baseline independence.     Time Calculation:   Evaluation Complexity (OT)  Review Occupational Profile/Medical/Therapy History Complexity: brief/low complexity  Assessment, Occupational Performance/Identification of Deficit Complexity: 3-5 performance deficits  Clinical Decision Making Complexity (OT): problem focused assessment/low complexity  Overall Complexity of Evaluation (OT): low complexity     Time Calculation- OT       Row Name 05/01/24 1146             Time Calculation- OT    OT Received On 05/01/24  -ES      OT Goal Re-Cert Due Date 05/10/24  -ES         Untimed Charges    OT Eval/Re-eval Minutes 32  -ES         Total Minutes    Untimed Charges Total Minutes 32  -ES       Total Minutes 32  -ES                User Key  (r) = Recorded By, (t) = Taken By, (c) = Cosigned By      Initials Name Provider Type    ES Babs Ramirez OTR/L, CSRS Occupational Therapist                  Therapy Charges for Today       Code Description Service Date Service Provider Modifiers Qty    [id number]  OT EVAL LOW COMPLEXITY 3 5/1/2024 Babs Ramirez OTR/L, CSRS GO 1                 MELINDA Truong/L, CSRS  5/1/2024

## 2024-05-01 NOTE — PLAN OF CARE
Goal Outcome Evaluation:      Pt is alert and oriented X 4, on room air, and X 1 assist with walker. All scheduled medications given as ordered. Pt had no complaints of pain during shift. Safety checks maintained with pt resting in chair, wheels to chair locked, and personal items and call light within reach. VSS.

## 2024-05-01 NOTE — THERAPY EVALUATION
Acute Care - Physical Therapy Initial Evaluation  TONY Keith     Patient Name: Blair Lira  : 1945  MRN: [id number]  Today's Date: 2024     Admit date: 2024     Referring Physician: Elieser Pederson MD     Surgery Date:2024 - 2024   Procedure(s) (LRB):  TOTAL HIP ARTHROPLASTY  ANTERIOR APROACH (Left)          Visit Dx:     ICD-10-CM ICD-9-CM   1. Closed fracture of neck of left femur, initial encounter  S72.002A 820.8   2. Cerebrovascular accident (CVA), unspecified mechanism  I63.9 434.91   3. Dysphagia, unspecified type  R13.10 787.20   4. Difficulty in walking  R26.2 719.7     Patient Active Problem List   Diagnosis    Rotator cuff tear, right    Impingement syndrome of right shoulder    Acute renal failure superimposed on chronic kidney disease    Pancytopenia due to chemotherapy    Abnormal weight loss    Hypertension    CKD (chronic kidney disease) stage 3, GFR 30-59 ml/min    Moderate malnutrition    Weakness generalized    Intractable pain    Sepsis associated hypotension    Multiple myeloma    Edema    Chronic diastolic (congestive) heart failure    Acute on chronic HFrEF (heart failure with reduced ejection fraction)    Chest pain    Syncope    COVID-19 virus infection    Chronic kidney disease, stage IV (severe)    Hypotension    Anemia    Acute on chronic renal failure    Pneumonia    Anemia    Acute febrile illness    Mucus plugging of bronchi    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    Pneumonia due to methicillin susceptible Staphylococcus aureus (MSSA)    Femoral neck fracture    Altered mental status     Past Medical History:   Diagnosis Date    BPH (benign prostatic hyperplasia)     Cancer     Chronic kidney disease     Femoral neck fracture 2024    Frozen shoulder     Hyperlipidemia     Hypertension 10/26/2023    Periarthritis of shoulder     Rotator cuff disorder, right     Tennis  elbow     RIGHT     Past Surgical History:   Procedure Laterality Date    BRONCHOSCOPY N/A 1/22/2024    Procedure: BRONCHOSCOPY WITH BAL AND WASHINGS;  Surgeon: Dionte Ruvalcaba MD;  Location: [facility] ENDOSCOPY;  Service: Pulmonary;  Laterality: N/A;  MUCUS PLUGGING    CATARACT EXTRACTION EXTRACAPSULAR W/ INTRAOCULAR LENS IMPLANTATION Bilateral     COLONOSCOPY      JOINT REPLACEMENT Right     THR    SHOULDER ARTHROSCOPY W/ ROTATOR CUFF REPAIR Right 3/29/2023    Procedure: SHOULDER ARTHROSCOPY WITH ROTATOR CUFF REPAIR, SUBCROMIAL DECOMPRESSION,DISTAL CLAVICLE RESECTION;  Surgeon: Gustavo Samuels MD;  Location: [facility] OR [facility];  Service: Orthopedics;  Laterality: Right;    SHOULDER SURGERY Left     SCOPE     PT Assessment (Last 12 Hours)       PT Evaluation and Treatment       Row Name 05/01/24 0900          Physical Therapy Time and Intention    Subjective Information complains of;pain  -CHLOE     Document Type evaluation  -CHLOE     Mode of Treatment individual therapy;physical therapy  -CHLOE     Patient Effort good  -CHLOE     Symptoms Noted During/After Treatment none  -CHLOE       Row Name 05/01/24 0900          General Information    Patient Observations alert;cooperative;agree to therapy  -CHLOE     Prior Level of Function independent:;all household mobility;community mobility  -CHLOE     Equipment Currently Used at Home cane, straight;cane, quad;shower chair  -CHLOE     Existing Precautions/Restrictions fall;weight bearing  -CHLOE     Barriers to Rehab none identified  -CHLOE       Row Name 05/01/24 0900          Living Environment    Current Living Arrangements home  -CHLOE       Row Name 05/01/24 0900          Cognition    Orientation Status (Cognition) oriented x 3  -CHLOE       Row Name 05/01/24 0900          Range of Motion Comprehensive    General Range of Motion lower extremity range of motion deficits identified  -CHLOE     Comment, General Range of Motion affected hip mildly limited due to pain  -CHLOE       Row Name 05/01/24 0900           Strength Comprehensive (MMT)    General Manual Muscle Testing (MMT) Assessment lower extremity strength deficits identified  LLE 4-/5  -CHLOE       Row Name 05/01/24 0900          Bed Mobility    Bed Mobility supine-sit;bed mobility (all) activities  -CHLOE     All Activities, Guild (Bed Mobility) contact guard  -CHLOE     Supine-Sit Guild (Bed Mobility) contact guard  -CHLOE       Row Name 05/01/24 0900          Transfers    Transfers bed-chair transfer;sit-stand transfer  -CHLOE       Row Name 05/01/24 0900          Bed-Chair Transfer    Bed-Chair Guild (Transfers) contact guard  -CHLOE     Assistive Device (Bed-Chair Transfers) walker, front-wheeled  -CHLOE       Row Name 05/01/24 0900          Sit-Stand Transfer    Sit-Stand Guild (Transfers) contact guard  -CHLOE     Assistive Device (Sit-Stand Transfers) walker, front-wheeled  -CHLOE       Row Name 05/01/24 0900          Gait/Stairs (Locomotion)    Gait/Stairs Locomotion gait/ambulation assistive device  -CHLOE     Guild Level (Gait) contact guard  -CHLOE     Assistive Device (Gait) walker, front-wheeled  -CHLOE     Patient was able to Ambulate yes  -CHLOE     Distance in Feet (Gait) 100  -CHLOE     Pattern (Gait) step-to  -CHLOE       Row Name 05/01/24 0900          Safety Issues, Functional Mobility    Impairments Affecting Function (Mobility) balance;pain;range of motion (ROM);strength  -CHLOE       Row Name 05/01/24 0900          Balance    Balance Assessment standing dynamic balance  -CHLOE     Dynamic Standing Balance contact guard  -CHLOE     Position/Device Used, Standing Balance walker, front-wheeled  -CHLOE       Row Name             Wound 04/30/24 1617 Left anterior thigh    Wound - Properties Group Placement Date: 04/30/24  -MH Placement Time: 1617 -MH Side: Left  -MH Orientation: anterior  -MH Location: thigh  -MH    Retired Wound - Properties Group Placement Date: 04/30/24  -MH Placement Time: 1617 -MH Side: Left  -MH Orientation: anterior  -MH Location: thigh  -MH     Retired Wound - Properties Group Date first assessed: 04/30/24  -MH Time first assessed: 1617  -MH Side: Left  -MH Location: thigh  -MH      Row Name 05/01/24 0900          Plan of Care Review    Plan of Care Reviewed With patient  -CHLOE     Outcome Evaluation Pt presents with decreased ROM, strength, transfers and ambulation.  Skilled PT services will be required to address these mobility deficits.  -CHLOE       Row Name 05/01/24 0900          Therapy Assessment/Plan (PT)    Rehab Potential (PT) good, to achieve stated therapy goals  -CHLOE     Criteria for Skilled Interventions Met (PT) skilled treatment is necessary  -CHLOE     Therapy Frequency (PT) 2 times/day  -CHLOE     Predicted Duration of Therapy Intervention (PT) 10 days  -CHLOE     Problem List (PT) problems related to;balance;mobility;strength;pain  -CHLOE     Activity Limitations Related to Problem List (PT) unable to ambulate safely;unable to transfer safely  -CHLOE       Row Name 05/01/24 0900          PT Evaluation Complexity    History, PT Evaluation Complexity no personal factors and/or comorbidities  -CHLOE     Examination of Body Systems (PT Eval Complexity) total of 4 or more elements  -CHLOE     Clinical Presentation (PT Evaluation Complexity) stable  -CHLOE     Clinical Decision Making (PT Evaluation Complexity) low complexity  -CHLOE     Overall Complexity (PT Evaluation Complexity) low complexity  -CHLOE       Row Name 05/01/24 0900          Therapy Plan Review/Discharge Plan (PT)    Therapy Plan Review (PT) evaluation/treatment results reviewed;participants voiced agreement with care plan;participants included;patient  -CHLOE       Row Name 05/01/24 0900          Physical Therapy Goals    Transfer Goal Selection (PT) transfer, PT goal 1  -CHLOE     Gait Training Goal Selection (PT) gait training, PT goal 1  -CHLOE     Strength Goal Selection (PT) strength, PT goal 1  -CHLOE       Row Name 05/01/24 0900          Transfer Goal 1 (PT)    Activity/Assistive Device (Transfer Goal 1, PT)  transfers, all  -CHLOE     Snyder Level/Cues Needed (Transfer Goal 1, PT) independent  -CHLOE     Time Frame (Transfer Goal 1, PT) long term goal (LTG);10 days  -CHLOE       Row Name 05/01/24 0900          Gait Training Goal 1 (PT)    Activity/Assistive Device (Gait Training Goal 1, PT) gait (walking locomotion);walker, rolling  -CHLOE     Snyder Level (Gait Training Goal 1, PT) independent  -CHLOE     Distance (Gait Training Goal 1, PT) 300  -CHLOE     Time Frame (Gait Training Goal 1, PT) long term goal (LTG);10 days  -CHLOE       Row Name 05/01/24 0900          Strength Goal 1 (PT)    Strength Goal 1 (PT) Pt will demonstrate 5/5 hip flexion strength on the affected side  -CHLOE     Time Frame (Strength Goal 1, PT) long term goal (LTG);10 days  -CHLOE               User Key  (r) = Recorded By, (t) = Taken By, (c) = Cosigned By      Initials Name Provider Type    Woodrow Vivar PT Physical Therapist    Del Patten, RN Registered Nurse                      PT Recommendation and Plan  Anticipated Discharge Disposition (PT): home with outpatient therapy services  Planned Therapy Interventions (PT): balance training, bed mobility training, gait training, home exercise program, ROM (range of motion), stair training, strengthening, transfer training  Therapy Frequency (PT): 2 times/day  Plan of Care Reviewed With: patient  Outcome Evaluation: Pt presents with decreased ROM, strength, transfers and ambulation.  Skilled PT services will be required to address these mobility deficits.   Outcome Measures       Row Name 05/01/24 0900             How much help from another person do you currently need...    Turning from your back to your side while in flat bed without using bedrails? 4  -CHLOE      Moving from lying on back to sitting on the side of a flat bed without bedrails? 3  -CHLOE      Moving to and from a bed to a chair (including a wheelchair)? 3  -CHLOE      Standing up from a chair using your arms (e.g., wheelchair,  bedside chair)? 3  -CHLOE      Climbing 3-5 steps with a railing? 3  -CHLOE      To walk in hospital room? 3  -CHLOE      AM-PAC 6 Clicks Score (PT) 19  -CHLOE      Highest Level of Mobility Goal 6 --> Walk 10 steps or more  -CHLOE         Functional Assessment    Outcome Measure Options AM-PAC 6 Clicks Basic Mobility (PT)  -CHLOE                User Key  (r) = Recorded By, (t) = Taken By, (c) = Cosigned By      Initials Name Provider Type    Woodrow Vivar, PT Physical Therapist                     Time Calculation:    PT Charges       Row Name 05/01/24 0936             Time Calculation    PT Received On 05/01/24  -CHLOE      PT Goal Re-Cert Due Date 05/10/24  -CHLOE         Untimed Charges    PT Eval/Re-eval Minutes 20  -CHLOE         Total Minutes    Untimed Charges Total Minutes 20  -CHLOE       Total Minutes 20  -CHLOE                User Key  (r) = Recorded By, (t) = Taken By, (c) = Cosigned By      Initials Name Provider Type    Woodrow Vivar, PT Physical Therapist                      PT G-Codes  Outcome Measure Options: AM-PAC 6 Clicks Basic Mobility (PT)  AM-PAC 6 Clicks Score (PT): 19    Woodrow Puckett, PT  5/1/2024

## 2024-05-01 NOTE — THERAPY TREATMENT NOTE
Acute Care - Physical Therapy Treatment Note  TONY Keith     Patient Name: Blair Lira  : 1945  MRN: [id number]  Today's Date: 2024      Visit Dx:     ICD-10-CM ICD-9-CM   1. Closed fracture of neck of left femur, initial encounter  S72.002A 820.8   2. Cerebrovascular accident (CVA), unspecified mechanism  I63.9 434.91   3. Dysphagia, unspecified type  R13.10 787.20   4. Difficulty in walking  R26.2 719.7   5. Decreased activities of daily living (ADL)  Z78.9 V49.89     Patient Active Problem List   Diagnosis    Rotator cuff tear, right    Impingement syndrome of right shoulder    Acute renal failure superimposed on chronic kidney disease    Pancytopenia due to chemotherapy    Abnormal weight loss    Hypertension    CKD (chronic kidney disease) stage 3, GFR 30-59 ml/min    Moderate malnutrition    Weakness generalized    Intractable pain    Sepsis associated hypotension    Multiple myeloma    Edema    Chronic diastolic (congestive) heart failure    Acute on chronic HFrEF (heart failure with reduced ejection fraction)    Chest pain    Syncope    COVID-19 virus infection    Chronic kidney disease, stage IV (severe)    Hypotension    Anemia    Acute on chronic renal failure    Pneumonia    Anemia    Acute febrile illness    Mucus plugging of bronchi    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    Pneumonia due to methicillin susceptible Staphylococcus aureus (MSSA)    Femoral neck fracture    Altered mental status     Past Medical History:   Diagnosis Date    BPH (benign prostatic hyperplasia)     Cancer     Chronic kidney disease     Femoral neck fracture 2024    Frozen shoulder     Hyperlipidemia     Hypertension 10/26/2023    Periarthritis of shoulder     Rotator cuff disorder, right     Tennis elbow     RIGHT     Past Surgical History:   Procedure Laterality Date    BRONCHOSCOPY N/A 2024    Procedure:  BRONCHOSCOPY WITH BAL AND WASHINGS;  Surgeon: Dionte Ruvalcaba MD;  Location: [facility] ENDOSCOPY;  Service: Pulmonary;  Laterality: N/A;  MUCUS PLUGGING    CATARACT EXTRACTION EXTRACAPSULAR W/ INTRAOCULAR LENS IMPLANTATION Bilateral     COLONOSCOPY      HIP HEMIARTHROPLASTY Left 4/30/2024    Procedure: TOTAL HIP ARTHROPLASTY  ANTERIOR APROACH;  Surgeon: Adán Osborne MD;  Location: [facility] MAIN OR;  Service: Orthopedics;  Laterality: Left;    JOINT REPLACEMENT Right     THR    SHOULDER ARTHROSCOPY W/ ROTATOR CUFF REPAIR Right 3/29/2023    Procedure: SHOULDER ARTHROSCOPY WITH ROTATOR CUFF REPAIR, SUBCROMIAL DECOMPRESSION,DISTAL CLAVICLE RESECTION;  Surgeon: Gustavo Samuels MD;  Location: [facility] OR OSC;  Service: Orthopedics;  Laterality: Right;    SHOULDER SURGERY Left     SCOPE     PT Assessment (Last 12 Hours)       PT Evaluation and Treatment       Row Name 05/01/24 1219 05/01/24 0900       Physical Therapy Time and Intention    Subjective Information complains of;pain  -RH complains of;pain  -CHLOE    Document Type therapy note (daily note)  -RH evaluation  -CHLOE    Mode of Treatment individual therapy;group therapy;physical therapy  -RH individual therapy;physical therapy  -CHLOE    Patient Effort good  -RH good  -CHLOE    Symptoms Noted During/After Treatment -- none  -CHLOE    Comment Gait performed individually; therapuetic exercises performed in a group session with 3 participants  - --      Row Name 05/01/24 1219 05/01/24 0900       General Information    Patient Observations alert;cooperative;agree to therapy  -RH alert;cooperative;agree to therapy  -CHLOE    Prior Level of Function -- independent:;all household mobility;community mobility  -CHLOE    Equipment Currently Used at Home -- cane, straight;cane, quad;shower chair  -CHLOE    Existing Precautions/Restrictions -- fall;weight bearing  -CHLOE    Barriers to Rehab -- none identified  -CHLOE      Row Name 05/01/24 0900          Living Environment    Current Living Arrangements  home  -[facility] Name 05/01/24 1219          Pain    Posttreatment Pain Rating 0/10 - no pain  -[facility] Name 05/01/24 0900          Cognition    Orientation Status (Cognition) oriented x 3  -[facility] Name 05/01/24 1219          Range of Motion (ROM)    Range of Motion --  Pt L hip AAROM at 90 degrees flex and 25 degrees abd.  -[facility] Name 05/01/24 0900          Range of Motion Comprehensive    General Range of Motion lower extremity range of motion deficits identified  -     Comment, General Range of Motion affected hip mildly limited due to pain  -[facility] Name 05/01/24 1219          Strength (Manual Muscle Testing)    Strength (Manual Muscle Testing) --  Pt L hip flex strength at 3+/5.  -[facility] Name 05/01/24 0900          Strength Comprehensive (MMT)    General Manual Muscle Testing (MMT) Assessment lower extremity strength deficits identified  LLE 4-/5  -[facility] Name 05/01/24 1219          Mobility    Extremity Weight-bearing Status left lower extremity  -     Left Lower Extremity (Weight-bearing Status) weight-bearing as tolerated (WBAT)  -[facility] Name 05/01/24 0900          Bed Mobility    Bed Mobility supine-sit;bed mobility (all) activities  -     All Activities, [place] (Bed Mobility) contact guard  -CHLOE     Supine-Sit [place] (Bed Mobility) contact guard  -[facility] Name 05/01/24 1219 05/01/24 0900       Transfers    Transfers sit-stand transfer;stand-sit transfer  - bed-chair transfer;sit-stand transfer  -CHLOE      Row Name 05/01/24 0900          Bed-Chair Transfer    Bed-Chair [place] (Transfers) contact guard  -CHLOE     Assistive Device (Bed-Chair Transfers) walker, front-wheeled  -CHLOE       [facility] Name 05/01/24 1219 05/01/24 0900       Sit-Stand Transfer    Sit-Stand [place] (Transfers) contact guard  - contact guard  -CHLOE    Assistive Device (Sit-Stand Transfers) walker, front-wheeled  - walker, front-wheeled  -CHLOE      [facility] Name 05/01/24  1219          Stand-Sit Transfer    Stand-Sit [place] (Transfers) contact guard  -RH     Assistive Device (Stand-Sit Transfers) walker, front-wheeled  -RH       Row Name 05/01/24 1219 05/01/24 0900       Gait/Stairs (Locomotion)    Gait/Stairs Locomotion gait/ambulation assistive device  -RH gait/ambulation assistive device  -CHLOE    [place] Level (Gait) contact guard  -RH contact guard  -CHLOE    Assistive Device (Gait) walker, front-wheeled  -RH walker, front-wheeled  -CHLOE    Patient was able to Ambulate yes  -RH yes  -CHLOE    Distance in Feet (Gait) 75  -  -CHLOE    Pattern (Gait) 3-point;step-through  -RH step-to  -CHLOE    Deviations/Abnormal Patterns (Gait) stride length decreased;gait speed decreased  -RH --    Left Sided Gait Deviations heel strike decreased  -RH --    Gait Assessment/Intervention Pt amb with RW and CGA with 3 point step-through gait pattern with decreased gait speed, stride length, and stride length.  Pt became dizzy with first attempt at standing but without difficulty with later attempt.  - --      Row Name 05/01/24 1219 05/01/24 0900       Safety Issues, Functional Mobility    Impairments Affecting Function (Mobility) balance;pain;range of motion (ROM);strength  -RH balance;pain;range of motion (ROM);strength  -CHLOE      Row Name 05/01/24 1219 05/01/24 0900       Balance    Balance Assessment standing dynamic balance  - standing dynamic balance  -CHLOE    Dynamic Standing Balance contact guard  -RH contact guard  -CHLOE    Position/Device Used, Standing Balance walker, front-wheeled  -RH walker, front-wheeled  -CHLOE      Row Name 05/01/24 1219          Motor Skills    Therapeutic Exercise hip;knee;ankle  -       Row Name 05/01/24 1219          Hip (Therapeutic Exercise)    Hip (Therapeutic Exercise) isometric exercises  -     Hip Isometrics (Therapeutic Exercise) left;gluteal sets;aBduction;10 repetitions;2 sets  -       Row Name 05/01/24 1219          Knee (Therapeutic Exercise)     Knee (Therapeutic Exercise) strengthening exercise;isometric exercises  -     Knee Isometrics (Therapeutic Exercise) left;quad sets;10 repetitions;2 sets  -     Knee Strengthening (Therapeutic Exercise) left;heel slides;SAQ (short arc quad);LAQ (long arc quad);10 repetitions;2 sets  -       Row Name 05/01/24 1219          Ankle (Therapeutic Exercise)    Ankle (Therapeutic Exercise) AROM (active range of motion)  -     Ankle AROM (Therapeutic Exercise) left;dorsiflexion;plantarflexion;10 repetitions;2 sets  -       Row Name             Wound 04/30/24 1617 Left anterior thigh    Wound - Properties Group Placement Date: 04/30/24  - Placement Time: 1617  -MH Side: Left  -MH Orientation: anterior  -MH Location: thigh  -MH    Retired Wound - Properties Group Placement Date: 04/30/24  - Placement Time: 1617  -MH Side: Left  -MH Orientation: anterior  -MH Location: thigh  -MH    Retired Wound - Properties Group Date first assessed: 04/30/24  - Time first assessed: 1617  - Side: Left  -MH Location: thigh  -MH      Row Name 05/01/24 0900          Plan of Care Review    Plan of Care Reviewed With patient  -CHLOE     Outcome Evaluation Pt presents with decreased ROM, strength, transfers and ambulation.  Skilled PT services will be required to address these mobility deficits.  -CHLOE       Row Name 05/01/24 1219          Vital Signs    O2 Delivery Intra Treatment room air  -       Row Name 05/01/24 1219          Positioning and Restraints    In Chair call light within reach;reclined;encouraged to call for assist;exit alarm on;with family/caregiver  -       Row Name 05/01/24 0900          Therapy Assessment/Plan (PT)    Rehab Potential (PT) good, to achieve stated therapy goals  -CHLOE     Criteria for Skilled Interventions Met (PT) skilled treatment is necessary  -CHLOE     Therapy Frequency (PT) 2 times/day  -CHLOE     Predicted Duration of Therapy Intervention (PT) 10 days  -CHLOE     Problem List (PT) problems related  to;balance;mobility;strength;pain  -CHLOE     Activity Limitations Related to Problem List (PT) unable to ambulate safely;unable to transfer safely  -CHLOE       Row Name 05/01/24 0900          PT Evaluation Complexity    History, PT Evaluation Complexity no personal factors and/or comorbidities  -CHLOE     Examination of Body Systems (PT Eval Complexity) total of 4 or more elements  -CHLOE     Clinical Presentation (PT Evaluation Complexity) stable  -CHLOE     Clinical Decision Making (PT Evaluation Complexity) low complexity  -CHLOE     Overall Complexity (PT Evaluation Complexity) low complexity  -CHLOE       Row Name 05/01/24 1219          Progress Summary (PT)    Progress Toward Functional Goals (PT) progress toward functional goals is good  -     Daily Progress Summary (PT) Pt is progressing well with their exercise program.  Will continue current plan of care.  -[facility] Name 05/01/24 0900          Therapy Plan Review/Discharge Plan (PT)    Therapy Plan Review (PT) evaluation/treatment results reviewed;participants voiced agreement with care plan;participants included;patient  -CHLOE       Row Name 05/01/24 0900          Physical Therapy Goals    Transfer Goal Selection (PT) transfer, PT goal 1  -CHLOE     Gait Training Goal Selection (PT) gait training, PT goal 1  -CHLOE     Strength Goal Selection (PT) strength, PT goal 1  -CHLOE       Row Name 05/01/24 0900          Transfer Goal 1 (PT)    Activity/Assistive Device (Transfer Goal 1, PT) transfers, all  -CHLOE     [place] Level/Cues Needed (Transfer Goal 1, PT) independent  -CHLOE     Time Frame (Transfer Goal 1, PT) long term goal (LTG);10 days  -CHLOE       Row Name 05/01/24 0900          Gait Training Goal 1 (PT)    Activity/Assistive Device (Gait Training Goal 1, PT) gait (walking locomotion);walker, rolling  -CHLOE     [place] Level (Gait Training Goal 1, PT) independent  -CHLOE     Distance (Gait Training Goal 1, PT) 300  -CHLOE     Time Frame (Gait Training Goal 1, PT) long term goal  (LTG);10 days  -CHLOE       Row Name 05/01/24 0900          Strength Goal 1 (PT)    Strength Goal 1 (PT) Pt will demonstrate 5/5 hip flexion strength on the affected side  -CHLOE     Time Frame (Strength Goal 1, PT) long term goal (LTG);10 days  -CHLOE               User Key  (r) = Recorded By, (t) = Taken By, (c) = Cosigned By      Initials Name Provider Type    Shen Gaytan PTA Physical Therapist Assistant    Woodrow Vivar, PT Physical Therapist    Del Patten RN Registered Nurse                      PT Recommendation and Plan     Progress Summary (PT)  Progress Toward Functional Goals (PT): progress toward functional goals is good  Daily Progress Summary (PT): Pt is progressing well with their exercise program.  Will continue current plan of care.   Outcome Measures       Row Name 05/01/24 1200 05/01/24 0900          How much help from another person do you currently need...    Turning from your back to your side while in flat bed without using bedrails? 4  -RH 4  -CHLOE     Moving from lying on back to sitting on the side of a flat bed without bedrails? 3  -RH 3  -CHLOE     Moving to and from a bed to a chair (including a wheelchair)? 3  -RH 3  -CHLOE     Standing up from a chair using your arms (e.g., wheelchair, bedside chair)? 3  -RH 3  -CHLOE     Climbing 3-5 steps with a railing? 3  -RH 3  -CHLOE     To walk in hospital room? 4  -RH 3  -CHLOE     AM-PAC 6 Clicks Score (PT) 20  -RH 19  -CHLOE     Highest Level of Mobility Goal 6 --> Walk 10 steps or more  -RH 6 --> Walk 10 steps or more  -CHLOE        Functional Assessment    Outcome Measure Options -- AM-PAC 6 Clicks Basic Mobility (PT)  -CHLOE               User Key  (r) = Recorded By, (t) = Taken By, (c) = Cosigned By      Initials Name Provider Type    Shen Gaytan PTA Physical Therapist Assistant    Woodrow Vivar, PT Physical Therapist                     Time Calculation:    PT Charges       Row Name 05/01/24 1218 05/01/24 0936          Time Calculation     PT Received On 05/01/24  -RH 05/01/24  -CHLOE     PT Goal Re-Cert Due Date -- 05/10/24  -CHLOE        Timed Charges    29689 - Gait Training Minutes  8  -RH --     20312 - PT Therapeutic Activity Minutes 5  -RH --        Untimed Charges    PT Eval/Re-eval Minutes -- 20  -CHLOE     PT Group Therapy Minutes 25  -RH --        Total Minutes    Timed Charges Total Minutes 13  -RH --     Untimed Charges Total Minutes 25  -RH 20  -CHLOE      Total Minutes 38  -RH 20  -CHLOE               User Key  (r) = Recorded By, (t) = Taken By, (c) = Cosigned By      Initials Name Provider Type     Shen Erazo PTA Physical Therapist Assistant    Woodrow Vivar, PT Physical Therapist                  Therapy Charges for Today       Code Description Service Date Service Provider Modifiers Qty    57600533892 HC GAIT TRAINING EA 15 MIN 5/1/2024 Shen Erazo PTA GP 1    12956167022 HC PT THER PROC GROUP 5/1/2024 Shen Erazo PTA GP 1            PT G-Codes  Outcome Measure Options: AM-PAC 6 Clicks Daily Activity (OT)  AM-PAC 6 Clicks Score (PT): 20  AM-PAC 6 Clicks Score (OT): 21    Shen Erazo PTA  5/1/2024

## 2024-05-01 NOTE — THERAPY TREATMENT NOTE
Acute Care - Physical Therapy Treatment Note  TONY Keith     Patient Name: Blair Lira  : 1945  MRN: [id number]  Today's Date: 2024      Visit Dx:     ICD-10-CM ICD-9-CM   1. Closed fracture of neck of left femur, initial encounter  S72.002A 820.8   2. Cerebrovascular accident (CVA), unspecified mechanism  I63.9 434.91   3. Dysphagia, unspecified type  R13.10 787.20   4. Difficulty in walking  R26.2 719.7   5. Decreased activities of daily living (ADL)  Z78.9 V49.89     Patient Active Problem List   Diagnosis    Rotator cuff tear, right    Impingement syndrome of right shoulder    Acute renal failure superimposed on chronic kidney disease    Pancytopenia due to chemotherapy    Abnormal weight loss    Hypertension    CKD (chronic kidney disease) stage 3, GFR 30-59 ml/min    Moderate malnutrition    Weakness generalized    Intractable pain    Sepsis associated hypotension    Multiple myeloma    Edema    Chronic diastolic (congestive) heart failure    Acute on chronic HFrEF (heart failure with reduced ejection fraction)    Chest pain    Syncope    COVID-19 virus infection    Chronic kidney disease, stage IV (severe)    Hypotension    Anemia    Acute on chronic renal failure    Pneumonia    Anemia    Acute febrile illness    Mucus plugging of bronchi    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    Pneumonia due to methicillin susceptible Staphylococcus aureus (MSSA)    Femoral neck fracture    Altered mental status     Past Medical History:   Diagnosis Date    BPH (benign prostatic hyperplasia)     Cancer     Chronic kidney disease     Femoral neck fracture 2024    Frozen shoulder     Hyperlipidemia     Hypertension 10/26/2023    Periarthritis of shoulder     Rotator cuff disorder, right     Tennis elbow     RIGHT     Past Surgical History:   Procedure Laterality Date    BRONCHOSCOPY N/A 2024    Procedure:  BRONCHOSCOPY WITH BAL AND WASHINGS;  Surgeon: Dionte Ruvalcaba MD;  Location: [facility] ENDOSCOPY;  Service: Pulmonary;  Laterality: N/A;  MUCUS PLUGGING    CATARACT EXTRACTION EXTRACAPSULAR W/ INTRAOCULAR LENS IMPLANTATION Bilateral     COLONOSCOPY      HIP HEMIARTHROPLASTY Left 4/30/2024    Procedure: TOTAL HIP ARTHROPLASTY  ANTERIOR APROACH;  Surgeon: Adán Osborne MD;  Location: [facility] MAIN OR;  Service: Orthopedics;  Laterality: Left;    JOINT REPLACEMENT Right     THR    SHOULDER ARTHROSCOPY W/ ROTATOR CUFF REPAIR Right 3/29/2023    Procedure: SHOULDER ARTHROSCOPY WITH ROTATOR CUFF REPAIR, SUBCROMIAL DECOMPRESSION,DISTAL CLAVICLE RESECTION;  Surgeon: Gustavo Samuels MD;  Location: [facility] OR [facility]SC;  Service: Orthopedics;  Laterality: Right;    SHOULDER SURGERY Left     SCOPE     PT Assessment (Last 12 Hours)       PT Evaluation and Treatment       Row Name 05/01/24 1431 05/01/24 1219       Physical Therapy Time and Intention    Subjective Information complains of;pain  -RH complains of;pain  -RH    Document Type therapy note (daily note)  -RH therapy note (daily note)  -RH    Mode of Treatment individual therapy;group therapy;physical therapy  -RH individual therapy;group therapy;physical therapy  -RH    Patient Effort good  -RH good  -RH    Comment Gait performed individually; therapuetic exercises performed in a group session with 4 participants  -RH Gait performed individually; therapuetic exercises performed in a group session with 3 participants  -RH      Row Name 05/01/24 0900          Physical Therapy Time and Intention    Subjective Information complains of;pain  -CHLOE     Document Type evaluation  -CHLOE     Mode of Treatment individual therapy;physical therapy  -CHLOE     Patient Effort good  -CHLOE     Symptoms Noted During/After Treatment none  -CHLOE       Row Name 05/01/24 1431 05/01/24 1219       General Information    Patient Observations alert;cooperative;agree to therapy  -RH alert;cooperative;agree to  therapy  -[facility] Name 05/01/24 0900          General Information    Patient Observations alert;cooperative;agree to therapy  -CHLOE     Prior Level of Function independent:;all household mobility;community mobility  -CHLOE     Equipment Currently Used at Home cane, straight;cane, quad;shower chair  -CHLOE     Existing Precautions/Restrictions fall;weight bearing  -CHLOE     Barriers to Rehab none identified  -CHLOE       Row Name 05/01/24 0900          Living Environment    Current Living Arrangements home  -CHLOE       Row Name 05/01/24 1431 05/01/24 1219       Pain    Pretreatment Pain Rating 0/10 - no pain  -RH --    Posttreatment Pain Rating 0/10 - no pain  -RH 0/10 - no pain  -RH    Pain Location - Side/Orientation Left  -RH --    Pain Location - hip  -RH --      [facility] Name 05/01/24 0900          Cognition    Orientation Status (Cognition) oriented x 3  -[facility] Name 05/01/24 1219          Range of Motion (ROM)    Range of Motion --  Pt L hip AAROM at 90 degrees flex and 25 degrees abd.  -[facility] Name 05/01/24 0900          Range of Motion Comprehensive    General Range of Motion lower extremity range of motion deficits identified  -CHLOE     Comment, General Range of Motion affected hip mildly limited due to pain  -CHLOE       Row Name 05/01/24 1219          Strength (Manual Muscle Testing)    Strength (Manual Muscle Testing) --  Pt L hip flex strength at 3+/5.  -[facility] Name 05/01/24 0900          Strength Comprehensive (MMT)    General Manual Muscle Testing (MMT) Assessment lower extremity strength deficits identified  LLE 4-/5  [facility] 05/01/24 1431 05/01/24 1219       Mobility    Extremity Weight-bearing Status left lower extremity  -RH left lower extremity  -RH    Left Lower Extremity (Weight-bearing Status) weight-bearing as tolerated (WBAT)  -RH weight-bearing as tolerated (WBAT)  -      Row Name 05/01/24 0900          Bed Mobility    Bed Mobility supine-sit;bed mobility (all) activities  -CHLOE      All Activities, Loyal (Bed Mobility) contact guard  -CHLOE     Supine-Sit Loyal (Bed Mobility) contact guard  -CHLOE       Row Name 05/01/24 1431 05/01/24 1219       Transfers    Transfers sit-stand transfer;stand-sit transfer  -RH sit-stand transfer;stand-sit transfer  -RH      Row Name 05/01/24 0900          Transfers    Transfers bed-chair transfer;sit-stand transfer  -CHLOE       Row Name 05/01/24 0900          Bed-Chair Transfer    Bed-Chair Loyal (Transfers) contact guard  -CHLOE     Assistive Device (Bed-Chair Transfers) walker, front-wheeled  -CHLOE       Row Name 05/01/24 1431 05/01/24 1219       Sit-Stand Transfer    Sit-Stand Loyal (Transfers) contact guard  -RH contact guard  -RH    Assistive Device (Sit-Stand Transfers) walker, front-wheeled  -RH walker, front-wheeled  -RH      Row Name 05/01/24 0900          Sit-Stand Transfer    Sit-Stand Loyal (Transfers) contact guard  -CHLOE     Assistive Device (Sit-Stand Transfers) walker, front-wheeled  -CHLOE       Row Name 05/01/24 1431 05/01/24 1219       Stand-Sit Transfer    Stand-Sit Loyal (Transfers) contact guard  -RH contact guard  -RH    Assistive Device (Stand-Sit Transfers) walker, front-wheeled  -RH walker, front-wheeled  -RH      Row Name 05/01/24 1431 05/01/24 1219       Gait/Stairs (Locomotion)    Gait/Stairs Locomotion gait/ambulation assistive device  -RH gait/ambulation assistive device  -RH    Loyal Level (Gait) contact guard  -RH contact guard  -RH    Assistive Device (Gait) walker, front-wheeled  -RH walker, front-wheeled  -RH    Patient was able to Ambulate yes  -RH yes  -RH    Distance in Feet (Gait) 150  -RH 75  -RH    Pattern (Gait) --  Pt able to achieve and maintain reciprocal gait.  -RH 3-point;step-through  -RH    Deviations/Abnormal Patterns (Gait) gait speed decreased  -RH stride length decreased;gait speed decreased  -RH    Left Sided Gait Deviations heel strike decreased  -RH heel strike decreased   -    Gait Assessment/Intervention -- Pt amb with RW and CGA with 3 point step-through gait pattern with decreased gait speed, stride length, and stride length.  Pt became dizzy with first attempt at standing but without difficulty with later attempt.  -      Row Name 05/01/24 0900          Gait/Stairs (Locomotion)    Gait/Stairs Locomotion gait/ambulation assistive device  -CHLOE     [place] Level (Gait) contact guard  -CHLOE     Assistive Device (Gait) walker, front-wheeled  -CHLOE     Patient was able to Ambulate yes  -CHLOE     Distance in Feet (Gait) 100  -CHLOE     Pattern (Gait) step-to  -CHLOE       Row Name 05/01/24 1431 05/01/24 1219       Safety Issues, Functional Mobility    Impairments Affecting Function (Mobility) balance;pain;range of motion (ROM);strength  - balance;pain;range of motion (ROM);strength  -      Row Name 05/01/24 0900          Safety Issues, Functional Mobility    Impairments Affecting Function (Mobility) balance;pain;range of motion (ROM);strength  -CHLOE       Row Name 05/01/24 1431 05/01/24 1219       Balance    Balance Assessment standing dynamic balance  - standing dynamic balance  -    Dynamic Sitting Balance contact guard  -RH --    Dynamic Standing Balance contact guard  -RH contact guard  -RH    Position/Device Used, Standing Balance walker, front-wheeled  -RH walker, front-wheeled  -RH      Row Name 05/01/24 0900          Balance    Balance Assessment standing dynamic balance  -CHLOE     Dynamic Standing Balance contact guard  -CHLOE     Position/Device Used, Standing Balance walker, front-wheeled  -CHLOE       Row Name 05/01/24 1431 05/01/24 1219       Motor Skills    Therapeutic Exercise hip;knee;ankle  - hip;knee;ankle  -      Row Name 05/01/24 1431 05/01/24 1219       Hip (Therapeutic Exercise)    Hip (Therapeutic Exercise) isometric exercises  - isometric exercises  -    Hip Isometrics (Therapeutic Exercise) left;gluteal sets;aBduction;10 repetitions;2 sets  - left;gluteal  sets;aBduction;10 repetitions;2 sets  -RH      Row Name 05/01/24 1431 05/01/24 1219       Knee (Therapeutic Exercise)    Knee (Therapeutic Exercise) strengthening exercise;isometric exercises  -RH strengthening exercise;isometric exercises  -    Knee Isometrics (Therapeutic Exercise) left;quad sets;10 repetitions;2 sets  -RH left;quad sets;10 repetitions;2 sets  -RH    Knee Strengthening (Therapeutic Exercise) left;heel slides;SAQ (short arc quad);LAQ (long arc quad);10 repetitions;2 sets  -RH left;heel slides;SAQ (short arc quad);LAQ (long arc quad);10 repetitions;2 sets  -RH      Row Name 05/01/24 1431 05/01/24 1219       Ankle (Therapeutic Exercise)    Ankle (Therapeutic Exercise) AROM (active range of motion)  - AROM (active range of motion)  -    Ankle AROM (Therapeutic Exercise) left;dorsiflexion;plantarflexion;10 repetitions;2 sets  -RH left;dorsiflexion;plantarflexion;10 repetitions;2 sets  -RH      Row Name             Wound 04/30/24 1617 Left anterior thigh    Wound - Properties Group Placement Date: 04/30/24  -MH Placement Time: 1617  -MH Side: Left  -MH Orientation: anterior  -MH Location: thigh  -MH    Retired Wound - Properties Group Placement Date: 04/30/24  - Placement Time: 1617  -MH Side: Left  -MH Orientation: anterior  -MH Location: thigh  -MH    Retired Wound - Properties Group Date first assessed: 04/30/24  -MH Time first assessed: 1617  -MH Side: Left  -MH Location: thigh  -MH      Row Name 05/01/24 0900          Plan of Care Review    Plan of Care Reviewed With patient  -CHLOE     Outcome Evaluation Pt presents with decreased ROM, strength, transfers and ambulation.  Skilled PT services will be required to address these mobility deficits.  -CHLOE       Row Name 05/01/24 1219          Vital Signs    O2 Delivery Intra Treatment room air  -RH       Row Name 05/01/24 1431 05/01/24 1219       Positioning and Restraints    In Chair call light within reach;reclined;encouraged to call for  assist;exit alarm on  - call light within reach;reclined;encouraged to call for assist;exit alarm on;with family/caregiver  -      Row Name 05/01/24 0900          Therapy Assessment/Plan (PT)    Rehab Potential (PT) good, to achieve stated therapy goals  -CHLOE     Criteria for Skilled Interventions Met (PT) skilled treatment is necessary  -CHLOE     Therapy Frequency (PT) 2 times/day  -CHLOE     Predicted Duration of Therapy Intervention (PT) 10 days  -CHLOE     Problem List (PT) problems related to;balance;mobility;strength;pain  -CHLOE     Activity Limitations Related to Problem List (PT) unable to ambulate safely;unable to transfer safely  -CHLOE       Row Name 05/01/24 0900          PT Evaluation Complexity    History, PT Evaluation Complexity no personal factors and/or comorbidities  -CHLOE     Examination of Body Systems (PT Eval Complexity) total of 4 or more elements  -CHLOE     Clinical Presentation (PT Evaluation Complexity) stable  -CHLOE     Clinical Decision Making (PT Evaluation Complexity) low complexity  -CHLOE     Overall Complexity (PT Evaluation Complexity) low complexity  -CHLOE       Row Name 05/01/24 1431 05/01/24 1219       Progress Summary (PT)    Progress Toward Functional Goals (PT) progress toward functional goals is good  -RH progress toward functional goals is good  -    Daily Progress Summary (PT) Pt is progressing well with their exercise program.  Will continue current plan of care.  -RH Pt is progressing well with their exercise program.  Will continue current plan of care.  -[facility] Name 05/01/24 0900          Therapy Plan Review/Discharge Plan (PT)    Therapy Plan Review (PT) evaluation/treatment results reviewed;participants voiced agreement with care plan;participants included;patient  -CHLOE       Row Name 05/01/24 0900          Physical Therapy Goals    Transfer Goal Selection (PT) transfer, PT goal 1  -CHLOE     Gait Training Goal Selection (PT) gait training, PT goal 1  -CHLOE     Strength Goal Selection  (PT) strength, PT goal 1  -CHLOE       Row Name 05/01/24 0900          Transfer Goal 1 (PT)    Activity/Assistive Device (Transfer Goal 1, PT) transfers, all  -CHOLE     [place] Level/Cues Needed (Transfer Goal 1, PT) independent  -CHLOE     Time Frame (Transfer Goal 1, PT) long term goal (LTG);10 days  -CHLOE       Row Name 05/01/24 0900          Gait Training Goal 1 (PT)    Activity/Assistive Device (Gait Training Goal 1, PT) gait (walking locomotion);walker, rolling  -CHLOE     [place] Level (Gait Training Goal 1, PT) independent  -CHLOE     Distance (Gait Training Goal 1, PT) 300  -CHLOE     Time Frame (Gait Training Goal 1, PT) long term goal (LTG);10 days  -CHLOE       Row Name 05/01/24 0900          Strength Goal 1 (PT)    Strength Goal 1 (PT) Pt will demonstrate 5/5 hip flexion strength on the affected side  -CHLOE     Time Frame (Strength Goal 1, PT) long term goal (LTG);10 days  -CHLOE               User Key  (r) = Recorded By, (t) = Taken By, (c) = Cosigned By      Initials Name Provider Type    RH Shen Erazo, PTA Physical Therapist Assistant    Woodrow Vivar, PT Physical Therapist    Del Patten, RN Registered Nurse                      PT Recommendation and Plan     Progress Summary (PT)  Progress Toward Functional Goals (PT): progress toward functional goals is good  Daily Progress Summary (PT): Pt is progressing well with their exercise program.  Will continue current plan of care.   Outcome Measures       Row Name 05/01/24 1200 05/01/24 0900          How much help from another person do you currently need...    Turning from your back to your side while in flat bed without using bedrails? 4  -RH 4  -CHLOE     Moving from lying on back to sitting on the side of a flat bed without bedrails? 3  -RH 3  -CHLOE     Moving to and from a bed to a chair (including a wheelchair)? 3  -RH 3  -CHLOE     Standing up from a chair using your arms (e.g., wheelchair, bedside chair)? 3  -RH 3  -CHLOE     Climbing 3-5 steps with a  railing? 3  -RH 3  -CHLOE     To walk in hospital room? 4  -RH 3  -CHLOE     AM-PAC 6 Clicks Score (PT) 20  -RH 19  -CHLOE     Highest Level of Mobility Goal 6 --> Walk 10 steps or more  -RH 6 --> Walk 10 steps or more  -CHLOE        Functional Assessment    Outcome Measure Options -- AM-PAC 6 Clicks Basic Mobility (PT)  -CHLOE               User Key  (r) = Recorded By, (t) = Taken By, (c) = Cosigned By      Initials Name Provider Type     Shen Erazo PTA Physical Therapist Assistant    Woodrow Vivar, PT Physical Therapist                     Time Calculation:    PT Charges       Row Name 05/01/24 1430 05/01/24 1218 05/01/24 0936       Time Calculation    PT Received On 05/01/24  -RH 05/01/24  -RH 05/01/24  -CHLOE    PT Goal Re-Cert Due Date -- -- 05/10/24  -CHLOE       Timed Charges    65929 - Gait Training Minutes  8  -RH 8  -RH --    34689 - PT Therapeutic Activity Minutes 4  -RH 5  -RH --       Untimed Charges    PT Eval/Re-eval Minutes -- -- 20  -CHLOE    PT Group Therapy Minutes 25  -RH 25  -RH --       Total Minutes    Timed Charges Total Minutes 12  -RH 13  -RH --    Untimed Charges Total Minutes 25  -RH 25  -RH 20  -CHLOE     Total Minutes 37  -RH 38  -RH 20  -CHLOE              User Key  (r) = Recorded By, (t) = Taken By, (c) = Cosigned By      Initials Name Provider Type     Shen Erazo PTA Physical Therapist Assistant    Woodrow Vivar, MARQUIS Physical Therapist                  Therapy Charges for Today       Code Description Service Date Service Provider Modifiers Qty    94308420919 HC GAIT TRAINING EA 15 MIN 5/1/2024 Shen Erazo PTA GP 1    43144821656 HC PT THER PROC GROUP 5/1/2024 Shen Erazo PTA GP 1    07228619027 HC GAIT TRAINING EA 15 MIN 5/1/2024 Shen Erazo PTA GP 1    80594605464 HC PT THER PROC GROUP 5/1/2024 Shen Erazo PTA GP 1            PT G-Codes  Outcome Measure Options: AM-PAC 6 Clicks Daily Activity (OT)  AM-PAC 6 Clicks Score (PT): 20  AM-PAC 6 Clicks Score (OT): 21    Shen Erazo,  PTA  5/1/2024

## 2024-05-01 NOTE — PROGRESS NOTES
[facility]     Progress Note    Patient Name: Blair Lira  : 1945  MRN: [id number]  Primary Care Physician:  Babs Summers APRN  Date of admission: 2024      Subjective   Brief summary.  Patient admitted post fall with hip fracture.  Status post hip surgery      HPI:  Patient postsurgery, postop day #1, left total hip arthroplasty.  Feeling weak, some cough congestion.  No shortness of breath,    Review of Systems     Denies any fever, complains of weakness, some cough and congestion.  No shortness of breath, wheezing.      Objective     Vitals:   Temp:  [97.3 °F (36.3 °C)-98.1 °F (36.7 °C)] 97.9 °F (36.6 °C)  Heart Rate:  [] 84  Resp:  [16-18] 18  BP: ()/(59-80) 116/59  Flow (L/min):  [2] 2    Physical Exam :     Elderly male obese, not in acute distress.  Heart regular. .  Lungs diminished breath sounds, coarse breath sounds.  Abdomen soft and obese.  Nontender.  Extremities left hip tenderness.,  Postsurgical area without any bleeding      Result Review:  I have personally reviewed the results from the time of this admission to 2024 18:47 EDT and agree with these findings:  [x]  Laboratory  [x]  Microbiology  [x]  Radiology  []  EKG/Telemetry   []  Cardiology/Vascular   []  Pathology  []  Old records  []  Other:    Reviewed      Assessment / Plan       Active Hospital Problems:  Active Hospital Problems    Diagnosis     **Femoral neck fracture     Altered mental status     Multiple myeloma     CKD (chronic kidney disease) stage 3, GFR 30-59 ml/min        Plan:   Patient stable.  Postop day #1  Labs reviewed.  Patient complains of congestion we will add nebs and Robitussin DM.  Increase activity with PT OT.  Discharge to inpatient rehab when bed available    DVT prophylaxis:  Medical and mechanical DVT prophylaxis orders are present.        CODE STATUS:   Code Status (Patient has no pulse and is not breathing): CPR (Attempt to Resuscitate)  Medical Interventions (Patient has  pulse or is breathing): Full Support              Electronically signed by Elieser Pederson MD, 05/01/24, 6:48 PM EDT.

## 2024-05-01 NOTE — PLAN OF CARE
Goal Outcome Evaluation:  Plan of Care Reviewed With: patient           Outcome Evaluation: Patient has experienced decline in function from baseline status, presenting w/ deficits related to balance, tolerance, transfers and mobility that impede patient independence with activities of daily living.  Patient would benefit from skilled Occupational Therapy intervention to maxamize patient safety, and promote return to baseline independence.      Anticipated Discharge Disposition (OT): home with outpatient therapy services

## 2024-05-02 PROBLEM — D62 ACUTE BLOOD LOSS ANEMIA: Status: ACTIVE | Noted: 2024-05-02

## 2024-05-02 LAB
ALBUMIN SERPL-MCNC: 2.4 G/DL (ref 3.5–5.2)
ALBUMIN/GLOB SERPL: 1 G/DL
ALP SERPL-CCNC: 53 U/L (ref 39–117)
ALT SERPL W P-5'-P-CCNC: <5 U/L (ref 1–41)
ANION GAP SERPL CALCULATED.3IONS-SCNC: 11.2 MMOL/L (ref 5–15)
AST SERPL-CCNC: 15 U/L (ref 1–40)
BASOPHILS # BLD AUTO: 0.01 10*3/MM3 (ref 0–0.2)
BASOPHILS NFR BLD AUTO: 0.3 % (ref 0–1.5)
BILIRUB SERPL-MCNC: 0.2 MG/DL (ref 0–1.2)
BUN SERPL-MCNC: 39 MG/DL (ref 8–23)
BUN/CREAT SERPL: 12.2 (ref 7–25)
CALCIUM SPEC-SCNC: 7.6 MG/DL (ref 8.6–10.5)
CHLORIDE SERPL-SCNC: 105 MMOL/L (ref 98–107)
CO2 SERPL-SCNC: 19.8 MMOL/L (ref 22–29)
CREAT SERPL-MCNC: 3.2 MG/DL (ref 0.76–1.27)
DEPRECATED RDW RBC AUTO: 61.7 FL (ref 37–54)
EGFRCR SERPLBLD CKD-EPI 2021: 19.2 ML/MIN/1.73
EOSINOPHIL # BLD AUTO: 0.09 10*3/MM3 (ref 0–0.4)
EOSINOPHIL NFR BLD AUTO: 2.3 % (ref 0.3–6.2)
ERYTHROCYTE [DISTWIDTH] IN BLOOD BY AUTOMATED COUNT: 16.4 % (ref 12.3–15.4)
GLOBULIN UR ELPH-MCNC: 2.4 GM/DL
GLUCOSE SERPL-MCNC: 128 MG/DL (ref 65–99)
HCT VFR BLD AUTO: 20 % (ref 37.5–51)
HGB BLD-MCNC: 6.3 G/DL (ref 13–17.7)
IMM GRANULOCYTES # BLD AUTO: 0.05 10*3/MM3 (ref 0–0.05)
IMM GRANULOCYTES NFR BLD AUTO: 1.3 % (ref 0–0.5)
LYMPHOCYTES # BLD AUTO: 0.51 10*3/MM3 (ref 0.7–3.1)
LYMPHOCYTES NFR BLD AUTO: 12.8 % (ref 19.6–45.3)
MCH RBC QN AUTO: 32.3 PG (ref 26.6–33)
MCHC RBC AUTO-ENTMCNC: 31.5 G/DL (ref 31.5–35.7)
MCV RBC AUTO: 102.6 FL (ref 79–97)
MONOCYTES # BLD AUTO: 0.4 10*3/MM3 (ref 0.1–0.9)
MONOCYTES NFR BLD AUTO: 10 % (ref 5–12)
NEUTROPHILS NFR BLD AUTO: 2.93 10*3/MM3 (ref 1.7–7)
NEUTROPHILS NFR BLD AUTO: 73.3 % (ref 42.7–76)
NRBC BLD AUTO-RTO: 0 /100 WBC (ref 0–0.2)
PASSIVE ANTI-CD-38: NORMAL
PLATELET # BLD AUTO: 140 10*3/MM3 (ref 140–450)
PMV BLD AUTO: 10.5 FL (ref 6–12)
POTASSIUM SERPL-SCNC: 4.9 MMOL/L (ref 3.5–5.2)
PROT SERPL-MCNC: 4.8 G/DL (ref 6–8.5)
RBC # BLD AUTO: 1.95 10*6/MM3 (ref 4.14–5.8)
SODIUM SERPL-SCNC: 136 MMOL/L (ref 136–145)
WBC NRBC COR # BLD AUTO: 3.99 10*3/MM3 (ref 3.4–10.8)

## 2024-05-02 PROCEDURE — 36430 TRANSFUSION BLD/BLD COMPNT: CPT

## 2024-05-02 PROCEDURE — 94799 UNLISTED PULMONARY SVC/PX: CPT

## 2024-05-02 PROCEDURE — 86922 COMPATIBILITY TEST ANTIGLOB: CPT

## 2024-05-02 PROCEDURE — 97116 GAIT TRAINING THERAPY: CPT

## 2024-05-02 PROCEDURE — 80053 COMPREHEN METABOLIC PANEL: CPT | Performed by: INTERNAL MEDICINE

## 2024-05-02 PROCEDURE — 86900 BLOOD TYPING SEROLOGIC ABO: CPT

## 2024-05-02 PROCEDURE — 86902 BLOOD TYPE ANTIGEN DONOR EA: CPT

## 2024-05-02 PROCEDURE — 97110 THERAPEUTIC EXERCISES: CPT

## 2024-05-02 PROCEDURE — P9040 RBC LEUKOREDUCED IRRADIATED: HCPCS

## 2024-05-02 PROCEDURE — 85025 COMPLETE CBC W/AUTO DIFF WBC: CPT | Performed by: INTERNAL MEDICINE

## 2024-05-02 PROCEDURE — 86920 COMPATIBILITY TEST SPIN: CPT

## 2024-05-02 RX ORDER — DEXTROSE AND SODIUM CHLORIDE 5; .45 G/100ML; G/100ML
100 INJECTION, SOLUTION INTRAVENOUS CONTINUOUS
Status: DISCONTINUED | OUTPATIENT
Start: 2024-05-02 | End: 2024-05-03 | Stop reason: HOSPADM

## 2024-05-02 RX ADMIN — TAMSULOSIN HYDROCHLORIDE 0.4 MG: 0.4 CAPSULE ORAL at 09:11

## 2024-05-02 RX ADMIN — APIXABAN 2.5 MG: 2.5 TABLET, FILM COATED ORAL at 09:10

## 2024-05-02 RX ADMIN — ACETAMINOPHEN 1000 MG: 500 TABLET ORAL at 17:04

## 2024-05-02 RX ADMIN — FAMOTIDINE 40 MG: 20 TABLET ORAL at 09:10

## 2024-05-02 RX ADMIN — ATORVASTATIN CALCIUM 10 MG: 10 TABLET, FILM COATED ORAL at 22:10

## 2024-05-02 RX ADMIN — DEXTROSE AND SODIUM CHLORIDE 100 ML/HR: 5; 450 INJECTION, SOLUTION INTRAVENOUS at 18:33

## 2024-05-02 RX ADMIN — ACETAMINOPHEN 1000 MG: 500 TABLET ORAL at 09:10

## 2024-05-02 RX ADMIN — MONTELUKAST 10 MG: 10 TABLET, FILM COATED ORAL at 22:10

## 2024-05-02 RX ADMIN — FERROUS SULFATE TAB 325 MG (65 MG ELEMENTAL FE) 325 MG: 325 (65 FE) TAB at 09:10

## 2024-05-02 RX ADMIN — MIRTAZAPINE 15 MG: 15 TABLET, FILM COATED ORAL at 22:09

## 2024-05-02 RX ADMIN — APIXABAN 2.5 MG: 2.5 TABLET, FILM COATED ORAL at 22:10

## 2024-05-02 NOTE — PLAN OF CARE
Goal Outcome Evaluation:  Plan of Care Reviewed With: patient        Progress: improving  Outcome Evaluation: Pt alert and able to make needs known. Pain controlled with scheduled APAP. Pt urinating without difficulty. Pt one person assist with gait belt and rolling walker for transfers, ambulated 250+ feet-tolerated well. Pt HGB this AM 6.3, MD notified and received orders for pt to receive two units PRB's. Pt to d/c to [facility] when medically able.

## 2024-05-02 NOTE — PROGRESS NOTES
[facility]     Progress Note    Patient Name: Blair Lira  : 1945  MRN: [id number]  Primary Care Physician:  Babs Summers APRN  Date of admission: 2024    Subjective   Subjective     Chief Complaint: BUN/creatinine elevated will start him on IV fluids he will be transfused with 2 units of packed RBCs today probably blood loss from surgery    HPI: Patient with multiple myeloma in remission at this time    Review of Systems   All systems were reviewed and negative except for: Has been reviewed    Objective   Objective     Vitals:   Temp:  [97.7 °F (36.5 °C)-98.1 °F (36.7 °C)] 98.1 °F (36.7 °C)  Heart Rate:  [67-94] 67  Resp:  [18] 18  BP: (108-129)/(57-80) 109/58  Flow (L/min):  [2] 2    Physical Exam    Constitutional: Awake, alert   Eyes: PERRLA, sclerae anicteric, no conjunctival injection   HENT: NCAT, mucous membranes moist   Neck: Supple, no thyromegaly, no lymphadenopathy, trachea midline   Respiratory: Clear to auscultation bilaterally, nonlabored respirations    Cardiovascular: RRR, no murmurs, rubs, or gallops, palpable pedal pulses bilaterally   Gastrointestinal: Positive bowel sounds, soft, nontender, nondistended   Musculoskeletal: No bilateral ankle edema, no clubbing or cyanosis to extremities   Psychiatric: Appropriate affect, cooperative   Neurologic: Oriented x 3, strength symmetric in all extremities, Cranial Nerves grossly intact to confrontation, speech clear   Skin: No rashes   No change  Result Review    Result Review:  I have personally reviewed the results from the time of this admission to 2024 08:08 EDT and agree with these findings:  [x]  Laboratory  []  Microbiology  []  Radiology  [x]  EKG/Telemetry   []  Cardiology/Vascular   []  Pathology  []  Old records  []  Other:  Most notable findings include: Has been reviewed and discussed    Assessment & Plan   Assessment / Plan     Brief Patient Summary:  Blair Lira is a 78 y.o. male who patient with history of  multiple myeloma hip fracture    Active Hospital Problems:  Active Hospital Problems    Diagnosis     **Femoral neck fracture     Altered mental status     Multiple myeloma     CKD (chronic kidney disease) stage 3, GFR 30-59 ml/min        Plan:   Transfusion of blood IV fluid    DVT prophylaxis:  Medical and mechanical DVT prophylaxis orders are present.        CODE STATUS:   Code Status (Patient has no pulse and is not breathing): CPR (Attempt to Resuscitate)  Medical Interventions (Patient has pulse or is breathing): Full Support    Disposition:  I expect patient to be discharged after the patient has been stabilized.    Electronically signed by Vamsi Mason MD, 05/02/24, 8:08 AM EDT.      Part of this note may be an electronic transcription/translation of spoken language to printed text using the Dragon Dictation System.

## 2024-05-02 NOTE — NURSING NOTE
Pt is alert and oriented X 4, on room air, and X 1 assist. All scheduled medications given as ordered. Pt had no complaints of pain during shift. Pt received 2 units of blood and tolerated the infusion well with no adverse reactions to report. Safety checks maintained throughout shift with pt resting in chair, wheels to chair locked, and personal items and call light within reach. VSS.    no

## 2024-05-02 NOTE — PROGRESS NOTES
[facility]     Progress Note    Patient Name: Blair Lira  : 1945  MRN: [id number]  Primary Care Physician:  Babs Summers APRN  Date of admission: 2024      Subjective   Brief summary.  Patient admitted post fall with hip fracture.  Status post hip surgery      HPI:  Postop day #2.  Patient's hemoglobin dropped.  Patient feeling better.  Less congested, less cough, tired and fatigued    Review of Systems     Feeling tired.  Overall doing better.  No chest pain, no shortness of breath, no blood in the stools.      Objective     Vitals:   Temp:  [97.7 °F (36.5 °C)-98.6 °F (37 °C)] 98.1 °F (36.7 °C)  Heart Rate:  [67-94] 74  Resp:  [18] 18  BP: (109-132)/(57-74) 120/64  Flow (L/min):  [2] 2    Physical Exam :     Elderly male obese, not in acute distress.  Pallor noted.  Heart regular. .  Lungs diminished breath sounds, coarse breath sounds.  Abdomen soft and obese.  Nontender.  Extremities left hip tenderness.,  Postsurgical area without any bleeding      Result Review:  I have personally reviewed the results from the time of this admission to 2024 18:00 EDT and agree with these findings:  [x]  Laboratory  [x]  Microbiology  [x]  Radiology  []  EKG/Telemetry   []  Cardiology/Vascular   []  Pathology  []  Old records  []  Other:    Reviewed      Assessment / Plan       Active Hospital Problems:  Active Hospital Problems    Diagnosis     **Femoral neck fracture     Acute blood loss anemia     Altered mental status     Multiple myeloma     CKD (chronic kidney disease) stage 3, GFR 30-59 ml/min        Plan:   Patient stable.  Postop day # 2  Anemia severe mostly combination of acute on chronic from acute blood loss from surgery.  Transfuse 2 units packed red cells.  Continue other treatment.  Creatinine jumped up we will monitor closely.  If remains stable discharge to rehab tomorrow    DVT prophylaxis:  Medical and mechanical DVT prophylaxis orders are present.        CODE STATUS:   Code  Status (Patient has no pulse and is not breathing): CPR (Attempt to Resuscitate)  Medical Interventions (Patient has pulse or is breathing): Full Support                Electronically signed by Elieser Pederson MD, 05/02/24, 6:01 PM EDT.  T.

## 2024-05-02 NOTE — SIGNIFICANT NOTE
05/02/24 1311   OTHER   Discipline occupational therapist   Rehab Time/Intention   Session Not Performed unable to treat, medical status change  (patient Hgb 6.3, contraindication for therapy participation)

## 2024-05-03 VITALS
TEMPERATURE: 98.4 F | DIASTOLIC BLOOD PRESSURE: 82 MMHG | WEIGHT: 192.02 LBS | HEART RATE: 85 BPM | OXYGEN SATURATION: 99 % | BODY MASS INDEX: 25.45 KG/M2 | SYSTOLIC BLOOD PRESSURE: 146 MMHG | HEIGHT: 73 IN | RESPIRATION RATE: 18 BRPM

## 2024-05-03 PROBLEM — R41.82 ALTERED MENTAL STATUS: Status: RESOLVED | Noted: 2024-04-29 | Resolved: 2024-05-03

## 2024-05-03 LAB
ALBUMIN SERPL-MCNC: 2.4 G/DL (ref 3.5–5.2)
ALBUMIN/GLOB SERPL: 0.9 G/DL
ALP SERPL-CCNC: 59 U/L (ref 39–117)
ALT SERPL W P-5'-P-CCNC: <5 U/L (ref 1–41)
ANION GAP SERPL CALCULATED.3IONS-SCNC: 11.1 MMOL/L (ref 5–15)
AST SERPL-CCNC: 10 U/L (ref 1–40)
BASOPHILS # BLD AUTO: 0.02 10*3/MM3 (ref 0–0.2)
BASOPHILS NFR BLD AUTO: 0.6 % (ref 0–1.5)
BB REFERENCE LAB SENDOUT: NORMAL
BH BB BLOOD EXPIRATION DATE: NORMAL
BH BB BLOOD EXPIRATION DATE: NORMAL
BH BB BLOOD TYPE BARCODE: 6200
BH BB BLOOD TYPE BARCODE: 6200
BH BB DISPENSE STATUS: NORMAL
BH BB DISPENSE STATUS: NORMAL
BH BB PRODUCT CODE: NORMAL
BH BB PRODUCT CODE: NORMAL
BH BB UNIT NUMBER: NORMAL
BH BB UNIT NUMBER: NORMAL
BILIRUB SERPL-MCNC: 0.3 MG/DL (ref 0–1.2)
BUN SERPL-MCNC: 42 MG/DL (ref 8–23)
BUN/CREAT SERPL: 15.4 (ref 7–25)
CALCIUM SPEC-SCNC: 7.4 MG/DL (ref 8.6–10.5)
CHLORIDE SERPL-SCNC: 105 MMOL/L (ref 98–107)
CO2 SERPL-SCNC: 19.9 MMOL/L (ref 22–29)
CREAT SERPL-MCNC: 2.73 MG/DL (ref 0.76–1.27)
CROSSMATCH INTERPRETATION: NORMAL
CROSSMATCH INTERPRETATION: NORMAL
DEPRECATED RDW RBC AUTO: 70 FL (ref 37–54)
EGFRCR SERPLBLD CKD-EPI 2021: 23.2 ML/MIN/1.73
EOSINOPHIL # BLD AUTO: 0.15 10*3/MM3 (ref 0–0.4)
EOSINOPHIL NFR BLD AUTO: 4.3 % (ref 0.3–6.2)
ERYTHROCYTE [DISTWIDTH] IN BLOOD BY AUTOMATED COUNT: 19.7 % (ref 12.3–15.4)
GLOBULIN UR ELPH-MCNC: 2.6 GM/DL
GLUCOSE SERPL-MCNC: 98 MG/DL (ref 65–99)
HCT VFR BLD AUTO: 27.2 % (ref 37.5–51)
HGB BLD-MCNC: 8.7 G/DL (ref 13–17.7)
IMM GRANULOCYTES # BLD AUTO: 0.03 10*3/MM3 (ref 0–0.05)
IMM GRANULOCYTES NFR BLD AUTO: 0.9 % (ref 0–0.5)
LYMPHOCYTES # BLD AUTO: 0.86 10*3/MM3 (ref 0.7–3.1)
LYMPHOCYTES NFR BLD AUTO: 24.9 % (ref 19.6–45.3)
MCH RBC QN AUTO: 31.1 PG (ref 26.6–33)
MCHC RBC AUTO-ENTMCNC: 32 G/DL (ref 31.5–35.7)
MCV RBC AUTO: 97.1 FL (ref 79–97)
MONOCYTES # BLD AUTO: 0.47 10*3/MM3 (ref 0.1–0.9)
MONOCYTES NFR BLD AUTO: 13.6 % (ref 5–12)
NEUTROPHILS NFR BLD AUTO: 1.93 10*3/MM3 (ref 1.7–7)
NEUTROPHILS NFR BLD AUTO: 55.7 % (ref 42.7–76)
NRBC BLD AUTO-RTO: 0 /100 WBC (ref 0–0.2)
PLATELET # BLD AUTO: 148 10*3/MM3 (ref 140–450)
PMV BLD AUTO: 10.1 FL (ref 6–12)
POTASSIUM SERPL-SCNC: 4.1 MMOL/L (ref 3.5–5.2)
PROT SERPL-MCNC: 5 G/DL (ref 6–8.5)
RBC # BLD AUTO: 2.8 10*6/MM3 (ref 4.14–5.8)
SODIUM SERPL-SCNC: 136 MMOL/L (ref 136–145)
UNIT  ABO: NORMAL
UNIT  ABO: NORMAL
UNIT  RH: NORMAL
UNIT  RH: NORMAL
WBC NRBC COR # BLD AUTO: 3.46 10*3/MM3 (ref 3.4–10.8)

## 2024-05-03 PROCEDURE — 97110 THERAPEUTIC EXERCISES: CPT

## 2024-05-03 PROCEDURE — 85025 COMPLETE CBC W/AUTO DIFF WBC: CPT | Performed by: INTERNAL MEDICINE

## 2024-05-03 PROCEDURE — 97530 THERAPEUTIC ACTIVITIES: CPT

## 2024-05-03 PROCEDURE — 80053 COMPREHEN METABOLIC PANEL: CPT | Performed by: INTERNAL MEDICINE

## 2024-05-03 PROCEDURE — 97150 GROUP THERAPEUTIC PROCEDURES: CPT

## 2024-05-03 PROCEDURE — 97116 GAIT TRAINING THERAPY: CPT

## 2024-05-03 RX ORDER — ALPRAZOLAM 0.25 MG/1
0.25 TABLET ORAL EVERY 6 HOURS PRN
Start: 2024-05-03 | End: 2024-05-06

## 2024-05-03 RX ORDER — FERROUS SULFATE 325(65) MG
325 TABLET ORAL
Start: 2024-05-04 | End: 2024-06-03

## 2024-05-03 RX ORDER — AMOXICILLIN 250 MG
2 CAPSULE ORAL DAILY
Start: 2024-05-03 | End: 2024-06-02

## 2024-05-03 RX ORDER — MIDODRINE HYDROCHLORIDE 5 MG/1
2.5 TABLET ORAL
Start: 2024-05-03 | End: 2024-08-31

## 2024-05-03 RX ORDER — HYDROCODONE BITARTRATE AND ACETAMINOPHEN 5; 325 MG/1; MG/1
1 TABLET ORAL EVERY 4 HOURS PRN
Start: 2024-05-03 | End: 2024-05-06

## 2024-05-03 RX ADMIN — TAMSULOSIN HYDROCHLORIDE 0.4 MG: 0.4 CAPSULE ORAL at 10:13

## 2024-05-03 RX ADMIN — Medication 10 ML: at 10:14

## 2024-05-03 RX ADMIN — Medication 3 ML: at 10:15

## 2024-05-03 RX ADMIN — APIXABAN 2.5 MG: 2.5 TABLET, FILM COATED ORAL at 10:14

## 2024-05-03 RX ADMIN — ACETAMINOPHEN 325 MG: 325 TABLET ORAL at 17:24

## 2024-05-03 RX ADMIN — FAMOTIDINE 40 MG: 20 TABLET ORAL at 10:13

## 2024-05-03 RX ADMIN — FERROUS SULFATE TAB 325 MG (65 MG ELEMENTAL FE) 325 MG: 325 (65 FE) TAB at 10:13

## 2024-05-03 NOTE — DISCHARGE SUMMARY
[facility]         DISCHARGE SUMMARY    Patient Name: Blair Lira  : 1945  MRN: [id number]    Date of Admission: 2024  Date of Discharge: May 3, 2024  Primary Care Physician: Babs Summers APRN    Consults       Date and Time Order Name Status Description    2024  8:06 AM Hematology & Oncology Inpatient Consult      2024  1:10 AM Inpatient Neurology Consult Stroke      2024 10:49 PM IP General Consult (Use specialty-specific consult if known)              Presenting Problem:   Femoral neck fracture [S72.009A]  Closed fracture of neck of left femur, initial encounter [S72.002A]  Cerebrovascular accident (CVA), unspecified mechanism [I63.9]  Aftercare following surgery [Z48.89]    Active and Resolved Hospital Problems:  Active Hospital Problems    Diagnosis POA    **Femoral neck fracture [S72.009A] Yes    Acute blood loss anemia [D62] Yes    Multiple myeloma [C90.00] Yes    CKD (chronic kidney disease) stage 3, GFR 30-59 ml/min [N18.30] Yes      Resolved Hospital Problems    Diagnosis POA    Altered mental status [R41.82] Yes         Hospital Course     Hospital Course:  Blair Lira is a 78 y.o. male admitted to hospital post fall, patient fell at home and sustained fracture of the left femur neck.  Also he had some confusion at that time.  Patient was admitted to medical service.    Please see H&P for details.      Hospital course    Patient was admitted to medical service, pain control was achieved with narcotics.  Orthopedic surgeon and oncologist was consulted patient has known history of multiple myeloma on treatment has chronic anemia and platelet issues with it.  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  Eliquis was held patient was taken for surgery after 48 hours by Dr. Osborne.  Successful surgery was performed patient mental status improved within 12 hours of admission.  It is not clear what was the etiology stroke was ruled out.    Patient also dropped  hemoglobin to 6 g postsurgery, combination of multiple myeloma as well as surgical intervention.    Patient was transfused 2 units of irradiated blood.  He is doing much better is participating in therapy.  As he is very weak and difficulty with mobility he will be discharged to inpatient rehab.  Patient accepted bed at [facility] and will be discharged today    DISCHARGE Follow Up Recommendations for labs and diagnostics:   Discharged to [facility] hospital.  PT OT.    Follow-up with Dr. Mason in 2 weeks and Dr. Osborne in 2 weeks, postsurgical care.  Follow-up with PCP post discharge from [facility] rehab      Day of Discharge     Vital Signs:  Temp:  [97.9 °F (36.6 °C)-98.5 °F (36.9 °C)] 98.4 °F (36.9 °C)  Heart Rate:  [67-99] 85  Resp:  [18] 18  BP: (122-151)/(58-82) 146/82    Physical Exam:    Elderly male looks of his stated age, not in acute distress.  Heart regular.  Lungs clear.  Abdomen soft.  Extremities trace of edema.  Surgical site looks clean on the left hip.  Neurologically awake alert and oriented      Pertinent  and/or Most Recent Results     LAB RESULTS:      Lab 05/03/24  0319 05/02/24  0428 05/01/24  0256 04/30/24 2008 04/29/24 0419 04/28/24 2052   WBC 3.46 3.99  --   --  5.98 4.84   HEMOGLOBIN 8.7* 6.3* 7.7* 9.2* 8.7* 8.5*   HEMATOCRIT 27.2* 20.0* 25.7* 30.3* 28.1* 27.0*   PLATELETS 148 140  --   --  184 189   NEUTROS ABS 1.93 2.93  --   --  4.91 2.87   IMMATURE GRANS (ABS) 0.03 0.05  --   --  0.05 0.03   LYMPHS ABS 0.86 0.51*  --   --  0.55* 1.31   MONOS ABS 0.47 0.40  --   --  0.44 0.55   EOS ABS 0.15 0.09  --   --  0.01 0.06   MCV 97.1* 102.6*  --   --  101.4* 102.3*   PROTIME  --   --   --   --   --  15.6*   APTT  --   --   --   --   --  34.1         Lab 05/03/24 0319 05/02/24 0319 05/01/24  0256 04/29/24 0419 04/28/24 2052   SODIUM 136 136 137 138 139   POTASSIUM 4.1 4.9 4.7 3.8 3.7   CHLORIDE 105 105 105 106 105   CO2 19.9* 19.8* 22.4 21.3* 21.0*   ANION GAP 11.1 11.2 9.6  10.7 13.0   BUN 42* 39* 28* 16 15   CREATININE 2.73* 3.20* 2.42* 1.67* 1.69*   EGFR 23.2* 19.2* 26.8* 41.9* 41.3*   GLUCOSE 98 128* 171* 131* 116*   CALCIUM 7.4* 7.6* 7.9* 8.2* 8.3*   MAGNESIUM  --   --   --   --  1.6         Lab 05/03/24 0319 05/02/24 0319 04/29/24 0419 04/28/24 2052   TOTAL PROTEIN 5.0* 4.8* 5.7* 5.8*   ALBUMIN 2.4* 2.4* 2.9* 2.9*   GLOBULIN 2.6 2.4 2.8 2.9   ALT (SGPT) <5 <5 14 12   AST (SGOT) 10 15 10 9   BILIRUBIN 0.3 0.2 0.6 0.4   ALK PHOS 59 53 71 71         Lab 04/29/24  0007 04/28/24 2052   HSTROP T 76* 76*   PROTIME  --  15.6*   INR  --  1.21*             Lab 04/29/24 0417   ABO TYPING A   RH TYPING Positive   ANTIBODY SCREEN Positive         Brief Urine Lab Results  (Last result in the past 365 days)        Color   Clarity   Blood   Leuk Est   Nitrite   Protein   CREAT   Urine HCG        04/29/24 0016 Yellow   Cloudy   Large (3+)   Moderate (2+)   Negative   Trace                 Microbiology Results (last 10 days)       ** No results found for the last 240 hours. **            PROCEDURES:    XR Hip With or Without Pelvis 2 - 3 View Left    Result Date: 4/30/2024  Impression: Impression: Total left hip arthroplasty without evidence of complication.   Electronically Signed By-JOHN BENTON MD On:4/30/2024 5:41 PM      MRI Brain Without Contrast    Result Date: 4/30/2024  Impression: Impression: No evidence of acute brain ischemia. No acute intracranial process demonstrated    Electronically Signed By-Jaylen Rivas On:4/30/2024 7:39 AM      CT Angiogram Head w AI Analysis of LVO    Result Date: 4/28/2024  Impression:  1. Technically degraded exam due to patient motion and suboptimal contrast bolus timing. 2. There is some atherosclerotic plaque at the right carotid bifurcation. No significant carotid or vertebral artery stenosis identified in the neck. 3. No intracranial flow limiting stenosis or large vessel occlusion. No clear evidence of aneurysm.  Electronically Signed By-  Govind Cisse MD On:4/28/2024 11:41 PM      CT Angiogram Neck    Result Date: 4/28/2024  Impression:  1. Technically degraded exam due to patient motion and suboptimal contrast bolus timing. 2. There is some atherosclerotic plaque at the right carotid bifurcation. No significant carotid or vertebral artery stenosis identified in the neck. 3. No intracranial flow limiting stenosis or large vessel occlusion. No clear evidence of aneurysm.  Electronically Signed By-Dr. Govind Cisse MD On:4/28/2024 11:41 PM      XR Chest 1 View    Result Date: 4/28/2024  Impression: No acute cardiopulmonary findings.  Electronically Signed By-Dr. Govind Cisse MD On:4/28/2024 11:02 PM      CT CEREBRAL PERFUSION WITH & WITHOUT CONTRAST    Result Date: 4/28/2024  Impression: Abnormal examination showing 114 mL of ischemic tissue in a patchy distribution throughout the brain. This may be secondary to artifact or global hypoperfusion. No infarct core is identified. Consider MRI for follow-up if patient is a candidate.    Electronically Signed By-Dr. Govind Cisse MD On:4/28/2024 10:39 PM      CT Head Without Contrast Stroke Protocol    Result Date: 4/28/2024  Impression: Senescent changes without acute abnormality.    Electronically Signed By-Dr. Govind Cisse MD On:4/28/2024 10:22 PM      XR Hip With or Without Pelvis 2 - 3 View Left    Result Date: 4/28/2024  Impression: Acute left femoral neck fracture. No dislocation.  Electronically Signed By-Dr. Govind Cisse MD On:4/28/2024 9:19 PM       Results for orders placed during the hospital encounter of 02/18/24    Duplex Venous Lower Extremity LEFT    Interpretation Summary    Acute left lower extremity deep vein thrombosis noted in the popliteal.    Sub-acute left lower extremity deep vein thrombosis noted in the posterial tibial and peroneal.    Chronic left lower extremity superficial thrombophlebitis noted in the small saphenous.    All other left sided veins appeared  normal.      Results for orders placed during the hospital encounter of 02/18/24    Duplex Venous Lower Extremity LEFT    Interpretation Summary    Acute left lower extremity deep vein thrombosis noted in the popliteal.    Sub-acute left lower extremity deep vein thrombosis noted in the posterial tibial and peroneal.    Chronic left lower extremity superficial thrombophlebitis noted in the small saphenous.    All other left sided veins appeared normal.      Results for orders placed during the hospital encounter of 01/17/24    Adult Transthoracic Echo Complete w/ Color, Spectral and Contrast if necessary per protocol    Interpretation Summary    Left ventricular systolic function is normal. Left ventricular ejection fraction appears to be 51 - 55%.    Left ventricular wall thickness is consistent with mild concentric hypertrophy.    Left ventricular diastolic function is consistent with (grade I) impaired relaxation.    There are no significant valvular abnormalities noted on the study.    Estimated right ventricular systolic pressure from tricuspid regurgitation is normal (<35 mmHg).      Labs Pending at Discharge:  Pending Labs       Order Current Status    [place] Draw In process              Discharge Details        Discharge Medications        New Medications        Instructions Start Date   ALPRAZolam 0.25 MG tablet  Commonly known as: XANAX   0.25 mg, Oral, Every 6 Hours PRN      ferrous sulfate 325 (65 FE) MG tablet   325 mg, Oral, Daily With Breakfast   Start Date: May 4, 2024     HYDROcodone-acetaminophen 5-325 MG per tablet  Commonly known as: NORCO   1 tablet, Oral, Every 4 Hours PRN      sennosides-docusate 8.6-50 MG per tablet  Commonly known as: PERICOLACE   2 tablets, Oral, Daily             Changes to Medications        Instructions Start Date   midodrine 5 MG tablet  Commonly known as: PROAMATINE  What changed: how much to take   2.5 mg, Oral, 3 Times Daily Before Meals             Continue These  Medications        Instructions Start Date   allopurinol 100 MG tablet  Commonly known as: ZYLOPRIM   100 mg, Oral, Daily      atorvastatin 10 MG tablet  Commonly known as: LIPITOR   10 mg, Oral, Nightly      Eliquis 5 MG tablet tablet  Generic drug: apixaban   5 mg, Oral, 2 Times Daily      fluticasone 50 MCG/ACT nasal spray  Commonly known as: FLONASE   2 sprays, Nasal, Daily      lenalidomide 10 MG capsule  Commonly known as: REVLIMID   10 mg, Oral, Daily      mirtazapine 15 MG tablet  Commonly known as: REMERON   15 mg, Oral, Nightly      montelukast 10 MG tablet  Commonly known as: SINGULAIR   10 mg, Oral, Nightly      pantoprazole 40 MG EC tablet  Commonly known as: PROTONIX   40 mg, Oral, Daily      tamsulosin 0.4 MG capsule 24 hr capsule  Commonly known as: FLOMAX   1 capsule, Oral, Daily             Stop These Medications      dexAMETHasone 4 MG tablet  Commonly known as: DECADRON              No Known Allergies      Discharge Disposition:    Rehab Facility or Unit (DC - External)    Diet:    Heart healthy    Discharge Activity:     Activity Instructions       Activity as Tolerated              Future Appointments   Date Time Provider Department Center   5/13/2024 10:15 AM Lynn Henriquez APRN [facility] [place] [facility]       Additional Instructions for the Follow-ups that You Need to Schedule       Discharge Follow-up with PCP   As directed       Currently Documented PCP:    Babs Summers APRN    PCP Phone Number:    [phone]     Follow Up Details: Post discharge from [facility] rehab        Discharge Follow-up with Specified Provider: Follow-up with Dr. Msaon and Dr. Osborne in 2 weeks   As directed      To: Follow-up with Dr. Mason and Dr. Osborne in 2 weeks                Time spent on Discharge including face to face service: 33 minutes.            I have dictated this note utilizing Dragon Dictation.             Please note that portions of this note were completed with a voice recognition program.              Part of this note may be an electronic transcription/translation of spoken language to printed text         using the Dragon Dictation System.       Electronically signed by Elieser Pederson MD, 05/03/24, 5:01 PM EDT.

## 2024-05-03 NOTE — THERAPY TREATMENT NOTE
Acute Care - Physical Therapy Treatment Note  TONY Keith     Patient Name: Blair Lira  : 1945  MRN: [id number]  Today's Date: 5/3/2024      Visit Dx:     ICD-10-CM ICD-9-CM   1. Closed fracture of neck of left femur, initial encounter  S72.002A 820.8   2. Cerebrovascular accident (CVA), unspecified mechanism  I63.9 434.91   3. Dysphagia, unspecified type  R13.10 787.20   4. Difficulty in walking  R26.2 719.7   5. Decreased activities of daily living (ADL)  Z78.9 V49.89     Patient Active Problem List   Diagnosis    Rotator cuff tear, right    Impingement syndrome of right shoulder    Acute renal failure superimposed on chronic kidney disease    Pancytopenia due to chemotherapy    Abnormal weight loss    Hypertension    CKD (chronic kidney disease) stage 3, GFR 30-59 ml/min    Moderate malnutrition    Weakness generalized    Intractable pain    Sepsis associated hypotension    Multiple myeloma    Edema    Chronic diastolic (congestive) heart failure    Acute on chronic HFrEF (heart failure with reduced ejection fraction)    Chest pain    Syncope    COVID-19 virus infection    Chronic kidney disease, stage IV (severe)    Hypotension    Anemia    Acute on chronic renal failure    Pneumonia    Anemia    Acute febrile illness    Mucus plugging of bronchi    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    Pneumonia due to methicillin susceptible Staphylococcus aureus (MSSA)    Femoral neck fracture    Altered mental status    Acute blood loss anemia     Past Medical History:   Diagnosis Date    BPH (benign prostatic hyperplasia)     Cancer     Chronic kidney disease     Femoral neck fracture 2024    Frozen shoulder     Hyperlipidemia     Hypertension 10/26/2023    Periarthritis of shoulder     Rotator cuff disorder, right     Tennis elbow     RIGHT     Past Surgical History:   Procedure Laterality Date    BRONCHOSCOPY N/A  1/22/2024    Procedure: BRONCHOSCOPY WITH BAL AND WASHINGS;  Surgeon: Dionte Ruvalcaba MD;  Location: [facility] ENDOSCOPY;  Service: Pulmonary;  Laterality: N/A;  MUCUS PLUGGING    CATARACT EXTRACTION EXTRACAPSULAR W/ INTRAOCULAR LENS IMPLANTATION Bilateral     COLONOSCOPY      HIP HEMIARTHROPLASTY Left 4/30/2024    Procedure: TOTAL HIP ARTHROPLASTY  ANTERIOR APROACH;  Surgeon: Adán Osborne MD;  Location: [facility] MAIN OR;  Service: Orthopedics;  Laterality: Left;    JOINT REPLACEMENT Right     THR    SHOULDER ARTHROSCOPY W/ ROTATOR CUFF REPAIR Right 3/29/2023    Procedure: SHOULDER ARTHROSCOPY WITH ROTATOR CUFF REPAIR, SUBCROMIAL DECOMPRESSION,DISTAL CLAVICLE RESECTION;  Surgeon: Gustavo Samuels MD;  Location: [facility] OR [facility]SC;  Service: Orthopedics;  Laterality: Right;    SHOULDER SURGERY Left     SCOPE     PT Assessment (Last 12 Hours)       PT Evaluation and Treatment       Row Name 05/03/24 0926          Physical Therapy Time and Intention    Subjective Information complains of;dyspnea  -VK     Document Type therapy note (daily note)  -VK     Mode of Treatment individual therapy;physical therapy  -VK     Patient Effort good  -VK       Row Name 05/03/24 0926          General Information    Patient Profile Reviewed yes  -VK     Patient Observations alert;cooperative;agree to therapy  -VK     Existing Precautions/Restrictions fall;weight bearing  -VK       Row Name 05/03/24 0926          Pain Scale: FACES Pre/Post-Treatment    Pre/Posttreatment Pain Comment Pt reports no pain but stiffness.  -VK       Row Name 05/03/24 0926          Cognition    Affect/Mental Status (Cognition) WNL  -VK     Orientation Status (Cognition) oriented x 3  -VK     Personal Safety Interventions fall prevention program maintained;gait belt;nonskid shoes/slippers when out of bed  -VK       Row Name 05/03/24 0926          Mobility    Extremity Weight-bearing Status left lower extremity  -VK     Left Lower Extremity (Weight-bearing Status)  weight-bearing as tolerated (WBAT)  -VK       Row Name 05/03/24 0926          Transfers    Transfers sit-stand transfer;stand-sit transfer;car transfer  -VK       Row Name 05/03/24 0926          Sit-Stand Transfer    Sit-Stand [place] (Transfers) standby assist;contact guard  -VK     Assistive Device (Sit-Stand Transfers) walker, front-wheeled  -VK       Row Name 05/03/24 0926          Stand-Sit Transfer    Stand-Sit [place] (Transfers) standby assist;contact guard  -VK     Assistive Device (Stand-Sit Transfers) walker, front-wheeled  -VK       Row Name 05/03/24 0926          Car Transfer    Type (Car Transfer) sit-stand;stand-sit  -VK     [place] Level (Car Transfer) contact guard;verbal cues  -VK     Assistive Device (Car Transfer) walker, front-wheeled  -VK       Row Name 05/03/24 0926          Gait/Stairs (Locomotion)    Gait/Stairs Locomotion gait/ambulation assistive device  -VK     [place] Level (Gait) standby assist;contact guard  -VK     Assistive Device (Gait) walker, front-wheeled  -VK     Patient was able to Ambulate yes  -VK     Distance in Feet (Gait) --  125ft, 125ft  -VK     Pattern (Gait) step-through  -VK     Deviations/Abnormal Patterns (Gait) gait speed decreased;stride length decreased;antalgic  -VK     Left Sided Gait Deviations heel strike decreased;weight shift ability decreased  -VK     [place] Level (Stairs) verbal cues;contact guard  -VK     Assistive Device (Stairs) walker, front-wheeled  -VK     Handrail Location (Stairs) both sides  -VK     Number of Steps (Stairs) 6  -VK     Ascending Technique (Stairs) step-to-step  -VK     Descending Technique (Stairs) step-to-step  -VK     Stairs, Safety Issues weight-shifting ability decreased  -VK       Row Name 05/03/24 0926          Safety Issues, Functional Mobility    Impairments Affecting Function (Mobility) balance;pain;range of motion (ROM);strength  -VK       Row Name 05/03/24 0926          Balance    Balance  Assessment standing dynamic balance  -       Row Name 05/03/24 0926          Motor Skills    Therapeutic Exercise hip;knee;ankle  -       Row Name 05/03/24 0926          Hip (Therapeutic Exercise)    Hip (Therapeutic Exercise) isometric exercises  -     Hip Isometrics (Therapeutic Exercise) left;gluteal sets;aBduction;10 repetitions;2 sets  -       Row Name 05/03/24 0926          Knee (Therapeutic Exercise)    Knee (Therapeutic Exercise) strengthening exercise;isometric exercises  -     Knee Isometrics (Therapeutic Exercise) left;quad sets;10 repetitions;2 sets  -VK     Knee Strengthening (Therapeutic Exercise) left;heel slides;SAQ (short arc quad);LAQ (long arc quad);10 repetitions;2 sets  -       Row Name 05/03/24 0926          Ankle (Therapeutic Exercise)    Ankle (Therapeutic Exercise) AROM (active range of motion)  -     Ankle AROM (Therapeutic Exercise) left;dorsiflexion;plantarflexion;10 repetitions;2 sets  -       Row Name             Wound 04/30/24 1617 Left anterior thigh    Wound - Properties Group Placement Date: 04/30/24  - Placement Time: 1617  -MH Side: Left  -MH Orientation: anterior  -MH Location: thigh  -MH    Retired Wound - Properties Group Placement Date: 04/30/24  -MH Placement Time: 1617  -MH Side: Left  -MH Orientation: anterior  -MH Location: thigh  -MH    Retired Wound - Properties Group Date first assessed: 04/30/24  - Time first assessed: 1617  -MH Side: Left  -MH Location: thigh  -MH      Row Name 05/03/24 0926          Vital Signs    Intra SpO2 (%) 90  -VK     O2 Delivery Intra Treatment room air  -VK     Post SpO2 (%) 95  -VK     O2 Delivery Post Treatment room air  -VK       Row Name 05/03/24 0926          Progress Summary (PT)    Progress Toward Functional Goals (PT) progress toward functional goals is good  -VK     Daily Progress Summary (PT) Pt is progressing well with their exercise program.  Will continue current plan of care.  -VK               User Key  (r)  = Recorded By, (t) = Taken By, (c) = Cosigned By      Initials Name Provider Type    VK Fabiana Vegas PTA Physical Therapist Assistant    Del Patten, RN Registered Nurse                      PT Recommendation and Plan     Progress Summary (PT)  Progress Toward Functional Goals (PT): progress toward functional goals is good  Daily Progress Summary (PT): Pt is progressing well with their exercise program.  Will continue current plan of care.   Outcome Measures       Row Name 05/03/24 1200 05/02/24 1400 05/01/24 1200       How much help from another person do you currently need...    Turning from your back to your side while in flat bed without using bedrails? 4  -VK 4  -RH 4  -RH    Moving from lying on back to sitting on the side of a flat bed without bedrails? 4  -VK 3  -RH 3  -RH    Moving to and from a bed to a chair (including a wheelchair)? 3  -VK 3  -RH 3  -RH    Standing up from a chair using your arms (e.g., wheelchair, bedside chair)? 4  -VK 3  -RH 3  -RH    Climbing 3-5 steps with a railing? 3  -VK 3  -RH 3  -RH    To walk in hospital room? 3  -VK 4  -RH 4  -RH    AM-PAC 6 Clicks Score (PT) 21  -VK 20  -RH 20  -RH    Highest Level of Mobility Goal 6 --> Walk 10 steps or more  -VK 6 --> Walk 10 steps or more  -RH 6 --> Walk 10 steps or more  -RH      Row Name 05/01/24 0900             How much help from another person do you currently need...    Turning from your back to your side while in flat bed without using bedrails? 4  -CHLOE      Moving from lying on back to sitting on the side of a flat bed without bedrails? 3  -CHLOE      Moving to and from a bed to a chair (including a wheelchair)? 3  -CHLOE      Standing up from a chair using your arms (e.g., wheelchair, bedside chair)? 3  -CHLOE      Climbing 3-5 steps with a railing? 3  -CHLOE      To walk in hospital room? 3  -CHLOE      AM-PAC 6 Clicks Score (PT) 19  -CHLOE      Highest Level of Mobility Goal 6 --> Walk 10 steps or more  -CHLOE         Functional  Assessment    Outcome Measure Options AM-PAC 6 Clicks Basic Mobility (PT)  -CHLOE                User Key  (r) = Recorded By, (t) = Taken By, (c) = Cosigned By      Initials Name Provider Type    Shen Gaytan, KRISTIE Physical Therapist Assistant    Woodrow Vivar, PT Physical Therapist    Fabiana Carver PTA Physical Therapist Assistant                     Time Calculation:    PT Charges       Row Name 05/03/24 1223             Time Calculation    PT Received On 05/03/24  -VK         Timed Charges    01919 - PT Therapeutic Exercise Minutes 15  -VK      50429 - Gait Training Minutes  11  -VK      74530 - PT Therapeutic Activity Minutes 18  -VK         Total Minutes    Timed Charges Total Minutes 44  -VK       Total Minutes 44  -VK                User Key  (r) = Recorded By, (t) = Taken By, (c) = Cosigned By      Initials Name Provider Type    VK Fabiana Vegas PTA Physical Therapist Assistant                  Therapy Charges for Today       Code Description Service Date Service Provider Modifiers Qty    92868160431 HC PT THER PROC EA 15 MIN 5/3/2024 Fabiana Vegas, PTA GP 1    59554203735 HC GAIT TRAINING EA 15 MIN 5/3/2024 Fabiana Vegas [facility] GP 1    72699701477 HC PT THERAPEUTIC ACT EA 15 MIN 5/3/2024 Fabiana Vegas, [facility] GP 1            PT G-Codes  Outcome Measure Options: AM-PAC 6 Clicks Daily Activity (OT)  AM-PAC 6 Clicks Score (PT): 21  AM-PAC 6 Clicks Score (OT): 21    Fabiana Vegas PTA  5/3/2024

## 2024-05-03 NOTE — PROGRESS NOTES
[facility]     Progress Note    Patient Name: Blair Lira  : 1945  MRN: [id number]  Primary Care Physician:  Babs Summers APRN  Date of admission: 2024      Subjective   Brief summary.  Patient admitted post fall with hip fracture.  Status post hip surgery, postop day #3      HPI:  Patient feeling better.  No active bleed.  Hemoglobin stable, chest congestion and cough improving.    Review of Systems     No fever chills   no shortness of breath  No nausea vomiting  No chest pain  No active bleed      Objective     Vitals:   Temp:  [97.9 °F (36.6 °C)-98.2 °F (36.8 °C)] 98.2 °F (36.8 °C)  Heart Rate:  [67-78] 78  Resp:  [18] 18  BP: (120-151)/(58-76) 151/76    Physical Exam :     Elderly male obese, not in acute distress.  Pallor noted.  Heart regular. .  Lungs diminished breath sounds, clear today..  Abdomen soft and obese.  Nontender.  Extremities left hip tenderness.,  Postsurgical area without any bleeding      Result Review:  I have personally reviewed the results from the time of this admission to 5/3/2024 13:59 EDT and agree with these findings:  [x]  Laboratory  [x]  Microbiology  [x]  Radiology  []  EKG/Telemetry   []  Cardiology/Vascular   []  Pathology  []  Old records  []  Other:    Reviewed      Assessment / Plan       Active Hospital Problems:  Active Hospital Problems    Diagnosis     **Femoral neck fracture     Acute blood loss anemia     Altered mental status     Multiple myeloma     CKD (chronic kidney disease) stage 3, GFR 30-59 ml/min        Plan:   Patient stable.  Labs remain stable hemoglobin 8.7 posttransfusion.  Stable for discharge to inpatient rehab but bed not available.  Continue PT OT efforts.  Repeat labs in AM.  Possible discharge tomorrow    DVT prophylaxis:  Medical and mechanical DVT prophylaxis orders are present.        CODE STATUS:   Code Status (Patient has no pulse and is not breathing): CPR (Attempt to Resuscitate)  Medical Interventions (Patient has  pulse or is breathing): Full Support                  Electronically signed by Elieser Pederson MD, 05/03/24, 2:00 PM EDT.

## 2024-05-03 NOTE — SIGNIFICANT NOTE
Pt unavailable for therapy, participating in PT group therapy at 1:45PM and 2:15PM.   05/03/24 1417   OTHER   Discipline occupational therapist   Rehab Time/Intention   Session Not Performed patient unavailable for treatment

## 2024-05-03 NOTE — PROGRESS NOTES
[facility]     Progress Note    Patient Name: Blair Lira  : 1945  MRN: [id number]  Primary Care Physician:  Babs Summers APRN  Date of admission: 2024    Subjective   Subjective     Chief Complaint: Patient stable and doing well recovering from the hip surgery, he has multiple myeloma currently in remission, seen in 2 weeks and a follow-up visit to see his progress and we will then make the decision regarding the management with her multiple myeloma BUN/creatinine has improved since IV hydration was given hemoglobin stable    HPI: With multiple myeloma renal failure resulting from multiple myeloma    Review of Systems   All systems were reviewed and negative except for: Has been reviewed and discussed    Objective   Objective     Vitals:   Temp:  [97.9 °F (36.6 °C)-98.6 °F (37 °C)] 98.2 °F (36.8 °C)  Heart Rate:  [67-86] 78  Resp:  [18] 18  BP: (112-151)/(58-76) 151/76    Physical Exam    Constitutional: Awake, alert   Eyes: PERRLA, sclerae anicteric, no conjunctival injection   HENT: NCAT, mucous membranes moist   Neck: Supple, no thyromegaly, no lymphadenopathy, trachea midline   Respiratory: Clear to auscultation bilaterally, nonlabored respirations    Cardiovascular: RRR, no murmurs, rubs, or gallops, palpable pedal pulses bilaterally   Gastrointestinal: Positive bowel sounds, soft, nontender, nondistended   Musculoskeletal: No bilateral ankle edema, no clubbing or cyanosis to extremities   Psychiatric: Appropriate affect, cooperative   Neurologic: Oriented x 3, strength symmetric in all extremities, Cranial Nerves grossly intact to confrontation, speech clear   Skin: No rashes   No change  Result Review    Result Review:  I have personally reviewed the results from the time of this admission to 5/3/2024 10:43 EDT and agree with these findings:  [x]  Laboratory  []  Microbiology  []  Radiology  []  EKG/Telemetry   []  Cardiology/Vascular   []  Pathology  []  Old records  []  Other:  Most  notable findings include: Has been reviewed and discussed with history of fall and fractures stable doing well    Assessment & Plan   Assessment / Plan     Brief Patient Summary:  Blair Lira is a 78 y.o. male who tree of fall with fracture    Active Hospital Problems:  Active Hospital Problems    Diagnosis     **Femoral neck fracture     Acute blood loss anemia     Altered mental status     Multiple myeloma     CKD (chronic kidney disease) stage 3, GFR 30-59 ml/min        Plan:   Physical therapy will be seen in 2 weeks    DVT prophylaxis:  Medical and mechanical DVT prophylaxis orders are present.        CODE STATUS:   Code Status (Patient has no pulse and is not breathing): CPR (Attempt to Resuscitate)  Medical Interventions (Patient has pulse or is breathing): Full Support    Disposition:  I expect patient to be discharged waiting for rehab placement.    Electronically signed by Vamsi Mason MD, 05/03/24, 10:43 AM EDT.      Part of this note may be an electronic transcription/translation of spoken language to printed text using the Dragon Dictation System.

## 2024-05-03 NOTE — PLAN OF CARE
Problem: Adult Inpatient Plan of Care  Goal: Plan of Care Review  Outcome: Met  Goal: Patient-Specific Goal (Individualized)  Outcome: Met  Goal: Absence of Hospital-Acquired Illness or Injury  Outcome: Met  Intervention: Identify and Manage Fall Risk  Recent Flowsheet Documentation  Taken 5/3/2024 1709 by Crys Gray RN  Safety Promotion/Fall Prevention: safety round/check completed  Taken 5/3/2024 1510 by Crys Gray RN  Safety Promotion/Fall Prevention: safety round/check completed  Taken 5/3/2024 1130 by Crys Gray RN  Safety Promotion/Fall Prevention: safety round/check completed  Taken 5/3/2024 0930 by Crys Gray RN  Safety Promotion/Fall Prevention: safety round/check completed  Taken 5/3/2024 0730 by Crys Gray RN  Safety Promotion/Fall Prevention: safety round/check completed  Intervention: Prevent and Manage VTE (Venous Thromboembolism) Risk  Recent Flowsheet Documentation  Taken 5/3/2024 0800 by Crys Gray RN  Activity Management: activity encouraged  Goal: Optimal Comfort and Wellbeing  Outcome: Met  Goal: Readiness for Transition of Care  Outcome: Met     Problem: Skin Injury Risk Increased  Goal: Skin Health and Integrity  Outcome: Met     Problem: Adjustment to Illness (Stroke, Ischemic/Transient Ischemic Attack)  Goal: Optimal Coping  Outcome: Met     Problem: Bowel Elimination Impaired (Stroke, Ischemic/Transient Ischemic Attack)  Goal: Effective Bowel Elimination  Outcome: Met     Problem: Cerebral Tissue Perfusion (Stroke, Ischemic/Transient Ischemic Attack)  Goal: Optimal Cerebral Tissue Perfusion  Outcome: Met     Problem: Cognitive Impairment (Stroke, Ischemic/Transient Ischemic Attack)  Goal: Optimal Cognitive Function  Outcome: Met     Problem: Communication Impairment (Stroke, Ischemic/Transient Ischemic Attack)  Goal: Improved Communication Skills  Outcome: Met     Problem: Functional Ability Impaired (Stroke, Ischemic/Transient Ischemic Attack)  Goal:  Optimal Functional Ability  Outcome: Met  Intervention: Optimize Functional Ability  Recent Flowsheet Documentation  Taken 5/3/2024 0800 by Crys Gray RN  Activity Management: activity encouraged     Problem: Respiratory Compromise (Stroke, Ischemic/Transient Ischemic Attack)  Goal: Effective Oxygenation and Ventilation  Outcome: Met     Problem: Sensorimotor Impairment (Stroke, Ischemic/Transient Ischemic Attack)  Goal: Improved Sensorimotor Function  Outcome: Met     Problem: Swallowing Impairment (Stroke, Ischemic/Transient Ischemic Attack)  Goal: Optimal Eating and Swallowing without Aspiration  Outcome: Met     Problem: Urinary Elimination Impaired (Stroke, Ischemic/Transient Ischemic Attack)  Goal: Effective Urinary Elimination  Outcome: Met     Problem: Fall Injury Risk  Goal: Absence of Fall and Fall-Related Injury  Outcome: Met  Intervention: Identify and Manage Contributors  Recent Flowsheet Documentation  Taken 5/3/2024 1709 by Crys Gray RN  Medication Review/Management: medications reviewed  Taken 5/3/2024 1510 by Crys Gray RN  Medication Review/Management: medications reviewed  Taken 5/3/2024 1130 by Crys Gray RN  Medication Review/Management: medications reviewed  Taken 5/3/2024 0930 by Crys Gray RN  Medication Review/Management: medications reviewed  Taken 5/3/2024 0730 by Crys Gray RN  Medication Review/Management: medications reviewed  Intervention: Promote Injury-Free Environment  Recent Flowsheet Documentation  Taken 5/3/2024 1709 by Crys Gray RN  Safety Promotion/Fall Prevention: safety round/check completed  Taken 5/3/2024 1510 by Crys Gray RN  Safety Promotion/Fall Prevention: safety round/check completed  Taken 5/3/2024 1130 by Crys Gray RN  Safety Promotion/Fall Prevention: safety round/check completed  Taken 5/3/2024 0930 by Crys Gray RN  Safety Promotion/Fall Prevention: safety round/check completed  Taken 5/3/2024 0730 by  [place], Crys, RN  Safety Promotion/Fall Prevention: safety round/check completed     Problem: Pain Acute  Goal: Acceptable Pain Control and Functional Ability  Outcome: Met  Intervention: Prevent or Manage Pain  Recent Flowsheet Documentation  Taken 5/3/2024 1709 by Crys Gray RN  Medication Review/Management: medications reviewed  Taken 5/3/2024 1510 by Crys Gray RN  Medication Review/Management: medications reviewed  Taken 5/3/2024 1130 by Crys Gray RN  Medication Review/Management: medications reviewed  Taken 5/3/2024 0930 by Crys Gray RN  Medication Review/Management: medications reviewed  Taken 5/3/2024 0730 by Crys Gray RN  Medication Review/Management: medications reviewed     Problem: Adjustment to Surgery (Hip Arthroplasty)  Goal: Optimal Coping  Outcome: Met     Problem: Bleeding (Hip Arthroplasty)  Goal: Absence of Bleeding  Outcome: Met     Problem: Bowel Motility Impaired (Hip Arthroplasty)  Goal: Effective Bowel Elimination  Outcome: Met     Problem: Fluid and Electrolyte Imbalance (Hip Arthroplasty)  Goal: Fluid and Electrolyte Balance  Outcome: Met     Problem: Functional Ability Impaired (Hip Arthroplasty)  Goal: Optimal Functional Ability  Outcome: Met  Intervention: Promote Optimal Functional Status  Recent Flowsheet Documentation  Taken 5/3/2024 0800 by Crys Gray RN  Activity Management: activity encouraged  Assistive Device Utilized:   gait belt   walker     Problem: Infection (Hip Arthroplasty)  Goal: Absence of Infection Signs and Symptoms  Outcome: Met     Problem: Neurovascular Compromise (Hip Arthroplasty)  Goal: Intact Neurovascular Status  Outcome: Met     Problem: Ongoing Anesthesia Effects (Hip Arthroplasty)  Goal: Anesthesia/Sedation Recovery  Outcome: Met  Intervention: Optimize Anesthesia Recovery  Recent Flowsheet Documentation  Taken 5/3/2024 1709 by Crys Gray RN  Safety Promotion/Fall Prevention: safety round/check  completed  Taken 5/3/2024 1557 by Crys Gray RN  Patient Tolerance (IS):   good   no adverse signs/symptoms present  Administration (IS):   self-administered   instruction provided, follow-up   proper technique demonstrated  Level Incentive Spirometer (mL): 2000  Number of Repetitions (IS): 10  Taken 5/3/2024 1510 by Crys Gray RN  Safety Promotion/Fall Prevention: safety round/check completed  Taken 5/3/2024 1325 by Crys Gray RN  Patient Tolerance (IS):   good   no adverse signs/symptoms present  Administration (IS):   self-administered   instruction provided, follow-up   proper technique demonstrated  Level Incentive Spirometer (mL): 2000  Number of Repetitions (IS): 10  Taken 5/3/2024 1200 by Crys Gray RN  Patient Tolerance (IS):   good   no adverse signs/symptoms present  Administration (IS):   self-administered   instruction provided, follow-up   proper technique demonstrated  Level Incentive Spirometer (mL): 2000  Number of Repetitions (IS): 10  Taken 5/3/2024 1130 by Crys Gray RN  Safety Promotion/Fall Prevention: safety round/check completed  Taken 5/3/2024 1000 by Crys Gray RN  Patient Tolerance (IS):   good   no adverse signs/symptoms present  Administration (IS):   self-administered   instruction provided, follow-up   proper technique demonstrated  Level Incentive Spirometer (mL): 2000  Number of Repetitions (IS): 10  Taken 5/3/2024 0930 by Crys Gray RN  Safety Promotion/Fall Prevention: safety round/check completed  Taken 5/3/2024 0732 by Crys Gray RN  Patient Tolerance (IS):   good   no adverse signs/symptoms present  Administration (IS):   self-administered   instruction provided, follow-up   proper technique demonstrated  Level Incentive Spirometer (mL): 2000  Number of Repetitions (IS): 10  Taken 5/3/2024 0730 by Crys Gray RN  Safety Promotion/Fall Prevention: safety round/check completed     Problem: Pain (Hip Arthroplasty)  Goal:  Acceptable Pain Control  Outcome: Met     Problem: Postoperative Nausea and Vomiting (Hip Arthroplasty)  Goal: Nausea and Vomiting Relief  Outcome: Met     Problem: Postoperative Urinary Retention (Hip Arthroplasty)  Goal: Effective Urinary Elimination  Outcome: Met     Problem: Respiratory Compromise (Hip Arthroplasty)  Goal: Effective Oxygenation and Ventilation  Outcome: Met     Problem: Bleeding (Orthopaedic Fracture)  Goal: Absence of Bleeding  Outcome: Met     Problem: Embolism (Orthopaedic Fracture)  Goal: Absence of Embolism Signs and Symptoms  Outcome: Met     Problem: Fracture Stabilization and Management (Orthopaedic Fracture)  Goal: Fracture Stability  Outcome: Met     Problem: Functional Ability Impaired (Orthopaedic Fracture)  Goal: Optimal Functional Ability  Outcome: Met  Intervention: Optimize Functional Ability  Recent Flowsheet Documentation  Taken 5/3/2024 0800 by Crys Gray RN  Activity Management: activity encouraged  Activity Assistance Provided: assistance, 1 person     Problem: Infection (Orthopaedic Fracture)  Goal: Absence of Infection Signs and Symptoms  Outcome: Met     Problem: Neurovascular Compromise (Orthopaedic Fracture)  Goal: Effective Tissue Perfusion  Outcome: Met     Problem: Pain (Orthopaedic Fracture)  Goal: Acceptable Pain Control  Outcome: Met     Problem: Respiratory Compromise (Orthopaedic Fracture)  Goal: Effective Oxygenation and Ventilation  Outcome: Met     Problem: Hypertension Comorbidity  Goal: Blood Pressure in Desired Range  Outcome: Met  Intervention: Maintain Blood Pressure Management  Recent Flowsheet Documentation  Taken 5/3/2024 1709 by Crys Gray RN  Medication Review/Management: medications reviewed  Taken 5/3/2024 1510 by Crys Gray RN  Medication Review/Management: medications reviewed  Taken 5/3/2024 1130 by Crys Gray RN  Medication Review/Management: medications reviewed  Taken 5/3/2024 0930 by Crys Gray, KAPIL  Medication  Review/Management: medications reviewed  Taken 5/3/2024 3330 by Crys Gray RN  Medication Review/Management: medications reviewed     Problem: Adjustment to Illness (Sepsis/Septic Shock)  Goal: Optimal Coping  Outcome: Met   Goal Outcome Evaluation:   Pt has had no new changes throughout shift and continues to remain stable. Pt had a left MERLYN performed by Dr. Osborne on 4/30. Pt's aquacel dressing was changed during shift. Pt will go to daily dry dressing on 5/6. Pt is DC to [facility] this evening with jason as ride.

## 2024-05-03 NOTE — PLAN OF CARE
Goal Outcome Evaluation:      Pt complained of pain once during the shift, which was managed with oral PRN medication. VSS. Pt was able to ambulate 100 ft with a walker and gait belt. Pt is expecting to go to [facility] upon discharge. Miguelito Tolbert RN

## 2024-05-03 NOTE — THERAPY TREATMENT NOTE
Acute Care - Physical Therapy Treatment Note  TONY Keith     Patient Name: Blair Lira  : 1945  MRN: [id number]  Today's Date: 5/3/2024      Visit Dx:     ICD-10-CM ICD-9-CM   1. Closed fracture of neck of left femur, initial encounter  S72.002A 820.8   2. Cerebrovascular accident (CVA), unspecified mechanism  I63.9 434.91   3. Dysphagia, unspecified type  R13.10 787.20   4. Difficulty in walking  R26.2 719.7   5. Decreased activities of daily living (ADL)  Z78.9 V49.89     Patient Active Problem List   Diagnosis    Rotator cuff tear, right    Impingement syndrome of right shoulder    Acute renal failure superimposed on chronic kidney disease    Pancytopenia due to chemotherapy    Abnormal weight loss    Hypertension    CKD (chronic kidney disease) stage 3, GFR 30-59 ml/min    Moderate malnutrition    Weakness generalized    Intractable pain    Sepsis associated hypotension    Multiple myeloma    Edema    Chronic diastolic (congestive) heart failure    Acute on chronic HFrEF (heart failure with reduced ejection fraction)    Chest pain    Syncope    COVID-19 virus infection    Chronic kidney disease, stage IV (severe)    Hypotension    Anemia    Acute on chronic renal failure    Pneumonia    Anemia    Acute febrile illness    Mucus plugging of bronchi    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    Pneumonia due to methicillin susceptible Staphylococcus aureus (MSSA)    Femoral neck fracture    Altered mental status    Acute blood loss anemia     Past Medical History:   Diagnosis Date    BPH (benign prostatic hyperplasia)     Cancer     Chronic kidney disease     Femoral neck fracture 2024    Frozen shoulder     Hyperlipidemia     Hypertension 10/26/2023    Periarthritis of shoulder     Rotator cuff disorder, right     Tennis elbow     RIGHT     Past Surgical History:   Procedure Laterality Date    BRONCHOSCOPY N/A  1/22/2024    Procedure: BRONCHOSCOPY WITH BAL AND WASHINGS;  Surgeon: Dionte Ruvalcaba MD;  Location: [facility] ENDOSCOPY;  Service: Pulmonary;  Laterality: N/A;  MUCUS PLUGGING    CATARACT EXTRACTION EXTRACAPSULAR W/ INTRAOCULAR LENS IMPLANTATION Bilateral     COLONOSCOPY      HIP HEMIARTHROPLASTY Left 4/30/2024    Procedure: TOTAL HIP ARTHROPLASTY  ANTERIOR APROACH;  Surgeon: Adán Osborne MD;  Location: [facility] MAIN OR;  Service: Orthopedics;  Laterality: Left;    JOINT REPLACEMENT Right     THR    SHOULDER ARTHROSCOPY W/ ROTATOR CUFF REPAIR Right 3/29/2023    Procedure: SHOULDER ARTHROSCOPY WITH ROTATOR CUFF REPAIR, SUBCROMIAL DECOMPRESSION,DISTAL CLAVICLE RESECTION;  Surgeon: Gustavo Samuels MD;  Location: [facility] OR OSC;  Service: Orthopedics;  Laterality: Right;    SHOULDER SURGERY Left     SCOPE     PT Assessment (Last 12 Hours)       PT Evaluation and Treatment       Row Name 05/03/24 1323 05/03/24 0926       Physical Therapy Time and Intention    Subjective Information complains of;pain  -VK complains of;dyspnea  -VK    Document Type therapy note (daily note)  -VK therapy note (daily note)  -VK    Mode of Treatment individual therapy;group therapy;physical therapy  -VK individual therapy;physical therapy  -VK    Patient Effort good  -VK good  -VK    Comment Gait performed individually; therapuetic exercises performed in a group session with 3 participants  -VK --      Row Name 05/03/24 1323 05/03/24 0926       General Information    Patient Profile Reviewed yes  -VK yes  -VK    Patient Observations alert;cooperative;agree to therapy  -VK alert;cooperative;agree to therapy  -VK    Existing Precautions/Restrictions fall;weight bearing  -VK fall;weight bearing  -VK      Row Name 05/03/24 0926          Pain Scale: FACES Pre/Post-Treatment    Pre/Posttreatment Pain Comment Pt reports no pain but stiffness.  -VK       Row Name 05/03/24 1323 05/03/24 0926       Cognition    Affect/Mental Status (Cognition) WNL   -VK WNL  -VK    Orientation Status (Cognition) oriented x 3  -VK oriented x 3  -VK    Personal Safety Interventions fall prevention program maintained;gait belt;nonskid shoes/slippers when out of bed  -VK fall prevention program maintained;gait belt;nonskid shoes/slippers when out of bed  -VK      Row Name 05/03/24 1323 05/03/24 0926       Mobility    Extremity Weight-bearing Status left lower extremity  -VK left lower extremity  -VK    Left Lower Extremity (Weight-bearing Status) weight-bearing as tolerated (WBAT)  -VK weight-bearing as tolerated (WBAT)  -VK      Row Name 05/03/24 1323 05/03/24 0926       Transfers    Transfers sit-stand transfer;stand-sit transfer  -VK sit-stand transfer;stand-sit transfer;car transfer  -VK      Row Name 05/03/24 1323 05/03/24 0926       Sit-Stand Transfer    Sit-Stand [place] (Transfers) standby assist;contact guard  -VK standby assist;contact guard  -VK    Assistive Device (Sit-Stand Transfers) walker, front-wheeled  -VK walker, front-wheeled  -CINDI      Row Name 05/03/24 1323 05/03/24 0926       Stand-Sit Transfer    Stand-Sit [place] (Transfers) standby assist;contact guard  -VK standby assist;contact guard  -VK    Assistive Device (Stand-Sit Transfers) walker, front-wheeled  -VK walker, front-wheeled  -CINDI      Row Name 05/03/24 0926          Car Transfer    Type (Car Transfer) sit-stand;stand-sit  -VK     [place] Level (Car Transfer) contact guard;verbal cues  -VK     Assistive Device (Car Transfer) walker, front-wheeled  -VK       Row Name 05/03/24 1323 05/03/24 0926       Gait/Stairs (Locomotion)    Gait/Stairs Locomotion gait/ambulation assistive device  -VK gait/ambulation assistive device  -VK    [place] Level (Gait) standby assist;contact guard  -VK standby assist;contact guard  -VK    Assistive Device (Gait) walker, front-wheeled  -VK walker, front-wheeled  -VK    Patient was able to Ambulate yes  -VK yes  -VK    Distance in Feet (Gait) --  [id number]   -VK --  125ft, 125ft  -VK    Pattern (Gait) step-through;step-to  -VK step-through  -VK    Deviations/Abnormal Patterns (Gait) gait speed decreased;stride length decreased;antalgic  -VK gait speed decreased;stride length decreased;antalgic  -VK    Left Sided Gait Deviations heel strike decreased;weight shift ability decreased  -VK heel strike decreased;weight shift ability decreased  -VK    [place] Level (Stairs) -- verbal cues;contact guard  -VK    Assistive Device (Stairs) -- walker, front-wheeled  -VK    Handrail Location (Stairs) -- both sides  -VK    Number of Steps (Stairs) -- 6  -VK    Ascending Technique (Stairs) -- step-to-step  -VK    Descending Technique (Stairs) -- step-to-step  -VK    Stairs, Safety Issues -- weight-shifting ability decreased  -VK      Row Name 05/03/24 1323 05/03/24 0926       Safety Issues, Functional Mobility    Impairments Affecting Function (Mobility) balance;pain;range of motion (ROM);strength  -VK balance;pain;range of motion (ROM);strength  -VK      Row Name 05/03/24 1323 05/03/24 0926       Balance    Balance Assessment standing dynamic balance  -VK standing dynamic balance  -VK    Dynamic Standing Balance standby assist;contact guard;verbal cues  -VK --    Position/Device Used, Standing Balance walker, front-wheeled  -VK --      Row Name 05/03/24 1323 05/03/24 0926       Motor Skills    Therapeutic Exercise hip;knee;ankle  -VK hip;knee;ankle  -VK      Row Name 05/03/24 1323 05/03/24 0926       Hip (Therapeutic Exercise)    Hip (Therapeutic Exercise) isometric exercises  -VK isometric exercises  -VK    Hip Isometrics (Therapeutic Exercise) left;gluteal sets;aBduction;10 repetitions;2 sets  -VK left;gluteal sets;aBduction;10 repetitions;2 sets  -VK      Row Name 05/03/24 1323 05/03/24 0926       Knee (Therapeutic Exercise)    Knee (Therapeutic Exercise) strengthening exercise;isometric exercises  -VK strengthening exercise;isometric exercises  -VK    Knee Isometrics  (Therapeutic Exercise) left;quad sets;10 repetitions;2 sets  -VK left;quad sets;10 repetitions;2 sets  -VK    Knee Strengthening (Therapeutic Exercise) left;heel slides;SAQ (short arc quad);LAQ (long arc quad);10 repetitions;2 sets  -VK left;heel slides;SAQ (short arc quad);LAQ (long arc quad);10 repetitions;2 sets  -VK      Row Name 05/03/24 1323 05/03/24 0926       Ankle (Therapeutic Exercise)    Ankle (Therapeutic Exercise) AROM (active range of motion)  -VK AROM (active range of motion)  -VK    Ankle AROM (Therapeutic Exercise) left;dorsiflexion;plantarflexion;10 repetitions;2 sets  -VK left;dorsiflexion;plantarflexion;10 repetitions;2 sets  -VK      Row Name             Wound 04/30/24 1617 Left anterior thigh    Wound - Properties Group Placement Date: 04/30/24  -MH Placement Time: 1617  -MH Side: Left  -MH Orientation: anterior  -MH Location: thigh  -MH    Retired Wound - Properties Group Placement Date: 04/30/24  -MH Placement Time: 1617  -MH Side: Left  -MH Orientation: anterior  -MH Location: thigh  -MH    Retired Wound - Properties Group Date first assessed: 04/30/24  -MH Time first assessed: 1617  -MH Side: Left  -MH Location: thigh  -MH      Row Name 05/03/24 0926          Vital Signs    Intra SpO2 (%) 90  -VK     O2 Delivery Intra Treatment room air  -VK     Post SpO2 (%) 95  -VK     O2 Delivery Post Treatment room air  -VK       Row Name 05/03/24 1323          Positioning and Restraints    Pre-Treatment Position sitting in chair/recliner  -VK     Post Treatment Position chair  -VK     In Chair reclined;call light within reach;encouraged to call for assist;exit alarm on  -VK       Row Name 05/03/24 1323 05/03/24 0926       Progress Summary (PT)    Progress Toward Functional Goals (PT) progress toward functional goals is good  -VK progress toward functional goals is good  -VK    Daily Progress Summary (PT) Pt is progressing well with their exercise program.  Will continue current plan of care.  -VK Pt is  progressing well with their exercise program.  Will continue current plan of care.  -VK              User Key  (r) = Recorded By, (t) = Taken By, (c) = Cosigned By      Initials Name Provider Type    Fabiana Carver PTA Physical Therapist Assistant    Del Patten, RN Registered Nurse                      PT Recommendation and Plan     Progress Summary (PT)  Progress Toward Functional Goals (PT): progress toward functional goals is good  Daily Progress Summary (PT): Pt is progressing well with their exercise program.  Will continue current plan of care.   Outcome Measures       Row Name 05/03/24 1200 05/02/24 1400 05/01/24 1200       How much help from another person do you currently need...    Turning from your back to your side while in flat bed without using bedrails? 4  -VK 4  -RH 4  -RH    Moving from lying on back to sitting on the side of a flat bed without bedrails? 4  -VK 3  -RH 3  -RH    Moving to and from a bed to a chair (including a wheelchair)? 3  -VK 3  -RH 3  -RH    Standing up from a chair using your arms (e.g., wheelchair, bedside chair)? 4  -VK 3  -RH 3  -RH    Climbing 3-5 steps with a railing? 3  -VK 3  -RH 3  -RH    To walk in hospital room? 3  -VK 4  -RH 4  -RH    AM-PAC 6 Clicks Score (PT) 21  -VK 20  -RH 20  -RH    Highest Level of Mobility Goal 6 --> Walk 10 steps or more  -VK 6 --> Walk 10 steps or more  -RH 6 --> Walk 10 steps or more  -RH      Row Name 05/01/24 0900             How much help from another person do you currently need...    Turning from your back to your side while in flat bed without using bedrails? 4  -CHLOE      Moving from lying on back to sitting on the side of a flat bed without bedrails? 3  -CHLOE      Moving to and from a bed to a chair (including a wheelchair)? 3  -CHLOE      Standing up from a chair using your arms (e.g., wheelchair, bedside chair)? 3  -CHLOE      Climbing 3-5 steps with a railing? 3  -CHLOE      To walk in hospital room? 3  -CHLOE      AM-PAC 6  Clicks Score (PT) 19  -CHLOE      Highest Level of Mobility Goal 6 --> Walk 10 steps or more  -CHLOE         Functional Assessment    Outcome Measure Options AM-PAC 6 Clicks Basic Mobility (PT)  -CHLOE                User Key  (r) = Recorded By, (t) = Taken By, (c) = Cosigned By      Initials Name Provider Type     Shen Erazo, PTA Physical Therapist Assistant    CHLOEWoodrow Raya, PT Physical Therapist    Fabiana Carver PTA Physical Therapist Assistant                     Time Calculation:    PT Charges       Row Name 05/03/24 1605 05/03/24 1223          Time Calculation    PT Received On 05/03/24  -VK 05/03/24  -VK        Timed Charges    67130 - PT Therapeutic Exercise Minutes -- 15  -VK     83605 - Gait Training Minutes  10  -VK 11  -VK     11311 - PT Therapeutic Activity Minutes -- 18  -VK        Untimed Charges    PT Group Therapy Minutes 30  -VK --        Total Minutes    Timed Charges Total Minutes 10  -VK 44  -VK     Untimed Charges Total Minutes 30  -VK --      Total Minutes 40  -VK 44  -VK               User Key  (r) = Recorded By, (t) = Taken By, (c) = Cosigned By      Initials Name Provider Type    Fabiana Carver PTA Physical Therapist Assistant                  Therapy Charges for Today       Code Description Service Date Service Provider Modifiers Qty    79834409356 HC PT THER PROC EA 15 MIN 5/3/2024 Fabiana Vegas, PTA GP 1    43004830743 HC GAIT TRAINING EA 15 MIN 5/3/2024 Fabiana Vegas, PTA GP 1    38107357895 HC PT THERAPEUTIC ACT EA 15 MIN 5/3/2024 Fabiana Vegas, PTA GP 1    46115038259 HC GAIT TRAINING EA 15 MIN 5/3/2024 Ascencion Fabiana, PTA GP 1    49379517358 HC PT THER PROC GROUP 5/3/2024 Fabiana Vegas, PTA GP 1            PT G-Codes  Outcome Measure Options: AM-PAC 6 Clicks Daily Activity (OT)  AM-PAC 6 Clicks Score (PT): 21  AM-PAC 6 Clicks Score (OT): 21    Fabiana Vegas PTA  5/3/2024

## 2024-05-03 NOTE — PLAN OF CARE
Goal Outcome Evaluation:   Pt [facility] this after via pov

## 2024-05-13 ENCOUNTER — OFFICE VISIT (OUTPATIENT)
Dept: ORTHOPEDIC SURGERY | Facility: CLINIC | Age: 79
End: 2024-05-13
Payer: MEDICARE

## 2024-05-13 VITALS
HEIGHT: 73 IN | OXYGEN SATURATION: 94 % | WEIGHT: 196 LBS | SYSTOLIC BLOOD PRESSURE: 118 MMHG | DIASTOLIC BLOOD PRESSURE: 72 MMHG | BODY MASS INDEX: 25.98 KG/M2 | HEART RATE: 94 BPM

## 2024-05-13 DIAGNOSIS — M25.552 LEFT HIP PAIN: Primary | ICD-10-CM

## 2024-05-13 DIAGNOSIS — S72.002A CLOSED FRACTURE OF NECK OF LEFT FEMUR, INITIAL ENCOUNTER: Primary | ICD-10-CM

## 2024-05-13 PROCEDURE — 99024 POSTOP FOLLOW-UP VISIT: CPT

## 2024-05-13 PROCEDURE — 1160F RVW MEDS BY RX/DR IN RCRD: CPT

## 2024-05-13 PROCEDURE — 3074F SYST BP LT 130 MM HG: CPT

## 2024-05-13 PROCEDURE — 1159F MED LIST DOCD IN RCRD: CPT

## 2024-05-13 PROCEDURE — 3078F DIAST BP <80 MM HG: CPT

## 2024-05-13 RX ORDER — HYDROCODONE BITARTRATE AND ACETAMINOPHEN 5; 325 MG/1; MG/1
1 TABLET ORAL EVERY 6 HOURS PRN
Qty: 28 TABLET | Refills: 0 | Status: SHIPPED | OUTPATIENT
Start: 2024-05-13

## 2024-05-13 NOTE — PROGRESS NOTES
"Chief Complaint  Follow-up and Pain of the Left Hip    Subjective      Blair Lira presents to [facility] for follow up of her left hip.  Patient is 2 weeks status post left total hip arthroplasty performed Dr. Osborne on 4/30/2024.  He is here today with his rolling walker.  Patient reports that he spent 1 week inpatient at [facility] rehab and states that he is doing well.  He states that he has home health coming to him to help with physical therapy, and they are going to start today.  He denies having to take any pain medication.  He reports overall he is doing well and denies any concerns.    No Known Allergies    Objective     Vital Signs:   Vitals:    05/13/24 1015   BP: 118/72   Pulse: 94   SpO2: 94%   Weight: 88.9 kg (196 lb)   Height: 185.4 cm (73\")   PainSc:   5     Body mass index is 25.86 kg/m².    I reviewed the patient's chief complaint, history of present illness, review of systems, past medical history, surgical history, family history, social history, medications, and allergy list.     REVIEW OF SYSTEMS    Constitutional: Denies fevers, chills, weight loss  Cardiovascular: Denies chest pain, shortness of breath  Skin: Denies rashes, acute skin changes  Neurologic: Denies headache, loss of consciousness  MSK: Left hip pain.     Ortho Exam  Hip   General: Alert. No acute distress.  Gait: Nonantalgic gait.    Left hip:Staples removed today in office without complication. Steri-strips applied. Incision well-healing, no signs of infection.  No pain with passive hip ROM.  Intact active hip ROM with no pain. Non tender over the thigh or knee distally. Demonstrates intact active ankle dorsiflexion and plantarflexion. Demonstrates intact sensation over dorsal and plantar foot.  Calf soft, nontender. Neurovascular intact distally. Palpable pedal pulses.           Imaging Results (Most Recent)       Procedure Component Value Units Date/Time    XR Hip With or Without Pelvis 2 - 3 " View Left [[id number]] Resulted: 05/13/24 1056     Updated: 05/13/24 1057    Narrative:      X-Ray Report:  Study: X-rays ordered, taken in the office, and reviewed today  Site: Left hip xray  Indication: Left total hip arthroplasty follow-up  View: AP, frog lateral left hip view(s)  Findings: Intact left total hip arthroplasty.  No evidence of hardware   complication or loosening.  No periprosthetic fractures are visualized.  Prior studies available for comparison: yes                 Assessment and Plan   Diagnoses and all orders for this visit:    1. Left hip pain (Primary)  -     XR Hip With or Without Pelvis 2 - 3 View Left         Blair Lira presents today 2 weeks status post left total hip arthroplasty performed by Dr. Osborne on 4/30/2024. X-rays were obtained and reviewed with the patient today showing intact hardware without complications. Staples/sutures removed today without complications. Steri-strips applied with instructions to keep incision clean and dry, allowing steri-strips to fall off on their own in 7-10 days. Incision site care was reviewed today. Patient instructed not to soak or submerge incision. Do not apply any lotions, creams, or ointments to the incision at this time.  We discussed concerning signs and symptoms regarding the incision site.      Patient instructed to continue with formal physical therapy and the use of cane/walker until recommended by PT to discontinue. We discussed the importance of home exercises in addition to PT. We discussed swelling management with continued icing of the hip for approximately 15-20 minutes, three times daily, and as needed. We discussed the importance of weaning from narcotic medications.     Patient will continue ASA for DVT prophylaxis until prescription completed. Patient expressed understanding.     Patient will follow up in 4 weeks for reevaluation. Repeat imaging not needed at this this visit.     Call or return if symptoms worsen or patient  has any concerns.        Tobacco Use: Low Risk  (5/13/2024)    Patient History     Smoking Tobacco Use: Never     Smokeless Tobacco Use: Never     Passive Exposure: Never     Patient reports that they are a nonsmoker; cessation education not applicable.           Follow Up   Return in about 1 month (around 6/13/2024).  There are no Patient Instructions on file for this visit.  Patient was given instructions and counseling regarding his condition or for health maintenance advice. Please see specific information pulled into the AVS if appropriate.       Dictated Utilizing Dragon Dictation. Please note that portions of this note were completed with a voice recognition program. Part of this note may be an electronic transcription/translation of spoken language to printed text using the Dragon Dictation System.

## 2024-06-10 ENCOUNTER — OFFICE VISIT (OUTPATIENT)
Dept: ORTHOPEDIC SURGERY | Facility: CLINIC | Age: 79
End: 2024-06-10
Payer: MEDICARE

## 2024-06-10 VITALS
HEIGHT: 73 IN | HEART RATE: 82 BPM | WEIGHT: 196 LBS | DIASTOLIC BLOOD PRESSURE: 73 MMHG | OXYGEN SATURATION: 94 % | BODY MASS INDEX: 25.98 KG/M2 | SYSTOLIC BLOOD PRESSURE: 144 MMHG

## 2024-06-10 DIAGNOSIS — Z96.642 S/P TOTAL LEFT HIP ARTHROPLASTY: Primary | ICD-10-CM

## 2024-06-10 DIAGNOSIS — M25.552 LEFT HIP PAIN: ICD-10-CM

## 2024-06-10 DIAGNOSIS — R29.6 FREQUENT FALLS: ICD-10-CM

## 2024-06-10 PROCEDURE — 1160F RVW MEDS BY RX/DR IN RCRD: CPT

## 2024-06-10 PROCEDURE — 3077F SYST BP >= 140 MM HG: CPT

## 2024-06-10 PROCEDURE — 99024 POSTOP FOLLOW-UP VISIT: CPT

## 2024-06-10 PROCEDURE — 3078F DIAST BP <80 MM HG: CPT

## 2024-06-10 PROCEDURE — 1159F MED LIST DOCD IN RCRD: CPT

## 2024-06-10 NOTE — PROGRESS NOTES
"Chief Complaint  Follow-up of the Left Hip    Subjective      Blair Lira presents to [facility] for follow up of his left hip.  Patient is 6-week status post left total hip arthroplasty performed Dr. Osborne on 4/30/2024.  He is here today using a cane.  He denies any significant pain.  Does report he has occasional stiffness which he works through with his home exercises.  He has [facility] coming out 1-2 times a week working with him in physical therapy.  Overall he thinks he is doing well and has progressed well.  He denies any concerns today.    No Known Allergies    Objective     Vital Signs:   Vitals:    06/10/24 1031   BP: 144/73   Pulse: 82   SpO2: 94%   Weight: 88.9 kg (196 lb)   Height: 185.4 cm (73\")     Body mass index is 25.86 kg/m².    I reviewed the patient's chief complaint, history of present illness, review of systems, past medical history, surgical history, family history, social history, medications, and allergy list.     REVIEW OF SYSTEMS    Constitutional: Denies fevers, chills, weight loss  Cardiovascular: Denies chest pain, shortness of breath  Skin: Denies rashes, acute skin changes  Neurologic: Denies headache, loss of consciousness  MSK: Left hip pain.     Ortho Exam  Hip   General: Alert. No acute distress.  Gait: Nonantalgic gait.    Left hip: Well-healed incision.  No pain with passive hip ROM.  Intact active hip ROM with good strength. Non tender over the thigh or knee distally. Demonstrates intact active ankle dorsiflexion and plantarflexion. Demonstrates intact sensation over dorsal and plantar foot.  Calf soft, nontender. Neurovascular intact distally. Palpable pedal pulses.           Imaging Results (Most Recent)       None                Assessment and Plan   Diagnoses and all orders for this visit:    1. S/P total left hip arthroplasty (Primary)  -     Ambulatory Referral to Home Health (Outpatient)    2. Frequent falls  -     Ambulatory Referral " to Home Health (Outpatient)    3. Left hip pain  -     Ambulatory Referral to Home Health (Outpatient)         Blair Lira presents today 6 weeks post op left total hip arthroplasty performed by Dr. Osborne on 4/30/24. Patient is doing well. Incision is well healing without active drainage or redness noted.     Discussed continuing to ice hip as needed up to 4 times daily for no longer than 15-20 mins at a time.     Recommended continuing PT to completion, continuing to progress with strength and ROM. Additionally, strongly advised to continue home exercises when outside of PT. PT reordered today.     Patient will follow up in 6 weeks. We will obtain xrays of the left hip at that time.            Tobacco Use: Low Risk  (6/10/2024)    Patient History     Smoking Tobacco Use: Never     Smokeless Tobacco Use: Never     Passive Exposure: Never     Patient reports that they are a nonsmoker; cessation education not applicable.           Follow Up   Return in about 6 weeks (around 7/22/2024).  There are no Patient Instructions on file for this visit.  Patient was given instructions and counseling regarding his condition or for health maintenance advice. Please see specific information pulled into the AVS if appropriate.       Dictated Utilizing Dragon Dictation. Please note that portions of this note were completed with a voice recognition program. Part of this note may be an electronic transcription/translation of spoken language to printed text using the Dragon Dictation System.

## 2024-06-27 ENCOUNTER — TRANSCRIBE ORDERS (OUTPATIENT)
Dept: LAB | Facility: HOSPITAL | Age: 79
End: 2024-06-27
Payer: MEDICARE

## 2024-06-27 ENCOUNTER — LAB (OUTPATIENT)
Dept: LAB | Facility: HOSPITAL | Age: 79
End: 2024-06-27
Payer: MEDICARE

## 2024-06-27 DIAGNOSIS — N18.4 CHRONIC RENAL DISEASE, STAGE IV: Primary | ICD-10-CM

## 2024-06-27 DIAGNOSIS — I10 HYPERTENSION, ESSENTIAL: ICD-10-CM

## 2024-06-27 DIAGNOSIS — N18.4 CHRONIC RENAL DISEASE, STAGE IV: ICD-10-CM

## 2024-06-27 LAB
ALBUMIN SERPL-MCNC: 3 G/DL (ref 3.5–5.2)
ALBUMIN UR-MCNC: 7.3 MG/DL
ALBUMIN/GLOB SERPL: 1.1 G/DL
ALP SERPL-CCNC: 82 U/L (ref 39–117)
ALT SERPL W P-5'-P-CCNC: 12 U/L (ref 1–41)
ANION GAP SERPL CALCULATED.3IONS-SCNC: 10.4 MMOL/L (ref 5–15)
AST SERPL-CCNC: 16 U/L (ref 1–40)
BACTERIA UR QL AUTO: ABNORMAL /HPF
BASOPHILS # BLD AUTO: 0.05 10*3/MM3 (ref 0–0.2)
BASOPHILS NFR BLD AUTO: 1.8 % (ref 0–1.5)
BILIRUB SERPL-MCNC: 0.5 MG/DL (ref 0–1.2)
BILIRUB UR QL STRIP: NEGATIVE
BUN SERPL-MCNC: 11 MG/DL (ref 8–23)
BUN/CREAT SERPL: 8.3 (ref 7–25)
CALCIUM SPEC-SCNC: 7.6 MG/DL (ref 8.6–10.5)
CHLORIDE SERPL-SCNC: 108 MMOL/L (ref 98–107)
CLARITY UR: ABNORMAL
CO2 SERPL-SCNC: 20.6 MMOL/L (ref 22–29)
COLOR UR: YELLOW
CREAT SERPL-MCNC: 1.33 MG/DL (ref 0.76–1.27)
CREAT UR-MCNC: 77.3 MG/DL
CREAT UR-MCNC: 90.6 MG/DL
DEPRECATED RDW RBC AUTO: 59.1 FL (ref 37–54)
EGFRCR SERPLBLD CKD-EPI 2021: 55.1 ML/MIN/1.73
EOSINOPHIL # BLD AUTO: 0.02 10*3/MM3 (ref 0–0.4)
EOSINOPHIL NFR BLD AUTO: 0.7 % (ref 0.3–6.2)
ERYTHROCYTE [DISTWIDTH] IN BLOOD BY AUTOMATED COUNT: 17.3 % (ref 12.3–15.4)
GLOBULIN UR ELPH-MCNC: 2.8 GM/DL
GLUCOSE SERPL-MCNC: 103 MG/DL (ref 65–99)
GLUCOSE UR STRIP-MCNC: NEGATIVE MG/DL
HCT VFR BLD AUTO: 29 % (ref 37.5–51)
HGB BLD-MCNC: 9.2 G/DL (ref 13–17.7)
HGB UR QL STRIP.AUTO: ABNORMAL
HYALINE CASTS UR QL AUTO: ABNORMAL /LPF
IMM GRANULOCYTES # BLD AUTO: 0.02 10*3/MM3 (ref 0–0.05)
IMM GRANULOCYTES NFR BLD AUTO: 0.7 % (ref 0–0.5)
KETONES UR QL STRIP: NEGATIVE
LEUKOCYTE ESTERASE UR QL STRIP.AUTO: ABNORMAL
LYMPHOCYTES # BLD AUTO: 0.66 10*3/MM3 (ref 0.7–3.1)
LYMPHOCYTES NFR BLD AUTO: 23.4 % (ref 19.6–45.3)
MCH RBC QN AUTO: 29.9 PG (ref 26.6–33)
MCHC RBC AUTO-ENTMCNC: 31.7 G/DL (ref 31.5–35.7)
MCV RBC AUTO: 94.2 FL (ref 79–97)
MICROALBUMIN/CREAT UR: 94.4 MG/G (ref 0–29)
MONOCYTES # BLD AUTO: 0.23 10*3/MM3 (ref 0.1–0.9)
MONOCYTES NFR BLD AUTO: 8.2 % (ref 5–12)
NEUTROPHILS NFR BLD AUTO: 1.84 10*3/MM3 (ref 1.7–7)
NEUTROPHILS NFR BLD AUTO: 65.2 % (ref 42.7–76)
NITRITE UR QL STRIP: NEGATIVE
NRBC BLD AUTO-RTO: 0 /100 WBC (ref 0–0.2)
PH UR STRIP.AUTO: 6 [PH] (ref 5–8)
PLATELET # BLD AUTO: 206 10*3/MM3 (ref 140–450)
PMV BLD AUTO: 11.7 FL (ref 6–12)
POTASSIUM SERPL-SCNC: 3.6 MMOL/L (ref 3.5–5.2)
PROT ?TM UR-MCNC: 32.4 MG/DL
PROT SERPL-MCNC: 5.8 G/DL (ref 6–8.5)
PROT UR QL STRIP: ABNORMAL
PROT/CREAT UR: 0.36 MG/G{CREAT}
RBC # BLD AUTO: 3.08 10*6/MM3 (ref 4.14–5.8)
RBC # UR STRIP: ABNORMAL /HPF
REF LAB TEST METHOD: ABNORMAL
SODIUM SERPL-SCNC: 139 MMOL/L (ref 136–145)
SP GR UR STRIP: 1.01 (ref 1–1.03)
SQUAMOUS #/AREA URNS HPF: ABNORMAL /HPF
UROBILINOGEN UR QL STRIP: ABNORMAL
WBC # UR STRIP: ABNORMAL /HPF
WBC NRBC COR # BLD AUTO: 2.82 10*3/MM3 (ref 3.4–10.8)

## 2024-06-27 PROCEDURE — 84156 ASSAY OF PROTEIN URINE: CPT

## 2024-06-27 PROCEDURE — 85025 COMPLETE CBC W/AUTO DIFF WBC: CPT

## 2024-06-27 PROCEDURE — 36415 COLL VENOUS BLD VENIPUNCTURE: CPT

## 2024-06-27 PROCEDURE — 82570 ASSAY OF URINE CREATININE: CPT

## 2024-06-27 PROCEDURE — 82043 UR ALBUMIN QUANTITATIVE: CPT

## 2024-06-27 PROCEDURE — 80053 COMPREHEN METABOLIC PANEL: CPT

## 2024-06-27 PROCEDURE — 81001 URINALYSIS AUTO W/SCOPE: CPT

## 2024-07-22 ENCOUNTER — OFFICE VISIT (OUTPATIENT)
Dept: ORTHOPEDIC SURGERY | Facility: CLINIC | Age: 79
End: 2024-07-22
Payer: MEDICARE

## 2024-07-22 VITALS
BODY MASS INDEX: 26 KG/M2 | SYSTOLIC BLOOD PRESSURE: 102 MMHG | DIASTOLIC BLOOD PRESSURE: 67 MMHG | OXYGEN SATURATION: 97 % | HEART RATE: 88 BPM | HEIGHT: 73 IN | WEIGHT: 196.21 LBS

## 2024-07-22 DIAGNOSIS — Z96.642 S/P TOTAL LEFT HIP ARTHROPLASTY: ICD-10-CM

## 2024-07-22 DIAGNOSIS — M25.552 LEFT HIP PAIN: Primary | ICD-10-CM

## 2024-07-22 PROCEDURE — 3078F DIAST BP <80 MM HG: CPT

## 2024-07-22 PROCEDURE — 99024 POSTOP FOLLOW-UP VISIT: CPT

## 2024-07-22 PROCEDURE — 3074F SYST BP LT 130 MM HG: CPT

## 2024-07-22 RX ORDER — COLCHICINE 0.6 MG/1
TABLET ORAL
COMMUNITY
Start: 2024-06-18

## 2024-07-22 NOTE — PROGRESS NOTES
"Chief Complaint  Follow-up of the Left Hip    Subjective      Blair Lira presents to [facility] for follow up of his left hip.  Patient is 12 weeks status post left total hip arthroplasty performed Dr. Osborne on 4/30/2024..  He arrives today to use of a cane.  He denies any pain or problems with the hip.  He has finished home health and is doing just his home exercises.  He states that he is currently receiving treatments for cancer and has some overall weakness from that but denies any specific issues from the hip.    No Known Allergies    Objective     Vital Signs:   Vitals:    07/22/24 0958   BP: 102/67   Pulse: 88   SpO2: 97%   Weight: 89 kg (196 lb 3.4 oz)   Height: 185.4 cm (73\")     Body mass index is 25.89 kg/m².    I reviewed the patient's chief complaint, history of present illness, review of systems, past medical history, surgical history, family history, social history, medications, and allergy list.     REVIEW OF SYSTEMS    Constitutional: Denies fevers, chills, weight loss  Cardiovascular: Denies chest pain, shortness of breath  Skin: Denies rashes, acute skin changes  Neurologic: Denies headache, loss of consciousness  MSK: Left hip pain.     Ortho Exam  Hip   General: Alert. No acute distress.  Gait: Nonantalgic gait.    Left hip: Well-healed incision.  No pain with passive hip ROM.  Intact active hip ROM with no pain. Non tender over the thigh or knee distally. Demonstrates intact active ankle dorsiflexion and plantarflexion. Demonstrates intact sensation over dorsal and plantar foot.  Calf soft, nontender. Neurovascular intact distally. Palpable pedal pulses.           Imaging Results (Most Recent)       Procedure Component Value Units Date/Time    XR Hip With or Without Pelvis 2 - 3 View Left [[id number]] Resulted: 07/23/24 0847     Updated: 07/23/24 0847    Narrative:      X-Ray Report:  Study: X-rays ordered, taken in the office, and reviewed today.   Site: Left " Hip Xray  Indication: Left total hip arthroplasty follow-up  View: AP, frog lateral left hip  Findings: Intact left total hip arthroplasty.  No evidence of hardware   complications or loosening.  No periprosthetic fractures are visualized.    Hip joint is reduced.  Prior studies available for comparison: yes                    Assessment and Plan   Diagnoses and all orders for this visit:    1. Left hip pain (Primary)  -     XR Hip With or Without Pelvis 2 - 3 View Left    2. S/P total left hip arthroplasty         Blair Lira present to [facility] for a follow up of their left hip. They are approximately 12 weeks post op left total hip arthroplasty performed by Dr Osborne on 4/30/2024.  X-rays taken and reviewed with patient today in office. Patient is doing well.  Encouraged to continue home exercises for ROM and strengthening. May continue icing as needed for no more than 20 minutes at a time for comfort and inflammation. Encouraged to continue avoiding outward pivoting and avoid deep squatting.     Incision well healed. May apply Vitamin E, cocoa butter, etc. over incision to aid in scar appearance. Educated that numbness over the incision can still remain at this point, and should continue to improve for up to 1 year postop. However, at the year ruthann if still experiencing numbness it may not return.    Discussed the need for antibiotic prophylaxis with dental procedures following total joint replacement for life. Patient instructed to contact office if dental office is reluctant to prescribe antibiotics prior to procedure and we will prescribe them.       Follow-up in 9 months. We will repeat xrays of the left hip at that visit.     Call with questions or concerns.              Follow Up   No follow-ups on file.  There are no Patient Instructions on file for this visit.  Patient was given instructions and counseling regarding his condition or for health maintenance advice. Please see specific  information pulled into the AVS if appropriate.       Dictated Utilizing Dragon Dictation. Please note that portions of this note were completed with a voice recognition program. Part of this note may be an electronic transcription/translation of spoken language to printed text using the Dragon Dictation System.

## 2024-09-12 ENCOUNTER — DOCUMENTATION (OUTPATIENT)
Dept: PHYSICAL THERAPY | Facility: CLINIC | Age: 79
End: 2024-09-12
Payer: MEDICARE

## 2024-09-12 NOTE — PROGRESS NOTES
Outpatient Physical Therapy  [address], [place] [address]      Discharge Summary  Discharge Summary from Physical Therapy Report      Dates  PT visit: 4/11/23-6/26/23  Number of Visits: 23       Goals  Plan Goals: SHOULDER  PROBLEMS:     1. The patient has limited ROM of the R shoulder.                  LTG 1: 12 weeks:  The patient will demonstrate 165 degrees of R shoulder flexion, 165 degrees of shoulder abduction, 80 degrees of shoulder external rotation, and 80 degrees of shoulder internal rotation to allow the patient to reach into upper kitchen cabinets and manipulate clothing behind the back with greater ease.                          STATUS:  progressing              STG 1a: 6 weeks:  The patient will demonstrate 125 degrees of R shoulder flexion, 125 degrees of shoulder abduction, 70 degrees of shoulder external rotation, and 70 degrees of shoulder internal rotation.                          STATUS:   progressing              TREATMENT: Manual therapy, therapeutic exercise, home exercise instruction, and modalities as needed to include: electrical stimulation, ultrasound, moist heat, and ice.    2. The patient has limited strength of the R shoulder.              LTG 2: 12 weeks:  The patient will demonstrate 4+/5 strength for R shoulder flexion, abduction, external rotation, and internal rotation in order to demonstrate improved shoulder stability.                          STATUS:   progressing              STG 2a: 8 weeks:  The patient will demonstrate 3+/5 strength for R shoulder flexion, abduction, external rotation, and internal rotation.                          STATUS:  met              STG2b:  2 weeks:  The patient will be independent with home exercises.                           STATUS:   progressing              TREATMENT: Manual therapy, therapeutic exercise, home exercise instruction, and modalities as needed to include: electrical stimulation, ultrasound, moist heat, and  ice.                3. The patient complains of pain to the R shoulder.              LTG 3: 12 weeks:  The patient will report a pain rating of 1/10 or better in order to improve sleep quality and tolerance to performance of activities of daily living.                          STATUS:  progressing              STG 3a: 8 weeks:  The patient will report a pain rating of 3/10 or better.                           STATUS:  met              TREATMENT: Manual therapy, therapeutic exercise, home exercise instruction, and modalities as needed to include: electrical stimulation, ultrasound, moist heat, and ice.    4. Carrying, Moving, and Handling Objects Functional Limitation                               LTG 4: 12 weeks:  The patient will demonstrate 9 % limitation by achieving a score of 15 on the QuickDASH.                          STATUS:  progressing              STG 4a: 6 weeks:  The patient will demonstrate 27 % limitation by achieving a score of 23 on the QuickDASH.                            STATUS:  met              TREATMENT:  Manual therapy, therapeutic exercise, home exercise instruction, and modalities as needed to include: moist heat, electrical stimulation, and ultrasound    Discharge Plan: Continue with current home exercise program as instructed    Date of Discharge 9/12/2024      Electronically signed:   Ann Howe PTA  Physical Therapist Assistant  [facility] License #: [id number]

## 2024-10-12 NOTE — THERAPY TREATMENT NOTE
Acute Care - Physical Therapy Treatment Note  TONY Keith     Patient Name: Blair Lira  : 1945  MRN: [id number]  Today's Date: 2024      Visit Dx:     ICD-10-CM ICD-9-CM   1. Closed fracture of neck of left femur, initial encounter  S72.002A 820.8   2. Cerebrovascular accident (CVA), unspecified mechanism  I63.9 434.91   3. Dysphagia, unspecified type  R13.10 787.20   4. Difficulty in walking  R26.2 719.7   5. Decreased activities of daily living (ADL)  Z78.9 V49.89     Patient Active Problem List   Diagnosis    Rotator cuff tear, right    Impingement syndrome of right shoulder    Acute renal failure superimposed on chronic kidney disease    Pancytopenia due to chemotherapy    Abnormal weight loss    Hypertension    CKD (chronic kidney disease) stage 3, GFR 30-59 ml/min    Moderate malnutrition    Weakness generalized    Intractable pain    Sepsis associated hypotension    Multiple myeloma    Edema    Chronic diastolic (congestive) heart failure    Acute on chronic HFrEF (heart failure with reduced ejection fraction)    Chest pain    Syncope    COVID-19 virus infection    Chronic kidney disease, stage IV (severe)    Hypotension    Anemia    Acute on chronic renal failure    Pneumonia    Anemia    Acute febrile illness    Mucus plugging of bronchi    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    Pneumonia due to methicillin susceptible Staphylococcus aureus (MSSA)    Femoral neck fracture    Altered mental status     Past Medical History:   Diagnosis Date    BPH (benign prostatic hyperplasia)     Cancer     Chronic kidney disease     Femoral neck fracture 2024    Frozen shoulder     Hyperlipidemia     Hypertension 10/26/2023    Periarthritis of shoulder     Rotator cuff disorder, right     Tennis elbow     RIGHT     Past Surgical History:   Procedure Laterality Date    BRONCHOSCOPY N/A 2024    Procedure:  BRONCHOSCOPY WITH BAL AND WASHINGS;  Surgeon: Dionte Ruvalcaba MD;  Location: [facility] ENDOSCOPY;  Service: Pulmonary;  Laterality: N/A;  MUCUS PLUGGING    CATARACT EXTRACTION EXTRACAPSULAR W/ INTRAOCULAR LENS IMPLANTATION Bilateral     COLONOSCOPY      HIP HEMIARTHROPLASTY Left 4/30/2024    Procedure: TOTAL HIP ARTHROPLASTY  ANTERIOR APROACH;  Surgeon: Adán Osborne MD;  Location: [facility] MAIN OR;  Service: Orthopedics;  Laterality: Left;    JOINT REPLACEMENT Right     THR    SHOULDER ARTHROSCOPY W/ ROTATOR CUFF REPAIR Right 3/29/2023    Procedure: SHOULDER ARTHROSCOPY WITH ROTATOR CUFF REPAIR, SUBCROMIAL DECOMPRESSION,DISTAL CLAVICLE RESECTION;  Surgeon: Gustavo Samuels MD;  Location: [facility] OR [facility];  Service: Orthopedics;  Laterality: Right;    SHOULDER SURGERY Left     SCOPE     PT Assessment (Last 12 Hours)       PT Evaluation and Treatment       Row Name 05/02/24 1441 05/02/24 1200       Physical Therapy Time and Intention    Subjective Information complains of;weakness  -RH --    Document Type therapy note (daily note)  -RH --    Mode of Treatment physical therapy;individual therapy  - --    Session Not Performed -- unable to treat, medical status change  -RH    Patient Effort good  -RH --      Row Name 05/02/24 1441          Pain    Additional Documentation Pain Scale: FACES Pre/Post-Treatment (Group)  -       Row Name 05/02/24 1441          Pain Scale: FACES Pre/Post-Treatment    Pain: FACES Scale, Pretreatment 0-->no hurt  -RH     Posttreatment Pain Rating 2-->hurts little bit  -RH     Pain Location - Side/Orientation Left  -RH     Pain Location - hip  -RH       Row Name 05/02/24 1441          Range of Motion (ROM)    Range of Motion --  Pt L hip AAROM at 90 degrees flex and 20 degrees abd.  -       Row Name 05/02/24 1441          Strength (Manual Muscle Testing)    Strength (Manual Muscle Testing) --  Pt L hip flex strength at 3-/5.  -       Row Name 05/02/24 1441          Mobility    Extremity  Weight-bearing Status left lower extremity  -RH     Left Lower Extremity (Weight-bearing Status) weight-bearing as tolerated (WBAT)  -RH       Row Name 05/02/24 1441          Sit-Stand Transfer    Sit-Stand Freeborn (Transfers) contact guard  -RH     Assistive Device (Sit-Stand Transfers) walker, front-wheeled  -RH       Row Name 05/02/24 1441          Stand-Sit Transfer    Stand-Sit Freeborn (Transfers) contact guard  -RH     Assistive Device (Stand-Sit Transfers) walker, front-wheeled  -RH       Row Name 05/02/24 1441          Gait/Stairs (Locomotion)    Gait/Stairs Locomotion gait/ambulation independence;gait/ambulation assistive device;distance ambulated;gait pattern;gait deviations  -RH     Freeborn Level (Gait) contact guard  -RH     Assistive Device (Gait) walker, front-wheeled  -RH     Patient was able to Ambulate yes  -RH     Distance in Feet (Gait) 50  -RH     Pattern (Gait) 3-point;step-through  -RH     Deviations/Abnormal Patterns (Gait) gait speed decreased;stride length decreased  -RH     Left Sided Gait Deviations heel strike decreased  -RH     Gait Assessment/Intervention Pt amb with RW and CGA with 3 point step-through gait pattern with decreased gait speed, stride length, and LLE heel strike.  -RH       Row Name 05/02/24 1441          Balance    Dynamic Standing Balance contact guard  -RH     Position/Device Used, Standing Balance walker, front-wheeled  -       Row Name             Wound 04/30/24 1617 Left anterior thigh    Wound - Properties Group Placement Date: 04/30/24  -MH Placement Time: 1617  -MH Side: Left  -MH Orientation: anterior  -MH Location: thigh  -MH    Retired Wound - Properties Group Placement Date: 04/30/24  -MH Placement Time: 1617  -MH Side: Left  -MH Orientation: anterior  -MH Location: thigh  -MH    Retired Wound - Properties Group Date first assessed: 04/30/24  -MH Time first assessed: 1617  -MH Side: Left  -MH Location: thigh  -MH      Row Name 05/02/24 1441           Vital Signs    O2 Delivery Intra Treatment room air  -       Row Name 05/02/24 1441          Positioning and Restraints    In Chair call light within reach;encouraged to call for assist;reclined  -       Row Name 05/02/24 1441          Progress Summary (PT)    Progress Toward Functional Goals (PT) progress toward functional goals is good  -               User Key  (r) = Recorded By, (t) = Taken By, (c) = Cosigned By      Initials Name Provider Type     Shen Erazo PTA Physical Therapist Assistant    Del Patten, RN Registered Nurse                Left Hip Ther -ex   Exercise  Reps  Sets    Long arc quads   10 2   Short arc quads  10 2   Heel slides  10 2   Ankle pumps  10 2   Quad sets  10 2   Glut sets  10 2   Abduction/ Adduction  10 2            PT Recommendation and Plan     Progress Summary (PT)  Progress Toward Functional Goals (PT): progress toward functional goals is good  Daily Progress Summary (PT): Pt is progressing well with their exercise program.  Will continue current plan of care.   Outcome Measures       Row Name 05/02/24 1400 05/01/24 1200 05/01/24 0900       How much help from another person do you currently need...    Turning from your back to your side while in flat bed without using bedrails? 4  -RH 4  -RH 4  -CHLOE    Moving from lying on back to sitting on the side of a flat bed without bedrails? 3  -RH 3  -RH 3  -CHLOE    Moving to and from a bed to a chair (including a wheelchair)? 3  -RH 3  -RH 3  -CHLOE    Standing up from a chair using your arms (e.g., wheelchair, bedside chair)? 3  -RH 3  -RH 3  -CHLOE    Climbing 3-5 steps with a railing? 3  -RH 3  -RH 3  -CHLOE    To walk in hospital room? 4  -RH 4  -RH 3  -CHLOE    AM-PAC 6 Clicks Score (PT) 20  -RH 20  -RH 19  -CHLOE    Highest Level of Mobility Goal 6 --> Walk 10 steps or more  -RH 6 --> Walk 10 steps or more  -RH 6 --> Walk 10 steps or more  -CHLOE       Functional Assessment    Outcome Measure Options -- -- AM-PAC 6 Clicks  Basic Mobility (PT)  -CHLOE              User Key  (r) = Recorded By, (t) = Taken By, (c) = Cosigned By      Initials Name Provider Type    RH Shen Erazo PTA Physical Therapist Assistant    Woodrow Vivar PT Physical Therapist                     Time Calculation:    PT Charges       Row Name 05/02/24 1440 05/02/24 1228          Time Calculation    PT Received On 05/02/24  -RH 05/02/24  -RH        Timed Charges    33542 - PT Therapeutic Exercise Minutes 16  -RH --     16402 - Gait Training Minutes  4  -RH --     52768 - PT Therapeutic Activity Minutes 4  -RH --        Total Minutes    Timed Charges Total Minutes 24  -RH --      Total Minutes 24  -RH --               User Key  (r) = Recorded By, (t) = Taken By, (c) = Cosigned By      Initials Name Provider Type     Shen Erazo PTA Physical Therapist Assistant                  Therapy Charges for Today       Code Description Service Date Service Provider Modifiers Qty    05186044067 HC GAIT TRAINING EA 15 MIN 5/1/2024 Shen Erazo PTA GP 1    52695481860 HC PT THER PROC GROUP 5/1/2024 Shen Erazo, PTA GP 1    92260490955 HC GAIT TRAINING EA 15 MIN 5/1/2024 Shen Erazo, PTA GP 1    53178443421 HC PT THER PROC GROUP 5/1/2024 Shen Erazo, PTA GP 1    05046946727 HC PT THER PROC EA 15 MIN 5/2/2024 Shen Erazo, PTA GP 1    38393235845 HC GAIT TRAINING EA 15 MIN 5/2/2024 Shen Erazo, PTA GP 1            PT G-Codes  Outcome Measure Options: AM-PAC 6 Clicks Daily Activity (OT)  AM-PAC 6 Clicks Score (PT): 20  AM-PAC 6 Clicks Score (OT): 21    Shen Erazo PTA  5/2/2024     Negative

## 2024-11-20 ENCOUNTER — HOSPITAL ENCOUNTER (OUTPATIENT)
Facility: HOSPITAL | Age: 79
Setting detail: OBSERVATION
Discharge: HOME | End: 2024-11-22
Attending: INTERNAL MEDICINE | Admitting: INTERNAL MEDICINE
Payer: MEDICARE

## 2024-11-20 ENCOUNTER — PREP FOR SURGERY (OUTPATIENT)
Dept: OTHER | Facility: HOSPITAL | Age: 79
End: 2024-11-20
Payer: MEDICARE

## 2024-11-20 ENCOUNTER — APPOINTMENT (OUTPATIENT)
Dept: GENERAL RADIOLOGY | Facility: HOSPITAL | Age: 79
End: 2024-11-20
Payer: MEDICARE

## 2024-11-20 DIAGNOSIS — R26.2 DIFFICULTY WALKING: Primary | ICD-10-CM

## 2024-11-20 PROBLEM — D61.818 PANCYTOPENIA: Status: ACTIVE | Noted: 2024-11-20

## 2024-11-20 LAB
ABO GROUP BLD: NORMAL
ALBUMIN SERPL-MCNC: 3 G/DL (ref 3.5–5.2)
ALBUMIN/GLOB SERPL: 1.1 G/DL
ALP SERPL-CCNC: 80 U/L (ref 39–117)
ALT SERPL W P-5'-P-CCNC: 11 U/L (ref 1–41)
ANION GAP SERPL CALCULATED.3IONS-SCNC: 13.3 MMOL/L (ref 5–15)
AST SERPL-CCNC: 10 U/L (ref 1–40)
BACTERIA UR QL AUTO: ABNORMAL /HPF
BILIRUB SERPL-MCNC: 0.6 MG/DL (ref 0–1.2)
BILIRUB UR QL STRIP: NEGATIVE
BLD GP AB SCN SERPL QL: POSITIVE
BUN SERPL-MCNC: 26 MG/DL (ref 8–23)
BUN/CREAT SERPL: 9.3 (ref 7–25)
CALCIUM SPEC-SCNC: 7.4 MG/DL (ref 8.6–10.5)
CHLORIDE SERPL-SCNC: 108 MMOL/L (ref 98–107)
CLARITY UR: ABNORMAL
CO2 SERPL-SCNC: 18.7 MMOL/L (ref 22–29)
COLOR UR: YELLOW
CREAT SERPL-MCNC: 2.8 MG/DL (ref 0.76–1.27)
DEPRECATED RDW RBC AUTO: 66.4 FL (ref 37–54)
EGFRCR SERPLBLD CKD-EPI 2021: 22.4 ML/MIN/1.73
ERYTHROCYTE [DISTWIDTH] IN BLOOD BY AUTOMATED COUNT: 17.2 % (ref 12.3–15.4)
GLOBULIN UR ELPH-MCNC: 2.8 GM/DL
GLUCOSE SERPL-MCNC: 110 MG/DL (ref 65–99)
GLUCOSE UR STRIP-MCNC: NEGATIVE MG/DL
HCT VFR BLD AUTO: 20.2 % (ref 37.5–51)
HGB BLD-MCNC: 6.2 G/DL (ref 13–17.7)
HGB UR QL STRIP.AUTO: ABNORMAL
HYALINE CASTS UR QL AUTO: ABNORMAL /LPF
KETONES UR QL STRIP: NEGATIVE
LEUKOCYTE ESTERASE UR QL STRIP.AUTO: ABNORMAL
MCH RBC QN AUTO: 32.3 PG (ref 26.6–33)
MCHC RBC AUTO-ENTMCNC: 30.7 G/DL (ref 31.5–35.7)
MCV RBC AUTO: 105.2 FL (ref 79–97)
NITRITE UR QL STRIP: NEGATIVE
PH UR STRIP.AUTO: 6.5 [PH] (ref 5–8)
PLATELET # BLD AUTO: 57 10*3/MM3 (ref 140–450)
PMV BLD AUTO: 11.2 FL (ref 6–12)
POTASSIUM SERPL-SCNC: 3.4 MMOL/L (ref 3.5–5.2)
PROT SERPL-MCNC: 5.8 G/DL (ref 6–8.5)
PROT UR QL STRIP: ABNORMAL
RBC # BLD AUTO: 1.92 10*6/MM3 (ref 4.14–5.8)
RBC # UR STRIP: ABNORMAL /HPF
REF LAB TEST METHOD: ABNORMAL
RH BLD: POSITIVE
SODIUM SERPL-SCNC: 140 MMOL/L (ref 136–145)
SP GR UR STRIP: 1.01 (ref 1–1.03)
SQUAMOUS #/AREA URNS HPF: ABNORMAL /HPF
T&S EXPIRATION DATE: NORMAL
UROBILINOGEN UR QL STRIP: ABNORMAL
WBC # UR STRIP: ABNORMAL /HPF
WBC NRBC COR # BLD AUTO: 0.88 10*3/MM3 (ref 3.4–10.8)

## 2024-11-20 PROCEDURE — 86870 RBC ANTIBODY IDENTIFICATION: CPT | Performed by: INTERNAL MEDICINE

## 2024-11-20 PROCEDURE — G0378 HOSPITAL OBSERVATION PER HR: HCPCS

## 2024-11-20 PROCEDURE — G0379 DIRECT REFER HOSPITAL OBSERV: HCPCS

## 2024-11-20 PROCEDURE — 87186 SC STD MICRODIL/AGAR DIL: CPT | Performed by: INTERNAL MEDICINE

## 2024-11-20 PROCEDURE — 87077 CULTURE AEROBIC IDENTIFY: CPT | Performed by: INTERNAL MEDICINE

## 2024-11-20 PROCEDURE — 86901 BLOOD TYPING SEROLOGIC RH(D): CPT | Performed by: INTERNAL MEDICINE

## 2024-11-20 PROCEDURE — 86850 RBC ANTIBODY SCREEN: CPT | Performed by: INTERNAL MEDICINE

## 2024-11-20 PROCEDURE — 96374 THER/PROPH/DIAG INJ IV PUSH: CPT

## 2024-11-20 PROCEDURE — 25010000002 CEFTRIAXONE PER 250 MG: Performed by: INTERNAL MEDICINE

## 2024-11-20 PROCEDURE — 86900 BLOOD TYPING SEROLOGIC ABO: CPT | Performed by: INTERNAL MEDICINE

## 2024-11-20 PROCEDURE — 80053 COMPREHEN METABOLIC PANEL: CPT | Performed by: INTERNAL MEDICINE

## 2024-11-20 PROCEDURE — 71045 X-RAY EXAM CHEST 1 VIEW: CPT

## 2024-11-20 PROCEDURE — 81001 URINALYSIS AUTO W/SCOPE: CPT | Performed by: INTERNAL MEDICINE

## 2024-11-20 PROCEDURE — 87086 URINE CULTURE/COLONY COUNT: CPT | Performed by: INTERNAL MEDICINE

## 2024-11-20 PROCEDURE — 85027 COMPLETE CBC AUTOMATED: CPT | Performed by: INTERNAL MEDICINE

## 2024-11-20 PROCEDURE — 87040 BLOOD CULTURE FOR BACTERIA: CPT | Performed by: INTERNAL MEDICINE

## 2024-11-20 RX ORDER — PROBENECID 500 MG/1
500 TABLET, FILM COATED ORAL 2 TIMES DAILY
COMMUNITY
Start: 2024-11-09

## 2024-11-20 RX ORDER — CEPHALEXIN 500 MG/1
500 CAPSULE ORAL 2 TIMES DAILY
COMMUNITY
Start: 2024-11-19 | End: 2024-11-29

## 2024-11-20 RX ORDER — BISACODYL 10 MG
10 SUPPOSITORY, RECTAL RECTAL DAILY PRN
Status: DISCONTINUED | OUTPATIENT
Start: 2024-11-20 | End: 2024-11-22 | Stop reason: HOSPADM

## 2024-11-20 RX ORDER — SOLIFENACIN SUCCINATE 5 MG/1
5 TABLET, FILM COATED ORAL DAILY
COMMUNITY
Start: 2024-11-18

## 2024-11-20 RX ORDER — SODIUM CHLORIDE 0.9 % (FLUSH) 0.9 %
10 SYRINGE (ML) INJECTION AS NEEDED
Status: CANCELLED | OUTPATIENT
Start: 2024-11-20

## 2024-11-20 RX ORDER — AMOXICILLIN 250 MG
2 CAPSULE ORAL 2 TIMES DAILY
Status: CANCELLED | OUTPATIENT
Start: 2024-11-20

## 2024-11-20 RX ORDER — GABAPENTIN 400 MG/1
400 CAPSULE ORAL 2 TIMES DAILY
COMMUNITY
Start: 2024-11-19

## 2024-11-20 RX ORDER — AMOXICILLIN 250 MG
2 CAPSULE ORAL 2 TIMES DAILY
Status: DISCONTINUED | OUTPATIENT
Start: 2024-11-20 | End: 2024-11-22 | Stop reason: HOSPADM

## 2024-11-20 RX ORDER — ONDANSETRON 2 MG/ML
4 INJECTION INTRAMUSCULAR; INTRAVENOUS EVERY 6 HOURS PRN
Status: CANCELLED | OUTPATIENT
Start: 2024-11-20

## 2024-11-20 RX ORDER — ALUMINA, MAGNESIA, AND SIMETHICONE 2400; 2400; 240 MG/30ML; MG/30ML; MG/30ML
15 SUSPENSION ORAL EVERY 6 HOURS PRN
Status: CANCELLED | OUTPATIENT
Start: 2024-11-20

## 2024-11-20 RX ORDER — SODIUM CHLORIDE 9 MG/ML
40 INJECTION, SOLUTION INTRAVENOUS AS NEEDED
Status: DISCONTINUED | OUTPATIENT
Start: 2024-11-20 | End: 2024-11-22 | Stop reason: HOSPADM

## 2024-11-20 RX ORDER — BISACODYL 10 MG
10 SUPPOSITORY, RECTAL RECTAL DAILY PRN
Status: CANCELLED | OUTPATIENT
Start: 2024-11-20

## 2024-11-20 RX ORDER — SODIUM CHLORIDE 0.9 % (FLUSH) 0.9 %
10 SYRINGE (ML) INJECTION EVERY 12 HOURS SCHEDULED
Status: DISCONTINUED | OUTPATIENT
Start: 2024-11-20 | End: 2024-11-22 | Stop reason: HOSPADM

## 2024-11-20 RX ORDER — SODIUM CHLORIDE 0.9 % (FLUSH) 0.9 %
10 SYRINGE (ML) INJECTION EVERY 12 HOURS SCHEDULED
Status: CANCELLED | OUTPATIENT
Start: 2024-11-20

## 2024-11-20 RX ORDER — BISACODYL 5 MG/1
5 TABLET, DELAYED RELEASE ORAL DAILY PRN
Status: DISCONTINUED | OUTPATIENT
Start: 2024-11-20 | End: 2024-11-22 | Stop reason: HOSPADM

## 2024-11-20 RX ORDER — POLYETHYLENE GLYCOL 3350 17 G/17G
17 POWDER, FOR SOLUTION ORAL DAILY PRN
Status: CANCELLED | OUTPATIENT
Start: 2024-11-20

## 2024-11-20 RX ORDER — SODIUM CHLORIDE 0.9 % (FLUSH) 0.9 %
10 SYRINGE (ML) INJECTION AS NEEDED
Status: DISCONTINUED | OUTPATIENT
Start: 2024-11-20 | End: 2024-11-22 | Stop reason: HOSPADM

## 2024-11-20 RX ORDER — BISACODYL 5 MG/1
5 TABLET, DELAYED RELEASE ORAL DAILY PRN
Status: CANCELLED | OUTPATIENT
Start: 2024-11-20

## 2024-11-20 RX ORDER — POLYETHYLENE GLYCOL 3350 17 G/17G
17 POWDER, FOR SOLUTION ORAL DAILY PRN
Status: DISCONTINUED | OUTPATIENT
Start: 2024-11-20 | End: 2024-11-22 | Stop reason: HOSPADM

## 2024-11-20 RX ORDER — ALUMINA, MAGNESIA, AND SIMETHICONE 2400; 2400; 240 MG/30ML; MG/30ML; MG/30ML
15 SUSPENSION ORAL EVERY 6 HOURS PRN
Status: DISCONTINUED | OUTPATIENT
Start: 2024-11-20 | End: 2024-11-22 | Stop reason: HOSPADM

## 2024-11-20 RX ORDER — SODIUM CHLORIDE 9 MG/ML
40 INJECTION, SOLUTION INTRAVENOUS AS NEEDED
Status: CANCELLED | OUTPATIENT
Start: 2024-11-20

## 2024-11-20 RX ORDER — ONDANSETRON 2 MG/ML
4 INJECTION INTRAMUSCULAR; INTRAVENOUS EVERY 6 HOURS PRN
Status: DISCONTINUED | OUTPATIENT
Start: 2024-11-20 | End: 2024-11-22 | Stop reason: HOSPADM

## 2024-11-20 RX ADMIN — CEFTRIAXONE SODIUM 1000 MG: 1 INJECTION, POWDER, FOR SOLUTION INTRAMUSCULAR; INTRAVENOUS at 17:56

## 2024-11-20 NOTE — PLAN OF CARE
Goal Outcome Evaluation:              Outcome Evaluation: Patient admitted from MD office this shift. Blood transfusion ordered. Blood bank called this nurse and informed nurse that patients blood may require further testing due to medication that patient takes. Patient reports decreased appetite and eating 1 meal per day. No issues noted at this time.

## 2024-11-21 LAB
ALBUMIN SERPL-MCNC: 3 G/DL (ref 3.5–5.2)
ALBUMIN/GLOB SERPL: 1.3 G/DL
ALP SERPL-CCNC: 74 U/L (ref 39–117)
ALT SERPL W P-5'-P-CCNC: 10 U/L (ref 1–41)
ANION GAP SERPL CALCULATED.3IONS-SCNC: 11.4 MMOL/L (ref 5–15)
ANION GAP SERPL CALCULATED.3IONS-SCNC: 13.4 MMOL/L (ref 5–15)
ANISOCYTOSIS BLD QL: NORMAL
AST SERPL-CCNC: 11 U/L (ref 1–40)
BASOPHILS # BLD AUTO: 0.01 10*3/MM3 (ref 0–0.2)
BASOPHILS # BLD AUTO: 0.01 10*3/MM3 (ref 0–0.2)
BASOPHILS NFR BLD AUTO: 0.9 % (ref 0–1.5)
BASOPHILS NFR BLD AUTO: 1.1 % (ref 0–1.5)
BILIRUB SERPL-MCNC: 0.4 MG/DL (ref 0–1.2)
BUN SERPL-MCNC: 26 MG/DL (ref 8–23)
BUN SERPL-MCNC: 26 MG/DL (ref 8–23)
BUN/CREAT SERPL: 9.5 (ref 7–25)
BUN/CREAT SERPL: 9.6 (ref 7–25)
CALCIUM SPEC-SCNC: 7.1 MG/DL (ref 8.6–10.5)
CALCIUM SPEC-SCNC: 7.2 MG/DL (ref 8.6–10.5)
CHLORIDE SERPL-SCNC: 108 MMOL/L (ref 98–107)
CHLORIDE SERPL-SCNC: 109 MMOL/L (ref 98–107)
CO2 SERPL-SCNC: 16.6 MMOL/L (ref 22–29)
CO2 SERPL-SCNC: 18.6 MMOL/L (ref 22–29)
CREAT SERPL-MCNC: 2.7 MG/DL (ref 0.76–1.27)
CREAT SERPL-MCNC: 2.73 MG/DL (ref 0.76–1.27)
DACRYOCYTES BLD QL SMEAR: NORMAL
DEPRECATED RDW RBC AUTO: 64.3 FL (ref 37–54)
DEPRECATED RDW RBC AUTO: 66.4 FL (ref 37–54)
EGFRCR SERPLBLD CKD-EPI 2021: 23.1 ML/MIN/1.73
EGFRCR SERPLBLD CKD-EPI 2021: 23.4 ML/MIN/1.73
ELLIPTOCYTES BLD QL SMEAR: NORMAL
EOSINOPHIL # BLD AUTO: 0.05 10*3/MM3 (ref 0–0.4)
EOSINOPHIL # BLD AUTO: 0.06 10*3/MM3 (ref 0–0.4)
EOSINOPHIL NFR BLD AUTO: 5.4 % (ref 0.3–6.2)
EOSINOPHIL NFR BLD AUTO: 5.6 % (ref 0.3–6.2)
ERYTHROCYTE [DISTWIDTH] IN BLOOD BY AUTOMATED COUNT: 17 % (ref 12.3–15.4)
ERYTHROCYTE [DISTWIDTH] IN BLOOD BY AUTOMATED COUNT: 17 % (ref 12.3–15.4)
FERRITIN SERPL-MCNC: 1344 NG/ML (ref 30–400)
FOLATE SERPL-MCNC: >20 NG/ML (ref 4.78–24.2)
GLOBULIN UR ELPH-MCNC: 2.4 GM/DL
GLUCOSE SERPL-MCNC: 100 MG/DL (ref 65–99)
GLUCOSE SERPL-MCNC: 93 MG/DL (ref 65–99)
HAPTOGLOB SERPL-MCNC: 278 MG/DL (ref 30–200)
HCT VFR BLD AUTO: 17.8 % (ref 37.5–51)
HCT VFR BLD AUTO: 20.2 % (ref 37.5–51)
HCT VFR BLD AUTO: 23.7 % (ref 37.5–51)
HGB BLD-MCNC: 5.5 G/DL (ref 13–17.7)
HGB BLD-MCNC: 6.1 G/DL (ref 13–17.7)
HGB BLD-MCNC: 7.6 G/DL (ref 13–17.7)
IMM GRANULOCYTES # BLD AUTO: 0.01 10*3/MM3 (ref 0–0.05)
IMM GRANULOCYTES # BLD AUTO: 0.01 10*3/MM3 (ref 0–0.05)
IMM GRANULOCYTES NFR BLD AUTO: 0.9 % (ref 0–0.5)
IMM GRANULOCYTES NFR BLD AUTO: 1.1 % (ref 0–0.5)
INR PPP: 1.53 (ref 0.86–1.15)
IRON 24H UR-MRATE: 63 MCG/DL (ref 59–158)
IRON SATN MFR SERPL: 43 % (ref 20–50)
LYMPHOCYTES # BLD AUTO: 0.47 10*3/MM3 (ref 0.7–3.1)
LYMPHOCYTES # BLD AUTO: 0.55 10*3/MM3 (ref 0.7–3.1)
LYMPHOCYTES NFR BLD AUTO: 51.1 % (ref 19.6–45.3)
LYMPHOCYTES NFR BLD AUTO: 51.4 % (ref 19.6–45.3)
MCH RBC QN AUTO: 31.6 PG (ref 26.6–33)
MCH RBC QN AUTO: 32.4 PG (ref 26.6–33)
MCHC RBC AUTO-ENTMCNC: 30.2 G/DL (ref 31.5–35.7)
MCHC RBC AUTO-ENTMCNC: 30.9 G/DL (ref 31.5–35.7)
MCV RBC AUTO: 102.3 FL (ref 79–97)
MCV RBC AUTO: 107.4 FL (ref 79–97)
MICROCYTES BLD QL: NORMAL
MONOCYTES # BLD AUTO: 0.1 10*3/MM3 (ref 0.1–0.9)
MONOCYTES # BLD AUTO: 0.14 10*3/MM3 (ref 0.1–0.9)
MONOCYTES NFR BLD AUTO: 10.9 % (ref 5–12)
MONOCYTES NFR BLD AUTO: 13.1 % (ref 5–12)
NEUTROPHILS NFR BLD AUTO: 0.28 10*3/MM3 (ref 1.7–7)
NEUTROPHILS NFR BLD AUTO: 0.3 10*3/MM3 (ref 1.7–7)
NEUTROPHILS NFR BLD AUTO: 28.1 % (ref 42.7–76)
NEUTROPHILS NFR BLD AUTO: 30.4 % (ref 42.7–76)
NRBC BLD AUTO-RTO: 0 /100 WBC (ref 0–0.2)
NRBC BLD AUTO-RTO: 0 /100 WBC (ref 0–0.2)
OVALOCYTES BLD QL SMEAR: NORMAL
PASSIVE ANTI CD-38, DTT TREATMENT: NORMAL
PLAT MORPH BLD: NORMAL
PLATELET # BLD AUTO: 58 10*3/MM3 (ref 140–450)
PLATELET # BLD AUTO: 60 10*3/MM3 (ref 140–450)
PMV BLD AUTO: 11.4 FL (ref 6–12)
PMV BLD AUTO: 12.1 FL (ref 6–12)
POTASSIUM SERPL-SCNC: 3.5 MMOL/L (ref 3.5–5.2)
POTASSIUM SERPL-SCNC: 3.6 MMOL/L (ref 3.5–5.2)
PROT SERPL-MCNC: 5.4 G/DL (ref 6–8.5)
PROTHROMBIN TIME: 18.7 SECONDS (ref 11.8–14.9)
RBC # BLD AUTO: 1.74 10*6/MM3 (ref 4.14–5.8)
RBC # BLD AUTO: 1.88 10*6/MM3 (ref 4.14–5.8)
RETICS # AUTO: 0.01 10*6/MM3 (ref 0.02–0.13)
RETICS/RBC NFR AUTO: 0.65 % (ref 0.7–1.9)
SODIUM SERPL-SCNC: 138 MMOL/L (ref 136–145)
SODIUM SERPL-SCNC: 139 MMOL/L (ref 136–145)
TIBC SERPL-MCNC: 146 MCG/DL (ref 298–536)
TRANSFERRIN SERPL-MCNC: 98 MG/DL (ref 200–360)
VIT B12 BLD-MCNC: 981 PG/ML (ref 211–946)
WBC MORPH BLD: NORMAL
WBC NRBC COR # BLD AUTO: 0.92 10*3/MM3 (ref 3.4–10.8)
WBC NRBC COR # BLD AUTO: 1.07 10*3/MM3 (ref 3.4–10.8)

## 2024-11-21 PROCEDURE — 82728 ASSAY OF FERRITIN: CPT | Performed by: INTERNAL MEDICINE

## 2024-11-21 PROCEDURE — 85014 HEMATOCRIT: CPT | Performed by: INTERNAL MEDICINE

## 2024-11-21 PROCEDURE — 25010000002 CEFTRIAXONE PER 250 MG: Performed by: INTERNAL MEDICINE

## 2024-11-21 PROCEDURE — G0378 HOSPITAL OBSERVATION PER HR: HCPCS

## 2024-11-21 PROCEDURE — 82607 VITAMIN B-12: CPT | Performed by: INTERNAL MEDICINE

## 2024-11-21 PROCEDURE — 83010 ASSAY OF HAPTOGLOBIN QUANT: CPT | Performed by: INTERNAL MEDICINE

## 2024-11-21 PROCEDURE — 83540 ASSAY OF IRON: CPT | Performed by: INTERNAL MEDICINE

## 2024-11-21 PROCEDURE — 85025 COMPLETE CBC W/AUTO DIFF WBC: CPT | Performed by: INTERNAL MEDICINE

## 2024-11-21 PROCEDURE — 82746 ASSAY OF FOLIC ACID SERUM: CPT | Performed by: INTERNAL MEDICINE

## 2024-11-21 PROCEDURE — 86920 COMPATIBILITY TEST SPIN: CPT

## 2024-11-21 PROCEDURE — 86922 COMPATIBILITY TEST ANTIGLOB: CPT

## 2024-11-21 PROCEDURE — 96372 THER/PROPH/DIAG INJ SC/IM: CPT

## 2024-11-21 PROCEDURE — P9040 RBC LEUKOREDUCED IRRADIATED: HCPCS

## 2024-11-21 PROCEDURE — 96376 TX/PRO/DX INJ SAME DRUG ADON: CPT

## 2024-11-21 PROCEDURE — 85018 HEMOGLOBIN: CPT | Performed by: INTERNAL MEDICINE

## 2024-11-21 PROCEDURE — 86921 COMPATIBILITY TEST INCUBATE: CPT

## 2024-11-21 PROCEDURE — 84466 ASSAY OF TRANSFERRIN: CPT | Performed by: INTERNAL MEDICINE

## 2024-11-21 PROCEDURE — 85610 PROTHROMBIN TIME: CPT | Performed by: INTERNAL MEDICINE

## 2024-11-21 PROCEDURE — 85045 AUTOMATED RETICULOCYTE COUNT: CPT | Performed by: INTERNAL MEDICINE

## 2024-11-21 PROCEDURE — 80053 COMPREHEN METABOLIC PANEL: CPT | Performed by: INTERNAL MEDICINE

## 2024-11-21 PROCEDURE — 36430 TRANSFUSION BLD/BLD COMPNT: CPT

## 2024-11-21 PROCEDURE — 85007 BL SMEAR W/DIFF WBC COUNT: CPT | Performed by: INTERNAL MEDICINE

## 2024-11-21 PROCEDURE — 86900 BLOOD TYPING SEROLOGIC ABO: CPT

## 2024-11-21 PROCEDURE — 25010000002 FILGRASTIM PER 480 MCG: Performed by: INTERNAL MEDICINE

## 2024-11-21 RX ADMIN — FILGRASTIM 480 MCG: 480 INJECTION, SOLUTION INTRAVENOUS; SUBCUTANEOUS at 17:32

## 2024-11-21 RX ADMIN — Medication 10 ML: at 20:10

## 2024-11-21 RX ADMIN — CEFTRIAXONE SODIUM 1000 MG: 1 INJECTION, POWDER, FOR SOLUTION INTRAMUSCULAR; INTRAVENOUS at 17:32

## 2024-11-21 RX ADMIN — Medication 10 ML: at 07:59

## 2024-11-21 NOTE — PLAN OF CARE
Goal Outcome Evaluation:  Plan of Care Reviewed With: patient        Progress: no change  Outcome Evaluation: A&O x4. VSS. No complaints from patient on shift. All needs met at this time. Plan of care ongoing.

## 2024-11-21 NOTE — SIGNIFICANT NOTE
11/21/24 0900   Physical Therapy Time and Intention   Session Not Performed other (see comments)  (pt's hgb at 5.5)

## 2024-11-21 NOTE — PLAN OF CARE
Goal Outcome Evaluation:              Outcome Evaluation: Patient received 2 units of PRBC this shift. No issues noted at this time.

## 2024-11-21 NOTE — CONSULTS
Nutrition Services    Patient Name: Blair Lira  YOB: 1945  MRN: [id number]  Admission date: 11/20/2024      CLINICAL NUTRITION ASSESSMENT      Reason for Assessment  MST Score 2+     H&P:  Past Medical History:   Diagnosis Date    BPH (benign prostatic hyperplasia)     Cancer     Chronic kidney disease     Femoral neck fracture 04/28/2024    Frozen shoulder     Hyperlipidemia     Hypertension 10/26/2023    Multiple myeloma     Periarthritis of shoulder     Rotator cuff disorder, right     Tennis elbow     RIGHT        Current Problems:   Active Hospital Problems    Diagnosis     **Pancytopenia         Nutrition/Diet History         Narrative   Pt reports good appetite (ate 100% of breakfast). Tolerating diet well, with no GI complaints offered. Admission wt of 181 lbs. Wt loss noted, as pt has cancer dx. Recommend Boost Plus TID for nutrition support. Pt agreeable to try.      Anthropometrics        Current Height, Weight  73 in  Weight: 82.4 kg (181 lb 10.5 oz)   Current BMI Body mass index is 23.97 kg/m².   BMI Classification Normal range   %  % (79.9 kg)   Adjusted Body Weight (ABW)    Weight Hx  Wt Readings from Last 30 Encounters:   11/21/24 0740 82.4 kg (181 lb 10.5 oz)   11/20/24 1543 82 kg (180 lb 12.4 oz)   07/22/24 0958 89 kg (196 lb 3.4 oz)   06/10/24 1031 88.9 kg (196 lb)   05/13/24 1015 88.9 kg (196 lb)   04/29/24 0110 87.1 kg (192 lb 0.3 oz)   02/18/24 1300 93.4 kg (205 lb 14.6 oz)   01/17/24 0635 87.9 kg (193 lb 12.6 oz)   01/17/24 0124 89.6 kg (197 lb 8.5 oz)   01/07/24 0445 89.9 kg (198 lb 3.1 oz)   01/06/24 0130 85.8 kg (189 lb 2.5 oz)   11/29/23 2309 85.8 kg (189 lb 2.5 oz)   11/29/23 1622 87.1 kg (192 lb)   11/15/23 0450 89.4 kg (197 lb 1.5 oz)   11/14/23 0500 92.2 kg (203 lb 3.2 oz)   11/13/23 0429 96.9 kg (213 lb 10 oz)   11/12/23 0539 97.5 kg (214 lb 15.2 oz)   11/11/23 0403 98 kg (216 lb 0.8 oz)   11/09/23 2346 98.2 kg (216 lb 7.9 oz)   11/09/23 0600 98.5 kg (217 lb  2.5 oz)   11/08/23 0509 93.6 kg (206 lb 5.6 oz)   11/07/23 0500 93.7 kg (206 lb 9.1 oz)   11/06/23 0626 93 kg (205 lb 0.4 oz)   11/06/23 0209 95.6 kg (210 lb 12.2 oz)   10/31/23 1000 94.9 kg (209 lb 3.5 oz)   10/26/23 1013 93.1 kg (205 lb 4 oz)   10/24/23 0803 99.8 kg (220 lb 0.3 oz)   06/27/23 1059 99.8 kg (220 lb)   05/16/23 1055 99.8 kg (220 lb)   04/11/23 0922 99.8 kg (220 lb)   03/29/23 1010 99.9 kg (220 lb 3.8 oz)   03/23/23 0824 103 kg (227 lb)   02/16/23 0759 103 kg (227 lb)   12/27/22 0838 102 kg (225 lb 9.6 oz)   11/15/22 0846 102 kg (224 lb)   11/17/20 0000 101 kg (223 lb 8 oz)   11/03/20 0000 98.4 kg (217 lb)   10/22/20 0000 98.6 kg (217 lb 6 oz)   11/19/19 0000 104 kg (229 lb)   10/08/19 0000 103 kg (227 lb 4 oz)   09/05/19 0000 103 kg (227 lb 2 oz)   07/09/19 0000 104 kg (229 lb)   06/07/19 0000 104 kg (229 lb)   05/02/19 0000 108 kg (237 lb 2 oz)   02/26/19 0000 107 kg (235 lb)          Wt Change Observation Significant wt loss (Wt loss of 15 lbs lbs in 3 months   >7.5%)     Estimated/Assessed Needs  Estimated Needs based on: Current Body Weight       Energy Requirements 25-30 kcal/kg   EST Needs (kcal/day) 4855-1190 kcal       Protein Requirements 1.5 g/kg - 2 g/kg IBW   EST Daily Needs (g/day) 119-160 g       Fluid Requirements 25-30 ml/kg    Estimated Needs (mL/day) 1902-1048 ml     Labs/Medications         Pertinent Labs Reviewed.   Results from last 7 days   Lab Units 11/21/24  0757 11/21/24  0431 11/20/24  1551   SODIUM mmol/L 138 139 140   POTASSIUM mmol/L 3.6 3.5 3.4*   CHLORIDE mmol/L 108* 109* 108*   CO2 mmol/L 16.6* 18.6* 18.7*   BUN mg/dL 26* 26* 26*   CREATININE mg/dL 2.70* 2.73* 2.80*   CALCIUM mg/dL 7.2* 7.1* 7.4*   BILIRUBIN mg/dL  --  0.4 0.6   ALK PHOS U/L  --  74 80   ALT (SGPT) U/L  --  10 11   AST (SGOT) U/L  --  11 10   GLUCOSE mg/dL 93 100* 110*     Results from last 7 days   Lab Units 11/21/24  0757   HEMOGLOBIN g/dL 6.1*   HEMATOCRIT % 20.2*     COVID19   Date Value Ref Range  Status   01/17/2024 Not Detected Not Detected - Ref. Range Final     Lab Results   Component Value Date    HGBA1C 6.10 (H) 03/28/2024         Pertinent Medications Reviewed.     Malnutrition Severity Assessment      Patient meets criteria for : Moderate (non-severe) Malnutrition  Malnutrition Type (Last 8 Hours)       Malnutrition Severity Assessment       Row Name 11/21/24 1053       Malnutrition Severity Assessment    Malnutrition Type Chronic Disease - Related Malnutrition      Row Name 11/21/24 1053       Unintentional Weight Loss     Unintentional Weight Loss  Weight loss greater than 7.5% in three months      Row Name 11/21/24 1053       Muscle Loss    Clavicle Bone Region Moderate - some protrusion in females, visible in males      Row Name 11/21/24 1053       Criteria Met (Must meet criteria for severity in at least 2 of these categories: M Wasting, Fat Loss, Fluid, Secondary Signs, Wt. Status, Intake)    Patient meets criteria for  Moderate (non-severe) Malnutrition                     Nutrition Diagnosis         Nutrition Dx Problem 1 Unintentional weight loss related to increased nutrient needs due to catabolic disease as evidenced by unintended weight change.     Nutrition Intervention           Current Nutrition Orders & Evaluation of Intake       Current PO Diet Diet: Regular/House; Fluid Consistency: Thin (IDDSI 0)   Supplement No active supplement orders           Nutrition Intervention/Prescription        Order Boost Plus TID   Continue Regular diet        Medical Nutrition Therapy/Nutrition Education          Learner     Readiness Patient  Acceptance     Method     Response Explanation  Verbalizes understanding     Monitor/Evaluation        Monitor Per protocol, I&O, PO intake     Nutrition Discharge Plan         Recommend to continue oral nutrition supplements on discharge.      Electronically signed by:  Archie Mir RD  11/21/24 10:58 EST    ambulate

## 2024-11-21 NOTE — H&P
[facility]   HISTORY AND PHYSICAL    Patient Name: Blair Lira  : 1945  MRN: [id number]  Primary Care Physician:  Babs Summers APRN  Date of admission: 2024    Subjective   Subjective     Chief Complaint: Patient being admitted with pancytopenia probable urinary tract infection very severe anemia postchemotherapy he has been getting treatment for his multiple myeloma and has developed severe anemia neutropenia with symptoms most likely because of urinary tract infection    HPI: With multiple myeloma on chemotherapy being admitted for antibiotic administration and transfusion    Blair Lira is a 79 y.o. male with severe anemia and neutropenia    Review of Systems   All systems were reviewed and negative except for: Have been reviewed and discussed  Probable UTI severe anemia  Personal History     Past Medical History:   Diagnosis Date    BPH (benign prostatic hyperplasia)     Cancer     Chronic kidney disease     Femoral neck fracture 2024    Frozen shoulder     Hyperlipidemia     Hypertension 10/26/2023    Multiple myeloma     Periarthritis of shoulder     Rotator cuff disorder, right     Tennis elbow     RIGHT       Past Surgical History:   Procedure Laterality Date    BRONCHOSCOPY N/A 2024    Procedure: BRONCHOSCOPY WITH BAL AND WASHINGS;  Surgeon: Dionte Ruvalcaba MD;  Location: [facility] ENDOSCOPY;  Service: Pulmonary;  Laterality: N/A;  MUCUS PLUGGING    CATARACT EXTRACTION EXTRACAPSULAR W/ INTRAOCULAR LENS IMPLANTATION Bilateral     COLONOSCOPY      HIP HEMIARTHROPLASTY Left 2024    Procedure: TOTAL HIP ARTHROPLASTY  ANTERIOR APROACH;  Surgeon: Adán Osborne MD;  Location: [facility] MAIN OR;  Service: Orthopedics;  Laterality: Left;    JOINT REPLACEMENT Right     THR    SHOULDER ARTHROSCOPY W/ ROTATOR CUFF REPAIR Right 3/29/2023    Procedure: SHOULDER ARTHROSCOPY WITH ROTATOR CUFF REPAIR, SUBCROMIAL DECOMPRESSION,DISTAL CLAVICLE RESECTION;  Surgeon: Gustavo Samuels  MD;  Location: [facility];  Service: Orthopedics;  Laterality: Right;    SHOULDER SURGERY Left     SCOPE       Family History: family history is not on file. Otherwise pertinent FHx was reviewed and not pertinent to current issue.    Social History:  reports that he has never smoked. He has never been exposed to tobacco smoke. He has never used smokeless tobacco. He reports that he does not currently use alcohol. He reports that he does not use drugs.    Home Medications:  allopurinol, apixaban, atorvastatin, cephalexin, colchicine, gabapentin, lenalidomide, montelukast, probenecid, solifenacin, and tamsulosin      Allergies:  No Known Allergies    Objective   Objective     Vitals:   Temp:  [98.2 °F (36.8 °C)-98.8 °F (37.1 °C)] 98.6 °F (37 °C)  Heart Rate:  [66-71] 71  Resp:  [18] 18  BP: (108-123)/(50-64) 123/64  Physical Exam    Constitutional: Alert and oriented not in acute distress   Eyes: Pupils equal reacting to light and accommodation   HENT: Normal   Neck: Normal   Respiratory: No rales or rhonchi   Cardiovascular: S1-S2 normal no S3 or S4   Gastrointestinal: No palpable organomegaly   Musculoskeletal: Normal no deformities   Neurologic: No localizing signs  Skin: No rash    Result Review    Result Review:  I have personally reviewed the results from the time of this admission to 11/21/2024 07:14 EST and agree with these findings:  [x]  Laboratory  []  Microbiology  []  Radiology  []  EKG/Telemetry   []  Cardiology/Vascular   []  Pathology  []  Old records  []  Other:  Most notable findings include: Have been reviewed and discussed     Assessment & Plan   Assessment / Plan     Brief Patient Summary:  Blair Lira is a 79 y.o. male who neutropenia thrombocytopenia and probable urinary tract infections    Active Hospital Problems:  Active Hospital Problems    Diagnosis     **Pancytopenia        Plan:   Blood transfusion IV antibiotics    VTE Prophylaxis:  Mechanical VTE prophylaxis orders are  present.        CODE STATUS:    Level Of Support Discussed With: Patient  Code Status (Patient has no pulse and is not breathing): CPR (Attempt to Resuscitate)  Medical Interventions (Patient has pulse or is breathing): Full Support      Admission Status:  I believe this patient meets observation status.    Electronically signed by Vamsi Mason MD, 11/21/24, 7:14 AM EST.      Part of this note may be an electronic transcription/translation of spoken language to printed text using the Dragon Dictation System.

## 2024-11-21 NOTE — PROGRESS NOTES
[facility]     Progress Note    Patient Name: Blair Lira  : 1945  MRN: [id number]  Primary Care Physician:  Babs Summers APRN  Date of admission: 2024    Subjective   Subjective     Chief Complaint: Patient to get blood transfusions because of Darzalex it had to be a special order we may need some more blood depending on the findings of CBC will continue antibiotics urine cultures pending    HPI: Urine cultures are pending patient will be continued on antibiotics and transfusions    Review of Systems   All systems were reviewed and negative except for: Has been reviewed    Objective   Objective     Vitals:   Temp:  [98.2 °F (36.8 °C)-98.9 °F (37.2 °C)] 98.7 °F (37.1 °C)  Heart Rate:  [64-71] 68  Resp:  [18] 18  BP: (108-123)/(50-64) 118/62    Physical Exam    Constitutional: Alert and oriented not in acute distress   Eyes: Pupils equal reacting to light and accommodation   HENT: Normal   Neck: Normal   Respiratory: No rales or rhonchi   Cardiovascular: S1-S2 normal no S3 or S4   Gastrointestinal: No palpable organomegaly   Musculoskeletal: No deformities   Neurologic: No localizing signs  Skin: No rash       Result Review    Result Review:  I have personally reviewed the results from the time of this admission to 2024 08:25 EST and agree with these findings:  [x]  Laboratory  []  Microbiology  []  Radiology  []  EKG/Telemetry   []  Cardiology/Vascular   []  Pathology  []  Old records  []  Other:  Most notable findings include: Multiple myeloma with very severe anemia    Assessment & Plan   Assessment / Plan     Brief Patient Summary:  Blair Lira is a 79 y.o. male who postchemotherapy pancytopenia    Active Hospital Problems:  Active Hospital Problems    Diagnosis     **Pancytopenia        Plan:   Antibiotics transfusion    VTE Prophylaxis:  Mechanical VTE prophylaxis orders are present.        CODE STATUS:   Level Of Support Discussed With: Patient  Code Status (Patient has no  pulse and is not breathing): CPR (Attempt to Resuscitate)  Medical Interventions (Patient has pulse or is breathing): Full Support    Disposition:  I expect patient to be discharged after the patient has been stable.    Electronically signed by Vamsi Mason MD, 11/21/24, 8:25 AM EST.      Part of this note may be an electronic transcription/translation of spoken language to printed text using the Dragon Dictation System.

## 2024-11-22 ENCOUNTER — READMISSION MANAGEMENT (OUTPATIENT)
Dept: CALL CENTER | Facility: HOSPITAL | Age: 79
End: 2024-11-22
Payer: MEDICARE

## 2024-11-22 VITALS
DIASTOLIC BLOOD PRESSURE: 57 MMHG | BODY MASS INDEX: 23.97 KG/M2 | TEMPERATURE: 98.2 F | WEIGHT: 181.66 LBS | SYSTOLIC BLOOD PRESSURE: 113 MMHG | RESPIRATION RATE: 18 BRPM | HEART RATE: 64 BPM | OXYGEN SATURATION: 100 %

## 2024-11-22 LAB
ACANTHOCYTES BLD QL SMEAR: NORMAL
ANION GAP SERPL CALCULATED.3IONS-SCNC: 11.3 MMOL/L (ref 5–15)
ANISOCYTOSIS BLD QL: NORMAL
BACTERIA SPEC AEROBE CULT: ABNORMAL
BASOPHILS # BLD AUTO: 0.02 10*3/MM3 (ref 0–0.2)
BASOPHILS NFR BLD AUTO: 1 % (ref 0–1.5)
BH BB BLOOD EXPIRATION DATE: NORMAL
BH BB BLOOD EXPIRATION DATE: NORMAL
BH BB BLOOD TYPE BARCODE: 6200
BH BB BLOOD TYPE BARCODE: 6200
BH BB DISPENSE STATUS: NORMAL
BH BB DISPENSE STATUS: NORMAL
BH BB PRODUCT CODE: NORMAL
BH BB PRODUCT CODE: NORMAL
BH BB UNIT NUMBER: NORMAL
BH BB UNIT NUMBER: NORMAL
BUN SERPL-MCNC: 26 MG/DL (ref 8–23)
BUN/CREAT SERPL: 9.3 (ref 7–25)
BURR CELLS BLD QL SMEAR: NORMAL
CALCIUM SPEC-SCNC: 7 MG/DL (ref 8.6–10.5)
CHLORIDE SERPL-SCNC: 110 MMOL/L (ref 98–107)
CO2 SERPL-SCNC: 18.7 MMOL/L (ref 22–29)
CREAT SERPL-MCNC: 2.79 MG/DL (ref 0.76–1.27)
CROSSMATCH INTERPRETATION: NORMAL
CROSSMATCH INTERPRETATION: NORMAL
DACRYOCYTES BLD QL SMEAR: NORMAL
DEPRECATED RDW RBC AUTO: 84.3 FL (ref 37–54)
EGFRCR SERPLBLD CKD-EPI 2021: 22.5 ML/MIN/1.73
EOSINOPHIL # BLD AUTO: 0.04 10*3/MM3 (ref 0–0.4)
EOSINOPHIL NFR BLD AUTO: 2 % (ref 0.3–6.2)
ERYTHROCYTE [DISTWIDTH] IN BLOOD BY AUTOMATED COUNT: 24.5 % (ref 12.3–15.4)
GLUCOSE SERPL-MCNC: 102 MG/DL (ref 65–99)
HCT VFR BLD AUTO: 23.8 % (ref 37.5–51)
HGB BLD-MCNC: 7.5 G/DL (ref 13–17.7)
HYPOCHROMIA BLD QL: NORMAL
IMM GRANULOCYTES # BLD AUTO: 0.06 10*3/MM3 (ref 0–0.05)
IMM GRANULOCYTES NFR BLD AUTO: 3 % (ref 0–0.5)
LYMPHOCYTES # BLD AUTO: 0.57 10*3/MM3 (ref 0.7–3.1)
LYMPHOCYTES NFR BLD AUTO: 28.9 % (ref 19.6–45.3)
MCH RBC QN AUTO: 29.9 PG (ref 26.6–33)
MCHC RBC AUTO-ENTMCNC: 31.5 G/DL (ref 31.5–35.7)
MCV RBC AUTO: 94.8 FL (ref 79–97)
MICROCYTES BLD QL: NORMAL
MONOCYTES # BLD AUTO: 0.19 10*3/MM3 (ref 0.1–0.9)
MONOCYTES NFR BLD AUTO: 9.6 % (ref 5–12)
NEUTROPHILS NFR BLD AUTO: 1.09 10*3/MM3 (ref 1.7–7)
NEUTROPHILS NFR BLD AUTO: 55.5 % (ref 42.7–76)
NRBC BLD AUTO-RTO: 0 /100 WBC (ref 0–0.2)
PLAT MORPH BLD: NORMAL
PLATELET # BLD AUTO: 62 10*3/MM3 (ref 140–450)
PMV BLD AUTO: 10.8 FL (ref 6–12)
POIKILOCYTOSIS BLD QL SMEAR: NORMAL
POTASSIUM SERPL-SCNC: 3.6 MMOL/L (ref 3.5–5.2)
RBC # BLD AUTO: 2.51 10*6/MM3 (ref 4.14–5.8)
SODIUM SERPL-SCNC: 140 MMOL/L (ref 136–145)
TARGETS BLD QL SMEAR: NORMAL
UNIT  ABO: NORMAL
UNIT  ABO: NORMAL
UNIT  RH: NORMAL
UNIT  RH: NORMAL
URATE SERPL-MCNC: 4.8 MG/DL (ref 3.4–7)
WBC MORPH BLD: NORMAL
WBC NRBC COR # BLD AUTO: 1.97 10*3/MM3 (ref 3.4–10.8)

## 2024-11-22 PROCEDURE — 84550 ASSAY OF BLOOD/URIC ACID: CPT | Performed by: INTERNAL MEDICINE

## 2024-11-22 PROCEDURE — 85007 BL SMEAR W/DIFF WBC COUNT: CPT | Performed by: INTERNAL MEDICINE

## 2024-11-22 PROCEDURE — 97161 PT EVAL LOW COMPLEX 20 MIN: CPT

## 2024-11-22 PROCEDURE — 85025 COMPLETE CBC W/AUTO DIFF WBC: CPT | Performed by: INTERNAL MEDICINE

## 2024-11-22 PROCEDURE — G0378 HOSPITAL OBSERVATION PER HR: HCPCS

## 2024-11-22 PROCEDURE — 80048 BASIC METABOLIC PNL TOTAL CA: CPT | Performed by: INTERNAL MEDICINE

## 2024-11-22 NOTE — PLAN OF CARE
Goal Outcome Evaluation:           Progress: no change  Outcome Evaluation: PT is AAOx4, VSS, no c/o of pain, SOA or N/V/D, rested well overnight, no acute events this shift, HgB 7.5 this morning, MD will review on rounding to determine further treatment, bed/chair in low/locked position, call light within reach, continue with current POC at this time.                              Opt out

## 2024-11-22 NOTE — PLAN OF CARE
Goal Outcome Evaluation:  Plan of Care Reviewed With: patient        Progress: no change  Outcome Evaluation: Pt presents with no physical limitations that impede his ability to return home independently or with assist from family as needed. Pt was encouraged to continue ambulating as tolerated while here at the hospital. He will be discharged from PT caseload.    Anticipated Discharge Disposition (PT): home with assist, home

## 2024-11-22 NOTE — THERAPY EVALUATION
Acute Care - Physical Therapy Initial Evaluation   [facility]     Patient Name: Blair Lira  : 1945  MRN: [id number]  Today's Date: 2024      Visit Dx:     ICD-10-CM ICD-9-CM   1. Difficulty walking  R26.2 719.7     Patient Active Problem List   Diagnosis    Rotator cuff tear, right    Impingement syndrome of right shoulder    Acute renal failure superimposed on chronic kidney disease    Pancytopenia due to chemotherapy    Abnormal weight loss    Hypertension    CKD (chronic kidney disease) stage 3, GFR 30-59 ml/min    Moderate malnutrition    Weakness generalized    Intractable pain    Sepsis associated hypotension    Multiple myeloma    Edema    Chronic diastolic (congestive) heart failure    Acute on chronic HFrEF (heart failure with reduced ejection fraction)    Chest pain    Syncope    COVID-19 virus infection    Chronic kidney disease, stage IV (severe)    Hypotension    Anemia    Acute on chronic renal failure    Pneumonia    Anemia    Acute febrile illness    Mucus plugging of bronchi    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    Pneumonia due to methicillin susceptible Staphylococcus aureus (MSSA)    Femoral neck fracture    Acute blood loss anemia    Pancytopenia     Past Medical History:   Diagnosis Date    BPH (benign prostatic hyperplasia)     Cancer     Chronic kidney disease     Femoral neck fracture 2024    Frozen shoulder     Hyperlipidemia     Hypertension 10/26/2023    Multiple myeloma     Periarthritis of shoulder     Rotator cuff disorder, right     Tennis elbow     RIGHT     Past Surgical History:   Procedure Laterality Date    BRONCHOSCOPY N/A 2024    Procedure: BRONCHOSCOPY WITH BAL AND WASHINGS;  Surgeon: Dionte Ruvalcaba MD;  Location: [facility] ENDOSCOPY;  Service: Pulmonary;  Laterality: N/A;  MUCUS PLUGGING    CATARACT EXTRACTION EXTRACAPSULAR W/ INTRAOCULAR LENS IMPLANTATION  Bilateral     COLONOSCOPY      HIP HEMIARTHROPLASTY Left 4/30/2024    Procedure: TOTAL HIP ARTHROPLASTY  ANTERIOR APROACH;  Surgeon: Adán Osborne MD;  Location: [facility] MAIN OR;  Service: Orthopedics;  Laterality: Left;    JOINT REPLACEMENT Right     THR    SHOULDER ARTHROSCOPY W/ ROTATOR CUFF REPAIR Right 3/29/2023    Procedure: SHOULDER ARTHROSCOPY WITH ROTATOR CUFF REPAIR, SUBCROMIAL DECOMPRESSION,DISTAL CLAVICLE RESECTION;  Surgeon: Gustavo Samuels MD;  [place] [facility] [place] [facility]  Service: Orthopedics;  Laterality: Right;    SHOULDER SURGERY Left     SCOPE     PT Assessment (Last 12 Hours)       PT Evaluation and Treatment       Row Name 11/22/24 1100          Physical Therapy Time and Intention    Subjective Information no complaints  -CS     Document Type evaluation  -CS     Mode of Treatment individual therapy;physical therapy  -CS     Patient Effort adequate  -CS     Symptoms Noted During/After Treatment none  -CS       Row Name 11/22/24 1100          General Information    Patient Profile Reviewed yes  -CS     Patient Observations alert;cooperative;agree to therapy  -CS     Prior Level of Function independent:;all household mobility;gait;transfer;bed mobility;ADL's  Family helps out if needed but otherwise, patient is mostly independently with all functional mobility and ADLs  -CS     Equipment Currently Used at Home cane, straight;cane, quad;shower chair  -CS     Existing Precautions/Restrictions fall  -CS     Barriers to Rehab none identified  -CS       Row Name 11/22/24 1100          Living Environment    Current Living Arrangements home  -CS     Home Accessibility stairs to enter home;stairs within home  -CS     People in Home alone  daughter and grandson are close by and come in and out to check on patient or assist with any needs  -CS     Primary Care Provided by self  -CS       Row Name 11/22/24 1100          Home Main Entrance    Number of Stairs, Main Entrance one  -CS     Stair Railings, Main  Entrance none  -       Row Name 11/22/24 1100          Stairs Within Home, Primary    Stairs, Within Home, Primary Non-essential flight of steps to the basement  -     Stair Railings, Within Home, Primary railings safe and in good condition  -       Row Name 11/22/24 1100          Pain    Pretreatment Pain Rating 0/10 - no pain  -CS     Posttreatment Pain Rating 0/10 - no pain  -CS       Row Name 11/22/24 1100          Cognition    Orientation Status (Cognition) oriented x 3  -       Row Name 11/22/24 1100          Range of Motion Comprehensive    General Range of Motion no range of motion deficits identified  -       Row Name 11/22/24 1100          Strength Comprehensive (MMT)    General Manual Muscle Testing (MMT) Assessment no strength deficits identified  -       Row Name 11/22/24 1100          Bed Mobility    Bed Mobility bed mobility (all) activities  -     All Activities, [place] (Bed Mobility) independent  -       Row Name 11/22/24 1100          Transfers    Transfers sit-stand transfer;stand-sit transfer  -       Row Name 11/22/24 1100          Sit-Stand Transfer    Sit-Stand [place] (Transfers) independent  -       Row Name 11/22/24 1100          Stand-Sit Transfer    Stand-Sit [place] (Transfers) independent  -       Row Name 11/22/24 1100          Gait/Stairs (Locomotion)    Gait/Stairs Locomotion gait/ambulation assistive device  -     [place] Level (Gait) supervision  -     Assistive Device (Gait) other (see comments)  HHA  -     Patient was able to Ambulate yes  -     Distance in Feet (Gait) 15  -       Row Name 11/22/24 1100          Safety Issues/Impairments Affecting Functional Mobility    Impairments Affecting Function (Mobility) balance;endurance/activity tolerance  -       Row Name 11/22/24 1100          Balance    Balance Assessment standing dynamic balance  -     Dynamic Standing Balance supervision  -       Row Name 11/22/24 1100           Plan of Care Review    Plan of Care Reviewed With patient  -CS     Progress no change  -CS     Outcome Evaluation Pt presents with no physical limitations that impede his ability to return home independently or with assist from family as needed. Pt was encouraged to continue ambulating as tolerated while here at the hospital. He will be discharged from PT caseload.  -CS       Row Name 11/22/24 1100          Positioning and Restraints    Pre-Treatment Position sitting in chair/recliner  -CS     Post Treatment Position chair  -CS     In Chair sitting;call light within reach;encouraged to call for assist  -CS       Row Name 11/22/24 1100          Therapy Assessment/Plan (PT)    Criteria for Skilled Interventions Met (PT) no problems identified which require skilled intervention  -CS     Therapy Frequency (PT) evaluation only  -CS       Row Name 11/22/24 1100          PT Evaluation Complexity    History, PT Evaluation Complexity 1-2 personal factors and/or comorbidities  -CS     Examination of Body Systems (PT Eval Complexity) total of 4 or more elements  -CS     Clinical Presentation (PT Evaluation Complexity) stable  -CS     Clinical Decision Making (PT Evaluation Complexity) low complexity  -CS     Overall Complexity (PT Evaluation Complexity) low complexity  -CS       Row Name 11/22/24 1100          Therapy Plan Review/Discharge Plan (PT)    Therapy Plan Review (PT) evaluation/treatment results reviewed;patient  -CS       Row Name 11/22/24 1100          Physical Therapy Goals    Problem Specific Goal Selection (PT) problem specific goal 1, PT  -CS       Row Name 11/22/24 1100          Problem Specific Goal 1 (PT)    Problem Specific Goal 1 (PT) Complete PT evaluation  -CS     Time Frame (Problem Specific Goal 1, PT) by discharge  -CS     Progress/Outcome (Problem Specific Goal 1, PT) goal met  -CS               User Key  (r) = Recorded By, (t) = Taken By, (c) = Cosigned By      Initials Name Provider Type    CS  Nimo Tsai PT Physical Therapist                    Physical Therapy Education        No education to display                  PT Recommendation and Plan  Anticipated Discharge Disposition (PT): home with assist, home  Therapy Frequency (PT): evaluation only  Plan of Care Reviewed With: patient  Progress: no change  Outcome Evaluation: Pt presents with no physical limitations that impede his ability to return home independently or with assist from family as needed. Pt was encouraged to continue ambulating as tolerated while here at the hospital. He will be discharged from PT caseload.   Outcome Measures       Row Name 11/22/24 1100             How much help from another person do you currently need...    Turning from your back to your side while in flat bed without using bedrails? 4  -CS      Moving from lying on back to sitting on the side of a flat bed without bedrails? 4  -CS      Moving to and from a bed to a chair (including a wheelchair)? 4  -CS      Standing up from a chair using your arms (e.g., wheelchair, bedside chair)? 4  -CS      Climbing 3-5 steps with a railing? 4  -CS      To walk in hospital room? 4  -CS      AM-PAC 6 Clicks Score (PT) 24  -CS         Functional Assessment    Outcome Measure Options AM-PAC 6 Clicks Basic Mobility (PT)  -CS                User Key  (r) = Recorded By, (t) = Taken By, (c) = Cosigned By      Initials Name Provider Type    Nimo Huertas PT Physical Therapist                     Time Calculation:    PT Charges       Row Name 11/22/24 1113             Time Calculation    PT Received On 11/22/24  -CS         Untimed Charges    PT Eval/Re-eval Minutes 15  -CS         Total Minutes    Untimed Charges Total Minutes 15  -CS       Total Minutes 15  -CS                User Key  (r) = Recorded By, (t) = Taken By, (c) = Cosigned By      Initials Name Provider Type    Nimo Huertas PT Physical Therapist                  Therapy Charges for Today       Code  Description Service Date Service Provider Modifiers Qty    31125412278 HC PT EVAL LOW COMPLEXITY 1 11/22/2024 Nimo Tsai, PT GP 1            PT G-Codes  Outcome Measure Options: AM-PAC 6 Clicks Basic Mobility (PT)  AM-PAC 6 Clicks Score (PT): 24    Nimo Tsai, MARQUIS  11/22/2024

## 2024-11-22 NOTE — DISCHARGE SUMMARY
[facility]         DISCHARGE SUMMARY    Patient Name: Blair Lira  : 1945  MRN: [id number]    Date of Admission: 2024  Date of Discharge: 2024 patient known to have multiple myeloma status post chemotherapy induced pancytopenia he was transfused with 20 packed RBCs hemoglobin has gone up to 7.5 white count is improved he is already on Levaquin as an outpatient for urinary tract infection which will be continued platelets stable comfortable and is therefore being discharged home today  Primary Care Physician: Babs Summers APRN    Consults       No orders found from 10/22/2024 to 2024.            Presenting Problem:   Anemia [D64.9]  Pancytopenia [D61.818]    Active and Resolved Hospital Problems:  Active Hospital Problems    Diagnosis POA    **Pancytopenia [D61.818] Yes      Resolved Hospital Problems   No resolved problems to display.         Hospital Course     Hospital Course:  Blair Lira is a 79 y.o. male patient with recurrent UTI and multiple myeloma with chemo induced pancytopenia has improved      DISCHARGE Follow Up Recommendations for labs and diagnostics: Patient has recovered from his cytopenias and is being discharged will be followed as an outpatient      Pertinent  and/or Most Recent Results     LAB RESULTS:      Lab 24  0418 24  1617 24  0757 24  0431 24  1551   WBC 1.97*  --  1.07* 0.92* 0.88*   HEMOGLOBIN 7.5* 7.6* 6.1* 5.5* 6.2*   HEMATOCRIT 23.8* 23.7* 20.2* 17.8* 20.2*   PLATELETS 62*  --  60* 58* 57*   NEUTROS ABS 1.09*  --  0.30* 0.28*  --    IMMATURE GRANS (ABS) 0.06*  --  0.01 0.01  --    LYMPHS ABS 0.57*  --  0.55* 0.47*  --    MONOS ABS 0.19  --  0.14 0.10  --    EOS ABS 0.04  --  0.06 0.05  --    MCV 94.8  --  107.4* 102.3* 105.2*   PROTIME  --   --   --  18.7*  --          Lab 24  0418 24  0757 24  0431 24  1551   SODIUM 140 138 139 140   POTASSIUM 3.6 3.6 3.5 3.4*   CHLORIDE  110* 108* 109* 108*   CO2 18.7* 16.6* 18.6* 18.7*   ANION GAP 11.3 13.4 11.4 13.3   BUN 26* 26* 26* 26*   CREATININE 2.79* 2.70* 2.73* 2.80*   EGFR 22.5* 23.4* 23.1* 22.4*   GLUCOSE 102* 93 100* 110*   CALCIUM 7.0* 7.2* 7.1* 7.4*         Lab 11/21/24  0431 11/20/24  1551   TOTAL PROTEIN 5.4* 5.8*   ALBUMIN 3.0* 3.0*   GLOBULIN 2.4 2.8   ALT (SGPT) 10 11   AST (SGOT) 11 10   BILIRUBIN 0.4 0.6   ALK PHOS 74 80         Lab 11/21/24 0431   PROTIME 18.7*   INR 1.53*             Lab 11/21/24  0431 11/20/24  1744   IRON 63  --    IRON SATURATION (TSAT) 43  --    TIBC 146*  --    TRANSFERRIN 98*  --    FERRITIN 1,344.00*  --    FOLATE >20.00  --    VITAMIN B 12 981*  --    ABO TYPING  --  A   RH TYPING  --  Positive   ANTIBODY SCREEN  --  Positive         Brief Urine Lab Results  (Last result in the past 365 days)        Color   Clarity   Blood   Leuk Est   Nitrite   Protein   CREAT   Urine HCG        11/20/24 1849 Yellow   Cloudy   Moderate (2+)   Large (3+)   Negative   100 mg/dL (2+)                 Microbiology Results (last 10 days)       Procedure Component Value - Date/Time    Urine Culture - Urine, Urine, Clean Catch [[id number]]  (Abnormal) Collected: 11/20/24 1849    Lab Status: Preliminary result Specimen: Urine, Clean Catch Updated: 11/21/24 1206     Urine Culture 50,000 CFU/mL Gram Positive Cocci    Narrative:      Colonization of the urinary tract without infection is common. Treatment is discouraged unless the patient is symptomatic, pregnant, or undergoing an invasive urologic procedure.    Blood Culture - Blood, Arm, Right [[id number]]  (Normal) Collected: 11/20/24 1745    Lab Status: Preliminary result Specimen: Blood from Arm, Right Updated: 11/21/24 1800     Blood Culture No growth at 24 hours    Blood Culture - Blood, Arm, Left [[id number]]  (Normal) Collected: 11/20/24 1744    Lab Status: Preliminary result Specimen: Blood from Arm, Left Updated: 11/21/24 1800     Blood Culture No growth at 24 hours             XR Chest 1 View    Result Date: 11/20/2024  Impression: Impression: Mild airspace opacity in the mid left lung could represent resolving infiltrate, atelectasis and/or fibrosis. Electronically Signed: Juno Santana MD  11/20/2024 5:30 PM EST  Workstation ID: [id number]     Results for orders placed during the hospital encounter of 02/18/24    Duplex Venous Lower Extremity LEFT    Interpretation Summary    Acute left lower extremity deep vein thrombosis noted in the popliteal.    Sub-acute left lower extremity deep vein thrombosis noted in the posterial tibial and peroneal.    Chronic left lower extremity superficial thrombophlebitis noted in the small saphenous.    All other left sided veins appeared normal.      Results for orders placed during the hospital encounter of 02/18/24    Duplex Venous Lower Extremity LEFT    Interpretation Summary    Acute left lower extremity deep vein thrombosis noted in the popliteal.    Sub-acute left lower extremity deep vein thrombosis noted in the posterial tibial and peroneal.    Chronic left lower extremity superficial thrombophlebitis noted in the small saphenous.    All other left sided veins appeared normal.      Results for orders placed during the hospital encounter of 01/17/24    Adult Transthoracic Echo Complete w/ Color, Spectral and Contrast if necessary per protocol    Interpretation Summary    Left ventricular systolic function is normal. Left ventricular ejection fraction appears to be 51 - 55%.    Left ventricular wall thickness is consistent with mild concentric hypertrophy.    Left ventricular diastolic function is consistent with (grade I) impaired relaxation.    There are no significant valvular abnormalities noted on the study.    Estimated right ventricular systolic pressure from tricuspid regurgitation is normal (<35 mmHg).      Labs Pending at Discharge:  Pending Labs       Order Current Status    BB REFERENCE LAB SENDOUT In process    Blood Culture -  Blood, Arm, Left Preliminary result    Blood Culture - Blood, Arm, Right Preliminary result    Urine Culture - Urine, Urine, Clean Catch Preliminary result              Discharge Details        Discharge Medications        Continue These Medications        Instructions Start Date   allopurinol 100 MG tablet  Commonly known as: ZYLOPRIM   100 mg, Daily      atorvastatin 10 MG tablet  Commonly known as: LIPITOR   10 mg, Daily      cephalexin 500 MG capsule  Commonly known as: KEFLEX   500 mg, 2 Times Daily      colchicine 0.6 MG tablet   Take 1 tablet by mouth 2 (Two) Times a Day.      Eliquis 5 MG tablet tablet  Generic drug: apixaban   5 mg, 2 Times Daily      gabapentin 400 MG capsule  Commonly known as: NEURONTIN   400 mg, 2 Times Daily      lenalidomide 10 MG capsule  Commonly known as: REVLIMID   10 mg, Oral, Daily      montelukast 10 MG tablet  Commonly known as: SINGULAIR   10 mg, Oral, Nightly      probenecid 500 MG tablet  Commonly known as: BENEMID   500 mg, 2 Times Daily      solifenacin 5 MG tablet  Commonly known as: VESICARE   5 mg, Daily      tamsulosin 0.4 MG capsule 24 hr capsule  Commonly known as: FLOMAX   1 capsule, Daily               No Known Allergies      Discharge Disposition:  Home or Self Care    Diet:  Hospital:  Diet Order   Procedures    Diet: Regular/House; Fluid Consistency: Thin (IDDSI 0)         Discharge Activity: As tolerated        CODE STATUS:  Code Status and Medical Interventions: CPR (Attempt to Resuscitate); Full Support   Ordered at: 11/20/24 6531     Level Of Support Discussed With:    Patient     Code Status (Patient has no pulse and is not breathing):    CPR (Attempt to Resuscitate)     Medical Interventions (Patient has pulse or is breathing):    Full Support         Future Appointments   Date Time Provider Department Center   4/21/2025  9:30 AM Lynn Henriquez APRN [facility]           Time spent on Discharge including face to face service: 45  minutes    Electronically signed by Vamsi Mason MD, 11/22/24, 10:34 AM EST.    Part of this note may be an electronic transcription/translation of spoken language to printed text using the Dragon Dictation System.

## 2024-11-23 NOTE — OUTREACH NOTE
Prep Survey      Flowsheet Row Responses   [facility] facility patient discharged from? [facility]   Is LACE score < 7 ? No   Eligibility Readm Mgmt   Discharge diagnosis Pancytopenia   Does the patient have one of the following disease processes/diagnoses(primary or secondary)? Other   Prep survey completed? Yes            Tiffanie MCGOVERN - Registered Nurse

## 2024-11-25 LAB
BACTERIA SPEC AEROBE CULT: NORMAL
BACTERIA SPEC AEROBE CULT: NORMAL

## 2024-11-27 ENCOUNTER — READMISSION MANAGEMENT (OUTPATIENT)
Dept: CALL CENTER | Facility: HOSPITAL | Age: 79
End: 2024-11-27
Payer: MEDICARE

## 2024-11-27 NOTE — OUTREACH NOTE
Medical Week 1 Survey      Flowsheet Row Responses   [facility] patient discharged from? [facility]   Does the patient have one of the following disease processes/diagnoses(primary or secondary)? Other   Week 1 attempt successful? No   Unsuccessful attempts Attempt 1            Jennifer ASNDOVAL - Licensed Nurse

## 2024-12-03 ENCOUNTER — READMISSION MANAGEMENT (OUTPATIENT)
Dept: CALL CENTER | Facility: HOSPITAL | Age: 79
End: 2024-12-03
Payer: MEDICARE

## 2024-12-03 NOTE — OUTREACH NOTE
Medical Week 1 Survey      Flowsheet Row Responses   [facility] facility patient discharged from? [facility]   Does the patient have one of the following disease processes/diagnoses(primary or secondary)? Other   Week 1 attempt successful? No   Unsuccessful attempts Attempt 2            Allyn CAMPBELL - Registered Nurse

## 2024-12-04 LAB — BB REFERENCE LAB SENDOUT: NORMAL

## 2024-12-13 ENCOUNTER — READMISSION MANAGEMENT (OUTPATIENT)
Dept: CALL CENTER | Facility: HOSPITAL | Age: 79
End: 2024-12-13
Payer: MEDICARE

## 2024-12-13 NOTE — OUTREACH NOTE
Medical Week 3 Survey      Flowsheet Row Responses   [facility] patient discharged from? [facility]   Does the patient have one of the following disease processes/diagnoses(primary or secondary)? Other   Week 3 attempt successful? Yes   Call start time 1546   Call end time 1548   Discharge diagnosis Pancytopenia   Person spoke with today (if not patient) and relationship pt   Meds reviewed with patient/caregiver? Yes   Is the patient having any side effects they believe may be caused by any medication additions or changes? No   Does the patient have all medications ordered at discharge? Yes   Is the patient taking all medications as directed (includes completed medication regime)? Yes   Does the patient have a primary care provider?  Yes   Does the patient have an appointment with their PCP within 7 days of discharge? Yes   Has the patient kept scheduled appointments due by today? Yes   Psychosocial issues? No   Did the patient receive a copy of their discharge instructions? Yes   Nursing interventions Reviewed instructions with patient   What is the patient's perception of their health status since discharge? Improving   Is the patient/caregiver able to teach back signs and symptoms related to disease process for when to call PCP? Yes   Is the patient/caregiver able to teach back signs and symptoms related to disease process for when to call 911? Yes   Is the patient/caregiver able to teach back the hierarchy of who to call/visit for symptoms/problems? PCP, Specialist, Home health nurse, Urgent Care, ED, 911 Yes   If the patient is a current smoker, are they able to teach back resources for cessation? Not a smoker   Week 3 Call Completed? Yes   Is the patient interested in additional calls from an ambulatory ? No   Would this patient benefit from a Referral to Amb Social Work? No   Wrap up additional comments pt stated he is doing ok.   Call end time 1548            STEVEN HOLGUIN - Registered Nurse

## 2025-01-02 ENCOUNTER — APPOINTMENT (OUTPATIENT)
Dept: GENERAL RADIOLOGY | Facility: HOSPITAL | Age: 80
DRG: 809 | End: 2025-01-02
Payer: MEDICARE

## 2025-01-02 ENCOUNTER — HOSPITAL ENCOUNTER (INPATIENT)
Facility: HOSPITAL | Age: 80
LOS: 2 days | Discharge: HOME | DRG: 809 | End: 2025-01-04
Attending: INTERNAL MEDICINE | Admitting: INTERNAL MEDICINE
Payer: MEDICARE

## 2025-01-02 LAB
ABO GROUP BLD: NORMAL
ALBUMIN SERPL-MCNC: 3 G/DL (ref 3.5–5.2)
ALBUMIN/GLOB SERPL: 1.3 G/DL
ALP SERPL-CCNC: 67 U/L (ref 39–117)
ALT SERPL W P-5'-P-CCNC: 6 U/L (ref 1–41)
ANION GAP SERPL CALCULATED.3IONS-SCNC: 9.7 MMOL/L (ref 5–15)
AST SERPL-CCNC: 6 U/L (ref 1–40)
BACTERIA UR QL AUTO: NORMAL /HPF
BILIRUB SERPL-MCNC: 0.5 MG/DL (ref 0–1.2)
BILIRUB UR QL STRIP: NEGATIVE
BLD GP AB SCN SERPL QL: POSITIVE
BUN SERPL-MCNC: 18 MG/DL (ref 8–23)
BUN/CREAT SERPL: 8.8 (ref 7–25)
CALCIUM SPEC-SCNC: 8.1 MG/DL (ref 8.6–10.5)
CHLORIDE SERPL-SCNC: 110 MMOL/L (ref 98–107)
CLARITY UR: CLEAR
CO2 SERPL-SCNC: 19.3 MMOL/L (ref 22–29)
COLOR UR: YELLOW
CREAT SERPL-MCNC: 2.05 MG/DL (ref 0.76–1.27)
D-LACTATE SERPL-SCNC: 1.5 MMOL/L (ref 0.5–2)
D-LACTATE SERPL-SCNC: 3.8 MMOL/L (ref 0.5–2)
DAT C3: NEGATIVE
DAT IGG GEL: NEGATIVE
DEPRECATED RDW RBC AUTO: 81.5 FL (ref 37–54)
EGFRCR SERPLBLD CKD-EPI 2021: 32.6 ML/MIN/1.73
ERYTHROCYTE [DISTWIDTH] IN BLOOD BY AUTOMATED COUNT: 21.5 % (ref 12.3–15.4)
GLOBULIN UR ELPH-MCNC: 2.3 GM/DL
GLUCOSE SERPL-MCNC: 111 MG/DL (ref 65–99)
GLUCOSE UR STRIP-MCNC: NEGATIVE MG/DL
HCT VFR BLD AUTO: 18.8 % (ref 37.5–51)
HGB BLD-MCNC: 5.6 G/DL (ref 13–17.7)
HGB UR QL STRIP.AUTO: NEGATIVE
HYALINE CASTS UR QL AUTO: NORMAL /LPF
KETONES UR QL STRIP: NEGATIVE
LEUKOCYTE ESTERASE UR QL STRIP.AUTO: NEGATIVE
MCH RBC QN AUTO: 31.1 PG (ref 26.6–33)
MCHC RBC AUTO-ENTMCNC: 29.8 G/DL (ref 31.5–35.7)
MCV RBC AUTO: 104.4 FL (ref 79–97)
NITRITE UR QL STRIP: NEGATIVE
PH UR STRIP.AUTO: 6 [PH] (ref 5–8)
PLATELET # BLD AUTO: 66 10*3/MM3 (ref 140–450)
PMV BLD AUTO: 12.4 FL (ref 6–12)
POTASSIUM SERPL-SCNC: 4.1 MMOL/L (ref 3.5–5.2)
PROT SERPL-MCNC: 5.3 G/DL (ref 6–8.5)
PROT UR QL STRIP: ABNORMAL
RBC # BLD AUTO: 1.8 10*6/MM3 (ref 4.14–5.8)
RBC # UR STRIP: NORMAL /HPF
REF LAB TEST METHOD: NORMAL
RH BLD: POSITIVE
SODIUM SERPL-SCNC: 139 MMOL/L (ref 136–145)
SP GR UR STRIP: 1.01 (ref 1–1.03)
SQUAMOUS #/AREA URNS HPF: NORMAL /HPF
T&S EXPIRATION DATE: NORMAL
UROBILINOGEN UR QL STRIP: ABNORMAL
WBC # UR STRIP: NORMAL /HPF
WBC NRBC COR # BLD AUTO: 1.42 10*3/MM3 (ref 3.4–10.8)

## 2025-01-02 PROCEDURE — 86922 COMPATIBILITY TEST ANTIGLOB: CPT

## 2025-01-02 PROCEDURE — 86921 COMPATIBILITY TEST INCUBATE: CPT

## 2025-01-02 PROCEDURE — 85027 COMPLETE CBC AUTOMATED: CPT | Performed by: INTERNAL MEDICINE

## 2025-01-02 PROCEDURE — 81001 URINALYSIS AUTO W/SCOPE: CPT | Performed by: INTERNAL MEDICINE

## 2025-01-02 PROCEDURE — 25010000002 FILGRASTIM PER 480 MCG: Performed by: INTERNAL MEDICINE

## 2025-01-02 PROCEDURE — 77075 RADEX OSSEOUS SURVEY COMPL: CPT

## 2025-01-02 PROCEDURE — 86920 COMPATIBILITY TEST SPIN: CPT

## 2025-01-02 PROCEDURE — 86901 BLOOD TYPING SEROLOGIC RH(D): CPT | Performed by: INTERNAL MEDICINE

## 2025-01-02 PROCEDURE — 86880 COOMBS TEST DIRECT: CPT | Performed by: INTERNAL MEDICINE

## 2025-01-02 PROCEDURE — 86870 RBC ANTIBODY IDENTIFICATION: CPT | Performed by: INTERNAL MEDICINE

## 2025-01-02 PROCEDURE — 86870 RBC ANTIBODY IDENTIFICATION: CPT

## 2025-01-02 PROCEDURE — 25010000002 CEFTRIAXONE PER 250 MG: Performed by: INTERNAL MEDICINE

## 2025-01-02 PROCEDURE — 87040 BLOOD CULTURE FOR BACTERIA: CPT | Performed by: INTERNAL MEDICINE

## 2025-01-02 PROCEDURE — 86900 BLOOD TYPING SEROLOGIC ABO: CPT | Performed by: INTERNAL MEDICINE

## 2025-01-02 PROCEDURE — 86850 RBC ANTIBODY SCREEN: CPT

## 2025-01-02 PROCEDURE — 80053 COMPREHEN METABOLIC PANEL: CPT | Performed by: INTERNAL MEDICINE

## 2025-01-02 PROCEDURE — 86880 COOMBS TEST DIRECT: CPT

## 2025-01-02 PROCEDURE — 83605 ASSAY OF LACTIC ACID: CPT | Performed by: INTERNAL MEDICINE

## 2025-01-02 PROCEDURE — 86970 RBC PRETX INCUBATJ W/CHEMICL: CPT

## 2025-01-02 PROCEDURE — 86900 BLOOD TYPING SEROLOGIC ABO: CPT

## 2025-01-02 PROCEDURE — 86901 BLOOD TYPING SEROLOGIC RH(D): CPT

## 2025-01-02 PROCEDURE — 86850 RBC ANTIBODY SCREEN: CPT | Performed by: INTERNAL MEDICINE

## 2025-01-02 RX ORDER — SODIUM CHLORIDE 0.9 % (FLUSH) 0.9 %
10 SYRINGE (ML) INJECTION AS NEEDED
Status: DISCONTINUED | OUTPATIENT
Start: 2025-01-02 | End: 2025-01-04 | Stop reason: HOSPADM

## 2025-01-02 RX ORDER — BISACODYL 10 MG
10 SUPPOSITORY, RECTAL RECTAL DAILY PRN
Status: DISCONTINUED | OUTPATIENT
Start: 2025-01-02 | End: 2025-01-04 | Stop reason: HOSPADM

## 2025-01-02 RX ORDER — SODIUM CHLORIDE 0.9 % (FLUSH) 0.9 %
10 SYRINGE (ML) INJECTION EVERY 12 HOURS SCHEDULED
Status: DISCONTINUED | OUTPATIENT
Start: 2025-01-02 | End: 2025-01-04 | Stop reason: HOSPADM

## 2025-01-02 RX ORDER — SODIUM CHLORIDE 9 MG/ML
40 INJECTION, SOLUTION INTRAVENOUS AS NEEDED
Status: DISCONTINUED | OUTPATIENT
Start: 2025-01-02 | End: 2025-01-04 | Stop reason: HOSPADM

## 2025-01-02 RX ORDER — BISACODYL 5 MG/1
5 TABLET, DELAYED RELEASE ORAL DAILY PRN
Status: DISCONTINUED | OUTPATIENT
Start: 2025-01-02 | End: 2025-01-04 | Stop reason: HOSPADM

## 2025-01-02 RX ORDER — AMOXICILLIN 250 MG
2 CAPSULE ORAL 2 TIMES DAILY PRN
Status: DISCONTINUED | OUTPATIENT
Start: 2025-01-02 | End: 2025-01-04 | Stop reason: HOSPADM

## 2025-01-02 RX ORDER — POLYETHYLENE GLYCOL 3350 17 G/17G
17 POWDER, FOR SOLUTION ORAL DAILY PRN
Status: DISCONTINUED | OUTPATIENT
Start: 2025-01-02 | End: 2025-01-04 | Stop reason: HOSPADM

## 2025-01-02 RX ORDER — ONDANSETRON 2 MG/ML
4 INJECTION INTRAMUSCULAR; INTRAVENOUS EVERY 6 HOURS PRN
Status: DISCONTINUED | OUTPATIENT
Start: 2025-01-02 | End: 2025-01-04 | Stop reason: HOSPADM

## 2025-01-02 RX ORDER — ALUMINA, MAGNESIA, AND SIMETHICONE 2400; 2400; 240 MG/30ML; MG/30ML; MG/30ML
15 SUSPENSION ORAL EVERY 6 HOURS PRN
Status: DISCONTINUED | OUTPATIENT
Start: 2025-01-02 | End: 2025-01-04 | Stop reason: HOSPADM

## 2025-01-02 RX ORDER — DEXAMETHASONE 4 MG/1
4 TABLET ORAL EVERY 12 HOURS SCHEDULED
COMMUNITY

## 2025-01-02 RX ADMIN — Medication 10 ML: at 12:42

## 2025-01-02 RX ADMIN — FILGRASTIM 480 MCG: 480 INJECTION, SOLUTION INTRAVENOUS; SUBCUTANEOUS at 19:00

## 2025-01-02 RX ADMIN — Medication 10 ML: at 20:58

## 2025-01-02 RX ADMIN — SODIUM CHLORIDE 1000 MG: 9 INJECTION INTRAMUSCULAR; INTRAVENOUS; SUBCUTANEOUS at 17:54

## 2025-01-02 NOTE — H&P
[facility]   HISTORY AND PHYSICAL    Patient Name: Blair Lira  : 1945  MRN: [id number]  Primary Care Physician:  Babs Summers APRN  Date of admission: 2025    Subjective   Subjective     Chief Complaint: Patient was diagnosed to have multiple myeloma about a year ago has been receiving chemotherapy had has had could remission with improvement of his kidney functions T has developed profound cytopenias was ability conversion to myelodysplastic syndrome is being considered, he also has neutropenia and thrombocytopenia and anemia iron deficiency has been corrected we'll transfuse him with 2 units of packed RBCs a bone marrow aspiration and biopsy will be performed tomorrow morning    HPI: Patient with pancytopenia multiple myeloma    Blair Lira is a 79 y.o. male Multiple myeloma with severe pancytopenia    Review of Systems   All systems were reviewed and negative except for: Has been reviewed    Personal History     Past Medical History:   Diagnosis Date    BPH (benign prostatic hyperplasia)     Cancer     Chronic kidney disease     Femoral neck fracture 2024    Frozen shoulder     Hyperlipidemia     Hypertension 10/26/2023    Multiple myeloma     Periarthritis of shoulder     Rotator cuff disorder, right     Tennis elbow     RIGHT       Past Surgical History:   Procedure Laterality Date    BRONCHOSCOPY N/A 2024    Procedure: BRONCHOSCOPY WITH BAL AND WASHINGS;  Surgeon: Dionte Ruvalcaba MD;  Location: [facility] ENDOSCOPY;  Service: Pulmonary;  Laterality: N/A;  MUCUS PLUGGING    CATARACT EXTRACTION EXTRACAPSULAR W/ INTRAOCULAR LENS IMPLANTATION Bilateral     COLONOSCOPY      HIP HEMIARTHROPLASTY Left 2024    Procedure: TOTAL HIP ARTHROPLASTY  ANTERIOR APROACH;  Surgeon: Adán Osborne MD;  Location: [facility] MAIN OR;  Service: Orthopedics;  Laterality: Left;    JOINT REPLACEMENT Right     THR    SHOULDER ARTHROSCOPY W/ ROTATOR CUFF REPAIR Right 3/29/2023    Procedure:  SHOULDER ARTHROSCOPY WITH ROTATOR CUFF REPAIR, SUBCROMIAL DECOMPRESSION,DISTAL CLAVICLE RESECTION;  Surgeon: Gustavo Samuels MD;  [place] [facility] [place] [facility] [place]  Service: Orthopedics;  Laterality: Right;    SHOULDER SURGERY Left     SCOPE       Family History: family history is not on file. Otherwise pertinent FHx was reviewed and not pertinent to current issue.    Social History:  reports that he has never smoked. He has never been exposed to tobacco smoke. He has never used smokeless tobacco. He reports that he does not currently use alcohol. He reports that he does not use drugs.    Home Medications:  allopurinol, apixaban, atorvastatin, colchicine, gabapentin, lenalidomide, montelukast, probenecid, solifenacin, and tamsulosin      Allergies:  No Known Allergies    Objective   Objective     Vitals:   Temp:  [98.1 °F (36.7 °C)] 98.1 °F (36.7 °C)  Heart Rate:  [88] 88  Resp:  [18] 18  BP: (138)/(72) 138/72  Physical Exam    Constitutional: Alert and oriented not in acute distress   Eyes: Pupils equal treatment to light and accommodation   HENT: Normal   Neck: Normal   Respiratory: Rales or rhonchi   Cardiovascular: S1 and S2 normal no S3 or S4   Gastrointestinal:  no palpable organomegaly   Musculoskeletal: No deformity   Neurologic: No localizing signs  Skin: No rash    Result Review    Result Review:  I have personally reviewed the results from the time of this admission to 1/2/2025 12:56 EST and agree with these findings:  [x]  Laboratory  []  Microbiology  []  Radiology  []  EKG/Telemetry   []  Cardiology/Vascular   []  Pathology  []  Old records  []  Other:  Most notable findings include: Have been reviewed and discussed    Assessment & Plan   Assessment / Plan     Brief Patient Summary:  Blair Lira is a 79 y.o. male who Patient with multiple myeloma with pancytopenia    Active Hospital Problems:  Active Hospital Problems    Diagnosis     **Anemia        Plan:   Transfusion of packed RBCs Neupogen start  antibiotics possible sepsis and a repeat bone marrow examination to be done in a.m.    VTE Prophylaxis:  Mechanical VTE prophylaxis orders are present.        CODE STATUS:    Level Of Support Discussed With: Patient  Code Status (Patient has no pulse and is not breathing): CPR (Attempt to Resuscitate)  Medical Interventions (Patient has pulse or is breathing): Full Support      Admission Status:  I believe this patient meets Inpatient status.    Electronically signed by Vamsi Mason MD, 01/02/25, 12:56 PM EST.      Part of this note may be an electronic transcription/translation of spoken language to printed text using the Dragon Dictation System.

## 2025-01-02 NOTE — PLAN OF CARE
Goal Outcome Evaluation:  Plan of Care Reviewed With: patient           Outcome Evaluation: VSS, bone xray complete this shift. Denies pain, nausea, vomiting. No acute events to report this shift. Continue plan of care.

## 2025-01-03 ENCOUNTER — APPOINTMENT (OUTPATIENT)
Dept: CT IMAGING | Facility: HOSPITAL | Age: 80
DRG: 809 | End: 2025-01-03
Payer: MEDICARE

## 2025-01-03 LAB
ACANTHOCYTES BLD QL SMEAR: ABNORMAL
ALBUMIN SERPL-MCNC: 3 G/DL (ref 3.5–5.2)
ALBUMIN/GLOB SERPL: 1.6 G/DL
ALP SERPL-CCNC: 60 U/L (ref 39–117)
ALT SERPL W P-5'-P-CCNC: 5 U/L (ref 1–41)
ANION GAP SERPL CALCULATED.3IONS-SCNC: 8.5 MMOL/L (ref 5–15)
AST SERPL-CCNC: 5 U/L (ref 1–40)
BASOPHILS # BLD AUTO: 0.01 10*3/MM3 (ref 0–0.2)
BASOPHILS NFR BLD AUTO: 0.7 % (ref 0–1.5)
BILIRUB SERPL-MCNC: 0.5 MG/DL (ref 0–1.2)
BUN SERPL-MCNC: 21 MG/DL (ref 8–23)
BUN/CREAT SERPL: 9.7 (ref 7–25)
BURR CELLS BLD QL SMEAR: ABNORMAL
CALCIUM SPEC-SCNC: 7.8 MG/DL (ref 8.6–10.5)
CHLORIDE SERPL-SCNC: 110 MMOL/L (ref 98–107)
CO2 SERPL-SCNC: 21.5 MMOL/L (ref 22–29)
CREAT SERPL-MCNC: 2.17 MG/DL (ref 0.76–1.27)
DACRYOCYTES BLD QL SMEAR: ABNORMAL
DEPRECATED RDW RBC AUTO: 74.7 FL (ref 37–54)
DEPRECATED RDW RBC AUTO: 80.7 FL (ref 37–54)
EGFRCR SERPLBLD CKD-EPI 2021: 30.4 ML/MIN/1.73
ELLIPTOCYTES BLD QL SMEAR: ABNORMAL
EOSINOPHIL # BLD AUTO: 0.05 10*3/MM3 (ref 0–0.4)
EOSINOPHIL NFR BLD AUTO: 3.5 % (ref 0.3–6.2)
ERYTHROCYTE [DISTWIDTH] IN BLOOD BY AUTOMATED COUNT: 21.6 % (ref 12.3–15.4)
ERYTHROCYTE [DISTWIDTH] IN BLOOD BY AUTOMATED COUNT: 22.5 % (ref 12.3–15.4)
FERRITIN SERPL-MCNC: 1010 NG/ML (ref 30–400)
FOLATE SERPL-MCNC: 19.7 NG/ML (ref 4.78–24.2)
GLOBULIN UR ELPH-MCNC: 1.9 GM/DL
GLUCOSE SERPL-MCNC: 91 MG/DL (ref 65–99)
HAPTOGLOB SERPL-MCNC: 206 MG/DL (ref 30–200)
HCT VFR BLD AUTO: 17.1 % (ref 37.5–51)
HCT VFR BLD AUTO: 24.1 % (ref 37.5–51)
HCYS SERPL-MCNC: 15.8 UMOL/L (ref 0–15)
HGB BLD-MCNC: 5.3 G/DL (ref 13–17.7)
HGB BLD-MCNC: 7.6 G/DL (ref 13–17.7)
IMM GRANULOCYTES # BLD AUTO: 0.02 10*3/MM3 (ref 0–0.05)
IMM GRANULOCYTES NFR BLD AUTO: 1.4 % (ref 0–0.5)
INR PPP: 1.53 (ref 0.86–1.15)
IRON 24H UR-MRATE: 74 MCG/DL (ref 59–158)
IRON SATN MFR SERPL: 46 % (ref 20–50)
LARGE PLATELETS: ABNORMAL
LYMPHOCYTES # BLD AUTO: 0.75 10*3/MM3 (ref 0.7–3.1)
LYMPHOCYTES # BLD MANUAL: 0.92 10*3/MM3 (ref 0.7–3.1)
LYMPHOCYTES NFR BLD AUTO: 52.4 % (ref 19.6–45.3)
LYMPHOCYTES NFR BLD MANUAL: 8 % (ref 5–12)
Lab: NORMAL
MACROCYTES BLD QL SMEAR: ABNORMAL
MCH RBC QN AUTO: 30.5 PG (ref 26.6–33)
MCH RBC QN AUTO: 31.4 PG (ref 26.6–33)
MCHC RBC AUTO-ENTMCNC: 31 G/DL (ref 31.5–35.7)
MCHC RBC AUTO-ENTMCNC: 31.5 G/DL (ref 31.5–35.7)
MCV RBC AUTO: 101.2 FL (ref 79–97)
MCV RBC AUTO: 96.8 FL (ref 79–97)
MONOCYTES # BLD AUTO: 0.14 10*3/MM3 (ref 0.1–0.9)
MONOCYTES # BLD: 0.11 10*3/MM3 (ref 0.1–0.9)
MONOCYTES NFR BLD AUTO: 9.8 % (ref 5–12)
NEUTROPHILS # BLD AUTO: 0.4 10*3/MM3 (ref 1.7–7)
NEUTROPHILS NFR BLD AUTO: 0.46 10*3/MM3 (ref 1.7–7)
NEUTROPHILS NFR BLD AUTO: 32.2 % (ref 42.7–76)
NEUTROPHILS NFR BLD MANUAL: 28 % (ref 42.7–76)
PASSIVE ANTI CD-38, DTT TREATMENT: NORMAL
PLATELET # BLD AUTO: 46 10*3/MM3 (ref 140–450)
PLATELET # BLD AUTO: 51 10*3/MM3 (ref 140–450)
PMV BLD AUTO: 11.9 FL (ref 6–12)
PMV BLD AUTO: 12.6 FL (ref 6–12)
POIKILOCYTOSIS BLD QL SMEAR: ABNORMAL
POTASSIUM SERPL-SCNC: 4 MMOL/L (ref 3.5–5.2)
PROT SERPL-MCNC: 4.9 G/DL (ref 6–8.5)
PROTHROMBIN TIME: 18.7 SECONDS (ref 11.8–14.9)
RBC # BLD AUTO: 1.69 10*6/MM3 (ref 4.14–5.8)
RBC # BLD AUTO: 2.49 10*6/MM3 (ref 4.14–5.8)
RETICS # AUTO: 0.02 10*6/MM3 (ref 0.02–0.13)
RETICS/RBC NFR AUTO: 1.28 % (ref 0.7–1.9)
SMALL PLATELETS BLD QL SMEAR: ABNORMAL
SODIUM SERPL-SCNC: 140 MMOL/L (ref 136–145)
TIBC SERPL-MCNC: 159 MCG/DL (ref 298–536)
TRANSFERRIN SERPL-MCNC: 107 MG/DL (ref 200–360)
VARIANT LYMPHS NFR BLD MANUAL: 64 % (ref 19.6–45.3)
VIT B12 BLD-MCNC: 441 PG/ML (ref 211–946)
WBC MORPH BLD: NORMAL
WBC NRBC COR # BLD AUTO: 1.43 10*3/MM3 (ref 3.4–10.8)
WBC NRBC COR # BLD AUTO: 1.81 10*3/MM3 (ref 3.4–10.8)

## 2025-01-03 PROCEDURE — 25010000002 FILGRASTIM PER 480 MCG: Performed by: INTERNAL MEDICINE

## 2025-01-03 PROCEDURE — 85007 BL SMEAR W/DIFF WBC COUNT: CPT | Performed by: INTERNAL MEDICINE

## 2025-01-03 PROCEDURE — 77012 CT SCAN FOR NEEDLE BIOPSY: CPT

## 2025-01-03 PROCEDURE — 86900 BLOOD TYPING SEROLOGIC ABO: CPT

## 2025-01-03 PROCEDURE — 80053 COMPREHEN METABOLIC PANEL: CPT | Performed by: INTERNAL MEDICINE

## 2025-01-03 PROCEDURE — 97165 OT EVAL LOW COMPLEX 30 MIN: CPT

## 2025-01-03 PROCEDURE — 83540 ASSAY OF IRON: CPT | Performed by: INTERNAL MEDICINE

## 2025-01-03 PROCEDURE — 85025 COMPLETE CBC W/AUTO DIFF WBC: CPT | Performed by: INTERNAL MEDICINE

## 2025-01-03 PROCEDURE — 83090 ASSAY OF HOMOCYSTEINE: CPT | Performed by: INTERNAL MEDICINE

## 2025-01-03 PROCEDURE — 82746 ASSAY OF FOLIC ACID SERUM: CPT | Performed by: INTERNAL MEDICINE

## 2025-01-03 PROCEDURE — 07DR3ZZ EXTRACTION OF ILIAC BONE MARROW, PERCUTANEOUS APPROACH: ICD-10-PCS | Performed by: INTERNAL MEDICINE

## 2025-01-03 PROCEDURE — 83010 ASSAY OF HAPTOGLOBIN QUANT: CPT | Performed by: INTERNAL MEDICINE

## 2025-01-03 PROCEDURE — 25010000002 FENTANYL CITRATE (PF) 50 MCG/ML SOLUTION: Performed by: RADIOLOGY

## 2025-01-03 PROCEDURE — 82728 ASSAY OF FERRITIN: CPT | Performed by: INTERNAL MEDICINE

## 2025-01-03 PROCEDURE — 85610 PROTHROMBIN TIME: CPT | Performed by: INTERNAL MEDICINE

## 2025-01-03 PROCEDURE — P9040 RBC LEUKOREDUCED IRRADIATED: HCPCS

## 2025-01-03 PROCEDURE — 82784 ASSAY IGA/IGD/IGG/IGM EACH: CPT | Performed by: INTERNAL MEDICINE

## 2025-01-03 PROCEDURE — 85045 AUTOMATED RETICULOCYTE COUNT: CPT | Performed by: INTERNAL MEDICINE

## 2025-01-03 PROCEDURE — 25010000002 CEFTRIAXONE PER 250 MG: Performed by: INTERNAL MEDICINE

## 2025-01-03 PROCEDURE — 82607 VITAMIN B-12: CPT | Performed by: INTERNAL MEDICINE

## 2025-01-03 PROCEDURE — 36430 TRANSFUSION BLD/BLD COMPNT: CPT

## 2025-01-03 PROCEDURE — 86334 IMMUNOFIX E-PHORESIS SERUM: CPT | Performed by: INTERNAL MEDICINE

## 2025-01-03 PROCEDURE — 83521 IG LIGHT CHAINS FREE EACH: CPT | Performed by: INTERNAL MEDICINE

## 2025-01-03 PROCEDURE — 85027 COMPLETE CBC AUTOMATED: CPT | Performed by: INTERNAL MEDICINE

## 2025-01-03 PROCEDURE — P9016 RBC LEUKOCYTES REDUCED: HCPCS

## 2025-01-03 PROCEDURE — 84466 ASSAY OF TRANSFERRIN: CPT | Performed by: INTERNAL MEDICINE

## 2025-01-03 RX ORDER — FENTANYL CITRATE 50 UG/ML
INJECTION, SOLUTION INTRAMUSCULAR; INTRAVENOUS AS NEEDED
Status: COMPLETED | OUTPATIENT
Start: 2025-01-03 | End: 2025-01-03

## 2025-01-03 RX ORDER — LIDOCAINE HYDROCHLORIDE 20 MG/ML
INJECTION, SOLUTION INFILTRATION; PERINEURAL
Status: DISPENSED
Start: 2025-01-03 | End: 2025-01-04

## 2025-01-03 RX ADMIN — SODIUM CHLORIDE 1000 MG: 9 INJECTION INTRAMUSCULAR; INTRAVENOUS; SUBCUTANEOUS at 14:14

## 2025-01-03 RX ADMIN — Medication 10 ML: at 20:26

## 2025-01-03 RX ADMIN — Medication 10 ML: at 08:55

## 2025-01-03 RX ADMIN — FENTANYL CITRATE 25 MCG: 50 INJECTION, SOLUTION INTRAMUSCULAR; INTRAVENOUS at 13:08

## 2025-01-03 RX ADMIN — FILGRASTIM 480 MCG: 480 INJECTION, SOLUTION INTRAVENOUS; SUBCUTANEOUS at 18:05

## 2025-01-03 NOTE — PLAN OF CARE
Goal Outcome Evaluation:  Plan of Care Reviewed With: patient        Progress: no change  Outcome Evaluation: No deficits identified.  Patient is currently at a modified independent level with transfers, self-care and functional mobility using his quad cane.  Patient agreeable that no additional therapy services are necessary at this time    Anticipated Discharge Disposition (OT): home

## 2025-01-03 NOTE — PROGRESS NOTES
[facility]     Progress Note    Patient Name: Blair Lira  : 1945  MRN: [id number]  Primary Care Physician:  Babs Summers APRN  Date of admission: 2025    Subjective   Subjective     Chief Complaint: Patient to get bone marrow aspiration and biopsy today we will hold off on transfusion until bone marrow biopsy is done    HPI: Patient with severe anemia needs a bone marrow aspiration to assess the status of multiple myeloma    Review of Systems   All systems were reviewed and negative except for: Has been reviewed    Objective   Objective     Vitals:   Temp:  [97.2 °F (36.2 °C)-98.4 °F (36.9 °C)] 98 °F (36.7 °C)  Heart Rate:  [60-88] 62  Resp:  [16-18] 16  BP: (100-138)/(44-72) 118/50    Physical Exam    Constitutional: Alert and oriented not in acute   Eyes: Pupils equal reacting to light and accommodation   HENT: Normal   Neck: Normal   Respiratory: No rales or rhonchi   Cardiovascular: S1-S2 normal no S3 or S4   Gastrointestinal: No palpable organomegaly   Musculoskeletal: No deformities   Neurologic:  NO Localizing signs  Skin: No rash       Result Review    Result Review:  I have personally reviewed the results from the time of this admission to 1/3/2025 08:53 EST and agree with these findings:  [x]  Laboratory  []  Microbiology  []  Radiology  []  EKG/Telemetry   []  Cardiology/Vascular   []  Pathology  []  Old records  []  Other:  Most notable findings include: Have been reviewed and discussed    Assessment & Plan   Assessment / Plan     Brief Patient Summary:  Blair Lira is a 79 y.o. male who to get bone marrow aspiration and biopsy and transfusions    Active Hospital Problems:  Active Hospital Problems    Diagnosis     **Anemia        Plan:   Continue current management    VTE Prophylaxis:  Mechanical VTE prophylaxis orders are present.        CODE STATUS:   Level Of Support Discussed With: Patient  Code Status (Patient has no pulse and is not breathing): CPR (Attempt to  Resuscitate)  Medical Interventions (Patient has pulse or is breathing): Full Support    Disposition:  I expect patient to be discharged after the patient has been stabilized.    Electronically signed by Vamsi Mason MD, 01/03/25, 8:53 AM EST.      Part of this note may be an electronic transcription/translation of spoken language to printed text using the Dragon Dictation System.

## 2025-01-03 NOTE — THERAPY EVALUATION
Patient Name: Blair Lira  : 1945    MRN: [id number]                              Today's Date: 1/3/2025       Admit Date: 2025    Visit Dx: No diagnosis found.  Patient Active Problem List   Diagnosis    Rotator cuff tear, right    Impingement syndrome of right shoulder    Acute renal failure superimposed on chronic kidney disease    Pancytopenia due to chemotherapy    Abnormal weight loss    Hypertension    CKD (chronic kidney disease) stage 3, GFR 30-59 ml/min    Moderate malnutrition    Weakness generalized    Intractable pain    Sepsis associated hypotension    Multiple myeloma    Edema    Chronic diastolic (congestive) heart failure    Acute on chronic HFrEF (heart failure with reduced ejection fraction)    Chest pain    Syncope    COVID-19 virus infection    Chronic kidney disease, stage IV (severe)    Hypotension    Anemia    Acute on chronic renal failure    Pneumonia    Anemia    Acute febrile illness    Mucus plugging of bronchi    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    Pneumonia due to methicillin susceptible Staphylococcus aureus (MSSA)    Femoral neck fracture    Acute blood loss anemia    Pancytopenia     Past Medical History:   Diagnosis Date    BPH (benign prostatic hyperplasia)     Cancer     Chronic kidney disease     Femoral neck fracture 2024    Frozen shoulder     Hyperlipidemia     Hypertension 10/26/2023    Multiple myeloma     Periarthritis of shoulder     Rotator cuff disorder, right     Tennis elbow     RIGHT     Past Surgical History:   Procedure Laterality Date    BRONCHOSCOPY N/A 2024    Procedure: BRONCHOSCOPY WITH BAL AND WASHINGS;  Surgeon: Dionte Ruvalcaba MD;  Location: [facility] ENDOSCOPY;  Service: Pulmonary;  Laterality: N/A;  MUCUS PLUGGING    CATARACT EXTRACTION EXTRACAPSULAR W/ INTRAOCULAR LENS IMPLANTATION Bilateral     COLONOSCOPY      HIP HEMIARTHROPLASTY Left  4/30/2024    Procedure: TOTAL HIP ARTHROPLASTY  ANTERIOR APROACH;  Surgeon: Adán Osborne MD;  Location: [facility] MAIN OR;  Service: Orthopedics;  Laterality: Left;    JOINT REPLACEMENT Right     THR    SHOULDER ARTHROSCOPY W/ ROTATOR CUFF REPAIR Right 3/29/2023    Procedure: SHOULDER ARTHROSCOPY WITH ROTATOR CUFF REPAIR, SUBCROMIAL DECOMPRESSION,DISTAL CLAVICLE RESECTION;  Surgeon: Gustavo Samuels MD;  [place] [facility] [place] [facility] [place];  Service: Orthopedics;  Laterality: Right;    SHOULDER SURGERY Left     SCOPE      General Information       Row Name 01/03/25 0956          OT Time and Intention    Document Type evaluation  -PG     Mode of Treatment individual therapy;occupational therapy  -PG       Row Name 01/03/25 0956          General Information    Patient Profile Reviewed yes  Patient reports living alone, independent with all self-care and transfers.  Uses quad cane for transfers and functional mobility  -PG     Prior Level of Function independent:;transfer;ADL's  -PG     Existing Precautions/Restrictions no known precautions/restrictions  -PG     Barriers to Rehab none identified  -PG       Row Name 01/03/25 0956          Occupational Profile    Reason for Services/Referral (Occupational Profile) Patient is a 79-year-old male admitted for anemia/generalized weakness.  Patient is being evaluated by Occupational Therapy due to recent decline in ADL function.  No previous OT services identified  -PG       Row Name 01/03/25 0956          Living Environment    People in Home alone  -PG       Row Name 01/03/25 0956          Cognition    Orientation Status (Cognition) oriented x 3  -PG               User Key  (r) = Recorded By, (t) = Taken By, (c) = Cosigned By      Initials Name Provider Type    PG Vipul Stallings, ROSA M Occupational Therapist                     Mobility/ADL's       Row Name 01/03/25 0959          Transfers    Transfers sit-stand transfer;stand-sit transfer  -PG       Row Name 01/03/25 0959           Sit-Stand Transfer    Sit-Stand [place] (Transfers) modified independence  -     Assistive Device (Sit-Stand Transfers) cane, quad  -PG       Row Name 01/03/25 0959          Stand-Sit Transfer    Stand-Sit [place] (Transfers) modified independence  -     Assistive Device (Stand-Sit Transfers) cane, quad  -PG       Row Name 01/03/25 0959          Activities of Daily Living    BADL Assessment/Intervention bathing;upper body dressing;lower body dressing;grooming;toileting  -PG       Row Name 01/03/25 0959          Bathing Assessment/Intervention    [place] Level (Bathing) bathing skills;modified independence  -PG       Row Name 01/03/25 0959          Upper Body Dressing Assessment/Training    [place] Level (Upper Body Dressing) upper body dressing skills;modified independence  -PG       Row Name 01/03/25 0959          Lower Body Dressing Assessment/Training    [place] Level (Lower Body Dressing) lower body dressing skills;modified independence  -PG       Row Name 01/03/25 0959          Grooming Assessment/Training    [place] Level (Grooming) grooming skills;modified independence  -PG       Row Name 01/03/25 0959          Toileting Assessment/Training    [place] Level (Toileting) toileting skills;modified independence  -PG               User Key  (r) = Recorded By, (t) = Taken By, (c) = Cosigned By      Initials Name Provider Type    PG Vipul Stallings OT Occupational Therapist                   Obj/Interventions       Row Name 01/03/25 0959          Sensory Assessment (Somatosensory)    Sensory Assessment (Somatosensory) sensation intact  -PG       Row Name 01/03/25 0959          Vision Assessment/Intervention    Visual Impairment/Limitations WFL  -PG       Row Name 01/03/25 0959          Range of Motion Comprehensive    General Range of Motion no range of motion deficits identified  -PG       Row Name 01/03/25 0959          Strength Comprehensive (MMT)    General Manual Muscle  Testing (MMT) Assessment no strength deficits identified  -PG       Row Name 01/03/25 0959          Motor Skills    Motor Skills coordination;functional endurance  -PG     Coordination WFL  -PG     Functional Endurance good minus  -PG               User Key  (r) = Recorded By, (t) = Taken By, (c) = Cosigned By      Initials Name Provider Type    PG Vipul Stallings OT Occupational Therapist                   Goals/Plan    No documentation.                  Clinical Impression       Row Name 01/03/25 1000          Pain Assessment    Pretreatment Pain Rating 0/10 - no pain  -PG     Posttreatment Pain Rating 0/10 - no pain  -PG       Row Name 01/03/25 1000          Plan of Care Review    Plan of Care Reviewed With patient  -PG     Progress no change  -PG     Outcome Evaluation No deficits identified.  Patient is currently at a modified independent level with transfers, self-care and functional mobility using his quad cane.  Patient agreeable that no additional therapy services are necessary at this time  -PG       Row Name 01/03/25 1000          Therapy Assessment/Plan (OT)    Criteria for Skilled Therapeutic Interventions Met (OT) no;no problems identified which require skilled intervention  -PG     Therapy Frequency (OT) evaluation only  -PG       Row Name 01/03/25 1000          Therapy Plan Review/Discharge Plan (OT)    Anticipated Discharge Disposition (OT) home  -PG               User Key  (r) = Recorded By, (t) = Taken By, (c) = Cosigned By      Initials Name Provider Type    Vipul Torres OT Occupational Therapist                   Outcome Measures       Row Name 01/03/25 1001          How much help from another is currently needed...    Putting on and taking off regular lower body clothing? 4  -PG     Bathing (including washing, rinsing, and drying) 4  -PG     Toileting (which includes using toilet bed pan or urinal) 4  -PG     Putting on and taking off regular upper body clothing 4  -PG     Taking care of  personal grooming (such as brushing teeth) 4  -PG     Eating meals 4  -PG     AM-PAC 6 Clicks Score (OT) 24  -PG       Row Name 01/03/25 0800          How much help from another person do you currently need...    Turning from your back to your side while in flat bed without using bedrails? 4  -AS     Moving from lying on back to sitting on the side of a flat bed without bedrails? 4  -AS     Moving to and from a bed to a chair (including a wheelchair)? 4  -AS     Standing up from a chair using your arms (e.g., wheelchair, bedside chair)? 4  -AS     Climbing 3-5 steps with a railing? 3  -AS     To walk in hospital room? 4  -AS     AM-PAC 6 Clicks Score (PT) 23  -AS       Row Name 01/03/25 1001          Functional Assessment    Outcome Measure Options AM-[facility] 6 Clicks Daily Activity (OT);Optimal Instrument  -PG       Row Name 01/03/25 1001          Optimal Instrument    Optimal Instrument Optimal - 3  -PG     Bending/Stooping 1  -PG     Standing 1  -PG     Reaching 1  -PG     From the list, choose the 3 activities you would most like to be able to do without any difficulty Bending/stooping;Standing;Reaching  -PG     Total Score Optimal - 3 3  -PG               User Key  (r) = Recorded By, (t) = Taken By, (c) = Cosigned By      Initials Name Provider Type    PG Vipul Stallings, ROSA M Occupational Therapist    AS Wilfredo Iglesias, RN Registered Nurse                    Occupational Therapy Education        No education to display                  OT Recommendation and Plan  Therapy Frequency (OT): evaluation only  Plan of Care Review  Plan of Care Reviewed With: patient  Progress: no change  Outcome Evaluation: No deficits identified.  Patient is currently at a modified independent level with transfers, self-care and functional mobility using his quad cane.  Patient agreeable that no additional therapy services are necessary at this time     Time Calculation:   Evaluation Complexity (OT)  Review Occupational Profile/Medical/Therapy  History Complexity: brief/low complexity  Assessment, Occupational Performance/Identification of Deficit Complexity: 1-3 performance deficits  Clinical Decision Making Complexity (OT): problem focused assessment/low complexity  Overall Complexity of Evaluation (OT): low complexity     Time Calculation- OT       Row Name 01/03/25 1002             Time Calculation- OT    OT Received On 01/03/25  -PG         Untimed Charges    OT Eval/Re-eval Minutes 30  -PG         Total Minutes    Untimed Charges Total Minutes 30  -PG       Total Minutes 30  -PG                User Key  (r) = Recorded By, (t) = Taken By, (c) = Cosigned By      Initials Name Provider Type    PG Vipul Stallings OT Occupational Therapist                  Therapy Charges for Today       Code Description Service Date Service Provider Modifiers Qty    39293134127 HC OT EVAL LOW COMPLEXITY 2 1/3/2025 Vipul Stallings OT GO 1                 Vipul Stallings OT  1/3/2025

## 2025-01-03 NOTE — H&P
[facility]   Interventional Radiology H&P    Patient Name: Blair Lira  : 1945  MRN: [id number]  Primary Care Physician:  Babs Summers APRN  Referring Physician: Vamsi Mason MD  Date of admission: 2025    Subjective   Subjective     HPI:  Blair Lira is a 79 y.o. male with pancytopenia    Review of Systems:   Constitutional no fever,  no weight loss       Otolaryngeal no difficulty swallowing   Cardiovascular no chest pain   Pulmonary no cough, no sputum production   Gastrointestinal no constipation, no diarrhea                         Personal History       Past Medical/Surgical History:   Past Medical History:   Diagnosis Date    BPH (benign prostatic hyperplasia)     Cancer     Chronic kidney disease     Femoral neck fracture 2024    Frozen shoulder     Hyperlipidemia     Hypertension 10/26/2023    Multiple myeloma     Periarthritis of shoulder     Rotator cuff disorder, right     Tennis elbow     RIGHT     Past Surgical History:   Procedure Laterality Date    BRONCHOSCOPY N/A 2024    Procedure: BRONCHOSCOPY WITH BAL AND WASHINGS;  Surgeon: Dionte Ruvalcaba MD;  Location: [facility] ENDOSCOPY;  Service: Pulmonary;  Laterality: N/A;  MUCUS PLUGGING    CATARACT EXTRACTION EXTRACAPSULAR W/ INTRAOCULAR LENS IMPLANTATION Bilateral     COLONOSCOPY      HIP HEMIARTHROPLASTY Left 2024    Procedure: TOTAL HIP ARTHROPLASTY  ANTERIOR APROACH;  Surgeon: Adán Osborne MD;  Location: [facility] MAIN OR;  Service: Orthopedics;  Laterality: Left;    JOINT REPLACEMENT Right     THR    SHOULDER ARTHROSCOPY W/ ROTATOR CUFF REPAIR Right 3/29/2023    Procedure: SHOULDER ARTHROSCOPY WITH ROTATOR CUFF REPAIR, SUBCROMIAL DECOMPRESSION,DISTAL CLAVICLE RESECTION;  Surgeon: Gustavo Samuels MD;  [place] [facility] [place] [facility] [place]  Service: Orthopedics;  Laterality: Right;    SHOULDER SURGERY Left     SCOPE       Social History:  reports that he has never smoked. He has never been exposed to tobacco smoke.  He has never used smokeless tobacco. He reports that he does not currently use alcohol. He reports that he does not use drugs.    Medications:  Medications Prior to Admission   Medication Sig Dispense Refill Last Dose/Taking    allopurinol (ZYLOPRIM) 100 MG tablet Take 1 tablet by mouth Daily.   1/1/2025    atorvastatin (LIPITOR) 10 MG tablet Take 1 tablet by mouth Daily.   1/1/2025    colchicine 0.6 MG tablet Take 1 tablet by mouth 2 (Two) Times a Day As Needed.   1/1/2025    Eliquis 5 MG tablet tablet Take 1 tablet by mouth 2 (Two) Times a Day.   1/1/2025    gabapentin (NEURONTIN) 400 MG capsule Take 1 capsule by mouth 2 (Two) Times a Day.   1/1/2025    lenalidomide (REVLIMID) 10 MG capsule Take 1 capsule by mouth Daily.   1/1/2025    montelukast (SINGULAIR) 10 MG tablet Take 1 tablet by mouth Every Night. 30 tablet 2 1/1/2025    probenecid (BENEMID) 500 MG tablet Take 1 tablet by mouth 2 (Two) Times a Day.   1/1/2025    solifenacin (VESICARE) 5 MG tablet Take 1 tablet by mouth Daily.   1/1/2025    tamsulosin (FLOMAX) 0.4 MG capsule 24 hr capsule Take 1 capsule by mouth Daily.   1/1/2025    dexAMETHasone (DECADRON) 4 MG tablet Take 1 tablet by mouth Every 12 (Twelve) Hours.   Unknown     Current medications:  cefTRIAXone, 1,000 mg, Intravenous, Q24H  filgrastim (NEUPOGEN) injection, 480 mcg, Subcutaneous, Q PM  sodium chloride, 10 mL, Intravenous, Q12H      Current IV drips:       Allergies:  No Known Allergies    Objective    Objective     Vitals:   Temp:  [97.2 °F (36.2 °C)-98.5 °F (36.9 °C)] 98.5 °F (36.9 °C)  Heart Rate:  [55-65] 58  Resp:  [12-18] 12  BP: (100-145)/(44-71) 145/71      Physical Exam:   Constitutional: Awake, alert, No acute distress    Respiratory: Clear to auscultation bilaterally, nonlabored respirations    Cardiovascular: RRR, no murmurs, rubs, or gallops      ASA SCALE ASSESSMENT:  3-Severe systemic disease that results in functional limitation    MALLAMPATI CLASSIFICATION:  3-Only the  "soft palate and base of uvula are visible       Result Review        Result Review:     Sodium   Date Value Ref Range Status   01/03/2025 140 136 - 145 mmol/L Final   01/02/2025 139 136 - 145 mmol/L Final       Potassium   Date Value Ref Range Status   01/03/2025 4.0 3.5 - 5.2 mmol/L Final   01/02/2025 4.1 3.5 - 5.2 mmol/L Final       Chloride   Date Value Ref Range Status   01/03/2025 110 (H) 98 - 107 mmol/L Final   01/02/2025 110 (H) 98 - 107 mmol/L Final       No results found for: \"PLASMABICARB\"    BUN   Date Value Ref Range Status   01/03/2025 21 8 - 23 mg/dL Final   01/02/2025 18 8 - 23 mg/dL Final       Creatinine   Date Value Ref Range Status   01/03/2025 2.17 (H) 0.76 - 1.27 mg/dL Final   01/02/2025 2.05 (H) 0.76 - 1.27 mg/dL Final       Calcium   Date Value Ref Range Status   01/03/2025 7.8 (L) 8.6 - 10.5 mg/dL Final   01/02/2025 8.1 (L) 8.6 - 10.5 mg/dL Final           No components found for: \"GLUCOSE.*\"  Results from last 7 days   Lab Units 01/03/25  0609   WBC 10*3/mm3 1.43*   HEMOGLOBIN g/dL 5.3*   HEMATOCRIT % 17.1*   PLATELETS 10*3/mm3 51*      Results from last 7 days   Lab Units 01/03/25  0609   INR  1.53*           Assessment / Plan     Assesment:   Pancytopenia      Plan:   CT guided bone marrow aspiration    The risks and benefits of the procedure were discussed with the patient.    Electronically signed by Juno Santana MD, 01/03/25, 12:51 PM EST.    "

## 2025-01-03 NOTE — PLAN OF CARE
Goal Outcome Evaluation:  Plan of Care Reviewed With: patient        Progress: improving  Outcome Evaluation: Patient received two units PRBCs and bone marrow biopsy today. Looks and feels better. Possible discharge tomorrow.

## 2025-01-03 NOTE — PLAN OF CARE
Goal Outcome Evaluation:Patient alert and oriented x4, able to make needs known. Patient to go for procedure of bone marrow aspiration today, patient has been NPO since midnight.  Patient to receive 2 units of blood today when labs that were sent to [place] on 1/2/25 are resulted.  Patient denied any pain or discomfort.  Patient educated that if felt weak or dizzy to call out for assistance and not to attempt to get up on his own. Call light within reach.  Continue plan of care.

## 2025-01-03 NOTE — SIGNIFICANT NOTE
01/03/25 0810   Physical Therapy Time and Intention   Session Not Performed other (see comments)  (hgb at 5.3)

## 2025-01-04 ENCOUNTER — READMISSION MANAGEMENT (OUTPATIENT)
Dept: CALL CENTER | Facility: HOSPITAL | Age: 80
End: 2025-01-04
Payer: MEDICARE

## 2025-01-04 VITALS
DIASTOLIC BLOOD PRESSURE: 62 MMHG | BODY MASS INDEX: 24.34 KG/M2 | WEIGHT: 183.64 LBS | HEIGHT: 73 IN | RESPIRATION RATE: 18 BRPM | OXYGEN SATURATION: 98 % | HEART RATE: 74 BPM | SYSTOLIC BLOOD PRESSURE: 140 MMHG | TEMPERATURE: 97.8 F

## 2025-01-04 LAB
ANION GAP SERPL CALCULATED.3IONS-SCNC: 10 MMOL/L (ref 5–15)
ANISOCYTOSIS BLD QL: NORMAL
BASOPHILS # BLD AUTO: 0.04 10*3/MM3 (ref 0–0.2)
BASOPHILS NFR BLD AUTO: 2.1 % (ref 0–1.5)
BH BB BLOOD EXPIRATION DATE: NORMAL
BH BB BLOOD EXPIRATION DATE: NORMAL
BH BB BLOOD TYPE BARCODE: 6200
BH BB BLOOD TYPE BARCODE: 6200
BH BB DISPENSE STATUS: NORMAL
BH BB DISPENSE STATUS: NORMAL
BH BB PRODUCT CODE: NORMAL
BH BB PRODUCT CODE: NORMAL
BH BB UNIT NUMBER: NORMAL
BH BB UNIT NUMBER: NORMAL
BUN SERPL-MCNC: 22 MG/DL (ref 8–23)
BUN/CREAT SERPL: 10.7 (ref 7–25)
CALCIUM SPEC-SCNC: 7.8 MG/DL (ref 8.6–10.5)
CHLORIDE SERPL-SCNC: 105 MMOL/L (ref 98–107)
CO2 SERPL-SCNC: 21 MMOL/L (ref 22–29)
CREAT SERPL-MCNC: 2.06 MG/DL (ref 0.76–1.27)
CROSSMATCH INTERPRETATION: NORMAL
CROSSMATCH INTERPRETATION: NORMAL
DEPRECATED RDW RBC AUTO: 76.9 FL (ref 37–54)
EGFRCR SERPLBLD CKD-EPI 2021: 32.4 ML/MIN/1.73
EOSINOPHIL # BLD AUTO: 0.05 10*3/MM3 (ref 0–0.4)
EOSINOPHIL NFR BLD AUTO: 2.6 % (ref 0.3–6.2)
ERYTHROCYTE [DISTWIDTH] IN BLOOD BY AUTOMATED COUNT: 23.3 % (ref 12.3–15.4)
GLUCOSE SERPL-MCNC: 87 MG/DL (ref 65–99)
HCT VFR BLD AUTO: 23.6 % (ref 37.5–51)
HGB BLD-MCNC: 7.5 G/DL (ref 13–17.7)
IMM GRANULOCYTES # BLD AUTO: 0.22 10*3/MM3 (ref 0–0.05)
IMM GRANULOCYTES NFR BLD AUTO: 11.3 % (ref 0–0.5)
LYMPHOCYTES # BLD AUTO: 0.79 10*3/MM3 (ref 0.7–3.1)
LYMPHOCYTES NFR BLD AUTO: 40.5 % (ref 19.6–45.3)
Lab: NORMAL
MACROCYTES BLD QL SMEAR: NORMAL
MCH RBC QN AUTO: 30.4 PG (ref 26.6–33)
MCHC RBC AUTO-ENTMCNC: 31.8 G/DL (ref 31.5–35.7)
MCV RBC AUTO: 95.5 FL (ref 79–97)
MONOCYTES # BLD AUTO: 0.18 10*3/MM3 (ref 0.1–0.9)
MONOCYTES NFR BLD AUTO: 9.2 % (ref 5–12)
NEUTROPHILS NFR BLD AUTO: 0.67 10*3/MM3 (ref 1.7–7)
NEUTROPHILS NFR BLD AUTO: 34.3 % (ref 42.7–76)
NRBC BLD AUTO-RTO: 0 /100 WBC (ref 0–0.2)
PLATELET # BLD AUTO: 45 10*3/MM3 (ref 140–450)
PMV BLD AUTO: 12 FL (ref 6–12)
POIKILOCYTOSIS BLD QL SMEAR: NORMAL
POTASSIUM SERPL-SCNC: 3.7 MMOL/L (ref 3.5–5.2)
RBC # BLD AUTO: 2.47 10*6/MM3 (ref 4.14–5.8)
RETICS # AUTO: 0.03 10*6/MM3 (ref 0.02–0.13)
RETICS/RBC NFR AUTO: 1.34 % (ref 0.7–1.9)
SMALL PLATELETS BLD QL SMEAR: NORMAL
SODIUM SERPL-SCNC: 136 MMOL/L (ref 136–145)
UNIT  ABO: NORMAL
UNIT  ABO: NORMAL
UNIT  RH: NORMAL
UNIT  RH: NORMAL
WBC MORPH BLD: NORMAL
WBC NRBC COR # BLD AUTO: 1.95 10*3/MM3 (ref 3.4–10.8)

## 2025-01-04 PROCEDURE — 80048 BASIC METABOLIC PNL TOTAL CA: CPT | Performed by: INTERNAL MEDICINE

## 2025-01-04 PROCEDURE — 85025 COMPLETE CBC W/AUTO DIFF WBC: CPT | Performed by: INTERNAL MEDICINE

## 2025-01-04 PROCEDURE — 85045 AUTOMATED RETICULOCYTE COUNT: CPT | Performed by: INTERNAL MEDICINE

## 2025-01-04 PROCEDURE — 85007 BL SMEAR W/DIFF WBC COUNT: CPT | Performed by: INTERNAL MEDICINE

## 2025-01-04 RX ORDER — ACETAMINOPHEN 325 MG/1
650 TABLET ORAL EVERY 6 HOURS PRN
Status: DISCONTINUED | OUTPATIENT
Start: 2025-01-04 | End: 2025-01-04 | Stop reason: HOSPADM

## 2025-01-04 RX ADMIN — Medication 10 ML: at 08:54

## 2025-01-04 RX ADMIN — ACETAMINOPHEN 650 MG: 325 TABLET ORAL at 10:58

## 2025-01-04 NOTE — PLAN OF CARE
Goal Outcome Evaluation:  Patient alert and oriented x4, able to make needs known.  Patient with no needs or concerns voiced at this time. Dressing to bone marrow aspiration site is clean, dry, and intact.  Patient with no acute events thus far this shift.  Patient possible discharge today.  Continue plan of care.

## 2025-01-04 NOTE — PLAN OF CARE
Goal Outcome Evaluation:  Plan of Care Reviewed With: patient        Progress: improving  Outcome Evaluation: Patient discharged as ordered per MD. Patient is currently waiting for his granddaughter to arrive to transport him home. Vss. Patient denies any pain or discomfort at this time. Plan of care reviewed with charge nurse, KAPIL Boudreaux.

## 2025-01-04 NOTE — DISCHARGE SUMMARY
[facility]         DISCHARGE SUMMARY    Patient Name: Blair Lira  : 1945  MRN: [id number]    Date of Admission: 2025  Date of Discharge: 2025  Primary Care Physician: Babs Summers APRN    Consults       No orders found from 2024 to 1/3/2025.            Presenting Problem:   Anemia [D64.9]    Active and Resolved Hospital Problems:  Active Hospital Problems    Diagnosis POA    **Anemia [D64.9] Yes      Resolved Hospital Problems   No resolved problems to display.         Hospital Course     Hospital Course:  Blair Lira is a 79 y.o. male with history of multiple myeloma and most likely chemo induced pancytopenia however there was a concern of myelodysplastic syndrome and therefore a bone marrow aspiration and biopsy was performed the results are still pending in the meantime patient was also transfused with 2 units of packed RBCs hemoglobin has gone up to 7.1 he was given filgrastim because of the neutropenia and that is also improved platelet counts are stable patient is afebrile vital signs stable there is no evidence of any infection he is therefore being discharged home he will be coming to the office to get his Procrit injections on a weekly basis and I will see him in 2 to 3 weeks to decide about what to do about his next treatment options for multiple myeloma      DISCHARGE Follow Up Recommendations for labs and diagnostics: Patient with multiple myeloma renal failure but stable at this time      Pertinent  and/or Most Recent Results     LAB RESULTS:      Lab 25  0637 25  1543 25  0609 25  1620 25  1326 25  1240   WBC 1.95* 1.81* 1.43*  --   --  1.42*   HEMOGLOBIN 7.5* 7.6* 5.3*  --   --  5.6*   HEMATOCRIT 23.6* 24.1* 17.1*  --   --  18.8*   PLATELETS 45* 46* 51*  --   --  66*   NEUTROS ABS 0.67*  --  0.46*  0.40*  --   --   --    IMMATURE GRANS (ABS) 0.22*  --  0.02  --   --   --    LYMPHS ABS 0.79  --  0.75  --   --   --     MONOS ABS 0.18  --  0.14  --   --   --    EOS ABS 0.05  --  0.05  --   --   --    MCV 95.5 96.8 101.2*  --   --  104.4*   LACTATE  --   --   --  1.5 3.8*  --    PROTIME  --   --  18.7*  --   --   --          Lab 01/04/25  0637 01/03/25  0609 01/02/25  1240   SODIUM 136 140 139   POTASSIUM 3.7 4.0 4.1   CHLORIDE 105 110* 110*   CO2 21.0* 21.5* 19.3*   ANION GAP 10.0 8.5 9.7   BUN 22 21 18   CREATININE 2.06* 2.17* 2.05*   EGFR 32.4* 30.4* 32.6*   GLUCOSE 87 91 111*   CALCIUM 7.8* 7.8* 8.1*         Lab 01/03/25  0609 01/02/25  1240   TOTAL PROTEIN 4.9* 5.3*   ALBUMIN 3.0* 3.0*   GLOBULIN 1.9 2.3   ALT (SGPT) 5 6   AST (SGOT) 5 6   BILIRUBIN 0.5 0.5   ALK PHOS 60 67         Lab 01/03/25  0609   PROTIME 18.7*   INR 1.53*             Lab 01/03/25  0609 01/02/25  1240   IRON 74  --    IRON SATURATION (TSAT) 46  --    TIBC 159*  --    TRANSFERRIN 107*  --    FERRITIN 1,010.00*  --    FOLATE 19.70  --    VITAMIN B 12 441  --    ABO TYPING  --  A   RH TYPING  --  Positive   ANTIBODY SCREEN  --  Positive         Brief Urine Lab Results  (Last result in the past 365 days)        Color   Clarity   Blood   Leuk Est   Nitrite   Protein   CREAT   Urine HCG        01/02/25 1555 Yellow   Clear   Negative   Negative   Negative   30 mg/dL (1+)                 Microbiology Results (last 10 days)       Procedure Component Value - Date/Time    Blood Culture - Blood, Arm, Right [[id number]]  (Normal) Collected: 01/02/25 1326    Lab Status: Preliminary result Specimen: Blood from Arm, Right Updated: 01/03/25 1345     Blood Culture No growth at 24 hours    Narrative:      Less than seven (7) mL's of blood was collected.  Insufficient quantity may yield false negative results.    Blood Culture - Blood, Arm, Right [[id number]]  (Normal) Collected: 01/02/25 1326    Lab Status: Preliminary result Specimen: Blood from Arm, Right Updated: 01/03/25 1345     Blood Culture No growth at 24 hours    Narrative:      Less than seven (7) mL's of blood was  collected.  Insufficient quantity may yield false negative results.            CT Bone marrow biopsy and aspiration    Result Date: 1/3/2025  Impression: Impression: Successful bone marrow aspiration without evidence of complication Electronically Signed: Juno Santana MD  1/3/2025 2:31 PM EST  Workstation ID: [id number]    XR Bone Survey Complete    Result Date: 1/2/2025  Impression: Impression: 1. No lytic or sclerotic bony lesion. 2. Chronic elevation of the right hemidiaphragm with minimal right basilar atelectasis unchanged from prior study. Electronically Signed: Nathan Deleon MD  1/2/2025 4:35 PM EST  Workstation ID: [id number]     Results for orders placed during the hospital encounter of 02/18/24    Duplex Venous Lower Extremity LEFT    Interpretation Summary    Acute left lower extremity deep vein thrombosis noted in the popliteal.    Sub-acute left lower extremity deep vein thrombosis noted in the posterial tibial and peroneal.    Chronic left lower extremity superficial thrombophlebitis noted in the small saphenous.    All other left sided veins appeared normal.      Results for orders placed during the hospital encounter of 02/18/24    Duplex Venous Lower Extremity LEFT    Interpretation Summary    Acute left lower extremity deep vein thrombosis noted in the popliteal.    Sub-acute left lower extremity deep vein thrombosis noted in the posterial tibial and peroneal.    Chronic left lower extremity superficial thrombophlebitis noted in the small saphenous.    All other left sided veins appeared normal.      Results for orders placed during the hospital encounter of 01/17/24    Adult Transthoracic Echo Complete w/ Color, Spectral and Contrast if necessary per protocol    Interpretation Summary    Left ventricular systolic function is normal. Left ventricular ejection fraction appears to be 51 - 55%.    Left ventricular wall thickness is consistent with mild concentric hypertrophy.    Left ventricular  diastolic function is consistent with (grade I) impaired relaxation.    There are no significant valvular abnormalities noted on the study.    Estimated right ventricular systolic pressure from tricuspid regurgitation is normal (<35 mmHg).      Labs Pending at Discharge:  Pending Labs       Order Current Status    BB REFERENCE LAB SENDOUT In process    Bone Marrow Exam In process    Bone Marrow Exam In process    Cytogenetics (Integrated Oncology) In process    Flow Cytometry In process    Immunofixation, Serum In process    Immunoglobulin Free LT Chains Blood In process    Blood Culture - Blood, Arm, Right Preliminary result    Blood Culture - Blood, Arm, Right Preliminary result              Discharge Details        Discharge Medications        Continue These Medications        Instructions Start Date   allopurinol 100 MG tablet  Commonly known as: ZYLOPRIM   100 mg, Daily      atorvastatin 10 MG tablet  Commonly known as: LIPITOR   10 mg, Daily      colchicine 0.6 MG tablet   Take 1 tablet by mouth 2 (Two) Times a Day As Needed.      dexAMETHasone 4 MG tablet  Commonly known as: DECADRON   4 mg, Oral, Every 12 Hours Scheduled      Eliquis 5 MG tablet tablet  Generic drug: apixaban   5 mg, 2 Times Daily      gabapentin 400 MG capsule  Commonly known as: NEURONTIN   400 mg, 2 Times Daily      lenalidomide 10 MG capsule  Commonly known as: REVLIMID   10 mg, Daily      montelukast 10 MG tablet  Commonly known as: SINGULAIR   10 mg, Oral, Nightly      probenecid 500 MG tablet  Commonly known as: BENEMID   500 mg, 2 Times Daily      solifenacin 5 MG tablet  Commonly known as: VESICARE   5 mg, Daily      tamsulosin 0.4 MG capsule 24 hr capsule  Commonly known as: FLOMAX   1 capsule, Daily               No Known Allergies      Discharge Disposition:  Home or Self Care    Diet:  Hospital:  Diet Order   Procedures    Diet: Regular/House; Fluid Consistency: Thin (IDDSI 0)         Discharge Activity: As tolerated        CODE  STATUS:  Code Status and Medical Interventions: CPR (Attempt to Resuscitate); Full Support   Ordered at: 01/02/25 1211     Level Of Support Discussed With:    Patient     Code Status (Patient has no pulse and is not breathing):    CPR (Attempt to Resuscitate)     Medical Interventions (Patient has pulse or is breathing):    Full Support         Future Appointments   Date Time Provider Department Center   4/21/2025  9:30 AM Lynn Henriquez APRN [facility]           Time spent on Discharge including face to face service: 45 minutes    Electronically signed by Vamsi Mason MD, 01/04/25, 12:02 PM EST.    Part of this note may be an electronic transcription/translation of spoken language to printed text using the Dragon Dictation System.

## 2025-01-05 NOTE — OUTREACH NOTE
Prep Survey      Flowsheet Row Responses   [facility] patient discharged from? [facility]   Is LACE score < 7 ? No   Eligibility Readm Mgmt   Discharge diagnosis Anemia   Does the patient have one of the following disease processes/diagnoses(primary or secondary)? Other   Does the patient have Home health ordered? No   Is there a DME ordered? No   Prep survey completed? Yes            Yany CAMPBELL - Registered Nurse

## 2025-01-06 LAB
KAPPA LC FREE SER-MCNC: 24.3 MG/L (ref 3.3–19.4)
KAPPA LC FREE/LAMBDA FREE SER: 0.59 {RATIO} (ref 0.26–1.65)
LAMBDA LC FREE SERPL-MCNC: 41.2 MG/L (ref 5.7–26.3)

## 2025-01-07 LAB
BACTERIA SPEC AEROBE CULT: NORMAL
BACTERIA SPEC AEROBE CULT: NORMAL
CYTO UR: NORMAL
DIFF PNL MAR: NORMAL
LAB AP ASPIRATE SMEAR: NORMAL
LAB AP CASE REPORT: NORMAL
LAB AP CLINICAL INFORMATION: NORMAL
LAB AP CLOT SECTION: NORMAL
LAB AP CORE BIOPSY: NORMAL
LAB AP SPECIAL STAINS: NORMAL
PATH REPORT.FINAL DX SPEC: NORMAL
PATH REPORT.GROSS SPEC: NORMAL

## 2025-01-08 LAB
IGA SERPL-MCNC: 89 MG/DL (ref 61–437)
IGG SERPL-MCNC: 567 MG/DL (ref 603–1613)
IGM SERPL-MCNC: 8 MG/DL (ref 15–143)
PROT PATTERN SERPL IFE-IMP: ABNORMAL

## 2025-01-10 ENCOUNTER — OFFICE VISIT (OUTPATIENT)
Dept: ORTHOPEDIC SURGERY | Facility: CLINIC | Age: 80
End: 2025-01-10
Payer: MEDICARE

## 2025-01-10 VITALS
HEIGHT: 73 IN | SYSTOLIC BLOOD PRESSURE: 150 MMHG | HEART RATE: 88 BPM | WEIGHT: 183.64 LBS | OXYGEN SATURATION: 93 % | BODY MASS INDEX: 24.34 KG/M2 | DIASTOLIC BLOOD PRESSURE: 76 MMHG

## 2025-01-10 DIAGNOSIS — L03.113 CELLULITIS OF RIGHT UPPER EXTREMITY: ICD-10-CM

## 2025-01-10 DIAGNOSIS — M25.521 RIGHT ELBOW PAIN: Primary | ICD-10-CM

## 2025-01-10 DIAGNOSIS — M10.9 ACUTE GOUT OF RIGHT HAND, UNSPECIFIED CAUSE: ICD-10-CM

## 2025-01-10 LAB — CYTOGENETICS RESULT: NORMAL

## 2025-01-10 PROCEDURE — 1159F MED LIST DOCD IN RCRD: CPT

## 2025-01-10 PROCEDURE — 99214 OFFICE O/P EST MOD 30 MIN: CPT

## 2025-01-10 PROCEDURE — 1160F RVW MEDS BY RX/DR IN RCRD: CPT

## 2025-01-10 PROCEDURE — 3077F SYST BP >= 140 MM HG: CPT

## 2025-01-10 PROCEDURE — 3078F DIAST BP <80 MM HG: CPT

## 2025-01-10 RX ORDER — AMOXICILLIN 500 MG/1
500 CAPSULE ORAL 2 TIMES DAILY
Qty: 14 CAPSULE | Refills: 0 | Status: SHIPPED | OUTPATIENT
Start: 2025-01-10 | End: 2025-01-17

## 2025-01-10 RX ORDER — INDOMETHACIN 50 MG/1
50 CAPSULE ORAL 2 TIMES DAILY WITH MEALS
Qty: 10 CAPSULE | Refills: 0 | Status: SHIPPED | OUTPATIENT
Start: 2025-01-10 | End: 2025-01-15

## 2025-01-10 RX ORDER — DOXYCYCLINE 100 MG/1
100 CAPSULE ORAL 2 TIMES DAILY
Qty: 14 CAPSULE | Refills: 0 | Status: SHIPPED | OUTPATIENT
Start: 2025-01-10 | End: 2025-01-17

## 2025-01-10 NOTE — PROGRESS NOTES
"Chief Complaint  Pain and Initial Evaluation of the Right Elbow    Subjective      Blair Lira presents to [facility] for initial evaluation of his right elbow.  He states this issue has happened in the past, but it has been greater than 5 years.    Patient states that he has been hospitalized recently receiving several blood transfusions and then most recently 1 week ago had a bone marrow biopsy while in the hospital.  He states during that time he started to experience quite a bit of elbow pain as he had his arm stretched out in front of him for an extended length of time.  He states the pain in his right elbow severe.  He also states that he woke up this morning with swelling in his right hand.  He states he does have intermittent episodes of gout but takes allopurinol daily.  He states he did take 1 dose of colchicine this morning without significant improvement in his pain.  He denies any fever or chills currently.    No Known Allergies    Objective     Vital Signs:   Vitals:    01/10/25 0815   BP: 150/76   Pulse: 88   SpO2: 93%   Weight: 83.3 kg (183 lb 10.3 oz)   Height: 185.4 cm (73\")     Body mass index is 24.23 kg/m².    I reviewed the patient's chief complaint, history of present illness, review of systems, past medical history, surgical history, family history, social history, medications, and allergy list.     Ortho Exam  Right upper extremity: Erythema, swelling and warmth to the third knuckle of the right hand.  Able to make a tight fist.  Sensation intact.  Palpable radial pulse.  Moderate elbow effusion.  Erythema surrounding posterior elbow as well as warmth.  Decreased range of motion secondary to pain and swelling.          Imaging Results (Most Recent)       Procedure Component Value Units Date/Time    XR Elbow 2 View Right [[id number]] Resulted: 01/12/25 2224     Updated: 01/12/25 2224    Narrative:      X-Ray Report:  Study: X-rays ordered, taken in the " office, and reviewed today  Site: Right elbow xray  Indication: Right elbow pain  View: AP, lateral right elbow view(s)  Findings: No identifiable osseous abnormalities.  Soft tissue swelling is   visible.  Prior studies available for comparison: yes                 Assessment and Plan   Diagnoses and all orders for this visit:    1. Right elbow pain (Primary)  -     XR Elbow 2 View Right  -     amoxicillin (AMOXIL) 500 MG capsule; Take 1 capsule by mouth 2 (Two) Times a Day for 7 days.  Dispense: 14 capsule; Refill: 0  -     doxycycline (VIBRAMYCIN) 100 MG capsule; Take 1 capsule by mouth 2 (Two) Times a Day for 7 days.  Dispense: 14 capsule; Refill: 0  -     indomethacin (INDOCIN) 50 MG capsule; Take 1 capsule by mouth 2 (Two) Times a Day With Meals for 5 days.  Dispense: 10 capsule; Refill: 0    2. Acute gout of right hand, unspecified cause  -     indomethacin (INDOCIN) 50 MG capsule; Take 1 capsule by mouth 2 (Two) Times a Day With Meals for 5 days.  Dispense: 10 capsule; Refill: 0    3. Cellulitis of right upper extremity  -     amoxicillin (AMOXIL) 500 MG capsule; Take 1 capsule by mouth 2 (Two) Times a Day for 7 days.  Dispense: 14 capsule; Refill: 0  -     doxycycline (VIBRAMYCIN) 100 MG capsule; Take 1 capsule by mouth 2 (Two) Times a Day for 7 days.  Dispense: 14 capsule; Refill: 0         Blair Lira presents to [facility] for his right elbow.  X-rays obtained.  No acute osseous abnormalities identified.  Patient does have severe swelling and redness to the elbow as well as the hand.  Differential diagnoses include acute gout exacerbation versus cellulitis.  Patient reports that he is on allopurinol daily and reports compliance taking that.  Patient reports he took 1 dose of colchicine earlier.  Will treat with antibiotics to rule out cellulitis since he did recently have hospitalization with several IV access to the right AC area.  Advised patient that if he develops  fever, nausea or chills that he needs go to emergency department immediately.  Patient verbalized understanding.  Family members with the patient also verbalized understanding.  Advised patient to ice and elevate elbow.  Advised patient to work on gentle range of motion.  Dr. Samuels brought to bedside for evaluation.  Dr. Samuels advised to utilize indomethacin versus colchicine for greater pain relief for the elbow.    Will see the patient back on Tuesday for reevaluation.      Tobacco Use: Low Risk  (1/10/2025)    Patient History     Smoking Tobacco Use: Never     Smokeless Tobacco Use: Never     Passive Exposure: Never     Patient reports that they are a nonsmoker; cessation education not applicable.     BMI is within normal parameters. No other follow-up for BMI required.      Follow Up   Return in about 4 days (around 1/14/2025).  There are no Patient Instructions on file for this visit.    Patient was given instructions and counseling regarding his condition or for health maintenance advice. Please see specific information pulled into the AVS if appropriate.       Dictated Utilizing Dragon Dictation. Please note that portions of this note were completed with a voice recognition program. Part of this note may be an electronic transcription/translation of spoken language to printed text using the Dragon Dictation System.

## 2025-01-13 ENCOUNTER — READMISSION MANAGEMENT (OUTPATIENT)
Dept: CALL CENTER | Facility: HOSPITAL | Age: 80
End: 2025-01-13
Payer: MEDICARE

## 2025-01-13 LAB — MDS TARGETGENE PANEL RESULT: NORMAL

## 2025-01-13 NOTE — OUTREACH NOTE
Medical Week 1 Survey      Flowsheet Row Responses   [facility] patient discharged from? [facility]   Does the patient have one of the following disease processes/diagnoses(primary or secondary)? Other   Week 1 attempt successful? Yes   Call start time 1552   Call end time 1555   Person spoke with today (if not patient) and relationship patient   Meds reviewed with patient/caregiver? Yes   Does the patient have all medications ordered at discharge? Yes   Prescription comments Pt is on antibiotics for elbow infection   Is the patient taking all medications as directed (includes completed medication regime)? Yes   Does the patient have a primary care provider?  Yes   Does the patient have an appointment with their PCP within 7 days of discharge? Yes   Comments regarding PCP PCP was seen today   Has the patient kept scheduled appointments due by today? Yes   Psychosocial issues? No   Did the patient receive a copy of their discharge instructions? Yes   Nursing interventions Reviewed instructions with patient   What is the patient's perception of their health status since discharge? Improving   Is the patient/caregiver able to teach back the hierarchy of who to call/visit for symptoms/problems? PCP, Specialist, Home health nurse, Urgent Care, ED, 911 Yes   If the patient is a current smoker, are they able to teach back resources for cessation? Not a smoker   Week 1 call completed? Yes   Would this patient benefit from a Referral to Amb Social Work? No   Is the patient interested in additional calls from an ambulatory ? No   Wrap up additional comments pt stated he is doing ok.   Call end time 1555            ALBERT GOLDBERG - Registered Nurse

## 2025-01-14 ENCOUNTER — OFFICE VISIT (OUTPATIENT)
Dept: ORTHOPEDIC SURGERY | Facility: CLINIC | Age: 80
End: 2025-01-14
Payer: MEDICARE

## 2025-01-14 VITALS
HEART RATE: 54 BPM | HEIGHT: 73 IN | SYSTOLIC BLOOD PRESSURE: 161 MMHG | DIASTOLIC BLOOD PRESSURE: 74 MMHG | OXYGEN SATURATION: 96 % | WEIGHT: 182 LBS | BODY MASS INDEX: 24.12 KG/M2

## 2025-01-14 DIAGNOSIS — L03.113 CELLULITIS OF RIGHT UPPER EXTREMITY: ICD-10-CM

## 2025-01-14 DIAGNOSIS — M10.9 ACUTE GOUT OF RIGHT HAND, UNSPECIFIED CAUSE: ICD-10-CM

## 2025-01-14 DIAGNOSIS — M25.521 RIGHT ELBOW PAIN: Primary | ICD-10-CM

## 2025-01-14 PROCEDURE — 3078F DIAST BP <80 MM HG: CPT

## 2025-01-14 PROCEDURE — 3077F SYST BP >= 140 MM HG: CPT

## 2025-01-14 PROCEDURE — 99213 OFFICE O/P EST LOW 20 MIN: CPT

## 2025-01-14 PROCEDURE — 1160F RVW MEDS BY RX/DR IN RCRD: CPT

## 2025-01-14 PROCEDURE — 1159F MED LIST DOCD IN RCRD: CPT

## 2025-01-14 NOTE — PROGRESS NOTES
"Chief Complaint  Pain and Follow-up of the Right Elbow    Subjective      Blair Lira presents to [facility] for follow up of his right elbow.  He returns to clinic today stating that he has been taking the medications that he was prescribed.  He states that his pain is still there but it is minimal compared to where it was.  He states the swelling has significantly improved as well with his hand and his elbow.    No Known Allergies    Objective     Vital Signs:   Vitals:    01/14/25 0818   BP: 161/74   Pulse: 54   SpO2: 96%   Weight: 82.6 kg (182 lb)   Height: 185.4 cm (73\")     Body mass index is 24.01 kg/m².    I reviewed the patient's chief complaint, history of present illness, review of systems, past medical history, surgical history, family history, social history, medications, and allergy list.     Ortho Exam  Right upper extremity: Mild swelling and tenderness to the third digit joint in the hand.  Erythema decreased when compared to last week.  Demonstrates active elbow flexion extension with mildly decreased flexion secondary to discomfort.  Mild olecranon bursitis is visible without fluctuation or drainable abscess.  No erythema of the elbow.  Mildly tender to palpation.  Biceps intact and nontender.          Imaging Results (Most Recent)       None                Assessment and Plan   Diagnoses and all orders for this visit:    1. Right elbow pain (Primary)    2. Acute gout of right hand, unspecified cause    3. Cellulitis of right upper extremity         Blair Lira presents to [facility] for her right elbow.  Patient has had improvement in his symptoms.  Continue antibiotics until completion.  Will follow-up in approximately 1 week for reevaluation.  Discussed that the patient is eligible to cancel appointment if symptoms return to normal.  Patient verbalized understanding.    Patient advised that if he would to develop fever or " symptoms would to suddenly worsen he would be recommended to go to the emergency department.  Patient verbalized understanding.      Tobacco Use: Low Risk  (1/14/2025)    Patient History     Smoking Tobacco Use: Never     Smokeless Tobacco Use: Never     Passive Exposure: Never     Patient reports that they are a nonsmoker; cessation education not applicable.     BMI is within normal parameters. No other follow-up for BMI required.      Follow Up   Return in about 10 days (around 1/24/2025).  There are no Patient Instructions on file for this visit.    Patient was given instructions and counseling regarding his condition or for health maintenance advice. Please see specific information pulled into the AVS if appropriate.       Dictated Utilizing Dragon Dictation. Please note that portions of this note were completed with a voice recognition program. Part of this note may be an electronic transcription/translation of spoken language to printed text using the Dragon Dictation System.

## 2025-01-17 LAB — BB REFERENCE LAB SENDOUT: NORMAL

## 2025-01-21 LAB — INTELLIGEN MYELOID RESULT: NORMAL

## 2025-01-22 LAB — CCV RESULT: NORMAL

## 2025-01-23 ENCOUNTER — READMISSION MANAGEMENT (OUTPATIENT)
Dept: CALL CENTER | Facility: HOSPITAL | Age: 80
End: 2025-01-23
Payer: MEDICARE

## 2025-01-23 NOTE — OUTREACH NOTE
Medical Week 2 Survey      Flowsheet Row Responses   [facility] patient discharged from? [facility]   Does the patient have one of the following disease processes/diagnoses(primary or secondary)? Other   Week 2 attempt successful? Yes   Call start time 1619   Discharge diagnosis Anemia   Call end time 1636   Person spoke with today (if not patient) and relationship patient   Meds reviewed with patient/caregiver? Yes   Is the patient having any side effects they believe may be caused by any medication additions or changes? No   Does the patient have all medications ordered at discharge? Yes   Is the patient taking all medications as directed (includes completed medication regime)? Yes   Does the patient have a primary care provider?  Yes   Does the patient have an appointment with their PCP within 7 days of discharge? Yes   Has the patient kept scheduled appointments due by today? Yes   Psychosocial issues? No   Did the patient receive a copy of their discharge instructions? Yes   Nursing interventions Reviewed instructions with patient   What is the patient's perception of their health status since discharge? Improving   Is the patient/caregiver able to teach back signs and symptoms related to disease process for when to call PCP? Yes   Is the patient/caregiver able to teach back signs and symptoms related to disease process for when to call 911? Yes   Is the patient/caregiver able to teach back the hierarchy of who to call/visit for symptoms/problems? PCP, Specialist, Home health nurse, Urgent Care, ED, 911 Yes   If the patient is a current smoker, are they able to teach back resources for cessation? Not a smoker   Week 2 Call Completed? Yes   Graduated Yes   Is the patient interested in additional calls from an ambulatory ? No   Would this patient benefit from a Referral to Amb Social Work? No   Call end time 1636            Willie HDEZ - Registered Nurse

## 2025-01-24 ENCOUNTER — OFFICE VISIT (OUTPATIENT)
Dept: ORTHOPEDIC SURGERY | Facility: CLINIC | Age: 80
End: 2025-01-24
Payer: MEDICARE

## 2025-01-24 VITALS
BODY MASS INDEX: 24.13 KG/M2 | DIASTOLIC BLOOD PRESSURE: 57 MMHG | HEIGHT: 73 IN | WEIGHT: 182.1 LBS | OXYGEN SATURATION: 92 % | SYSTOLIC BLOOD PRESSURE: 144 MMHG | HEART RATE: 87 BPM

## 2025-01-24 DIAGNOSIS — M25.521 RIGHT ELBOW PAIN: Primary | ICD-10-CM

## 2025-01-24 DIAGNOSIS — L03.113 CELLULITIS OF RIGHT UPPER EXTREMITY: ICD-10-CM

## 2025-01-24 PROCEDURE — 3077F SYST BP >= 140 MM HG: CPT

## 2025-01-24 PROCEDURE — 3078F DIAST BP <80 MM HG: CPT

## 2025-01-24 PROCEDURE — 1159F MED LIST DOCD IN RCRD: CPT

## 2025-01-24 PROCEDURE — 1160F RVW MEDS BY RX/DR IN RCRD: CPT

## 2025-01-24 PROCEDURE — 99213 OFFICE O/P EST LOW 20 MIN: CPT

## 2025-01-24 NOTE — PROGRESS NOTES
"Chief Complaint  Follow-up of the Right Elbow    Subjective      Blair Lira presents to [facility] for follow up of their right elbow. He returns today stating his elbow is much better than it was before. He states he still does have some tenderness in the elbow especially if he rests his elbow on something hard.  He denies any falls or injuries that has exacerbated.  He is curious if there is anything else that could be done for his elbow.    No Known Allergies    Objective     Vital Signs:   Vitals:    01/24/25 0831   BP: 144/57   Pulse: 87   SpO2: 92%   Weight: 82.6 kg (182 lb 1.6 oz)   Height: 185.4 cm (73\")     Body mass index is 24.03 kg/m².    I reviewed the patient's chief complaint, history of present illness, review of systems, past medical history, surgical history, family history, social history, medications, and allergy list.     Ortho Exam  Right upper extremity: Biceps soft, nontender.  Demonstrates active elbow flexion extension with mild associated stiffness.  Mild olecranon bursitis without redness, swelling or concerns of infection/gout.  Forearm soft, nontender.  Demonstrates active wrist flexion extension.  Sensation intact.  Neurovascular intact.  Palpable radial pulse.          Imaging Results (Most Recent)       None                Assessment and Plan   Diagnoses and all orders for this visit:    1. Right elbow pain (Primary)    2. Cellulitis of right upper extremity         Blair Lira presents today to [facility] for the follow up of their right elbow. They have right elbow pain and olecranon bursitis that we have been treating conservatively.  Elbow has significantly improved from previous visits.  Patient has finished his antibiotics as instructed.  Advised patient that he may want to look into an elbow brace to help padding and prevent further olecranon bursitis exacerbations.  Topical compound cream will be prescribed.      Patient was " advised to follow-up as needed.      Tobacco Use: Low Risk  (1/24/2025)    Patient History     Smoking Tobacco Use: Never     Smokeless Tobacco Use: Never     Passive Exposure: Never     Patient reports that they are a nonsmoker; cessation education not applicable.     BMI is within normal parameters. No other follow-up for BMI required.      Follow Up   Return if symptoms worsen or fail to improve.  There are no Patient Instructions on file for this visit.    Patient was given instructions and counseling regarding his condition or for health maintenance advice. Please see specific information pulled into the AVS if appropriate.     Dictated Utilizing Dragon Dictation. Please note that portions of this note were completed with a voice recognition program. Part of this note may be an electronic transcription/translation of spoken language to printed text using the Dragon Dictation System.

## 2025-01-27 LAB
CYTO UR: NORMAL
DIFF PNL MAR: NORMAL
LAB AP ASPIRATE SMEAR: NORMAL
LAB AP CASE REPORT: NORMAL
LAB AP CLINICAL INFORMATION: NORMAL
LAB AP CLOT SECTION: NORMAL
LAB AP CORE BIOPSY: NORMAL
LAB AP SPECIAL STAINS: NORMAL
PATH REPORT.FINAL DX SPEC: NORMAL
PATH REPORT.GROSS SPEC: NORMAL

## 2025-04-21 ENCOUNTER — OFFICE VISIT (OUTPATIENT)
Dept: ORTHOPEDIC SURGERY | Facility: CLINIC | Age: 80
End: 2025-04-21
Payer: MEDICARE

## 2025-04-21 VITALS
OXYGEN SATURATION: 95 % | HEIGHT: 73 IN | WEIGHT: 205 LBS | HEART RATE: 61 BPM | DIASTOLIC BLOOD PRESSURE: 90 MMHG | BODY MASS INDEX: 27.17 KG/M2 | SYSTOLIC BLOOD PRESSURE: 192 MMHG

## 2025-04-21 DIAGNOSIS — M25.552 LEFT HIP PAIN: Primary | ICD-10-CM

## 2025-04-21 NOTE — PROGRESS NOTES
Chief Complaint  Follow-up of the Left Hip  {Labs  Result Review  Imaging  Med Tab  Media  Procedures :23}   Subjective      Blair Lira is a 79 y.o. male who presents to [facility] .    History of Present Illness        No Known Allergies     Social History     Socioeconomic History    Marital status:    Tobacco Use    Smoking status: Never     Passive exposure: Never    Smokeless tobacco: Never   Vaping Use    Vaping status: Never Used   Substance and Sexual Activity    Alcohol use: Not Currently    Drug use: Never    Sexual activity: Defer     Partners: Female        Tobacco Use: Low Risk  (4/21/2025)    Patient History     Smoking Tobacco Use: Never     Smokeless Tobacco Use: Never     Passive Exposure: Never     {Tobaccouse:82671}    I reviewed the patient's chief complaint, history of present illness, review of systems, past medical history, surgical history, family history, social history, medications, and allergy list.     Review of Systems     Constitutional: Denies fevers, chills, weight loss  Cardiovascular: Denies chest pain, shortness of breath  Skin: Denies rashes, acute skin changes  Neurologic: Denies headache, loss of consciousness    Vital Signs:   There were no vitals taken for this visit.         Physical Exam  General: Alert. No acute distress    Ortho Exam    ***  Physical Exam        Procedures        Imaging Results (Most Recent)       None             Result Review :{Labs  Result Review  Imaging  Med Tab  Media  Procedures :23}       No results found.           Assessment and Plan     There are no diagnoses linked to this encounter.    Assessment & Plan      {RAFAL [employer] Provider Statement:61569}    Follow Up   There are no Patient Instructions on file for this visit.        Patient was given instructions and counseling regarding his condition or for health maintenance advice. Please see specific information pulled into the AVS if  appropriate.     Tawana Malik PA-C   04/21/2025  09:23 EDT        Dictated Utilizing Dragon Dictation. Please note that portions of this note were completed with a voice recognition program. Part of this note may be an electronic transcription/translation of spoken language to printed text using the Dragon Dictation System.

## 2025-04-21 NOTE — PROGRESS NOTES
Chief Complaint  Follow-up of the Left Hip    Subjective      Blair Lira presents to [facility]     History of Present Illness        No Known Allergies    Objective     Vital Signs:   There were no vitals filed for this visit.  There is no height or weight on file to calculate BMI.    I reviewed the patient's chief complaint, history of present illness, review of systems, past medical history, surgical history, family history, social history, medications, and allergy list.     Ortho Exam  {KSORTHOPE:88805}    Physical Exam              Imaging Results (Most Recent)       None             Results           Assessment and Plan   There are no diagnoses linked to this encounter.     Blair Lira presents today to [facility] for the follow up of their ***. They have *** that we have been treating conservatively with ***. They have also been utilizing ***.     Assessment & Plan        Follow up ***         Tobacco Use: Low Risk  (4/21/2025)    Patient History     Smoking Tobacco Use: Never     Smokeless Tobacco Use: Never     Passive Exposure: Never     {Tobaccouse:12012}    BMI is within normal parameters. No other follow-up for BMI required.      Follow Up   No follow-ups on file.  There are no Patient Instructions on file for this visit.    Patient was given instructions and counseling regarding his condition or for health maintenance advice. Please see specific information pulled into the AVS if appropriate.     {RAFAL [employer] Provider Statement:40713}    Dictated Utilizing Dragon Dictation. Please note that portions of this note were completed with a voice recognition program. Part of this note may be an electronic transcription/translation of spoken language to printed text using the Dragon Dictation System.

## 2025-04-22 NOTE — PROGRESS NOTES
Chief Complaint  Follow-up of the Left Hip    Subjective          History of Present Illness       History of Present Illness  The patient is a 79-year-old male who presents for an annual checkup on his left hip. He is nearly 1 year postoperative from a left total hip arthroplasty performed by Dr. Osborne on 04/30/2024.    Approximately 3 weeks ago, significant discomfort in the left hip radiating down anterior thigh was experienced,  compromising ambulation. The pain radiated down the anterior aspect of the thigh, extending slightly beyond the knee. No specific incident of trauma or injury to the hip is reported. Despite the severity of the pain, immediate medical attention was not sought as the symptoms began to improve last week.  He was just hoping for the x-rays today to show that the joint looked okay.  Adherence to the prescribed home exercise regimen has not been maintained. No groin pain or knee pain is reported. Difficulty in lifting the foot while walking is noted, although operating a vehicle's gas pedal is possible. Ascending and descending stairs is manageable.            No Known Allergies     Social History     Socioeconomic History    Marital status:    Tobacco Use    Smoking status: Never     Passive exposure: Never    Smokeless tobacco: Never   Vaping Use    Vaping status: Never Used   Substance and Sexual Activity    Alcohol use: Not Currently    Drug use: Never    Sexual activity: Defer     Partners: Female        I reviewed the patient's chief complaint, history of present illness, review of systems, past medical history, surgical history, family history, social history, medications, and allergy list.     REVIEW OF SYSTEMS    Constitutional: Denies fevers, chills, weight loss  Cardiovascular: Denies chest pain, shortness of breath  Skin: Denies rashes, acute skin changes  Neurologic: Denies headache, loss of consciousness  MSK: Left hip pain      Objective   Vital Signs:   BP (!) 192/90  "Comment: Please See PCP  Pulse 61   Ht 185.4 cm (73\")   Wt 93 kg (205 lb)   SpO2 95%   BMI 27.05 kg/m²     Body mass index is 27.05 kg/m².    Physical Exam    Tobacco Use: Low Risk  (4/21/2025)    Patient History     Smoking Tobacco Use: Never     Smokeless Tobacco Use: Never     Passive Exposure: Never     Patient reports that they are a nonsmoker; cessation education not applicable.     General: Alert, no acute distress  Gait: Nonantalgic without use of AD  Left lower extremity: Surgical scar.  No concerning signs of infection.  No swelling. No pain with passive hip ROM.  Knee extensor mechanism intact.  Intact hip abduction.  Hip flexion intact.  Left lower extremity range of motion full with good strength demonstrated.  Leg lengths appear symmetric.  Calf soft, nontender.  Demonstrates active ankle plantarflexion dorsiflexion.  Sensation intact over the dorsal and plantar foot.  Palpable pedal pulses.      Imaging Results (Most Recent)       Procedure Component Value Units Date/Time    XR Hip With or Without Pelvis 2 - 3 View Left [[id number]] Resulted: 04/22/25 0840     Updated: 04/22/25 0840    Narrative:      X-Ray Report:  Study: X-rays ordered, taken in the office, and reviewed today.   Site: Left Hip Xray  Indication: Left total hip arthroplasty follow-up  View: AP, frog lateral left hip  Findings: Intact left total hip arthroplasty.  No evidence of hardware   complications or loosening.  No periprosthetic fractures are visualized.    Hip joint is reduced.  Prior studies available for comparison: yes                      Assessment and Plan    Diagnoses and all orders for this visit:    1. Left hip pain (Primary)  -     XR Hip With or Without Pelvis 2 - 3 View Left        Assessment & Plan  X-rays reviewed, showing hardware intact and no complications.  Patient is doing well. Continue home exercise program to progress strength and ROM.     Discussed the importance of lifelong antibiotic prophylaxis " with dental procedures following total joint replacement. In the event of a dental procedure, patient is welcome to contact our office to obtain antibiotics prior to the procedure if their dentist does not provide the prescription. Patient verbalized understanding.     Follow-up in 1 year. We will repeat xray's of the prosthetic joint at that time.   Call sooner or return to care, if needed with any changes or concerns.             Follow Up   No follow-ups on file.  There are no Patient Instructions on file for this visit.  Patient was given instructions and counseling regarding his condition or for health maintenance advice. Please see specific information pulled into the AVS if appropriate.       Tawana Malik PA-C  04/22/25  12:17 EDT    Patient or patient representative verbalized consent for the use of Ambient Listening during the visit with  Tawana Malik PA-C for chart documentation. 4/22/2025  12:18 EDT

## 2025-07-25 ENCOUNTER — HOSPITAL ENCOUNTER (EMERGENCY)
Facility: HOSPITAL | Age: 80
Discharge: HOME | End: 2025-07-25
Attending: EMERGENCY MEDICINE
Payer: MEDICARE

## 2025-07-25 VITALS
TEMPERATURE: 97.5 F | OXYGEN SATURATION: 98 % | WEIGHT: 201.94 LBS | RESPIRATION RATE: 17 BRPM | SYSTOLIC BLOOD PRESSURE: 161 MMHG | HEART RATE: 82 BPM | BODY MASS INDEX: 29.91 KG/M2 | HEIGHT: 69 IN | DIASTOLIC BLOOD PRESSURE: 69 MMHG

## 2025-07-25 DIAGNOSIS — D69.6 THROMBOCYTOPENIA: ICD-10-CM

## 2025-07-25 DIAGNOSIS — D61.818 PANCYTOPENIA: Primary | ICD-10-CM

## 2025-07-25 DIAGNOSIS — N18.9 CHRONIC KIDNEY DISEASE, UNSPECIFIED CKD STAGE: ICD-10-CM

## 2025-07-25 DIAGNOSIS — C90.00 MULTIPLE MYELOMA NOT HAVING ACHIEVED REMISSION: ICD-10-CM

## 2025-07-25 LAB
ABO GROUP BLD: NORMAL
ALBUMIN SERPL-MCNC: 4.4 G/DL (ref 3.5–5.2)
ALBUMIN/GLOB SERPL: 2.6 G/DL
ALP SERPL-CCNC: 57 U/L (ref 39–117)
ALT SERPL W P-5'-P-CCNC: 69 U/L (ref 1–41)
ANION GAP SERPL CALCULATED.3IONS-SCNC: 11.5 MMOL/L (ref 5–15)
ANISOCYTOSIS BLD QL: NORMAL
AST SERPL-CCNC: 19 U/L (ref 1–40)
BASOPHILS # BLD AUTO: 0 10*3/MM3 (ref 0–0.2)
BASOPHILS NFR BLD AUTO: 0 % (ref 0–1.5)
BILIRUB SERPL-MCNC: 0.4 MG/DL (ref 0–1.2)
BUN SERPL-MCNC: 50.9 MG/DL (ref 8–23)
BUN/CREAT SERPL: 20.7 (ref 7–25)
BURR CELLS BLD QL SMEAR: NORMAL
CALCIUM SPEC-SCNC: 8.4 MG/DL (ref 8.6–10.5)
CHLORIDE SERPL-SCNC: 109 MMOL/L (ref 98–107)
CO2 SERPL-SCNC: 16.5 MMOL/L (ref 22–29)
CREAT SERPL-MCNC: 2.46 MG/DL (ref 0.76–1.27)
DEPRECATED RDW RBC AUTO: 75.1 FL (ref 37–54)
EGFRCR SERPLBLD CKD-EPI 2021: 26.2 ML/MIN/1.73
EOSINOPHIL # BLD AUTO: 0.02 10*3/MM3 (ref 0–0.4)
EOSINOPHIL NFR BLD AUTO: 2.3 % (ref 0.3–6.2)
ERYTHROCYTE [DISTWIDTH] IN BLOOD BY AUTOMATED COUNT: 19.9 % (ref 12.3–15.4)
GLOBULIN UR ELPH-MCNC: 1.7 GM/DL
GLUCOSE SERPL-MCNC: 131 MG/DL (ref 65–99)
HCT VFR BLD AUTO: 23.4 % (ref 37.5–51)
HGB BLD-MCNC: 7.6 G/DL (ref 13–17.7)
HOLD SPECIMEN: NORMAL
HOLD SPECIMEN: NORMAL
IMM GRANULOCYTES # BLD AUTO: 0.01 10*3/MM3 (ref 0–0.05)
IMM GRANULOCYTES NFR BLD AUTO: 1.1 % (ref 0–0.5)
LYMPHOCYTES # BLD AUTO: 0.42 10*3/MM3 (ref 0.7–3.1)
LYMPHOCYTES NFR BLD AUTO: 47.7 % (ref 19.6–45.3)
MACROCYTES BLD QL SMEAR: NORMAL
MCH RBC QN AUTO: 33.8 PG (ref 26.6–33)
MCHC RBC AUTO-ENTMCNC: 32.5 G/DL (ref 31.5–35.7)
MCV RBC AUTO: 104 FL (ref 79–97)
MICROCYTES BLD QL: NORMAL
MONOCYTES # BLD AUTO: 0.09 10*3/MM3 (ref 0.1–0.9)
MONOCYTES NFR BLD AUTO: 10.2 % (ref 5–12)
NEUTROPHILS NFR BLD AUTO: 0.34 10*3/MM3 (ref 1.7–7)
NEUTROPHILS NFR BLD AUTO: 38.7 % (ref 42.7–76)
NRBC BLD AUTO-RTO: 0 /100 WBC (ref 0–0.2)
OVALOCYTES BLD QL SMEAR: NORMAL
PLATELET # BLD AUTO: 14 10*3/MM3 (ref 140–450)
PMV BLD AUTO: 11.2 FL (ref 6–12)
POIKILOCYTOSIS BLD QL SMEAR: NORMAL
POTASSIUM SERPL-SCNC: 4.1 MMOL/L (ref 3.5–5.2)
PROT SERPL-MCNC: 6.1 G/DL (ref 6–8.5)
RBC # BLD AUTO: 2.25 10*6/MM3 (ref 4.14–5.8)
RH BLD: POSITIVE
SMALL PLATELETS BLD QL SMEAR: NORMAL
SODIUM SERPL-SCNC: 137 MMOL/L (ref 136–145)
WBC MORPH BLD: NORMAL
WBC NRBC COR # BLD AUTO: 0.88 10*3/MM3 (ref 3.4–10.8)
WHOLE BLOOD HOLD COAG: NORMAL
WHOLE BLOOD HOLD SPECIMEN: NORMAL

## 2025-07-25 PROCEDURE — P9037 PLATE PHERES LEUKOREDU IRRAD: HCPCS

## 2025-07-25 PROCEDURE — 86901 BLOOD TYPING SEROLOGIC RH(D): CPT | Performed by: EMERGENCY MEDICINE

## 2025-07-25 PROCEDURE — 85007 BL SMEAR W/DIFF WBC COUNT: CPT | Performed by: EMERGENCY MEDICINE

## 2025-07-25 PROCEDURE — 36415 COLL VENOUS BLD VENIPUNCTURE: CPT

## 2025-07-25 PROCEDURE — P9100 PATHOGEN TEST FOR PLATELETS: HCPCS

## 2025-07-25 PROCEDURE — 99285 EMERGENCY DEPT VISIT HI MDM: CPT

## 2025-07-25 PROCEDURE — 36430 TRANSFUSION BLD/BLD COMPNT: CPT

## 2025-07-25 PROCEDURE — 85025 COMPLETE CBC W/AUTO DIFF WBC: CPT | Performed by: EMERGENCY MEDICINE

## 2025-07-25 PROCEDURE — 86900 BLOOD TYPING SEROLOGIC ABO: CPT | Performed by: EMERGENCY MEDICINE

## 2025-07-25 PROCEDURE — 80053 COMPREHEN METABOLIC PANEL: CPT | Performed by: EMERGENCY MEDICINE

## 2025-07-25 NOTE — ED PROVIDER NOTES
Time: 12:58 PM EDT  Date of encounter:  7/25/2025  Independent Historian/Clinical History and Information was obtained by:   Patient    History is limited by: N/A    Chief Complaint: Here to check on low platelets      History of Present Illness:  Patient is a 79 y.o. year old male with history of multiple myeloma and CKD, currently on chemotherapy who presents to the emergency department for evaluation of low platelets.    He is not having any bleeding or rash or broken blood vessels on his legs.  No rectal bleeding or melena.    He is followed locally by Dr Mason of [facility]-onc and also at [facility] and receiving chemotherapy.    His platelets were very low recently and he was getting platelet transfusions and was supposed to get his blood work rechecked today to see if he need more platelets.      Patient Care Team  Primary Care Provider: Babs Summers APRN    Past Medical History:     No Known Allergies  Past Medical History:   Diagnosis Date    BPH (benign prostatic hyperplasia)     Cancer     Chronic kidney disease     Femoral neck fracture 04/28/2024    Frozen shoulder     Hyperlipidemia     Hypertension 10/26/2023    Multiple myeloma     Periarthritis of shoulder     Rotator cuff disorder, right     Tennis elbow     RIGHT     Past Surgical History:   Procedure Laterality Date    BRONCHOSCOPY N/A 1/22/2024    Procedure: BRONCHOSCOPY WITH BAL AND WASHINGS;  Surgeon: Dionte Ruvalcaba MD;  Location: [facility] ENDOSCOPY;  Service: Pulmonary;  Laterality: N/A;  MUCUS PLUGGING    CATARACT EXTRACTION EXTRACAPSULAR W/ INTRAOCULAR LENS IMPLANTATION Bilateral     COLONOSCOPY      HIP HEMIARTHROPLASTY Left 4/30/2024    Procedure: TOTAL HIP ARTHROPLASTY  ANTERIOR APROACH;  Surgeon: Adán Osborne MD;  Location: [facility] MAIN OR;  Service: Orthopedics;  Laterality: Left;    JOINT REPLACEMENT Right     THR    SHOULDER ARTHROSCOPY W/ ROTATOR CUFF REPAIR Right 3/29/2023    Procedure: SHOULDER ARTHROSCOPY WITH  ROTATOR CUFF REPAIR, SUBCROMIAL DECOMPRESSION,DISTAL CLAVICLE RESECTION;  Surgeon: Gustavo Samuels MD;  [place] [facility] [place] [facility] [place]  Service: Orthopedics;  Laterality: Right;    SHOULDER SURGERY Left     SCOPE     Family History   Problem Relation Age of Onset    Malig Hyperthermia Neg Hx        Home Medications:  Prior to Admission medications    Medication Sig Start Date End Date Taking? Authorizing Provider   allopurinol (ZYLOPRIM) 100 MG tablet Take 1 tablet by mouth Daily. 4/16/24  Yes Shavonne Yin MD   atorvastatin (LIPITOR) 10 MG tablet Take 1 tablet by mouth Daily. 9/23/22  Yes Shavonne Yin MD   dexAMETHasone (DECADRON) 4 MG tablet Take 1 tablet by mouth Every 12 (Twelve) Hours.   Yes Shavonne Yin MD   gabapentin (NEURONTIN) 400 MG capsule Take 1 capsule by mouth 2 (Two) Times a Day. 11/19/24  Yes Shavonne Yin MD   tamsulosin (FLOMAX) 0.4 MG capsule 24 hr capsule Take 1 capsule by mouth Daily.   Yes Shavonne Yin MD   colchicine 0.6 MG tablet Take 1 tablet by mouth 2 (Two) Times a Day As Needed. 6/18/24   Shavonne Yin MD   Eliquis 5 MG tablet tablet Take 1 tablet by mouth 2 (Two) Times a Day. 3/27/24   Shavonne Yin MD   Ibuprofen 3 %, Gabapentin 10 %, Baclofen 2 %, lidocaine 4 % Apply 1-2 g topically to the appropriate area as directed 3 (Three) to 4 (Four) times daily. 1/24/25 1/24/26  Gustavo Samuels MD   lenalidomide (REVLIMID) 10 MG capsule Take 1 capsule by mouth Daily.  Patient not taking: Reported on 1/14/2025    Shavonne Yin MD   montelukast (SINGULAIR) 10 MG tablet Take 1 tablet by mouth Every Night. 1/28/24   Vamsi Mason MD   probenecid (BENEMID) 500 MG tablet Take 1 tablet by mouth 2 (Two) Times a Day. 11/9/24   Shavonne Yin MD   solifenacin (VESICARE) 5 MG tablet Take 1 tablet by mouth Daily. 11/18/24   Shavonne Yin MD        Social History:   Social History     Tobacco Use    Smoking status: Never      "Passive exposure: Never    Smokeless tobacco: Never   Vaping Use    Vaping status: Never Used   Substance Use Topics    Alcohol use: Not Currently    Drug use: Never         Review of Systems:  Review of Systems   I performed a 10 point review of systems which was all negative, except for the positives found in the HPI above.      Physical Exam:  /69   Pulse 82   Temp 97.5 °F (36.4 °C) (Oral)   Resp 17   Ht 175.3 cm (69\")   Wt 91.6 kg (201 lb 15.1 oz)   SpO2 98%   BMI 29.82 kg/m²       Physical Exam   General: Awake alert and in no obvious distress    HEENT: Head normocephalic atraumatic, eyes PERRLA EOMI, nose normal, oropharynx normal.    Neck: Supple full range of motion, no meningismus, no lymphadenopathy    Heart: Regular rate and rhythm, no murmurs or rubs, 2+ radial pulses bilaterally    Lungs: Clear to auscultation bilaterally without wheezes or crackles, no respiratory distress    Abdomen: Soft, nontender, nondistended, no rebound or guarding    Skin: Warm, dry, no rash; no petechiae    Musculoskeletal: Normal range of motion, 1+ bilateral pitting lower extremity edema    Neurologic: Oriented x3, no motor deficits no sensory deficits    Psychiatric: Mood appears stable, no psychosis            Medical Decision Making:      Comorbidities that affect care:    Multiple myeloma on chemotherapy    External Notes reviewed:    Previous Labs: I reviewed his most recent lab work including platelets of 45 which is better than today's very low platelets of 14      The following orders were placed and all results were independently analyzed by me:  Orders Placed This Encounter   Procedures    Comprehensive Metabolic Panel    [place] Draw    CBC Auto Differential    Scan Slide    Verify Informed Consent for Blood Product Administration    IP General Consult (Use specialty-specific consult if known)    Prepare Platelet Pheresis, 1 Units    ABO / Rh    CBC & Differential    Green Top (Gel)    Lavender Top    " Gold Top - SST    Light Blue Top       Medications Given in the Emergency Department:  Medications - No data to display     ED Course:         Labs:    Lab Results (last 24 hours)       Procedure Component Value Units Date/Time    CBC & Differential [[id number]]  (Abnormal) Collected: 07/25/25 1129    Specimen: Blood Updated: 07/25/25 1202    Narrative:      The following orders were created for panel order CBC & Differential.  Procedure                               Abnormality         Status                     ---------                               -----------         ------                     CBC Auto Differential[[id number]]        Abnormal            Final result               Scan Slide[[id number]]                                       Final result                 Please view results for these tests on the individual orders.    Comprehensive Metabolic Panel [[id number]]  (Abnormal) Collected: 07/25/25 1129    Specimen: Blood Updated: 07/25/25 1206     Glucose 131 mg/dL      BUN 50.9 mg/dL      Creatinine 2.46 mg/dL      Sodium 137 mmol/L      Potassium 4.1 mmol/L      Chloride 109 mmol/L      CO2 16.5 mmol/L      Calcium 8.4 mg/dL      Total Protein 6.1 g/dL      Albumin 4.4 g/dL      ALT (SGPT) 69 U/L      AST (SGOT) 19 U/L      Alkaline Phosphatase 57 U/L      Total Bilirubin 0.4 mg/dL      Globulin 1.7 gm/dL      A/G Ratio 2.6 g/dL      BUN/Creatinine Ratio 20.7     Anion Gap 11.5 mmol/L      eGFR 26.2 mL/min/1.73     Narrative:      GFR Categories in Chronic Kidney Disease (CKD)              GFR Category          GFR (mL/min/1.73)    Interpretation  G1                    90 or greater        Normal or high (1)  G2                    60-89                Mild decrease (1)  G3a                   45-59                Mild to moderate decrease  G3b                   30-44                Moderate to severe decrease  G4                    15-29                Severe decrease  G5                    14 or less            Kidney failure    (1)In the absence of evidence of kidney disease, neither GFR category G1 or G2 fulfill the criteria for CKD.    eGFR calculation 2021 CKD-EPI creatinine equation, which does not include race as a factor    CBC Auto Differential [[id number]]  (Abnormal) Collected: 07/25/25 1129    Specimen: Blood Updated: 07/25/25 1202     WBC 0.88 10*3/mm3      RBC 2.25 10*6/mm3      Hemoglobin 7.6 g/dL      Hematocrit 23.4 %      .0 fL      MCH 33.8 pg      MCHC 32.5 g/dL      RDW 19.9 %      RDW-SD 75.1 fl      MPV 11.2 fL      Platelets 14 10*3/mm3      Neutrophil % 38.7 %      Lymphocyte % 47.7 %      Monocyte % 10.2 %      Eosinophil % 2.3 %      Basophil % 0.0 %      Immature Grans % 1.1 %      Neutrophils, Absolute 0.34 10*3/mm3      Lymphocytes, Absolute 0.42 10*3/mm3      Monocytes, Absolute 0.09 10*3/mm3      Eosinophils, Absolute 0.02 10*3/mm3      Basophils, Absolute 0.00 10*3/mm3      Immature Grans, Absolute 0.01 10*3/mm3      nRBC 0.0 /100 WBC     Narrative:      Appended report. These results have been appended to a previously verified report.    Scan Slide [[id number]] Collected: 07/25/25 1129    Specimen: Blood Updated: 07/25/25 1202     Anisocytosis Mod/2+     Crenated RBC's Slight/1+     Microcytes Slight/1+     Macrocytes Slight/1+     Ovalocytes Slight/1+     Poikilocytes Slight/1+     WBC Morphology Normal     Platelet Estimate Decreased             Imaging:    No Radiology Exams Resulted Within Past 24 Hours      Differential Diagnosis and Discussion:    Weakness: Based on the patient's history, signs, and symptoms, the diffential diagnosis includes but is not limited to meningitis, stroke, sepsis, subarachnoid hemorrhage, intracranial bleeding, encephalitis, acute uti, dehydration, MS, myasthenia gravis, Guillan [place], migraine variant, neuromuscular disorders vertigo, electrolyte imbalance, and metabolic disorders.    PROCEDURES:    Labs were collected in the emergency department  and all labs were reviewed and interpreted by me.    No orders to display       Procedures    MDM     Amount and/or Complexity of Data Reviewed  Decide to obtain previous medical records or to obtain history from someone other than the patient: yes           This patient is a 79-year-old male with multiple myeloma on chemotherapy who has known pancytopenia presenting today for low platelets recheck.    His platelets are lower than recently measuring only 14 today, but he is not having any bleeding of any type or petechiae or purpura.    He looks clinically stable at this time.    The rest of his lab work reflects his usual CKD and pancytopenia in the setting of chemotherapy.    He is neutropenic but no fever or signs of sepsis are seen.    I consulted with his heme-onc physician who recommends giving him 1 unit of platelets and transfusion and then discharging him home and he will have follow-up in the next few days to get this rechecked.              Critical care:  Patient was placed on the cardiac monitor after being given IV platelet transfusion.  They were monitored for ventricular ectopy, arrhythmia, tachycardia, hypoxia, and changes in blood pressure.  Patient was rechecked several times throughout their stay for mental status decline and for reassessment of worsening changes in vital signs.     Total Critical Care time of 30 minutes. Total critical care time documented does not include time spent on separately billed procedures for services of nurses or physician assistants. I personally saw and examined the patient. I have reviewed all diagnostic interpretations and treatment plans as written. I was present for the key portions of any procedures performed and the inclusive time noted in any critical care statement. Critical care time includes patient management by me, time spent at the patients bedside,  time to review lab and imaging results, discussing patient care, documentation in the medical record,  and time spent with family or caregiver.          Patient Care Considerations:          Consultants/Shared Management Plan:    Consultant: I spoke with his heme-onc physician, Dr. Mason, who recommends platelet transfusion then outpatient follow-up    Social Determinants of Health:    Patient is independent, reliable, and has access to care.       Disposition and Care Coordination:    Discharged: The patient is suitable and stable for discharge with no need for consideration of admission.    I have explained the patient´s condition, diagnoses and treatment plan based on the information available to me at this time. I have answered questions and addressed any concerns. The patient has a good  understanding of the patient´s diagnosis, condition, and treatment plan as can be expected at this point. The vital signs have been stable. The patient´s condition is stable and appropriate for discharge from the emergency department.      The patient will pursue further outpatient evaluation with the primary care physician or other designated or consulting physician as outlined in the discharge instructions. They are agreeable to this plan of care and follow-up instructions have been explained in detail. The patient has received these instructions in written format and has expressed an understanding of the discharge instructions. The patient is aware that any significant change in condition or worsening of symptoms should prompt an immediate return to this or the closest emergency department or call to 911.  I have explained discharge medications and the need for follow up with the patient/caretakers. This was also printed in the discharge instructions. Patient was discharged with the following medications and follow up:      Medication List      No changes were made to your prescriptions during this visit.      Vamsi Mason MD  [address]  [place] [address]  [phone]    Call in 2 days  for a  follow-up appointment       Final diagnoses:   Pancytopenia   Thrombocytopenia   Multiple myeloma not having achieved remission   Chronic kidney disease, unspecified CKD stage        ED Disposition       ED Disposition   Discharge    Condition   Stable    Comment   --               This medical record created using voice recognition software.             Dean Freire MD  07/25/25 [id number]

## 2025-07-26 LAB
BH BB BLOOD EXPIRATION DATE: NORMAL
BH BB BLOOD TYPE BARCODE: 6200
BH BB DISPENSE STATUS: NORMAL
BH BB PRODUCT CODE: NORMAL
BH BB UNIT NUMBER: NORMAL
UNIT  ABO: NORMAL
UNIT  RH: NORMAL

## 2025-08-02 ENCOUNTER — APPOINTMENT (OUTPATIENT)
Dept: GENERAL RADIOLOGY | Facility: HOSPITAL | Age: 80
DRG: 809 | End: 2025-08-02
Payer: MEDICARE

## 2025-08-02 ENCOUNTER — HOSPITAL ENCOUNTER (INPATIENT)
Facility: HOSPITAL | Age: 80
LOS: 3 days | Discharge: HOME | DRG: 809 | End: 2025-08-05
Attending: EMERGENCY MEDICINE | Admitting: INTERNAL MEDICINE
Payer: MEDICARE

## 2025-08-02 DIAGNOSIS — I47.29 NONSUSTAINED VENTRICULAR TACHYCARDIA: Primary | ICD-10-CM

## 2025-08-02 DIAGNOSIS — D72.819 LEUKOPENIA, UNSPECIFIED TYPE: ICD-10-CM

## 2025-08-02 DIAGNOSIS — R26.2 DIFFICULTY IN WALKING: ICD-10-CM

## 2025-08-02 DIAGNOSIS — D69.6 THROMBOCYTOPENIA: ICD-10-CM

## 2025-08-02 DIAGNOSIS — C90.00 MULTIPLE MYELOMA, REMISSION STATUS UNSPECIFIED: ICD-10-CM

## 2025-08-02 DIAGNOSIS — D64.9 SYMPTOMATIC ANEMIA: ICD-10-CM

## 2025-08-02 LAB
ALBUMIN SERPL-MCNC: 4 G/DL (ref 3.5–5.2)
ALBUMIN/GLOB SERPL: 2.1 G/DL
ALP SERPL-CCNC: 87 U/L (ref 39–117)
ALT SERPL W P-5'-P-CCNC: 36 U/L (ref 1–41)
ANION GAP SERPL CALCULATED.3IONS-SCNC: 13.5 MMOL/L (ref 5–15)
ANISOCYTOSIS BLD QL: NORMAL
AST SERPL-CCNC: 13 U/L (ref 1–40)
BASOPHILS # BLD AUTO: 0 10*3/MM3 (ref 0–0.2)
BASOPHILS NFR BLD AUTO: 0 % (ref 0–1.5)
BILIRUB SERPL-MCNC: 0.3 MG/DL (ref 0–1.2)
BUN SERPL-MCNC: 62.9 MG/DL (ref 8–23)
BUN/CREAT SERPL: 20.2 (ref 7–25)
CALCIUM SPEC-SCNC: 7.9 MG/DL (ref 8.6–10.5)
CHLORIDE SERPL-SCNC: 107 MMOL/L (ref 98–107)
CO2 SERPL-SCNC: 18.5 MMOL/L (ref 22–29)
CREAT SERPL-MCNC: 3.11 MG/DL (ref 0.76–1.27)
DEPRECATED RDW RBC AUTO: 72.9 FL (ref 37–54)
EGFRCR SERPLBLD CKD-EPI 2021: 19.7 ML/MIN/1.73
EOSINOPHIL # BLD AUTO: 0 10*3/MM3 (ref 0–0.4)
EOSINOPHIL NFR BLD AUTO: 0 % (ref 0.3–6.2)
ERYTHROCYTE [DISTWIDTH] IN BLOOD BY AUTOMATED COUNT: 19.6 % (ref 12.3–15.4)
GLOBULIN UR ELPH-MCNC: 1.9 GM/DL
GLUCOSE SERPL-MCNC: 117 MG/DL (ref 65–99)
HCT VFR BLD AUTO: 23.1 % (ref 37.5–51)
HGB BLD-MCNC: 7.5 G/DL (ref 13–17.7)
HOLD SPECIMEN: NORMAL
HOLD SPECIMEN: NORMAL
IMM GRANULOCYTES # BLD AUTO: 0.02 10*3/MM3 (ref 0–0.05)
IMM GRANULOCYTES NFR BLD AUTO: 1.9 % (ref 0–0.5)
LYMPHOCYTES # BLD AUTO: 0.27 10*3/MM3 (ref 0.7–3.1)
LYMPHOCYTES NFR BLD AUTO: 25.7 % (ref 19.6–45.3)
MACROCYTES BLD QL SMEAR: NORMAL
MAGNESIUM SERPL-MCNC: 2 MG/DL (ref 1.6–2.4)
MCH RBC QN AUTO: 33 PG (ref 26.6–33)
MCHC RBC AUTO-ENTMCNC: 32.5 G/DL (ref 31.5–35.7)
MCV RBC AUTO: 101.8 FL (ref 79–97)
MONOCYTES # BLD AUTO: 0.08 10*3/MM3 (ref 0.1–0.9)
MONOCYTES NFR BLD AUTO: 7.6 % (ref 5–12)
NEUTROPHILS NFR BLD AUTO: 0.68 10*3/MM3 (ref 1.7–7)
NEUTROPHILS NFR BLD AUTO: 64.8 % (ref 42.7–76)
NRBC BLD AUTO-RTO: 0 /100 WBC (ref 0–0.2)
NT-PROBNP SERPL-MCNC: 3080 PG/ML (ref 0–1800)
PLATELET # BLD AUTO: 11 10*3/MM3 (ref 140–450)
PMV BLD AUTO: 10.9 FL (ref 6–12)
POTASSIUM SERPL-SCNC: 4.3 MMOL/L (ref 3.5–5.2)
PROT SERPL-MCNC: 5.9 G/DL (ref 6–8.5)
QT INTERVAL: 422 MS
QTC INTERVAL: 522 MS
RBC # BLD AUTO: 2.27 10*6/MM3 (ref 4.14–5.8)
SMALL PLATELETS BLD QL SMEAR: NORMAL
SODIUM SERPL-SCNC: 139 MMOL/L (ref 136–145)
TROPONIN T SERPL HS-MCNC: 48 NG/L
WBC MORPH BLD: NORMAL
WBC NRBC COR # BLD AUTO: 1.05 10*3/MM3 (ref 3.4–10.8)
WHOLE BLOOD HOLD COAG: NORMAL
WHOLE BLOOD HOLD SPECIMEN: NORMAL

## 2025-08-02 PROCEDURE — 84484 ASSAY OF TROPONIN QUANT: CPT | Performed by: EMERGENCY MEDICINE

## 2025-08-02 PROCEDURE — 85007 BL SMEAR W/DIFF WBC COUNT: CPT | Performed by: EMERGENCY MEDICINE

## 2025-08-02 PROCEDURE — 71045 X-RAY EXAM CHEST 1 VIEW: CPT

## 2025-08-02 PROCEDURE — 93005 ELECTROCARDIOGRAM TRACING: CPT | Performed by: EMERGENCY MEDICINE

## 2025-08-02 PROCEDURE — 84484 ASSAY OF TROPONIN QUANT: CPT | Performed by: INTERNAL MEDICINE

## 2025-08-02 PROCEDURE — 85025 COMPLETE CBC W/AUTO DIFF WBC: CPT | Performed by: EMERGENCY MEDICINE

## 2025-08-02 PROCEDURE — 83735 ASSAY OF MAGNESIUM: CPT | Performed by: EMERGENCY MEDICINE

## 2025-08-02 PROCEDURE — 36415 COLL VENOUS BLD VENIPUNCTURE: CPT

## 2025-08-02 PROCEDURE — 80053 COMPREHEN METABOLIC PANEL: CPT | Performed by: EMERGENCY MEDICINE

## 2025-08-02 PROCEDURE — 99291 CRITICAL CARE FIRST HOUR: CPT

## 2025-08-02 PROCEDURE — 83880 ASSAY OF NATRIURETIC PEPTIDE: CPT | Performed by: EMERGENCY MEDICINE

## 2025-08-02 RX ORDER — NALOXONE HCL 0.4 MG/ML
0.4 VIAL (ML) INJECTION
Status: DISCONTINUED | OUTPATIENT
Start: 2025-08-02 | End: 2025-08-05 | Stop reason: HOSPADM

## 2025-08-02 RX ORDER — AMOXICILLIN 250 MG
2 CAPSULE ORAL 2 TIMES DAILY PRN
Status: DISCONTINUED | OUTPATIENT
Start: 2025-08-02 | End: 2025-08-05 | Stop reason: HOSPADM

## 2025-08-02 RX ORDER — MORPHINE SULFATE 2 MG/ML
2 INJECTION, SOLUTION INTRAMUSCULAR; INTRAVENOUS EVERY 4 HOURS PRN
Status: DISCONTINUED | OUTPATIENT
Start: 2025-08-02 | End: 2025-08-05 | Stop reason: HOSPADM

## 2025-08-02 RX ORDER — POLYETHYLENE GLYCOL 3350 17 G/17G
17 POWDER, FOR SOLUTION ORAL DAILY PRN
Status: DISCONTINUED | OUTPATIENT
Start: 2025-08-02 | End: 2025-08-05 | Stop reason: HOSPADM

## 2025-08-02 RX ORDER — BISACODYL 5 MG/1
5 TABLET, DELAYED RELEASE ORAL DAILY PRN
Status: DISCONTINUED | OUTPATIENT
Start: 2025-08-02 | End: 2025-08-05 | Stop reason: HOSPADM

## 2025-08-02 RX ORDER — TAMSULOSIN HYDROCHLORIDE 0.4 MG/1
0.4 CAPSULE ORAL DAILY
Status: DISCONTINUED | OUTPATIENT
Start: 2025-08-03 | End: 2025-08-05 | Stop reason: HOSPADM

## 2025-08-02 RX ORDER — FLUCONAZOLE 100 MG/1
200 TABLET ORAL DAILY
Status: DISCONTINUED | OUTPATIENT
Start: 2025-08-03 | End: 2025-08-05 | Stop reason: HOSPADM

## 2025-08-02 RX ORDER — FLUCONAZOLE 200 MG/1
400 TABLET ORAL DAILY
COMMUNITY
Start: 2025-07-22

## 2025-08-02 RX ORDER — TEMAZEPAM 15 MG/1
15 CAPSULE ORAL NIGHTLY PRN
Status: DISCONTINUED | OUTPATIENT
Start: 2025-08-02 | End: 2025-08-05 | Stop reason: HOSPADM

## 2025-08-02 RX ORDER — SODIUM CHLORIDE 0.9 % (FLUSH) 0.9 %
10 SYRINGE (ML) INJECTION AS NEEDED
Status: DISCONTINUED | OUTPATIENT
Start: 2025-08-02 | End: 2025-08-05 | Stop reason: HOSPADM

## 2025-08-02 RX ORDER — CALCIUM GLUCONATE 20 MG/ML
1000 INJECTION, SOLUTION INTRAVENOUS ONCE
Status: COMPLETED | OUTPATIENT
Start: 2025-08-02 | End: 2025-08-03

## 2025-08-02 RX ORDER — ACETAMINOPHEN 650 MG/1
650 SUPPOSITORY RECTAL EVERY 4 HOURS PRN
Status: DISCONTINUED | OUTPATIENT
Start: 2025-08-02 | End: 2025-08-05 | Stop reason: HOSPADM

## 2025-08-02 RX ORDER — ACYCLOVIR 200 MG/1
200 CAPSULE ORAL 2 TIMES DAILY
COMMUNITY
Start: 2025-07-22

## 2025-08-02 RX ORDER — BISACODYL 10 MG
10 SUPPOSITORY, RECTAL RECTAL DAILY PRN
Status: DISCONTINUED | OUTPATIENT
Start: 2025-08-02 | End: 2025-08-05 | Stop reason: HOSPADM

## 2025-08-02 RX ORDER — ACETAMINOPHEN 325 MG/1
650 TABLET ORAL EVERY 4 HOURS PRN
Status: DISCONTINUED | OUTPATIENT
Start: 2025-08-02 | End: 2025-08-05 | Stop reason: HOSPADM

## 2025-08-02 RX ORDER — ATORVASTATIN CALCIUM 10 MG/1
10 TABLET, FILM COATED ORAL NIGHTLY
Status: DISCONTINUED | OUTPATIENT
Start: 2025-08-03 | End: 2025-08-05 | Stop reason: HOSPADM

## 2025-08-02 RX ORDER — SODIUM CHLORIDE 9 MG/ML
40 INJECTION, SOLUTION INTRAVENOUS AS NEEDED
Status: DISCONTINUED | OUTPATIENT
Start: 2025-08-02 | End: 2025-08-05 | Stop reason: HOSPADM

## 2025-08-02 RX ORDER — ALUMINA, MAGNESIA, AND SIMETHICONE 2400; 2400; 240 MG/30ML; MG/30ML; MG/30ML
15 SUSPENSION ORAL EVERY 6 HOURS PRN
Status: DISCONTINUED | OUTPATIENT
Start: 2025-08-02 | End: 2025-08-05 | Stop reason: HOSPADM

## 2025-08-02 RX ORDER — FAMOTIDINE 20 MG/1
40 TABLET, FILM COATED ORAL DAILY
Status: DISCONTINUED | OUTPATIENT
Start: 2025-08-03 | End: 2025-08-05 | Stop reason: HOSPADM

## 2025-08-02 RX ORDER — ONDANSETRON 2 MG/ML
4 INJECTION INTRAMUSCULAR; INTRAVENOUS EVERY 4 HOURS PRN
Status: DISCONTINUED | OUTPATIENT
Start: 2025-08-02 | End: 2025-08-05 | Stop reason: HOSPADM

## 2025-08-02 RX ORDER — SODIUM CHLORIDE 0.9 % (FLUSH) 0.9 %
10 SYRINGE (ML) INJECTION EVERY 12 HOURS SCHEDULED
Status: DISCONTINUED | OUTPATIENT
Start: 2025-08-03 | End: 2025-08-05 | Stop reason: HOSPADM

## 2025-08-02 RX ORDER — ALPRAZOLAM 0.25 MG
0.25 TABLET ORAL EVERY 6 HOURS PRN
Status: DISCONTINUED | OUTPATIENT
Start: 2025-08-02 | End: 2025-08-05 | Stop reason: HOSPADM

## 2025-08-02 RX ORDER — GABAPENTIN 400 MG/1
400 CAPSULE ORAL EVERY 12 HOURS SCHEDULED
Status: DISCONTINUED | OUTPATIENT
Start: 2025-08-03 | End: 2025-08-05 | Stop reason: HOSPADM

## 2025-08-02 RX ORDER — AMLODIPINE BESYLATE 10 MG/1
10 TABLET ORAL DAILY
COMMUNITY
Start: 2025-07-22

## 2025-08-02 RX ORDER — MONTELUKAST SODIUM 10 MG/1
10 TABLET ORAL NIGHTLY
Status: DISCONTINUED | OUTPATIENT
Start: 2025-08-03 | End: 2025-08-05 | Stop reason: HOSPADM

## 2025-08-02 RX ORDER — NITROGLYCERIN 0.4 MG/1
0.4 TABLET SUBLINGUAL
Status: DISCONTINUED | OUTPATIENT
Start: 2025-08-02 | End: 2025-08-05 | Stop reason: HOSPADM

## 2025-08-02 RX ORDER — ACETAMINOPHEN 160 MG/5ML
650 SOLUTION ORAL EVERY 4 HOURS PRN
Status: DISCONTINUED | OUTPATIENT
Start: 2025-08-02 | End: 2025-08-05 | Stop reason: HOSPADM

## 2025-08-02 RX ORDER — ALLOPURINOL 100 MG/1
100 TABLET ORAL DAILY
Status: DISCONTINUED | OUTPATIENT
Start: 2025-08-03 | End: 2025-08-03

## 2025-08-02 RX ORDER — OXYBUTYNIN CHLORIDE 5 MG/1
2.5 TABLET ORAL 2 TIMES DAILY
Status: DISCONTINUED | OUTPATIENT
Start: 2025-08-03 | End: 2025-08-03

## 2025-08-03 ENCOUNTER — APPOINTMENT (OUTPATIENT)
Dept: CARDIOLOGY | Facility: HOSPITAL | Age: 80
DRG: 809 | End: 2025-08-03
Payer: MEDICARE

## 2025-08-03 LAB
ABO GROUP BLD: NORMAL
ALBUMIN SERPL-MCNC: 3.3 G/DL (ref 3.5–5.2)
ALBUMIN/GLOB SERPL: 1.9 G/DL
ALP SERPL-CCNC: 73 U/L (ref 39–117)
ALT SERPL W P-5'-P-CCNC: 28 U/L (ref 1–41)
ANION GAP SERPL CALCULATED.3IONS-SCNC: 9.5 MMOL/L (ref 5–15)
AORTIC DIMENSIONLESS INDEX: 0.64 (DI)
AST SERPL-CCNC: 11 U/L (ref 1–40)
AV MEAN PRESS GRAD SYS DOP V1V2: 4 MMHG
AV VMAX SYS DOP: 141 CM/SEC
BASOPHILS # BLD AUTO: 0 10*3/MM3 (ref 0–0.2)
BASOPHILS NFR BLD AUTO: 0 % (ref 0–1.5)
BH CV ECHO MEAS - AO MAX PG: 8 MMHG
BH CV ECHO MEAS - AO ROOT DIAM: 3.9 CM
BH CV ECHO MEAS - AO V2 VTI: 26.7 CM
BH CV ECHO MEAS - AVA(I,D): 2.45 CM2
BH CV ECHO MEAS - EDV(CUBED): 132.7 ML
BH CV ECHO MEAS - EDV(MOD-SP2): 104 ML
BH CV ECHO MEAS - EDV(MOD-SP4): 84.8 ML
BH CV ECHO MEAS - EF(MOD-SP2): 59.8 %
BH CV ECHO MEAS - EF(MOD-SP4): 64.3 %
BH CV ECHO MEAS - ESV(CUBED): 35.9 ML
BH CV ECHO MEAS - ESV(MOD-SP2): 41.8 ML
BH CV ECHO MEAS - ESV(MOD-SP4): 30.3 ML
BH CV ECHO MEAS - FS: 35.3 %
BH CV ECHO MEAS - IVS/LVPW: 0.77 CM
BH CV ECHO MEAS - IVSD: 1 CM
BH CV ECHO MEAS - LA DIMENSION: 4.1 CM
BH CV ECHO MEAS - LAT PEAK E' VEL: 11.6 CM/SEC
BH CV ECHO MEAS - LV DIASTOLIC VOL/BSA (35-75): 38.7 CM2
BH CV ECHO MEAS - LV MASS(C)D: 227.4 GRAMS
BH CV ECHO MEAS - LV MAX PG: 3.8 MMHG
BH CV ECHO MEAS - LV MEAN PG: 2 MMHG
BH CV ECHO MEAS - LV SYSTOLIC VOL/BSA (12-30): 13.8 CM2
BH CV ECHO MEAS - LV V1 MAX: 97.4 CM/SEC
BH CV ECHO MEAS - LV V1 VTI: 17.2 CM
BH CV ECHO MEAS - LVIDD: 5.1 CM
BH CV ECHO MEAS - LVIDS: 3.3 CM
BH CV ECHO MEAS - LVOT AREA: 3.8 CM2
BH CV ECHO MEAS - LVOT DIAM: 2.2 CM
BH CV ECHO MEAS - LVPWD: 1.3 CM
BH CV ECHO MEAS - MED PEAK E' VEL: 6.5 CM/SEC
BH CV ECHO MEAS - MV A MAX VEL: 95.9 CM/SEC
BH CV ECHO MEAS - MV DEC SLOPE: 564 CM/SEC2
BH CV ECHO MEAS - MV DEC TIME: 0.17 SEC
BH CV ECHO MEAS - MV E MAX VEL: 97.5 CM/SEC
BH CV ECHO MEAS - MV E/A: 1.02
BH CV ECHO MEAS - MV MEAN PG: 2 MMHG
BH CV ECHO MEAS - MV V2 VTI: 29.5 CM
BH CV ECHO MEAS - MVA(VTI): 2.22 CM2
BH CV ECHO MEAS - RVDD: 3.1 CM
BH CV ECHO MEAS - SV(LVOT): 65.4 ML
BH CV ECHO MEAS - SV(MOD-SP2): 62.2 ML
BH CV ECHO MEAS - SV(MOD-SP4): 54.5 ML
BH CV ECHO MEAS - SVI(LVOT): 29.8 ML/M2
BH CV ECHO MEAS - SVI(MOD-SP2): 28.4 ML/M2
BH CV ECHO MEAS - SVI(MOD-SP4): 24.9 ML/M2
BH CV ECHO MEAS - TAPSE (>1.6): 1.02 CM
BH CV ECHO MEAS - TR MAX PG: 32.7 MMHG
BH CV ECHO MEAS - TR MAX VEL: 286 CM/SEC
BH CV ECHO MEASUREMENTS AVERAGE E/E' RATIO: 10.77
BILIRUB SERPL-MCNC: 0.3 MG/DL (ref 0–1.2)
BLD GP AB SCN SERPL QL: POSITIVE
BUN SERPL-MCNC: 53.4 MG/DL (ref 8–23)
BUN/CREAT SERPL: 18.9 (ref 7–25)
CALCIUM SPEC-SCNC: 7.4 MG/DL (ref 8.6–10.5)
CHLORIDE SERPL-SCNC: 111 MMOL/L (ref 98–107)
CO2 SERPL-SCNC: 18.5 MMOL/L (ref 22–29)
CREAT SERPL-MCNC: 2.83 MG/DL (ref 0.76–1.27)
DEPRECATED RDW RBC AUTO: 70.1 FL (ref 37–54)
EGFRCR SERPLBLD CKD-EPI 2021: 22.1 ML/MIN/1.73
EOSINOPHIL # BLD AUTO: 0 10*3/MM3 (ref 0–0.4)
EOSINOPHIL NFR BLD AUTO: 0 % (ref 0.3–6.2)
ERYTHROCYTE [DISTWIDTH] IN BLOOD BY AUTOMATED COUNT: 19.2 % (ref 12.3–15.4)
GEN 5 1HR TROPONIN T REFLEX: 47 NG/L
GLOBULIN UR ELPH-MCNC: 1.7 GM/DL
GLUCOSE SERPL-MCNC: 108 MG/DL (ref 65–99)
HCT VFR BLD AUTO: 20.3 % (ref 37.5–51)
HGB BLD-MCNC: 6.7 G/DL (ref 13–17.7)
IMM GRANULOCYTES # BLD AUTO: 0.01 10*3/MM3 (ref 0–0.05)
IMM GRANULOCYTES NFR BLD AUTO: 1.1 % (ref 0–0.5)
IVRT: 63 MS
LEFT ATRIUM VOLUME INDEX: 30.5 ML/M2
LV EF BIPLANE MOD: 60.5 %
LYMPHOCYTES # BLD AUTO: 0.24 10*3/MM3 (ref 0.7–3.1)
LYMPHOCYTES NFR BLD AUTO: 26.4 % (ref 19.6–45.3)
MCH RBC QN AUTO: 33.3 PG (ref 26.6–33)
MCHC RBC AUTO-ENTMCNC: 33 G/DL (ref 31.5–35.7)
MCV RBC AUTO: 101 FL (ref 79–97)
MONOCYTES # BLD AUTO: 0.05 10*3/MM3 (ref 0.1–0.9)
MONOCYTES NFR BLD AUTO: 5.5 % (ref 5–12)
NEUTROPHILS NFR BLD AUTO: 0.61 10*3/MM3 (ref 1.7–7)
NEUTROPHILS NFR BLD AUTO: 67 % (ref 42.7–76)
NRBC BLD AUTO-RTO: 0 /100 WBC (ref 0–0.2)
PLATELET # BLD AUTO: 10 10*3/MM3 (ref 140–450)
PMV BLD AUTO: 12.9 FL (ref 6–12)
POTASSIUM SERPL-SCNC: 4.4 MMOL/L (ref 3.5–5.2)
PROT SERPL-MCNC: 5 G/DL (ref 6–8.5)
RBC # BLD AUTO: 2.01 10*6/MM3 (ref 4.14–5.8)
RH BLD: POSITIVE
SODIUM SERPL-SCNC: 139 MMOL/L (ref 136–145)
T&S EXPIRATION DATE: NORMAL
TROPONIN T % DELTA: -2
TROPONIN T NUMERIC DELTA: -1 NG/L
TROPONIN T SERPL HS-MCNC: 49 NG/L
WBC NRBC COR # BLD AUTO: 0.91 10*3/MM3 (ref 3.4–10.8)

## 2025-08-03 PROCEDURE — 86901 BLOOD TYPING SEROLOGIC RH(D): CPT

## 2025-08-03 PROCEDURE — 86921 COMPATIBILITY TEST INCUBATE: CPT

## 2025-08-03 PROCEDURE — 25810000003 SODIUM CHLORIDE 0.9 % SOLUTION: Performed by: EMERGENCY MEDICINE

## 2025-08-03 PROCEDURE — 80053 COMPREHEN METABOLIC PANEL: CPT | Performed by: INTERNAL MEDICINE

## 2025-08-03 PROCEDURE — 93306 TTE W/DOPPLER COMPLETE: CPT | Performed by: INTERNAL MEDICINE

## 2025-08-03 PROCEDURE — 86922 COMPATIBILITY TEST ANTIGLOB: CPT

## 2025-08-03 PROCEDURE — 86900 BLOOD TYPING SEROLOGIC ABO: CPT

## 2025-08-03 PROCEDURE — P9037 PLATE PHERES LEUKOREDU IRRAD: HCPCS

## 2025-08-03 PROCEDURE — 93306 TTE W/DOPPLER COMPLETE: CPT

## 2025-08-03 PROCEDURE — 25010000002 CALCIUM GLUCONATE-NACL 1-0.675 GM/50ML-% SOLUTION: Performed by: EMERGENCY MEDICINE

## 2025-08-03 PROCEDURE — 36430 TRANSFUSION BLD/BLD COMPNT: CPT

## 2025-08-03 PROCEDURE — 86870 RBC ANTIBODY IDENTIFICATION: CPT | Performed by: INTERNAL MEDICINE

## 2025-08-03 PROCEDURE — 84484 ASSAY OF TROPONIN QUANT: CPT | Performed by: INTERNAL MEDICINE

## 2025-08-03 PROCEDURE — 86901 BLOOD TYPING SEROLOGIC RH(D): CPT | Performed by: INTERNAL MEDICINE

## 2025-08-03 PROCEDURE — P9100 PATHOGEN TEST FOR PLATELETS: HCPCS

## 2025-08-03 PROCEDURE — 86850 RBC ANTIBODY SCREEN: CPT

## 2025-08-03 PROCEDURE — 86850 RBC ANTIBODY SCREEN: CPT | Performed by: INTERNAL MEDICINE

## 2025-08-03 PROCEDURE — 86870 RBC ANTIBODY IDENTIFICATION: CPT

## 2025-08-03 PROCEDURE — 86900 BLOOD TYPING SEROLOGIC ABO: CPT | Performed by: INTERNAL MEDICINE

## 2025-08-03 PROCEDURE — 85025 COMPLETE CBC W/AUTO DIFF WBC: CPT | Performed by: INTERNAL MEDICINE

## 2025-08-03 PROCEDURE — 86970 RBC PRETX INCUBATJ W/CHEMICL: CPT

## 2025-08-03 PROCEDURE — 99222 1ST HOSP IP/OBS MODERATE 55: CPT | Performed by: INTERNAL MEDICINE

## 2025-08-03 PROCEDURE — 86860 RBC ANTIBODY ELUTION: CPT

## 2025-08-03 PROCEDURE — 86880 COOMBS TEST DIRECT: CPT

## 2025-08-03 PROCEDURE — 86920 COMPATIBILITY TEST SPIN: CPT

## 2025-08-03 RX ORDER — ALLOPURINOL 100 MG/1
100 TABLET ORAL DAILY
Status: DISCONTINUED | OUTPATIENT
Start: 2025-08-03 | End: 2025-08-05 | Stop reason: HOSPADM

## 2025-08-03 RX ORDER — CYCLOPHOSPHAMIDE 50 MG/1
50 CAPSULE ORAL TAKE AS DIRECTED
COMMUNITY
Start: 2025-07-30

## 2025-08-03 RX ORDER — LEVOFLOXACIN 250 MG/1
250 TABLET, FILM COATED ORAL DAILY PRN
COMMUNITY
Start: 2025-07-22 | End: 2025-09-20

## 2025-08-03 RX ORDER — FOLIC ACID 1 MG/1
1 TABLET ORAL DAILY
COMMUNITY
Start: 2025-07-09

## 2025-08-03 RX ADMIN — GABAPENTIN 400 MG: 400 CAPSULE ORAL at 00:44

## 2025-08-03 RX ADMIN — MONTELUKAST 10 MG: 10 TABLET, FILM COATED ORAL at 00:44

## 2025-08-03 RX ADMIN — GABAPENTIN 400 MG: 400 CAPSULE ORAL at 08:22

## 2025-08-03 RX ADMIN — ALLOPURINOL 100 MG: 100 TABLET ORAL at 12:17

## 2025-08-03 RX ADMIN — ATORVASTATIN CALCIUM 10 MG: 10 TABLET, FILM COATED ORAL at 21:28

## 2025-08-03 RX ADMIN — TAMSULOSIN HYDROCHLORIDE 0.4 MG: 0.4 CAPSULE ORAL at 08:22

## 2025-08-03 RX ADMIN — Medication 10 ML: at 00:45

## 2025-08-03 RX ADMIN — FAMOTIDINE 40 MG: 20 TABLET, FILM COATED ORAL at 08:22

## 2025-08-03 RX ADMIN — GABAPENTIN 400 MG: 400 CAPSULE ORAL at 21:28

## 2025-08-03 RX ADMIN — SODIUM CHLORIDE 1000 ML: 9 INJECTION, SOLUTION INTRAVENOUS at 00:46

## 2025-08-03 RX ADMIN — Medication 10 ML: at 21:28

## 2025-08-03 RX ADMIN — MONTELUKAST 10 MG: 10 TABLET, FILM COATED ORAL at 21:28

## 2025-08-03 RX ADMIN — CALCIUM GLUCONATE 1000 MG: 20 INJECTION, SOLUTION INTRAVENOUS at 00:45

## 2025-08-03 RX ADMIN — FLUCONAZOLE 200 MG: 100 TABLET ORAL at 10:12

## 2025-08-03 RX ADMIN — Medication 10 ML: at 10:13

## 2025-08-03 RX ADMIN — ATORVASTATIN CALCIUM 10 MG: 10 TABLET, FILM COATED ORAL at 00:44

## 2025-08-04 LAB
BH BB BLOOD EXPIRATION DATE: NORMAL
BH BB BLOOD EXPIRATION DATE: NORMAL
BH BB BLOOD TYPE BARCODE: 6200
BH BB BLOOD TYPE BARCODE: 6200
BH BB DISPENSE STATUS: NORMAL
BH BB DISPENSE STATUS: NORMAL
BH BB PRODUCT CODE: NORMAL
BH BB PRODUCT CODE: NORMAL
BH BB UNIT NUMBER: NORMAL
BH BB UNIT NUMBER: NORMAL
HCT VFR BLD AUTO: 25.3 % (ref 37.5–51)
HGB BLD-MCNC: 8.4 G/DL (ref 13–17.7)
PASSIVE ANTI-CD-38: NORMAL
UNIT  ABO: NORMAL
UNIT  ABO: NORMAL
UNIT  RH: NORMAL
UNIT  RH: NORMAL

## 2025-08-04 PROCEDURE — 36430 TRANSFUSION BLD/BLD COMPNT: CPT

## 2025-08-04 PROCEDURE — P9040 RBC LEUKOREDUCED IRRADIATED: HCPCS

## 2025-08-04 PROCEDURE — 85018 HEMOGLOBIN: CPT | Performed by: INTERNAL MEDICINE

## 2025-08-04 PROCEDURE — 85014 HEMATOCRIT: CPT | Performed by: INTERNAL MEDICINE

## 2025-08-04 PROCEDURE — 25010000002 EPOETIN ALFA PER 1000 UNITS: Performed by: INTERNAL MEDICINE

## 2025-08-04 PROCEDURE — 97161 PT EVAL LOW COMPLEX 20 MIN: CPT

## 2025-08-04 PROCEDURE — 86900 BLOOD TYPING SEROLOGIC ABO: CPT

## 2025-08-04 PROCEDURE — 25010000002 FILGRASTIM PER 480 MCG: Performed by: INTERNAL MEDICINE

## 2025-08-04 RX ADMIN — Medication 10 ML: at 20:56

## 2025-08-04 RX ADMIN — FILGRASTIM 480 MCG: 480 INJECTION, SOLUTION INTRAVENOUS; SUBCUTANEOUS at 16:38

## 2025-08-04 RX ADMIN — GABAPENTIN 400 MG: 400 CAPSULE ORAL at 08:05

## 2025-08-04 RX ADMIN — TAMSULOSIN HYDROCHLORIDE 0.4 MG: 0.4 CAPSULE ORAL at 08:05

## 2025-08-04 RX ADMIN — GABAPENTIN 400 MG: 400 CAPSULE ORAL at 20:31

## 2025-08-04 RX ADMIN — ALLOPURINOL 100 MG: 100 TABLET ORAL at 08:05

## 2025-08-04 RX ADMIN — Medication 10 ML: at 08:05

## 2025-08-04 RX ADMIN — ERYTHROPOIETIN 40000 UNITS: 40000 INJECTION, SOLUTION INTRAVENOUS; SUBCUTANEOUS at 09:30

## 2025-08-04 RX ADMIN — FLUCONAZOLE 200 MG: 100 TABLET ORAL at 08:05

## 2025-08-04 RX ADMIN — FAMOTIDINE 40 MG: 20 TABLET, FILM COATED ORAL at 08:05

## 2025-08-04 RX ADMIN — ATORVASTATIN CALCIUM 10 MG: 10 TABLET, FILM COATED ORAL at 20:31

## 2025-08-04 RX ADMIN — MONTELUKAST 10 MG: 10 TABLET, FILM COATED ORAL at 20:31

## 2025-08-04 RX ADMIN — SENNOSIDES, DOCUSATE SODIUM 2 TABLET: 50; 8.6 TABLET, FILM COATED ORAL at 17:07

## 2025-08-05 ENCOUNTER — READMISSION MANAGEMENT (OUTPATIENT)
Dept: CALL CENTER | Facility: HOSPITAL | Age: 80
End: 2025-08-05
Payer: MEDICARE

## 2025-08-05 VITALS
TEMPERATURE: 98.2 F | HEIGHT: 69 IN | OXYGEN SATURATION: 95 % | WEIGHT: 212.3 LBS | SYSTOLIC BLOOD PRESSURE: 127 MMHG | DIASTOLIC BLOOD PRESSURE: 59 MMHG | RESPIRATION RATE: 20 BRPM | BODY MASS INDEX: 31.44 KG/M2 | HEART RATE: 73 BPM

## 2025-08-05 LAB
ALBUMIN SERPL-MCNC: 3.4 G/DL (ref 3.5–5.2)
ALBUMIN/GLOB SERPL: 2.4 G/DL
ALP SERPL-CCNC: 79 U/L (ref 39–117)
ALT SERPL W P-5'-P-CCNC: 37 U/L (ref 1–41)
ANION GAP SERPL CALCULATED.3IONS-SCNC: 10.1 MMOL/L (ref 5–15)
AST SERPL-CCNC: 21 U/L (ref 1–40)
BASOPHILS # BLD AUTO: 0.01 10*3/MM3 (ref 0–0.2)
BASOPHILS NFR BLD AUTO: 0.7 % (ref 0–1.5)
BH BB BLOOD EXPIRATION DATE: NORMAL
BH BB BLOOD EXPIRATION DATE: NORMAL
BH BB BLOOD TYPE BARCODE: 6200
BH BB BLOOD TYPE BARCODE: 6200
BH BB DISPENSE STATUS: NORMAL
BH BB DISPENSE STATUS: NORMAL
BH BB PRODUCT CODE: NORMAL
BH BB PRODUCT CODE: NORMAL
BH BB UNIT NUMBER: NORMAL
BH BB UNIT NUMBER: NORMAL
BILIRUB SERPL-MCNC: 0.4 MG/DL (ref 0–1.2)
BUN SERPL-MCNC: 42.4 MG/DL (ref 8–23)
BUN/CREAT SERPL: 18.2 (ref 7–25)
CALCIUM SPEC-SCNC: 7.1 MG/DL (ref 8.6–10.5)
CHLORIDE SERPL-SCNC: 110 MMOL/L (ref 98–107)
CO2 SERPL-SCNC: 18.9 MMOL/L (ref 22–29)
CREAT SERPL-MCNC: 2.33 MG/DL (ref 0.76–1.27)
CROSSMATCH INTERPRETATION: NORMAL
CROSSMATCH INTERPRETATION: NORMAL
DEPRECATED RDW RBC AUTO: 71.5 FL (ref 37–54)
DOHLE BOD BLD QL SMEAR: PRESENT
EGFRCR SERPLBLD CKD-EPI 2021: 27.7 ML/MIN/1.73
EOSINOPHIL # BLD AUTO: 0.02 10*3/MM3 (ref 0–0.4)
EOSINOPHIL NFR BLD AUTO: 1.5 % (ref 0.3–6.2)
ERYTHROCYTE [DISTWIDTH] IN BLOOD BY AUTOMATED COUNT: 20.3 % (ref 12.3–15.4)
GLOBULIN UR ELPH-MCNC: 1.4 GM/DL
GLUCOSE SERPL-MCNC: 85 MG/DL (ref 65–99)
HCT VFR BLD AUTO: 25.5 % (ref 37.5–51)
HGB BLD-MCNC: 8.3 G/DL (ref 13–17.7)
IMM GRANULOCYTES # BLD AUTO: 0.19 10*3/MM3 (ref 0–0.05)
IMM GRANULOCYTES NFR BLD AUTO: 14 % (ref 0–0.5)
LYMPHOCYTES # BLD AUTO: 0.3 10*3/MM3 (ref 0.7–3.1)
LYMPHOCYTES NFR BLD AUTO: 22.1 % (ref 19.6–45.3)
MAGNESIUM SERPL-MCNC: 1.6 MG/DL (ref 1.6–2.4)
MCH RBC QN AUTO: 31.7 PG (ref 26.6–33)
MCHC RBC AUTO-ENTMCNC: 32.5 G/DL (ref 31.5–35.7)
MCV RBC AUTO: 97.3 FL (ref 79–97)
MONOCYTES # BLD AUTO: 0.06 10*3/MM3 (ref 0.1–0.9)
MONOCYTES NFR BLD AUTO: 4.4 % (ref 5–12)
NEUTROPHILS NFR BLD AUTO: 0.78 10*3/MM3 (ref 1.7–7)
NEUTROPHILS NFR BLD AUTO: 57.3 % (ref 42.7–76)
NRBC BLD AUTO-RTO: 1.5 /100 WBC (ref 0–0.2)
PLATELET # BLD AUTO: 28 10*3/MM3 (ref 140–450)
PMV BLD AUTO: 11.2 FL (ref 6–12)
POTASSIUM SERPL-SCNC: 4.4 MMOL/L (ref 3.5–5.2)
PROT SERPL-MCNC: 4.8 G/DL (ref 6–8.5)
RBC # BLD AUTO: 2.62 10*6/MM3 (ref 4.14–5.8)
RBC MORPH BLD: NORMAL
SMALL PLATELETS BLD QL SMEAR: NORMAL
SODIUM SERPL-SCNC: 139 MMOL/L (ref 136–145)
UNIT  ABO: NORMAL
UNIT  ABO: NORMAL
UNIT  RH: NORMAL
UNIT  RH: NORMAL
WBC NRBC COR # BLD AUTO: 1.36 10*3/MM3 (ref 3.4–10.8)

## 2025-08-05 PROCEDURE — 85007 BL SMEAR W/DIFF WBC COUNT: CPT | Performed by: INTERNAL MEDICINE

## 2025-08-05 PROCEDURE — 83735 ASSAY OF MAGNESIUM: CPT | Performed by: INTERNAL MEDICINE

## 2025-08-05 PROCEDURE — 80053 COMPREHEN METABOLIC PANEL: CPT | Performed by: INTERNAL MEDICINE

## 2025-08-05 PROCEDURE — 85025 COMPLETE CBC W/AUTO DIFF WBC: CPT | Performed by: INTERNAL MEDICINE

## 2025-08-05 RX ORDER — DOCUSATE SODIUM 100 MG/1
100 CAPSULE, LIQUID FILLED ORAL NIGHTLY PRN
Status: DISCONTINUED | OUTPATIENT
Start: 2025-08-05 | End: 2025-08-05 | Stop reason: HOSPADM

## 2025-08-05 RX ORDER — AMLODIPINE BESYLATE 10 MG/1
10 TABLET ORAL DAILY
Status: DISCONTINUED | OUTPATIENT
Start: 2025-08-05 | End: 2025-08-05 | Stop reason: HOSPADM

## 2025-08-05 RX ORDER — LEVOFLOXACIN 250 MG/1
250 TABLET, FILM COATED ORAL EVERY 24 HOURS
Status: DISCONTINUED | OUTPATIENT
Start: 2025-08-05 | End: 2025-08-05 | Stop reason: HOSPADM

## 2025-08-05 RX ORDER — FOLIC ACID 1 MG/1
1 TABLET ORAL DAILY
Status: DISCONTINUED | OUTPATIENT
Start: 2025-08-05 | End: 2025-08-05 | Stop reason: HOSPADM

## 2025-08-05 RX ORDER — ACYCLOVIR 200 MG/1
200 CAPSULE ORAL 2 TIMES DAILY
Status: DISCONTINUED | OUTPATIENT
Start: 2025-08-05 | End: 2025-08-05 | Stop reason: HOSPADM

## 2025-08-05 RX ADMIN — ALLOPURINOL 100 MG: 100 TABLET ORAL at 08:29

## 2025-08-05 RX ADMIN — Medication 10 ML: at 08:29

## 2025-08-05 RX ADMIN — AMLODIPINE BESYLATE 10 MG: 10 TABLET ORAL at 08:29

## 2025-08-05 RX ADMIN — TAMSULOSIN HYDROCHLORIDE 0.4 MG: 0.4 CAPSULE ORAL at 08:29

## 2025-08-05 RX ADMIN — LEVOFLOXACIN 250 MG: 250 TABLET, FILM COATED ORAL at 08:32

## 2025-08-05 RX ADMIN — GABAPENTIN 400 MG: 400 CAPSULE ORAL at 08:29

## 2025-08-05 RX ADMIN — FLUCONAZOLE 200 MG: 100 TABLET ORAL at 08:29

## 2025-08-05 RX ADMIN — FAMOTIDINE 40 MG: 20 TABLET, FILM COATED ORAL at 08:29

## 2025-08-05 RX ADMIN — FOLIC ACID 1 MG: 1 TABLET ORAL at 08:29

## 2025-08-05 RX ADMIN — ACYCLOVIR 200 MG: 200 CAPSULE ORAL at 08:29

## 2025-08-06 ENCOUNTER — READMISSION MANAGEMENT (OUTPATIENT)
Dept: CALL CENTER | Facility: HOSPITAL | Age: 80
End: 2025-08-06
Payer: MEDICARE

## 2025-08-20 LAB
ABO GROUP BLD: NORMAL
BB REFERENCE LAB SENDOUT: NORMAL
BLD GP AB SCN SERPL QL: POSITIVE
RH BLD: POSITIVE
T&S EXPIRATION DATE: NORMAL

## 2025-08-21 LAB
QT INTERVAL: 422 MS
QTC INTERVAL: 522 MS

## (undated) DEVICE — GAUZE,SPONGE,4"X4",16PLY,STRL,LF,10/TRAY: Brand: MEDLINE

## (undated) DEVICE — GLOVE,SURG,SENSICARE SLT,LF,PF,7: Brand: MEDLINE

## (undated) DEVICE — INTENDED FOR TISSUE SEPARATION, AND OTHER PROCEDURES THAT REQUIRE A SHARP SURGICAL BLADE TO PUNCTURE OR CUT.: Brand: BARD-PARKER ® CARBON RIB-BACK BLADES

## (undated) DEVICE — TOTAL ANTERIOR HIP-LF: Brand: MEDLINE INDUSTRIES, INC.

## (undated) DEVICE — SHOULDER P.A.D. III: Brand: DEROYAL

## (undated) DEVICE — DEV ATOMIZATION MUCOSAL/NASALTRACH

## (undated) DEVICE — BLD CUT FORMLA AGGR PLS 4.0MM

## (undated) DEVICE — PCH SURG INVISISHIELD FLD/COL W/DRN/PRT 20X6IN

## (undated) DEVICE — STERILE POLYISOPRENE POWDER-FREE SURGICAL GLOVES: Brand: PROTEXIS

## (undated) DEVICE — PAD GRND REM POLYHESIVE A/ DISP

## (undated) DEVICE — SOL IRR NACL 0.9PCT BT 250ML

## (undated) DEVICE — PENCL E/S SMOKEEVAC W/TELESCP CANN

## (undated) DEVICE — SUT ETHLN 3-0 FS118IN 663H

## (undated) DEVICE — 3M™ IOBAN™ 2 ANTIMICROBIAL INCISE DRAPE 6651EZ: Brand: IOBAN™ 2

## (undated) DEVICE — LINER SURG CANSTR SXN S/RIGD 1500CC

## (undated) DEVICE — Device

## (undated) DEVICE — APPL CHLORAPREP HI/LITE 26ML ORNG

## (undated) DEVICE — Device: Brand: COVER, PERINEAL POST, 12 PK

## (undated) DEVICE — SYS MIX CLEARMIX SGL/DBL VAC

## (undated) DEVICE — TOWEL,OR,DSP,ST,BLUE,STD,4/PK,20PK/CS: Brand: MEDLINE

## (undated) DEVICE — SLV SCD KN/LEN ADJ EXPRSS BLENDED MD 1P/U

## (undated) DEVICE — SCRW ACET CORT TRILOGY S/TAP 6.5X25
Type: IMPLANTABLE DEVICE | Site: ACETABULUM | Status: NON-FUNCTIONAL
Removed: 2024-04-30

## (undated) DEVICE — ZIPPERED TOGA, PEEL-AWAY 2X LARGE: Brand: FLYTE, SURGICOOL

## (undated) DEVICE — DRSNG WND GZ CURAD OIL EMULSION 3X3IN STRL

## (undated) DEVICE — SCRW ACET CORT TRILOGY S/TAP 6.5X30
Type: IMPLANTABLE DEVICE | Site: ACETABULUM | Status: NON-FUNCTIONAL
Removed: 2024-04-30

## (undated) DEVICE — KENDALL SCD EXPRESS SLEEVES, KNEE LENGTH, LARGE: Brand: KENDALL SCD

## (undated) DEVICE — PENCL E/S HNDSWCH ROCKR CB

## (undated) DEVICE — FMS FLUID MANAGEMENT SYSTEM OUTFLOW TUBING WITHOUT ONE-WAY VALVE (FMS VUE OR FMS DUO PLUS): Brand: FMS

## (undated) DEVICE — TRAP,MUCUS SPECIMEN,40CC: Brand: MEDLINE

## (undated) DEVICE — GLOVE,SURG,SENSICARE SLT,LF,PF,6.5: Brand: MEDLINE

## (undated) DEVICE — ELECTRD BLD EXT EDGE 1P COAT 6.5IN STRL

## (undated) DEVICE — BUR BRL FORMLA 12FLUT 4MM

## (undated) DEVICE — CUP SPECI 4OZ LF STRL

## (undated) DEVICE — SUT VIC UD BR COAT 0 CP2 27IN

## (undated) DEVICE — GLV SURG SENSICARE PI PF LF 7 GRN STRL

## (undated) DEVICE — SOLIDIFIER LIQLOC PLS 1500CC BT

## (undated) DEVICE — PCH INST SURG INVISISHIELD 2PCKT

## (undated) DEVICE — GLV SURG BIOGEL LTX PF 8 1/2

## (undated) DEVICE — FMS FLUID MANAGEMENT SYSTEM INFLOW TUBING (FMS VUE): Brand: FMS

## (undated) DEVICE — MAT FLR ABS W/BLU/LINER 56X72IN WHT

## (undated) DEVICE — SINGLE USE SUCTION VALVE MAJ-209: Brand: SINGLE USE SUCTION VALVE (STERILE)

## (undated) DEVICE — GLV SURG SENSICARE SLT PF LF 7 STRL

## (undated) DEVICE — BLCK/BITE BLOX WO/DENTL/RIM W/STRAP 54F

## (undated) DEVICE — STRYKER PERFORMANCE SERIES SAGITTAL BLADE: Brand: STRYKER PERFORMANCE SERIES

## (undated) DEVICE — BIPOLAR SEALER 23-112-1 AQM 6.0: Brand: AQUAMANTYS™

## (undated) DEVICE — ANTIBACTERIAL VIOLET BRAIDED (POLYGLACTIN 910), SYNTHETIC ABSORBABLE SURGICAL SUTURE: Brand: COATED VICRYL

## (undated) DEVICE — SYR LUER SLPTP 50ML

## (undated) DEVICE — OSC BEACH CHAIR SHOULDER-LF: Brand: MEDLINE INDUSTRIES, INC.

## (undated) DEVICE — PROXIMATE RH ROTATING HEAD SKIN STAPLERS (35 WIDE) CONTAINS 35 STAINLESS STEEL STAPLES: Brand: PROXIMATE

## (undated) DEVICE — SOL IRR NACL 0.9PCT BT 1000ML

## (undated) DEVICE — SUT VIC 2/0 CT1 36IN

## (undated) DEVICE — CONN JET HYDRA H20 AUXILIARY DISP

## (undated) DEVICE — KT PT POSITION SUPINE HANA/PROFX TABL

## (undated) DEVICE — 90-S CRUISE, SUCTION PROBE, NON-BENDABLE, MAX CUT LEVEL 1: Brand: SERFAS ENERGY

## (undated) DEVICE — SOL IRR NACL 0.9PCT 3000ML

## (undated) DEVICE — PULLOVER TOGA, 2X LARGE: Brand: FLYTE, SURGICOOL

## (undated) DEVICE — DRAPE,TOWEL,LARGE,INVISISHIELD: Brand: MEDLINE

## (undated) DEVICE — DRSNG SURG AQUACEL AG/ADVNTGE 9X25CM 3.5X10IN

## (undated) DEVICE — ENCORE® LATEX ORTHO SIZE 8, STERILE LATEX POWDER-FREE SURGICAL GLOVE: Brand: ENCORE

## (undated) DEVICE — CVR LEG BOOTLEG F/R NOSKID 33IN

## (undated) DEVICE — ELECTRD BLD EDGE COAT 3IN

## (undated) DEVICE — UNDYED BRAIDED (POLYGLACTIN 910), SYNTHETIC ABSORBABLE SUTURE: Brand: COATED VICRYL

## (undated) DEVICE — SUT MNCRYL PLS ANTIB UD 3/0 PS2 27IN

## (undated) DEVICE — SINGLE USE BIOPSY VALVE MAJ-210: Brand: SINGLE USE BIOPSY VALVE (STERILE)

## (undated) DEVICE — GLV SURG ULTRAFREE MAX LTX PF 8